# Patient Record
Sex: FEMALE | Race: BLACK OR AFRICAN AMERICAN | Employment: UNEMPLOYED | URBAN - METROPOLITAN AREA
[De-identification: names, ages, dates, MRNs, and addresses within clinical notes are randomized per-mention and may not be internally consistent; named-entity substitution may affect disease eponyms.]

---

## 2017-01-24 ENCOUNTER — HOSPITAL ENCOUNTER (OUTPATIENT)
Dept: ULTRASOUND IMAGING | Age: 47
Discharge: HOME | End: 2017-01-24
Attending: PHYSICIAN ASSISTANT
Payer: MEDICAID

## 2017-01-24 DIAGNOSIS — M79.89 PAIN AND SWELLING OF RIGHT LOWER LEG: ICD-10-CM

## 2017-01-24 DIAGNOSIS — M79.661 PAIN AND SWELLING OF RIGHT LOWER LEG: ICD-10-CM

## 2017-01-24 PROCEDURE — 93971 EXTREMITY STUDY: CPT

## 2017-01-24 NOTE — PROGRESS NOTES
Results discussed with patient. Advised to wear compression stocking on the R leg to help reduce the swelling, limit sodium in her diet, and stay well hydrated with water. Asked her to call me late next week to let me know how symptoms are doing and if not significantly improved will put in a lab order for BNP to be done at the [facility] while she is in that area for her pulmonology appt.

## 2017-02-22 ENCOUNTER — HOSPITAL ENCOUNTER (OUTPATIENT)
Dept: GENERAL RADIOLOGY | Age: 47
Discharge: HOME | End: 2017-02-22
Attending: PHYSICIAN ASSISTANT
Payer: MEDICAID

## 2017-02-22 PROCEDURE — 71020 XR CHEST PA LAT: CPT

## 2017-03-01 ENCOUNTER — APPOINTMENT (OUTPATIENT)
Dept: GENERAL RADIOLOGY | Age: 47
DRG: 133 | End: 2017-03-01
Attending: EMERGENCY MEDICINE
Payer: MEDICAID

## 2017-03-01 ENCOUNTER — HOSPITAL ENCOUNTER (INPATIENT)
Age: 47
LOS: 4 days | Discharge: HOME | DRG: 133 | End: 2017-03-05
Attending: EMERGENCY MEDICINE | Admitting: INTERNAL MEDICINE
Payer: MEDICAID

## 2017-03-01 DIAGNOSIS — J96.21 ACUTE ON CHRONIC RESPIRATORY FAILURE WITH HYPOXIA AND HYPERCAPNIA (HCC): ICD-10-CM

## 2017-03-01 DIAGNOSIS — J96.22 ACUTE ON CHRONIC RESPIRATORY FAILURE WITH HYPOXIA AND HYPERCAPNIA (HCC): ICD-10-CM

## 2017-03-01 DIAGNOSIS — E11.29 CONTROLLED TYPE 2 DIABETES MELLITUS WITH MICROALBUMINURIA, WITHOUT LONG-TERM CURRENT USE OF INSULIN (HCC): Chronic | ICD-10-CM

## 2017-03-01 DIAGNOSIS — J44.9 COPD, SEVERE (HCC): Chronic | ICD-10-CM

## 2017-03-01 DIAGNOSIS — E66.01 MORBID OBESITY WITH BMI OF 50.0-59.9, ADULT (HCC): Chronic | ICD-10-CM

## 2017-03-01 DIAGNOSIS — R09.02 HYPOXIA: ICD-10-CM

## 2017-03-01 DIAGNOSIS — F17.210 NICOTINE DEPENDENCE, CIGARETTES, UNCOMPLICATED: Chronic | ICD-10-CM

## 2017-03-01 DIAGNOSIS — Z99.89 OSA ON CPAP: Chronic | ICD-10-CM

## 2017-03-01 DIAGNOSIS — E66.2 OBESITY HYPOVENTILATION SYNDROME (HCC): ICD-10-CM

## 2017-03-01 DIAGNOSIS — G47.33 OSA ON CPAP: Chronic | ICD-10-CM

## 2017-03-01 DIAGNOSIS — J96.02 ACUTE RESPIRATORY FAILURE WITH HYPERCAPNIA (HCC): Primary | ICD-10-CM

## 2017-03-01 DIAGNOSIS — R80.9 CONTROLLED TYPE 2 DIABETES MELLITUS WITH MICROALBUMINURIA, WITHOUT LONG-TERM CURRENT USE OF INSULIN (HCC): Chronic | ICD-10-CM

## 2017-03-01 PROBLEM — J96.92 RESPIRATORY FAILURE WITH HYPERCAPNIA (HCC): Status: ACTIVE | Noted: 2017-03-01

## 2017-03-01 LAB
ALBUMIN SERPL BCP-MCNC: 2.9 G/DL (ref 3.5–5)
ALBUMIN SERPL BCP-MCNC: 3 G/DL (ref 3.5–5)
ALBUMIN/GLOB SERPL: 0.6 {RATIO} (ref 1.2–3.5)
ALBUMIN/GLOB SERPL: 0.7 {RATIO} (ref 1.2–3.5)
ALP SERPL-CCNC: 72 U/L (ref 50–136)
ALP SERPL-CCNC: 74 U/L (ref 50–136)
ALT SERPL-CCNC: 39 U/L (ref 12–65)
ALT SERPL-CCNC: 41 U/L (ref 12–65)
AMPHET UR QL SCN: NEGATIVE
ANION GAP BLD CALC-SCNC: ABNORMAL MMOL/L (ref 7–16)
ANION GAP BLD CALC-SCNC: ABNORMAL MMOL/L (ref 7–16)
ARTERIAL PATENCY WRIST A: POSITIVE
AST SERPL W P-5'-P-CCNC: 19 U/L (ref 15–37)
AST SERPL W P-5'-P-CCNC: 21 U/L (ref 15–37)
BACTERIA SPEC CULT: NORMAL
BACTERIA URNS QL MICRO: 0 /HPF
BARBITURATES UR QL SCN: NEGATIVE
BASE EXCESS BLDA CALC-SCNC: 15.1 MMOL/L (ref 0–3)
BASE EXCESS BLDA CALC-SCNC: 15.4 MMOL/L (ref 0–3)
BASE EXCESS BLDA CALC-SCNC: 16.7 MMOL/L (ref 0–3)
BASE EXCESS BLDA CALC-SCNC: 18.3 MMOL/L (ref 0–3)
BASOPHILS # BLD AUTO: 0 K/UL (ref 0–0.2)
BASOPHILS # BLD: 0 % (ref 0–2)
BDY SITE: ABNORMAL
BENZODIAZ UR QL: NEGATIVE
BILIRUB SERPL-MCNC: 0.5 MG/DL (ref 0.2–1.1)
BILIRUB SERPL-MCNC: 0.6 MG/DL (ref 0.2–1.1)
BNP SERPL-MCNC: <2 PG/ML
BUN SERPL-MCNC: 10 MG/DL (ref 6–23)
BUN SERPL-MCNC: 10 MG/DL (ref 6–23)
CALCIUM SERPL-MCNC: 8.5 MG/DL (ref 8.3–10.4)
CALCIUM SERPL-MCNC: 9 MG/DL (ref 8.3–10.4)
CANNABINOIDS UR QL SCN: NEGATIVE
CASTS URNS QL MICRO: NORMAL /LPF
CHLORIDE SERPL-SCNC: 97 MMOL/L (ref 98–107)
CHLORIDE SERPL-SCNC: 98 MMOL/L (ref 98–107)
CO2 SERPL-SCNC: 43 MMOL/L (ref 21–32)
CO2 SERPL-SCNC: >45 MMOL/L (ref 21–32)
COCAINE UR QL SCN: NEGATIVE
COHGB MFR BLD: 2.3 % (ref 0.5–1.5)
COHGB MFR BLD: 2.4 % (ref 0.5–1.5)
COHGB MFR BLD: 2.5 % (ref 0.5–1.5)
CREAT SERPL-MCNC: 0.73 MG/DL (ref 0.6–1)
CREAT SERPL-MCNC: 0.91 MG/DL (ref 0.6–1)
CRYSTALS URNS QL MICRO: 0 /LPF
DIFFERENTIAL METHOD BLD: ABNORMAL
DO-HGB BLD-MCNC: 10 % (ref 0–5)
DO-HGB BLD-MCNC: 15 % (ref 0–5)
DO-HGB BLD-MCNC: 8 % (ref 0–5)
EOSINOPHIL # BLD: 0 K/UL (ref 0–0.8)
EOSINOPHIL NFR BLD: 0 % (ref 0.5–7.8)
EPI CELLS #/AREA URNS HPF: NORMAL /HPF
ERYTHROCYTE [DISTWIDTH] IN BLOOD BY AUTOMATED COUNT: 19 % (ref 11.9–14.6)
FIO2 ON VENT: 40 %
FIO2 ON VENT: 45 %
FIO2 ON VENT: 60 %
FLUAV AG NPH QL IA: NEGATIVE
FLUBV AG NPH QL IA: NEGATIVE
GAS FLOW.O2 O2 DELIVERY SYS: 4 L/MIN
GLOBULIN SER CALC-MCNC: 4.3 G/DL
GLOBULIN SER CALC-MCNC: 4.7 G/DL (ref 2.3–3.5)
GLUCOSE SERPL-MCNC: 113 MG/DL (ref 65–100)
GLUCOSE SERPL-MCNC: 115 MG/DL (ref 65–100)
HCO3 BLDA-SCNC: 47 MMOL/L (ref 22–26)
HCO3 BLDA-SCNC: 50 MMOL/L (ref 22–26)
HCO3 BLDA-SCNC: 51 MMOL/L (ref 22–26)
HCO3 BLDA-SCNC: 51 MMOL/L (ref 22–26)
HCT VFR BLD AUTO: 49.7 % (ref 35.8–46.3)
HGB BLD-MCNC: 14.1 G/DL (ref 11.7–15.4)
HGB BLDMV-MCNC: 15 GM/DL (ref 11.7–15)
HGB BLDMV-MCNC: 15.1 GM/DL (ref 11.7–15)
HGB BLDMV-MCNC: 15.7 GM/DL (ref 11.7–15)
IMM GRANULOCYTES # BLD: 0 K/UL (ref 0–0.5)
IMM GRANULOCYTES NFR BLD AUTO: 0.3 % (ref 0–5)
LACTATE BLD-SCNC: 1.1 MMOL/L (ref 0.5–1.9)
LYMPHOCYTES # BLD AUTO: 17 % (ref 13–44)
LYMPHOCYTES # BLD: 1.5 K/UL (ref 0.5–4.6)
MCH RBC QN AUTO: 22.8 PG (ref 26.1–32.9)
MCHC RBC AUTO-ENTMCNC: 28.4 G/DL (ref 31.4–35)
MCV RBC AUTO: 80.3 FL (ref 79.6–97.8)
METHADONE UR QL: NEGATIVE
METHGB MFR BLD: 0 % (ref 0–1.5)
METHGB MFR BLD: 0.1 % (ref 0–1.5)
METHGB MFR BLD: 0.2 % (ref 0–1.5)
MONOCYTES # BLD: 0.7 K/UL (ref 0.1–1.3)
MONOCYTES NFR BLD AUTO: 8 % (ref 4–12)
MUCOUS THREADS URNS QL MICRO: NORMAL /LPF
NEUTS SEG # BLD: 6.6 K/UL (ref 1.7–8.2)
NEUTS SEG NFR BLD AUTO: 75 % (ref 43–78)
OPIATES UR QL: NEGATIVE
OTHER OBSERVATIONS,UCOM: NORMAL
OXYHGB MFR BLDA: 82.7 % (ref 94–97)
OXYHGB MFR BLDA: 87.2 % (ref 94–97)
OXYHGB MFR BLDA: 89.2 % (ref 94–97)
PCO2 BLDA: 101 MMHG (ref 35–45)
PCO2 BLDA: 109 MMHG (ref 35–45)
PCO2 BLDA: 113 MMHG (ref 35–45)
PCO2 BLDA: >130 MMHG (ref 35–45)
PCP UR QL: NEGATIVE
PH BLDA: 7.17 [PH] (ref 7.35–7.45)
PH BLDA: 7.27 [PH] (ref 7.35–7.45)
PH BLDA: 7.29 [PH] (ref 7.35–7.45)
PH BLDA: 7.29 [PH] (ref 7.35–7.45)
PHOSPHATE SERPL-MCNC: 4 MG/DL (ref 2.5–4.5)
PLATELET # BLD AUTO: 247 K/UL (ref 150–450)
PMV BLD AUTO: 11 FL (ref 10.8–14.1)
PO2 BLDA: 52 MMHG (ref 75–100)
PO2 BLDA: 62 MMHG (ref 75–100)
PO2 BLDA: 74 MMHG (ref 75–100)
PO2 BLDA: 89 MMHG (ref 75–100)
POTASSIUM SERPL-SCNC: 4.1 MMOL/L (ref 3.5–5.1)
POTASSIUM SERPL-SCNC: 4.8 MMOL/L (ref 3.5–5.1)
PROT SERPL-MCNC: 7.2 G/DL (ref 6.3–8.2)
PROT SERPL-MCNC: 7.7 G/DL (ref 6.3–8.2)
RBC # BLD AUTO: 6.19 M/UL (ref 4.05–5.25)
RBC #/AREA URNS HPF: NORMAL /HPF
RESP RATE: 24
SAO2 % BLD: 85 % (ref 92–98.5)
SAO2 % BLD: 90 % (ref 92–98.5)
SAO2 % BLD: 92 % (ref 92–98.5)
SERVICE CMNT-IMP: ABNORMAL
SERVICE CMNT-IMP: NORMAL
SODIUM SERPL-SCNC: 137 MMOL/L (ref 136–145)
SODIUM SERPL-SCNC: 141 MMOL/L (ref 136–145)
TROPONIN I BLD-MCNC: 0.02 NG/ML (ref 0–0.08)
TROPONIN I SERPL-MCNC: <0.04 NG/ML (ref 0.02–0.05)
VENTILATION MODE VENT: ABNORMAL
VT SETTING VENT: 400 ML
WBC # BLD AUTO: 8.8 K/UL (ref 4.3–11.1)
WBC URNS QL MICRO: NORMAL /HPF

## 2017-03-01 PROCEDURE — 82803 BLOOD GASES ANY COMBINATION: CPT

## 2017-03-01 PROCEDURE — 77030019605

## 2017-03-01 PROCEDURE — 84484 ASSAY OF TROPONIN QUANT: CPT

## 2017-03-01 PROCEDURE — 81003 URINALYSIS AUTO W/O SCOPE: CPT | Performed by: NURSE PRACTITIONER

## 2017-03-01 PROCEDURE — 80307 DRUG TEST PRSMV CHEM ANLYZR: CPT | Performed by: NURSE PRACTITIONER

## 2017-03-01 PROCEDURE — 94640 AIRWAY INHALATION TREATMENT: CPT

## 2017-03-01 PROCEDURE — 74011250636 HC RX REV CODE- 250/636: Performed by: INTERNAL MEDICINE

## 2017-03-01 PROCEDURE — 99285 EMERGENCY DEPT VISIT HI MDM: CPT | Performed by: NURSE PRACTITIONER

## 2017-03-01 PROCEDURE — 84100 ASSAY OF PHOSPHORUS: CPT | Performed by: INTERNAL MEDICINE

## 2017-03-01 PROCEDURE — 74011000250 HC RX REV CODE- 250: Performed by: NURSE PRACTITIONER

## 2017-03-01 PROCEDURE — 71010 XR CHEST PORT: CPT

## 2017-03-01 PROCEDURE — 65610000001 HC ROOM ICU GENERAL

## 2017-03-01 PROCEDURE — 74011250636 HC RX REV CODE- 250/636: Performed by: NURSE PRACTITIONER

## 2017-03-01 PROCEDURE — 87804 INFLUENZA ASSAY W/OPTIC: CPT | Performed by: INTERNAL MEDICINE

## 2017-03-01 PROCEDURE — 85025 COMPLETE CBC W/AUTO DIFF WBC: CPT | Performed by: EMERGENCY MEDICINE

## 2017-03-01 PROCEDURE — 36600 WITHDRAWAL OF ARTERIAL BLOOD: CPT

## 2017-03-01 PROCEDURE — 81015 MICROSCOPIC EXAM OF URINE: CPT | Performed by: NURSE PRACTITIONER

## 2017-03-01 PROCEDURE — 83605 ASSAY OF LACTIC ACID: CPT

## 2017-03-01 PROCEDURE — 36415 COLL VENOUS BLD VENIPUNCTURE: CPT | Performed by: INTERNAL MEDICINE

## 2017-03-01 PROCEDURE — 80053 COMPREHEN METABOLIC PANEL: CPT | Performed by: EMERGENCY MEDICINE

## 2017-03-01 PROCEDURE — 87040 BLOOD CULTURE FOR BACTERIA: CPT | Performed by: EMERGENCY MEDICINE

## 2017-03-01 PROCEDURE — 83880 ASSAY OF NATRIURETIC PEPTIDE: CPT | Performed by: NURSE PRACTITIONER

## 2017-03-01 PROCEDURE — 87641 MR-STAPH DNA AMP PROBE: CPT | Performed by: INTERNAL MEDICINE

## 2017-03-01 PROCEDURE — 80053 COMPREHEN METABOLIC PANEL: CPT | Performed by: INTERNAL MEDICINE

## 2017-03-01 PROCEDURE — 94660 CPAP INITIATION&MGMT: CPT

## 2017-03-01 RX ORDER — IPRATROPIUM BROMIDE AND ALBUTEROL SULFATE 2.5; .5 MG/3ML; MG/3ML
3 SOLUTION RESPIRATORY (INHALATION)
Status: DISCONTINUED | OUTPATIENT
Start: 2017-03-01 | End: 2017-03-02

## 2017-03-01 RX ORDER — NYSTATIN 100000 U/G
CREAM TOPICAL
Status: DISPENSED | OUTPATIENT
Start: 2017-03-01 | End: 2017-03-02

## 2017-03-01 RX ORDER — ENOXAPARIN SODIUM 100 MG/ML
40 INJECTION SUBCUTANEOUS EVERY 12 HOURS
Status: DISCONTINUED | OUTPATIENT
Start: 2017-03-01 | End: 2017-03-05 | Stop reason: HOSPADM

## 2017-03-01 RX ORDER — SODIUM CHLORIDE 0.9 % (FLUSH) 0.9 %
5-10 SYRINGE (ML) INJECTION AS NEEDED
Status: DISCONTINUED | OUTPATIENT
Start: 2017-03-01 | End: 2017-03-05 | Stop reason: HOSPADM

## 2017-03-01 RX ORDER — LEVOFLOXACIN 5 MG/ML
750 INJECTION, SOLUTION INTRAVENOUS EVERY 24 HOURS
Status: DISCONTINUED | OUTPATIENT
Start: 2017-03-01 | End: 2017-03-05 | Stop reason: HOSPADM

## 2017-03-01 RX ORDER — ACETAMINOPHEN 325 MG/1
650 TABLET ORAL
Status: DISCONTINUED | OUTPATIENT
Start: 2017-03-01 | End: 2017-03-05 | Stop reason: HOSPADM

## 2017-03-01 RX ORDER — MAGNESIUM SULFATE HEPTAHYDRATE 40 MG/ML
2 INJECTION, SOLUTION INTRAVENOUS
Status: COMPLETED | OUTPATIENT
Start: 2017-03-01 | End: 2017-03-01

## 2017-03-01 RX ORDER — SODIUM CHLORIDE 0.9 % (FLUSH) 0.9 %
5-10 SYRINGE (ML) INJECTION EVERY 8 HOURS
Status: DISCONTINUED | OUTPATIENT
Start: 2017-03-01 | End: 2017-03-05 | Stop reason: HOSPADM

## 2017-03-01 RX ORDER — IPRATROPIUM BROMIDE AND ALBUTEROL SULFATE 2.5; .5 MG/3ML; MG/3ML
3 SOLUTION RESPIRATORY (INHALATION)
Status: COMPLETED | OUTPATIENT
Start: 2017-03-01 | End: 2017-03-01

## 2017-03-01 RX ORDER — BUDESONIDE 0.5 MG/2ML
500 INHALANT ORAL
Status: DISCONTINUED | OUTPATIENT
Start: 2017-03-01 | End: 2017-03-05 | Stop reason: HOSPADM

## 2017-03-01 RX ADMIN — IPRATROPIUM BROMIDE AND ALBUTEROL SULFATE 3 ML: 2.5; .5 SOLUTION RESPIRATORY (INHALATION) at 15:15

## 2017-03-01 RX ADMIN — LEVOFLOXACIN 750 MG: 5 INJECTION, SOLUTION INTRAVENOUS at 20:44

## 2017-03-01 RX ADMIN — Medication 10 ML: at 23:30

## 2017-03-01 RX ADMIN — MAGNESIUM SULFATE IN WATER 2 G: 40 INJECTION, SOLUTION INTRAVENOUS at 15:54

## 2017-03-01 RX ADMIN — METHYLPREDNISOLONE SODIUM SUCCINATE 125 MG: 125 INJECTION, POWDER, FOR SOLUTION INTRAMUSCULAR; INTRAVENOUS at 15:54

## 2017-03-01 RX ADMIN — Medication 10 ML: at 20:45

## 2017-03-01 RX ADMIN — METHYLPREDNISOLONE SODIUM SUCCINATE 40 MG: 40 INJECTION, POWDER, FOR SOLUTION INTRAMUSCULAR; INTRAVENOUS at 23:29

## 2017-03-01 RX ADMIN — ENOXAPARIN SODIUM 40 MG: 40 INJECTION SUBCUTANEOUS at 20:45

## 2017-03-01 NOTE — PROGRESS NOTES
03/01/17 1710   Oxygen Therapy   O2 Sat (%) (!) 84 %   FIO2 (%) 45 %  (increased from 40%)   CPAP/BIPAP   Device Mode BIPAP; Humidified;S/T   Mask Type and Size Full face; Medium   IPAP (cm H2O) 22 cm H2O   EPAP (cm H2O) 12 cm H2O  (increased from 10)   Total RR (Spontaneous) 20 breaths per minute  (increased from 18)   ABG's drawn and results given to Dr Shanta Madison.

## 2017-03-01 NOTE — ED NOTES
Patient arrives to the ER complaining of weakness and lethargy. Upon arrival, patient appears very lethargic, falling asleep sitting up during assessment. Patient is coughing up thick white mucous. Lung sounds are very course. Patient states that she recently went to the doctor for a cold, but does not remember when she went to the doctor or what exact medications she has recently taken for the \"cold\".

## 2017-03-01 NOTE — H&P
HOSPITALIST HISTORY AND PHYSICAL  NAME:  Khushi Navarrete   Age:  42 y.o.  :   1970   MRN:   [id number]  PCP: MAIA Serrano  Consulting MD:  Treatment Team: Attending Provider: Katia Triana DO; Primary Nurse: Marie Tamayo, RN; Nurse Practitioner: VALENTINA Callaway    REASON FOR ADMISSION: Hypercapnic respiratory failure    HPI:   38yr old with a history of COPD and noncompliance with cpap and smoking who presented to ER with lethragy and weakness. Found to have Hypercapnic respiratory failure CO2 144. Currently on BIPAP in the ER. Lethargic and drowsy and unable to give history. Hospitalist asked to admit. Complete ROS done and is as stated in HPI or otherwise negative  Past Medical History:   Diagnosis Date    Childhood asthma     COPD exacerbation ([facility] [address] 11/10/2016    hospitalization at [facility] 2016    Pneumonia ages 3 and 11      Past Surgical History:   Procedure Laterality Date    HX PARTIAL HYSTERECTOMY  2012      Prior to Admission Medications   Prescriptions Last Dose Informant Patient Reported? Taking? Blood-Glucose Meter (ONETOUCH ULTRA2) monitoring kit   No No   Sig: Use to check blood sugar once daily as directed. Dx: E11.9   Blood-Glucose Meter (TRUE METRIX GLUCOSE METER) misc   No No   Sig: Check blood sugar twice daily, E11.9   Lancets (ONETOUCH ULTRASOFT LANCETS) misc   No No   Sig: Use to check glucose once daily as directed. Dx: E11.9   OXYGEN-AIR DELIVERY SYSTEMS   Yes No   Sig: by Does Not Apply route. 2 lpm exertion and hs    albuterol (PROVENTIL VENTOLIN) 2.5 mg /3 mL (0.083 %) nebulizer solution   No No   Sig: 3 mL by Nebulization route four (4) times daily. Indications: Chronic Obstructive Pulmonary Disease   albuterol (VENTOLIN HFA) 90 mcg/actuation inhaler   No No   Sig: Take 2 Puffs by inhalation every four (4) hours as needed for Wheezing.    albuterol-ipratropium (DUO-NEB) 2.5 mg-0.5 mg/3 ml nebu   No No   Sig: 3 mL by Nebulization route four (4) times daily. atorvastatin (LIPITOR) 10 mg tablet   No No   Sig: Take 1 Tab by mouth nightly. benzonatate (TESSALON) 200 mg capsule   No No   Sig: Take 1 Cap by mouth three (3) times daily as needed for Cough for up to 10 days. buPROPion (WELLBUTRIN) 75 mg tablet   No No   Sig: Take 2 Tabs by mouth daily. budesonide-formoterol (SYMBICORT) 160-4.5 mcg/actuation HFA inhaler   No No   Sig: Take 2 Puffs by inhalation two (2) times a day. cpap machine kit   Yes No   Sig: by Does Not Apply route. cyclobenzaprine (FLEXERIL) 10 mg tablet   No No   Sig: Take 1 Tab by mouth three (3) times daily as needed for Muscle Spasm(s). doxycycline (MONODOX) 100 mg capsule   No No   Sig: Take 1 Cap by mouth two (2) times a day for 10 days. glucose blood VI test strips (ONETOUCH ULTRA TEST) strip   No No   Sig: Use to check glucose once daily as directed. Dx: E11.9   glucose blood VI test strips (TRUE METRIX GLUCOSE TEST STRIP) strip   No No   Sig: Check blood sugar twice daily, E11.9   guaiFENesin SR (MUCINEX) 600 mg SR tablet   No No   Sig: Take 2 Tabs by mouth two (2) times a day. lisinopril (PRINIVIL, ZESTRIL) 10 mg tablet   No No   Sig: Take 1 Tab by mouth daily. metFORMIN (GLUCOPHAGE) 500 mg tablet   No No   Sig: Take 1 Tab by mouth two (2) times daily (with meals). methylPREDNISolone (MEDROL DOSEPACK) 4 mg tablet   No No   Sig: Take as directed   traMADol (ULTRAM) 50 mg tablet   No No   Sig: Take 1 Tab by mouth every six (6) hours as needed for Pain. Max Daily Amount: 200 mg.       Facility-Administered Medications: None     No Known Allergies   Social History   Substance Use Topics    Smoking status: Former Smoker     Packs/day: 0.25     Years: 24.00     Types: Cigarettes    Smokeless tobacco: Never Used      Comment: Quit 02/21/2017    Alcohol use 9.0 oz/week     18 Cans of beer per week      Comment: weekends      Family History   Problem Relation Age of Onset   Avila Inder COPD Mother     Hypertension Mother  Parkinsonism Father     Asthma Son     COPD Brother       Objective:     Visit Vitals    /59    Pulse 100    Temp 98.2 °F (36.8 °C)    Resp (!) 68    Ht 5' 2\" (1.575 m)    Wt 158.8 kg (350 lb)    LMP 2010    SpO2 92%    BMI 64.02 kg/m2      Temp (24hrs), Av.2 °F (36.8 °C), Min:98.2 °F (36.8 °C), Max:98.2 °F (36.8 °C)    Oxygen Therapy  O2 Sat (%): 92 % (17 1504)  Pulse via Oximetry: 85 beats per minute (17 1504)  O2 Device: Room air (17 1347)  Physical Exam:  General:    Alert, cooperative, no distress, appears stated age. Head:   Normocephalic, without obvious abnormality, atraumatic. Nose:  Nares normal. No drainage or sinus tenderness. Lungs:   Clear to auscultation bilaterally. No Wheezing or Rhonchi. No rales. Heart:   Regular rate and rhythm,  no murmur, rub or gallop. Abdomen:   Soft, non-tender. Not distended. Bowel sounds normal.   Extremities: No cyanosis. No edema. No clubbing  Skin:     Texture, turgor normal. No rashes or lesions. Not Jaundiced  Neurologic: Alert and oriented x 3, no focal deficits   Data Review: personally by me   Recent Results (from the past 24 hour(s))   CBC WITH AUTOMATED DIFF    Collection Time: 17  2:07 PM   Result Value Ref Range    WBC 8.8 4.3 - 11.1 K/uL    RBC 6.19 (H) 4.05 - 5.25 M/uL    HGB 14.1 11.7 - 15.4 g/dL    HCT 49.7 (H) 35.8 - 46.3 %    MCV 80.3 79.6 - 97.8 FL    MCH 22.8 (L) 26.1 - 32.9 PG    MCHC 28.4 (L) 31.4 - 35.0 g/dL    RDW 19.0 (H) 11.9 - 14.6 %    PLATELET 177 610 - 354 K/uL    MPV 11.0 10.8 - 14.1 FL    DF AUTOMATED      NEUTROPHILS 75 43 - 78 %    LYMPHOCYTES 17 13 - 44 %    MONOCYTES 8 4.0 - 12.0 %    EOSINOPHILS 0 (L) 0.5 - 7.8 %    BASOPHILS 0 0.0 - 2.0 %    IMMATURE GRANULOCYTES 0.3 0.0 - 5.0 %    ABS. NEUTROPHILS 6.6 1.7 - 8.2 K/UL    ABS. LYMPHOCYTES 1.5 0.5 - 4.6 K/UL    ABS. MONOCYTES 0.7 0.1 - 1.3 K/UL    ABS. EOSINOPHILS 0.0 0.0 - 0.8 K/UL    ABS.  BASOPHILS 0.0 0.0 - 0.2 K/UL ABS. IMM. GRANS. 0.0 0.0 - 0.5 K/UL   METABOLIC PANEL, COMPREHENSIVE    Collection Time: 03/01/17  2:07 PM   Result Value Ref Range    Sodium 141 136 - 145 mmol/L    Potassium 4.1 3.5 - 5.1 mmol/L    Chloride 98 98 - 107 mmol/L    CO2 >45 (HH) 21 - 32 mmol/L    Anion gap Cannot be calulated 7 - 16 mmol/L    Glucose 115 (H) 65 - 100 mg/dL    BUN 10 6 - 23 MG/DL    Creatinine 0.91 0.6 - 1.0 MG/DL    GFR est AA >60 >60 ml/min/1.73m2    GFR est non-AA >60 >60 ml/min/1.73m2    Calcium 8.5 8.3 - 10.4 MG/DL    Bilirubin, total 0.5 0.2 - 1.1 MG/DL    ALT (SGPT) 41 12 - 65 U/L    AST (SGOT) 21 15 - 37 U/L    Alk.  phosphatase 72 50 - 136 U/L    Protein, total 7.2 6.3 - 8.2 g/dL    Albumin 2.9 (L) 3.5 - 5.0 g/dL    Globulin 4.3 g/dL    A-G Ratio 0.7 (L) 1.2 - 3.5     POC TROPONIN-I    Collection Time: 03/01/17  2:08 PM   Result Value Ref Range    Troponin-I (POC) 0.02 0.0 - 0.08 ng/ml   POC LACTIC ACID    Collection Time: 03/01/17  2:17 PM   Result Value Ref Range    Lactic Acid (POC) 1.1 0.5 - 1.9 mmol/L   DRUG SCREEN, URINE    Collection Time: 03/01/17  3:13 PM   Result Value Ref Range    PCP(PHENCYCLIDINE) NEGATIVE       BENZODIAZEPINE NEGATIVE       COCAINE NEGATIVE       AMPHETAMINE NEGATIVE       METHADONE NEGATIVE       THC (TH-CANNABINOL) NEGATIVE       OPIATES NEGATIVE       BARBITURATES NEGATIVE      URINE MICROSCOPIC    Collection Time: 03/01/17  3:13 PM   Result Value Ref Range    WBC 0-3 0 /hpf    RBC 3-5 0 /hpf    Epithelial cells 0-3 0 /hpf    Bacteria 0 0 /hpf    Casts 10-20 0 /lpf    Crystals 0 0 /LPF    Mucus TRACE 0 /lpf    Other observations RESULTS VERIFIED MANUALLY     BLOOD GAS, ARTERIAL    Collection Time: 03/01/17  3:23 PM   Result Value Ref Range    pH 7.17 (LL) 7.35 - 7.45      PCO2 >130 (HH) 35 - 45 mmHg    PO2 74 (L) 75.0 - 100.0 mmHg    BICARBONATE 51 (H) 22 - 26 mmol/L    BASE EXCESS 15.4 (H) 0 - 3 mmol/L    TOTAL HEMOGLOBIN 15.1 (H) 11.7 - 15.0 GM/DL    O2 SAT 92 92.0 - 98.5 %    ARTERIAL O2 HGB 89.2 (L) 94.0 - 97.0 %    CARBOXYHEMOGLOBIN 2.4 (H) 0.5 - 1.5 %    METHEMOGLOBIN 0.1 0.0 - 1.5 %    DEOXYHEMOGLOBIN 8 (H) 0.0 - 5.0 %    SITE LR     ALLENS TEST POSITIVE      MODE NC     O2 FLOW 4.00 L/min    Respiratory comment: RYAN ascencio at 3 1 2017 3 25 21 PM. Read back. Imaging /Procedures /Studies reviewed personally by me  XR Results (most recent):    Results from Hospital Encounter encounter on 03/01/17   XR CHEST PORT   Narrative AP chest radiograph    History: Shortness of breath,     Comparison: Chest radiograph February 22, 2017    Findings:   Normal cardiomediastinal silhouette. Decreased lung volumes. There  is increased diffuse interstitial prominence which is nonspecific but in the  appropriate clinical setting could represent interstitial edema. There appear  to be increased trace bilateral pleural effusions and mild bibasilar airspace  opacities, which may represent atelectasis and or consolidation. No evidence of  pneumothorax. Visualized soft tissue and osseous structures otherwise  unremarkable. Impression Impression:    1. Decreased lung volumes with increased diffuse interstitial prominence, which  in the appropriate clinical setting could represent interstitial edema. 2.  Increased trace bilateral pleural effusions and mild bibasilar atelectasis  and or consolidation. CT Scan    Results from Hospital Encounter encounter on 11/10/16   CT CHEST W CONT   Narrative CT chest for pulmonary embolus done with  IV contrast November 10, 2016    Reference exam: Chest x-ray same day    Indication: COPD, worsening shortness of breath and chest congestion for 5 days    Technique: Radiation dose reduction techniques were used for this study using  one or more of the following: automated exposure control; adjustment of mA  and/or kV (according to patient size);  iterative reconstruction.  Thin section  axial images were taken through the chest using 100 cc  dynamic contrast  enhancement. Findings: No convincing evidence of pulmonary embolus is seen. Pretracheal lymph  node measures 1.7 x 1.3 cm, the azygous node measures 1.8 x 0.9 cm with  aortopulmonary window node measuring 1.4 x 2.4 cm. Lung fields show some  dependent edema and atelectasis but no focal consolidation. There is a  pericardial lymph node anteriorly on the right inferiorly measuring 1.2 x 0.9  cm. Impression IMPRESSION: No PE seen. Mediastinal adenopathy seen but no clear pneumonia  appreciated. Cannot exclude malignancy such as lymphoma. VAS/US Results (most recent):    Results from Hospital Encounter encounter on 01/24/17   DUPLEX LOWER EXT VENOUS RIGHT   Narrative Right lower extremity venous ultrasound    INDICATION:  Pain and swelling, one-month, moderate    Doppler ultrasound of the right lower extremity was performed. FINDINGS: There is no evidence of Baker's cyst. There is normal flow in the  common femoral, superficial femoral, greater saphenous, femoral and popliteal  veins. Normal compression and augmentation demonstrated. The proximal calf veins  are also patent as is the partially visualized contralateral common femoral  vein. Impression IMPRESSION: No evidence of deep venous thrombosis in the right lower extremity          Assessment and Plan:      Active Hospital Problems    Diagnosis Date Noted    Respiratory failure with hypercapnia ([address] 03/01/2017       PLAN  · Resp failure- continue bipap- repeat abg in 1 hour, consult pulmonology  · Copd exacerbation- continue nebs, steroids, abx  · Tinea in body folds- nystatin powder  · Morbid obesity- supportive care  · CXR concerning for pleural effusions vs consolidations- on abx- bnp pending,will also trend trops- will follow up and give lasix if indicated  · dvt ppx lovenox  · Condition guarded- pt high risk given severe hypercapnic respiratory failure- low threshold for intubation- although I am reluctant to given her copd and morbid obesity  · Further mgt as her workup dictates    Code Status:   full  Anticipated discharge:   2-3 days  Signed By: John Vega MD     March 1, 2017        Addendum- rpt abg showed some improvement in [address] and co- settings titrated will get a repeat in 2 hours  BNP- <2- so likely pneumonia- on abx -  follow up cultures  Further mgt as course dictates

## 2017-03-01 NOTE — ED TRIAGE NOTES
From home, c/o lethargic/confused been in bed x5 days, family called. Seen by pcp last week and rx for prednisone and antibiotic. Wears home o2 at 4l, 94%, PTA received 2 albuterol tx, temp 99 oral, /96, HR 98.  Pt states feeling  Better upon arrival.

## 2017-03-01 NOTE — ED PROVIDER NOTES
HPI Comments: Patient presents with lethargy. Patient having trouble staying awake during assessment. Patient given breathing treatment per ems. Family states patient does not have history of CHF. Patient's family is not good historians regarding patient's health. Patient is a 42 y.o. female presenting with lethargy. The history is provided by the patient. Lethargy   This is a new problem. The current episode started 12 to 24 hours ago. The problem occurs daily. The problem has been gradually worsening. Associated symptoms include shortness of breath. Pertinent negatives include no chest pain, no abdominal pain and no headaches. Past Medical History:   Diagnosis Date    Childhood asthma     COPD exacerbation ([facility] [address] 11/10/2016    hospitalization at [facility] 11/2016    Pneumonia ages 3 and 11       Past Surgical History:   Procedure Laterality Date    HX PARTIAL HYSTERECTOMY  08/2012         Family History:   Problem Relation Age of Onset    COPD Mother     Hypertension Mother     Parkinsonism Father     Asthma Son     COPD Brother        Social History     Social History    Marital status: SINGLE     Spouse name: N/A    Number of children: N/A    Years of education: N/A     Occupational History    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 Disabled--previously worked at [employer]. Has lived in [place] and [place]. No pets. ALLERGIES: Review of patient's allergies indicates no known allergies.     Review of Systems   Unable to perform ROS: Other (ROS provided by patient's family. )   Respiratory: Positive for cough and shortness of breath. Cardiovascular: Negative for chest pain. Gastrointestinal: Negative for abdominal pain. Neurological: Negative for headaches. Vitals:    03/01/17 1347 03/01/17 1357 03/01/17 1405   BP: 124/69  116/59   Pulse: 94 97 85   Resp: 16 28 24   Temp: 98.2 °F (36.8 °C)     SpO2: 96% 94% 93%   Weight: 158.8 kg (350 lb)     Height: 5' 2\" (1.575 m)              Physical Exam   Constitutional: She appears lethargic. She is sleeping. She appears distressed. Cardiovascular: Normal rate, regular rhythm, normal heart sounds and normal pulses. Pulmonary/Chest: No accessory muscle usage. Tachypnea noted. She is in respiratory distress. She has decreased breath sounds in the right lower field and the left lower field. She has wheezes in the right middle field and the left middle field. Abdominal: Normal appearance. Bowel sounds are decreased. There is no tenderness. Musculoskeletal:        Right ankle: She exhibits swelling. Left ankle: She exhibits swelling. Right lower leg: She exhibits edema. Left lower leg: She exhibits edema. Right foot: There is swelling. Left foot: There is swelling. Neurological: She appears lethargic. Nursing note and vitals reviewed. 3:41 PM-patient discussed with Dr. David Pagan  Recent Results (from the past 12 hour(s))   CBC WITH AUTOMATED DIFF    Collection Time: 03/01/17  2:07 PM   Result Value Ref Range    WBC 8.8 4.3 - 11.1 K/uL    RBC 6.19 (H) 4.05 - 5.25 M/uL    HGB 14.1 11.7 - 15.4 g/dL    HCT 49.7 (H) 35.8 - 46.3 %    MCV 80.3 79.6 - 97.8 FL    MCH 22.8 (L) 26.1 - 32.9 PG    MCHC 28.4 (L) 31.4 - 35.0 g/dL    RDW 19.0 (H) 11.9 - 14.6 %    PLATELET 861 269 - 778 K/uL    MPV 11.0 10.8 - 14.1 FL    DF AUTOMATED      NEUTROPHILS 75 43 - 78 %    LYMPHOCYTES 17 13 - 44 %    MONOCYTES 8 4.0 - 12.0 %    EOSINOPHILS 0 (L) 0.5 - 7.8 %    BASOPHILS 0 0.0 - 2.0 %    IMMATURE GRANULOCYTES 0.3 0.0 - 5.0 %    ABS.  NEUTROPHILS 6.6 1.7 - 8.2 K/UL    ABS. LYMPHOCYTES 1.5 0.5 - 4.6 K/UL    ABS. MONOCYTES 0.7 0.1 - 1.3 K/UL    ABS. EOSINOPHILS 0.0 0.0 - 0.8 K/UL    ABS. BASOPHILS 0.0 0.0 - 0.2 K/UL    ABS. IMM. GRANS. 0.0 0.0 - 0.5 K/UL   METABOLIC PANEL, COMPREHENSIVE    Collection Time: 03/01/17  2:07 PM   Result Value Ref Range    Sodium 141 136 - 145 mmol/L    Potassium 4.1 3.5 - 5.1 mmol/L    Chloride 98 98 - 107 mmol/L    CO2 >45 (HH) 21 - 32 mmol/L    Anion gap Cannot be calulated 7 - 16 mmol/L    Glucose 115 (H) 65 - 100 mg/dL    BUN 10 6 - 23 MG/DL    Creatinine 0.91 0.6 - 1.0 MG/DL    GFR est AA >60 >60 ml/min/1.73m2    GFR est non-AA >60 >60 ml/min/1.73m2    Calcium 8.5 8.3 - 10.4 MG/DL    Bilirubin, total 0.5 0.2 - 1.1 MG/DL    ALT (SGPT) 41 12 - 65 U/L    AST (SGOT) 21 15 - 37 U/L    Alk.  phosphatase 72 50 - 136 U/L    Protein, total 7.2 6.3 - 8.2 g/dL    Albumin 2.9 (L) 3.5 - 5.0 g/dL    Globulin 4.3 g/dL    A-G Ratio 0.7 (L) 1.2 - 3.5     BNP    Collection Time: 03/01/17  2:07 PM   Result Value Ref Range    BNP <2 pg/mL   POC TROPONIN-I    Collection Time: 03/01/17  2:08 PM   Result Value Ref Range    Troponin-I (POC) 0.02 0.0 - 0.08 ng/ml   POC LACTIC ACID    Collection Time: 03/01/17  2:17 PM   Result Value Ref Range    Lactic Acid (POC) 1.1 0.5 - 1.9 mmol/L   DRUG SCREEN, URINE    Collection Time: 03/01/17  3:13 PM   Result Value Ref Range    PCP(PHENCYCLIDINE) NEGATIVE       BENZODIAZEPINE NEGATIVE       COCAINE NEGATIVE       AMPHETAMINE NEGATIVE       METHADONE NEGATIVE       THC (TH-CANNABINOL) NEGATIVE       OPIATES NEGATIVE       BARBITURATES NEGATIVE      URINE MICROSCOPIC    Collection Time: 03/01/17  3:13 PM   Result Value Ref Range    WBC 0-3 0 /hpf    RBC 3-5 0 /hpf    Epithelial cells 0-3 0 /hpf    Bacteria 0 0 /hpf    Casts 10-20 0 /lpf    Crystals 0 0 /LPF    Mucus TRACE 0 /lpf    Other observations RESULTS VERIFIED MANUALLY     BLOOD GAS, ARTERIAL    Collection Time: 03/01/17  3:23 PM   Result Value Ref Range pH 7.17 (LL) 7.35 - 7.45      PCO2 >130 (HH) 35 - 45 mmHg    PO2 74 (L) 75.0 - 100.0 mmHg    BICARBONATE 51 (H) 22 - 26 mmol/L    BASE EXCESS 15.4 (H) 0 - 3 mmol/L    TOTAL HEMOGLOBIN 15.1 (H) 11.7 - 15.0 GM/DL    O2 SAT 92 92.0 - 98.5 %    ARTERIAL O2 HGB 89.2 (L) 94.0 - 97.0 %    CARBOXYHEMOGLOBIN 2.4 (H) 0.5 - 1.5 %    METHEMOGLOBIN 0.1 0.0 - 1.5 %    DEOXYHEMOGLOBIN 8 (H) 0.0 - 5.0 %    SITE LR     ALLENS TEST POSITIVE      MODE NC     O2 FLOW 4.00 L/min    Respiratory comment: RYAN ascencio at 3 1 2017 3 25 21 PM. Read back. XR CHEST PORT (Final result) Result time: 03/01/17 14:44:02     Final result by Kalie Khan MD (03/01/17 14:44:02)     Impression:     Impression:    1.  Decreased lung volumes with increased diffuse interstitial prominence, which  in the appropriate clinical setting could represent interstitial edema. 2.  Increased trace bilateral pleural effusions and mild bibasilar atelectasis  and or consolidation.       Narrative:     AP chest radiograph    History: Shortness of breath,     Comparison: Chest radiograph February 22, 2017    Findings:   Normal cardiomediastinal silhouette.  Decreased lung volumes. Janalee Ceiba  is increased diffuse interstitial prominence which is nonspecific but in the  appropriate clinical setting could represent interstitial edema.  There appear  to be increased trace bilateral pleural effusions and mild bibasilar airspace  opacities, which may represent atelectasis and or consolidation.  No evidence of  pneumothorax.  Visualized soft tissue and osseous structures otherwise  unremarkable.                 MDM  Number of Diagnoses or Management Options  Acute respiratory failure with hypercapnia [facility]):   Diagnosis management comments: Patient's CO2 144. 2 patient placed on bipap. BNP within normal limits. I have discussed patient with Dr. Jhony Armendariz regarding admission for COPD exacerbation. She agrees to see patient and admit.  Patient received IV solumedrol, 2 g of IV magnesium and duoneb treatment while in the ED. Negative troponin negative lactic. Patient admitted to ICU for respiratory failure.         Amount and/or Complexity of Data Reviewed  Clinical lab tests: ordered and reviewed  Tests in the radiology section of CPT®: ordered and reviewed  Tests in the medicine section of CPT®: reviewed and ordered  Discuss the patient with other providers: yes    Patient Progress  Patient progress: stable    ED Course       Procedures

## 2017-03-01 NOTE — IP AVS SNAPSHOT
[address] [place] [address] 
[phone] Patient: Ramirez Isidro MRN: [id number] You are allergic to the following No active allergies Recent Documentation Height Weight Breastfeeding? BMI OB Status Smoking Status 1.575 m 151.7 kg No 61.18 kg/m2 Hysterectomy Former Smoker Unresulted Labs Order Current Status CULTURE, BLOOD Preliminary result Emergency Contacts Name Discharge Info Relation Home Work Mobile Yina Isidro  Sister [23] [phone] About your hospitalization You were admitted on:  March 1, 2017 You last received care in the:  [facility] You were discharged on:  March 5, 2017 Unit phone number:  [phone] Why you were hospitalized Your primary diagnosis was:  Acute On Chronic Respiratory Failure (Hcc) Your diagnoses also included:  Copd, Severe (Hcc), Morbid Obesity With Bmi Of 50.0-59.9, Adult (Hcc), Hugo On Cpap, Hypoxia, Diabetes Mellitus Type 2, Controlled (Hcc), Obesity Hypoventilation Syndrome (Hcc), Tobacco Abuse, Cocaine Abuse Providers Seen During Your Hospitalizations Provider Role Specialty Primary office phone Ángel Casillas MD Attending Provider Emergency Medicine [phone] Eagle Norris DO Attending Provider Emergency Medicine [phone] Natasha Burt MD Attending Provider Internal Medicine [phone] Your Primary Care Physician (PCP) Primary Care Physician Office Phone Office Fax Alber Dominique Follow-up Information Follow up With Details Comments Contact Info MAIA Baron In 1 week  [address] [place] [phone] 
[phone] Your Appointments Thursday March 09, 2017  8:20 AM EST Return appointment with Pati Verde NP  
 [facility] ([facility]) [address] [facility]  
[phone] Current Discharge Medication List  
  
START taking these medications Dose & Instructions Dispensing Information Comments Morning Noon Evening Bedtime  
 levoFLOXacin 750 mg tablet Commonly known as:  Rod Back Your next dose is: Today Dose:  750 mg Take 1 Tab by mouth daily. Quantity:  5 Tab Refills:  0  
     
   
   
  
   
  
 nicotine 21 mg/24 hr  
Commonly known as:  Dianne Brewster Your next dose is:  Tomorrow Dose:  1 Patch 1 Patch by TransDERmal route daily for 30 days. Quantity:  28 Patch Refills:  1  
     
  
   
   
   
  
 nystatin powder Commonly known as:  MYCOSTATIN Your next dose is: Today Apply  to affected area two (2) times a day. Quantity:  1 Bottle Refills:  0  
     
   
   
  
   
  
 predniSONE 20 mg tablet Commonly known as:  Bosie Spittle Your next dose is: Today 3 tabs po daily x 4 days then 2 tabs po daily 4 days then 1 tab po daily x 4 days then 1.2 tab po daily x 4 days then stop Quantity:  26 Tab Refills:  0 CONTINUE these medications which have CHANGED Dose & Instructions Dispensing Information Comments Morning Noon Evening Bedtime  
 albuterol-ipratropium 2.5 mg-0.5 mg/3 ml Nebu Commonly known as:  [employer] What changed:   
- when to take this 
- additional instructions Dose:  3 mL  
3 mL by Nebulization route every four (4) hours. For 3 days then 4 times daily Quantity:  120 Nebule Refills:  11 CONTINUE these medications which have NOT CHANGED Dose & Instructions Dispensing Information Comments Morning Noon Evening Bedtime * albuterol 2.5 mg /3 mL (0.083 %) nebulizer solution Commonly known as:  PROVENTIL VENTOLIN  Dose:  2.5 mg  
 3 mL by Nebulization route four (4) times daily. Indications: Chronic Obstructive Pulmonary Disease Quantity:  120 Each Refills:  11  
     
   
   
   
  
 * albuterol 90 mcg/actuation inhaler Commonly known as:  VENTOLIN HFA Dose:  2 Puff Take 2 Puffs by inhalation every four (4) hours as needed for Wheezing. Quantity:  1 Inhaler Refills:  11  
     
   
   
   
  
 atorvastatin 10 mg tablet Commonly known as:  LIPITOR Your next dose is: Today Dose:  10 mg Take 1 Tab by mouth nightly. Quantity:  30 Tab Refills:  5 * Blood-Glucose Meter monitoring kit Commonly known as:  Gt Carry Use to check blood sugar once daily as directed. Dx: E11.9 Quantity:  1 Kit Refills:  0  
     
   
   
   
  
 * Blood-Glucose Meter Misc Commonly known as:  TRUE METRIX GLUCOSE METER Check blood sugar twice daily, E11.9 Quantity:  100 Each Refills:  11  
     
   
   
   
  
 budesonide-formoterol 160-4.5 mcg/actuation HFA inhaler Commonly known as:  SYMBICORT Your next dose is: Today Dose:  2 Puff Take 2 Puffs by inhalation two (2) times a day. Quantity:  1 Inhaler Refills:  11 buPROPion 75 mg tablet Commonly known as:  [facility] Your next dose is:  Tomorrow Dose:  150 mg Take 2 Tabs by mouth daily. Quantity:  30 Tab Refills:  1  
     
  
   
   
   
  
 cpap machine kit Your next dose is: Today  
   
 by Does Not Apply route. Refills:  0  
     
   
   
   
  
  
 cyclobenzaprine 10 mg tablet Commonly known as:  FLEXERIL Dose:  10 mg Take 1 Tab by mouth three (3) times daily as needed for Muscle Spasm(s). Quantity:  30 Tab Refills:  1  
     
   
   
   
  
 * glucose blood VI test strips strip Commonly known as:  ONETOUCH ULTRA TEST Use to check glucose once daily as directed. Dx: E11.9 Quantity:  30 Strip Refills:  11  
     
   
 * glucose blood VI test strips strip Commonly known as:  TRUE METRIX GLUCOSE TEST STRIP Check blood sugar twice daily, E11.9 Quantity:  100 Strip Refills:  11  
     
   
   
   
  
 guaiFENesin  mg SR tablet Commonly known as:  Chidi & Chidi Your next dose is: Today Dose:  1200 mg Take 2 Tabs by mouth two (2) times a day. Quantity:  120 Tab Refills:  5 Lancets Misc Commonly known as:  ONETOUCH ULTRASOFT LANCETS Use to check glucose once daily as directed. Dx: E11.9 Quantity:  30 Each Refills:  11  
     
   
   
   
  
 lisinopril 10 mg tablet Commonly known as:  Rosaline Reasons Your next dose is:  Tomorrow Dose:  10 mg Take 1 Tab by mouth daily. Quantity:  30 Tab Refills:  5  
     
  
   
   
   
  
 metFORMIN 500 mg tablet Commonly known as:  GLUCOPHAGE Your next dose is: Today Dose:  500 mg Take 1 Tab by mouth two (2) times daily (with meals). Quantity:  60 Tab Refills:  5 OXYGEN-AIR DELIVERY SYSTEMS  
   
 by Does Not Apply route. 2 lpm exertion and hs Refills:  0  
     
   
   
   
  
 traMADol 50 mg tablet Commonly known as:  ULTRAM  
   
 Dose:  50 mg Take 1 Tab by mouth every six (6) hours as needed for Pain. Max Daily Amount: 200 mg. Quantity:  30 Tab Refills:  1  
     
   
   
   
  
 * Notice: This list has 6 medication(s) that are the same as other medications prescribed for you. Read the directions carefully, and ask your doctor or other care provider to review them with you. STOP taking these medications   
 benzonatate 200 mg capsule Commonly known as:  TESSALON  
   
  
 doxycycline 100 mg capsule Commonly known as:  MONODOX  
   
  
 methylPREDNISolone 4 mg tablet Commonly known as:  Pako Julien Where to Get Your Medications These medications were sent to [address] [place] [address], [place] - [address] [address] Phone:  [phone]  
  nicotine 21 mg/24 hr Information on where to get these meds will be given to you by the nurse or doctor. ! Ask your nurse or doctor about these medications  
  albuterol-ipratropium 2.5 mg-0.5 mg/3 ml Nebu  
 levoFLOXacin 750 mg tablet  
 nystatin powder  
 predniSONE 20 mg tablet Discharge Instructions Chronic Obstructive Pulmonary Disease (COPD): Care Instructions Your Care Instructions Chronic obstructive pulmonary disease (COPD) is a general term for a group of lung diseases, including emphysema and chronic bronchitis. People with COPD have decreased airflow in and out of the lungs, which makes it hard to breathe. The airways also can get clogged with thick mucus. Cigarette smoking is a major cause of COPD. Although there is no cure for COPD, you can slow its progress. Following your treatment plan and taking care of yourself can help you feel better and live longer. Follow-up care is a key part of your treatment and safety. Be sure to make and go to all appointments, and call your doctor if you are having problems. It's also a good idea to know your test results and keep a list of the medicines you take. How can you care for yourself at home? Staying healthy · Do not smoke. This is the most important step you can take to prevent more damage to your lungs. If you need help quitting, talk to your doctor about stop-smoking programs and medicines. These can increase your chances of quitting for good. · Avoid colds and flu. Get a pneumococcal vaccine shot. If you have had one before, ask your doctor whether you need a second dose. Get the flu vaccine every fall. If you must be around people with colds or the flu, wash your hands often. · Avoid secondhand smoke, air pollution, and high altitudes.  Also avoid cold, dry air and hot, humid air. Stay at home with your windows closed when air pollution is bad. Medicines and oxygen therapy · Take your medicines exactly as prescribed. Call your doctor if you think you are having a problem with your medicine. · You may be taking medicines such as: ¨ Bronchodilators. These help open your airways and make breathing easier. Bronchodilators are either short-acting (work for 6 to 9 hours) or long-acting (work for 24 hours). You inhale most bronchodilators, so they start to act quickly. Always carry your quick-relief inhaler with you in case you need it while you are away from home. ¨ Corticosteroids (prednisone, budesonide). These reduce airway inflammation. They come in pill or inhaled form. You must take these medicines every day for them to work well. · A spacer may help you get more inhaled medicine to your lungs. Ask your doctor or pharmacist if a spacer is right for you. If it is, ask how to use it properly. · Do not take any vitamins, over-the-counter medicine, or herbal products without talking to your doctor first. 
· If your doctor prescribed antibiotics, take them as directed. Do not stop taking them just because you feel better. You need to take the full course of antibiotics. · Oxygen therapy boosts the amount of oxygen in your blood and helps you breathe easier. Use the flow rate your doctor has recommended, and do not change it without talking to your doctor first. 
Activity · Get regular exercise. Walking is an easy way to get exercise. Start out slowly, and walk a little more each day. · Pay attention to your breathing. You are exercising too hard if you cannot talk while you are exercising. · Take short rest breaks when doing household chores and other activities. · Learn breathing methodssuch as breathing through pursed lipsto help you become less short of breath.  
· If your doctor has not set you up with a pulmonary rehabilitation program, talk to him or her about whether rehab is right for you. Rehab includes exercise programs, education about your disease and how to manage it, help with diet and other changes, and emotional support. Diet · Eat regular, healthy meals. Use bronchodilators about 1 hour before you eat to make it easier to eat. Eat several small meals instead of three large ones. Drink beverages at the end of the meal. Avoid foods that are hard to chew. · Eat foods that contain protein so that you do not lose muscle mass. Mental health · Talk to your family, friends, or a therapist about your feelings. It is normal to feel frightened, angry, hopeless, helpless, and even guilty. Talking openly about bad feelings can help you cope. If these feelings last, talk to your doctor. When should you call for help? Call 911 anytime you think you may need emergency care. For example, call if: 
· You have severe trouble breathing. Call your doctor now or seek immediate medical care if: 
· You have new or worse trouble breathing. · You cough up blood. · You have a fever. Watch closely for changes in your health, and be sure to contact your doctor if: 
· You cough more deeply or more often, especially if you notice more mucus or a change in the color of your mucus. · You have new or worse swelling in your legs or belly. · You are not getting better as expected. Where can you learn more? Go to http://carin-kylie.info/. Calderon Sears in the search box to learn more about \"Chronic Obstructive Pulmonary Disease (COPD): Care Instructions. \" Current as of: May 23, 2016 Content Version: 11.1 © 2781-7413 [employer]. Care instructions adapted under license by [employer] (which disclaims liability or warranty for this information).  If you have questions about a medical condition or this instruction, always ask your healthcare professional. [employer] Dicks[employer] disclaims any warranty or liability for your use of this information. Stopping Smoking: Care Instructions Your Care Instructions Cigarette smokers crave the nicotine in cigarettes. Giving it up is much harder than simply changing a habit. Your body has to stop craving the nicotine. It is hard to quit, but you can do it. There are many tools that people use to quit smoking. You may find that combining tools works best for you. There are several steps to quitting. First you get ready to quit. Then you get support to help you. After that, you learn new skills and behaviors to become a nonsmoker. For many people, a necessary step is getting and using medicine. Your doctor will help you set up the plan that best meets your needs. You may want to attend a smoking cessation program to help you quit smoking. When you choose a program, look for one that has proven success. Ask your doctor for ideas. You will greatly increase your chances of success if you take medicine as well as get counseling or join a cessation program. 
Some of the changes you feel when you first quit tobacco are uncomfortable. Your body will miss the nicotine at first, and you may feel short-tempered and grumpy. You may have trouble sleeping or concentrating. Medicine can help you deal with these symptoms. You may struggle with changing your smoking habits and rituals. The last step is the tricky one: Be prepared for the smoking urge to continue for a time. This is a lot to deal with, but keep at it. You will feel better. Follow-up care is a key part of your treatment and safety. Be sure to make and go to all appointments, and call your doctor if you are having problems. Its also a good idea to know your test results and keep a list of the medicines you take. How can you care for yourself at home? · Ask your family, friends, and coworkers for support. You have a better chance of quitting if you have help and support. · Join a support group, such as Nicotine Anonymous, for people who are trying to quit smoking. · Consider signing up for a smoking cessation program, such as the American Lung Association's Freedom from Smoking program. 
· Set a quit date. Pick your date carefully so that it is not right in the middle of a big deadline or stressful time. Once you quit, do not even take a puff. Get rid of all ashtrays and lighters after your last cigarette. Clean your house and your clothes so that they do not smell of smoke. · Learn how to be a nonsmoker. Think about ways you can avoid those things that make you reach for a cigarette. ¨ Avoid situations that put you at greatest risk for smoking. For some people, it is hard to have a drink with friends without smoking. For others, they might skip a coffee break with coworkers who smoke. ¨ Change your daily routine. Take a different route to work or eat a meal in a different place. · Cut down on stress. Calm yourself or release tension by doing an activity you enjoy, such as reading a book, taking a hot bath, or gardening. · Talk to your doctor or pharmacist about nicotine replacement therapy, which replaces the nicotine in your body. You still get nicotine but you do not use tobacco. Nicotine replacement products help you slowly reduce the amount of nicotine you need. These products come in several forms, many of them available over-the-counter: ¨ Nicotine patches ¨ Nicotine gum and lozenges ¨ Nicotine inhaler · Ask your doctor about bupropion (Wellbutrin) or varenicline (Chantix), which are prescription medicines. They do not contain nicotine. They help you by reducing withdrawal symptoms, such as stress and anxiety. · Some people find hypnosis, acupuncture, and massage helpful for ending the smoking habit. · Eat a healthy diet and get regular exercise. Having healthy habits will help your body move past its craving for nicotine. · Be prepared to keep trying. Most people are not successful the first few times they try to quit. Do not get mad at yourself if you smoke again. Make a list of things you learned and think about when you want to try again, such as next week, next month, or next year. Where can you learn more? Go to http://carin-kylie.info/. Enter C448 in the search box to learn more about \"Stopping Smoking: Care Instructions. \" Current as of: May 26, 2016 Content Version: 11.1 © 7764-5107 [employer]. Care instructions adapted under license by [employer] (which disclaims liability or warranty for this information). If you have questions about a medical condition or this instruction, always ask your healthcare professional. [address] any warranty or liability for your use of this information. Discharge Orders None [place] Announcement We are excited to announce that we are making your provider's discharge notes available to you in [facility]. You will see these notes when they are completed and signed by the physician that discharged you from your recent hospital stay. If you have any questions or concerns about any information you see in [facility]ok, please call the Health Information Department where you were seen or reach out to your Primary Care Provider for more information about your plan of care. Introducing [facility]! Johnathon Yi introduces [facility]ok patient portal. Now you can access parts of your medical record, email your doctor's office, and request medication refills online. 1. In your internet browser, go to [email or web address] 2. Click on the First Time User? Click Here link in the Sign In box. You will see the New Member Sign Up page. 3. Enter your [place]ok Access Code exactly as it appears below. You will not need to use this code after youve completed the sign-up process.  If you do not sign up before the expiration date, you must request a new code. · ClearFlow Access Code: [email or web address] Expires: 5/23/2017 10:41 AM 
 
4. Enter the last four digits of your Social Security Number (xxxx) and Date of Birth (mm/dd/yyyy) as indicated and click Submit. You will be taken to the next sign-up page. 5. Create a ClearFlow ID. This will be your ClearFlow login ID and cannot be changed, so think of one that is secure and easy to remember. 6. Create a ClearFlow password. You can change your password at any time. 7. Enter your Password Reset Question and Answer. This can be used at a later time if you forget your password. 8. Enter your e-mail address. You will receive e-mail notification when new information is available in [address] 9. Click Sign Up. You can now view and download portions of your medical record. 10. Click the Download Summary menu link to download a portable copy of your medical information. If you have questions, please visit the Frequently Asked Questions section of the ClearFlow website. Remember, ClearFlow is NOT to be used for urgent needs. For medical emergencies, dial 911. Now available from your iPhone and Android! General Information Please provide this summary of care documentation to your next provider. Patient Signature:  ____________________________________________________________ Date:  ____________________________________________________________  
  
Celine Li Provider Signature:  ____________________________________________________________ Date:  ____________________________________________________________

## 2017-03-01 NOTE — PROGRESS NOTES
03/01/17 1345   Oxygen Therapy   O2 Sat (%) 91 %   Pulse via Oximetry 101 beats per minute   O2 Device BIPAP   FIO2 (%) 40 %   CPAP/BIPAP   CPAP/BIPAP Start/Stop On   Device Mode BIPAP; Humidified;S/T   $$ Bipap Daily Yes   Mask Type and Size Full face; Medium   IPAP (cm H2O) 22 cm H2O   EPAP (cm H2O) 10 cm H2O   Inspiratory Time (sec) 1 seconds   Vt Spont (ml) 372 ml   Ve Observed (l/min) 7 l/min   Total RR (Spontaneous) 18 breaths per minute   Insp Rise Time (sec) 0.1   Leak (Estimated) 47 L/min   Alarm Settings   High Pressure 24   Low Pressure 10   High Rate 40   Low Rate 10   Pt placed on Bi-pap after ABG and nebulizer treatment. Pt was responding to questions prior to placing on Bi-pap. Pt has had a Sleep Study in the past 3/25/16 with results showing lowest Sats of 71% . .1 258 Hypopneas and 71 Obstructive apneas during study. Pt was given  Home cpap of 20 cm with full face mask. Pt is a current smoker. Pt has seen [facility] in the past.  Pt placed on Bi-pap of 22 cm and Epap of 10 RR 18 . BS diminished.  Will repeat ABG in 1 hour per ER MD request.

## 2017-03-02 ENCOUNTER — APPOINTMENT (OUTPATIENT)
Dept: GENERAL RADIOLOGY | Age: 47
DRG: 133 | End: 2017-03-02
Attending: INTERNAL MEDICINE
Payer: MEDICAID

## 2017-03-02 PROBLEM — Z99.89 OSA ON CPAP: Chronic | Status: ACTIVE | Noted: 2017-03-02

## 2017-03-02 PROBLEM — J96.20 ACUTE ON CHRONIC RESPIRATORY FAILURE (HCC): Status: ACTIVE | Noted: 2017-03-01

## 2017-03-02 PROBLEM — E66.01 MORBID OBESITY WITH BMI OF 50.0-59.9, ADULT (HCC): Chronic | Status: ACTIVE | Noted: 2017-03-02

## 2017-03-02 PROBLEM — R09.02 HYPOXIA: Status: ACTIVE | Noted: 2017-03-02

## 2017-03-02 PROBLEM — G47.33 OSA ON CPAP: Chronic | Status: ACTIVE | Noted: 2017-03-02

## 2017-03-02 PROBLEM — J44.9 COPD, SEVERE (HCC): Chronic | Status: ACTIVE | Noted: 2017-03-02

## 2017-03-02 PROBLEM — F17.210 NICOTINE DEPENDENCE, CIGARETTES, UNCOMPLICATED: Chronic | Status: ACTIVE | Noted: 2017-03-02

## 2017-03-02 PROBLEM — E11.9 DIABETES MELLITUS TYPE 2, CONTROLLED (HCC): Chronic | Status: ACTIVE | Noted: 2017-03-02

## 2017-03-02 LAB
ALBUMIN SERPL BCP-MCNC: 2.8 G/DL (ref 3.5–5)
ALBUMIN/GLOB SERPL: 0.6 {RATIO} (ref 1.2–3.5)
ALP SERPL-CCNC: 74 U/L (ref 50–136)
ALT SERPL-CCNC: 35 U/L (ref 12–65)
ANION GAP BLD CALC-SCNC: ABNORMAL MMOL/L (ref 7–16)
ARTERIAL PATENCY WRIST A: ABNORMAL
ARTERIAL PATENCY WRIST A: POSITIVE
AST SERPL W P-5'-P-CCNC: 16 U/L (ref 15–37)
ATRIAL RATE: 83 BPM
ATRIAL RATE: 95 BPM
BASE EXCESS BLDA CALC-SCNC: 15.7 MMOL/L (ref 0–3)
BASE EXCESS BLDA CALC-SCNC: 17.7 MMOL/L (ref 0–3)
BASOPHILS # BLD AUTO: 0 K/UL (ref 0–0.2)
BASOPHILS # BLD: 0 % (ref 0–2)
BDY SITE: ABNORMAL
BDY SITE: ABNORMAL
BILIRUB SERPL-MCNC: 0.5 MG/DL (ref 0.2–1.1)
BNP SERPL-MCNC: 258 PG/ML
BUN SERPL-MCNC: 9 MG/DL (ref 6–23)
CALCIUM SERPL-MCNC: 9 MG/DL (ref 8.3–10.4)
CALCULATED P AXIS, ECG09: 23 DEGREES
CALCULATED P AXIS, ECG09: 53 DEGREES
CALCULATED R AXIS, ECG10: -60 DEGREES
CALCULATED R AXIS, ECG10: -71 DEGREES
CALCULATED T AXIS, ECG11: 67 DEGREES
CALCULATED T AXIS, ECG11: 9 DEGREES
CHLORIDE SERPL-SCNC: 100 MMOL/L (ref 98–107)
CO2 SERPL-SCNC: 45 MMOL/L (ref 21–32)
CREAT SERPL-MCNC: 0.68 MG/DL (ref 0.6–1)
DIAGNOSIS, 93000: NORMAL
DIAGNOSIS, 93000: NORMAL
DIASTOLIC BP, ECG02: NORMAL MMHG
DIASTOLIC BP, ECG02: NORMAL MMHG
DIFFERENTIAL METHOD BLD: ABNORMAL
EOSINOPHIL # BLD: 0 K/UL (ref 0–0.8)
EOSINOPHIL NFR BLD: 0 % (ref 0.5–7.8)
ERYTHROCYTE [DISTWIDTH] IN BLOOD BY AUTOMATED COUNT: 18.6 % (ref 11.9–14.6)
FIO2 ON VENT: 55 %
FIO2 ON VENT: 60 %
GLOBULIN SER CALC-MCNC: 4.5 G/DL (ref 2.3–3.5)
GLUCOSE SERPL-MCNC: 126 MG/DL (ref 65–100)
HCO3 BLDA-SCNC: 46 MMOL/L (ref 22–26)
HCO3 BLDA-SCNC: 47 MMOL/L (ref 22–26)
HCT VFR BLD AUTO: 48.7 % (ref 35.8–46.3)
HGB BLD-MCNC: 13.8 G/DL (ref 11.7–15.4)
IMM GRANULOCYTES # BLD: 0 K/UL (ref 0–0.5)
IMM GRANULOCYTES NFR BLD AUTO: 0.2 % (ref 0–5)
LYMPHOCYTES # BLD AUTO: 6 % (ref 13–44)
LYMPHOCYTES # BLD: 0.5 K/UL (ref 0.5–4.6)
MAGNESIUM SERPL-MCNC: 2.4 MG/DL (ref 1.8–2.4)
MCH RBC QN AUTO: 22.6 PG (ref 26.1–32.9)
MCHC RBC AUTO-ENTMCNC: 28.3 G/DL (ref 31.4–35)
MCV RBC AUTO: 79.7 FL (ref 79.6–97.8)
MONOCYTES # BLD: 0.1 K/UL (ref 0.1–1.3)
MONOCYTES NFR BLD AUTO: 1 % (ref 4–12)
NEUTS SEG # BLD: 7.4 K/UL (ref 1.7–8.2)
NEUTS SEG NFR BLD AUTO: 93 % (ref 43–78)
P-R INTERVAL, ECG05: 160 MS
P-R INTERVAL, ECG05: 163 MS
PCO2 BLDA: 67 MMHG (ref 35–45)
PCO2 BLDA: 98 MMHG (ref 35–45)
PH BLDA: 7.3 [PH] (ref 7.35–7.45)
PH BLDA: 7.45 [PH] (ref 7.35–7.45)
PLATELET # BLD AUTO: 239 K/UL (ref 150–450)
PMV BLD AUTO: 11.2 FL (ref 10.8–14.1)
PO2 BLDA: 63 MMHG (ref 75–100)
PO2 BLDA: 78 MMHG (ref 75–100)
POTASSIUM SERPL-SCNC: 5.1 MMOL/L (ref 3.5–5.1)
PROT SERPL-MCNC: 7.3 G/DL (ref 6.3–8.2)
Q-T INTERVAL, ECG07: 338 MS
Q-T INTERVAL, ECG07: 346 MS
QRS DURATION, ECG06: 76 MS
QRS DURATION, ECG06: 79 MS
QTC CALCULATION (BEZET), ECG08: 406 MS
QTC CALCULATION (BEZET), ECG08: 424 MS
RBC # BLD AUTO: 6.11 M/UL (ref 4.05–5.25)
RESP RATE: 24
SERVICE CMNT-IMP: ABNORMAL
SERVICE CMNT-IMP: ABNORMAL
SODIUM SERPL-SCNC: 141 MMOL/L (ref 136–145)
SYSTOLIC BP, ECG01: NORMAL MMHG
SYSTOLIC BP, ECG01: NORMAL MMHG
TROPONIN I SERPL-MCNC: <0.04 NG/ML (ref 0.02–0.05)
TSH SERPL DL<=0.005 MIU/L-ACNC: 0.51 UIU/ML (ref 0.36–3.74)
VENTILATION MODE VENT: ABNORMAL
VENTILATION MODE VENT: ABNORMAL
VENTRICULAR RATE, ECG03: 83 BPM
VENTRICULAR RATE, ECG03: 95 BPM
WBC # BLD AUTO: 8.1 K/UL (ref 4.3–11.1)

## 2017-03-02 PROCEDURE — 83880 ASSAY OF NATRIURETIC PEPTIDE: CPT | Performed by: INTERNAL MEDICINE

## 2017-03-02 PROCEDURE — 82803 BLOOD GASES ANY COMBINATION: CPT

## 2017-03-02 PROCEDURE — 84443 ASSAY THYROID STIM HORMONE: CPT | Performed by: INTERNAL MEDICINE

## 2017-03-02 PROCEDURE — 84484 ASSAY OF TROPONIN QUANT: CPT | Performed by: INTERNAL MEDICINE

## 2017-03-02 PROCEDURE — 83735 ASSAY OF MAGNESIUM: CPT | Performed by: INTERNAL MEDICINE

## 2017-03-02 PROCEDURE — 94640 AIRWAY INHALATION TREATMENT: CPT

## 2017-03-02 PROCEDURE — 74011000250 HC RX REV CODE- 250: Performed by: INTERNAL MEDICINE

## 2017-03-02 PROCEDURE — 74011250636 HC RX REV CODE- 250/636: Performed by: INTERNAL MEDICINE

## 2017-03-02 PROCEDURE — 93005 ELECTROCARDIOGRAM TRACING: CPT | Performed by: INTERNAL MEDICINE

## 2017-03-02 PROCEDURE — 36600 WITHDRAWAL OF ARTERIAL BLOOD: CPT

## 2017-03-02 PROCEDURE — 36415 COLL VENOUS BLD VENIPUNCTURE: CPT | Performed by: INTERNAL MEDICINE

## 2017-03-02 PROCEDURE — 71010 XR CHEST SNGL V: CPT

## 2017-03-02 PROCEDURE — 74011250637 HC RX REV CODE- 250/637: Performed by: INTERNAL MEDICINE

## 2017-03-02 PROCEDURE — 94660 CPAP INITIATION&MGMT: CPT

## 2017-03-02 PROCEDURE — 80053 COMPREHEN METABOLIC PANEL: CPT | Performed by: INTERNAL MEDICINE

## 2017-03-02 PROCEDURE — 85025 COMPLETE CBC W/AUTO DIFF WBC: CPT | Performed by: INTERNAL MEDICINE

## 2017-03-02 PROCEDURE — 65610000001 HC ROOM ICU GENERAL

## 2017-03-02 PROCEDURE — 99254 IP/OBS CNSLTJ NEW/EST MOD 60: CPT | Performed by: INTERNAL MEDICINE

## 2017-03-02 RX ORDER — ALBUTEROL SULFATE 0.83 MG/ML
2.5 SOLUTION RESPIRATORY (INHALATION)
Status: DISCONTINUED | OUTPATIENT
Start: 2017-03-02 | End: 2017-03-04

## 2017-03-02 RX ORDER — FUROSEMIDE 10 MG/ML
40 INJECTION INTRAMUSCULAR; INTRAVENOUS ONCE
Status: COMPLETED | OUTPATIENT
Start: 2017-03-02 | End: 2017-03-02

## 2017-03-02 RX ORDER — NYSTATIN 100000 [USP'U]/G
POWDER TOPICAL 2 TIMES DAILY
Status: DISCONTINUED | OUTPATIENT
Start: 2017-03-02 | End: 2017-03-05 | Stop reason: HOSPADM

## 2017-03-02 RX ADMIN — IPRATROPIUM BROMIDE AND ALBUTEROL SULFATE 3 ML: .5; 3 SOLUTION RESPIRATORY (INHALATION) at 00:38

## 2017-03-02 RX ADMIN — LEVOFLOXACIN 750 MG: 5 INJECTION, SOLUTION INTRAVENOUS at 21:30

## 2017-03-02 RX ADMIN — Medication 10 ML: at 09:21

## 2017-03-02 RX ADMIN — NYSTATIN: 100000 POWDER TOPICAL at 21:31

## 2017-03-02 RX ADMIN — IPRATROPIUM BROMIDE AND ALBUTEROL SULFATE 3 ML: .5; 3 SOLUTION RESPIRATORY (INHALATION) at 08:28

## 2017-03-02 RX ADMIN — Medication 10 ML: at 21:33

## 2017-03-02 RX ADMIN — ALBUTEROL SULFATE 2.5 MG: 2.5 SOLUTION RESPIRATORY (INHALATION) at 19:45

## 2017-03-02 RX ADMIN — Medication 10 ML: at 14:01

## 2017-03-02 RX ADMIN — NYSTATIN: 100000 POWDER TOPICAL at 16:53

## 2017-03-02 RX ADMIN — IPRATROPIUM BROMIDE AND ALBUTEROL SULFATE 3 ML: .5; 3 SOLUTION RESPIRATORY (INHALATION) at 03:44

## 2017-03-02 RX ADMIN — ENOXAPARIN SODIUM 40 MG: 40 INJECTION SUBCUTANEOUS at 21:30

## 2017-03-02 RX ADMIN — ALBUTEROL SULFATE 2.5 MG: 2.5 SOLUTION RESPIRATORY (INHALATION) at 23:08

## 2017-03-02 RX ADMIN — Medication 10 ML: at 05:10

## 2017-03-02 RX ADMIN — FUROSEMIDE 40 MG: 10 INJECTION, SOLUTION INTRAMUSCULAR; INTRAVENOUS at 14:04

## 2017-03-02 RX ADMIN — ENOXAPARIN SODIUM 40 MG: 40 INJECTION SUBCUTANEOUS at 09:20

## 2017-03-02 RX ADMIN — METHYLPREDNISOLONE SODIUM SUCCINATE 40 MG: 40 INJECTION, POWDER, FOR SOLUTION INTRAMUSCULAR; INTRAVENOUS at 09:21

## 2017-03-02 RX ADMIN — BUDESONIDE 500 MCG: 0.5 INHALANT RESPIRATORY (INHALATION) at 19:45

## 2017-03-02 RX ADMIN — BUDESONIDE 500 MCG: 0.5 INHALANT RESPIRATORY (INHALATION) at 08:29

## 2017-03-02 RX ADMIN — ALBUTEROL SULFATE 2.5 MG: 2.5 SOLUTION RESPIRATORY (INHALATION) at 12:43

## 2017-03-02 RX ADMIN — Medication 10 ML: at 16:50

## 2017-03-02 RX ADMIN — ALBUTEROL SULFATE 2.5 MG: 2.5 SOLUTION RESPIRATORY (INHALATION) at 15:24

## 2017-03-02 RX ADMIN — METHYLPREDNISOLONE SODIUM SUCCINATE 40 MG: 40 INJECTION, POWDER, FOR SOLUTION INTRAMUSCULAR; INTRAVENOUS at 16:50

## 2017-03-02 NOTE — PROGRESS NOTES
Spiritual Care Assessment/Progress Notes    Chucho Marino [id number]  [id number]    1970  42 y.o.  female    Patient Telephone Number: [phone] (home)   Jain Affiliation: No preference   Language: English   Extended Emergency Contact Information  Primary Emergency Contact: Clau Tang Balta. Phone: [phone]  Relation: Sister   Patient Active Problem List    Diagnosis Date Noted    COPD, severe ([facility] 03/02/2017    Diabetes mellitus type 2, controlled ([facility] 03/02/2017    Morbid obesity with BMI of 50.0-59.9, adult ([facility] 03/02/2017    MICHAEL on CPAP 03/02/2017    Nicotine dependence, cigarettes, uncomplicated 66/97/5128    Hypoxia 03/02/2017    Acute on chronic respiratory failure ([facility] 03/01/2017    Tracheobronchitis 11/10/2016    Hypertension 03/07/2016    Chronic back pain 03/07/2016    Nocturnal hypoxemia 03/07/2016    Obesity hypoventilation syndrome ([facility] 12/18/2015        Date: 3/2/2017       Level of Jain/Spiritual Activity:  []         Involved in kibmerley tradition/spiritual practice    []         Not involved in kimberley tradition/spiritual practice  [x]         Spiritually oriented    []         Claims no spiritual orientation    []         seeking spiritual identity  []         Feels alienated from Baptist practice/tradition  []         Feels angry about Baptist practice/tradition  [x]         Spirituality/Baptist tradition is not a resource for coping at this time.   []         Not able to assess due to medical condition    Services Provided Today:  []         crisis intervention    []         reading Scriptures  []         spiritual assessment    []         prayer  []         empathic listening/emotional support  []         rites and rituals (cite in comments)  []         life review     []         Baptist support  []         theological development   []         advocacy  []         ethical dialog     []         blessing  [] bereavement support    []         support to family  []         anticipatory grief support   []         help with AMD  []         spiritual guidance    []         meditation      Spiritual Care Needs  []         Emotional Support  []         Spiritual/Jainism Care  []         Loss/Adjustment  []         Advocacy/Referral                /Ethics  []         No needs expressed at               this time  []         Other: (note in               comments)  [address]  []         Follow up visits with               pt/family  []         Provide materials  []         Schedule sacraments  []         Contact Community               Clergy  []         Follow up as needed  []         Other: (note in               comments)     Comments: Spiritual care assessment completed via chart review. Patient has \"No preference\" in Quaker. Patient is a Full Code. Will follow as needed.     Juliet Belle

## 2017-03-02 NOTE — PROGRESS NOTES
TRANSFER - IN REPORT:    Verbal report received from [facility] on Karime Isidro  being received from ED for routine progression of care      Report consisted of patients Situation, Background, Assessment and   Recommendations(SBAR). Information from the following report(s) SBAR, ED Summary, [facility] and Recent Results was reviewed with the receiving nurse. Opportunity for questions and clarification was provided. Assessment completed upon patients arrival to unit and care assumed.

## 2017-03-02 NOTE — PROGRESS NOTES
Hospitalist Progress Note    3/2/2017  Admit Date: 3/1/2017  1:46 PM   NAME: Gaby Isidro   :  1970   MRN:  [id number]   Attending: Naa Garner MD  PCP:  MAIA Barr      Admitted for: Resp failure, metabolic encephalopathy    SUBJECTIVE:   Patient seen- awake and alert- still on Bipap. But mental status is improved        Hospital Course  Admitted to [facility], on treatment for copd exacerbation. Has improved     Review of Systems negative with exception of pertinent positives noted above  Past medical history unchanged from H&P    PHYSICAL EXAM     Visit Vitals    /54    Pulse 93    Temp 97.5 °F (36.4 °C)    Resp 24    Ht 5' 2\" (1.575 m)    Wt 153.8 kg (339 lb)    LMP 2010    SpO2 93%    Breastfeeding No    BMI 62 kg/m2      Temp (24hrs), Av °F (36.7 °C), Min:97.4 °F (36.3 °C), Max:98.8 °F (37.1 °C)    Oxygen Therapy  O2 Sat (%): 93 % (17 08)  Pulse via Oximetry: 89 beats per minute (17 0831)  O2 Device: BIPAP (17 08)  O2 Temperature: 87.8 °F (31 °C) (17 0345)  FIO2 (%): 60 % (decreased to 55%) (17 0836)    Intake/Output Summary (Last 24 hours) at 17 1109  Last data filed at 17 0515   Gross per 24 hour   Intake              150 ml   Output             1600 ml   Net            -1450 ml        General: No acute distress  mucous membranes pink and moist acyanotic anicteric  Neck:  Supple full range of motion  Lungs:  Air entry equal bilaterally, no crepitations rales rhonchi   Heart:  Regular rate and rhythm,  No murmur, rub, or gallop  Abdomen: Soft, Non distended, Non tender, no rebound guarding Positive bowel sounds  Extremities: No cyanosis, clubbing or edema  Neurologic:  No focal deficits  Musculoskeletal: no   Joint swelling tenderness erythema  Skin:  No erythema, rashes noted          LAB  No results for input(s): GLUCPOC in the last 72 hours.     No lab exists for component: [address] 03/02/17   0050   WBC  8.1   HGB  13.8   HCT  48.7*   PLT  239     Recent Labs      03/02/17   0050  03/01/17   2044   NA  141  137   K  5.1  4.8   CL  100  97*   CO2  45*  43*   GLU  126*  113*   BUN  9  10   CREA  0.68  0.73   MG  2.4   --    CA  9.0  9.0   PHOS   --   4.0   TROIQ  <0.04  <0.04   ALB  2.8*  3.0*   TBILI  0.5  0.6   ALT  35  39   SGOT  16  19       EKG and imaging reviewed personally by me  Xr Chest Sngl V    Result Date: 3/2/2017  Chest portable CLINICAL INDICATION: Respiratory failure COMPARISON: Yesterday TECHNIQUE: single AP portable view chest at 9:14 AM semiupright FINDINGS: The cardiac silhouette is borderline enlarged. Hilar contours are unremarkable. Overall lung inflation has slightly improved since yesterday's exam. There is no pneumothorax or dense consolidation. There is suggestion of persisting although decreased bilateral perihilar mild vascular congestion and trace pleural effusions. IMPRESSION: Improving aeration. Decreased, mild vascular congestion and trace pleural effusions. Xr Chest Port    Result Date: 3/1/2017  AP chest radiograph History: Shortness of breath, Comparison: Chest radiograph February 22, 2017 Findings:   Normal cardiomediastinal silhouette. Decreased lung volumes. There is increased diffuse interstitial prominence which is nonspecific but in the appropriate clinical setting could represent interstitial edema. There appear to be increased trace bilateral pleural effusions and mild bibasilar airspace opacities, which may represent atelectasis and or consolidation. No evidence of pneumothorax. Visualized soft tissue and osseous structures otherwise unremarkable. Impression:  1. Decreased lung volumes with increased diffuse interstitial prominence, which in the appropriate clinical setting could represent interstitial edema. 2.  Increased trace bilateral pleural effusions and mild bibasilar atelectasis and or consolidation.      Results for orders placed or performed during the hospital encounter of 03/01/17   EKG, 12 LEAD, INITIAL   Result Value Ref Range    Systolic BP  mmHg    Diastolic BP  mmHg    Ventricular Rate 83 BPM    Atrial Rate 83 BPM    P-R Interval 163 ms    QRS Duration 79 ms    Q-T Interval 346 ms    QTC Calculation (Bezet) 406 ms    Calculated P Axis 23 degrees    Calculated R Axis -71 degrees    Calculated T Axis 67 degrees    Diagnosis       Normal sinus rhythm with occasional Premature atrial complexes  Left axis deviation !!! Poor data quality, interpretation may be adversely   affected  Nonspecific ST abnormality  Abnormal ECG  Confirmed by CHRISTOPHER YENUG (), CHARLIE DSOUZA ([id number]) on 3/2/2017 8:12:05 AM       XR Results (most recent):    Results from Hospital Encounter encounter on 03/01/17   XR CHEST SNGL V   Narrative Chest portable    CLINICAL INDICATION: Respiratory failure    COMPARISON: Yesterday    TECHNIQUE: single AP portable view chest at 9:14 AM semiupright     FINDINGS: The cardiac silhouette is borderline enlarged. Hilar contours are  unremarkable. Overall lung inflation has slightly improved since yesterday's  exam. There is no pneumothorax or dense consolidation. There is suggestion of  persisting although decreased bilateral perihilar mild vascular congestion and  trace pleural effusions. Impression IMPRESSION: Improving aeration. Decreased, mild vascular congestion and trace  pleural effusions. Active problems  Active Hospital Problems    Diagnosis Date Noted    COPD, severe ([address] 03/02/2017     PFTs 3/2016    FVC 2.22 L and 62 % of predicted. FEV1 1.23 L and 42 % of predicted. Ratio 55%. There was no clinically relevant response to bronchodilator  LUNG VOLUMES:  TLC 4.61 L and 89 % of predicted. RV 2.39 L and 139 % of predicted. DIFFUSION:    The diffusing capacity was Corrected for hemoglobin of 14.7 g/dL  and was 15.9 mL/mmHg/minute and 58 % of predicted.         Morbid obesity with BMI of 50.0-59.9, adult ([facility] 03/02/2017    MICHAEL on CPAP 03/02/2017    Hypoxia 03/02/2017    Nicotine dependence, cigarettes, uncomplicated 29/01/2939    Diabetes mellitus type 2, controlled ([facility] 03/02/2017    Acute on chronic respiratory failure ([facility] 03/01/2017    Obesity hypoventilation syndrome ([facility] 12/18/2015       ASSESSMENT  AND PLAN      · Resp failure- continue bipap- repeat abg this afternoon- pulmonology input appreciated  · Copd exacerbation- continue nebs, steroids, abx  · Tinea in body folds- nystatin powder  · Morbid obesity- supportive care  · CXR concerning for pleural effusions vs consolidations- on abx- - bnp negative likely pneumonia  · dvt ppx lovenox  · Condition guarded- pt high risk given severe hypercapnic respiratory failure- low threshold for intubation- although I am reluctant to given her copd and morbid obesity  · Further mgt as her workup dictates       DVT Prophylaxis:   Patient remains high risk for decompensation, given resp failure- low threshold for intubation    Signed By: Traci Walsh MD     March 2, 2017

## 2017-03-02 NOTE — PROGRESS NOTES
Patient received from ED and place on cardiac monitor. Sinus rhythm heart rate 93, no ectopy noted. Patient place on BIPAP per respiratory therapy. Oxygen saturation 87-88 %. Head of bed elevated 30 degrees. Patient lethargic will open eyes when name called. Follow commands. Denies pain. Buitrago catheter in place with kim urine. Dual skin assessment completed with Lorena Hodges RN. Muna areas underneath both breast and abdomen folds. Underneath left breast appears to be a small open shear tear and to left lower quadrant. Sacral /coccyx area red but blanchable,small shear tear also. Skin protector applied to areas and Allevyn to sacral/coccyx. Bed in low/lock position. Bed alarm on.

## 2017-03-02 NOTE — PROGRESS NOTES
Late entry due to hands on patient care. Patient more awake and alert. Follow commands. Patient remain on BIPAP. Call light within reach. Bed in low/lock position.

## 2017-03-02 NOTE — CONSULTS
CONSULT NOTE    Jonatan Isidro    3/2/2017    Date of Admission:  3/1/2017    The patient's chart is reviewed and the patient is discussed with the staff. Subjective: The patient is a 42 y.o.  female seen and evaluated at the request of Dr. Sherin Cote. She has a history of morbid obesity, OHS, MICHAEL, noncompliance who presented to the ER with hypercaneic respiratory failure. She was placed on BIPAP and moved to the ICU for further management. The pt is known to our service with severe COPD (FEV1 42% predicted) and at her December appt was smoking 1 PPD. She is currently on BIPAP and remains somnolent but her gases have improved. It is unclear is she was using home CPAP/BIPAP      Review of Systems    Review of systems not obtained due to patient factors. Patient Active Problem List   Diagnosis Code    COPD, severe ([facility] J44.9    Obesity hypoventilation syndrome ([facility] E66.2    Diabetes mellitus type 2, controlled ([address] E11.9    Morbid obesity with BMI of 50.0-59.9, adult ([facility] E66.01, Z68.43    Hypertension I10    Chronic back pain M54.9, G89.29    Nocturnal hypoxemia G47.34    MICHAEL on CPAP G47.33    Nicotine dependence, cigarettes, uncomplicated Z05.223    Hypoxia R09.02    Tracheobronchitis J40    Acute on chronic respiratory failure (HCC) J96.20         Prior to Admission Medications   Prescriptions Last Dose Informant Patient Reported? Taking? Blood-Glucose Meter (ONETOUCH ULTRA2) monitoring kit   No No   Sig: Use to check blood sugar once daily as directed. Dx: E11.9   Blood-Glucose Meter (TRUE METRIX GLUCOSE METER) misc   No No   Sig: Check blood sugar twice daily, E11.9   Lancets (ONETOUCH ULTRASOFT LANCETS) misc   No No   Sig: Use to check glucose once daily as directed. Dx: E11.9   OXYGEN-AIR DELIVERY SYSTEMS   Yes No   Sig: by Does Not Apply route.  2 lpm exertion and hs    albuterol (PROVENTIL VENTOLIN) 2.5 mg /3 mL (0.083 %) nebulizer solution   No No   Sig: 3 mL by Nebulization route four (4) times daily. Indications: Chronic Obstructive Pulmonary Disease   albuterol (VENTOLIN HFA) 90 mcg/actuation inhaler   No No   Sig: Take 2 Puffs by inhalation every four (4) hours as needed for Wheezing. albuterol-ipratropium (DUO-NEB) 2.5 mg-0.5 mg/3 ml nebu   No No   Sig: 3 mL by Nebulization route four (4) times daily. atorvastatin (LIPITOR) 10 mg tablet   No No   Sig: Take 1 Tab by mouth nightly. benzonatate (TESSALON) 200 mg capsule   No No   Sig: Take 1 Cap by mouth three (3) times daily as needed for Cough for up to 10 days. buPROPion (WELLBUTRIN) 75 mg tablet   No No   Sig: Take 2 Tabs by mouth daily. budesonide-formoterol (SYMBICORT) 160-4.5 mcg/actuation HFA inhaler   No No   Sig: Take 2 Puffs by inhalation two (2) times a day. cpap machine kit   Yes No   Sig: by Does Not Apply route. cyclobenzaprine (FLEXERIL) 10 mg tablet   No No   Sig: Take 1 Tab by mouth three (3) times daily as needed for Muscle Spasm(s). doxycycline (MONODOX) 100 mg capsule   No No   Sig: Take 1 Cap by mouth two (2) times a day for 10 days. glucose blood VI test strips (ONETOUCH ULTRA TEST) strip   No No   Sig: Use to check glucose once daily as directed. Dx: E11.9   glucose blood VI test strips (TRUE METRIX GLUCOSE TEST STRIP) strip   No No   Sig: Check blood sugar twice daily, E11.9   guaiFENesin SR (MUCINEX) 600 mg SR tablet   No No   Sig: Take 2 Tabs by mouth two (2) times a day. lisinopril (PRINIVIL, ZESTRIL) 10 mg tablet   No No   Sig: Take 1 Tab by mouth daily. metFORMIN (GLUCOPHAGE) 500 mg tablet   No No   Sig: Take 1 Tab by mouth two (2) times daily (with meals). methylPREDNISolone (MEDROL DOSEPACK) 4 mg tablet   No No   Sig: Take as directed   traMADol (ULTRAM) 50 mg tablet   No No   Sig: Take 1 Tab by mouth every six (6) hours as needed for Pain. Max Daily Amount: 200 mg.       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 Disabled--previously worked at [employer]. Has lived in [place] and [place]. No pets.      Family History   Problem Relation Age of Onset   [place] COPD Mother     Hypertension Mother     Parkinsonism Father     Asthma Son     COPD Brother      No Known Allergies    Current Facility-Administered Medications   Medication Dose Route Frequency    sodium chloride (NS) flush 5-10 mL  5-10 mL IntraVENous Q8H    sodium chloride (NS) flush 5-10 mL  5-10 mL IntraVENous PRN    sodium chloride (NS) flush 5-10 mL  5-10 mL IntraVENous Q8H    sodium chloride (NS) flush 5-10 mL  5-10 mL IntraVENous PRN    levoFLOXacin (LEVAQUIN) 750 mg in D5W IVPB  750 mg IntraVENous Q24H    methylPREDNISolone (PF) (SOLU-MEDROL) injection 40 mg  40 mg IntraVENous Q8H    acetaminophen (TYLENOL) tablet 650 mg  650 mg Oral Q4H PRN    enoxaparin (LOVENOX) injection 40 mg  40 mg SubCUTAneous Q12H    albuterol-ipratropium (DUO-NEB) 2.5 MG-0.5 MG/3 ML  3 mL Nebulization Q4H RT    budesonide (PULMICORT) 500 mcg/2 ml nebulizer suspension  500 mcg Nebulization BID RT         Objective:     Vitals:    03/02/17 9457 03/02/17 8160 03/02/17 0824 03/02/17 0836   BP: 109/50 103/54     Pulse: 85 93     Resp: 16 24     Temp:       SpO2: 92% 95% 93% 93%   Weight:       Height:           PHYSICAL EXAM     Constitutional:  the patient is morbidly obese and in no acute distress  EENMT:  Sclera clear, pupils equal, oral mucosa moist  Respiratory: decreased BS  Cardiovascular:  RRR without M,G,R  Gastrointestinal: soft and non-tender; with positive bowel sounds. Musculoskeletal: warm without cyanosis. There is trace lower leg edema. Skin:  no jaundice or rashes  Neurologic: no gross neuro deficits     Psychiatric:  somnolent    Chest X-ray:            Recent Labs      03/02/17   0050  03/01/17   1407   WBC  8.1  8.8   HGB  13.8  14.1   HCT  48.7*  49.7*   PLT  239  247     Recent Labs      03/02/17   0050  03/01/17   2044  03/01/17   1407   NA  141  137  141   K  5.1  4.8  4.1   CL  100  97*  98   GLU  126*  113*  115*   CO2  45*  43*  >45*   BUN  9  10  10   CREA  0.68  0.73  0.91   MG  2.4   --    --    PHOS   --   4.0   --    CA  9.0  9.0  8.5   TROIQ  <0.04  <0.04   --    ALB  2.8*  3.0*  2.9*   TBILI  0.5  0.6  0.5   ALT  35  39  41   SGOT  16  19  21     Recent Labs      03/02/17   0455  03/01/17   2117  03/01/17   1928   PH  7.30*  7.29*  7.27*   PCO2  98*  101*  113*   PO2  78  89  62*   HCO3  47*  47*  50*     No results for input(s): LCAD, LAC in the last 72 hours. Assessment:  (Medical Decision Making)     Hospital Problems  Date Reviewed: 2/22/2017          Codes Class Noted POA    COPD, severe ([facility] (Chronic) ICD-10-CM: J44.9  ICD-9-CM: 808  3/2/2017 Yes    Overview Signed 11/10/2016  4:21 PM by Tino Paz NP     PFTs 3/2016    FVC 2.22 L and 62 % of predicted. FEV1 1.23 L and 42 % of predicted. Ratio 55%. There was no clinically relevant response to bronchodilator  LUNG VOLUMES:  TLC 4.61 L and 89 % of predicted. RV 2.39 L and 139 % of predicted.       DIFFUSION:    The diffusing capacity was Corrected for hemoglobin of 14.7 g/dL  and was 15.9 mL/mmHg/minute and 58 % of predicted. Needs aggressive bronchodilator support. Keep PO2 in low 60s to lower pCO2. Diabetes mellitus type 2, controlled ([facility] (Chronic) ICD-10-CM: E11.9  ICD-9-CM: 250.00  3/2/2017 Yes        Morbid obesity with BMI of 50.0-59.9, adult (HCC) (Chronic) ICD-10-CM: E66.01, Z68.43  ICD-9-CM: 278.01, V85.43  3/2/2017 Yes    Major problem. MICHAEL on CPAP (Chronic) ICD-10-CM: G47.33  ICD-9-CM: 327.23  3/2/2017 Yes    Suspect non-compliant    Nicotine dependence, cigarettes, uncomplicated (Chronic) OHK-93-UI: C09.549  ICD-9-CM: 305.1  3/2/2017 Yes        Hypoxia ICD-10-CM: R09.02  ICD-9-CM: 799.02  3/2/2017 Yes    Wean oxygen to keep sats in low 90s. * (Principal)Acute on chronic respiratory failure ([facility] ICD-10-CM: J96.20  ICD-9-CM: 518.84  3/1/2017 Unknown    Severe but improved. Obesity hypoventilation syndrome ([facility] ICD-10-CM: X48.6  ICD-9-CM: 278.03  12/18/2015 Yes    Likely. Recheck TSH since hypothyroidism can promote decompensation. Plan:  (Medical Decision Making)     Continue BIPAP support. Avoid narcotics. Check TSH. Intubate prn. Seems to be getting better so would not intubate at present. Check BNP and diurese if elevated. Duoneb >> albuterol. Serial gases. Needs dietary consult when mentation better. --    More than 50% of the time documented was spent in face-to-face contact with the patient and in the care of the patient on the floor/unit where the patient is located. Thank you very much for this referral.  We appreciate the opportunity to participate in this patient's care. Will follow along with above stated plan.     Aida Monson MD

## 2017-03-02 NOTE — ROUTINE PROCESS
Report called to Aubrey Phillips RN in ICU. #20 jelco via the Left AC intact without swelling or redness.   Transported to ICU via stretcher attached to the monitor

## 2017-03-02 NOTE — PROGRESS NOTES
Patient awake and alert. Head of bed elevated. ABG results called to . Call light within reach. Bed in low/lock position. Bed alarm on.

## 2017-03-03 LAB
ALBUMIN SERPL BCP-MCNC: 3 G/DL (ref 3.5–5)
ALBUMIN/GLOB SERPL: 0.6 {RATIO} (ref 1.2–3.5)
ALP SERPL-CCNC: 70 U/L (ref 50–136)
ALT SERPL-CCNC: 33 U/L (ref 12–65)
ANION GAP BLD CALC-SCNC: 2 MMOL/L (ref 7–16)
ARTERIAL PATENCY WRIST A: POSITIVE
AST SERPL W P-5'-P-CCNC: 15 U/L (ref 15–37)
BASE EXCESS BLDA CALC-SCNC: 14.2 MMOL/L (ref 0–3)
BDY SITE: ABNORMAL
BILIRUB SERPL-MCNC: 0.6 MG/DL (ref 0.2–1.1)
BUN SERPL-MCNC: 16 MG/DL (ref 6–23)
CALCIUM SERPL-MCNC: 9.7 MG/DL (ref 8.3–10.4)
CHLORIDE SERPL-SCNC: 99 MMOL/L (ref 98–107)
CO2 SERPL-SCNC: 39 MMOL/L (ref 21–32)
CREAT SERPL-MCNC: 0.84 MG/DL (ref 0.6–1)
GLOBULIN SER CALC-MCNC: 4.7 G/DL (ref 2.3–3.5)
GLUCOSE SERPL-MCNC: 111 MG/DL (ref 65–100)
HCO3 BLDA-SCNC: 42 MMOL/L (ref 22–26)
MAGNESIUM SERPL-MCNC: 2.4 MG/DL (ref 1.8–2.4)
PCO2 BLDA: 68 MMHG (ref 35–45)
PEEP RESPIRATORY: 8 CM[H2O]
PH BLDA: 7.41 [PH] (ref 7.35–7.45)
PO2 BLDA: 78 MMHG (ref 75–100)
POTASSIUM SERPL-SCNC: 4.6 MMOL/L (ref 3.5–5.1)
PROT SERPL-MCNC: 7.7 G/DL (ref 6.3–8.2)
SERVICE CMNT-IMP: ABNORMAL
SODIUM SERPL-SCNC: 140 MMOL/L (ref 136–145)
VENTILATION MODE VENT: ABNORMAL

## 2017-03-03 PROCEDURE — 99233 SBSQ HOSP IP/OBS HIGH 50: CPT | Performed by: INTERNAL MEDICINE

## 2017-03-03 PROCEDURE — 36415 COLL VENOUS BLD VENIPUNCTURE: CPT | Performed by: INTERNAL MEDICINE

## 2017-03-03 PROCEDURE — 83735 ASSAY OF MAGNESIUM: CPT | Performed by: INTERNAL MEDICINE

## 2017-03-03 PROCEDURE — 74011000250 HC RX REV CODE- 250: Performed by: INTERNAL MEDICINE

## 2017-03-03 PROCEDURE — 74011250636 HC RX REV CODE- 250/636: Performed by: INTERNAL MEDICINE

## 2017-03-03 PROCEDURE — 80053 COMPREHEN METABOLIC PANEL: CPT | Performed by: INTERNAL MEDICINE

## 2017-03-03 PROCEDURE — 74011250637 HC RX REV CODE- 250/637: Performed by: INTERNAL MEDICINE

## 2017-03-03 PROCEDURE — 65610000001 HC ROOM ICU GENERAL

## 2017-03-03 PROCEDURE — 82803 BLOOD GASES ANY COMBINATION: CPT

## 2017-03-03 PROCEDURE — 97161 PT EVAL LOW COMPLEX 20 MIN: CPT

## 2017-03-03 PROCEDURE — 36600 WITHDRAWAL OF ARTERIAL BLOOD: CPT

## 2017-03-03 PROCEDURE — 97165 OT EVAL LOW COMPLEX 30 MIN: CPT

## 2017-03-03 PROCEDURE — 94640 AIRWAY INHALATION TREATMENT: CPT

## 2017-03-03 PROCEDURE — 77030021668 HC NEB PREFIL KT VYRM -A

## 2017-03-03 RX ORDER — IBUPROFEN 200 MG
1 TABLET ORAL DAILY
Status: DISCONTINUED | OUTPATIENT
Start: 2017-03-03 | End: 2017-03-05 | Stop reason: HOSPADM

## 2017-03-03 RX ADMIN — Medication 10 ML: at 05:50

## 2017-03-03 RX ADMIN — ENOXAPARIN SODIUM 40 MG: 40 INJECTION SUBCUTANEOUS at 08:47

## 2017-03-03 RX ADMIN — NYSTATIN: 100000 POWDER TOPICAL at 22:00

## 2017-03-03 RX ADMIN — ALBUTEROL SULFATE 2.5 MG: 2.5 SOLUTION RESPIRATORY (INHALATION) at 16:23

## 2017-03-03 RX ADMIN — Medication 10 ML: at 22:00

## 2017-03-03 RX ADMIN — METHYLPREDNISOLONE SODIUM SUCCINATE 40 MG: 40 INJECTION, POWDER, FOR SOLUTION INTRAMUSCULAR; INTRAVENOUS at 23:24

## 2017-03-03 RX ADMIN — BUDESONIDE 500 MCG: 0.5 INHALANT RESPIRATORY (INHALATION) at 19:36

## 2017-03-03 RX ADMIN — METHYLPREDNISOLONE SODIUM SUCCINATE 40 MG: 40 INJECTION, POWDER, FOR SOLUTION INTRAMUSCULAR; INTRAVENOUS at 17:09

## 2017-03-03 RX ADMIN — ENOXAPARIN SODIUM 40 MG: 40 INJECTION SUBCUTANEOUS at 20:32

## 2017-03-03 RX ADMIN — ALBUTEROL SULFATE 2.5 MG: 2.5 SOLUTION RESPIRATORY (INHALATION) at 13:05

## 2017-03-03 RX ADMIN — ALBUTEROL SULFATE 2.5 MG: 2.5 SOLUTION RESPIRATORY (INHALATION) at 08:39

## 2017-03-03 RX ADMIN — ALBUTEROL SULFATE 2.5 MG: 2.5 SOLUTION RESPIRATORY (INHALATION) at 19:36

## 2017-03-03 RX ADMIN — BUDESONIDE 500 MCG: 0.5 INHALANT RESPIRATORY (INHALATION) at 08:39

## 2017-03-03 RX ADMIN — LEVOFLOXACIN 750 MG: 5 INJECTION, SOLUTION INTRAVENOUS at 20:33

## 2017-03-03 RX ADMIN — METHYLPREDNISOLONE SODIUM SUCCINATE 40 MG: 40 INJECTION, POWDER, FOR SOLUTION INTRAMUSCULAR; INTRAVENOUS at 00:39

## 2017-03-03 RX ADMIN — NYSTATIN: 100000 POWDER TOPICAL at 08:48

## 2017-03-03 RX ADMIN — METHYLPREDNISOLONE SODIUM SUCCINATE 40 MG: 40 INJECTION, POWDER, FOR SOLUTION INTRAMUSCULAR; INTRAVENOUS at 08:47

## 2017-03-03 NOTE — PROGRESS NOTES
Bedside report from Susie Rebollar RN. Pt saturated in ice water that she had spilled on herself. I will clean up or get PCT, Letitia, to do so since day shift nurse has to go.

## 2017-03-03 NOTE — PROGRESS NOTES
Pt has been compliant with wearing her home BiPap 18/8, rate = 20/min, with 50% Fi02. ABG drawn and results reviewed pH= 7.41, pCO2=67, and p02=78, & HC03=42. Previous ABG's with C02's of 113, 101, and 98. Pt says she doesn't want to wear her BiPap mask anymore tonight and \"is done wearing that\". Teaching about home hs compliance and smoking cessation need reinforcement. Pt placed back on high flow 02 at 6L/min. She remains in the recliner as she wishes.

## 2017-03-03 NOTE — PROGRESS NOTES
Hospitalist Progress Note    3/3/2017  Admit Date: 3/1/2017  1:46 PM   NAME: Burgess Shima Isidro   :  1970   MRN:  [id number]   Attending: Narendra Hawkins MD  PCP:  MAIA Limon      Admitted for: Resp failure, metabolic encephalopathy    SUBJECTIVE:   Patient seen- awake and alert- Off Bipap. Mental status is improved        Hospital Course  Admitted to [facility], on treatment for copd exacerbation. Has improved     Review of Systems negative with exception of pertinent positives noted above  Past medical history unchanged from H&P    PHYSICAL EXAM     Visit Vitals    /71    Pulse 100    Temp 98.6 °F (37 °C)    Resp (!) 42    Ht 5' 2\" (1.575 m)    Wt 146.8 kg (323 lb 11.2 oz)    LMP 2010    SpO2 93%    Breastfeeding No    BMI 59.21 kg/m2      Temp (24hrs), Av.2 °F (36.8 °C), Min:97.2 °F (36.2 °C), Max:98.7 °F (37.1 °C)    Oxygen Therapy  O2 Sat (%): 93 % (17 0839)  Pulse via Oximetry: 100 beats per minute (17 0839)  O2 Device: Hi flow nasal cannula (17 0335)  O2 Flow Rate (L/min): 6 l/min (17 0335)  O2 Temperature: 87.8 °F (31 °C) (17 0345)  FIO2 (%): 50 % (17 0313)    Intake/Output Summary (Last 24 hours) at 17 0940  Last data filed at 17 0530   Gross per 24 hour   Intake                0 ml   Output             4700 ml   Net            -4700 ml        General: No acute distress  mucous membranes pink and moist acyanotic anicteric  Neck:  Supple full range of motion  Lungs:  Air entry equal bilaterally, no crepitations rales rhonchi   Heart:  Regular rate and rhythm,  No murmur, rub, or gallop  Abdomen: Soft, Non distended, Non tender, no rebound guarding Positive bowel sounds  Extremities: No cyanosis, clubbing or edema  Neurologic:  No focal deficits  Musculoskeletal: no   Joint swelling tenderness erythema  Skin:  No erythema, rashes noted          LAB  No results for input(s): GLUCPOC in the last 72 hours.     No lab exists for component: [place]   Recent Labs      03/02/17   0050   WBC  8.1   HGB  13.8   HCT  48.7*   PLT  239     Recent Labs      03/03/17   0328  03/02/17   0050   03/01/17   2044   NA  140  141   --   137   K  4.6  5.1   --   4.8   CL  99  100   --   97*   CO2  39*  45*   --   43*   GLU  111*  126*   --   113*   BUN  16  9   --   10   CREA  0.84  0.68   --   0.73   MG  2.4  2.4   < >   --    CA  9.7  9.0   --   9.0   PHOS   --    --    --   4.0   TROIQ   --   <0.04   --   <0.04   ALB  3.0*  2.8*   --   3.0*   TBILI  0.6  0.5   --   0.6   ALT  33  35   --   39   SGOT  15  16   --   19    < > = values in this interval not displayed. EKG and imaging reviewed personally by me  No results found. Results for orders placed or performed during the hospital encounter of 03/01/17   EKG, 12 LEAD, INITIAL   Result Value Ref Range    Systolic BP  mmHg    Diastolic BP  mmHg    Ventricular Rate 83 BPM    Atrial Rate 83 BPM    P-R Interval 163 ms    QRS Duration 79 ms    Q-T Interval 346 ms    QTC Calculation (Bezet) 406 ms    Calculated P Axis 23 degrees    Calculated R Axis -71 degrees    Calculated T Axis 67 degrees    Diagnosis       Normal sinus rhythm with occasional Premature atrial complexes  Left axis deviation !!! Poor data quality, interpretation may be adversely   affected  Nonspecific ST abnormality  Abnormal ECG  Confirmed by CHRISTOPHER YEUNG (), CHARLIE DSOUZA ([id number]) on 3/2/2017 8:12:05 AM       XR Results (most recent):    Results from Hospital Encounter encounter on 03/01/17   XR CHEST SNGL V   Narrative Chest portable    CLINICAL INDICATION: Respiratory failure    COMPARISON: Yesterday    TECHNIQUE: single AP portable view chest at 9:14 AM semiupright     FINDINGS: The cardiac silhouette is borderline enlarged. Hilar contours are  unremarkable. Overall lung inflation has slightly improved since yesterday's  exam. There is no pneumothorax or dense consolidation.  There is suggestion of  persisting although decreased bilateral perihilar mild vascular congestion and  trace pleural effusions. Impression IMPRESSION: Improving aeration. Decreased, mild vascular congestion and trace  pleural effusions. Active problems  Active Hospital Problems    Diagnosis Date Noted    COPD, severe ([facility] 03/02/2017     PFTs 3/2016    FVC 2.22 L and 62 % of predicted. FEV1 1.23 L and 42 % of predicted. Ratio 55%. There was no clinically relevant response to bronchodilator  LUNG VOLUMES:  TLC 4.61 L and 89 % of predicted. RV 2.39 L and 139 % of predicted. DIFFUSION:    The diffusing capacity was Corrected for hemoglobin of 14.7 g/dL  and was 15.9 mL/mmHg/minute and 58 % of predicted.  Morbid obesity with BMI of 50.0-59.9, adult ([facility] 03/02/2017    MICHAEL on CPAP 03/02/2017    Hypoxia 03/02/2017    Nicotine dependence, cigarettes, uncomplicated 68/91/5038    Diabetes mellitus type 2, controlled ([facility] 03/02/2017    Acute on chronic respiratory failure ([facility] 03/01/2017    Obesity hypoventilation syndrome ([facility] 12/18/2015       ASSESSMENT  AND PLAN      · Resp failure- prn bipap- pulmonology input appreciated  · Copd exacerbation- continue nebs, steroids, abx  · Tinea in body folds- nystatin powder  · Morbid obesity- supportive care  · ?  PNa- continue abx  · dvt ppx lovenox  · Condition guarded- pt high risk given severe hypercapnic respiratory failure-   · Further mgt as her workup dictates       DVT Prophylaxis:   Patient remains high risk for decompensation, given resp failure- low threshold for intubation    Signed By: John Vega MD     March 3, 2017

## 2017-03-03 NOTE — PROGRESS NOTES
ABG drawn and resulted to Rashaad Jain RN. Pt. Placed on 6l HF cannula at present time.   ABG results:  PH  7.41  PCO2  68  Po2  78  HC03  42  BE  14.2

## 2017-03-03 NOTE — PROGRESS NOTES
Pulmonary Daily Progress NOTE    Red Isidro    3/3/2017    Date of Admission:  3/1/2017   The patient is a 42 y.o.  female seen and evaluated at the request of Dr. Manuel Swain. She has a history of morbid obesity, OHS, MICHAEL, noncompliance who presented to the ER with hypercaneic respiratory failure. She was placed on BIPAP and moved to the ICU for further management. The pt is known to our service with severe COPD (FEV1 42% predicted) and at her December appt was smoking 1 PPD. She is currently on BIPAP and remains somnolent but her gases have improved. It is unclear is she was using home CPAP/BIPAP      The patient's chart is reviewed and the patient is discussed with the staff. Subjective:     Patient off BIPAP since AM now and feels she can breath today. On NC at 6 LPM and normally only on 2-4 LPM. She does have her home machine with her today. No coughing.  Does report smokes 2 PPD and does use cocaine (snorting) about once per month    Review of Systems:  -Fever  -Headaches  -Chest pain  +Dyspnea, -wheezing-, cough  -Abdominal pain, -constipation  -Leg swelling  All other organ systems grossly normal.      Current Facility-Administered Medications   Medication Dose Route Frequency    albuterol (PROVENTIL VENTOLIN) nebulizer solution 2.5 mg  2.5 mg Inhalation Q4H RT    nystatin (MYCOSTATIN) 100,000 unit/gram powder   Topical BID    sodium chloride (NS) flush 5-10 mL  5-10 mL IntraVENous Q8H    sodium chloride (NS) flush 5-10 mL  5-10 mL IntraVENous PRN    sodium chloride (NS) flush 5-10 mL  5-10 mL IntraVENous Q8H    sodium chloride (NS) flush 5-10 mL  5-10 mL IntraVENous PRN    levoFLOXacin (LEVAQUIN) 750 mg in D5W IVPB  750 mg IntraVENous Q24H    methylPREDNISolone (PF) (SOLU-MEDROL) injection 40 mg  40 mg IntraVENous Q8H    acetaminophen (TYLENOL) tablet 650 mg  650 mg Oral Q4H PRN    enoxaparin (LOVENOX) injection 40 mg  40 mg SubCUTAneous Q12H    budesonide (PULMICORT) 500 mcg/2 ml nebulizer suspension  500 mcg Nebulization BID RT         Objective:     Vitals:    03/03/17 0539 03/03/17 0639 03/03/17 0651 03/03/17 0658   BP: 106/65 109/76 98/62 98/62   Pulse: 89  90 (!) 108   Resp: (!) 40   28   Temp:    98.6 °F (37 °C)   SpO2: 93% 95% 95% 94%   Weight:       Height:         Blood pressure 98/62, pulse (!) 108, temperature 98.6 °F (37 °C), resp. rate 28, height 5' 2\" (1.575 m), weight 323 lb 11.2 oz (146.8 kg), last menstrual period 05/26/2010, SpO2 94 %, not currently breastfeeding. PHYSICAL EXAM     Constitutional:  the patient is morbidly obese and in no acute distress  EENMT:  Sclera clear, pupils equal, oral mucosa moist, narrow airway   Respiratory: decreased BS b/l. NO wheezing on 6 LPM  Cardiovascular:  RRR without M,G,R  Gastrointestinal: soft and non-tender; with positive bowel sounds. Musculoskeletal: warm without cyanosis. There is trace lower leg edema. Skin:  no jaundice or rashes  Neurologic: no gross neuro deficits     Psychiatric:  Awake and alert    Chest X-ray:  Yesterday noted Less congestion with noted cardiomegaly improved compared to day prior                  Recent Labs      03/02/17   0050  03/01/17   1407   WBC  8.1  8.8   HGB  13.8  14.1   HCT  48.7*  49.7*   PLT  239  247     Recent Labs      03/03/17   0328  03/02/17   0050  03/01/17   2044   NA  140  141  137   K  4.6  5.1  4.8   CL  99  100  97*   GLU  111*  126*  113*   CO2  39*  45*  43*   BUN  16  9  10   CREA  0.84  0.68  0.73   MG  2.4  2.4   --    PHOS   --    --   4.0   CA  9.7  9.0  9.0   TROIQ   --   <0.04  <0.04   ALB  3.0*  2.8*  3.0*   TBILI  0.6  0.5  0.6   ALT  33  35  39   SGOT  15  16  19     Recent Labs      03/02/17   1440  03/02/17   0455  03/01/17   2117   PH  7.45  7.30*  7.29*   PCO2  67*  98*  101*   PO2  63*  78  89   HCO3  46*  47*  47*     No results for input(s): LCAD, LAC in the last 72 hours.     Assessment:  (Medical Decision Making) Hospital Problems  Date Reviewed: 2/22/2017          Codes Class Noted POA    COPD, severe ([facility] (Chronic) ICD-10-CM: J44.9  ICD-9-CM: 847  3/2/2017 Yes    Overview Signed 11/10/2016  4:21 PM by Gayla Duran NP     PFTs 3/2016    FVC 2.22 L and 62 % of predicted. FEV1 1.23 L and 42 % of predicted. Ratio 55%. There was no clinically relevant response to bronchodilator  LUNG VOLUMES:  TLC 4.61 L and 89 % of predicted. RV 2.39 L and 139 % of predicted. DIFFUSION:    The diffusing capacity was Corrected for hemoglobin of 14.7 g/dL  and was 15.9 mL/mmHg/minute and 58 % of predicted. See below    Diabetes mellitus type 2, controlled ([facility] (Chronic) ICD-10-CM: E11.9  ICD-9-CM: 250.00  3/2/2017 Yes        Morbid obesity with BMI of 50.0-59.9, adult (HCC) (Chronic) ICD-10-CM: E66.01, Z68.43  ICD-9-CM: 278.01, V85.43  3/2/2017 Yes    Major problem. MICHAEL on CPAP (Chronic) ICD-10-CM: G47.33  ICD-9-CM: 327.23  3/2/2017 Yes    Suspect non-compliant    Nicotine dependence, cigarettes, uncomplicated (Chronic) [address]: Q49.905  ICD-9-CM: 305.1  3/2/2017 Yes        Hypoxia ICD-10-CM: R09.02  ICD-9-CM: 799.02  3/2/2017 Yes    Wean oxygen to keep sats in low 90s. * (Principal)Acute on chronic respiratory failure ([facility] ICD-10-CM: J96.20  ICD-9-CM: 518.84  3/1/2017 Unknown    Severe but improved. Obesity hypoventilation syndrome ([facility] ICD-10-CM: W49.7  ICD-9-CM: 278.03  12/18/2015 Yes    Likely. Recheck TSH since hypothyroidism can promote decompensation. Plan:  (Medical Decision Making)     --on NC now, will continue to taper to 4 LPM  --has her home CPAP here and told respiratory will see how she does with it tonight.  Told to make sure we are using her home oxgyen at 4 LPM with it.  --remove gonzalez  --PT/OT  --OOB to chair today  --continue solumedrol 40 q8 and taper tomorrow  --TSH normal range  --continue Abx for 7 days  --f/u ABG and x-ray in AM  --incentive spirometry  --add nicotine patch.  --continue remaining treatment. Spoke with nursing and respiratory and aware of plan    More than 50% of the time documented was spent in face-to-face contact with the patient and in the care of the patient on the floor/unit where the patient is located.          Caleb Pandya MD

## 2017-03-03 NOTE — PROGRESS NOTES
Problem: Mobility Impaired (Adult and Pediatric)  Goal: *Acute Goals and Plan of Care (Insert Text)  STG:  (1.)Ms. Isidro will move from supine to sit and sit to supine with CONTACT GUARD ASSIST within 3 day(s). (2.)Ms. Isidro will transfer from bed to chair and chair to bed with CONTACT GUARD ASSIST using the least restrictive device within 3 day(s). (3.)Ms. Isidro will ambulate with CONTACT GUARD ASSIST for 100 feet with the least restrictive device within 3 day(s). LTG:  (1.)Ms. Isidro will move from supine to sit and sit to supine in bed with SUPERVISION within 7 day(s). (2.)Ms. Isidro will transfer from bed to chair and chair to bed with SUPERVISION using the least restrictive device within 7 day(s). (3.)Ms. Isidro will ambulate with SUPERVISION for 300 feet with the least restrictive device within 7 day(s). (4)Ms. Isidro will go up and down a flight of steps with rail CGA in 7 days. ________________________________________________________________________________________________      PHYSICAL THERAPY: INITIAL ASSESSMENT, AM 3/3/2017  INPATIENT: Hospital Day: 3  Payor: Luke Rm / Plan: [place] Luke Rm / Product Type: Managed Care Medicaid /      NAME/AGE/GENDER: Miranda Isidro is a 42 y.o. female            PRIMARY DIAGNOSIS: Respiratory failure with hypercapnia ([address] Acute on chronic respiratory failure ([address] Acute on chronic respiratory failure (HCC)        ICD-10: Treatment Diagnosis:       · Generalized Muscle Weakness (M62.81)  · Difficulty in walking, Not elsewhere classified (R26.2)   Precaution/Allergies:  Review of patient's allergies indicates no known allergies. ASSESSMENT:      Ms. Sienna Pruitt presents with general decrease in functional mobility and gait. She is on 5 liters O2 and was able to walk in the room a short distance with walker and get in chair. Will follow to increase functional mobility.       This section established at most recent assessment PROBLEM LIST (Impairments causing functional limitations):  1. Decreased Strength  2. Decreased ADL/Functional Activities  3. Decreased Transfer Abilities  4. Decreased Ambulation Ability/Technique  5. Decreased Balance  6. Decreased Activity Tolerance  7. Increased Fatigue  8. Increased Shortness of Breath  9. Decreased [place] with Home Exercise Program    INTERVENTIONS PLANNED: (Benefits and precautions of physical therapy have been discussed with the patient.)  1. Balance Exercise  2. Bed Mobility  3. Family Education  4. Gait Training  5. Range of Motion (ROM)  6. Therapeutic Activites  7. Therapeutic Exercise/Strengthening  8. Transfer Training  9. Group Therapy      TREATMENT PLAN: Frequency/Duration: daily for duration of hospital stay  Rehabilitation Potential For Stated Goals: GOOD      RECOMMENDED REHABILITATION/EQUIPMENT: (at time of discharge pending progress): Continue Skilled Therapy and Home Health: Physical Therapy. HISTORY:   History of Present Injury/Illness (Reason for Referral):  38yr old with a history of COPD and noncompliance with cpap and smoking who presented to ER with lethragy and weakness. Found to have Hypercapnic respiratory failure CO2 144. Currently on BIPAP in the ER. Lethargic and drowsy and unable to give history. Hospitalist asked to admit. Past Medical History/Comorbidities:   Ms. Fariha John  has a past medical history of Childhood asthma; COPD exacerbation ([facility] (11/10/2016); and Pneumonia (ages 3 and 11). She also has no past medical history of COPD; DEMENTIA; Gastrointestinal disorder; Heart failure ([address] Infectious disease; Neurological disorder; Other ill-defined conditions(799.89); or Psychiatric disorder. Ms. Isidro  has a past surgical history that includes partial hysterectomy (08/2012).   Social History/Living Environment:   Home Environment: Apartment  # Steps to Enter: 14 (second floor apartment)  Rails to Enter: Yes  [employer] : Right  One/Two Story Residence: One story  Living Alone: No  Support Systems: Family member(s)  Patient Expects to be Discharged to[de-identified] Private residence  Current DME Used/Available at Home: CPAP, Oxygen, portable, Nebulizer, Glucometer  Prior Level of Function/Work/Activity:  Independent, on oxygen   Number of Personal Factors/Comorbidities that affect the Plan of Care: 1-2: MODERATE COMPLEXITY   EXAMINATION:   Most Recent Physical Functioning:   Gross Assessment:  AROM: Generally decreased, functional (LE's)  Strength: Generally decreased, functional (LE's)               Posture:  Posture (WDL): Exceptions to WDL  Posture Assessment: Rounded shoulders  Balance:  Sitting: Intact  Standing: With support;Pull to stand Bed Mobility:  Supine to Sit: Minimum assistance; Additional time  Wheelchair Mobility:     Transfers:  Sit to Stand: Additional time;Minimum assistance  Stand to Sit: Minimum assistance; Additional time  Bed to Chair: Minimum assistance; Additional time  Gait:     Speed/Miriam: Delayed  Step Length: Left shortened;Right shortened  Gait Abnormalities: Decreased step clearance  Distance (ft): 20 Feet (ft)  Assistive Device: Walker, rolling  Ambulation - Level of Assistance: Minimal assistance  Interventions: Safety awareness training;Verbal cues; Tactile cues;Manual cues       Body Structures Involved:  1. Bones  2. Joints  3. Muscles  4. Ligaments Body Functions Affected:  1. Movement Related Activities and Participation Affected:  1. Mobility   Number of elements that affect the Plan of Care: 1-2: LOW COMPLEXITY   CLINICAL PRESENTATION:   Presentation: Stable and uncomplicated: LOW COMPLEXITY   CLINICAL DECISION MAKIN [facility] [address] [id number] AM-PAC 6 Clicks   Basic Mobility Inpatient Short Form  How much difficulty does the patient currently have. .. Unable A Lot A Little None   1. Turning over in bed (including adjusting bedclothes, sheets and blankets)? [ ] 1   [ ] 2   [X] 3   [ ] 4   2.   Sitting down on and standing up from a chair with arms ( e.g., wheelchair, bedside commode, etc.)   [ ] 1   [ ] 2   [X] 3   [ ] 4   3. Moving from lying on back to sitting on the side of the bed? [ ] 1   [ ] 2   [X] 3   [ ] 4   How much help from another person does the patient currently need. .. Total A Lot A Little None   4. Moving to and from a bed to a chair (including a wheelchair)? [ ] 1   [ ] 2   [X] 3   [ ] 4   5. Need to walk in hospital room? [ ] 1   [ ] 2   [X] 3   [ ] 4   6. Climbing 3-5 steps with a railing? [ ] 1   [X] 2   [ ] 3   [ ] 4   © 2007, Trustees of [address] [place] [address] [address] [address], under license to [employer]. All rights reserved    Score:  Initial: 17 Most Recent: X (Date: -- )     Interpretation of Tool:  Represents activities that are increasingly more difficult (i.e. Bed mobility, Transfers, Gait). Score 24 23 22-20 19-15 14-10 9-7 6       Modifier CH CI CJ CK CL CM CN         · Mobility - Walking and Moving Around:               - CURRENT STATUS:    CK - 40%-59% impaired, limited or restricted               - GOAL STATUS:           CJ - 20%-39% impaired, limited or restricted               - D/C STATUS:                       ---------------To be determined---------------  Payor: José Somers / Plan: [place] RUSSE[employer] MEDICAID / Product Type: Managed Care Medicaid /       Medical Necessity:     · Patient is expected to demonstrate progress in strength, range of motion and balance to decrease assistance required with theraputic exercises and functional mobility. Reason for Services/Other Comments:  · Patient continues to require present interventions due to patient's inability to perform theraputic exercises and functional mobility independently.    Use of outcome tool(s) and clinical judgement create a POC that gives a: Questionable prediction of patient's progress: MODERATE COMPLEXITY                 TREATMENT:   (In addition to Assessment/Re-Assessment sessions the following treatments were rendered)   Pre-treatment Symptoms/Complaints:  None, general weakness and fatigue  Pain: Initial:      Post Session:  0      Assessment/Reassessment only, no treatment provided today     Braces/Orthotics/Lines/Etc:   · IV  · gonzalez catheter  · telemetry  · O2 Device: Hi flow nasal cannula  Treatment/Session Assessment:    · Response to Treatment:  Pt. Did well. · Interdisciplinary Collaboration:  · Occupational Therapist  · Registered Nurse  · After treatment position/precautions:  · Up in chair  · Bed/Chair-wheels locked  · Bed in low position  · Call light within reach  · RN notified  · Compliance with Program/Exercises: Will assess as treatment progresses. No questions. · Recommendations/Intent for next treatment session: \"Next visit will focus on advancements to more challenging activities and reduction in assistance provided\".   Total Treatment Duration:  PT Patient Time In/Time Out  Time In: 1055  [facility]

## 2017-03-03 NOTE — PROGRESS NOTES
Problem: Self Care Deficits Care Plan (Adult)  Goal: *Acute Goals and Plan of Care (Insert Text)  1. Patient will perform grooming with supervision. 2. Patient will perform Upper body dressing with supervision  3. Patient will perform lower body dressing with CGA using adaptive equipment PRN. 4. Patient will perform upper and lower body bathing with supervision using adaptive equipment PRN. 5. Patient will perform toilet transfers with CGA/SBA. 6. Patient will perform shower transfer with CGA/SBA. 7. Patient will participate in 30 + minutes of ADL/ therapeutic exercise/therapeutic activity with min rest breaks to increase activity tolerance for self care. 8. Patient will perform ADL functional mobility in room with CGA/SBA    Goals to be achieved in 7 days. OCCUPATIONAL THERAPY: Initial Assessment, AM 3/3/2017  INPATIENT: Hospital Day: 3  Payor: Sheila Hanson / Plan: [place] / Product Type: [employer] Medicaid /      NAME/AGE/GENDER: Keenan Isidro is a 42 y.o. female            PRIMARY DIAGNOSIS:  Respiratory failure with hypercapnia ([address] Acute on chronic respiratory failure ([address] Acute on chronic respiratory failure (HCC)        ICD-10: Treatment Diagnosis:        · Generalized Muscle Weakness (M62.81)  · Other lack of cordination (R27.8)   Precautions/Allergies:         Review of patient's allergies indicates no known allergies. ASSESSMENT:      Ms. Abena Britton presents supine in bed, min assist with transfers, supervision with UE ADLS and min/mod assist with LE ADLS. She lives with her son in a 2nd floor apartment and was Independent with all ADLS prior. She does not work and was on home o2. She would benefit from skilled OT will follow. This section established at most recent assessment   PROBLEM LIST (Impairments causing functional limitations):  1. Decreased Strength  2. Decreased ADL/Functional Activities  3. Decreased Transfer Abilities  4.  Decreased Ambulation Ability/Technique  5. Decreased Balance  6. Decreased Activity Tolerance    INTERVENTIONS PLANNED: (Benefits and precautions of occupational therapy have been discussed with the patient.)  1. Activities of daily living training  2. Adaptive equipment training  3. Balance training  4. Clothing management  5. Neuromuscular re-eduation  6. Theraputic activity  7. Theraputic exercise  8. Home safety and DME recommendations      TREATMENT PLAN: Frequency/Duration: Follow patient 3 times to address above goals. Rehabilitation Potential For Stated Goals: GOOD      RECOMMENDED REHABILITATION/EQUIPMENT: (at time of discharge pending progress): Continue Skilled Therapy and Home Health: Physical Therapy. OCCUPATIONAL PROFILE AND HISTORY:   History of Present Injury/Illness (Reason for Referral):  Decreased self care and functional mobility  Past Medical History/Comorbidities:   Ms. Christi Rizo  has a past medical history of Childhood asthma; COPD exacerbation ([facility] (11/10/2016); and Pneumonia (ages 3 and 11). She also has no past medical history of COPD; DEMENTIA; Gastrointestinal disorder; Heart failure ([facility] Infectious disease; Neurological disorder; Other ill-defined conditions(799.89); or Psychiatric disorder. Ms. Isidro  has a past surgical history that includes partial hysterectomy (08/2012).   Social History/Living Environment:   Home Environment: Apartment  # Steps to Enter: 14 (second floor apartment)  Rails to Enter: Yes  [employer] : Right  One/Two Story Residence: One story  Living Alone: No  Support Systems: Family member(s)  Patient Expects to be Discharged to[de-identified] Private residence  Current DME Used/Available at Home: CPAP, Oxygen, portable, Nebulizer, Glucometer  Tub or Shower Type: Tub/Shower combination  Prior Level of Function/Work/Activity:  Mod I with ADLs no DME      Number of Personal Factors/Comorbidities that affect the Plan of Care: Brief history (0):  LOW COMPLEXITY   ASSESSMENT OF OCCUPATIONAL PERFORMANCE[de-identified]   Activities of Daily Living:           Basic ADLs (From Assessment) Complex ADLs (From Assessment)   Basic ADL  Feeding: Setup  Oral Facial Hygiene/Grooming: Supervision  Bathing: Minimum assistance, Moderate assistance  Upper Body Dressing: Supervision  Lower Body Dressing: Minimum assistance, Moderate assistance  Toileting: Total assistance (catheter)     Grooming/Bathing/Dressing Activities of Daily Living                       Functional Transfers  Toilet Transfer : Minimum assistance     Bed/Mat Mobility  Supine to Sit: Minimum assistance; Additional time  Sit to Stand: Additional time;Minimum assistance  Bed to Chair: Minimum assistance; Additional time          Most Recent Physical Functioning:   Gross Assessment:                  Posture:  Posture (WDL): Exceptions to WDL  Posture Assessment: Rounded shoulders  Balance:  Sitting: Intact  Standing: With support;Pull to stand Bed Mobility:  Supine to Sit: Minimum assistance; Additional time  Wheelchair Mobility:     Transfers:  Sit to Stand: Additional time;Minimum assistance  Stand to Sit: Minimum assistance; Additional time  Bed to Chair: Minimum assistance; Additional time                    Patient Vitals for the past 6 hrs:       BP SpO2 Pulse   03/03/17 0639 109/76 95 % -   03/03/17 0651 98/62 95 % 90   03/03/17 0658 98/62 94 % (!) 108   03/03/17 0739 115/59 95 % 95   03/03/17 0839 113/71 93 % 100   03/03/17 0939 130/63 (!) 88 % (!) 102   03/03/17 1039 103/55 93 % (!) 104   03/03/17 1139 105/63 96 % (!) 103        Mental Status  Neurologic State: Alert, Appropriate for age  Orientation Level: Appropriate for age  Cognition: Appropriate decision making, Appropriate for age attention/concentration, Appropriate safety awareness, Follows commands                               Physical Skills Involved:  1. Balance  2. Mobility  3. Strength  4.  Endurance Cognitive Skills Affected (resulting in the inability to perform in a timely and safe manner): 1. none Psychosocial Skills Affected:  1. Environmental Adaptations   Number of elements that affect the Plan of Care: 3-5:  MODERATE COMPLEXITY   CLINICAL DECISION MAKIN[address] [place] [address] AM-PAC 6 Clicks   Basic Mobility Inpatient Short Form  How much help from another person does the patient currently need. .. Total A Lot A Little None   1. Putting on and taking off regular lower body clothing?   [ ] 1   [X] 2   [ ] 3   [ ] 4   2. Bathing (including washing, rinsing, drying)? [ ] 1   [X] 2   [ ] 3   [ ] 4   3. Toileting, which includes using toilet, bedpan or urinal?   [ ] 1   [ ] 2   [X] 3   [ ] 4   4. Putting on and taking off regular upper body clothing?   [ ] 1   [ ] 2   [X] 3   [ ] 4   5. Taking care of personal grooming such as brushing teeth? [ ] 1   [ ] 2   [X] 3   [ ] 4   6. Eating meals? [ ] 1   [ ] 2   [ ] 3   [X] 4   © , Trustees of [address] [place] [address], under license to [employer]. All rights reserved    Score:  Initial: 17 Most Recent: X (Date: -- )     Interpretation of Tool:  Represents activities that are increasingly more difficult (i.e. Bed mobility, Transfers, Gait). Score 24 23 22-20 19-15 14-10 9-7 6       Modifier CH CI CJ CK CL CM CN         · Self Care:               - CURRENT STATUS:    CK - 40%-59% impaired, limited or restricted               - GOAL STATUS:           CJ - 20%-39% impaired, limited or restricted               - D/C STATUS:                       ---------------To be determined---------------  Payor: Clarisse Dia / Plan: [place] [employer] MEDICAID / Product Type: Managed Care Medicaid /       Medical Necessity:     · Patient is expected to demonstrate progress in balance, coordination and functional technique to decrease assistance required with self care and functional mobility and improve safety during self care and functional mobility.   Reason for Services/Other Comments:  · Patient continues to require skilled intervention due to decreased self care and functional mobility. Use of outcome tool(s) and clinical judgement create a POC that gives a: LOW COMPLEXITY             TREATMENT:   (In addition to Assessment/Re-Assessment sessions the following treatments were rendered)      Pre-treatment Symptoms/Complaints:  none  Pain: Initial:   Pain Intensity 1: 0  Post Session:  0/10      Assessment/Reassessment only, no treatment provided today     Braces/Orthotics/Lines/Etc:   · gonzalez catheter  · ICU monitors  · O2 Device: Hi flow nasal cannula  Treatment/Session Assessment:    · Response to Treatment:  Tolerated well, motivated to move  · Interdisciplinary Collaboration:  · Physical Therapist  · Occupational Therapist  · Registered Nurse  · After treatment position/precautions:  · Up in chair  · Bed/Chair-wheels locked  · Bed in low position  · Call light within reach  · RN notified  · Compliance with Program/Exercises: Will assess as treatment progresses. · Recommendations/Intent for next treatment session: \"Next visit will focus on advancements to more challenging activities and reduction in assistance provided\".   Total Treatment Duration:  OT Patient Time In/Time Out  Time In: 1105  Time Out: 1801 [facility], OT

## 2017-03-04 ENCOUNTER — APPOINTMENT (OUTPATIENT)
Dept: GENERAL RADIOLOGY | Age: 47
DRG: 133 | End: 2017-03-04
Attending: INTERNAL MEDICINE
Payer: MEDICAID

## 2017-03-04 LAB
ARTERIAL PATENCY WRIST A: POSITIVE
BASE EXCESS BLDA CALC-SCNC: 9.8 MMOL/L (ref 0–3)
BDY SITE: ABNORMAL
FIO2 ON VENT: 40 %
GAS FLOW.O2 O2 DELIVERY SYS: 5 L/MIN
HCO3 BLDA-SCNC: 37 MMOL/L (ref 22–26)
PCO2 BLDA: 58 MMHG (ref 35–45)
PH BLDA: 7.42 [PH] (ref 7.35–7.45)
PO2 BLDA: 67 MMHG (ref 75–100)
SERVICE CMNT-IMP: ABNORMAL
VENTILATION MODE VENT: ABNORMAL

## 2017-03-04 PROCEDURE — 74011250637 HC RX REV CODE- 250/637: Performed by: INTERNAL MEDICINE

## 2017-03-04 PROCEDURE — 36600 WITHDRAWAL OF ARTERIAL BLOOD: CPT

## 2017-03-04 PROCEDURE — 77030011256 HC DRSG MEPILEX <16IN NO BORD MOLN -A

## 2017-03-04 PROCEDURE — 94760 N-INVAS EAR/PLS OXIMETRY 1: CPT

## 2017-03-04 PROCEDURE — 97530 THERAPEUTIC ACTIVITIES: CPT

## 2017-03-04 PROCEDURE — 77010033678 HC OXYGEN DAILY

## 2017-03-04 PROCEDURE — 97535 SELF CARE MNGMENT TRAINING: CPT

## 2017-03-04 PROCEDURE — 71020 XR CHEST PA LAT: CPT

## 2017-03-04 PROCEDURE — 74011000250 HC RX REV CODE- 250: Performed by: INTERNAL MEDICINE

## 2017-03-04 PROCEDURE — 94640 AIRWAY INHALATION TREATMENT: CPT

## 2017-03-04 PROCEDURE — 82803 BLOOD GASES ANY COMBINATION: CPT

## 2017-03-04 PROCEDURE — 74011250636 HC RX REV CODE- 250/636: Performed by: INTERNAL MEDICINE

## 2017-03-04 PROCEDURE — 99232 SBSQ HOSP IP/OBS MODERATE 35: CPT | Performed by: INTERNAL MEDICINE

## 2017-03-04 PROCEDURE — 65270000029 HC RM PRIVATE

## 2017-03-04 RX ORDER — ALBUTEROL SULFATE 0.83 MG/ML
2.5 SOLUTION RESPIRATORY (INHALATION)
Status: DISCONTINUED | OUTPATIENT
Start: 2017-03-04 | End: 2017-03-05 | Stop reason: HOSPADM

## 2017-03-04 RX ADMIN — METHYLPREDNISOLONE SODIUM SUCCINATE 40 MG: 40 INJECTION, POWDER, FOR SOLUTION INTRAMUSCULAR; INTRAVENOUS at 09:12

## 2017-03-04 RX ADMIN — Medication 10 ML: at 06:00

## 2017-03-04 RX ADMIN — ALBUTEROL SULFATE 2.5 MG: 2.5 SOLUTION RESPIRATORY (INHALATION) at 01:33

## 2017-03-04 RX ADMIN — NYSTATIN: 100000 POWDER TOPICAL at 18:23

## 2017-03-04 RX ADMIN — METHYLPREDNISOLONE SODIUM SUCCINATE 40 MG: 40 INJECTION, POWDER, FOR SOLUTION INTRAMUSCULAR; INTRAVENOUS at 20:25

## 2017-03-04 RX ADMIN — ENOXAPARIN SODIUM 40 MG: 40 INJECTION SUBCUTANEOUS at 20:25

## 2017-03-04 RX ADMIN — BUDESONIDE 500 MCG: 0.5 INHALANT RESPIRATORY (INHALATION) at 07:35

## 2017-03-04 RX ADMIN — NYSTATIN: 100000 POWDER TOPICAL at 09:12

## 2017-03-04 RX ADMIN — BUDESONIDE 500 MCG: 0.5 INHALANT RESPIRATORY (INHALATION) at 20:11

## 2017-03-04 RX ADMIN — ENOXAPARIN SODIUM 40 MG: 40 INJECTION SUBCUTANEOUS at 09:12

## 2017-03-04 RX ADMIN — ALBUTEROL SULFATE 2.5 MG: 2.5 SOLUTION RESPIRATORY (INHALATION) at 15:17

## 2017-03-04 RX ADMIN — ALBUTEROL SULFATE 2.5 MG: 2.5 SOLUTION RESPIRATORY (INHALATION) at 07:35

## 2017-03-04 RX ADMIN — Medication 10 ML: at 20:26

## 2017-03-04 RX ADMIN — ALBUTEROL SULFATE 2.5 MG: 2.5 SOLUTION RESPIRATORY (INHALATION) at 20:11

## 2017-03-04 RX ADMIN — LEVOFLOXACIN 750 MG: 5 INJECTION, SOLUTION INTRAVENOUS at 20:25

## 2017-03-04 NOTE — PROGRESS NOTES
Problem: Mobility Impaired (Adult and Pediatric)  Goal: *Acute Goals and Plan of Care (Insert Text)  STG:  (1.)Ms. Isidro will move from supine to sit and sit to supine with CONTACT GUARD ASSIST within 3 day(s). (2.)Ms. Isidro will transfer from bed to chair and chair to bed with CONTACT GUARD ASSIST using the least restrictive device within 3 day(s). (3.)Ms. Isidro will ambulate with CONTACT GUARD ASSIST for 100 feet with the least restrictive device within 3 day(s). LTG:  (1.)Ms. Isidro will move from supine to sit and sit to supine in bed with SUPERVISION within 7 day(s). (2.)Ms. Isidro will transfer from bed to chair and chair to bed with SUPERVISION using the least restrictive device within 7 day(s). (3.)Ms. Isidro will ambulate with SUPERVISION for 300 feet with the least restrictive device within 7 day(s). (4)Ms. Isidro will go up and down a flight of steps with rail CGA in 7 days. ________________________________________________________________________________________________      PHYSICAL THERAPY: Daily Note and AM 3/4/2017  INPATIENT: Hospital Day: 4  Payor: Archie Artis / Plan: [place] Archie Lavender / Product Type: Managed Care Medicaid /      NAME/AGE/GENDER: Clark Isidro is a 42 y.o. female            PRIMARY DIAGNOSIS: Respiratory failure with hypercapnia ([address] Acute on chronic respiratory failure ([address] Acute on chronic respiratory failure (HCC)        ICD-10: Treatment Diagnosis:       · Generalized Muscle Weakness (M62.81)  · Difficulty in walking, Not elsewhere classified (R26.2)   Precaution/Allergies:  Review of patient's allergies indicates no known allergies. ASSESSMENT:     Ms. Pau Lewis presents with general decrease in functional mobility and gait. Her O2 was decreased to 2L by RT and she was able to ambulate without the use of an assistive device. Patient did need a rest break with walking but she demonstrated good balance.   Patient does have a flight of stairs to get to her apartment on the 2nd floor and she has had trouble with this prior. Agreed to work on stair negotiation tomorrow. This section established at most recent assessment   PROBLEM LIST (Impairments causing functional limitations):  1. Decreased Strength  2. Decreased ADL/Functional Activities  3. Decreased Transfer Abilities  4. Decreased Ambulation Ability/Technique  5. Decreased Balance  6. Decreased Activity Tolerance  7. Increased Fatigue  8. Increased Shortness of Breath  9. Decreased [place] with Home Exercise Program    INTERVENTIONS PLANNED: (Benefits and precautions of physical therapy have been discussed with the patient.)  1. Balance Exercise  2. Bed Mobility  3. Family Education  4. Gait Training  5. Range of Motion (ROM)  6. Therapeutic Activites  7. Therapeutic Exercise/Strengthening  8. Transfer Training  9. Group Therapy      TREATMENT PLAN: Frequency/Duration: daily for duration of hospital stay  Rehabilitation Potential For Stated Goals: GOOD      RECOMMENDED REHABILITATION/EQUIPMENT: (at time of discharge pending progress): Continue Skilled Therapy and Home Health: Physical Therapy. HISTORY:   History of Present Injury/Illness (Reason for Referral):  38yr old with a history of COPD and noncompliance with cpap and smoking who presented to ER with lethragy and weakness. Found to have Hypercapnic respiratory failure CO2 144. Currently on BIPAP in the ER. Lethargic and drowsy and unable to give history. Hospitalist asked to admit. Past Medical History/Comorbidities:   Ms. Daya García  has a past medical history of Childhood asthma; COPD exacerbation ([address] (11/10/2016); and Pneumonia (ages 3 and 11). She also has no past medical history of COPD; DEMENTIA; Gastrointestinal disorder; Heart failure ([address] Infectious disease; Neurological disorder; Other ill-defined conditions(799.89); or Psychiatric disorder. Ms. Isidro  has a past surgical history that includes partial hysterectomy (2012). Social History/Living Environment:   Home Environment: Apartment  # Steps to Enter: 14 (second floor apartment)  Rails to Enter: Yes  [employer] : Right  One/Two Story Residence: One story  Living Alone: No  Support Systems: Family member(s)  Patient Expects to be Discharged to[de-identified] Private residence  Current DME Used/Available at Home: CPAP, Oxygen, portable, Nebulizer, Glucometer  Tub or Shower Type: Tub/Shower combination  Prior Level of Function/Work/Activity:  Independent, on oxygen   Number of Personal Factors/Comorbidities that affect the Plan of Care: 1-2: MODERATE COMPLEXITY   EXAMINATION:   Most Recent Physical Functioning:   Gross Assessment:  AROM: Generally decreased, functional (B UEs and LEs)  Strength: Generally decreased, functional (B UEs and LEs)               Posture:     Balance:  Sitting: Intact  Standing: Intact Bed Mobility:  Supine to Sit: Stand-by asssistance  Wheelchair Mobility:     Transfers:  Sit to Stand: Stand-by asssistance  Stand to Sit: Stand-by asssistance  Bed to Chair: Stand-by asssistance  Gait:     Speed/Miriam: Pace decreased (<100 feet/min)  Step Length: Left shortened;Right shortened  Distance (ft): 75 Feet (ft) (x 1; 100' x 1)  Assistive Device:  (none)  Ambulation - Level of Assistance: Stand-by asssistance  Interventions: Safety awareness training;Verbal cues       Body Structures Involved:  1. Bones  2. Joints  3. Muscles  4. Ligaments Body Functions Affected:  1. Movement Related Activities and Participation Affected:  1. Mobility   Number of elements that affect the Plan of Care: 1-2: LOW COMPLEXITY   CLINICAL PRESENTATION:   Presentation: Stable and uncomplicated: LOW COMPLEXITY   CLINICAL DECISION MAKIN [facility] [address] [id number] AM-PAC 6 Clicks   Basic Mobility Inpatient Short Form  How much difficulty does the patient currently have. .. Unable A Lot A Little None   1.   Turning over in bed (including adjusting bedclothes, sheets and blankets)? [ ] 1   [ ] 2   [X] 3   [ ] 4   2. Sitting down on and standing up from a chair with arms ( e.g., wheelchair, bedside commode, etc.)   [ ] 1   [ ] 2   [X] 3   [ ] 4   3. Moving from lying on back to sitting on the side of the bed? [ ] 1   [ ] 2   [X] 3   [ ] 4   How much help from another person does the patient currently need. .. Total A Lot A Little None   4. Moving to and from a bed to a chair (including a wheelchair)? [ ] 1   [ ] 2   [X] 3   [ ] 4   5. Need to walk in hospital room? [ ] 1   [ ] 2   [X] 3   [ ] 4   6. Climbing 3-5 steps with a railing? [ ] 1   [X] 2   [ ] 3   [ ] 4   © 2007, Trustees of [address] [place] [id number], under license to [employer]. All rights reserved    Score:  Initial: 17 Most Recent: X (Date: -- )     Interpretation of Tool:  Represents activities that are increasingly more difficult (i.e. Bed mobility, Transfers, Gait). Score 24 23 22-20 19-15 14-10 9-7 6       Modifier CH CI CJ CK CL CM CN         · Mobility - Walking and Moving Around:               - CURRENT STATUS:    CK - 40%-59% impaired, limited or restricted               - GOAL STATUS:           CJ - 20%-39% impaired, limited or restricted               - D/C STATUS:                       ---------------To be determined---------------  Payor: Bartolo Brown / Plan: [place] MEDICAID / Product Type: Managed Care Medicaid /       Medical Necessity:     · Patient is expected to demonstrate progress in strength, range of motion and balance to decrease assistance required with theraputic exercises and functional mobility. Reason for Services/Other Comments:  · Patient continues to require present interventions due to patient's inability to perform theraputic exercises and functional mobility independently.    Use of outcome tool(s) and clinical judgement create a POC that gives a: Questionable prediction of patient's progress: MODERATE COMPLEXITY                 TREATMENT:   (In addition to Assessment/Re-Assessment sessions the following treatments were rendered)   Pre-treatment Symptoms/Complaints:  Patient with no complaints. Pain: Initial:   Pain Intensity 1: 0  Post Session:  0      Therapeutic Activity: (    30 minutes): Therapeutic activities including Bed transfers, Chair transfers, Toilet transfers and Ambulation on level ground to improve mobility, strength, balance and coordination. Required minimal Safety awareness training;Verbal cues to promote static and dynamic balance in standing. Braces/Orthotics/Lines/Etc:   · telemetry, O2 via nasal canula (2L)  Treatment/Session Assessment:    · Response to Treatment:  Patient tolerated today's therapy well. Good progress with mobility. · Interdisciplinary Collaboration:  · Physical Therapist and Registered Nurse  · After treatment position/precautions:  · Up in chair, Bed/Chair-wheels locked, Call light within reach and RN notified  · Compliance with Program/Exercises: Will assess as treatment progresses. No questions. · Recommendations/Intent for next treatment session: \"Next visit will focus on advancements to more challenging activities and reduction in assistance provided\".   Total Treatment Duration:  PT Patient Time In/Time Out  Time In: 1030  Time Out: 593 Johnathon Dex Novak PT

## 2017-03-04 NOTE — PROGRESS NOTES
Hospitalist Progress Note    3/4/2017  Admit Date: 3/1/2017  1:46 PM   NAME: Burgess Shima Isidro   :  1970   MRN:  [id number]   Attending: Narendra Hawkins MD  PCP:  MAIA Limon      Admitted for: Resp failure, metabolic encephalopathy    SUBJECTIVE:   Patient seen- awake and alert- Off Bipap. Mental status is improved    2017- seen - ddi well overnight with 3 [facility]  Admitted to [facility] and started on treatment for copd exacerbation. Has improved. Wore her on BIPAp last night and did well.  Pulmonology wants to tweak her O2      Review of Systems negative with exception of pertinent positives noted above  Past medical history unchanged from H&P    PHYSICAL EXAM     Visit Vitals    /77    Pulse 90    Temp 98.6 °F (37 °C)    Resp 13    Ht 5' 2\" (1.575 m)    Wt 151.7 kg (334 lb 8 oz)    LMP 2010    SpO2 99%    Breastfeeding No    BMI 61.18 kg/m2      Temp (24hrs), Av.5 °F (36.9 °C), Min:98.1 °F (36.7 °C), Max:99 °F (37.2 °C)    Oxygen Therapy  O2 Sat (%): 99 % (17 0735)  Pulse via Oximetry: 93 beats per minute (17 0735)  O2 Device: Hi flow nasal cannula (17 0735)  O2 Flow Rate (L/min): 5 l/min (pt states she wears 2 L at home- weaned to 4) (17 0735)  O2 Temperature: 87.8 °F (31 °C) (17 0345)  FIO2 (%): 40 % (17 193)    Intake/Output Summary (Last 24 hours) at 17 09  Last data filed at 17   Gross per 24 hour   Intake              150 ml   Output             1800 ml   Net            -1650 ml        General: No acute distress  mucous membranes pink and moist acyanotic anicteric  Neck:  Supple full range of motion  Lungs:  Air entry equal bilaterally, no crepitations rales rhonchi   Heart:  Regular rate and rhythm,  No murmur, rub, or gallop  Abdomen: Soft, Non distended, Non tender, no rebound guarding Positive bowel sounds  Extremities: No cyanosis, clubbing or edema  Neurologic:  No focal deficits  Musculoskeletal: no   Joint swelling tenderness erythema  Skin:  No erythema, rashes noted          LAB  No results for input(s): GLUCPOC in the last 72 hours. No lab exists for component: [place]   Recent Labs      03/02/17   0050   WBC  8.1   HGB  13.8   HCT  48.7*   PLT  239     Recent Labs      03/03/17   0328  03/02/17   0050   03/01/17   2044   NA  140  141   --   137   K  4.6  5.1   --   4.8   CL  99  100   --   97*   CO2  39*  45*   --   43*   GLU  111*  126*   --   113*   BUN  16  9   --   10   CREA  0.84  0.68   --   0.73   MG  2.4  2.4   < >   --    CA  9.7  9.0   --   9.0   PHOS   --    --    --   4.0   TROIQ   --   <0.04   --   <0.04   ALB  3.0*  2.8*   --   3.0*   TBILI  0.6  0.5   --   0.6   ALT  33  35   --   39   SGOT  15  16   --   19    < > = values in this interval not displayed. EKG and imaging reviewed personally by me  Xr Chest Pa Lat    Result Date: 3/4/2017  CHEST X-RAY, 2 views 3/4/2017 History: Follow-up infiltrates. Technique: PA and lateral views of the chest. Comparison: 3/2/2017 Findings: The cardiac silhouette is nonenlarged. Lungs are expanded without pneumothorax. Slightly stable basilar atelectatic appearing changes are seen. In addition, small basilar effusions are seen best appreciated on the lateral view which do appear new given that there does appear to be some new blunting of the costophrenic angles. IMPRESSION: 1.  Stable basilar atelectatic appearing changes although new trace basilar effusions are seen. Recommend correlation for symptoms of evolving fluid overload.      Results for orders placed or performed during the hospital encounter of 03/01/17   EKG, 12 LEAD, INITIAL   Result Value Ref Range    Systolic BP  mmHg    Diastolic BP  mmHg    Ventricular Rate 83 BPM    Atrial Rate 83 BPM    P-R Interval 163 ms    QRS Duration 79 ms    Q-T Interval 346 ms    QTC Calculation (Bezet) 406 ms    Calculated P Axis 23 degrees    Calculated R Axis -71 degrees Calculated T Axis 67 degrees    Diagnosis       Normal sinus rhythm with occasional Premature atrial complexes  Left axis deviation !!! Poor data quality, interpretation may be adversely   affected  Nonspecific ST abnormality  Abnormal ECG  Confirmed by CHRISTOPHER YEUNG (), CHARLIE DSOUZA ([id number]) on 3/2/2017 8:12:05 AM       XR Results (most recent):    Results from [place] encounter on 03/01/17   XR CHEST PA LAT   Narrative CHEST X-RAY, 2 views 3/4/2017    History: Follow-up infiltrates. Technique: PA and lateral views of the chest.     Comparison: 3/2/2017    Findings: The cardiac silhouette is nonenlarged. Lungs are expanded without pneumothorax. Slightly stable basilar atelectatic appearing changes are seen. In addition,  small basilar effusions are seen best appreciated on the lateral view which do  appear new given that there does appear to be some new blunting of the  costophrenic angles. Impression IMPRESSION:   1.  Stable basilar atelectatic appearing changes although new trace basilar  effusions are seen. Recommend correlation for symptoms of evolving fluid  overload. Active problems  Active Hospital Problems    Diagnosis Date Noted    COPD, severe ([address] 03/02/2017     PFTs 3/2016    FVC 2.22 L and 62 % of predicted. FEV1 1.23 L and 42 % of predicted. Ratio 55%. There was no clinically relevant response to bronchodilator  LUNG VOLUMES:  TLC 4.61 L and 89 % of predicted. RV 2.39 L and 139 % of predicted. DIFFUSION:    The diffusing capacity was Corrected for hemoglobin of 14.7 g/dL  and was 15.9 mL/mmHg/minute and 58 % of predicted.         Morbid obesity with BMI of 50.0-59.9, adult ([address] 03/02/2017    MICHAEL on CPAP 03/02/2017    Hypoxia 03/02/2017    Nicotine dependence, cigarettes, uncomplicated 95/05/3729    Diabetes mellitus type 2, controlled ([address] 03/02/2017    Acute on chronic respiratory failure ([address] 03/01/2017    Obesity hypoventilation syndrome ([address] 12/18/2015       ASSESSMENT  AND PLAN      · Resp failure- bipap at night and O2 in the day- pulmonology input appreciated  · Copd exacerbation- continue nebs, steroids, abx  · Tinea in body folds- nystatin powder  · Morbid obesity- supportive care  · ? PNa- continue abx  · dvt ppx lovenox  · Condition guarded- pt high risk given severe hypercapnic respiratory failure-   · Further mgt as her workup dictates     Moderate risk. Will transfer to the floor today.      Signed By: Zeeshan Yeboah MD     March 4, 2017

## 2017-03-04 NOTE — PROGRESS NOTES
Shift assessment complete. Patient in bed resting quietly. Patient is alert and oriented x4. Patient on 2Lof 02 per NC. Chest sounds diminished, no distress noted. Ambulates to bathroom without difficulty. BM today. Denies any pain or discomfort at this time.

## 2017-03-04 NOTE — PROGRESS NOTES
Problem: Self Care Deficits Care Plan (Adult)  Goal: *Acute Goals and Plan of Care (Insert Text)  1. Patient will perform grooming with supervision. 2. Patient will perform Upper body dressing with supervision  3. Patient will perform lower body dressing with CGA using adaptive equipment PRN. 4. Patient will perform upper and lower body bathing with supervision using adaptive equipment PRN. 5. Patient will perform toilet transfers with CGA/SBA. 6. Patient will perform shower transfer with CGA/SBA. 7. Patient will participate in 30 + minutes of ADL/ therapeutic exercise/therapeutic activity with min rest breaks to increase activity tolerance for self care. 8. Patient will perform ADL functional mobility in room with CGA/SBA    Goals to be achieved in 7 days. OCCUPATIONAL THERAPY: Daily Note, Treatment Day: 1st and AM 3/4/2017  INPATIENT: Hospital Day: 4  Payor: Elena Vera / Plan: VIDYA Vera / Product Type: [facility] Care Medicaid /      NAME/AGE/GENDER: Kaylan Isidro is a 42 y.o. female            PRIMARY DIAGNOSIS:  Respiratory failure with hypercapnia ([address] Acute on chronic respiratory failure ([address] Acute on chronic respiratory failure (HCC)        ICD-10: Treatment Diagnosis:        · Generalized Muscle Weakness (M62.81)  · Other lack of cordination (R27.8)   Precautions/Allergies:         Review of patient's allergies indicates no known allergies. ASSESSMENT:      Ms. Ricardo Pate presents in supine upon arrival. Pt completed functional mobility with supervision. Pt completed toileting and grooming with the assistance listed below. She would benefit from skilled OT will follow. This section established at most recent assessment   PROBLEM LIST (Impairments causing functional limitations):  1. Decreased Strength  2. Decreased ADL/Functional Activities  3. Decreased Transfer Abilities  4. Decreased Ambulation Ability/Technique  5. Decreased Balance  6.  Decreased Activity Tolerance INTERVENTIONS PLANNED: (Benefits and precautions of occupational therapy have been discussed with the patient.)  1. Activities of daily living training  2. Adaptive equipment training  3. Balance training  4. Clothing management  5. Neuromuscular re-eduation  6. Theraputic activity  7. Theraputic exercise  8. Home safety and DME recommendations      TREATMENT PLAN: Frequency/Duration: Follow patient 3 times to address above goals. Rehabilitation Potential For Stated Goals: GOOD      RECOMMENDED REHABILITATION/EQUIPMENT: (at time of discharge pending progress): Continue Skilled Therapy and Home Health: Physical Therapy. OCCUPATIONAL PROFILE AND HISTORY:   History of Present Injury/Illness (Reason for Referral):  Decreased self care and functional mobility  Past Medical History/Comorbidities:   Ms. Christi Rizo  has a past medical history of Childhood asthma; COPD exacerbation ([facility] (11/10/2016); and Pneumonia (ages 3 and 11). She also has no past medical history of COPD; DEMENTIA; Gastrointestinal disorder; Heart failure ([facility] Infectious disease; Neurological disorder; Other ill-defined conditions(799.89); or Psychiatric disorder. Ms. Isidro  has a past surgical history that includes partial hysterectomy (08/2012).   Social History/Living Environment:   Home Environment: Apartment  # Steps to Enter: 14 (second floor apartment)  Rails to Enter: Yes  [employer] : Right  One/Two Story Residence: One story  Living Alone: No  Support Systems: Family member(s)  Patient Expects to be Discharged to[de-identified] Private residence  Current DME Used/Available at Home: CPAP, Oxygen, portable, Nebulizer, Glucometer  Tub or Shower Type: Tub/Shower combination  Prior Level of Function/Work/Activity:  Mod I with ADLs no DME      Number of Personal Factors/Comorbidities that affect the Plan of Care: Brief history (0):  LOW COMPLEXITY   ASSESSMENT OF OCCUPATIONAL PERFORMANCE[de-identified]   Activities of Daily Living:           Basic ADLs (From Assessment) Complex ADLs (From Assessment)   Basic ADL  Feeding: Setup  Oral Facial Hygiene/Grooming: Supervision  Bathing: Minimum assistance, Moderate assistance  Upper Body Dressing: Supervision  Lower Body Dressing: Minimum assistance, Moderate assistance  Toileting: Total assistance (catheter)     Grooming/Bathing/Dressing Activities of Daily Living   Grooming  Washing Hands: Supervision/set-up Cognitive Retraining  Safety/Judgement: Fall prevention           Toileting  Toileting Assistance: Modified independent  Bladder Hygiene: Independent  Adaptive Equipment: Elevated seat     Functional Transfers  Toilet Transfer : Supervision     Bed/Mat Mobility  Supine to Sit: Supervision  Sit to Supine: Supervision  Sit to Stand: Supervision  Bed to Chair: Stand-by asssistance          Most Recent Physical Functioning:   Gross Assessment:                  Posture:  Posture (WDL): Exceptions to WDL  Posture Assessment: Rounded shoulders  Balance:  Sitting: Intact  Standing: Intact Bed Mobility:  Supine to Sit: Supervision  Sit to Supine: Supervision  Wheelchair Mobility:     Transfers:  Sit to Stand: Supervision  Stand to Sit: Stand-by asssistance  Bed to Chair: Stand-by asssistance                    Patient Vitals for the past 6 hrs:   BP SpO2 O2 Flow Rate (L/min) Pulse   03/04/17 0923 112/56 95 % - 95   03/04/17 1033 - 97 % 2 l/min -   03/04/17 1152 (!) 144/100 96 % - 93        Mental Status  Neurologic State: Alert  Orientation Level: Oriented X4  Cognition: Follows commands  Perception: Appears intact  Perseveration: No perseveration noted  Safety/Judgement: Fall prevention                               Physical Skills Involved:  1. Balance  2. Mobility  3. Strength  4. Endurance Cognitive Skills Affected (resulting in the inability to perform in a timely and safe manner): 1. none Psychosocial Skills Affected:  1.  Environmental Adaptations   Number of elements that affect the Plan of Care: 3-5:  MODERATE COMPLEXITY   CLINICAL DECISION MAKIN[address] [place] [address] AM-PAC 6 Clicks   Basic Mobility Inpatient Short Form  How much help from another person does the patient currently need. .. Total A Lot A Little None   1. Putting on and taking off regular lower body clothing?   [ ] 1   [X] 2   [ ] 3   [ ] 4   2. Bathing (including washing, rinsing, drying)? [ ] 1   [X] 2   [ ] 3   [ ] 4   3. Toileting, which includes using toilet, bedpan or urinal?   [ ] 1   [ ] 2   [X] 3   [ ] 4   4. Putting on and taking off regular upper body clothing?   [ ] 1   [ ] 2   [X] 3   [ ] 4   5. Taking care of personal grooming such as brushing teeth? [ ] 1   [ ] 2   [X] 3   [ ] 4   6. Eating meals? [ ] 1   [ ] 2   [ ] 3   [X] 4   © , Trustees of [address] [place] [address], under license to [employer]. All rights reserved    Score:  Initial: 17 Most Recent: X (Date: -- )     Interpretation of Tool:  Represents activities that are increasingly more difficult (i.e. Bed mobility, Transfers, Gait). Score 24 23 22-20 19-15 14-10 9-7 6       Modifier CH CI CJ CK CL CM CN         · Self Care:               - CURRENT STATUS:    CK - 40%-59% impaired, limited or restricted               - GOAL STATUS:           CJ - 20%-39% impaired, limited or restricted               - D/C STATUS:                       ---------------To be determined---------------  Payor: Batool Teresa / Plan: [place] MEDICA[place] / Product Type: Managed Care Medicaid /       Medical Necessity:     · Patient is expected to demonstrate progress in balance, coordination and functional technique to decrease assistance required with self care and functional mobility and improve safety during self care and functional mobility. Reason for Services/Other Comments:  · Patient continues to require skilled intervention due to decreased self care and functional mobility.    Use of outcome tool(s) and clinical judgement create a POC that gives a: LOW COMPLEXITY TREATMENT:   (In addition to Assessment/Re-Assessment sessions the following treatments were rendered)      Pre-treatment Symptoms/Complaints:  none  Pain: Initial:   Pain Intensity 1: 0  Post Session:  0/10      Self Care: (15): Procedure(s) (per grid) utilized to improve and/or restore self-care/home management as related to toileting. Required min verbal cueing to facilitate activities of daily living skills. Braces/Orthotics/Lines/Etc:   · IV  Treatment/Session Assessment:    · Response to Treatment:  Tolerated well, motivated to move  · Interdisciplinary Collaboration:  · Physical Therapist, Certified Occupational Therapy Assistant and Registered Nurse  · After treatment position/precautions:  · Up in chair, Bed/Chair-wheels locked, Bed in low position, Call light within reach and RN notified  · Compliance with Program/Exercises: Will assess as treatment progresses. · Recommendations/Intent for next treatment session: \"Next visit will focus on advancements to more challenging activities and reduction in assistance provided\".   Total Treatment Duration:  OT Patient Time In/Time Out  Time In: 1335  Time Out: [address] [place] Saunders

## 2017-03-04 NOTE — PROGRESS NOTES
Pulmonary Daily Progress NOTE    Burgess Shima Isidro    3/4/2017    Date of Admission:  3/1/2017   The patient is a 42 y.o.  female seen and evaluated at the request of Dr. Shanta Madison. She has a history of morbid obesity, OHS, MICHAEL, noncompliance who presented to the ER with hypercaneic respiratory failure. She was placed on BIPAP and moved to the ICU for further management. The pt is known to our service with severe COPD (FEV1 42% predicted) and at her December appt was smoking 1 PPD. She is currently on BIPAP and remains somnolent but her gases have improved. It is unclear is she was using home CPAP/BIPAP      The patient's chart is reviewed and the patient is discussed with the staff. Subjective: On NC at 4 LPM.    No coughing.    Feels better      Review of Systems:  -Fever  -Headaches  -Chest pain  +Dyspnea, -wheezing-, cough  -Abdominal pain, -constipation  -Leg swelling  All other organ systems grossly normal.      Current Facility-Administered Medications   Medication Dose Route Frequency    albuterol (PROVENTIL VENTOLIN) nebulizer solution 2.5 mg  2.5 mg Inhalation Q6H RT    nicotine (NICODERM CQ) 21 mg/24 hr patch 1 Patch  1 Patch TransDERmal DAILY    nystatin (MYCOSTATIN) 100,000 unit/gram powder   Topical BID    sodium chloride (NS) flush 5-10 mL  5-10 mL IntraVENous Q8H    sodium chloride (NS) flush 5-10 mL  5-10 mL IntraVENous PRN    sodium chloride (NS) flush 5-10 mL  5-10 mL IntraVENous Q8H    sodium chloride (NS) flush 5-10 mL  5-10 mL IntraVENous PRN    levoFLOXacin (LEVAQUIN) 750 mg in D5W IVPB  750 mg IntraVENous Q24H    methylPREDNISolone (PF) (SOLU-MEDROL) injection 40 mg  40 mg IntraVENous Q8H    acetaminophen (TYLENOL) tablet 650 mg  650 mg Oral Q4H PRN    enoxaparin (LOVENOX) injection 40 mg  40 mg SubCUTAneous Q12H    budesonide (PULMICORT) 500 mcg/2 ml nebulizer suspension  500 mcg Nebulization BID RT         Objective:     Vitals: 03/04/17 0133 03/04/17 0139 03/04/17 0328 03/04/17 0735   BP:  126/77     Pulse:  90     Resp:  13     Temp:   98.6 °F (37 °C)    SpO2: 93% 97%  99%   Weight:       Height:         Blood pressure 126/77, pulse 90, temperature 98.6 °F (37 °C), resp. rate 13, height 5' 2\" (1.575 m), weight 334 lb 8 oz (151.7 kg), last menstrual period 05/26/2010, SpO2 99 %, not currently breastfeeding. PHYSICAL EXAM     Constitutional:  the patient is morbidly obese and in no acute distress  EENMT:  Sclera clear, pupils equal, oral mucosa moist, narrow airway   Respiratory: decreased BS b/l. NO wheezing   Cardiovascular:  RRR without M,G,R  Gastrointestinal: soft and non-tender; with positive bowel sounds. Musculoskeletal: warm without cyanosis. There is trace lower leg edema. Skin:  no jaundice or rashes  Neurologic: no gross neuro deficits     Psychiatric:  Awake and alert    Chest X-ray:                   Recent Labs      03/02/17   0050  03/01/17   1407   WBC  8.1  8.8   HGB  13.8  14.1   HCT  48.7*  49.7*   PLT  239  247     Recent Labs      03/03/17   0328  03/02/17   0050  03/01/17   2044   NA  140  141  137   K  4.6  5.1  4.8   CL  99  100  97*   GLU  111*  126*  113*   CO2  39*  45*  43*   BUN  16  9  10   CREA  0.84  0.68  0.73   MG  2.4  2.4   --    PHOS   --    --   4.0   CA  9.7  9.0  9.0   TROIQ   --   <0.04  <0.04   ALB  3.0*  2.8*  3.0*   TBILI  0.6  0.5  0.6   ALT  33  35  39   SGOT  15  16  19     Recent Labs      03/04/17   0615  03/03/17 0325  03/02/17   1440   PH  7.42  7.41  7.45   PCO2  58*  68*  67*   PO2  67*  78  63*   HCO3  37*  42*  46*     No results for input(s): LCAD, LAC in the last 72 hours.     Assessment:  (Medical Decision Making)     Hospital Problems  Date Reviewed: 2/22/2017          Codes Class Noted POA    COPD, severe ([facility] (Chronic) ICD-10-CM: J44.9  ICD-9-CM: 643  3/2/2017 Yes    Overview Signed 11/10/2016  4:21 PM by Stacia Rubio NP     PFTs 3/2016    FVC 2.22 L and 62 % of predicted. FEV1 1.23 L and 42 % of predicted. Ratio 55%. There was no clinically relevant response to bronchodilator  LUNG VOLUMES:  TLC 4.61 L and 89 % of predicted. RV 2.39 L and 139 % of predicted. DIFFUSION:    The diffusing capacity was Corrected for hemoglobin of 14.7 g/dL  and was 15.9 mL/mmHg/minute and 58 % of predicted. See below    Diabetes mellitus type 2, controlled ([facility] (Chronic) ICD-10-CM: E11.9  ICD-9-CM: 250.00  3/2/2017 Yes        Morbid obesity with BMI of 50.0-59.9, adult (HCC) (Chronic) ICD-10-CM: E66.01, Z68.43  ICD-9-CM: 278.01, V85.43  3/2/2017 Yes    Major problem. MICHAEL on CPAP (Chronic) ICD-10-CM: G47.33  ICD-9-CM: 327.23  3/2/2017 Yes    Suspect non-compliant    Nicotine dependence, cigarettes, uncomplicated (Chronic) V-36-RR: X10.347  ICD-9-CM: 305.1  3/2/2017 Yes        Hypoxia ICD-10-CM: R09.02  ICD-9-CM: 799.02  3/2/2017 Yes    Wean oxygen to keep sats in low 90s. * (Principal)Acute on chronic respiratory failure ([facility] ICD-10-CM: J96.20  ICD-9-CM: 518.84  3/1/2017 Unknown    Severe but improved. Obesity hypoventilation syndrome ([facility] ICD-10-CM: R57.8  ICD-9-CM: 278.03  12/18/2015 Yes    Likely. Recheck TSH since hypothyroidism can promote decompensation. Plan:  (Medical Decision Making)       --has her home CPAP   --PT/OT  --OOB   --continue Abx for 7 days  --incentive spirometry  --wean steroids  --can go to floor    Spoke with nursing and respiratory and aware of plan    More than 50% of the time documented was spent in face-to-face contact with the patient and in the care of the patient on the floor/unit where the patient is located.          Tamiko Bergeron MD

## 2017-03-04 NOTE — PROGRESS NOTES
TRANSFER - OUT REPORT:    Verbal report given to DANIEL Cee on Zita Bliss  being transferred to [facility] for routine progression of care       Report consisted of patients Situation, Background, Assessment and   Recommendations(SBAR). Information from the following report(s) SBAR, ED Summary, Intake/Output, MAR, Accordion and Recent Results was reviewed with the receiving nurse. Lines:   Peripheral IV 03/01/17 Left Antecubital (Active)   Site Assessment Clean, dry, & intact 3/3/2017  7:33 PM   Phlebitis Assessment 0 3/3/2017  7:33 PM   Infiltration Assessment 0 3/3/2017  7:33 PM   Dressing Status Clean, dry, & intact 3/3/2017  7:33 PM   Dressing Type Transparent 3/3/2017  7:33 PM   Hub Color/Line Status Flushed 3/3/2017  7:33 PM   Alcohol Cap Used Yes 3/3/2017  7:33 PM        Opportunity for questions and clarification was provided.       Patient transported with:   O2 @ 2 liters

## 2017-03-04 NOTE — PROGRESS NOTES
TRANSFER - IN REPORT:    Verbal report received from Payal(name) on Anjana Isidro  being received from [facility]) for routine progression of care      Report consisted of patients Situation, Background, Assessment and   Recommendations(SBAR). Information from the following report(s) Kardex, Procedure Summary, Intake/Output and MAR was reviewed with the receiving nurse. Opportunity for questions and clarification was provided. Assessment completed upon patients arrival to unit and care assumed.

## 2017-03-05 VITALS
SYSTOLIC BLOOD PRESSURE: 161 MMHG | DIASTOLIC BLOOD PRESSURE: 88 MMHG | BODY MASS INDEX: 53.92 KG/M2 | WEIGHT: 293 LBS | RESPIRATION RATE: 18 BRPM | TEMPERATURE: 98.6 F | HEART RATE: 89 BPM | HEIGHT: 62 IN | OXYGEN SATURATION: 96 %

## 2017-03-05 PROBLEM — Z72.0 TOBACCO ABUSE: Chronic | Status: ACTIVE | Noted: 2017-03-05

## 2017-03-05 PROBLEM — F14.10 COCAINE ABUSE (HCC): Chronic | Status: ACTIVE | Noted: 2017-03-05

## 2017-03-05 PROCEDURE — 94640 AIRWAY INHALATION TREATMENT: CPT

## 2017-03-05 PROCEDURE — 94760 N-INVAS EAR/PLS OXIMETRY 1: CPT

## 2017-03-05 PROCEDURE — 74011000250 HC RX REV CODE- 250: Performed by: INTERNAL MEDICINE

## 2017-03-05 PROCEDURE — 77010033678 HC OXYGEN DAILY

## 2017-03-05 PROCEDURE — 74011250636 HC RX REV CODE- 250/636: Performed by: INTERNAL MEDICINE

## 2017-03-05 PROCEDURE — 97530 THERAPEUTIC ACTIVITIES: CPT

## 2017-03-05 PROCEDURE — 74011250637 HC RX REV CODE- 250/637: Performed by: INTERNAL MEDICINE

## 2017-03-05 RX ORDER — NYSTATIN 100000 [USP'U]/G
POWDER TOPICAL 2 TIMES DAILY
Qty: 1 BOTTLE | Refills: 0 | Status: SHIPPED | OUTPATIENT
Start: 2017-03-05 | End: 2018-01-04

## 2017-03-05 RX ORDER — LISINOPRIL 5 MG/1
10 TABLET ORAL DAILY
Status: DISCONTINUED | OUTPATIENT
Start: 2017-03-05 | End: 2017-03-05 | Stop reason: HOSPADM

## 2017-03-05 RX ORDER — PREDNISONE 20 MG/1
TABLET ORAL
Qty: 26 TAB | Refills: 0 | Status: SHIPPED | OUTPATIENT
Start: 2017-03-05 | End: 2017-03-23 | Stop reason: ALTCHOICE

## 2017-03-05 RX ORDER — IBUPROFEN 200 MG
1 TABLET ORAL DAILY
Qty: 28 PATCH | Refills: 1 | Status: SHIPPED | OUTPATIENT
Start: 2017-03-05 | End: 2017-04-04

## 2017-03-05 RX ORDER — LEVOFLOXACIN 750 MG/1
750 TABLET ORAL DAILY
Qty: 5 TAB | Refills: 0 | Status: SHIPPED | OUTPATIENT
Start: 2017-03-05 | End: 2017-03-14

## 2017-03-05 RX ORDER — IPRATROPIUM BROMIDE AND ALBUTEROL SULFATE 2.5; .5 MG/3ML; MG/3ML
3 SOLUTION RESPIRATORY (INHALATION) EVERY 4 HOURS
Qty: 120 NEBULE | Refills: 11 | Status: SHIPPED | OUTPATIENT
Start: 2017-03-05 | End: 2017-12-21 | Stop reason: SDUPTHER

## 2017-03-05 RX ADMIN — METHYLPREDNISOLONE SODIUM SUCCINATE 40 MG: 40 INJECTION, POWDER, FOR SOLUTION INTRAMUSCULAR; INTRAVENOUS at 09:03

## 2017-03-05 RX ADMIN — ALBUTEROL SULFATE 2.5 MG: 2.5 SOLUTION RESPIRATORY (INHALATION) at 02:06

## 2017-03-05 RX ADMIN — Medication 10 ML: at 05:48

## 2017-03-05 RX ADMIN — NYSTATIN: 100000 POWDER TOPICAL at 09:01

## 2017-03-05 RX ADMIN — BUDESONIDE 500 MCG: 0.5 INHALANT RESPIRATORY (INHALATION) at 07:41

## 2017-03-05 RX ADMIN — ALBUTEROL SULFATE 2.5 MG: 2.5 SOLUTION RESPIRATORY (INHALATION) at 07:41

## 2017-03-05 RX ADMIN — ENOXAPARIN SODIUM 40 MG: 40 INJECTION SUBCUTANEOUS at 09:02

## 2017-03-05 RX ADMIN — LISINOPRIL 10 MG: 5 TABLET ORAL at 14:40

## 2017-03-05 NOTE — PROGRESS NOTES
Problem: Mobility Impaired (Adult and Pediatric)  Goal: *Acute Goals and Plan of Care (Insert Text)  STG:  (1.)Ms. Isidro will move from supine to sit and sit to supine with CONTACT GUARD ASSIST within 3 day(s). Met 3/5/17  (2.)Ms. Isidro will transfer from bed to chair and chair to bed with CONTACT GUARD ASSIST using the least restrictive device within 3 day(s). (3.)Ms. Isidro will ambulate with CONTACT GUARD ASSIST for 100 feet with the least restrictive device within 3 day(s). Met 3/5/17     LTG:  (1.)Ms. Isidro will move from supine to sit and sit to supine in bed with SUPERVISION within 7 day(s). Met 3/5/17  (2.)Ms. Isidro will transfer from bed to chair and chair to bed with SUPERVISION using the least restrictive device within 7 day(s). (3.)Ms. Isidro will ambulate with SUPERVISION for 300 feet with the least restrictive device within 7 day(s). (4)Ms. Isidro will go up and down a flight of steps with rail CGA in 7 days. Met 3/5/17  ________________________________________________________________________________________________      PHYSICAL THERAPY: Daily Note and AM 3/5/2017  INPATIENT: Hospital Day: 5  Payor: John Sunshine / Plan: VIDYA Sunshine / Product Type: Managed Care Medicaid /      NAME/AGE/GENDER: Christine Isidro is a 42 y.o. female            PRIMARY DIAGNOSIS: Respiratory failure with hypercapnia ([address] Acute on chronic respiratory failure ([address] Acute on chronic respiratory failure (HCC)        ICD-10: Treatment Diagnosis:       · Generalized Muscle Weakness (M62.81)  · Difficulty in walking, Not elsewhere classified (R26.2)   Precaution/Allergies:  Review of patient's allergies indicates no known allergies. ASSESSMENT:     Ms. Oziel Barney presents with general decrease in functional mobility and gait. Patient reported feeling better and hoping to go home today. She as able to ambulate without an assistive device and supervision only.   She took standing rest breaks prior to and after ambulating up/down steps but no sitting breaks. Patient able to negotiate steps safely and felt it went better than she anticipated. Will continue to work with patient while in the hospital to maximize strength and functional mobility. This section established at most recent assessment   PROBLEM LIST (Impairments causing functional limitations):  1. Decreased Strength  2. Decreased ADL/Functional Activities  3. Decreased Transfer Abilities  4. Decreased Ambulation Ability/Technique  5. Decreased Balance  6. Decreased Activity Tolerance  7. Increased Fatigue  8. Increased Shortness of Breath  9. Decreased Mathews with Home Exercise Program    INTERVENTIONS PLANNED: (Benefits and precautions of physical therapy have been discussed with the patient.)  1. Balance Exercise  2. Bed Mobility  3. Family Education  4. Gait Training  5. Range of Motion (ROM)  6. Therapeutic Activites  7. Therapeutic Exercise/Strengthening  8. Transfer Training  9. Group Therapy      TREATMENT PLAN: Frequency/Duration: daily for duration of hospital stay  Rehabilitation Potential For Stated Goals: GOOD      RECOMMENDED REHABILITATION/EQUIPMENT: (at time of discharge pending progress): Continue Skilled Therapy and Home Health: Physical Therapy. HISTORY:   History of Present Injury/Illness (Reason for Referral):  38yr old with a history of COPD and noncompliance with cpap and smoking who presented to ER with lethragy and weakness. Found to have Hypercapnic respiratory failure CO2 144. Currently on BIPAP in the ER. Lethargic and drowsy and unable to give history. Hospitalist asked to admit. Past Medical History/Comorbidities:   Ms. Bradly Rizzo  has a past medical history of Childhood asthma; COPD exacerbation ([facility] (11/10/2016); and Pneumonia (ages 3 and 11). She also has no past medical history of COPD; DEMENTIA; Gastrointestinal disorder; Heart failure ([address]  Infectious disease; Neurological disorder; Other ill-defined conditions(799.89); or Psychiatric disorder. Ms. Isidro  has a past surgical history that includes partial hysterectomy (08/2012). Social History/Living Environment:   Home Environment: Apartment  # Steps to Enter: 15 (second floor apartment)  Rails to Enter: Yes  [employer] : Right  One/Two Story Residence: One story  Living Alone: No  Support Systems: Family member(s)  Patient Expects to be Discharged to[de-identified] Private residence  Current DME Used/Available at Home: CPAP, Oxygen, portable, Nebulizer, Glucometer  Tub or Shower Type: Tub/Shower combination  Prior Level of Function/Work/Activity:  Independent, on oxygen   Number of Personal Factors/Comorbidities that affect the Plan of Care: 1-2: MODERATE COMPLEXITY   EXAMINATION:   Most Recent Physical Functioning:   Gross Assessment:  AROM: Generally decreased, functional  Strength: Generally decreased, functional  Coordination: Within functional limits               Posture:     Balance:  Sitting: Intact  Standing: Intact Bed Mobility:  Supine to Sit: Independent  Sit to Supine: Independent  Wheelchair Mobility:     Transfers:  Sit to Stand: Supervision  Stand to Sit: Supervision  Gait:     Speed/Miriam: Pace decreased (<100 feet/min)  Step Length: Left shortened;Right shortened  Gait Abnormalities: Trunk sway increased  Distance (ft): 125 Feet (ft)  Assistive Device:  (none)  Ambulation - Level of Assistance: Supervision  Number of Stairs Trained: 12  Stairs - Level of Assistance: Supervision  Rail Use: Right   Interventions: Safety awareness training;Verbal cues; Visual/Demos       Body Structures Involved:  1. Bones  2. Joints  3. Muscles  4. Ligaments Body Functions Affected:  1. Movement Related Activities and Participation Affected:  1.  Mobility   Number of elements that affect the Plan of Care: 1-2: LOW COMPLEXITY   CLINICAL PRESENTATION:   Presentation: Stable and uncomplicated: LOW COMPLEXITY   CLINICAL DECISION MAKING:   [employer] AM-PAC 6 Clicks   Basic Mobility Inpatient Short Form  How much difficulty does the patient currently have. .. Unable A Lot A Little None   1. Turning over in bed (including adjusting bedclothes, sheets and blankets)? [ ] 1   [ ] 2   [X] 3   [ ] 4   2. Sitting down on and standing up from a chair with arms ( e.g., wheelchair, bedside commode, etc.)   [ ] 1   [ ] 2   [X] 3   [ ] 4   3. Moving from lying on back to sitting on the side of the bed? [ ] 1   [ ] 2   [X] 3   [ ] 4   How much help from another person does the patient currently need. .. Total A Lot A Little None   4. Moving to and from a bed to a chair (including a wheelchair)? [ ] 1   [ ] 2   [X] 3   [ ] 4   5. Need to walk in hospital room? [ ] 1   [ ] 2   [X] 3   [ ] 4   6. Climbing 3-5 steps with a railing? [ ] 1   [X] 2   [ ] 3   [ ] 4   © 2007, Trustees of [employer], under license to [employer]. All rights reserved    Score:  Initial: 17 Most Recent: X (Date: -- )     Interpretation of Tool:  Represents activities that are increasingly more difficult (i.e. Bed mobility, Transfers, Gait). Score 24 23 22-20 19-15 14-10 9-7 6       Modifier CH CI CJ CK CL CM CN         · Mobility - Walking and Moving Around:               - CURRENT STATUS:    CK - 40%-59% impaired, limited or restricted               - GOAL STATUS:           CJ - 20%-39% impaired, limited or restricted               - D/C STATUS:                       ---------------To be determined---------------  Payor: Iveth Mealing / Plan: VIDYA RUSSELL MEDICAID / Product Type: Managed Care Medicaid /       Medical Necessity:     · Patient is expected to demonstrate progress in strength, range of motion and balance to decrease assistance required with theraputic exercises and functional mobility.   Reason for Services/Other Comments:  · Patient continues to require present interventions due to patient's inability to perform theraputic exercises and functional mobility independently. Use of outcome tool(s) and clinical judgement create a POC that gives a: Questionable prediction of patient's progress: MODERATE COMPLEXITY                 TREATMENT:   (In addition to Assessment/Re-Assessment sessions the following treatments were rendered)   Pre-treatment Symptoms/Complaints:  Patient reports feeling better today and hoping to go home. Pain: Initial:   Pain Intensity 1: 0  Post Session:  0      Therapeutic Activity: (    20 minutes): Therapeutic activities including Bed transfers, Chair transfers, Toilet transfers and Ambulation on level ground and unlevel ground (stairs) to improve mobility, strength, balance and coordination. Required minimal Safety awareness training;Verbal cues; Visual/Demos to promote static and dynamic balance in standing. Braces/Orthotics/Lines/Etc:   · O2 via nasal canula (2L)  Treatment/Session Assessment:    · Response to Treatment:  Patient tolerated today's therapy well. Good negotiation of stairs. · Interdisciplinary Collaboration:  · Physical Therapist and Registered Nurse  · After treatment position/precautions:  · Supine in bed, Bed/Chair-wheels locked, Bed in low position and Call light within reach  · Compliance with Program/Exercises: Will assess as treatment progresses. No questions. · Recommendations/Intent for next treatment session: \"Next visit will focus on advancements to more challenging activities and reduction in assistance provided\".   Total Treatment Duration:  PT Patient Time In/Time Out  Time In: 0830  Time Out: Shukri Novak PT

## 2017-03-05 NOTE — DISCHARGE INSTRUCTIONS
Chronic Obstructive Pulmonary Disease (COPD): Care Instructions  Your Care Instructions    Chronic obstructive pulmonary disease (COPD) is a general term for a group of lung diseases, including emphysema and chronic bronchitis. People with COPD have decreased airflow in and out of the lungs, which makes it hard to breathe. The airways also can get clogged with thick mucus. Cigarette smoking is a major cause of COPD. Although there is no cure for COPD, you can slow its progress. Following your treatment plan and taking care of yourself can help you feel better and live longer. Follow-up care is a key part of your treatment and safety. Be sure to make and go to all appointments, and call your doctor if you are having problems. It's also a good idea to know your test results and keep a list of the medicines you take. How can you care for yourself at home? Staying healthy  · Do not smoke. This is the most important step you can take to prevent more damage to your lungs. If you need help quitting, talk to your doctor about stop-smoking programs and medicines. These can increase your chances of quitting for good. · Avoid colds and flu. Get a pneumococcal vaccine shot. If you have had one before, ask your doctor whether you need a second dose. Get the flu vaccine every fall. If you must be around people with colds or the flu, wash your hands often. · Avoid secondhand smoke, air pollution, and high altitudes. Also avoid cold, dry air and hot, humid air. Stay at home with your windows closed when air pollution is bad. Medicines and oxygen therapy  · Take your medicines exactly as prescribed. Call your doctor if you think you are having a problem with your medicine. · You may be taking medicines such as:  ¨ Bronchodilators. These help open your airways and make breathing easier. Bronchodilators are either short-acting (work for 6 to 9 hours) or long-acting (work for 24 hours).  You inhale most bronchodilators, so they start to act quickly. Always carry your quick-relief inhaler with you in case you need it while you are away from home. ¨ Corticosteroids (prednisone, budesonide). These reduce airway inflammation. They come in pill or inhaled form. You must take these medicines every day for them to work well. · A spacer may help you get more inhaled medicine to your lungs. Ask your doctor or pharmacist if a spacer is right for you. If it is, ask how to use it properly. · Do not take any vitamins, over-the-counter medicine, or herbal products without talking to your doctor first.  · If your doctor prescribed antibiotics, take them as directed. Do not stop taking them just because you feel better. You need to take the full course of antibiotics. · Oxygen therapy boosts the amount of oxygen in your blood and helps you breathe easier. Use the flow rate your doctor has recommended, and do not change it without talking to your doctor first.  Activity  · Get regular exercise. Walking is an easy way to get exercise. Start out slowly, and walk a little more each day. · Pay attention to your breathing. You are exercising too hard if you cannot talk while you are exercising. · Take short rest breaks when doing household chores and other activities. · Learn breathing methods--such as breathing through pursed lips--to help you become less short of breath. · If your doctor has not set you up with a pulmonary rehabilitation program, talk to him or her about whether rehab is right for you. Rehab includes exercise programs, education about your disease and how to manage it, help with diet and other changes, and emotional support. Diet  · Eat regular, healthy meals. Use bronchodilators about 1 hour before you eat to make it easier to eat. Eat several small meals instead of three large ones. Drink beverages at the end of the meal. Avoid foods that are hard to chew.   · Eat foods that contain protein so that you do not lose muscle mass.  Mental health  · Talk to your family, friends, or a therapist about your feelings. It is normal to feel frightened, angry, hopeless, helpless, and even guilty. Talking openly about bad feelings can help you cope. If these feelings last, talk to your doctor. When should you call for help? Call 911 anytime you think you may need emergency care. For example, call if:  · You have severe trouble breathing. Call your doctor now or seek immediate medical care if:  · You have new or worse trouble breathing. · You cough up blood. · You have a fever. Watch closely for changes in your health, and be sure to contact your doctor if:  · You cough more deeply or more often, especially if you notice more mucus or a change in the color of your mucus. · You have new or worse swelling in your legs or belly. · You are not getting better as expected. Where can you learn more? Go to http://carin-kylie.info/. Camacho Lou in the search box to learn more about \"Chronic Obstructive Pulmonary Disease (COPD): Care Instructions. \"  Current as of: May 23, 2016  Content Version: 11.1  © 1386-1904 [employer]. Care instructions adapted under license by [employer] (which disclaims liability or warranty for this information). If you have questions about a medical condition or this instruction, always ask your healthcare professional. [address] any warranty or liability for your use of this information. Stopping Smoking: Care Instructions  Your Care Instructions  Cigarette smokers crave the nicotine in cigarettes. Giving it up is much harder than simply changing a habit. Your body has to stop craving the nicotine. It is hard to quit, but you can do it. There are many tools that people use to quit smoking. You may find that combining tools works best for you. There are several steps to quitting. First you get ready to quit. Then you get support to help you. After that, you learn new skills and behaviors to become a nonsmoker. For many people, a necessary step is getting and using medicine. Your doctor will help you set up the plan that best meets your needs. You may want to attend a smoking cessation program to help you quit smoking. When you choose a program, look for one that has proven success. Ask your doctor for ideas. You will greatly increase your chances of success if you take medicine as well as get counseling or join a cessation program.  Some of the changes you feel when you first quit tobacco are uncomfortable. Your body will miss the nicotine at first, and you may feel short-tempered and grumpy. You may have trouble sleeping or concentrating. Medicine can help you deal with these symptoms. You may struggle with changing your smoking habits and rituals. The last step is the tricky one: Be prepared for the smoking urge to continue for a time. This is a lot to deal with, but keep at it. You will feel better. Follow-up care is a key part of your treatment and safety. Be sure to make and go to all appointments, and call your doctor if you are having problems. Its also a good idea to know your test results and keep a list of the medicines you take. How can you care for yourself at home? · Ask your family, friends, and coworkers for support. You have a better chance of quitting if you have help and support. · Join a support group, such as Nicotine Anonymous, for people who are trying to quit smoking. · Consider signing up for a smoking cessation program, such as the American Lung Association's Freedom from Smoking program.  · Set a quit date. Pick your date carefully so that it is not right in the middle of a big deadline or stressful time. Once you quit, do not even take a puff. Get rid of all ashtrays and lighters after your last cigarette. Clean your house and your clothes so that they do not smell of smoke. · Learn how to be a nonsmoker.  Think about ways you can avoid those things that make you reach for a cigarette. ¨ Avoid situations that put you at greatest risk for smoking. For some people, it is hard to have a drink with friends without smoking. For others, they might skip a coffee break with coworkers who smoke. ¨ Change your daily routine. Take a different route to work or eat a meal in a different place. · Cut down on stress. Calm yourself or release tension by doing an activity you enjoy, such as reading a book, taking a hot bath, or gardening. · Talk to your doctor or pharmacist about nicotine replacement therapy, which replaces the nicotine in your body. You still get nicotine but you do not use tobacco. Nicotine replacement products help you slowly reduce the amount of nicotine you need. These products come in several forms, many of them available over-the-counter:  ¨ Nicotine patches  ¨ Nicotine gum and lozenges  ¨ Nicotine inhaler  · Ask your doctor about bupropion (Wellbutrin) or varenicline (Chantix), which are prescription medicines. They do not contain nicotine. They help you by reducing withdrawal symptoms, such as stress and anxiety. · Some people find hypnosis, acupuncture, and massage helpful for ending the smoking habit. · Eat a healthy diet and get regular exercise. Having healthy habits will help your body move past its craving for nicotine. · Be prepared to keep trying. Most people are not successful the first few times they try to quit. Do not get mad at yourself if you smoke again. Make a list of things you learned and think about when you want to try again, such as next week, next month, or next year. Where can you learn more? Go to http://carin-kylie.info/. Enter Z605 in the search box to learn more about \"Stopping Smoking: Care Instructions. \"  Current as of: May 26, 2016  Content Version: 11.1  © 3158-1364 [employer].  Care instructions adapted under license by [employer] (which disclaims liability or warranty for this information). If you have questions about a medical condition or this instruction, always ask your healthcare professional. [address] any warranty or liability for your use of this information.

## 2017-03-05 NOTE — PROGRESS NOTES
Shift assessment complete. Resting quietly in bed. Diminished breath sounds. Ambulating to the bathroom voiding yellow clear urine. No distress noted. Denies any pian or discomfort at this time. Bed is low and locked. Call light within reach.

## 2017-03-05 NOTE — DISCHARGE SUMMARY
Patient ID:  Carlos Martinez  [id number]  42 y.o.  1970  Admit date: 3/1/2017  1:46 PM  Discharge date and time: 3/5/2017  Attending: Frederick Severe, MD  PCP:  MAIA Pastrana  Treatment Team: Attending Provider: Frederick Severe, MD; Consulting Provider: Anjali Mancilla MD; Consulting Provider: Corie Flaherty MD; Consulting Provider: Katia Jeffers MD; Utilization Review: Ameya Huerta RN; Utilization Review: Keenan Zamudio RN    Principal Diagnosis Acute on chronic respiratory failure [facility])   Principal Problem:    Acute on chronic respiratory failure ([facility] (3/1/2017)    Active Problems:    COPD, severe ([facility] (3/2/2017)      Overview: PFTs 3/2016            FVC 2.22 L and 62 % of predicted. FEV1 1.23 L and 42 % of predicted. Ratio 55%. There was no clinically relevant response to bronchodilator      LUNG VOLUMES:      TLC 4.61 L and 89 % of predicted. RV 2.39 L and 139 % of predicted. DIFFUSION:            The diffusing capacity was Corrected for hemoglobin of 14.7 g/dL      and was 15.9 mL/mmHg/minute and 58 % of predicted. Obesity hypoventilation syndrome ([facility] (12/18/2015)      Diabetes mellitus type 2, controlled ([facility] (3/2/2017)      Morbid obesity with BMI of 50.0-59.9, adult ([facility] (3/2/2017)      MICHAEL on CPAP (3/2/2017)      Hypoxia (3/2/2017)      Tobacco abuse (3/5/2017)      Cocaine abuse (3/5/2017)             Hospital Course:  Please refer to the admission H&P for details of presentation. In summary, the patient is a 38yr old with a history of COPD and noncompliance with cpap and smoking who presented to ER with lethragy and weakness. Found to have Hypercapnic respiratory failure CO2 144. Currently on BIPAP in the ER. Lethargic and drowsy and unable to give history. Hospitalist asked to admit. The patient was started on Bipap and admitted to the [address] [place] [address]. Her hypercapnic respiratory failure resolved. Her mentation improved. Copd exacerbation improved. She was seen by pulmonology. Further history revealed that the pt was noncompliant with BIPAP and medications and was also still smoking. We explained the need for medication and bipap compliance and smoking cessation she has voiced understanding. Says that she will quit moking and be compliant with o2 and bipap. Significant Diagnostic Studies:     CHEST X-RAY, 2 views 3/4/2017     History: Follow-up infiltrates.     Technique: PA and lateral views of the chest.      Comparison: 3/2/2017     Findings: The cardiac silhouette is nonenlarged. Lungs are expanded without pneumothorax. Slightly stable basilar atelectatic appearing changes are seen. In addition,  small basilar effusions are seen best appreciated on the lateral view which do  appear new given that there does appear to be some new blunting of the  costophrenic angles.     IMPRESSION  IMPRESSION:   1. Stable basilar atelectatic appearing changes although new trace basilar  effusions are seen. Recommend correlation for symptoms of evolving fluid  overload. Labs: Results:       Chemistry Recent Labs      03/03/17   0328   GLU  111*   NA  140   K  4.6   CL  99   CO2  39*   BUN  16   CREA  0.84   CA  9.7   AGAP  2*   AP  70   TP  7.7   ALB  3.0*   GLOB  4.7*   AGRAT  0.6*      CBC w/Diff No results for input(s): WBC, RBC, HGB, HCT, PLT, GRANS, LYMPH, EOS, HGBEXT, HCTEXT, PLTEXT in the last 72 hours. Cardiac Enzymes No results for input(s): CPK, CKND1, KASIA in the last 72 hours. No lab exists for component: CKRMB, TROIP   Coagulation No results for input(s): PTP, INR, APTT in the last 72 hours.     No lab exists for component: INREXT    Lipid Panel Lab Results   Component Value Date/Time    Cholesterol, total 162 11/22/2016 09:12 AM    HDL Cholesterol 40 11/22/2016 09:12 AM    LDL, calculated 103 11/22/2016 09:12 AM    VLDL, calculated 19 11/22/2016 09:12 AM    Triglyceride 96 11/22/2016 09:12 AM      BNP No results for input(s): BNPP in the last 72 hours. Liver Enzymes Recent Labs      03/03/17   0328   TP  7.7   ALB  3.0*   AP  70   SGOT  15      Thyroid Studies Lab Results   Component Value Date/Time    TSH 0.509 03/02/2017 12:50 AM          Results for orders placed or performed during the hospital encounter of 03/01/17   EKG, 12 LEAD, INITIAL   Result Value Ref Range    Systolic BP  mmHg    Diastolic BP  mmHg    Ventricular Rate 83 BPM    Atrial Rate 83 BPM    P-R Interval 163 ms    QRS Duration 79 ms    Q-T Interval 346 ms    QTC Calculation (Bezet) 406 ms    Calculated P Axis 23 degrees    Calculated R Axis -71 degrees    Calculated T Axis 67 degrees    Diagnosis       Normal sinus rhythm with occasional Premature atrial complexes  Left axis deviation !!! Poor data quality, interpretation may be adversely   affected  Nonspecific ST abnormality  Abnormal ECG  Confirmed by CHRISTOPHER YEUNG (), CHARLIE DSOUZA ([id number]) on 3/2/2017 8:12:05 AM       CT Results (most recent):    Results from Hospital Encounter encounter on 11/10/16   CT CHEST W CONT   Narrative CT chest for pulmonary embolus done with  IV contrast November 10, 2016    Reference exam: Chest x-ray same day    Indication: COPD, worsening shortness of breath and chest congestion for 5 days    Technique: Radiation dose reduction techniques were used for this study using  one or more of the following: automated exposure control; adjustment of mA  and/or kV (according to patient size);  iterative reconstruction. Thin section  axial images were taken through the chest using 100 cc  dynamic contrast  enhancement. Findings: No convincing evidence of pulmonary embolus is seen. Pretracheal lymph  node measures 1.7 x 1.3 cm, the azygous node measures 1.8 x 0.9 cm with  aortopulmonary window node measuring 1.4 x 2.4 cm. Lung fields show some  dependent edema and atelectasis but no focal consolidation.  There is a  pericardial lymph node anteriorly on the right inferiorly measuring 1.2 x 0.9  cm. Impression IMPRESSION: No PE seen. Mediastinal adenopathy seen but no clear pneumonia  appreciated. Cannot exclude malignancy such as lymphoma. VAS/US Results (most recent):    Results from Hospital Encounter encounter on 01/24/17   DUPLEX LOWER EXT VENOUS RIGHT   Narrative Right lower extremity venous ultrasound    INDICATION:  Pain and swelling, one-month, moderate    Doppler ultrasound of the right lower extremity was performed. FINDINGS: There is no evidence of Baker's cyst. There is normal flow in the  common femoral, superficial femoral, greater saphenous, femoral and popliteal  veins. Normal compression and augmentation demonstrated. The proximal calf veins  are also patent as is the partially visualized contralateral common femoral  vein. Impression IMPRESSION: No evidence of deep venous thrombosis in the right lower extremity        XR Results (most recent):    Results from Hospital Encounter encounter on 03/01/17   XR CHEST PA LAT   Narrative CHEST X-RAY, 2 views 3/4/2017    History: Follow-up infiltrates. Technique: PA and lateral views of the chest.     Comparison: 3/2/2017    Findings: The cardiac silhouette is nonenlarged. Lungs are expanded without pneumothorax. Slightly stable basilar atelectatic appearing changes are seen. In addition,  small basilar effusions are seen best appreciated on the lateral view which do  appear new given that there does appear to be some new blunting of the  costophrenic angles. Impression IMPRESSION:   1.  Stable basilar atelectatic appearing changes although new trace basilar  effusions are seen. Recommend correlation for symptoms of evolving fluid  overload.               Discharge Exam:  Visit Vitals    /88    Pulse 89    Temp 98.6 °F (37 °C)    Resp 18    Ht 5' 2\" (1.575 m)    Wt 151.7 kg (334 lb 8 oz)    LMP 05/26/2010    SpO2 96%    Breastfeeding No    BMI 61.18 kg/m2     General appearance: alert, cooperative, no distress, appears stated age  Lungs: clear to auscultation bilaterally  Heart: regular rate and rhythm, S1, S2 normal, no murmur, click, rub or gallop  Abdomen: soft, non-tender. Bowel sounds normal. No masses,  no organomegaly  Extremities: no cyanosis or edema  Neurologic: Grossly normal    Disposition: home  Discharge Condition: stable  Patient Instructions:   Current Discharge Medication List      START taking these medications    Details   nicotine (NICODERM CQ) 21 mg/24 hr 1 Patch by TransDERmal route daily for 30 days. Qty: 28 Patch, Refills: 1      nystatin (MYCOSTATIN) powder Apply  to affected area two (2) times a day. Qty: 1 Bottle, Refills: 0      levoFLOXacin (LEVAQUIN) 750 mg tablet Take 1 Tab by mouth daily. Qty: 5 Tab, Refills: 0      predniSONE (DELTASONE) 20 mg tablet 3 tabs po daily x 4 days then 2 tabs po daily 4 days then 1 tab po daily x 4 days then 1.2 tab po daily x 4 days then stop  Qty: 26 Tab, Refills: 0         CONTINUE these medications which have CHANGED    Details   albuterol-ipratropium (DUO-NEB) 2.5 mg-0.5 mg/3 ml nebu 3 mL by Nebulization route every four (4) hours. For 3 days then 4 times daily  Qty: 120 Nebule, Refills: 11    Associated Diagnoses: COPD, severe ([facility]         CONTINUE these medications which have NOT CHANGED    Details   guaiFENesin SR (MUCINEX) 600 mg SR tablet Take 2 Tabs by mouth two (2) times a day. Qty: 120 Tab, Refills: 5    Associated Diagnoses: Acute exacerbation of chronic obstructive pulmonary disease (COPD) ([facility])      lisinopril (PRINIVIL, ZESTRIL) 10 mg tablet Take 1 Tab by mouth daily. Qty: 30 Tab, Refills: 5    Associated Diagnoses: Essential hypertension, benign      metFORMIN (GLUCOPHAGE) 500 mg tablet Take 1 Tab by mouth two (2) times daily (with meals).   Qty: 60 Tab, Refills: 5    Associated Diagnoses: Controlled type 2 diabetes mellitus with microalbuminuria, without long-term current use of insulin ([facility] atorvastatin (LIPITOR) 10 mg tablet Take 1 Tab by mouth nightly. Qty: 30 Tab, Refills: 5    Associated Diagnoses: Hyperlipidemia LDL goal <100      budesonide-formoterol (SYMBICORT) 160-4.5 mcg/actuation HFA inhaler Take 2 Puffs by inhalation two (2) times a day. Qty: 1 Inhaler, Refills: 11    Associated Diagnoses: Mucopurulent chronic bronchitis (HCC)      albuterol (PROVENTIL VENTOLIN) 2.5 mg /3 mL (0.083 %) nebulizer solution 3 mL by Nebulization route four (4) times daily. Indications: Chronic Obstructive Pulmonary Disease  Qty: 120 Each, Refills: 11      albuterol (VENTOLIN HFA) 90 mcg/actuation inhaler Take 2 Puffs by inhalation every four (4) hours as needed for Wheezing. Qty: 1 Inhaler, Refills: 11    Associated Diagnoses: Chronic obstructive pulmonary disease, unspecified COPD type (HCC)      buPROPion (WELLBUTRIN) 75 mg tablet Take 2 Tabs by mouth daily. Qty: 30 Tab, Refills: 1      cpap machine kit by Does Not Apply route. OXYGEN-AIR DELIVERY SYSTEMS by Does Not Apply route. 2 lpm exertion and hs       Blood-Glucose Meter (TRUE METRIX GLUCOSE METER) misc Check blood sugar twice daily, E11.9  Qty: 100 Each, Refills: 11      !! glucose blood VI test strips (TRUE METRIX GLUCOSE TEST STRIP) strip Check blood sugar twice daily, E11.9  Qty: 100 Strip, Refills: 11      Blood-Glucose Meter (ONETOUCH ULTRA2) monitoring kit Use to check blood sugar once daily as directed. Dx: E11.9  Qty: 1 Kit, Refills: 0    Associated Diagnoses: Diabetes mellitus type 2, controlled ([address]      ! ! glucose blood VI test strips (ONETOUCH ULTRA TEST) strip Use to check glucose once daily as directed. Dx: E11.9  Qty: 30 Strip, Refills: 11    Associated Diagnoses: Diabetes mellitus type 2, controlled ([address]      Lancets (ONETOUCH ULTRASOFT LANCETS) misc Use to check glucose once daily as directed.   Dx: E11.9  Qty: 30 Each, Refills: 11    Associated Diagnoses: Diabetes mellitus type 2, controlled ([address]      cyclobenzaprine (FLEXERIL) 10 mg tablet Take 1 Tab by mouth three (3) times daily as needed for Muscle Spasm(s). Qty: 30 Tab, Refills: 1      traMADol (ULTRAM) 50 mg tablet Take 1 Tab by mouth every six (6) hours as needed for Pain. Max Daily Amount: 200 mg. Qty: 30 Tab, Refills: 1       !! - Potential duplicate medications found. Please discuss with provider.       STOP taking these medications       doxycycline (MONODOX) 100 mg capsule Comments:   Reason for Stopping:         methylPREDNISolone (MEDROL DOSEPACK) 4 mg tablet Comments:   Reason for Stopping:         benzonatate (TESSALON) 200 mg capsule Comments:   Reason for Stopping:               Activity: as tolerated  Diet: cardiac diabetic  Wound Care: none    Follow-up  ·   Pulmonology  1 week  · PCP in 1 week  Time spent to discharge patient greater than 30 minutes  Signed:  John Vega MD  3/5/2017  12:42 PM

## 2017-03-05 NOTE — PROGRESS NOTES
Patient is alert and oriented x4. Able to verbalize needs. Resting quietly with no distress noted. Diminished breath sounds. No c/o pain or s/s of distress. Voiding clear yellow urine. Bed low and locked. Call light within reach. Instructed to call for assistance. Patient verbalizes understanding. Will monitor.

## 2017-03-05 NOTE — PROGRESS NOTES
I have reviewed discharge instructions with the patient. The patient verbalized understanding. Prescriptions given to patient. Patient will follow up with her PCP and already has an appointment with [facility].

## 2017-03-07 LAB
BACTERIA SPEC CULT: NORMAL
SERVICE CMNT-IMP: NORMAL

## 2017-05-18 PROBLEM — J96.20 ACUTE ON CHRONIC RESPIRATORY FAILURE (HCC): Status: RESOLVED | Noted: 2017-03-01 | Resolved: 2017-05-18

## 2017-05-18 PROBLEM — R09.02 HYPOXIA: Status: RESOLVED | Noted: 2017-03-02 | Resolved: 2017-05-18

## 2018-10-18 ENCOUNTER — HOSPITAL ENCOUNTER (OUTPATIENT)
Dept: CT IMAGING | Age: 48
Discharge: HOME | End: 2018-10-18
Attending: INTERNAL MEDICINE
Payer: MEDICARE

## 2018-10-18 DIAGNOSIS — R59.0 MEDIASTINAL ADENOPATHY: ICD-10-CM

## 2018-10-18 PROCEDURE — 71250 CT THORAX DX C-: CPT

## 2018-10-23 NOTE — PROGRESS NOTES
Please notify patient that previously enlarged lymph nodes in the chest are now no longer enlarged. She does have some evidence of emphysema.   Angel Ricks MD

## 2018-10-23 NOTE — PROGRESS NOTES
Spoke with the patient in regards to their CT scan results, explained to the patient per Dr. Sabine Crenshaw that the enlarged lymph notes are no longer enlarged, but she does have some evidence of emphysema. Patient understood and did not have any further questions or concerns at this time. // Angel TREJO

## 2018-10-24 ENCOUNTER — HOSPITAL ENCOUNTER (OUTPATIENT)
Dept: CARDIAC REHAB | Age: 48
Discharge: HOME | End: 2018-10-24

## 2018-10-24 NOTE — CARDIO/PULMONARY
2018      Dear Avery Bhardwaj    Thank you for referring your patient, Radhika Delgado ( 1970), to the Pulmonary Rehabilitation Program at [facility]. Ms. Breanna Juarez is a good candidate for the program and should see improvements with regular participation. We will be working to increase your patient's endurance and self care over the next 12 weeks. We will contact you with any issues or concerns that may arise, or you can follow your patient's progress through [address] [place] [address] at any time. We will send you a final summary report when the program is completed. Again, thank you for your referral. If we can be of further assistance, please feel free to contact the Cardiopulmonary Rehab staff at [phone].     Sincerely,      Genie Pelletier, RRT, RCP  Pulmonary Rehab Specialist   [facility]    CC: File

## 2018-11-06 ENCOUNTER — HOSPITAL ENCOUNTER (OUTPATIENT)
Dept: CARDIAC REHAB | Age: 48
End: 2018-11-06

## 2018-11-08 ENCOUNTER — APPOINTMENT (OUTPATIENT)
Dept: CARDIAC REHAB | Age: 48
End: 2018-11-08

## 2018-11-13 ENCOUNTER — APPOINTMENT (OUTPATIENT)
Dept: CARDIAC REHAB | Age: 48
End: 2018-11-13

## 2018-11-15 ENCOUNTER — APPOINTMENT (OUTPATIENT)
Dept: CARDIAC REHAB | Age: 48
End: 2018-11-15

## 2018-11-20 ENCOUNTER — APPOINTMENT (OUTPATIENT)
Dept: CARDIAC REHAB | Age: 48
End: 2018-11-20

## 2018-11-27 ENCOUNTER — APPOINTMENT (OUTPATIENT)
Dept: CARDIAC REHAB | Age: 48
End: 2018-11-27

## 2018-11-29 ENCOUNTER — APPOINTMENT (OUTPATIENT)
Dept: CARDIAC REHAB | Age: 48
End: 2018-11-29

## 2018-12-04 ENCOUNTER — APPOINTMENT (OUTPATIENT)
Dept: CARDIAC REHAB | Age: 48
End: 2018-12-04

## 2018-12-04 ENCOUNTER — HOSPITAL ENCOUNTER (OUTPATIENT)
Dept: MAMMOGRAPHY | Age: 48
Discharge: HOME | End: 2018-12-04
Attending: PHYSICIAN ASSISTANT
Payer: MEDICARE

## 2018-12-04 DIAGNOSIS — Z12.31 ENCOUNTER FOR SCREENING MAMMOGRAM FOR BREAST CANCER: ICD-10-CM

## 2018-12-04 PROCEDURE — 77067 SCR MAMMO BI INCL CAD: CPT

## 2018-12-06 ENCOUNTER — APPOINTMENT (OUTPATIENT)
Dept: CARDIAC REHAB | Age: 48
End: 2018-12-06

## 2018-12-11 ENCOUNTER — APPOINTMENT (OUTPATIENT)
Dept: CARDIAC REHAB | Age: 48
End: 2018-12-11

## 2018-12-13 ENCOUNTER — APPOINTMENT (OUTPATIENT)
Dept: CARDIAC REHAB | Age: 48
End: 2018-12-13

## 2018-12-18 ENCOUNTER — APPOINTMENT (OUTPATIENT)
Dept: CARDIAC REHAB | Age: 48
End: 2018-12-18

## 2018-12-20 ENCOUNTER — APPOINTMENT (OUTPATIENT)
Dept: CARDIAC REHAB | Age: 48
End: 2018-12-20

## 2018-12-27 ENCOUNTER — APPOINTMENT (OUTPATIENT)
Dept: CARDIAC REHAB | Age: 48
End: 2018-12-27

## 2019-01-03 ENCOUNTER — APPOINTMENT (OUTPATIENT)
Dept: CARDIAC REHAB | Age: 49
End: 2019-01-03

## 2019-01-03 ENCOUNTER — HOSPITAL ENCOUNTER (OUTPATIENT)
Dept: MAMMOGRAPHY | Age: 49
Discharge: HOME | End: 2019-01-03
Attending: PHYSICIAN ASSISTANT
Payer: MEDICARE

## 2019-01-03 DIAGNOSIS — R92.8 ABNORMAL SCREENING MAMMOGRAM: ICD-10-CM

## 2019-01-03 PROCEDURE — 77065 DX MAMMO INCL CAD UNI: CPT

## 2019-01-03 PROCEDURE — 76642 ULTRASOUND BREAST LIMITED: CPT

## 2019-01-07 ENCOUNTER — HOSPITAL ENCOUNTER (OUTPATIENT)
Dept: MAMMOGRAPHY | Age: 49
Discharge: HOME | End: 2019-01-07
Attending: PHYSICIAN ASSISTANT
Payer: MEDICARE

## 2019-01-07 VITALS — DIASTOLIC BLOOD PRESSURE: 70 MMHG | HEART RATE: 86 BPM | SYSTOLIC BLOOD PRESSURE: 120 MMHG

## 2019-01-07 DIAGNOSIS — R92.8 ABNORMAL MAMMOGRAM: ICD-10-CM

## 2019-01-07 DIAGNOSIS — R92.8 ABNORMAL MAMMOGRAM OF LEFT BREAST: ICD-10-CM

## 2019-01-07 PROCEDURE — 74011250636 HC RX REV CODE- 250/636: Performed by: PHYSICIAN ASSISTANT

## 2019-01-07 PROCEDURE — 19083 BX BREAST 1ST LESION US IMAG: CPT

## 2019-01-07 PROCEDURE — 77065 DX MAMMO INCL CAD UNI: CPT

## 2019-01-07 PROCEDURE — 88305 TISSUE EXAM BY PATHOLOGIST: CPT

## 2019-01-07 RX ORDER — LIDOCAINE HYDROCHLORIDE 10 MG/ML
3 INJECTION INFILTRATION; PERINEURAL
Status: COMPLETED | OUTPATIENT
Start: 2019-01-07 | End: 2019-01-07

## 2019-01-07 RX ADMIN — LIDOCAINE HYDROCHLORIDE 3 ML: 10 INJECTION, SOLUTION INFILTRATION; PERINEURAL at 08:56

## 2019-01-08 ENCOUNTER — APPOINTMENT (OUTPATIENT)
Dept: CARDIAC REHAB | Age: 49
End: 2019-01-08

## 2019-01-10 ENCOUNTER — APPOINTMENT (OUTPATIENT)
Dept: CARDIAC REHAB | Age: 49
End: 2019-01-10

## 2019-01-15 ENCOUNTER — APPOINTMENT (OUTPATIENT)
Dept: CARDIAC REHAB | Age: 49
End: 2019-01-15

## 2019-01-17 ENCOUNTER — APPOINTMENT (OUTPATIENT)
Dept: CARDIAC REHAB | Age: 49
End: 2019-01-17

## 2019-01-17 NOTE — PROGRESS NOTES
Patient notified that Crystal Victor was aware of biopsy and lumpectomy needed because of nodule inside duct with dilated duct seen on compression views which remains unclear if Malignant. Patient wants to Thank Crystal Victor for her concern and she will see her at follow up in April.

## 2019-01-17 NOTE — PROGRESS NOTES
Biopsy determined intraductal papilloma. Surgical consult occurred today and she is scheduled for lumpectomy in February.

## 2019-01-17 NOTE — PROGRESS NOTES
Compression views show a nodule inside a duct with a dilated duct that remains unclear if malignant. Biopsy is needed and was done already (1/7/19). She is scheduled for lumpectomy on 2/13/19. Please let patient know that I am aware - sorry I have not been in the office to see these results sooner. We will keep up with what is happening through the system and will plan to see her back in the office for follow-up in April as scheduled.

## 2019-01-22 ENCOUNTER — APPOINTMENT (OUTPATIENT)
Dept: CARDIAC REHAB | Age: 49
End: 2019-01-22

## 2019-01-24 ENCOUNTER — APPOINTMENT (OUTPATIENT)
Dept: CARDIAC REHAB | Age: 49
End: 2019-01-24

## 2019-02-11 ENCOUNTER — HOSPITAL ENCOUNTER (OUTPATIENT)
Dept: SURGERY | Age: 49
Discharge: HOME | End: 2019-02-11
Attending: SURGERY
Payer: MEDICARE

## 2019-02-11 ENCOUNTER — ANESTHESIA EVENT (OUTPATIENT)
Dept: SURGERY | Age: 49
End: 2019-02-11
Payer: MEDICARE

## 2019-02-11 VITALS
OXYGEN SATURATION: 96 % | WEIGHT: 293 LBS | DIASTOLIC BLOOD PRESSURE: 76 MMHG | HEART RATE: 96 BPM | HEIGHT: 66 IN | SYSTOLIC BLOOD PRESSURE: 138 MMHG | TEMPERATURE: 97.3 F | BODY MASS INDEX: 47.09 KG/M2 | RESPIRATION RATE: 16 BRPM

## 2019-02-11 LAB — GLUCOSE BLD STRIP.AUTO-MCNC: 98 MG/DL (ref 65–100)

## 2019-02-11 PROCEDURE — 82962 GLUCOSE BLOOD TEST: CPT

## 2019-02-11 NOTE — PERIOP NOTES
Call placed to Dr Gonsalez's office and message left with surgery scheduler, Uri Golden,  regarding anesthesia recommendation for patient to postpone surgery.

## 2019-02-11 NOTE — PERIOP NOTES
Patient verified name and  Order for consent not found in EHR and unable to match consent with case posting; patient verified. Type 1b surgery, walk in assessment complete. Labs per surgeon: none;  
Labs per anesthesia protocol: glucose; results 98 and within anesthesia guidelines. EKG: completed 18; ECHO 18; CXR 10/18/18; cardiology progress note~Dr Sanchez Lipoma 18; [facility] note 18~all placed on chart for anesthesia reference. Hibiclens and instructions given per hospital policy. Patient provided with and instructed on educational handouts including Guide to Surgery, Pain Management, Hand Hygiene, Blood Transfusion Education, and [facility] Brochure. Patient answered medical/surgical history questions at their best of ability. All prior to admission medications documented in [facility]. Original medication prescription bottle YES visualized during patient appointment. Patient instructed to hold all vitamins 7 days prior to surgery and NSAIDS 5 days prior to surgery, patient verbalized understanding. Prescription medications to hold: none Patient instructed to continue previous medications as prescribed prior to surgery and to take the following medications the day of surgery according to anesthesia guidelines with a small sip of water: use/bring Albuterol inhaler; use Albuterol nebulizer. Beth Cheek Patient teach back successful and patient demonstrates knowledge of instructions.

## 2019-02-11 NOTE — PERIOP NOTES
Dr Kole Brown by to see patient and to listen to her lungs; He recommends that she postpone her surgery and see her pulmonologist for possible CXR. Pt voices understanding.

## 2019-02-11 NOTE — PERIOP NOTES
Pt coughing and spitting out \"thick white\" drainage; right lung sounds coarse; pt wearing oxygen 2 liters/nc~she states 24/7. Diagnosed with flu~type B 1/30/19 and treated with Tamiflu and Prednisone. Call placed to Dr. Jose Gomez and states he will come by to see patient.

## 2019-02-11 NOTE — ANESTHESIA PREPROCEDURE EVALUATION
Anesthetic History   No history of anesthetic complications            Review of Systems / Medical History  Patient summary reviewed and pertinent labs reviewed    Pulmonary    COPD (Chronic respiratory failure. On 2L [place] O2 continuous.): severe    Sleep apnea: CPAP  Smoker    Pertinent negatives: Asthma: Childhood asthma. Neuro/Psych   Within defined limits           Cardiovascular    Hypertension              Exercise tolerance: <4 METS  Comments: Denies recent CP, SOB or Palpitations   GI/Hepatic/Renal  Within defined limits              Endo/Other    Diabetes: type 2    Morbid obesity (Supramorbid)     Other Findings            Physical Exam    Airway  Mallampati: II  TM Distance: 4 - 6 cm  Neck ROM: normal range of motion   Mouth opening: Normal     Cardiovascular  Regular rate and rhythm,  S1 and S2 normal,  no murmur, click, rub, or gallop  Rhythm: regular  Rate: normal         Dental  No notable dental hx       Pulmonary  Breath sounds clear to auscultation    Decreased breath sounds           Abdominal  GI exam deferred       Other Findings            Anesthetic Plan    ASA: 3  Anesthesia type: general          Induction: Intravenous  Anesthetic plan and risks discussed with: Patient      Patient cancelled 2 months ago due to influenza. She appears at baseline today. Will plan for GETA. D/w patient possibility of post-op ventilation due to patient's end-stage lung disease.

## 2019-02-13 ENCOUNTER — APPOINTMENT (OUTPATIENT)
Dept: MAMMOGRAPHY | Age: 49
End: 2019-02-13
Attending: SURGERY
Payer: MEDICARE

## 2019-03-20 ENCOUNTER — HOSPITAL ENCOUNTER (OUTPATIENT)
Dept: SURGERY | Age: 49
Discharge: HOME | End: 2019-03-20
Attending: SURGERY
Payer: MEDICARE

## 2019-03-20 VITALS
OXYGEN SATURATION: 94 % | HEIGHT: 66 IN | TEMPERATURE: 98.9 F | RESPIRATION RATE: 16 BRPM | DIASTOLIC BLOOD PRESSURE: 57 MMHG | WEIGHT: 293 LBS | HEART RATE: 110 BPM | BODY MASS INDEX: 47.09 KG/M2 | SYSTOLIC BLOOD PRESSURE: 99 MMHG

## 2019-03-20 LAB — GLUCOSE BLD STRIP.AUTO-MCNC: 128 MG/DL (ref 65–100)

## 2019-03-20 PROCEDURE — 82962 GLUCOSE BLOOD TEST: CPT

## 2019-03-20 NOTE — PERIOP NOTES
Patient verified name and  Order for consent not found in EHR. Type 1B surgery, PAT walk in assessment complete. Labs per surgeon: no orders. Labs per anesthesia protocol: POC glucose; results 128. EKG: completed 18; ECHO 18; cardiology progress note~Dr Sanchez Lipoma 18; [facility] clearance note dated 3/4/19~all  found in EMR if needed for anesthesia reference. Pt had a PAT walk in assessment on 19 and was seen by Dr. Bebeto Brown who wanted her to have pulmonary clearance prior to surgery due to pt continuing to have a cough. Pt was cleared by pulmonary on 3/4/19~\"There is no contraindication for planned lumpectomy or breast surgery from the pulmonary standpoint in this patient as long as she is in her baseline state and not exacerbated. She is therefore from the pulmonary standpoint cleared for the surgery. \" Dr. Rodolfo Whalen did an in person visit with pt today due to pt still having a productive cough and pt coughing during visit. Dr. Rodolfo Whalen states that pt will be ok for surgery and encouraged pt to stop smoking prior to surgery. Antibacterial soap and instructions given per hospital policy. Patient provided with and instructed on educational handouts including Guide to Surgery, Pain Management, Hand Hygiene, Blood Transfusion Education, and [facility] Brochure. Patient answered medical/surgical history questions at their best of ability. All prior to admission medications documented in [facility]. Original medication prescription bottle was visualized during patient appointment. Patient instructed to hold all vitamins 7 days prior to surgery and NSAIDS 5 days prior to surgery, patient verbalized understanding. Prescription medications to hold: none.  
 
Patient instructed to continue previous medications as prescribed prior to surgery and to take the following medications the day of surgery according to anesthesia guidelines with a small sip of water: albuterol inhaler, duo-neb treatment and mucinex. Patient teach back successful and patient demonstrates knowledge of instructions.

## 2019-03-27 ENCOUNTER — ANESTHESIA (OUTPATIENT)
Dept: SURGERY | Age: 49
End: 2019-03-27
Payer: MEDICARE

## 2019-03-27 ENCOUNTER — HOSPITAL ENCOUNTER (OUTPATIENT)
Age: 49
Setting detail: OUTPATIENT SURGERY
Discharge: HOME | End: 2019-03-27
Attending: SURGERY | Admitting: SURGERY
Payer: MEDICARE

## 2019-03-27 ENCOUNTER — APPOINTMENT (OUTPATIENT)
Dept: MAMMOGRAPHY | Age: 49
End: 2019-03-27
Attending: SURGERY
Payer: MEDICARE

## 2019-03-27 VITALS
SYSTOLIC BLOOD PRESSURE: 123 MMHG | DIASTOLIC BLOOD PRESSURE: 77 MMHG | TEMPERATURE: 97.2 F | HEART RATE: 91 BPM | RESPIRATION RATE: 20 BRPM | OXYGEN SATURATION: 93 %

## 2019-03-27 DIAGNOSIS — D24.2 INTRADUCTAL PAPILLOMA OF BREAST, LEFT: Primary | ICD-10-CM

## 2019-03-27 DIAGNOSIS — D24.2 INTRADUCTAL PAPILLOMA OF LEFT BREAST: ICD-10-CM

## 2019-03-27 LAB
BASE EXCESS BLD CALC-SCNC: 4 MMOL/L
CA-I BLD-MCNC: 1.38 MMOL/L (ref 1.12–1.32)
GLUCOSE BLD STRIP.AUTO-MCNC: 130 MG/DL (ref 65–100)
GLUCOSE BLD STRIP.AUTO-MCNC: 95 MG/DL (ref 65–100)
HCO3 BLD-SCNC: 35.1 MMOL/L (ref 22–26)
PCO2 BLD: 129.7 MMHG (ref 35–45)
PH BLD: 7.04 [PH] (ref 7.35–7.45)
PO2 BLD: 101 MMHG (ref 75–100)
POTASSIUM BLD-SCNC: 3.9 MMOL/L (ref 3.5–5.1)
SAO2 % BLD: 93 % (ref 95–98)
SODIUM BLD-SCNC: 142 MMOL/L (ref 136–145)

## 2019-03-27 PROCEDURE — 19285 PERQ DEV BREAST 1ST US IMAG: CPT

## 2019-03-27 PROCEDURE — 76010000161 HC OR TIME 1 TO 1.5 HR INTENSV-TIER 1: Performed by: SURGERY

## 2019-03-27 PROCEDURE — 77030002996 HC SUT SLK J&J -A: Performed by: SURGERY

## 2019-03-27 PROCEDURE — 82803 BLOOD GASES ANY COMBINATION: CPT

## 2019-03-27 PROCEDURE — 74011000250 HC RX REV CODE- 250: Performed by: SURGERY

## 2019-03-27 PROCEDURE — 77030008477 HC STYL SATN SLP COVD -A: Performed by: NURSE ANESTHETIST, CERTIFIED REGISTERED

## 2019-03-27 PROCEDURE — 76210000003 HC OR PH I REC 3.5 TO 4 HR: Performed by: SURGERY

## 2019-03-27 PROCEDURE — 77030032490 HC SLV COMPR SCD KNE COVD -B: Performed by: SURGERY

## 2019-03-27 PROCEDURE — 74011000250 HC RX REV CODE- 250

## 2019-03-27 PROCEDURE — 74011250636 HC RX REV CODE- 250/636

## 2019-03-27 PROCEDURE — 94640 AIRWAY INHALATION TREATMENT: CPT

## 2019-03-27 PROCEDURE — 74011250636 HC RX REV CODE- 250/636: Performed by: SURGERY

## 2019-03-27 PROCEDURE — 76060000034 HC ANESTHESIA 1.5 TO 2 HR: Performed by: SURGERY

## 2019-03-27 PROCEDURE — 77030008703 HC TU ET UNCUF COVD -A: Performed by: NURSE ANESTHETIST, CERTIFIED REGISTERED

## 2019-03-27 PROCEDURE — 77030039425 HC BLD LARYNG TRULITE DISP TELE -A: Performed by: NURSE ANESTHETIST, CERTIFIED REGISTERED

## 2019-03-27 PROCEDURE — 77065 DX MAMMO INCL CAD UNI: CPT

## 2019-03-27 PROCEDURE — 77030031139 HC SUT VCRL2 J&J -A: Performed by: SURGERY

## 2019-03-27 PROCEDURE — 77030018836 HC SOL IRR NACL ICUM -A: Performed by: SURGERY

## 2019-03-27 PROCEDURE — 74011250636 HC RX REV CODE- 250/636: Performed by: ANESTHESIOLOGY

## 2019-03-27 PROCEDURE — 76210000020 HC REC RM PH II FIRST 0.5 HR: Performed by: SURGERY

## 2019-03-27 PROCEDURE — 82947 ASSAY GLUCOSE BLOOD QUANT: CPT

## 2019-03-27 PROCEDURE — 74011000250 HC RX REV CODE- 250: Performed by: ANESTHESIOLOGY

## 2019-03-27 PROCEDURE — 82962 GLUCOSE BLOOD TEST: CPT

## 2019-03-27 PROCEDURE — 88307 TISSUE EXAM BY PATHOLOGIST: CPT

## 2019-03-27 PROCEDURE — 77030021668 HC NEB PREFIL KT VYRM -A

## 2019-03-27 PROCEDURE — 77030010514 HC APPL CLP LIG COVD -B: Performed by: SURGERY

## 2019-03-27 PROCEDURE — 94660 CPAP INITIATION&MGMT: CPT

## 2019-03-27 RX ORDER — SODIUM CHLORIDE 0.9 % (FLUSH) 0.9 %
5-40 SYRINGE (ML) INJECTION AS NEEDED
Status: DISCONTINUED | OUTPATIENT
Start: 2019-03-27 | End: 2019-03-27 | Stop reason: HOSPADM

## 2019-03-27 RX ORDER — LIDOCAINE HYDROCHLORIDE 10 MG/ML
0.1 INJECTION INFILTRATION; PERINEURAL AS NEEDED
Status: DISCONTINUED | OUTPATIENT
Start: 2019-03-27 | End: 2019-03-27 | Stop reason: HOSPADM

## 2019-03-27 RX ORDER — OXYCODONE HYDROCHLORIDE 5 MG/1
10 TABLET ORAL
Status: DISCONTINUED | OUTPATIENT
Start: 2019-03-27 | End: 2019-03-27 | Stop reason: HOSPADM

## 2019-03-27 RX ORDER — ACETAMINOPHEN 500 MG
1000 TABLET ORAL ONCE
Status: DISCONTINUED | OUTPATIENT
Start: 2019-03-27 | End: 2019-03-27 | Stop reason: HOSPADM

## 2019-03-27 RX ORDER — SODIUM CHLORIDE 0.9 % (FLUSH) 0.9 %
5-40 SYRINGE (ML) INJECTION EVERY 8 HOURS
Status: DISCONTINUED | OUTPATIENT
Start: 2019-03-27 | End: 2019-03-27 | Stop reason: HOSPADM

## 2019-03-27 RX ORDER — LIDOCAINE HYDROCHLORIDE 10 MG/ML
6 INJECTION INFILTRATION; PERINEURAL
Status: COMPLETED | OUTPATIENT
Start: 2019-03-27 | End: 2019-03-27

## 2019-03-27 RX ORDER — MIDAZOLAM HYDROCHLORIDE 1 MG/ML
2 INJECTION, SOLUTION INTRAMUSCULAR; INTRAVENOUS
Status: DISCONTINUED | OUTPATIENT
Start: 2019-03-27 | End: 2019-03-27 | Stop reason: HOSPADM

## 2019-03-27 RX ORDER — NALOXONE HYDROCHLORIDE 0.4 MG/ML
0.2 INJECTION, SOLUTION INTRAMUSCULAR; INTRAVENOUS; SUBCUTANEOUS AS NEEDED
Status: DISCONTINUED | OUTPATIENT
Start: 2019-03-27 | End: 2019-03-27 | Stop reason: HOSPADM

## 2019-03-27 RX ORDER — ESMOLOL HYDROCHLORIDE 10 MG/ML
INJECTION INTRAVENOUS AS NEEDED
Status: DISCONTINUED | OUTPATIENT
Start: 2019-03-27 | End: 2019-03-27 | Stop reason: HOSPADM

## 2019-03-27 RX ORDER — OXYCODONE AND ACETAMINOPHEN 5; 325 MG/1; MG/1
1 TABLET ORAL
Qty: 30 TAB | Refills: 0 | Status: SHIPPED | OUTPATIENT
Start: 2019-03-27 | End: 2019-04-03

## 2019-03-27 RX ORDER — ONDANSETRON 2 MG/ML
INJECTION INTRAMUSCULAR; INTRAVENOUS AS NEEDED
Status: DISCONTINUED | OUTPATIENT
Start: 2019-03-27 | End: 2019-03-27 | Stop reason: HOSPADM

## 2019-03-27 RX ORDER — SODIUM CHLORIDE, SODIUM LACTATE, POTASSIUM CHLORIDE, CALCIUM CHLORIDE 600; 310; 30; 20 MG/100ML; MG/100ML; MG/100ML; MG/100ML
100 INJECTION, SOLUTION INTRAVENOUS CONTINUOUS
Status: DISCONTINUED | OUTPATIENT
Start: 2019-03-27 | End: 2019-03-27 | Stop reason: HOSPADM

## 2019-03-27 RX ORDER — HYDROMORPHONE HYDROCHLORIDE 2 MG/ML
0.5 INJECTION, SOLUTION INTRAMUSCULAR; INTRAVENOUS; SUBCUTANEOUS
Status: DISCONTINUED | OUTPATIENT
Start: 2019-03-27 | End: 2019-03-27 | Stop reason: HOSPADM

## 2019-03-27 RX ORDER — FLUMAZENIL 0.1 MG/ML
0.2 INJECTION INTRAVENOUS
Status: DISCONTINUED | OUTPATIENT
Start: 2019-03-27 | End: 2019-03-27 | Stop reason: HOSPADM

## 2019-03-27 RX ORDER — EPHEDRINE SULFATE 50 MG/ML
INJECTION, SOLUTION INTRAVENOUS AS NEEDED
Status: DISCONTINUED | OUTPATIENT
Start: 2019-03-27 | End: 2019-03-27 | Stop reason: HOSPADM

## 2019-03-27 RX ORDER — BUPIVACAINE HYDROCHLORIDE AND EPINEPHRINE 2.5; 5 MG/ML; UG/ML
INJECTION, SOLUTION EPIDURAL; INFILTRATION; INTRACAUDAL; PERINEURAL AS NEEDED
Status: DISCONTINUED | OUTPATIENT
Start: 2019-03-27 | End: 2019-03-27 | Stop reason: HOSPADM

## 2019-03-27 RX ORDER — FENTANYL CITRATE 50 UG/ML
INJECTION, SOLUTION INTRAMUSCULAR; INTRAVENOUS AS NEEDED
Status: DISCONTINUED | OUTPATIENT
Start: 2019-03-27 | End: 2019-03-27 | Stop reason: HOSPADM

## 2019-03-27 RX ORDER — SUCCINYLCHOLINE CHLORIDE 20 MG/ML
INJECTION INTRAMUSCULAR; INTRAVENOUS AS NEEDED
Status: DISCONTINUED | OUTPATIENT
Start: 2019-03-27 | End: 2019-03-27 | Stop reason: HOSPADM

## 2019-03-27 RX ORDER — PROPOFOL 10 MG/ML
INJECTION, EMULSION INTRAVENOUS AS NEEDED
Status: DISCONTINUED | OUTPATIENT
Start: 2019-03-27 | End: 2019-03-27 | Stop reason: HOSPADM

## 2019-03-27 RX ORDER — ROCURONIUM BROMIDE 10 MG/ML
INJECTION, SOLUTION INTRAVENOUS AS NEEDED
Status: DISCONTINUED | OUTPATIENT
Start: 2019-03-27 | End: 2019-03-27 | Stop reason: HOSPADM

## 2019-03-27 RX ORDER — CEFAZOLIN SODIUM/WATER 2 G/20 ML
2 SYRINGE (ML) INTRAVENOUS ONCE
Status: DISCONTINUED | OUTPATIENT
Start: 2019-03-27 | End: 2019-03-27

## 2019-03-27 RX ORDER — MIDAZOLAM HYDROCHLORIDE 1 MG/ML
2 INJECTION, SOLUTION INTRAMUSCULAR; INTRAVENOUS ONCE
Status: DISCONTINUED | OUTPATIENT
Start: 2019-03-27 | End: 2019-03-27 | Stop reason: HOSPADM

## 2019-03-27 RX ORDER — LIDOCAINE HYDROCHLORIDE 20 MG/ML
INJECTION, SOLUTION EPIDURAL; INFILTRATION; INTRACAUDAL; PERINEURAL AS NEEDED
Status: DISCONTINUED | OUTPATIENT
Start: 2019-03-27 | End: 2019-03-27 | Stop reason: HOSPADM

## 2019-03-27 RX ORDER — LABETALOL HYDROCHLORIDE 5 MG/ML
INJECTION, SOLUTION INTRAVENOUS AS NEEDED
Status: DISCONTINUED | OUTPATIENT
Start: 2019-03-27 | End: 2019-03-27 | Stop reason: HOSPADM

## 2019-03-27 RX ORDER — OXYCODONE HYDROCHLORIDE 5 MG/1
5 TABLET ORAL
Status: DISCONTINUED | OUTPATIENT
Start: 2019-03-27 | End: 2019-03-27 | Stop reason: HOSPADM

## 2019-03-27 RX ORDER — IPRATROPIUM BROMIDE AND ALBUTEROL SULFATE 2.5; .5 MG/3ML; MG/3ML
3 SOLUTION RESPIRATORY (INHALATION)
Status: COMPLETED | OUTPATIENT
Start: 2019-03-27 | End: 2019-03-27

## 2019-03-27 RX ORDER — DIPHENHYDRAMINE HYDROCHLORIDE 50 MG/ML
12.5 INJECTION, SOLUTION INTRAMUSCULAR; INTRAVENOUS
Status: DISCONTINUED | OUTPATIENT
Start: 2019-03-27 | End: 2019-03-27 | Stop reason: HOSPADM

## 2019-03-27 RX ORDER — FENTANYL CITRATE 50 UG/ML
100 INJECTION, SOLUTION INTRAMUSCULAR; INTRAVENOUS ONCE
Status: DISCONTINUED | OUTPATIENT
Start: 2019-03-27 | End: 2019-03-27 | Stop reason: HOSPADM

## 2019-03-27 RX ADMIN — FENTANYL CITRATE 50 MCG: 50 INJECTION, SOLUTION INTRAMUSCULAR; INTRAVENOUS at 11:02

## 2019-03-27 RX ADMIN — ESMOLOL HYDROCHLORIDE 30 MG: 10 INJECTION INTRAVENOUS at 10:48

## 2019-03-27 RX ADMIN — SUCCINYLCHOLINE CHLORIDE 180 MG: 20 INJECTION INTRAMUSCULAR; INTRAVENOUS at 10:45

## 2019-03-27 RX ADMIN — Medication 3 G: at 10:40

## 2019-03-27 RX ADMIN — ROCURONIUM BROMIDE 5 MG: 10 INJECTION, SOLUTION INTRAVENOUS at 10:45

## 2019-03-27 RX ADMIN — LABETALOL HYDROCHLORIDE 10 MG: 5 INJECTION, SOLUTION INTRAVENOUS at 11:58

## 2019-03-27 RX ADMIN — SODIUM CHLORIDE, SODIUM LACTATE, POTASSIUM CHLORIDE, AND CALCIUM CHLORIDE: 600; 310; 30; 20 INJECTION, SOLUTION INTRAVENOUS at 10:38

## 2019-03-27 RX ADMIN — SODIUM CHLORIDE, SODIUM LACTATE, POTASSIUM CHLORIDE, AND CALCIUM CHLORIDE: 600; 310; 30; 20 INJECTION, SOLUTION INTRAVENOUS at 11:28

## 2019-03-27 RX ADMIN — LIDOCAINE HYDROCHLORIDE 100 MG: 20 INJECTION, SOLUTION EPIDURAL; INFILTRATION; INTRACAUDAL; PERINEURAL at 10:44

## 2019-03-27 RX ADMIN — LIDOCAINE HYDROCHLORIDE 0.1 ML: 10 INJECTION, SOLUTION INFILTRATION; PERINEURAL at 10:28

## 2019-03-27 RX ADMIN — FENTANYL CITRATE 50 MCG: 50 INJECTION, SOLUTION INTRAMUSCULAR; INTRAVENOUS at 10:42

## 2019-03-27 RX ADMIN — ONDANSETRON 4 MG: 2 INJECTION INTRAMUSCULAR; INTRAVENOUS at 11:04

## 2019-03-27 RX ADMIN — IPRATROPIUM BROMIDE AND ALBUTEROL SULFATE 3 ML: .5; 3 SOLUTION RESPIRATORY (INHALATION) at 15:14

## 2019-03-27 RX ADMIN — EPHEDRINE SULFATE 10 MG: 50 INJECTION, SOLUTION INTRAVENOUS at 11:18

## 2019-03-27 RX ADMIN — SODIUM CHLORIDE, SODIUM LACTATE, POTASSIUM CHLORIDE, AND CALCIUM CHLORIDE 100 ML/HR: 600; 310; 30; 20 INJECTION, SOLUTION INTRAVENOUS at 10:29

## 2019-03-27 RX ADMIN — PROPOFOL 200 MG: 10 INJECTION, EMULSION INTRAVENOUS at 10:45

## 2019-03-27 RX ADMIN — PROPOFOL 50 MG: 10 INJECTION, EMULSION INTRAVENOUS at 10:55

## 2019-03-27 RX ADMIN — LIDOCAINE HYDROCHLORIDE 6 ML: 10 INJECTION, SOLUTION INFILTRATION; PERINEURAL at 09:45

## 2019-03-27 NOTE — OP NOTES
[place]  OPERATIVE REPORT    Name:  Anita Montes  MR#:  [id number]  :  1970  ACCOUNT #:  [[id number]]  DATE OF SERVICE:  2019    PREOPERATIVE DIAGNOSIS:  Left breast intraductal papilloma. POSTOPERATIVE DIAGNOSIS:  Left breast intraductal papilloma. PROCEDURE PERFORMED:  Left breast needle localization, partial mastectomy. SURGEON:  Hank Kirby DO.    ASSISTANT:  None. ANESTHESIA:  General endotracheal.    COMPLICATIONS:  None. CONDITION:  Stable. COUNTS:  Sponge count, needle count and instrument count all correct x3. SPECIMENS REMOVED:  Left breast lumpectomy. IMPLANTS:  None. ESTIMATED BLOOD LOSS:  Minimal.    DESCRIPTION OF PROCEDURE:  This is a 53-year-old female with biopsy-proven left breast intraductal papilloma. She is prepared for left breast needle localization and partial mastectomy. Consent was obtained by describing the procedure to the patient including potential complications to include infection and bleeding. Consent was obtained and placed in the final chart. She was administered Ancef 3 g IV preoperatively, taken to the operative suite in a supine position. General anesthesia was initiated without complications. She was prepped and draped in usual sterile fashion. A needle guidewire had been placed by Radiology. Radiographs were reviewed. We then prepared the incision. A 0.25% Marcaine with epinephrine was used to anesthetize the skin and subcutaneous tissue. A #15 scalpel blade was used to make a skin incision over the wire. We then dissected around the wire circumferentially stabilizing the wire using Allis clamps. We continued our dissection to beyond the tip of the wire and removed the specimen marking with a long lateral, short superior and double deep suture. This was sent to mammography for radiographic imaging, which revealed the biopsy clip within the specimen. At this point, we concluded the case.   We copiously irrigated with saline until clear. We ensured hemostasis using spot cauterization. We then reapproximated the subcutaneous tissue with 2-0 Vicryl in an interrupted fashion and then 3-0 Vicryl in a simple running fashion. Mastisol and Steri-Strips were placed at top of the incisions. Sterile dressing was applied. The patient was extubated and transferred to recovery stable. FINDINGS:  A 82-year-old female with left breast intraductal papilloma. Wire localization, partial mastectomy was performed with biopsy clip within the specimen. She tolerated the procedure well.         [address], DO      [id number]  D:  03/27/2019 11:41  T:  03/27/2019 13:18  JOB #:  [id number]

## 2019-03-27 NOTE — BRIEF OP NOTE
BRIEF OPERATIVE NOTE Date of Procedure: 3/27/2019 Preoperative Diagnosis: Papilloma of left breast [D24.2] Postoperative Diagnosis: Papilloma of left breast [D24.2] Procedure(s): LEFT BREAST NEEDLE LOCALIZED PARTIAL MASTECTOMY FOR MARGINS Surgeon(s) and Role: * Phylicia Gonsalez,  - Primary Surgical Assistant: None Surgical Staff: 
Circ-1: Anali Bermudez RN 
Circ-Relief: Herber Huntley RN Scrub Tech-1: Grace Good Scrub Tech-2: Cecelia Abad Scrub Tech-3: Satinder Llanos Event Time In Time Out Incision Start 1100 Incision Close 1132 Anesthesia: General  
Estimated Blood Loss: Minimal 
Specimens:  
ID Type Source Tests Collected by Time Destination 1 : left breast lumpectomy Needle Loc Breast Mass, Left  Radha Dirk, DO 3/27/2019 1110 Pathology Findings: Left breast intraductal papilloma. Specimen with biopsy clip included. Complications: None Implants: * No implants in log * Phylicia Carbone [address], [address]

## 2019-03-27 NOTE — DISCHARGE INSTRUCTIONS
Post op instructions:  1. May shower only, no tub bathing, no hot tub, no swimming. 2. Keep incision clean with regular soap and water. 3. No lifting over 10 lbs. 4. Regular diet as tolerated. 5. May remove topical dressing on Day #2. Leave steri strips until seen in Dr. Gonsalez's office at follow up appointment. 6. No driving on pain medicines.   7. Resume home medicines as usual.     Kamila Gonsalez, DO

## 2019-03-27 NOTE — PROGRESS NOTES
Took patient off BIPAP to NC , she couldn't keep her saturation past 85 ON 6 LPM.  Consequently, she is back on BIPAP on documented settings.

## 2019-03-28 NOTE — ANESTHESIA POSTPROCEDURE EVALUATION
Procedure(s):  LEFT BREAST NEEDLE LOCALIZED LUMPECTOMY . general    Anesthesia Post Evaluation      Multimodal analgesia: multimodal analgesia not used between 6 hours prior to anesthesia start to PACU discharge  Patient location during evaluation: PACU  Patient participation: complete - patient participated  Level of consciousness: awake and alert  Pain management: adequate  Airway patency: patent  Anesthetic complications: no  Cardiovascular status: acceptable and hemodynamically stable  Respiratory status: acceptable  Hydration status: acceptable  Comments: Patient required post-op ventilation due to profound hypercarbia. Patient self-extubated in PACU and transitioned to BiPAP and eventually nasal canula at home O2 dose. Awake and alert - ok for d/c.         Vitals Value Taken Time   /77 3/27/2019  3:11 PM   Temp 36.2 °C (97.2 °F) 3/27/2019 12:11 PM   Pulse 91 3/27/2019  3:11 PM   Resp 20 3/27/2019  3:11 PM   SpO2 93 % 3/27/2019  3:26 PM

## 2019-08-27 ENCOUNTER — HOSPITAL ENCOUNTER (INPATIENT)
Age: 49
LOS: 8 days | Discharge: HOME | DRG: 190 | End: 2019-09-04
Attending: INTERNAL MEDICINE | Admitting: INTERNAL MEDICINE
Payer: MEDICARE

## 2019-08-27 ENCOUNTER — APPOINTMENT (OUTPATIENT)
Dept: GENERAL RADIOLOGY | Age: 49
DRG: 190 | End: 2019-08-27
Attending: INTERNAL MEDICINE
Payer: MEDICARE

## 2019-08-27 DIAGNOSIS — J44.1 COPD WITH ACUTE EXACERBATION (HCC): ICD-10-CM

## 2019-08-27 DIAGNOSIS — G47.33 OSA ON CPAP: Chronic | ICD-10-CM

## 2019-08-27 DIAGNOSIS — E66.2 OBESITY HYPOVENTILATION SYNDROME (HCC): ICD-10-CM

## 2019-08-27 DIAGNOSIS — J96.11 CHRONIC RESPIRATORY FAILURE WITH HYPOXIA (HCC): Chronic | ICD-10-CM

## 2019-08-27 DIAGNOSIS — J44.9 COPD, SEVERE (HCC): Chronic | ICD-10-CM

## 2019-08-27 DIAGNOSIS — I48.91 ATRIAL FIBRILLATION, NEW ONSET (HCC): ICD-10-CM

## 2019-08-27 DIAGNOSIS — E66.01 MORBID OBESITY WITH BMI OF 50.0-59.9, ADULT (HCC): Chronic | ICD-10-CM

## 2019-08-27 DIAGNOSIS — Z99.89 OSA ON CPAP: Chronic | ICD-10-CM

## 2019-08-27 DIAGNOSIS — Z72.0 TOBACCO ABUSE: Chronic | ICD-10-CM

## 2019-08-27 LAB
ANION GAP SERPL CALC-SCNC: 6 MMOL/L (ref 7–16)
BUN SERPL-MCNC: 10 MG/DL (ref 6–23)
CALCIUM SERPL-MCNC: 9.5 MG/DL (ref 8.3–10.4)
CHLORIDE SERPL-SCNC: 97 MMOL/L (ref 98–107)
CO2 SERPL-SCNC: 35 MMOL/L (ref 21–32)
CREAT SERPL-MCNC: 0.74 MG/DL (ref 0.6–1)
ERYTHROCYTE [DISTWIDTH] IN BLOOD BY AUTOMATED COUNT: 15.5 % (ref 11.9–14.6)
FLUAV AG NPH QL IA: NEGATIVE
FLUBV AG NPH QL IA: NEGATIVE
GLUCOSE BLD STRIP.AUTO-MCNC: 93 MG/DL (ref 65–100)
GLUCOSE BLD STRIP.AUTO-MCNC: 94 MG/DL (ref 65–100)
GLUCOSE SERPL-MCNC: 97 MG/DL (ref 65–100)
HCT VFR BLD AUTO: 44.1 % (ref 35.8–46.3)
HGB BLD-MCNC: 12.6 G/DL (ref 11.7–15.4)
MAGNESIUM SERPL-MCNC: 1.8 MG/DL (ref 1.8–2.4)
MCH RBC QN AUTO: 22.2 PG (ref 26.1–32.9)
MCHC RBC AUTO-ENTMCNC: 28.6 G/DL (ref 31.4–35)
MCV RBC AUTO: 77.8 FL (ref 79.6–97.8)
NRBC # BLD: 0.02 K/UL (ref 0–0.2)
PHOSPHATE SERPL-MCNC: 3.5 MG/DL (ref 2.5–4.5)
PLATELET # BLD AUTO: 288 K/UL (ref 150–450)
PMV BLD AUTO: 11 FL (ref 9.4–12.3)
POTASSIUM SERPL-SCNC: 4 MMOL/L (ref 3.5–5.1)
RBC # BLD AUTO: 5.67 M/UL (ref 4.05–5.2)
SODIUM SERPL-SCNC: 138 MMOL/L (ref 136–145)
SPECIMEN SOURCE: NORMAL
WBC # BLD AUTO: 9.4 K/UL (ref 4.3–11.1)

## 2019-08-27 PROCEDURE — 74011000250 HC RX REV CODE- 250: Performed by: INTERNAL MEDICINE

## 2019-08-27 PROCEDURE — 77010033678 HC OXYGEN DAILY

## 2019-08-27 PROCEDURE — 74011250636 HC RX REV CODE- 250/636: Performed by: INTERNAL MEDICINE

## 2019-08-27 PROCEDURE — 83735 ASSAY OF MAGNESIUM: CPT

## 2019-08-27 PROCEDURE — 74011250637 HC RX REV CODE- 250/637: Performed by: INTERNAL MEDICINE

## 2019-08-27 PROCEDURE — 85027 COMPLETE CBC AUTOMATED: CPT

## 2019-08-27 PROCEDURE — 94760 N-INVAS EAR/PLS OXIMETRY 1: CPT

## 2019-08-27 PROCEDURE — 94640 AIRWAY INHALATION TREATMENT: CPT

## 2019-08-27 PROCEDURE — 80048 BASIC METABOLIC PNL TOTAL CA: CPT

## 2019-08-27 PROCEDURE — 82962 GLUCOSE BLOOD TEST: CPT

## 2019-08-27 PROCEDURE — 36415 COLL VENOUS BLD VENIPUNCTURE: CPT

## 2019-08-27 PROCEDURE — 65270000029 HC RM PRIVATE

## 2019-08-27 PROCEDURE — 71046 X-RAY EXAM CHEST 2 VIEWS: CPT

## 2019-08-27 PROCEDURE — 84100 ASSAY OF PHOSPHORUS: CPT

## 2019-08-27 PROCEDURE — 87070 CULTURE OTHR SPECIMN AEROBIC: CPT

## 2019-08-27 PROCEDURE — 87804 INFLUENZA ASSAY W/OPTIC: CPT

## 2019-08-27 PROCEDURE — 65660000000 HC RM CCU STEPDOWN

## 2019-08-27 RX ORDER — NALOXONE HYDROCHLORIDE 0.4 MG/ML
0.4 INJECTION, SOLUTION INTRAMUSCULAR; INTRAVENOUS; SUBCUTANEOUS AS NEEDED
Status: DISCONTINUED | OUTPATIENT
Start: 2019-08-27 | End: 2019-09-04 | Stop reason: HOSPADM

## 2019-08-27 RX ORDER — NICOTINE 7MG/24HR
1 PATCH, TRANSDERMAL 24 HOURS TRANSDERMAL EVERY 24 HOURS
Status: DISCONTINUED | OUTPATIENT
Start: 2019-08-27 | End: 2019-09-04 | Stop reason: HOSPADM

## 2019-08-27 RX ORDER — IPRATROPIUM BROMIDE AND ALBUTEROL SULFATE 2.5; .5 MG/3ML; MG/3ML
3 SOLUTION RESPIRATORY (INHALATION)
Status: DISCONTINUED | OUTPATIENT
Start: 2019-08-27 | End: 2019-08-27

## 2019-08-27 RX ORDER — BISACODYL 5 MG
5 TABLET, DELAYED RELEASE (ENTERIC COATED) ORAL DAILY PRN
Status: DISCONTINUED | OUTPATIENT
Start: 2019-08-27 | End: 2019-09-04 | Stop reason: HOSPADM

## 2019-08-27 RX ORDER — IPRATROPIUM BROMIDE AND ALBUTEROL SULFATE 2.5; .5 MG/3ML; MG/3ML
3 SOLUTION RESPIRATORY (INHALATION)
Status: DISCONTINUED | OUTPATIENT
Start: 2019-08-27 | End: 2019-08-28

## 2019-08-27 RX ORDER — ALBUTEROL SULFATE 0.83 MG/ML
2.5 SOLUTION RESPIRATORY (INHALATION)
Status: DISCONTINUED | OUTPATIENT
Start: 2019-08-27 | End: 2019-08-28

## 2019-08-27 RX ORDER — SODIUM CHLORIDE 0.9 % (FLUSH) 0.9 %
5-40 SYRINGE (ML) INJECTION AS NEEDED
Status: DISCONTINUED | OUTPATIENT
Start: 2019-08-27 | End: 2019-09-04 | Stop reason: HOSPADM

## 2019-08-27 RX ORDER — HYDROCODONE BITARTRATE AND ACETAMINOPHEN 5; 325 MG/1; MG/1
1 TABLET ORAL
Status: DISCONTINUED | OUTPATIENT
Start: 2019-08-27 | End: 2019-09-04 | Stop reason: HOSPADM

## 2019-08-27 RX ORDER — DILTIAZEM HYDROCHLORIDE 180 MG/1
180 CAPSULE, COATED, EXTENDED RELEASE ORAL
Status: DISCONTINUED | OUTPATIENT
Start: 2019-08-27 | End: 2019-08-30

## 2019-08-27 RX ORDER — ONDANSETRON 8 MG/1
4 TABLET, ORALLY DISINTEGRATING ORAL
Status: DISCONTINUED | OUTPATIENT
Start: 2019-08-27 | End: 2019-09-04 | Stop reason: HOSPADM

## 2019-08-27 RX ORDER — ENOXAPARIN SODIUM 100 MG/ML
40 INJECTION SUBCUTANEOUS EVERY 12 HOURS
Status: DISCONTINUED | OUTPATIENT
Start: 2019-08-27 | End: 2019-08-30 | Stop reason: ALTCHOICE

## 2019-08-27 RX ORDER — LISINOPRIL 5 MG/1
5 TABLET ORAL DAILY
Status: DISCONTINUED | OUTPATIENT
Start: 2019-08-28 | End: 2019-09-04 | Stop reason: HOSPADM

## 2019-08-27 RX ORDER — SODIUM CHLORIDE 0.9 % (FLUSH) 0.9 %
5-40 SYRINGE (ML) INJECTION EVERY 8 HOURS
Status: DISCONTINUED | OUTPATIENT
Start: 2019-08-27 | End: 2019-09-04 | Stop reason: HOSPADM

## 2019-08-27 RX ORDER — ALBUTEROL SULFATE 0.83 MG/ML
2.5 SOLUTION RESPIRATORY (INHALATION)
Status: DISCONTINUED | OUTPATIENT
Start: 2019-08-27 | End: 2019-08-27 | Stop reason: SDUPTHER

## 2019-08-27 RX ORDER — INSULIN LISPRO 100 [IU]/ML
INJECTION, SOLUTION INTRAVENOUS; SUBCUTANEOUS
Status: DISCONTINUED | OUTPATIENT
Start: 2019-08-27 | End: 2019-09-04 | Stop reason: HOSPADM

## 2019-08-27 RX ORDER — ATORVASTATIN CALCIUM 10 MG/1
10 TABLET, FILM COATED ORAL
Status: DISCONTINUED | OUTPATIENT
Start: 2019-08-27 | End: 2019-09-04 | Stop reason: HOSPADM

## 2019-08-27 RX ORDER — ACETAMINOPHEN 325 MG/1
650 TABLET ORAL
Status: DISCONTINUED | OUTPATIENT
Start: 2019-08-27 | End: 2019-09-04 | Stop reason: HOSPADM

## 2019-08-27 RX ORDER — GUAIFENESIN 600 MG/1
1200 TABLET, EXTENDED RELEASE ORAL 2 TIMES DAILY
Status: DISCONTINUED | OUTPATIENT
Start: 2019-08-27 | End: 2019-09-04 | Stop reason: HOSPADM

## 2019-08-27 RX ORDER — BUDESONIDE 0.5 MG/2ML
500 INHALANT ORAL
Status: DISCONTINUED | OUTPATIENT
Start: 2019-08-27 | End: 2019-08-28

## 2019-08-27 RX ORDER — METFORMIN HYDROCHLORIDE 500 MG/1
500 TABLET ORAL 2 TIMES DAILY WITH MEALS
Status: DISCONTINUED | OUTPATIENT
Start: 2019-08-27 | End: 2019-09-04 | Stop reason: HOSPADM

## 2019-08-27 RX ORDER — PANTOPRAZOLE SODIUM 40 MG/1
40 TABLET, DELAYED RELEASE ORAL
Status: DISCONTINUED | OUTPATIENT
Start: 2019-08-27 | End: 2019-09-04 | Stop reason: HOSPADM

## 2019-08-27 RX ORDER — DIPHENHYDRAMINE HYDROCHLORIDE 50 MG/ML
12.5 INJECTION, SOLUTION INTRAMUSCULAR; INTRAVENOUS
Status: DISCONTINUED | OUTPATIENT
Start: 2019-08-27 | End: 2019-09-04 | Stop reason: HOSPADM

## 2019-08-27 RX ADMIN — METHYLPREDNISOLONE SODIUM SUCCINATE 125 MG: 125 INJECTION, POWDER, FOR SOLUTION INTRAMUSCULAR; INTRAVENOUS at 17:24

## 2019-08-27 RX ADMIN — AZITHROMYCIN MONOHYDRATE 500 MG: 500 INJECTION, POWDER, LYOPHILIZED, FOR SOLUTION INTRAVENOUS at 17:25

## 2019-08-27 RX ADMIN — ALBUTEROL SULFATE 2.5 MG: 2.5 SOLUTION RESPIRATORY (INHALATION) at 14:36

## 2019-08-27 RX ADMIN — ENOXAPARIN SODIUM 40 MG: 40 INJECTION SUBCUTANEOUS at 17:26

## 2019-08-27 RX ADMIN — DILTIAZEM HYDROCHLORIDE 180 MG: 180 CAPSULE, COATED, EXTENDED RELEASE ORAL at 22:11

## 2019-08-27 RX ADMIN — BUDESONIDE 500 MCG: 0.5 INHALANT RESPIRATORY (INHALATION) at 20:57

## 2019-08-27 RX ADMIN — METFORMIN HYDROCHLORIDE 500 MG: 500 TABLET, FILM COATED ORAL at 17:24

## 2019-08-27 RX ADMIN — GUAIFENESIN 1200 MG: 600 TABLET, EXTENDED RELEASE ORAL at 17:26

## 2019-08-27 RX ADMIN — ALBUTEROL SULFATE 2.5 MG: 2.5 SOLUTION RESPIRATORY (INHALATION) at 20:57

## 2019-08-27 RX ADMIN — PANTOPRAZOLE SODIUM 40 MG: 40 TABLET, DELAYED RELEASE ORAL at 17:24

## 2019-08-27 RX ADMIN — Medication 10 ML: at 22:12

## 2019-08-27 RX ADMIN — Medication 10 ML: at 14:58

## 2019-08-27 RX ADMIN — ATORVASTATIN CALCIUM 10 MG: 10 TABLET, FILM COATED ORAL at 22:11

## 2019-08-27 NOTE — PROGRESS NOTES
Pt resting in bed with family at the bedside. Pt alert and oriented to person place and time. Pt and family made aware of room surroundings and call light. No distress noted at this time. Respirations even and unlabored on 3 L NC. Rhonci noted upon assessment. Pt states that 3 L is her baseline at home. Skin assessment complete with Donavon Jones RN. When asked if pt has any wounds/redness she stated no. Pt refused for me to look at her sacral area. Bilateral feet dry and scaly. Pt denies pain at this time. Pt instructed to call for assistance if needed. Call light in place. Door open. Will continue to monitor.

## 2019-08-27 NOTE — PROGRESS NOTES
Pt resting in bed comfortably. Pt alert and oriented times 3. No distress noted at this time. Respirations even and unlabored on 3 L NC. Pt has productive cough. Coughing up thick clear sputum. Pt made aware of sputum culture. Sputum cup at the bedside. Pt denies pain at this time. Pt instructed to call for assistance if needed. Call light in place. Door open. Will continue to monitor.

## 2019-08-27 NOTE — PROGRESS NOTES
BSR received  Patient is resting in bed  Respirations are even on 3L NC there are no signs of distress at this time  Will continue to monitor

## 2019-08-27 NOTE — H&P
HOSPITALIST H&P/CONSULT  NAME:  Rhonda Isidro   Age:  54 y.o.  :   1970   MRN:   [id number]  PCP: Jessica Merchant  Consulting MD:  Treatment Team: Attending Provider: Ruslan Still DO; Primary Nurse: Devi Sutton, RN; Primary Nurse: Nathalie Esposito, DANIEL  HPI:   53 yo F with PMH of severe COPD (GOLD stage 4) on home O2/CPAP, OHS, Morbid obesity, HTN, HLD, DM2 was sent to [facility] as direct admit from pcp office. Pt went there for worsening cough, SOB and fatigue for 4-5 days. Today she said her breathing got worse at the pcp office. Denies any CP, Fever, dizziness, passing out, diarrhea, muscle pain but did have productive cough with whitish sputum, also reports runny nose. Her home inhalers did not help. At the office, her O2 sat was 71 and HR was 114 so she was sent to hospital.    She says she has not smoked a cigarette in over a week. Denies sick contacts. 10 point ROS done and is negative except as noted in HPI. Past Medical History:   Diagnosis Date    Arrhythmia     hx of tachycardia~under control w medication; sees cardiologist- Dr. Rocio Bains Childhood asthma     Chronic respiratory failure with hypoxia ([address]     COPD (chronic obstructive pulmonary disease) ([address]     currently on O2/NC at 2 liters~24/7    Diabetes ([address]     type 2 ~takes oral med, average blood sugar- 99, Denies hypoglycemia     Hyperlipidemia     Daily meds     Hypertension     Managed with meds     Influenza B 2019    Microalbuminuria     Microcytosis     Morbid obesity ([address]     Pneumonia ages 1 and 9    Sleep apnea     CPAP every night    Tachycardia       Past Surgical History:   Procedure Laterality Date    HX BREAST BIOPSY Left 3/27/2019    LEFT BREAST NEEDLE LOCALIZED LUMPECTOMY  performed by Leydi Deleon DO at [address] [facility] HX PARTIAL HYSTERECTOMY  2012      Prior to Admission Medications   Prescriptions Last Dose Informant Patient Reported? Taking?    Blood-Glucose Meter [facility] ULTRA2) monitoring kit   No No   Sig: Use to check blood sugar once daily as directed. Dx: E11.9   Blood-Glucose Meter (TRUE METRIX GLUCOSE METER) misc   No No   Sig: Check blood sugar twice daily, E11.9   DISABLED PLACARD (DISABLED PLACARD) DMV   No No   Sig: Dx: Severe COPD J44.9   Insulin Needles, Disposable, (NOVOFINE 32) 32 gauge x 1/4\" ndle   No No   Sig: Use to inject Victoza once daily   Lancets (ONETOUCH ULTRASOFT LANCETS) misc   No No   Sig: Use to check glucose once daily as directed. Dx: E11.9   OXYGEN-AIR DELIVERY SYSTEMS   Yes No   Sig: by Nasal route. 2 lpm exertion and hs   albuterol (VENTOLIN HFA) 90 mcg/actuation inhaler   No No   Sig: Take 2 Puffs by inhalation every four (4) hours as needed for Wheezing. albuterol-ipratropium (DUO-NEB) 2.5 mg-0.5 mg/3 ml nebu   No No   Sig: 3 mL by Nebulization route every six (6) hours as needed (shortness of breath or wheezing). atorvastatin (LIPITOR) 10 mg tablet   No No   Sig: Take 1 Tab by mouth nightly. Indications: high cholesterol   cpap machine kit   Yes No   Sig: by Does Not Apply route. dilTIAZem CD (CARDIZEM CD) 180 mg ER capsule   No No   Sig: Take 1 Cap by mouth nightly. fluticasone-vilanterol (BREO ELLIPTA) 200-25 mcg/dose inhaler   No No   Sig: Take 1 Puff by inhalation daily. RINSE MOUTH WELL AFTER USE   Patient taking differently: Take 1 Puff by inhalation nightly. RINSE MOUTH WELL AFTER USE;   glucose blood VI test strips (ONETOUCH ULTRA TEST) strip   No No   Sig: Use to check glucose once daily as directed. Dx: E11.9   glucose blood VI test strips (TRUE METRIX GLUCOSE TEST STRIP) strip   No No   Sig: Use to check blood sugar once daily as directed. Dx: E11.29   guaiFENesin SR (MUCINEX) 600 mg SR tablet   No No   Sig: Take 2 Tabs by mouth two (2) times a day. Patient taking differently: Take 1,200 mg by mouth two (2) times a day. Take / use AM day of surgery  per anesthesia protocols.    lisinopril (PRINIVIL, ZESTRIL) 5 mg tablet   No No   Sig: Take 1 Tab by mouth daily. metFORMIN (GLUCOPHAGE) 500 mg tablet   No No   Sig: Take 1 Tab by mouth two (2) times daily (with meals). Indications: type 2 diabetes mellitus      Facility-Administered Medications: None     Home meds reconciled. No Known Allergies   Social History     Tobacco Use    Smoking status: Current Some Day Smoker     Packs/day: 1.00     Years: 32.00     Pack years: 32.00     Types: Cigarettes    Smokeless tobacco: Never Used    Tobacco comment: Reduced to 1/2 pack daily   Substance Use Topics    Alcohol use: Yes     Alcohol/week: 1.0 standard drinks     Types: 1 Shots of liquor per week     Frequency: Never     Comment: rarely      Family History   Problem Relation Age of Onset    COPD Mother     Hypertension Mother     Parkinsonism Father     Asthma Son     Schizophrenia Brother     Hypertension Brother     Emphysema Paternal Grandfather     Coronary Artery Disease Neg Hx       Immunization History   Administered Date(s) Administered    Influenza High Dose Vaccine PF 10/25/2018    Influenza Vaccine 10/01/2017    Influenza Vaccine (Quad) PF 2015, 11/15/2016    Pneumococcal Polysaccharide (PPSV-23) 2017     Objective:     Visit Vitals  BP (!) 142/95   Pulse (!) 109   Temp 98.2 °F (36.8 °C)   LMP 2010   SpO2 91%      Temp (24hrs), Av.8 °F (37.1 °C), Min:98.2 °F (36.8 °C), Max:99.4 °F (37.4 °C)    Oxygen Therapy  O2 Sat (%): 91 % (19 1322)  Physical Exam:  General:    Alert, cooperative, mild resp distress. Morbidly obese  Head:   NCAT. No obvious deformity  Nose:  Nares normal. No drainage  Lungs:   B/l expiratory wheezing  Heart:   RRR. No m/r/g. Abdomen:   S/nt/nd. Bowel sounds normal.   Extremities: No cyanosis. Skin:     No rashes or lesions. Not Jaundiced  Neurologic: Moves all extremities.   no gross focal deficits      Data Review:   Recent Results (from the past 24 hour(s))   AMB POC HEMOGLOBIN A1C Collection Time: 08/27/19 11:18 AM   Result Value Ref Range    Hemoglobin A1c (POC) 6.0 %     Imaging /Procedures /Studies:  I personally reviewed all labs, imaging, and other studies this admission:    CXR Results  (Last 48 hours)    None        CT Results  (Last 48 hours)    None          Assessment and Plan: Active Hospital Problems    Diagnosis Date Noted    COPD with acute exacerbation ([facility] 08/27/2019    Tobacco abuse 03/05/2017    MICHAEL on CPAP 03/02/2017    Morbid obesity with BMI of 50.0-59.9, adult ([facility] 03/02/2017    Diabetes mellitus type 2, controlled ([facility] 03/02/2017    Hypertension 03/07/2016    Chronic back pain 03/07/2016    Obesity hypoventilation syndrome ([facility] 12/18/2015       PLAN    · COPD: Hypoxia improved with O2, on 3lnc, need to r/o Flu, get CXR r/o PNA. Order duo-nebs, pulmicort, azithromycin and IV steroids. Pulm consult in am.  · MICHAEL/OHS: resume home cpap qhs. · DM2: Resume metformin, add ISS as sugars wiill be high with steroids. · HTN: Resume home meds. · Tobacco Abuse: Nicotine patch ordered  · Protonix for ppx. FEN:  Diabetic diet  DVT ppx:  lovenox  Code status:  Full code  Estimated LOS:  > 2 midnights, admit to inpt.   Risk assessment:  High Risk  Plan of care discussed with: patient     Signed By: Ty Calhoun MD     August 27, 2019

## 2019-08-28 PROBLEM — J96.11 CHRONIC RESPIRATORY FAILURE WITH HYPOXIA (HCC): Chronic | Status: ACTIVE | Noted: 2019-08-28

## 2019-08-28 LAB
GLUCOSE BLD STRIP.AUTO-MCNC: 122 MG/DL (ref 65–100)
GLUCOSE BLD STRIP.AUTO-MCNC: 131 MG/DL (ref 65–100)
GLUCOSE BLD STRIP.AUTO-MCNC: 135 MG/DL (ref 65–100)
GLUCOSE BLD STRIP.AUTO-MCNC: 139 MG/DL (ref 65–100)

## 2019-08-28 PROCEDURE — 65270000029 HC RM PRIVATE

## 2019-08-28 PROCEDURE — 94660 CPAP INITIATION&MGMT: CPT

## 2019-08-28 PROCEDURE — 74011000250 HC RX REV CODE- 250: Performed by: INTERNAL MEDICINE

## 2019-08-28 PROCEDURE — 94760 N-INVAS EAR/PLS OXIMETRY 1: CPT

## 2019-08-28 PROCEDURE — 94640 AIRWAY INHALATION TREATMENT: CPT

## 2019-08-28 PROCEDURE — 77010033678 HC OXYGEN DAILY

## 2019-08-28 PROCEDURE — 65660000000 HC RM CCU STEPDOWN

## 2019-08-28 PROCEDURE — 74011250636 HC RX REV CODE- 250/636: Performed by: INTERNAL MEDICINE

## 2019-08-28 PROCEDURE — 82962 GLUCOSE BLOOD TEST: CPT

## 2019-08-28 PROCEDURE — 77030018846 HC SOL IRR STRL H20 ICUM -A

## 2019-08-28 PROCEDURE — 74011250637 HC RX REV CODE- 250/637: Performed by: INTERNAL MEDICINE

## 2019-08-28 PROCEDURE — 74011000250 HC RX REV CODE- 250: Performed by: NURSE PRACTITIONER

## 2019-08-28 PROCEDURE — 99223 1ST HOSP IP/OBS HIGH 75: CPT | Performed by: INTERNAL MEDICINE

## 2019-08-28 RX ORDER — BUDESONIDE 0.5 MG/2ML
500 INHALANT ORAL
Status: DISCONTINUED | OUTPATIENT
Start: 2019-08-28 | End: 2019-08-30

## 2019-08-28 RX ORDER — ALBUTEROL SULFATE 0.83 MG/ML
2.5 SOLUTION RESPIRATORY (INHALATION)
Status: DISCONTINUED | OUTPATIENT
Start: 2019-08-28 | End: 2019-08-30

## 2019-08-28 RX ORDER — ALBUTEROL SULFATE 0.83 MG/ML
2.5 SOLUTION RESPIRATORY (INHALATION)
Status: DISCONTINUED | OUTPATIENT
Start: 2019-08-28 | End: 2019-09-04 | Stop reason: HOSPADM

## 2019-08-28 RX ORDER — CODEINE PHOSPHATE AND GUAIFENESIN 10; 100 MG/5ML; MG/5ML
5 SOLUTION ORAL
Status: DISCONTINUED | OUTPATIENT
Start: 2019-08-28 | End: 2019-09-04 | Stop reason: HOSPADM

## 2019-08-28 RX ADMIN — METFORMIN HYDROCHLORIDE 500 MG: 500 TABLET, FILM COATED ORAL at 08:43

## 2019-08-28 RX ADMIN — ALBUTEROL SULFATE 2.5 MG: 2.5 SOLUTION RESPIRATORY (INHALATION) at 23:31

## 2019-08-28 RX ADMIN — GUAIFENESIN AND CODEINE PHOSPHATE 5 ML: 10; 100 LIQUID ORAL at 15:27

## 2019-08-28 RX ADMIN — ALBUTEROL SULFATE 2.5 MG: 2.5 SOLUTION RESPIRATORY (INHALATION) at 19:37

## 2019-08-28 RX ADMIN — ENOXAPARIN SODIUM 40 MG: 40 INJECTION SUBCUTANEOUS at 21:24

## 2019-08-28 RX ADMIN — ALBUTEROL SULFATE 2.5 MG: 2.5 SOLUTION RESPIRATORY (INHALATION) at 07:28

## 2019-08-28 RX ADMIN — BUDESONIDE 500 MCG: 0.5 INHALANT RESPIRATORY (INHALATION) at 19:37

## 2019-08-28 RX ADMIN — GUAIFENESIN AND CODEINE PHOSPHATE 5 ML: 10; 100 LIQUID ORAL at 21:24

## 2019-08-28 RX ADMIN — AZITHROMYCIN MONOHYDRATE 500 MG: 500 INJECTION, POWDER, LYOPHILIZED, FOR SOLUTION INTRAVENOUS at 16:34

## 2019-08-28 RX ADMIN — Medication 10 ML: at 21:25

## 2019-08-28 RX ADMIN — METHYLPREDNISOLONE SODIUM SUCCINATE 40 MG: 40 INJECTION, POWDER, FOR SOLUTION INTRAMUSCULAR; INTRAVENOUS at 21:24

## 2019-08-28 RX ADMIN — METHYLPREDNISOLONE SODIUM SUCCINATE 40 MG: 40 INJECTION, POWDER, FOR SOLUTION INTRAMUSCULAR; INTRAVENOUS at 15:27

## 2019-08-28 RX ADMIN — DILTIAZEM HYDROCHLORIDE 180 MG: 180 CAPSULE, COATED, EXTENDED RELEASE ORAL at 21:24

## 2019-08-28 RX ADMIN — METFORMIN HYDROCHLORIDE 500 MG: 500 TABLET, FILM COATED ORAL at 16:34

## 2019-08-28 RX ADMIN — METHYLPREDNISOLONE SODIUM SUCCINATE 40 MG: 40 INJECTION, POWDER, FOR SOLUTION INTRAMUSCULAR; INTRAVENOUS at 06:01

## 2019-08-28 RX ADMIN — ENOXAPARIN SODIUM 40 MG: 40 INJECTION SUBCUTANEOUS at 08:43

## 2019-08-28 RX ADMIN — PANTOPRAZOLE SODIUM 40 MG: 40 TABLET, DELAYED RELEASE ORAL at 06:02

## 2019-08-28 RX ADMIN — ATORVASTATIN CALCIUM 10 MG: 10 TABLET, FILM COATED ORAL at 21:24

## 2019-08-28 RX ADMIN — BUDESONIDE 500 MCG: 0.5 INHALANT RESPIRATORY (INHALATION) at 07:28

## 2019-08-28 RX ADMIN — GUAIFENESIN 1200 MG: 600 TABLET, EXTENDED RELEASE ORAL at 16:34

## 2019-08-28 RX ADMIN — LISINOPRIL 5 MG: 5 TABLET ORAL at 08:43

## 2019-08-28 RX ADMIN — ALBUTEROL SULFATE 2.5 MG: 2.5 SOLUTION RESPIRATORY (INHALATION) at 11:22

## 2019-08-28 RX ADMIN — Medication 10 ML: at 06:01

## 2019-08-28 RX ADMIN — Medication 10 ML: at 15:27

## 2019-08-28 RX ADMIN — ALBUTEROL SULFATE 2.5 MG: 2.5 SOLUTION RESPIRATORY (INHALATION) at 15:18

## 2019-08-28 RX ADMIN — GUAIFENESIN 1200 MG: 600 TABLET, EXTENDED RELEASE ORAL at 08:43

## 2019-08-28 RX ADMIN — ALBUTEROL SULFATE 2.5 MG: 2.5 SOLUTION RESPIRATORY (INHALATION) at 10:11

## 2019-08-28 NOTE — PROGRESS NOTES
Beside shift report received from [facility]  Patient lying in bed  Respirations present  No signs of distress  No needs expressed at this time  Safety measures in place

## 2019-08-28 NOTE — CONSULTS
CONSULT NOTE    Molly Liliana Isidro    8/28/2019    Date of Admission:  8/27/2019    The patient's chart is reviewed and the patient is discussed with the staff. Subjective:     Patient is a 54 y.o.  female seen and evaluated at the request of Dr. Luis King for COPD exacerbation with shortness of breath, productive cough and wheezing. Was last seen in our office 3/4/19 for poorly controlled severe COPD (FEV1 1.23, 42% in 2016) and in need of breast surgery (left lumpectomy with benign intraductal papilloma). States had not been back to her normal from breathing standpoint since influenza in January/Feb 2019. Has been compliant with home meds, nebs and O2 at 3L. She has not been using BIPAP as prescribed due to mask breakdown and possible machine malfunction--has not called for assistance and doesn't remember DME name of company for BIPAP. Has unfortunately continued to smoke about 1/3 PPD--states she quit 2 weeks ago when symptoms started. Denies fever, chills, GI symptoms and only rarely has LE edema. Chronic Medical:  O2 dependent requiring NC 3L ([facility]),  Gold stage IV COPD, morbid obesity, tobacco abuse, HTN, DM, MICHAEL with home BIPAP (22/12 with 3L O2 bleed in), HLD, chronic back pain.     Review of Systems  A comprehensive review of systems was negative except for: Constitutional: positive for fatigue and malaise  Respiratory: positive for cough, sputum, wheezing, dyspnea on exertion or MICHAEL has home BIPAP, COPD Gold stage 4, chronic respiratory failure on NC 3L  Cardiovascular: positive for fatigue, HTN, mild LE edema at times, dyspnea on exertion  Musculoskeletal: positive for back pain  Behvioral/Psych: positive for obesity, sleep disturbance and tobacco use  Endocrine: positive for DM    Patient Active Problem List   Diagnosis Code    COPD, severe ([address] J44.9    Obesity hypoventilation syndrome ([address] E66.2    Diabetes mellitus type 2, controlled ([facility] E11.9    Morbid obesity with BMI of 50.0-59.9, adult ([facility] E66.01, Z68.43    Hypertension I10    Chronic back pain M54.9, G89.29    Nocturnal hypoxemia G47.34    MICHAEL on CPAP G47.33, Z99.89    Tobacco abuse Z72.0    Cocaine abuse (HCC) F14.10    COPD with acute exacerbation ([facility]) J44.1    Chronic respiratory failure with hypoxia ([facility] J96.11       Home DME [employer]--3L continuous. Prior to Admission Medications   Prescriptions Last Dose Informant Patient Reported? Taking? Blood-Glucose Meter (ONETOUCH ULTRA2) monitoring kit   No No   Sig: Use to check blood sugar once daily as directed. Dx: E11.9   Blood-Glucose Meter (TRUE METRIX GLUCOSE METER) misc   No No   Sig: Check blood sugar twice daily, E11.9   DISABLED PLACARD (DISABLED PLACARD) DMV   No No   Sig: Dx: Severe COPD J44.9   Insulin Needles, Disposable, (NOVOFINE 32) 32 gauge x 1/4\" ndle   No No   Sig: Use to inject Victoza once daily   Lancets (ONETOUCH ULTRASOFT LANCETS) misc   No No   Sig: Use to check glucose once daily as directed. Dx: E11.9   OXYGEN-AIR DELIVERY SYSTEMS   Yes No   Sig: by Nasal route. 2 lpm exertion and hs   albuterol (VENTOLIN HFA) 90 mcg/actuation inhaler   No No   Sig: Take 2 Puffs by inhalation every four (4) hours as needed for Wheezing. albuterol-ipratropium (DUO-NEB) 2.5 mg-0.5 mg/3 ml nebu   No No   Sig: 3 mL by Nebulization route every six (6) hours as needed (shortness of breath or wheezing). atorvastatin (LIPITOR) 10 mg tablet   No No   Sig: Take 1 Tab by mouth nightly. Indications: high cholesterol   cpap machine kit   Yes No   Sig: by Does Not Apply route. dilTIAZem CD (CARDIZEM CD) 180 mg ER capsule   No No   Sig: Take 1 Cap by mouth nightly. fluticasone-vilanterol (BREO ELLIPTA) 200-25 mcg/dose inhaler   No No   Sig: Take 1 Puff by inhalation daily. RINSE MOUTH WELL AFTER USE   Patient taking differently: Take 1 Puff by inhalation nightly.  RINSE MOUTH WELL AFTER USE;   glucose blood VI test strips (ONETOUCH ULTRA TEST) strip   No No   Sig: Use to check glucose once daily as directed. Dx: E11.9   glucose blood VI test strips (TRUE METRIX GLUCOSE TEST STRIP) strip   No No   Sig: Use to check blood sugar once daily as directed. Dx: E11.29   guaiFENesin SR (MUCINEX) 600 mg SR tablet   No No   Sig: Take 2 Tabs by mouth two (2) times a day. Patient taking differently: Take 1,200 mg by mouth two (2) times a day. Take / use AM day of surgery  per anesthesia protocols. lisinopril (PRINIVIL, ZESTRIL) 5 mg tablet   No No   Sig: Take 1 Tab by mouth daily. metFORMIN (GLUCOPHAGE) 500 mg tablet   No No   Sig: Take 1 Tab by mouth two (2) times daily (with meals).  Indications: type 2 diabetes mellitus      Facility-Administered Medications: None       Past Medical History:   Diagnosis Date    Arrhythmia     hx of tachycardia~under control w medication; sees cardiologist- Dr. Getachew Fregoso Childhood asthma     Chronic respiratory failure with hypoxia ([address]     COPD (chronic obstructive pulmonary disease) ([address]     currently on O2/NC at 2 liters~24/7    Diabetes ([address]     type 2 ~takes oral med, average blood sugar- 99, Denies hypoglycemia     Hyperlipidemia     Daily meds     Hypertension     Managed with meds     Influenza B 01/30/2019    Microalbuminuria     Microcytosis     Morbid obesity ([address]     Pneumonia ages 3 and 9    Sleep apnea     CPAP every night    Tachycardia      Past Surgical History:   Procedure Laterality Date    HX BREAST BIOPSY Left 3/27/2019    LEFT BREAST NEEDLE LOCALIZED LUMPECTOMY  [facility] HY[facility]  08/2012     Social History     Socioeconomic History    Marital status: SINGLE     Spouse name: Not on file    Number of children: Not on file    Years of education: Not on file    Highest education level: Not on file   Occupational History    Occupation: disabled from [employer] Financial resource strain: Not on file    Food insecurity:     Worry: Not on file     Inability: Not on file    Transportation needs:     Medical: Not on file     Non-medical: Not on file   Tobacco Use    Smoking status: Current Some Day Smoker     Packs/day: 1.00     Years: 32.00     Pack years: 32.00     Types: Cigarettes    Smokeless tobacco: Never Used    Tobacco comment: Reduced to 1/2 pack daily   Substance and Sexual Activity    Alcohol use: Yes     Alcohol/week: 1.0 standard drinks     Types: 1 Shots of liquor per week     Frequency: Never     Comment: rarely    Drug use: Not Currently     Types: Marijuana, Cocaine     Comment: no drugs in over 2 years    Sexual activity: Never     Partners: Male     Birth control/protection: None   Lifestyle    Physical activity:     Days per week: Not on file     Minutes per session: Not on file    Stress: Not on file   Relationships    Social connections:     Talks on phone: Not on file     Gets together: Not on file     Attends Jew service: Not on file     Active member of club or organization: Not on file     Attends meetings of clubs or organizations: Not on file     Relationship status: Not on file    Intimate partner violence:     Fear of current or ex partner: Not on file     Emotionally abused: Not on file     Physically abused: Not on file     Forced sexual activity: Not on file   Other Topics Concern     Service Not Asked    Blood Transfusions Not Asked    Caffeine Concern Not Asked    Occupational Exposure Not Asked   Percilla Sprang Hazards Not Asked    Sleep Concern Not Asked    Stress Concern Not Asked    Weight Concern Not Asked    Special Diet Not Asked    Back Care Not Asked    Exercise Not Asked    Bike Helmet Not Asked   [address] Not Asked    Self-Exams Not Asked   Social History Narrative    Single and lives alone. Disabled--previously worked at [employer]. Has lived in [place] and [place]. No pets.      Family History   Problem Relation Age of Onset    COPD Mother     Hypertension Mother     Parkinsonism Father     Asthma Son     Schizophrenia Brother     Hypertension Brother     Emphysema Paternal Grandfather     Coronary Artery Disease Neg Hx      No Known Allergies    Current Facility-Administered Medications   Medication Dose Route Frequency    sodium chloride (NS) flush 5-40 mL  5-40 mL IntraVENous Q8H    sodium chloride (NS) flush 5-40 mL  5-40 mL IntraVENous PRN    acetaminophen (TYLENOL) tablet 650 mg  650 mg Oral Q4H PRN    HYDROcodone-acetaminophen (NORCO) 5-325 mg per tablet 1 Tab  1 Tab Oral Q4H PRN    naloxone (NARCAN) injection 0.4 mg  0.4 mg IntraVENous PRN    diphenhydrAMINE (BENADRYL) injection 12.5 mg  12.5 mg IntraVENous Q4H PRN    ondansetron (ZOFRAN ODT) tablet 4 mg  4 mg Oral Q8H PRN    bisacodyl (DULCOLAX) tablet 5 mg  5 mg Oral DAILY PRN    nicotine (NICODERM CQ) 7 mg/24 hr patch 1 Patch  1 Patch TransDERmal Q24H    enoxaparin (LOVENOX) injection 40 mg  40 mg SubCUTAneous Q12H    atorvastatin (LIPITOR) tablet 10 mg  10 mg Oral QHS    dilTIAZem CD (CARDIZEM CD) capsule 180 mg  180 mg Oral QHS    guaiFENesin ER (MUCINEX) tablet 1,200 mg  1,200 mg Oral BID    lisinopril (PRINIVIL, ZESTRIL) tablet 5 mg  5 mg Oral DAILY    metFORMIN (GLUCOPHAGE) tablet 500 mg  500 mg Oral BID WITH MEALS    insulin lispro (HUMALOG) injection   SubCUTAneous AC&HS    budesonide (PULMICORT) 500 mcg/2 ml nebulizer suspension  500 mcg Nebulization BID RT    And    albuterol (PROVENTIL VENTOLIN) nebulizer solution 2.5 mg  2.5 mg Nebulization Q6HWA RT    albuterol-ipratropium (DUO-NEB) 2.5 MG-0.5 MG/3 ML  3 mL Nebulization Q4H PRN    methylPREDNISolone (PF) (SOLU-MEDROL) injection 40 mg  40 mg IntraVENous Q8H    pantoprazole (PROTONIX) tablet 40 mg  40 mg Oral ACB    azithromycin (ZITHROMAX) 500 mg in 0.9% sodium chloride (MBP/ADV) 250 mL  500 mg IntraVENous Q24H         Objective:     Vitals:    08/27/19 2331 08/28/19 0047 08/28/19 0357 08/28/19 0728   BP: 133/82  138/84    Pulse: (!) 111  100    Temp: 98.6 °F (37 °C)  98.2 °F (36.8 °C)    SpO2: 94% 96% 96% 90%       PHYSICAL EXAM     Constitutional:  the patient is morbidly obese and in no acute distress, NC 3L sat 90%  EENMT:  Sclera clear, pupils equal, oral mucosa moist  Respiratory: few scattered crackles, anterior and posterior wheezes, productive cough with \"milky\" colored secretions  Cardiovascular:  RRR without M,G,R  Gastrointestinal: soft, obese and non-tender; with positive bowel sounds. Musculoskeletal: warm without cyanosis. There is trace lower extremity edema. Skin:  no jaundice or rashes, no wounds   Neurologic: no gross neuro deficits     Psychiatric:  alert and oriented x 3    CXR:    8/27/19:  Mild lingular atelectasis/infiltrate. Recent Labs     08/27/19  1800   WBC 9.4   HGB 12.6   HCT 44.1        Recent Labs     08/27/19  1800      K 4.0   CL 97*   GLU 97   CO2 35*   BUN 10   CREA 0.74   MG 1.8   PHOS 3.5   CA 9.5     No results for input(s): PH, PCO2, PO2, HCO3, PHI, PCO2I, PO2I, HCO3I in the last 72 hours. No results for input(s): LCAD, LAC in the last 72 hours.     Assessment:  (Medical Decision Making)     Hospital Problems  Date Reviewed: 8/28/2019          Codes Class Noted POA    * (Principal) COPD with acute exacerbation ([facility] ICD-10-CM: J44.1  ICD-9-CM: 491.21  8/27/2019 Yes    Continue current--    Chronic respiratory failure with hypoxia (HCC) (Chronic) ICD-10-CM: J96.11  ICD-9-CM: 518.83, 799.02  8/28/2019 Yes    Overview Signed 8/28/2019  8:51 AM by Fernando Barros NP     NC 3L continuous         On home O2 flow    Tobacco abuse (Chronic) ICD-10-CM: Z72.0  ICD-9-CM: 305.1  3/5/2017 Yes    Cessation stressed    Diabetes mellitus type 2, controlled ([address] (Chronic) ICD-10-CM: E11.9  ICD-9-CM: 250.00  3/2/2017 Yes    Chronic--per primary    Morbid obesity with BMI of 50.0-59.9, adult (HCC) (Chronic) ICD-10-CM: E66.01, P52.42  ICD-9-CM: 278.01, V85.43  3/2/2017 Yes    Chronic--weight loss stressed    MICHAEL on CPAP (Chronic) ICD-10-CM: G47.33, Z99.89  ICD-9-CM: 327.23, V46.8  3/2/2017 Yes    Has home BIPAP--son to bring her machine here for evaluation    Hypertension (Chronic) ICD-10-CM: I10  ICD-9-CM: 401.9  3/7/2016 Yes    chronic    Chronic back pain (Chronic) ICD-10-CM: M54.9, G89.29  ICD-9-CM: 724.5, 338.29  3/7/2016 Yes    chronic    Obesity hypoventilation syndrome ([facility] ICD-10-CM: G42.5  ICD-9-CM: 278.03  12/18/2015 Yes    chronic          Plan:  (Medical Decision Making)     --Albuterol, Pulmicort, Mucinex--change Albuterol to every 4hrs and PRN  --Zithromax day 2  --Respiratory culture:  pending  --Solu Medrol 40mg l9t--gzaoopyv  --Nicotine patch--smoking cessation encouraged  --Son to bring her machine here for evaluation. Sounds like she needs a new mask. Will need to follow up in our sleep lab for management. More than 50% of the time documented was spent in face-to-face contact with the patient and in the care of the patient on the floor/unit where the patient is located. Thank you very much for this referral.  We appreciate the opportunity to participate in this patient's care. Will follow along with above stated plan. Digna Chris NP     Lungs:  Distant with frequent cough but not much wheeze  Heart:  RRR with no Murmur/Rubs/Gallops    Additional Comments:    Patient with moderate-severe obstruction. Will check A1AT level given the severity at relatively young age. May be related to obesity and collapsible airways. Cont above therapies. F/u sputum culture. I have spoken with and examined the patient. I agree with the above assessment and plan as documented.     Timbo Negron MD

## 2019-08-28 NOTE — PROGRESS NOTES
Back on floor via stretcher  Patient is A/Ox4 resting in bed  Respirations are even on 3L NC with no signs of distress

## 2019-08-28 NOTE — PROGRESS NOTES
Patient admitted overnight. She is independent at baseline. She has known COPD with home O2 at baseline. She is otherwise independent in all ADLs. Chart screened by  for discharge planning. No needs identified at this time. Please consult  if any new issues arise. Care Management Interventions  PCP Verified by CM:  Yes  Transition of Care Consult (CM Consult): Discharge Planning  Discharge Durable Medical Equipment: No  Physical Therapy Consult: No  Occupational Therapy Consult: No  Speech Therapy Consult: No  Current Support Network: Own Home  Confirm Follow Up Transport: Family  Plan discussed with Pt/Family/Caregiver: Yes  Freedom of Choice Offered: Yes  Discharge Location  Discharge Placement: Home

## 2019-08-28 NOTE — PROGRESS NOTES
Spoke with MULUGETA, patient home CPAP machine will have a safety check completed this afternoon before use this evening. Confirmation # is [id number].

## 2019-08-28 NOTE — PROGRESS NOTES
Problem: Chronic Obstructive Pulmonary Disease (COPD)  Goal: *Optimize nutritional status  Outcome: Progressing Towards Goal     Problem: Falls - Risk of  Goal: *Absence of Falls  Description  Document Chela Freedman Fall Risk and appropriate interventions in the flowsheet.   Outcome: Progressing Towards Goal  Note:   Fall Risk Interventions:            Medication Interventions: Evaluate medications/consider consulting pharmacy, Teach patient to arise slowly

## 2019-08-28 NOTE — PROGRESS NOTES
Spiritual Care Visit, initial visit. Visited with patient at bedside. Prayed for patient's healing and health. Visit by Tino Way, Staff .  Joe, Ragini.B., B.A.

## 2019-08-28 NOTE — PROGRESS NOTES
Hospitalist Progress Note    2019  Admit Date: 2019  1:19 PM   NAME: Irish Isidro   :  1970   MRN:  [id number]   Attending: Jhonatan Stiles DO  PCP:  Ambrocio Olguin PA-C    SUBJECTIVE:   HPI: 53 yo F with PMH of severe COPD (GOLD stage 4) on home O2/CPAP, OHS, Morbid obesity, HTN, HLD, DM2 was sent to [facility] as direct admit from pcp office for COPD exacerbation. Pulm consulted. : Feels better with her breathing, Less cough and sputum, sugars at goal.     Nursing notes and chart reviewed. Review of Systems negative with exception of pertinent positives noted above. PHYSICAL EXAM     Visit Vitals  /84 (BP 1 Location: Right arm, BP Patient Position: At rest)   Pulse 100   Temp 98.2 °F (36.8 °C)   LMP 2010   SpO2 92%      Temp (24hrs), Av.7 °F (37.1 °C), Min:98.2 °F (36.8 °C), Max:99.4 °F (37.4 °C)    Oxygen Therapy  O2 Sat (%): 92 % (19 1011)  Pulse via Oximetry: 105 beats per minute (19 1011)  O2 Device: Nasal cannula (19 1011)  PEEP/CPAP (cm H2O): 10 cm H20 (19 0047)  O2 Flow Rate (L/min): 3 l/min (19 1011)  FIO2 (%): 32 % (19 1011)    Intake/Output Summary (Last 24 hours) at 2019 1015  Last data filed at 2019 0544  Gross per 24 hour   Intake 840 ml   Output 1750 ml   Net -910 ml         General: No acute distress. Alert.  Obese. Head:  AT/NC  Lungs:  B/l expiratory wheezing. Heart:  RRR, no murmur, rub, or gallop  Abdomen: Soft, non-distended, non-tender, +bs  Extremities: No cyanosis or clubbing. Neurologic:  No focal deficits. Moves all extremities. Skin:  No Obvious Rash  Psych:  Normal affect. LABS AND STUDIES:  Personally reviewed all labs, meds, and studies for past 24hrs.       ASSESSMENT      Active Hospital Problems    Diagnosis Date Noted    Chronic respiratory failure with hypoxia ([facility] 2019     NC 3L continuous      COPD with acute exacerbation ([facility] 2019    Tobacco abuse 2017  MICHAEL on CPAP 03/02/2017    Morbid obesity with BMI of 50.0-59.9, adult ([facility] 03/02/2017    Diabetes mellitus type 2, controlled ([facility] 03/02/2017    Hypertension 03/07/2016    Chronic back pain 03/07/2016    Obesity hypoventilation syndrome ([facility] 12/18/2015           PLAN:  · COPD: Hypoxia improved with O2, on 3lnc, -ve Flu, -ve CXR for PNA. c/w duo-nebs, pulmicort, azithromycin and IV steroids. Pulm has seen pt. · MICHAEL/OHS: resume home cpap qhs. · DM2: Resume metformin, add ISS as sugars wiill be high with steroids. · HTN: Resume home meds. · Tobacco Abuse: Nicotine patch ordered  · Protonix for ppx.     FEN:  Diabetic diet  DVT ppx:  lovenox    Dispo: pending improvement   Discussed plan with pt who is in agreement. All questions answered.     Signed By: Sandoval Samuels MD     August 28, 2019

## 2019-08-29 LAB
ANION GAP SERPL CALC-SCNC: 3 MMOL/L (ref 7–16)
BUN SERPL-MCNC: 10 MG/DL (ref 6–23)
CALCIUM SERPL-MCNC: 9.8 MG/DL (ref 8.3–10.4)
CHLORIDE SERPL-SCNC: 96 MMOL/L (ref 98–107)
CO2 SERPL-SCNC: 37 MMOL/L (ref 21–32)
CREAT SERPL-MCNC: 0.59 MG/DL (ref 0.6–1)
GLUCOSE BLD STRIP.AUTO-MCNC: 126 MG/DL (ref 65–100)
GLUCOSE BLD STRIP.AUTO-MCNC: 127 MG/DL (ref 65–100)
GLUCOSE BLD STRIP.AUTO-MCNC: 135 MG/DL (ref 65–100)
GLUCOSE BLD STRIP.AUTO-MCNC: 152 MG/DL (ref 65–100)
GLUCOSE SERPL-MCNC: 130 MG/DL (ref 65–100)
POTASSIUM SERPL-SCNC: 3.8 MMOL/L (ref 3.5–5.1)
SODIUM SERPL-SCNC: 136 MMOL/L (ref 136–145)

## 2019-08-29 PROCEDURE — 99232 SBSQ HOSP IP/OBS MODERATE 35: CPT | Performed by: INTERNAL MEDICINE

## 2019-08-29 PROCEDURE — 74011636637 HC RX REV CODE- 636/637: Performed by: INTERNAL MEDICINE

## 2019-08-29 PROCEDURE — 94640 AIRWAY INHALATION TREATMENT: CPT

## 2019-08-29 PROCEDURE — 74011250636 HC RX REV CODE- 250/636: Performed by: INTERNAL MEDICINE

## 2019-08-29 PROCEDURE — 74011000250 HC RX REV CODE- 250: Performed by: NURSE PRACTITIONER

## 2019-08-29 PROCEDURE — 77010033678 HC OXYGEN DAILY

## 2019-08-29 PROCEDURE — 36415 COLL VENOUS BLD VENIPUNCTURE: CPT

## 2019-08-29 PROCEDURE — 82962 GLUCOSE BLOOD TEST: CPT

## 2019-08-29 PROCEDURE — 94760 N-INVAS EAR/PLS OXIMETRY 1: CPT

## 2019-08-29 PROCEDURE — 65660000000 HC RM CCU STEPDOWN

## 2019-08-29 PROCEDURE — 74011250637 HC RX REV CODE- 250/637: Performed by: INTERNAL MEDICINE

## 2019-08-29 PROCEDURE — 80048 BASIC METABOLIC PNL TOTAL CA: CPT

## 2019-08-29 RX ORDER — AZITHROMYCIN 250 MG/1
500 TABLET, FILM COATED ORAL EVERY 24 HOURS
Status: COMPLETED | OUTPATIENT
Start: 2019-08-29 | End: 2019-08-31

## 2019-08-29 RX ADMIN — METFORMIN HYDROCHLORIDE 500 MG: 500 TABLET, FILM COATED ORAL at 09:20

## 2019-08-29 RX ADMIN — Medication 10 ML: at 05:46

## 2019-08-29 RX ADMIN — INSULIN LISPRO 2 UNITS: 100 INJECTION, SOLUTION INTRAVENOUS; SUBCUTANEOUS at 21:28

## 2019-08-29 RX ADMIN — GUAIFENESIN AND CODEINE PHOSPHATE 5 ML: 10; 100 LIQUID ORAL at 16:29

## 2019-08-29 RX ADMIN — GUAIFENESIN 1200 MG: 600 TABLET, EXTENDED RELEASE ORAL at 16:28

## 2019-08-29 RX ADMIN — ALBUTEROL SULFATE 2.5 MG: 2.5 SOLUTION RESPIRATORY (INHALATION) at 07:25

## 2019-08-29 RX ADMIN — DILTIAZEM HYDROCHLORIDE 180 MG: 180 CAPSULE, COATED, EXTENDED RELEASE ORAL at 21:28

## 2019-08-29 RX ADMIN — LISINOPRIL 5 MG: 5 TABLET ORAL at 09:20

## 2019-08-29 RX ADMIN — PANTOPRAZOLE SODIUM 40 MG: 40 TABLET, DELAYED RELEASE ORAL at 05:46

## 2019-08-29 RX ADMIN — ATORVASTATIN CALCIUM 10 MG: 10 TABLET, FILM COATED ORAL at 21:28

## 2019-08-29 RX ADMIN — BUDESONIDE 500 MCG: 0.5 INHALANT RESPIRATORY (INHALATION) at 07:25

## 2019-08-29 RX ADMIN — ENOXAPARIN SODIUM 40 MG: 40 INJECTION SUBCUTANEOUS at 09:20

## 2019-08-29 RX ADMIN — GUAIFENESIN AND CODEINE PHOSPHATE 5 ML: 10; 100 LIQUID ORAL at 09:20

## 2019-08-29 RX ADMIN — METFORMIN HYDROCHLORIDE 500 MG: 500 TABLET, FILM COATED ORAL at 16:28

## 2019-08-29 RX ADMIN — METHYLPREDNISOLONE SODIUM SUCCINATE 40 MG: 40 INJECTION, POWDER, FOR SOLUTION INTRAMUSCULAR; INTRAVENOUS at 16:28

## 2019-08-29 RX ADMIN — GUAIFENESIN AND CODEINE PHOSPHATE 5 ML: 10; 100 LIQUID ORAL at 21:27

## 2019-08-29 RX ADMIN — ALBUTEROL SULFATE 2.5 MG: 2.5 SOLUTION RESPIRATORY (INHALATION) at 15:21

## 2019-08-29 RX ADMIN — BUDESONIDE 500 MCG: 0.5 INHALANT RESPIRATORY (INHALATION) at 19:50

## 2019-08-29 RX ADMIN — ALBUTEROL SULFATE 2.5 MG: 2.5 SOLUTION RESPIRATORY (INHALATION) at 19:49

## 2019-08-29 RX ADMIN — ENOXAPARIN SODIUM 40 MG: 40 INJECTION SUBCUTANEOUS at 21:28

## 2019-08-29 RX ADMIN — AZITHROMYCIN 500 MG: 250 TABLET, FILM COATED ORAL at 16:28

## 2019-08-29 RX ADMIN — GUAIFENESIN 1200 MG: 600 TABLET, EXTENDED RELEASE ORAL at 09:19

## 2019-08-29 RX ADMIN — METHYLPREDNISOLONE SODIUM SUCCINATE 40 MG: 40 INJECTION, POWDER, FOR SOLUTION INTRAMUSCULAR; INTRAVENOUS at 05:45

## 2019-08-29 RX ADMIN — Medication 10 ML: at 16:29

## 2019-08-29 RX ADMIN — ALBUTEROL SULFATE 2.5 MG: 2.5 SOLUTION RESPIRATORY (INHALATION) at 11:39

## 2019-08-29 RX ADMIN — ALBUTEROL SULFATE 2.5 MG: 2.5 SOLUTION RESPIRATORY (INHALATION) at 23:20

## 2019-08-29 RX ADMIN — Medication 10 ML: at 21:30

## 2019-08-29 RX ADMIN — ALBUTEROL SULFATE 2.5 MG: 2.5 SOLUTION RESPIRATORY (INHALATION) at 05:17

## 2019-08-29 RX ADMIN — METHYLPREDNISOLONE SODIUM SUCCINATE 40 MG: 40 INJECTION, POWDER, FOR SOLUTION INTRAMUSCULAR; INTRAVENOUS at 21:28

## 2019-08-29 NOTE — PROGRESS NOTES
Hospitalist Progress Note    2019  Admit Date: 2019  1:19 PM   NAME: Clay Isidro   :  1970   MRN:  [id number]   Attending: Wendy Esqueda DO  PCP:  Saravanan Shaw PA-C    SUBJECTIVE:   HPI: 55 yo F with PMH of severe COPD (GOLD stage 4) on home O2/CPAP, OHS, Morbid obesity, HTN, HLD, DM2 was sent to [facility] as direct admit from pcp office for COPD exacerbation. Pulm consulted. : Feels better, improved breathing, Less cough and sputum, sugars at goal. Used home CPAP overnight. Nursing notes and chart reviewed. Review of Systems negative with exception of pertinent positives noted above. PHYSICAL EXAM     Visit Vitals  /90   Pulse 91   Temp 97.9 °F (36.6 °C)   Resp 18   LMP 2010   SpO2 90%      Temp (24hrs), Av.1 °F (36.7 °C), Min:97.3 °F (36.3 °C), Max:98.7 °F (37.1 °C)    Oxygen Therapy  O2 Sat (%): 90 % (19 0736)  Pulse via Oximetry: 90 beats per minute (19)  O2 Device: Nasal cannula (19)  PEEP/CPAP (cm H2O): 3 cm H20 (19)  O2 Flow Rate (L/min): 4 l/min (19)  FIO2 (%): 36 % (19)    Intake/Output Summary (Last 24 hours) at 2019 0911  Last data filed at 2019 0336  Gross per 24 hour   Intake 960 ml   Output 3000 ml   Net -2040 ml         General: No acute distress. Alert.  Obese. Head:  AT/NC  Lungs:  B/l expiratory wheezing. Heart:  RRR, no murmur, rub, or gallop  Abdomen: Soft, non-distended, non-tender, +bs  Extremities: No cyanosis or clubbing. Neurologic:  No focal deficits. Moves all extremities. Skin:  No Obvious Rash  Psych:  Normal affect. LABS AND STUDIES:  Personally reviewed all labs, meds, and studies for past 24hrs.       ASSESSMENT      Active Hospital Problems    Diagnosis Date Noted    Chronic respiratory failure with hypoxia ([facility] 2019     NC 3L continuous      COPD with acute exacerbation ([facility] 2019    Tobacco abuse 2017    MICHAEL on CPAP 03/02/2017    Morbid obesity with BMI of 50.0-59.9, adult ([facility] 03/02/2017    Diabetes mellitus type 2, controlled ([facility] 03/02/2017    Hypertension 03/07/2016    Chronic back pain 03/07/2016    Obesity hypoventilation syndrome ([facility] 12/18/2015           PLAN:  · COPD: Hypoxia improved with O2, on 3lnc, -ve Flu, -ve CXR for PNA. c/w duo-nebs, pulmicort, azithromycin and IV steroids. Pulm managing. · MICHAEL/OHS: resumed home cpap qhs. · DM2: Resumed metformin, added ISS. · HTN: Resumed home meds. · Tobacco Abuse: Nicotine patch ordered  · Protonix for ppx.     FEN:  Diabetic diet  DVT ppx:  lovenox    Dispo: pending improvement, to home when ok with Pulm  Discussed plan with pt who is in agreement. All questions answered.     Signed By: Royce Tracy MD     August 29, 2019

## 2019-08-29 NOTE — PROGRESS NOTES
Beside shift report received from [facility]  Patient lying in bed  Respirations present  No signs of distress  No needs expressed at this time  Safety measures in place

## 2019-08-29 NOTE — PROGRESS NOTES
SBAR report received from [facility]. Pt is stable, alert and oriented. Resting quietly in bed, respirations unlabored, has productive, hacking cough. Safety measures in place, bed low and locked, call light in reach. Encouraged to call with any needs.

## 2019-08-29 NOTE — PROGRESS NOTES
Problem: Chronic Obstructive Pulmonary Disease (COPD)  Goal: *Absence of hypoxia  Outcome: Progressing Towards Goal  Goal: *Optimize nutritional status  Outcome: Progressing Towards Goal     Problem: Falls - Risk of  Goal: *Absence of Falls  Description  Document Vivienne Pisano Fall Risk and appropriate interventions in the flowsheet.   Outcome: Progressing Towards Goal  Note:   Fall Risk Interventions:            Medication Interventions: Evaluate medications/consider consulting pharmacy, Teach patient to arise slowly

## 2019-08-29 NOTE — PROGRESS NOTES
Tate Isidro  Admission Date: 8/27/2019             Daily Progress Note: 8/29/2019    The patient's chart is reviewed and the patient is discussed with the staff. 54 y.o. AAF evaluated at the request of Dr. Cary Villanueva for COPD exacerbation with shortness of breath, productive cough and wheezing. Was last seen in our office 3/4/19 for poorly controlled severe COPD (FEV1 1.23, 42% in 2016) and in need of breast surgery (left lumpectomy with benign intraductal papilloma). States had not been back to her normal from breathing standpoint since influenza in January/Feb 2019. Has been compliant with home meds, nebs and O2 at 3L. Has not been using BIPAP due to mask breakdown and possible machine malfunction--has not called for assistance and doesn't remember DME name of company. Has unfortunately continued to smoke about 1/3 PPD--states she quit 2 weeks ago when symptoms started.        Chronic Medical:  O2 dependent requiring NC 3L ([facility]),  Gold stage IV COPD, morbid obesity, tobacco abuse, HTN, DM, MICHAEL with home BIPAP (22/12 with 3L O2 bleed in), HLD, chronic back pain. Subjective:     Sitting up in bed, states is feeling some better today. Has productive cough at times. Family brought her machine and respiratory replaced her mask.       Current Facility-Administered Medications   Medication Dose Route Frequency    albuterol (PROVENTIL VENTOLIN) nebulizer solution 2.5 mg  2.5 mg Nebulization Q4H PRN    albuterol (PROVENTIL VENTOLIN) nebulizer solution 2.5 mg  2.5 mg Nebulization Q4H RT    And    budesonide (PULMICORT) 500 mcg/2 ml nebulizer suspension  500 mcg Nebulization BID RT    guaiFENesin-codeine (ROBITUSSIN AC) 100-10 mg/5 mL solution 5 mL  5 mL Oral Q4H PRN    sodium chloride (NS) flush 5-40 mL  5-40 mL IntraVENous Q8H    sodium chloride (NS) flush 5-40 mL  5-40 mL IntraVENous PRN    acetaminophen (TYLENOL) tablet 650 mg  650 mg Oral Q4H PRN    HYDROcodone-acetaminophen (NORCO) 5-325 mg per tablet 1 Tab  1 Tab Oral Q4H PRN    naloxone (NARCAN) injection 0.4 mg  0.4 mg IntraVENous PRN    diphenhydrAMINE (BENADRYL) injection 12.5 mg  12.5 mg IntraVENous Q4H PRN    ondansetron (ZOFRAN ODT) tablet 4 mg  4 mg Oral Q8H PRN    bisacodyl (DULCOLAX) tablet 5 mg  5 mg Oral DAILY PRN    nicotine (NICODERM CQ) 7 mg/24 hr patch 1 Patch  1 Patch TransDERmal Q24H    enoxaparin (LOVENOX) injection 40 mg  40 mg SubCUTAneous Q12H    atorvastatin (LIPITOR) tablet 10 mg  10 mg Oral QHS    dilTIAZem CD (CARDIZEM CD) capsule 180 mg  180 mg Oral QHS    guaiFENesin ER (MUCINEX) tablet 1,200 mg  1,200 mg Oral BID    lisinopril (PRINIVIL, ZESTRIL) tablet 5 mg  5 mg Oral DAILY    metFORMIN (GLUCOPHAGE) tablet 500 mg  500 mg Oral BID WITH MEALS    insulin lispro (HUMALOG) injection   SubCUTAneous AC&HS    methylPREDNISolone (PF) (SOLU-MEDROL) injection 40 mg  40 mg IntraVENous Q8H    pantoprazole (PROTONIX) tablet 40 mg  40 mg Oral ACB    azithromycin (ZITHROMAX) 500 mg in 0.9% sodium chloride (MBP/ADV) 250 mL  500 mg IntraVENous Q24H       Review of Systems  Constitutional: negative for fever, chills, sweats  Cardiovascular: negative for chest pain, palpitations, syncope, edema  Gastrointestinal:  negative for dysphagia, reflux, vomiting, diarrhea, abdominal pain, or melena  Neurologic:  negative for focal weakness, numbness, headache    Objective:     Vitals:    08/28/19 1937 08/28/19 2320 08/28/19 2341 08/29/19 0331   BP:  143/78  153/75   Pulse:  100  94   Resp:  20  20   Temp:  98.5 °F (36.9 °C)  98.2 °F (36.8 °C)   SpO2: 94% 91% 93% 94%         Intake/Output Summary (Last 24 hours) at 8/29/2019 6138  Last data filed at 8/29/2019 7884  Gross per 24 hour   Intake 960 ml   Output 3000 ml   Net -2040 ml       Physical Exam:   Constitutional:  the patient is morbidly obese and in no acute distress, NC 3L sat 94%  EENMT:  Sclera clear, pupils equal, oral mucosa moist  Respiratory: few scattered crackles, anterior and posterior wheezes, productive cough with \"milky\" colored secretions  Cardiovascular:  RRR without M,G,R  Gastrointestinal: soft, obese and non-tender; with positive bowel sounds. Musculoskeletal: warm without cyanosis. There is no lower extremity edema. Skin:  no jaundice or rashes, no wounds   Neurologic: no gross neuro deficits     Psychiatric:  alert and oriented x 3    CHEST XRAY:   8/27/19:  Mild lingular atelectasis/infiltrate. LAB  Recent Labs     08/29/19  0525 08/28/19  2037 08/28/19  1643 08/28/19  1121 08/28/19  0519   GLUCPOC 126* 131* 135* 139* 122*      Recent Labs     08/27/19  1800   WBC 9.4   HGB 12.6   HCT 44.1        Recent Labs     08/27/19  1800      K 4.0   CL 97*   CO2 35*   GLU 97   BUN 10   CREA 0.74   MG 1.8   CA 9.5   PHOS 3.5     No results for input(s): PH, PCO2, PO2, HCO3, PHI, PCO2I, PO2I, HCO3I in the last 72 hours. No results for input(s): LCAD, LAC in the last 72 hours.       Assessment:  (Medical Decision Making)     Patient Active Problem List   Diagnosis Code    COPD, severe ([facility] J44.9    Obesity hypoventilation syndrome ([facility] E66.2    Diabetes mellitus type 2, controlled ([facility] E11.9    Morbid obesity with BMI of 50.0-59.9, adult ([facility] E66.01, Z68.43    Hypertension I10    Chronic back pain M54.9, G89.29    Nocturnal hypoxemia G47.34    MICHAEL on CPAP G47.33, Z99.89    Tobacco abuse Z72.0    Cocaine abuse (HCC) F14.10    COPD with acute exacerbation (HCC) J44.1    Chronic respiratory failure with hypoxia (HCC) J96.11         Plan:  (Medical Decision Making)     Hospital Problems  Date Reviewed: 8/29/2019          Codes Class Noted POA    * (Principal) COPD with acute exacerbation ([facility] ICD-10-CM: J44.1  ICD-9-CM: 491.21  8/27/2019 Yes    Continue current    Chronic respiratory failure with hypoxia (HCC) (Chronic) ICD-10-CM: J96.11  ICD-9-CM: 518.83, 799.02  8/28/2019 Yes    Overview Signed 8/28/2019  8:51 AM by Elke Case NP     NC 3L continuous         chronic--on home flow    Tobacco abuse (Chronic) ICD-10-CM: Z72.0  ICD-9-CM: 305.1  3/5/2017 Yes    Cessation stressed    Diabetes mellitus type 2, controlled (HCC) (Chronic) ICD-10-CM: E11.9  ICD-9-CM: 250.00  3/2/2017 Yes    Chronic--ranges 122-139    Morbid obesity with BMI of 50.0-59.9, adult (HCC) (Chronic) ICD-10-CM: E66.01, Z68.43  ICD-9-CM: 278.01, V85.43  3/2/2017 Yes    Chronic -- weight loss strongly encouraged    MICHAEL on CPAP (Chronic) ICD-10-CM: G47.33, Z99.89  ICD-9-CM: 327.23, V46.8  3/2/2017 Yes    chronic    Hypertension (Chronic) ICD-10-CM: I10  ICD-9-CM: 401.9  3/7/2016 Yes    chronic    Chronic back pain (Chronic) ICD-10-CM: M54.9, G89.29  ICD-9-CM: 724.5, 338.29  3/7/2016 Yes    chronic    Obesity hypoventilation syndrome ([facility] ICD-10-CM: W47.9  ICD-9-CM: 278.03  12/18/2015 Yes    chronic          --Albuterol, Pulmicort, Mucinex  --Zithromax day 3  --Respiratory culture:  pending  --Solu Medrol 40mg q8h--continue--hope to wean tomorrow  --Nicotine patch--smoking cessation encouraged  --Her home machine brought here, new full face mask obtained. Will need to follow up in our sleep lab for management. --Checking A1AT    More than 50% of the time documented was spent in face-to-face contact with the patient and in the care of the patient on the floor/unit where the patient is located. Jero Trejo NP    Lungs: less  wheezing  Heart:  RRR with no Murmur/Rubs/Gallops    Additional Comments:  She was fitted with medium FFM and will use her home CPAP her. Cont. Nebs and steroids    I have spoken with and examined the patient. I agree with the above assessment and plan as documented.     Shivam Cohen MD

## 2019-08-29 NOTE — INTERDISCIPLINARY ROUNDS
Interdisciplinary team rounds were held 8/29/2019 with the following team members:Care Management, Physical Therapy, Physician and Clinical Coordinator and the patient. Plan of care discussed. See clinical pathway and/or care plan for interventions and desired outcomes.

## 2019-08-30 PROBLEM — I48.91 ATRIAL FIBRILLATION, NEW ONSET (HCC): Status: ACTIVE | Noted: 2019-08-30

## 2019-08-30 LAB
APTT PPP: 33 SEC (ref 24.7–39.8)
APTT PPP: 53.9 SEC (ref 24.7–39.8)
ATRIAL RATE: 120 BPM
CALCULATED R AXIS, ECG10: -24 DEGREES
CALCULATED T AXIS, ECG11: 46 DEGREES
DIAGNOSIS, 93000: NORMAL
GLUCOSE BLD STRIP.AUTO-MCNC: 120 MG/DL (ref 65–100)
GLUCOSE BLD STRIP.AUTO-MCNC: 128 MG/DL (ref 65–100)
GLUCOSE BLD STRIP.AUTO-MCNC: 135 MG/DL (ref 65–100)
GLUCOSE BLD STRIP.AUTO-MCNC: 136 MG/DL (ref 65–100)
Q-T INTERVAL, ECG07: 270 MS
QRS DURATION, ECG06: 70 MS
QTC CALCULATION (BEZET), ECG08: 401 MS
TROPONIN I SERPL-MCNC: <0.02 NG/ML (ref 0.02–0.05)
VENTRICULAR RATE, ECG03: 133 BPM

## 2019-08-30 PROCEDURE — 94640 AIRWAY INHALATION TREATMENT: CPT

## 2019-08-30 PROCEDURE — 82962 GLUCOSE BLOOD TEST: CPT

## 2019-08-30 PROCEDURE — 74011000250 HC RX REV CODE- 250: Performed by: INTERNAL MEDICINE

## 2019-08-30 PROCEDURE — 99232 SBSQ HOSP IP/OBS MODERATE 35: CPT | Performed by: INTERNAL MEDICINE

## 2019-08-30 PROCEDURE — C8929 TTE W OR WO FOL WCON,DOPPLER: HCPCS

## 2019-08-30 PROCEDURE — 74011250636 HC RX REV CODE- 250/636: Performed by: INTERNAL MEDICINE

## 2019-08-30 PROCEDURE — 74011250637 HC RX REV CODE- 250/637: Performed by: INTERNAL MEDICINE

## 2019-08-30 PROCEDURE — 85730 THROMBOPLASTIN TIME PARTIAL: CPT

## 2019-08-30 PROCEDURE — 74011000250 HC RX REV CODE- 250: Performed by: NURSE PRACTITIONER

## 2019-08-30 PROCEDURE — 36415 COLL VENOUS BLD VENIPUNCTURE: CPT

## 2019-08-30 PROCEDURE — 77010033678 HC OXYGEN DAILY

## 2019-08-30 PROCEDURE — 93005 ELECTROCARDIOGRAM TRACING: CPT | Performed by: INTERNAL MEDICINE

## 2019-08-30 PROCEDURE — 65660000000 HC RM CCU STEPDOWN

## 2019-08-30 PROCEDURE — 84484 ASSAY OF TROPONIN QUANT: CPT

## 2019-08-30 PROCEDURE — 94760 N-INVAS EAR/PLS OXIMETRY 1: CPT

## 2019-08-30 PROCEDURE — 74011250636 HC RX REV CODE- 250/636: Performed by: HOSPITALIST

## 2019-08-30 RX ORDER — HEPARIN SODIUM 5000 [USP'U]/ML
35 INJECTION, SOLUTION INTRAVENOUS; SUBCUTANEOUS ONCE
Status: COMPLETED | OUTPATIENT
Start: 2019-08-30 | End: 2019-08-30

## 2019-08-30 RX ORDER — HEPARIN SODIUM 5000 [USP'U]/100ML
12-25 INJECTION, SOLUTION INTRAVENOUS
Status: DISCONTINUED | OUTPATIENT
Start: 2019-08-30 | End: 2019-08-31

## 2019-08-30 RX ORDER — HEPARIN SODIUM 5000 [USP'U]/ML
60 INJECTION, SOLUTION INTRAVENOUS; SUBCUTANEOUS ONCE
Status: COMPLETED | OUTPATIENT
Start: 2019-08-30 | End: 2019-08-30

## 2019-08-30 RX ORDER — ALBUTEROL SULFATE 0.83 MG/ML
2.5 SOLUTION RESPIRATORY (INHALATION)
Status: DISCONTINUED | OUTPATIENT
Start: 2019-08-30 | End: 2019-09-04 | Stop reason: HOSPADM

## 2019-08-30 RX ORDER — DILTIAZEM HYDROCHLORIDE 30 MG/1
60 TABLET, FILM COATED ORAL
Status: COMPLETED | OUTPATIENT
Start: 2019-08-30 | End: 2019-08-30

## 2019-08-30 RX ORDER — ASPIRIN 325 MG
325 TABLET ORAL ONCE
Status: COMPLETED | OUTPATIENT
Start: 2019-08-30 | End: 2019-08-30

## 2019-08-30 RX ORDER — DILTIAZEM HYDROCHLORIDE 240 MG/1
240 CAPSULE, COATED, EXTENDED RELEASE ORAL
Status: DISCONTINUED | OUTPATIENT
Start: 2019-08-30 | End: 2019-09-04 | Stop reason: HOSPADM

## 2019-08-30 RX ORDER — BUDESONIDE 0.5 MG/2ML
500 INHALANT ORAL
Status: DISCONTINUED | OUTPATIENT
Start: 2019-08-30 | End: 2019-09-04 | Stop reason: HOSPADM

## 2019-08-30 RX ORDER — METOPROLOL TARTRATE 5 MG/5ML
5 INJECTION INTRAVENOUS
Status: DISCONTINUED | OUTPATIENT
Start: 2019-08-30 | End: 2019-09-04 | Stop reason: HOSPADM

## 2019-08-30 RX ADMIN — HEPARIN SODIUM 12 UNITS/KG/HR: 5000 INJECTION, SOLUTION INTRAVENOUS at 07:21

## 2019-08-30 RX ADMIN — GUAIFENESIN 1200 MG: 600 TABLET, EXTENDED RELEASE ORAL at 10:12

## 2019-08-30 RX ADMIN — METFORMIN HYDROCHLORIDE 500 MG: 500 TABLET, FILM COATED ORAL at 10:12

## 2019-08-30 RX ADMIN — METHYLPREDNISOLONE SODIUM SUCCINATE 40 MG: 40 INJECTION, POWDER, FOR SOLUTION INTRAMUSCULAR; INTRAVENOUS at 13:21

## 2019-08-30 RX ADMIN — ALBUTEROL SULFATE 2.5 MG: 2.5 SOLUTION RESPIRATORY (INHALATION) at 13:53

## 2019-08-30 RX ADMIN — BUDESONIDE 500 MCG: 0.5 INHALANT RESPIRATORY (INHALATION) at 19:51

## 2019-08-30 RX ADMIN — PERFLUTREN 1 ML: 6.52 INJECTION, SUSPENSION INTRAVENOUS at 11:13

## 2019-08-30 RX ADMIN — AZITHROMYCIN 500 MG: 250 TABLET, FILM COATED ORAL at 17:07

## 2019-08-30 RX ADMIN — Medication 10 ML: at 22:26

## 2019-08-30 RX ADMIN — ALBUTEROL SULFATE 2.5 MG: 2.5 SOLUTION RESPIRATORY (INHALATION) at 03:56

## 2019-08-30 RX ADMIN — BISACODYL 5 MG: 5 TABLET, COATED ORAL at 12:29

## 2019-08-30 RX ADMIN — ALBUTEROL SULFATE 2.5 MG: 2.5 SOLUTION RESPIRATORY (INHALATION) at 19:51

## 2019-08-30 RX ADMIN — HEPARIN SODIUM 5550 UNITS: 5000 INJECTION INTRAVENOUS; SUBCUTANEOUS at 05:52

## 2019-08-30 RX ADMIN — GUAIFENESIN 1200 MG: 600 TABLET, EXTENDED RELEASE ORAL at 17:07

## 2019-08-30 RX ADMIN — ATORVASTATIN CALCIUM 10 MG: 10 TABLET, FILM COATED ORAL at 22:25

## 2019-08-30 RX ADMIN — HEPARIN SODIUM 5000 UNITS: 5000 INJECTION INTRAVENOUS; SUBCUTANEOUS at 22:19

## 2019-08-30 RX ADMIN — DILTIAZEM HYDROCHLORIDE 240 MG: 240 CAPSULE, COATED, EXTENDED RELEASE ORAL at 22:25

## 2019-08-30 RX ADMIN — METOPROLOL TARTRATE 5 MG: 5 INJECTION INTRAVENOUS at 05:47

## 2019-08-30 RX ADMIN — DILTIAZEM HYDROCHLORIDE 60 MG: 30 TABLET, FILM COATED ORAL at 12:29

## 2019-08-30 RX ADMIN — GUAIFENESIN AND CODEINE PHOSPHATE 5 ML: 10; 100 LIQUID ORAL at 06:08

## 2019-08-30 RX ADMIN — METFORMIN HYDROCHLORIDE 500 MG: 500 TABLET, FILM COATED ORAL at 17:06

## 2019-08-30 RX ADMIN — DILTIAZEM HYDROCHLORIDE 60 MG: 30 TABLET, FILM COATED ORAL at 17:06

## 2019-08-30 RX ADMIN — Medication 10 ML: at 05:54

## 2019-08-30 RX ADMIN — BUDESONIDE 500 MCG: 0.5 INHALANT RESPIRATORY (INHALATION) at 07:56

## 2019-08-30 RX ADMIN — PANTOPRAZOLE SODIUM 40 MG: 40 TABLET, DELAYED RELEASE ORAL at 06:08

## 2019-08-30 RX ADMIN — LISINOPRIL 5 MG: 5 TABLET ORAL at 10:12

## 2019-08-30 RX ADMIN — METHYLPREDNISOLONE SODIUM SUCCINATE 40 MG: 40 INJECTION, POWDER, FOR SOLUTION INTRAMUSCULAR; INTRAVENOUS at 05:48

## 2019-08-30 RX ADMIN — ASPIRIN 325 MG ORAL TABLET 325 MG: 325 PILL ORAL at 04:56

## 2019-08-30 RX ADMIN — DILTIAZEM HYDROCHLORIDE 60 MG: 30 TABLET, FILM COATED ORAL at 05:53

## 2019-08-30 RX ADMIN — Medication 10 ML: at 12:31

## 2019-08-30 RX ADMIN — METHYLPREDNISOLONE SODIUM SUCCINATE 40 MG: 40 INJECTION, POWDER, FOR SOLUTION INTRAMUSCULAR; INTRAVENOUS at 22:21

## 2019-08-30 RX ADMIN — ALBUTEROL SULFATE 2.5 MG: 2.5 SOLUTION RESPIRATORY (INHALATION) at 07:55

## 2019-08-30 RX ADMIN — HEPARIN SODIUM 5000 UNITS: 5000 INJECTION INTRAVENOUS; SUBCUTANEOUS at 14:43

## 2019-08-30 NOTE — PROGRESS NOTES
Sammi Isidro  Admission Date: 8/27/2019             Daily Progress Note: 8/30/2019    The patient's chart is reviewed and the patient is discussed with the staff. 54 y.o. AAF evaluated at the request of Dr. Frederick Layne for COPD exacerbation with shortness of breath, productive cough and wheezing. Was last seen in our office 3/4/19 for poorly controlled severe COPD (FEV1 1.23, 42% in 2016) and in need of breast surgery (left lumpectomy with benign intraductal papilloma). States had not been back to her normal from breathing standpoint since influenza in January/Feb 2019. Has been compliant with home meds, nebs and O2 at 3L. Has not been using BIPAP due to mask breakdown and possible machine malfunction--has not called for assistance and doesn't remember DME name of company. Has unfortunately continued to smoke about 1/3 PPD--states she quit 2 weeks ago when symptoms started.        Chronic Medical:  O2 dependent requiring NC 3L ([facility]),  Gold stage IV COPD, morbid obesity, tobacco abuse, HTN, DM, MICHAEL with home BIPAP (22/12 with 3L O2 bleed in), HLD, chronic back pain. Subjective:     Sitting up in bed, states is breathing is better but \"my heart is acting up\". Last night noted with tachycardia, EKG showed AFib with RVR and placed on telemetry. Hospitalist placed on Heparin drip for new onset AFib and cardiology consulted. Feeling some better today and denies chest pain or palpitiations. Has productive cough at times. Used home machine with full face mask--tolerated fairly well.     Current Facility-Administered Medications   Medication Dose Route Frequency    metoprolol (LOPRESSOR) injection 5 mg  5 mg IntraVENous Q6H PRN    dilTIAZem (CARDIZEM) IR tablet 60 mg  60 mg Oral TIDAC    dilTIAZem CD (CARDIZEM CD) capsule 240 mg  240 mg Oral QHS    heparin 25,000 units in dextrose 500 mL infusion  12-25 Units/kg/hr (Adjusted) IntraVENous TITRATE    azithromycin (ZITHROMAX) tablet 500 mg  500 mg Oral Q24H    albuterol (PROVENTIL VENTOLIN) nebulizer solution 2.5 mg  2.5 mg Nebulization Q4H PRN    albuterol (PROVENTIL VENTOLIN) nebulizer solution 2.5 mg  2.5 mg Nebulization Q4H RT    And    budesonide (PULMICORT) 500 mcg/2 ml nebulizer suspension  500 mcg Nebulization BID RT    guaiFENesin-codeine (ROBITUSSIN AC) 100-10 mg/5 mL solution 5 mL  5 mL Oral Q4H PRN    sodium chloride (NS) flush 5-40 mL  5-40 mL IntraVENous Q8H    sodium chloride (NS) flush 5-40 mL  5-40 mL IntraVENous PRN    acetaminophen (TYLENOL) tablet 650 mg  650 mg Oral Q4H PRN    HYDROcodone-acetaminophen (NORCO) 5-325 mg per tablet 1 Tab  1 Tab Oral Q4H PRN    naloxone (NARCAN) injection 0.4 mg  0.4 mg IntraVENous PRN    diphenhydrAMINE (BENADRYL) injection 12.5 mg  12.5 mg IntraVENous Q4H PRN    ondansetron (ZOFRAN ODT) tablet 4 mg  4 mg Oral Q8H PRN    bisacodyl (DULCOLAX) tablet 5 mg  5 mg Oral DAILY PRN    nicotine (NICODERM CQ) 7 mg/24 hr patch 1 Patch  1 Patch TransDERmal Q24H    atorvastatin (LIPITOR) tablet 10 mg  10 mg Oral QHS    guaiFENesin ER (MUCINEX) tablet 1,200 mg  1,200 mg Oral BID    lisinopril (PRINIVIL, ZESTRIL) tablet 5 mg  5 mg Oral DAILY    metFORMIN (GLUCOPHAGE) tablet 500 mg  500 mg Oral BID WITH MEALS    insulin lispro (HUMALOG) injection   SubCUTAneous AC&HS    methylPREDNISolone (PF) (SOLU-MEDROL) injection 40 mg  40 mg IntraVENous Q8H    pantoprazole (PROTONIX) tablet 40 mg  40 mg Oral ACB       Review of Systems  Constitutional: negative for fever, chills, sweats  Cardiovascular: negative for chest pain, palpitations, syncope, edema  Gastrointestinal:  negative for dysphagia, reflux, vomiting, diarrhea, abdominal pain, or melena  Neurologic:  negative for focal weakness, numbness, headache    Objective:     Vitals:    08/30/19 0436 08/30/19 0508 08/30/19 0533 08/30/19 0727   BP:    117/81   Pulse: (!) 116 (!) 133  89   Resp:    (!) 93   Temp:    98.1 °F (36.7 °C)   SpO2:       Weight:   314 lb 11.2 oz (142.7 kg)          Intake/Output Summary (Last 24 hours) at 8/30/2019 0745  Last data filed at 8/30/2019 0533  Gross per 24 hour   Intake 1320 ml   Output 3000 ml   Net -1680 ml       Physical Exam:   Constitutional:  the patient is morbidly obese and in no acute distress, NC 3L sat 93%  EENMT:  Sclera clear, pupils equal, oral mucosa moist  Respiratory: rare wheezes, productive cough with \"milky\" colored secretions but less now  Cardiovascular:  irregular, AFib without M,G,R  Gastrointestinal: soft, obese and non-tender; with positive bowel sounds. Musculoskeletal: warm without cyanosis. There is no lower extremity edema. Skin:  no jaundice or rashes, no wounds   Neurologic: no gross neuro deficits     Psychiatric:  alert and oriented x 3    CHEST XRAY:   8/27/19:  Mild lingular atelectasis/infiltrate. LAB  Recent Labs     08/30/19  0524 08/29/19  2023 08/29/19  1638 08/29/19  1111 08/29/19  0525   GLUCPOC 135* 152* 135* 127* 126*      Recent Labs     08/27/19  1800   WBC 9.4   HGB 12.6   HCT 44.1        Recent Labs     08/30/19  0509 08/29/19  0636 08/27/19  1800   NA  --  136 138   K  --  3.8 4.0   CL  --  96* 97*   CO2  --  37* 35*   GLU  --  130* 97   BUN  --  10 10   CREA  --  0.59* 0.74   MG  --   --  1.8   CA  --  9.8 9.5   PHOS  --   --  3.5   TROIQ <0.02*  --   --      No results for input(s): PH, PCO2, PO2, HCO3, PHI, PCO2I, PO2I, HCO3I in the last 72 hours. No results for input(s): LCAD, LAC in the last 72 hours.       Assessment:  (Medical Decision Making)     Patient Active Problem List   Diagnosis Code    COPD, severe ([facility] J44.9    Obesity hypoventilation syndrome ([facility] E66.2    Diabetes mellitus type 2, controlled ([facility] E11.9    Morbid obesity with BMI of 50.0-59.9, adult ([facility] E66.01, Z68.43    Hypertension I10    Chronic back pain M54.9, G89.29    Nocturnal hypoxemia G47.34    MICHAEL on CPAP G47.33, Z99.89    Tobacco abuse Z72.0  Cocaine abuse (HCC) F14.10    COPD with acute exacerbation ([facility] J44.1    Chronic respiratory failure with hypoxia (HCC) J96.11    Atrial fibrillation, new onset ([facility] I48.91         Plan:  (Medical Decision Making)     Hospital Problems  Date Reviewed: 8/29/2019          Codes Class Noted POA    * (Principal) COPD with acute exacerbation ([facility] ICD-10-CM: J44.1  ICD-9-CM: 491.21  8/27/2019 Yes    Less wheezing--wean steroids    Atrial fibrillation, new onset ([facility] ICD-10-CM: I48.91  ICD-9-CM: 427.31  8/30/2019 No    Cardiology consutled    Chronic respiratory failure with hypoxia (HCC) (Chronic) ICD-10-CM: J96.11  ICD-9-CM: 518.83, 799.02  8/28/2019 Yes    Overview Signed 8/28/2019  8:51 AM by Carlyn Bland NP     NC 3L continuous         On home O2 flow    Tobacco abuse (Chronic) ICD-10-CM: Z72.0  ICD-9-CM: 305.1  3/5/2017 Yes    Cessation stressed    Diabetes mellitus type 2, controlled ([facility] (Chronic) ICD-10-CM: E11.9  ICD-9-CM: 250.00  3/2/2017 Yes    Chronic--ranges 126-152    Morbid obesity with BMI of 50.0-59.9, adult (HCC) (Chronic) ICD-10-CM: E66.01, Z68.43  ICD-9-CM: 278.01, V85.43  3/2/2017 Yes    Weight loss encouraged    MICHAEL on CPAP (Chronic) ICD-10-CM: G47.33, Z99.89  ICD-9-CM: 327.23, V46.8  3/2/2017 Yes    Tolerating new mask    Hypertension (Chronic) ICD-10-CM: I10  ICD-9-CM: 401.9  3/7/2016 Yes    Chronic--controlled    Chronic back pain (Chronic) ICD-10-CM: M54.9, G89.29  ICD-9-CM: 724.5, 338.29  3/7/2016 Yes    chronic    Obesity hypoventilation syndrome ([facility] ICD-10-CM: L01.1  ICD-9-CM: 278.03  12/18/2015 Yes    chronic            --New onset AFib, on Heparin drip, remote telemetry, cardiology consulted and ECHO pending  --Albuterol, Pulmicort, Mucinex  --Zithromax day 3  --Respiratory culture:  pending  --Solu Medrol 40mg q8h-- wean today, rare wheezing  --Nicotine patch--smoking cessation encouraged  --Using her home machine, tolerating full face mask.  Will need to follow up in our sleep lab for management. --Checking A1AT    More than 50% of the time documented was spent in face-to-face contact with the patient and in the care of the patient on the floor/unit where the patient is located. Jero Trejo NP    Lungs: less wheezing  Heart:  RRR with no Murmur/Rubs/Gallops    Additional Comments:   Wean steroids and decrease BD. Card is following for atrial fib    I have spoken with and examined the patient. I agree with the above assessment and plan as documented.     Shivam Cohen MD

## 2019-08-30 NOTE — PROGRESS NOTES
Pt resting in bed comfortably at this time. Alert and oriented to person place and time. No distress noted at this time. Respirations even and unlabored on 3 L NC. CPAP at the bedside. Heparin infusing in L forearm IV 12 units/kg/hour. IV clean dry and intact with no signs of infection noted at this time. Pt has productive cough periodically. Pt denies pain at this time. Pt instructed to call for assistance if needed. Call light in place. Door open. Will continue to monitor.

## 2019-08-30 NOTE — PROGRESS NOTES
SBAR report received from [facility]. Pt is stable, alert and oriented x4. Denies pain and does not appear to be in distress. Sitting up in bed. Denies any needs at this time. Safety measures in place, bed low and locked, call light in reach. Encouraged to call with any needs.

## 2019-08-30 NOTE — CONSULTS
[facility] Cardiology Consultation        Date of  Admission: 8/27/2019  1:19 PM     Primary Care Physician: Dr. Kwasi Gordon  Primary Cardiologist: Dr. Christine Loza  Referring Physician: Dr. Ricardo Woodward Physician: Dr. Mel Rodriguez    CC/Reason for consult: AF w/ RVR    HPI:  Denise Isidro is a 54 y.o. morbidly obese AAF with h/o COPD, chronic respiratory failure on O2 3L at home, MICHAEL on CPAP, hypoventilation syndrome, DM II, tachycardia, dyslipidemia and tobacco abuse who has had increasing SOB, cough with milky white sputum and was seen at PCP office and found to have hypoxia. She was sent to [facility] for direct admission. She was admitted with COPD exacerbation and is prescribed steroids, antibiotics and nebulizer treatments. Last night she was noted to have tachycardia and placed on remote telemetry and ECG showed AF with RVR. She was given IV BB, IV heparin and [facility] Cardiology was consulted. She has seen Dr. Christine Loza in 4/2018 for tachycardia which was sinus tachycardia and started on Cardizem  mg daily. She had an echo which showed preserved EF and mild LVH. Cardizem CD was increased to 180 mg daily. She denies CP, palpitations, LE swelling, orthopnea or syncope. She also denies any h/o bleeding- no hematuria, BRBPR or melena.       Past Medical History:   Diagnosis Date    Arrhythmia     hx of tachycardia~under control w medication; sees cardiologist- Dr. Melissa Contreras Childhood asthma     Chronic respiratory failure with hypoxia ([address]     COPD (chronic obstructive pulmonary disease) ([address]     currently on O2/NC at 2 liters~24/7    Diabetes ([address]     type 2 ~takes oral med, average blood sugar- 99, Denies hypoglycemia     Hyperlipidemia     Daily meds     Hypertension     Managed with meds     Influenza B 01/30/2019    Microalbuminuria     Microcytosis     Morbid obesity ([address]     Pneumonia ages 3 and 9    Sleep apnea     CPAP every night    Tachycardia       Past Surgical History: Procedure Laterality Date    HX BREAST BIOPSY Left 3/27/2019    LEFT BREAST NEEDLE LOCALIZED LUMPECTOMY  [facility] [address] [facility]ERECTOM[facility]  08/2012       No Known Allergies   Social History     Socioeconomic History    Marital status: SINGLE     Spouse name: Not on file    Number of children: Not on file    Years of education: Not on file    Highest education level: Not on file   Occupational History    Occupation: disabled from good will   Social Needs    Financial resource strain: Not on file    Food insecurity:     Worry: Not on file     Inability: Not on file   [employer] needs:     Medical: Not on file     Non-medical: Not on file   Tobacco Use    Smoking status: Current Some Day Smoker     Packs/day: 1.00     Years: 32.00     Pack years: 32.00     Types: Cigarettes    Smokeless tobacco: Never Used    Tobacco comment: Reduced to 1/2 pack daily   Substance and Sexual Activity    Alcohol use:  Yes     Alcohol/week: 1.0 standard drinks     Types: 1 Shots of liquor per week     Frequency: Never     Comment: rarely    Drug use: Not Currently     Types: Marijuana, Cocaine     Comment: no drugs in over 2 years    Sexual activity: Never     Partners: Male     Birth control/protection: None   Lifestyle    Physical activity:     Days per week: Not on file     Minutes per session: Not on file    Stress: Not on file   Relationships    Social connections:     Talks on phone: Not on file     Gets together: Not on file     Attends Latter day service: Not on file     Active member of club or organization: Not on file     Attends meetings of clubs or organizations: Not on file     Relationship status: Not on file    Intimate partner violence:     Fear of current or ex partner: Not on file     Emotionally abused: Not on file     Physically abused: Not on file     Forced sexual activity: Not on file   Other Topics Concern   [address] Service Not Asked    Blood Transfusions Not Asked    Caffeine Concern Not Asked    Occupational Exposure Not Asked    Hobby Hazards Not Asked    Sleep Concern Not Asked    Stress Concern Not Asked    Weight Concern Not Asked    Special Diet Not Asked    Back Care Not Asked    Exercise Not Asked    Bike Helmet Not Asked   [address] Not Asked    Self-Exams Not Asked   Social History Narrative    Single and lives alone. Disabled--previously worked at [employer]. Has lived in [place] and [place]. No pets.      Family History   Problem Relation Age of Onset    COPD Mother     Hypertension Mother     Parkinsonism Father     Asthma Son     Schizophrenia Brother     Hypertension Brother     Emphysema Paternal Grandfather     Coronary Artery Disease Neg Hx         Current Facility-Administered Medications   Medication Dose Route Frequency    metoprolol (LOPRESSOR) injection 5 mg  5 mg IntraVENous Q6H PRN    dilTIAZem (CARDIZEM) IR tablet 60 mg  60 mg Oral TIDAC    dilTIAZem CD (CARDIZEM CD) capsule 240 mg  240 mg Oral QHS    heparin 25,000 units in dextrose 500 mL infusion  12-25 Units/kg/hr (Adjusted) IntraVENous TITRATE    azithromycin (ZITHROMAX) tablet 500 mg  500 mg Oral Q24H    albuterol (PROVENTIL VENTOLIN) nebulizer solution 2.5 mg  2.5 mg Nebulization Q4H PRN    albuterol (PROVENTIL VENTOLIN) nebulizer solution 2.5 mg  2.5 mg Nebulization Q4H RT    And    budesonide (PULMICORT) 500 mcg/2 ml nebulizer suspension  500 mcg Nebulization BID RT    guaiFENesin-codeine (ROBITUSSIN AC) 100-10 mg/5 mL solution 5 mL  5 mL Oral Q4H PRN    sodium chloride (NS) flush 5-40 mL  5-40 mL IntraVENous Q8H    sodium chloride (NS) flush 5-40 mL  5-40 mL IntraVENous PRN    acetaminophen (TYLENOL) tablet 650 mg  650 mg Oral Q4H PRN    HYDROcodone-acetaminophen (NORCO) 5-325 mg per tablet 1 Tab  1 Tab Oral Q4H PRN    naloxone (NARCAN) injection 0.4 mg  0.4 mg IntraVENous PRN    diphenhydrAMINE (BENADRYL) injection 12.5 mg  12.5 mg IntraVENous Q4H PRN    ondansetron (ZOFRAN ODT) tablet 4 mg  4 mg Oral Q8H PRN    bisacodyl (DULCOLAX) tablet 5 mg  5 mg Oral DAILY PRN    nicotine (NICODERM CQ) 7 mg/24 hr patch 1 Patch  1 Patch TransDERmal Q24H    atorvastatin (LIPITOR) tablet 10 mg  10 mg Oral QHS    guaiFENesin ER (MUCINEX) tablet 1,200 mg  1,200 mg Oral BID    lisinopril (PRINIVIL, ZESTRIL) tablet 5 mg  5 mg Oral DAILY    metFORMIN (GLUCOPHAGE) tablet 500 mg  500 mg Oral BID WITH MEALS    insulin lispro (HUMALOG) injection   SubCUTAneous AC&HS    methylPREDNISolone (PF) (SOLU-MEDROL) injection 40 mg  40 mg IntraVENous Q8H    pantoprazole (PROTONIX) tablet 40 mg  40 mg Oral ACB       Review of symptoms:  General: no recent weight loss/gain, weakness, +fatigue, fever or chills   Skin: no rashes, lumps, or other skin changes   HEENT: no headache, dizziness, lightheadedness, vision changes, hearing changes, tinnitus, vertigo, sinus pressure/pain, bleeding gums, sore throat, or hoarseness   Neck: no swollen glands, goiter, pain or stiffness   Respiratory: +cough with milky white sputum, no hemoptysis, +dyspnea, +wheezing   Cardiovascular: no chest pain or discomfort, palpitations, +dyspnea, orthopnea, paroxysmal nocturnal dyspnea, peripheral edema   Gastrointestinal: no trouble swallowing, heartburn, change of appetite, nausea, change in bowel habits, pain with defecation, rectal bleeding or black/tarry stools, hemorrhoids, constipation, diarrhea, abdominal pain, jaundice, liver or gallbladder problems   Urinary: no frequency, urgency , hematuria, burning/pain with urination, recent flank pain, polyuria, nocturia, or difficulty urinating   Genital: no vaginal or pelvic infections   Peripheral Vascular: no claudication, leg cramps, prior DVTs, swelling of calves, legs, or feet, color change, or swelling with redness or tenderness   Musculoskeletal: no muscle or joint pain/stiffness, joint swelling, erythema of joints, or back pain Psychiatric: no depression, mental disorders, or excessive stress   Neurological: no history of CVA, dizziness, no sensory or motor loss, seizures, syncope, tremors, numbness, tingling, no changes in mood, attention, or speech, no changes in orientation, memory, insight, or judgment. no headache, vertigo. Hematologic: no anemia, easy bruising or bleeding   Endocrine: +diabetes, thyroid problems, heat or cold intolerance, excessive sweating, polyuria, polydipsia        Subjective:   Physical Exam    Visit Vitals  /81   Pulse (!) 114   Temp 98.1 °F (36.7 °C)   Resp 20   Wt 142.7 kg (314 lb 11.2 oz)   LMP 05/26/2010   SpO2 95%   BMI 50.79 kg/m²     General Appearance:  Well developed, obese, alert and oriented x 3, and individual in no acute distress. Ears/Nose/Mouth/Throat:   Hearing grossly normal.         Neck: Supple. Chest:   Lungs clear to auscultation bilaterally. Cardiovascular:  Irregular rate and rhythm, S1, S2   Abdomen:   Soft, obese, non-tender, bowel sounds are active. Extremities: No edema bilaterally. Skin: Warm and dry.        Labs:   Recent Results (from the past 24 hour(s))   GLUCOSE, POC    Collection Time: 08/29/19 11:11 AM   Result Value Ref Range    Glucose (POC) 127 (H) 65 - 100 mg/dL   GLUCOSE, POC    Collection Time: 08/29/19  4:38 PM   Result Value Ref Range    Glucose (POC) 135 (H) 65 - 100 mg/dL   GLUCOSE, POC    Collection Time: 08/29/19  8:23 PM   Result Value Ref Range    Glucose (POC) 152 (H) 65 - 100 mg/dL   EKG, 12 LEAD, INITIAL    Collection Time: 08/30/19  5:03 AM   Result Value Ref Range    Ventricular Rate 133 BPM    Atrial Rate 120 BPM    QRS Duration 70 ms    Q-T Interval 270 ms    QTC Calculation (Bezet) 401 ms    Calculated R Axis -24 degrees    Calculated T Axis 46 degrees    Diagnosis       Atrial fibrillation with rapid ventricular response  Inferior infarct , age undetermined  Abnormal ECG  When compared with ECG of 02-MAR-2017 06:00,  Atrial fibrillation has replaced Sinus rhythm  Vent. rate has increased BY  50 BPM  Inferior infarct is now Present  Non-specific change in ST segment in Inferior leads  ST elevation now present in Lateral leads  T wave inversion now evident in Lateral leads     TROPONIN I    Collection Time: 08/30/19  5:09 AM   Result Value Ref Range    Troponin-I, Qt. <0.02 (L) 0.02 - 0.05 NG/ML   GLUCOSE, POC    Collection Time: 08/30/19  5:24 AM   Result Value Ref Range    Glucose (POC) 135 (H) 65 - 100 mg/dL       Pt has been seen and examined by Dr. Wisam Mohan. He agrees with the following assessment and plan. Assessment/Plan:       Diagnosis    Atrial fibrillation, new onset- on IV heparin per primary team--would change to [address] [place] [address] Eliquis 5 mg q 12 hours, rate improved with IV BB and PO Cardizem-- increase home dose Cardizem CD to 240 mg daily, monitor    Chronic respiratory failure with hypoxia (HCC)- on O2, steroids, nebs per primary team/Pulm    COPD with acute exacerbation (HCC)- on O2, antibiotics, steroids, nebs per primary team/Pulm    Tobacco abuse- cessation encouraged    H/o Cocaine abuse ([address]    Diabetes mellitus type 2, controlled ([address]- meds, SSI per primary team    Morbid obesity with BMI of 50.0-59.9, adult ([address]- weight loss encouraged    MICHAEL on CPAP    Hypertension    Chronic back pain    Obesity hypoventilation syndrome ([address]       Thank you for consulting [facility] Cardiology and allowing us to participate in the care of this patient. We will continue to follow along with you.     Kellee Patel PA-C

## 2019-08-30 NOTE — PROGRESS NOTES
Hospitalist Progress Note    2019  Admit Date: 2019  1:19 PM   NAME: Mark Isidro   :  1970   MRN:  [id number]   Attending: Hilario Beckman DO  PCP:  Chad Loco PA-C    SUBJECTIVE:   HPI: 55 yo F with PMH of severe COPD (GOLD stage 4) on home O2/CPAP, OHS, Morbid obesity, HTN, HLD, DM2 was sent to [facility] as direct admit from pcp office for COPD exacerbation. Pulm consulted. 0n  am, went into A fib RVR and started on hep gtt, cards consulted. : Feels better with improved breathing, but concerned for A Fib, seen by cards, sugars at goal. Used home CPAP overnight. Remains on Hep gtt. Nursing notes and chart reviewed. Review of Systems negative with exception of pertinent positives noted above. PHYSICAL EXAM     Visit Vitals  /81   Pulse (!) 114   Temp 98.1 °F (36.7 °C)   Resp 20   Wt 142.7 kg (314 lb 11.2 oz)   LMP 2010   SpO2 95%   BMI 50.79 kg/m²      Temp (24hrs), Av.1 °F (36.7 °C), Min:97.4 °F (36.3 °C), Max:98.5 °F (36.9 °C)    Oxygen Therapy  O2 Sat (%): 95 % (19 0756)  Pulse via Oximetry: 79 beats per minute (19 0756)  O2 Device: Nasal cannula (19 0756)  PEEP/CPAP (cm H2O): 3 cm H20 (19 1937)  O2 Flow Rate (L/min): 3 l/min (19 0756)  FIO2 (%): 32 % (19 1521)    Intake/Output Summary (Last 24 hours) at 2019 1108  Last data filed at 2019 0857  Gross per 24 hour   Intake 1320 ml   Output 2900 ml   Net -1580 ml         General: No acute distress. Alert.  Obese. Head:  AT/NC  Lungs:  B/l expiratory wheezing. Heart:  IRIR, no murmur, rub, or gallop  Abdomen: Soft, non-distended, non-tender, +bs  Extremities: No cyanosis or clubbing. Neurologic:  No focal deficits. Moves all extremities. Skin:  No Obvious Rash  Psych:  Normal affect. LABS AND STUDIES:  Personally reviewed all labs, meds, and studies for past 24hrs.       ASSESSMENT      Active Hospital Problems    Diagnosis Date Noted    Atrial fibrillation, new onset ([facility] 08/30/2019    Chronic respiratory failure with hypoxia ([facility] 08/28/2019     NC 3L continuous      COPD with acute exacerbation ([facility] 08/27/2019    Tobacco abuse 03/05/2017    MICHAEL on CPAP 03/02/2017    Morbid obesity with BMI of 50.0-59.9, adult ([facility] 03/02/2017    Diabetes mellitus type 2, controlled ([facility] 03/02/2017    Hypertension 03/07/2016    Chronic back pain 03/07/2016    Obesity hypoventilation syndrome ([facility] 12/18/2015           PLAN:  · COPD exacerbation: Hypoxia improved with O2, on 3lnc, -ve Flu, -ve CXR for PNA. c/w duo-nebs, pulmicort, azithromycin and IV steroids. Pulm managing. · Afib with RVR: New onset, [id number] score is 3, started on Hep gtt. Seen by cards, plan to change to Eliquis, Cardiazem increased to 240, further management as per cards. · MICHAEL/OHS: resumed home cpap qhs. · DM2: Resumed metformin, added ISS. · HTN: Resumed home meds. · Tobacco Abuse: Nicotine patch ordered  · Protonix for ppx.  [address] pt  FEN:  Diabetic/cardiac diet  DVT ppx:  lovenox    Dispo: pending improvement, to home when ok with Pulm/cards  Discussed plan with pt who is in agreement. All questions answered.     Signed By: Estrada Samuels MD     August 30, 2019

## 2019-08-30 NOTE — PROGRESS NOTES
Pt resting in bed comfortably. No distress noted at this time. Respirations even and unlabored on 3 L NC. Heparin infusing in left forearm. Pt denies pain at this time. Pt instructed to call for assistance if needed. Call light in place. Door open. Will continue to monitor.

## 2019-08-30 NOTE — PROGRESS NOTES
Pt is stable, alert, resting in bed at this time. Pt is A fib 105 per tele room. Respirations even and unlabored and does not appear to be in distress. Denies any needs at this time. SBAR report given to oncoming nurse.

## 2019-08-30 NOTE — PROGRESS NOTES
Pt resting in bed comfortably at this time. Pt alert and oriented to person place and time. No distress noted. Respirations even and unlabored on 3 L NC. Heparin drip infusing 16 units/kg/hour. Pt instructed to call for assistance if needed. Call light in place. Door open. Will continue to monitor.

## 2019-08-30 NOTE — PROGRESS NOTES
PROGRESS NOTE    I was paged about the patient having a rapid HR. She was placed on telemetry and was noticed to be atrial fibrillation with RVR. A 12-lead EKG confirmed atrial fibrillation with RVR. The patient denies CP/palpitations. She states that she has seen a Cardiology NP (not sure what practice) in the past for fast HR and that's why she is on Cardizem CD. She has never heard the term afib or atrial fibrillation before.     At this point I will:  Give Lopressor IV  Start Cardizem IR 60mg QID for today  Increase Cardizem CD 180mg to 240mg  Start Heparin gtt  Check ECHO  Consult Cardiology for assistance in case she needs cardioversion    Will follow    Brenda Dover, DO

## 2019-08-30 NOTE — PROGRESS NOTES
08/30/19 0028   Oxygen Therapy   O2 Sat (%) 93 %   Pulse via Oximetry 106 beats per minute   O2 Device CPAP mask   O2 Flow Rate (L/min) 3 l/min  (bled in)   Respiratory   Respiratory (WDL) X   Respiratory Pattern Regular   Chest/Tracheal Assessment Chest expansion, symmetrical   Breath Sounds Bilateral Coarse   CPAP/BIPAP   CPAP/BIPAP Start/Stop On   Device Mode CPAP, auto-titrating   Mask Type and Size Full face   Skin Condition Intact   Pt's Home Machine Yes (type/vendor)   Patient placed on home cpap with 3L bled in. Pt will call if needed.

## 2019-08-30 NOTE — PROGRESS NOTES
Problem: Chronic Obstructive Pulmonary Disease (COPD)  Goal: *Oxygen saturation during activity within specified parameters  Outcome: Progressing Towards Goal  Goal: *Able to remain out of bed as prescribed  Outcome: Progressing Towards Goal  Goal: *Absence of hypoxia  Outcome: Progressing Towards Goal  Goal: *Optimize nutritional status  Outcome: Progressing Towards Goal     Problem: Falls - Risk of  Goal: *Absence of Falls  Description  Document Tyler Fells Fall Risk and appropriate interventions in the flowsheet.   Outcome: Progressing Towards Goal  Note:   Fall Risk Interventions:            Medication Interventions: Patient to call before getting OOB

## 2019-08-30 NOTE — PROGRESS NOTES
PTT 33.0. Pharmacy notified of lab value. Bolus ordered and given. Heparin increased 4 units/kg/hour to 16 units/kg/hour. Will continue to monitor.

## 2019-08-31 LAB
ANION GAP SERPL CALC-SCNC: 3 MMOL/L (ref 7–16)
APTT PPP: 102 SEC (ref 24.7–39.8)
APTT PPP: 73.7 SEC (ref 24.7–39.8)
BACTERIA SPEC CULT: NORMAL
BUN SERPL-MCNC: 17 MG/DL (ref 6–23)
CALCIUM SERPL-MCNC: 9.3 MG/DL (ref 8.3–10.4)
CHLORIDE SERPL-SCNC: 97 MMOL/L (ref 98–107)
CO2 SERPL-SCNC: 35 MMOL/L (ref 21–32)
CREAT SERPL-MCNC: 0.69 MG/DL (ref 0.6–1)
GLUCOSE BLD STRIP.AUTO-MCNC: 154 MG/DL (ref 65–100)
GLUCOSE BLD STRIP.AUTO-MCNC: 158 MG/DL (ref 65–100)
GLUCOSE BLD STRIP.AUTO-MCNC: 170 MG/DL (ref 65–100)
GLUCOSE BLD STRIP.AUTO-MCNC: 202 MG/DL (ref 65–100)
GLUCOSE SERPL-MCNC: 165 MG/DL (ref 65–100)
GRAM STN SPEC: NORMAL
MAGNESIUM SERPL-MCNC: 2.4 MG/DL (ref 1.8–2.4)
POTASSIUM SERPL-SCNC: 4.1 MMOL/L (ref 3.5–5.1)
SERVICE CMNT-IMP: NORMAL
SODIUM SERPL-SCNC: 135 MMOL/L (ref 136–145)

## 2019-08-31 PROCEDURE — 83735 ASSAY OF MAGNESIUM: CPT

## 2019-08-31 PROCEDURE — 82962 GLUCOSE BLOOD TEST: CPT

## 2019-08-31 PROCEDURE — 74011250637 HC RX REV CODE- 250/637: Performed by: INTERNAL MEDICINE

## 2019-08-31 PROCEDURE — 65660000000 HC RM CCU STEPDOWN

## 2019-08-31 PROCEDURE — 74011636637 HC RX REV CODE- 636/637: Performed by: INTERNAL MEDICINE

## 2019-08-31 PROCEDURE — 85730 THROMBOPLASTIN TIME PARTIAL: CPT

## 2019-08-31 PROCEDURE — 80048 BASIC METABOLIC PNL TOTAL CA: CPT

## 2019-08-31 PROCEDURE — 94640 AIRWAY INHALATION TREATMENT: CPT

## 2019-08-31 PROCEDURE — 74011250636 HC RX REV CODE- 250/636: Performed by: INTERNAL MEDICINE

## 2019-08-31 PROCEDURE — 99232 SBSQ HOSP IP/OBS MODERATE 35: CPT | Performed by: INTERNAL MEDICINE

## 2019-08-31 PROCEDURE — 36415 COLL VENOUS BLD VENIPUNCTURE: CPT

## 2019-08-31 PROCEDURE — 74011000250 HC RX REV CODE- 250: Performed by: INTERNAL MEDICINE

## 2019-08-31 PROCEDURE — 77010033678 HC OXYGEN DAILY

## 2019-08-31 PROCEDURE — 94760 N-INVAS EAR/PLS OXIMETRY 1: CPT

## 2019-08-31 RX ORDER — HEPARIN SODIUM 5000 [USP'U]/ML
35 INJECTION, SOLUTION INTRAVENOUS; SUBCUTANEOUS ONCE
Status: COMPLETED | OUTPATIENT
Start: 2019-08-31 | End: 2019-08-31

## 2019-08-31 RX ADMIN — Medication 10 ML: at 21:02

## 2019-08-31 RX ADMIN — GUAIFENESIN 1200 MG: 600 TABLET, EXTENDED RELEASE ORAL at 17:56

## 2019-08-31 RX ADMIN — ALBUTEROL SULFATE 2.5 MG: 2.5 SOLUTION RESPIRATORY (INHALATION) at 01:10

## 2019-08-31 RX ADMIN — LISINOPRIL 5 MG: 5 TABLET ORAL at 09:41

## 2019-08-31 RX ADMIN — ALBUTEROL SULFATE 2.5 MG: 2.5 SOLUTION RESPIRATORY (INHALATION) at 08:39

## 2019-08-31 RX ADMIN — APIXABAN 5 MG: 2.5 TABLET, FILM COATED ORAL at 17:56

## 2019-08-31 RX ADMIN — AZITHROMYCIN 500 MG: 250 TABLET, FILM COATED ORAL at 17:57

## 2019-08-31 RX ADMIN — Medication 10 ML: at 05:04

## 2019-08-31 RX ADMIN — DILTIAZEM HYDROCHLORIDE 240 MG: 240 CAPSULE, COATED, EXTENDED RELEASE ORAL at 21:00

## 2019-08-31 RX ADMIN — ALBUTEROL SULFATE 2.5 MG: 2.5 SOLUTION RESPIRATORY (INHALATION) at 14:06

## 2019-08-31 RX ADMIN — ATORVASTATIN CALCIUM 10 MG: 10 TABLET, FILM COATED ORAL at 21:00

## 2019-08-31 RX ADMIN — METFORMIN HYDROCHLORIDE 500 MG: 500 TABLET, FILM COATED ORAL at 17:56

## 2019-08-31 RX ADMIN — HEPARIN SODIUM 5000 UNITS: 5000 INJECTION INTRAVENOUS; SUBCUTANEOUS at 13:21

## 2019-08-31 RX ADMIN — ALBUTEROL SULFATE 2.5 MG: 2.5 SOLUTION RESPIRATORY (INHALATION) at 19:45

## 2019-08-31 RX ADMIN — METFORMIN HYDROCHLORIDE 500 MG: 500 TABLET, FILM COATED ORAL at 09:41

## 2019-08-31 RX ADMIN — BISACODYL 5 MG: 5 TABLET, COATED ORAL at 21:07

## 2019-08-31 RX ADMIN — INSULIN LISPRO 4 UNITS: 100 INJECTION, SOLUTION INTRAVENOUS; SUBCUTANEOUS at 12:32

## 2019-08-31 RX ADMIN — BUDESONIDE 500 MCG: 0.5 INHALANT RESPIRATORY (INHALATION) at 19:45

## 2019-08-31 RX ADMIN — BUDESONIDE 500 MCG: 0.5 INHALANT RESPIRATORY (INHALATION) at 08:39

## 2019-08-31 RX ADMIN — INSULIN LISPRO 2 UNITS: 100 INJECTION, SOLUTION INTRAVENOUS; SUBCUTANEOUS at 06:38

## 2019-08-31 RX ADMIN — METHYLPREDNISOLONE SODIUM SUCCINATE 40 MG: 40 INJECTION, POWDER, FOR SOLUTION INTRAMUSCULAR; INTRAVENOUS at 05:04

## 2019-08-31 RX ADMIN — METHYLPREDNISOLONE SODIUM SUCCINATE 40 MG: 40 INJECTION, POWDER, FOR SOLUTION INTRAMUSCULAR; INTRAVENOUS at 21:00

## 2019-08-31 RX ADMIN — GUAIFENESIN 1200 MG: 600 TABLET, EXTENDED RELEASE ORAL at 09:41

## 2019-08-31 RX ADMIN — METHYLPREDNISOLONE SODIUM SUCCINATE 40 MG: 40 INJECTION, POWDER, FOR SOLUTION INTRAMUSCULAR; INTRAVENOUS at 13:22

## 2019-08-31 RX ADMIN — Medication 10 ML: at 13:22

## 2019-08-31 RX ADMIN — HEPARIN SODIUM 18 UNITS/KG/HR: 5000 INJECTION, SOLUTION INTRAVENOUS at 01:32

## 2019-08-31 RX ADMIN — INSULIN LISPRO 2 UNITS: 100 INJECTION, SOLUTION INTRAVENOUS; SUBCUTANEOUS at 17:57

## 2019-08-31 RX ADMIN — PANTOPRAZOLE SODIUM 40 MG: 40 TABLET, DELAYED RELEASE ORAL at 05:04

## 2019-08-31 RX ADMIN — INSULIN LISPRO 2 UNITS: 100 INJECTION, SOLUTION INTRAVENOUS; SUBCUTANEOUS at 21:00

## 2019-08-31 NOTE — PROGRESS NOTES
Sammi Isidro  Admission Date: 8/27/2019             Daily Progress Note: 8/31/2019    The patient's chart is reviewed and the patient is discussed with the staff. 54 y.o. AAF evaluated at the request of Dr. Frederick Layne for COPD exacerbation with shortness of breath, productive cough and wheezing. Was last seen in our office 3/4/19 for poorly controlled severe COPD (FEV1 1.23, 42% in 2016) and in need of breast surgery (left lumpectomy with benign intraductal papilloma). States had not been back to her normal from breathing standpoint since influenza in January/Feb 2019. Has been compliant with home meds, nebs and O2 at 3L. Has not been using BIPAP due to mask breakdown and possible machine malfunction--has not called for assistance and doesn't remember DME name of company. Has unfortunately continued to smoke about 1/3 PPD--states she quit 2 weeks ago when symptoms started.        Chronic Medical:  O2 dependent requiring NC 3L ([facility]),  Gold stage IV COPD, morbid obesity, tobacco abuse, HTN, DM, MICHAEL with home BIPAP (22/12 with 3L O2 bleed in), HLD, chronic back pain. Subjective:     Sitting up in bed, states is breathing is better   Used home CPAP machine with full face mask--tolerated fairly well.     Current Facility-Administered Medications   Medication Dose Route Frequency    metoprolol (LOPRESSOR) injection 5 mg  5 mg IntraVENous Q6H PRN    dilTIAZem CD (CARDIZEM CD) capsule 240 mg  240 mg Oral QHS    heparin 25,000 units in dextrose 500 mL infusion  12-25 Units/kg/hr (Adjusted) IntraVENous TITRATE    albuterol (PROVENTIL VENTOLIN) nebulizer solution 2.5 mg  2.5 mg Nebulization Q6H RT    And    budesonide (PULMICORT) 500 mcg/2 ml nebulizer suspension  500 mcg Nebulization BID RT    azithromycin (ZITHROMAX) tablet 500 mg  500 mg Oral Q24H    albuterol (PROVENTIL VENTOLIN) nebulizer solution 2.5 mg  2.5 mg Nebulization Q4H PRN    guaiFENesin-codeine (ROBITUSSIN AC) 100-10 mg/5 mL solution 5 mL  5 mL Oral Q4H PRN    sodium chloride (NS) flush 5-40 mL  5-40 mL IntraVENous Q8H    sodium chloride (NS) flush 5-40 mL  5-40 mL IntraVENous PRN    acetaminophen (TYLENOL) tablet 650 mg  650 mg Oral Q4H PRN    HYDROcodone-acetaminophen (NORCO) 5-325 mg per tablet 1 Tab  1 Tab Oral Q4H PRN    naloxone (NARCAN) injection 0.4 mg  0.4 mg IntraVENous PRN    diphenhydrAMINE (BENADRYL) injection 12.5 mg  12.5 mg IntraVENous Q4H PRN    ondansetron (ZOFRAN ODT) tablet 4 mg  4 mg Oral Q8H PRN    bisacodyl (DULCOLAX) tablet 5 mg  5 mg Oral DAILY PRN    nicotine (NICODERM CQ) 7 mg/24 hr patch 1 Patch  1 Patch TransDERmal Q24H    atorvastatin (LIPITOR) tablet 10 mg  10 mg Oral QHS    guaiFENesin ER (MUCINEX) tablet 1,200 mg  1,200 mg Oral BID    lisinopril (PRINIVIL, ZESTRIL) tablet 5 mg  5 mg Oral DAILY    metFORMIN (GLUCOPHAGE) tablet 500 mg  500 mg Oral BID WITH MEALS    insulin lispro (HUMALOG) injection   SubCUTAneous AC&HS    methylPREDNISolone (PF) (SOLU-MEDROL) injection 40 mg  40 mg IntraVENous Q8H    pantoprazole (PROTONIX) tablet 40 mg  40 mg Oral ACB       Review of Systems  Constitutional: negative for fever, chills, sweats  Cardiovascular: negative for chest pain, palpitations, syncope, edema  Gastrointestinal:  negative for dysphagia, reflux, vomiting, diarrhea, abdominal pain, or melena  Neurologic:  negative for focal weakness, numbness, headache    Objective:     Vitals:    08/31/19 0110 08/31/19 0448 08/31/19 0735 08/31/19 0842   BP:  129/76 128/79    Pulse:  77 80    Resp:  22 22    Temp:  97.8 °F (36.6 °C) 98.1 °F (36.7 °C)    SpO2: 94% 94% 95% 95%   Weight:             Intake/Output Summary (Last 24 hours) at 8/31/2019 1052  Last data filed at 8/31/2019 2177  Gross per 24 hour   Intake 1516.25 ml   Output 2875 ml   Net -1358.75 ml       Physical Exam:   Constitutional:  the patient is morbidly obese and in no acute distress, NC 3L sat 93%  EENMT: Sclera clear, pupils equal, oral mucosa moist  Respiratory: rare wheezes, productive cough with \"milky\" colored secretions but less now  Cardiovascular:  irregular, AFib without M,G,R  Gastrointestinal: soft, obese and non-tender; with positive bowel sounds. Musculoskeletal: warm without cyanosis. There is no lower extremity edema. Skin:  no jaundice or rashes, no wounds   Neurologic: no gross neuro deficits     Psychiatric:  alert and oriented x 3    CHEST XRAY:   8/27/19:  Mild lingular atelectasis/infiltrate. LAB  Recent Labs     08/31/19  0537 08/30/19  2030 08/30/19  1600 08/30/19  1206 08/30/19  0524   GLUCPOC 154* 136* 120* 128* 135*      No results for input(s): WBC, HGB, HCT, PLT, INR, HGBEXT, HCTEXT, PLTEXT, HGBEXT, HCTEXT, PLTEXT in the last 72 hours. No lab exists for component: Xu Hernandez  Recent Labs     08/31/19  0342 08/30/19  0509 08/29/19  0636   *  --  136   K 4.1  --  3.8   CL 97*  --  96*   CO2 35*  --  37*   *  --  130*   BUN 17  --  10   CREA 0.69  --  0.59*   MG 2.4  --   --    CA 9.3  --  9.8   TROIQ  --  <0.02*  --      No results for input(s): PH, PCO2, PO2, HCO3, PHI, PCO2I, PO2I, HCO3I in the last 72 hours. No results for input(s): LCAD, LAC in the last 72 hours.       Assessment:  (Medical Decision Making)     Patient Active Problem List   Diagnosis Code    COPD, severe ([facility] J44.9    Obesity hypoventilation syndrome ([facility] E66.2    Diabetes mellitus type 2, controlled ([facility] E11.9    Morbid obesity with BMI of 50.0-59.9, adult ([facility] E66.01, Z68.43    Hypertension I10    Chronic back pain M54.9, G89.29    Nocturnal hypoxemia G47.34    MICHAEL on CPAP G47.33, Z99.89    Tobacco abuse Z72.0    Cocaine abuse ([facility] F14.10    COPD with acute exacerbation (HCC) J44.1    Chronic respiratory failure with hypoxia ([facility]) J96.11    Atrial fibrillation, new onset ([facility] I48.91         Plan:  (Medical Decision Making)     Hospital Problems  Date Reviewed: 8/29/2019 Codes Class Noted POA    * (Principal) COPD with acute exacerbation ([facility] ICD-10-CM: J44.1  ICD-9-CM: 491.21  8/27/2019 Yes    Less wheezing--wean steroids    Atrial fibrillation, new onset ([facility] ICD-10-CM: I48.91  ICD-9-CM: 427.31  8/30/2019 No    Cardiology consutled    Chronic respiratory failure with hypoxia (HCC) (Chronic) ICD-10-CM: J96.11  ICD-9-CM: 518.83, 799.02  8/28/2019 Yes    Overview Signed 8/28/2019  8:51 AM by Ej Whalen NP     NC 3L continuous         On home O2 flow    Tobacco abuse (Chronic) ICD-10-CM: Z72.0  ICD-9-CM: 305.1  3/5/2017 Yes    Cessation stressed    Diabetes mellitus type 2, controlled ([facility] (Chronic) ICD-10-CM: E11.9  ICD-9-CM: 250.00  3/2/2017 Yes    Chronic--ranges 126-152    Morbid obesity with BMI of 50.0-59.9, adult (HCC) (Chronic) ICD-10-CM: E66.01, Z68.43  ICD-9-CM: 278.01, V85.43  3/2/2017 Yes    Weight loss encouraged    MICHAEL on CPAP (Chronic) ICD-10-CM: G47.33, Z99.89  ICD-9-CM: 327.23, V46.8  3/2/2017 Yes    Tolerating new mask    Hypertension (Chronic) ICD-10-CM: I10  ICD-9-CM: 401.9  3/7/2016 Yes    Chronic--controlled    Chronic back pain (Chronic) ICD-10-CM: M54.9, G89.29  ICD-9-CM: 724.5, 338.29  3/7/2016 Yes    chronic    Obesity hypoventilation syndrome ([facility] ICD-10-CM: X39.6  ICD-9-CM: 278.03  12/18/2015 Yes    chronic            --New onset AFib, on Heparin drip, remote telemetry, cardiology consulted   --Albuterol, Pulmicort, Mucinex  --Zithromax day 4  --Respiratory culture:  pending  --Solu Medrol 40mg q12h  --Nicotine patch--smoking cessation encouraged  --Using her home machine, tolerating full face mask. Will need to follow up in our sleep lab for management. More than 50% of the time documented was spent in face-to-face contact with the patient and in the care of the patient on the floor/unit where the patient is located.     Vincenzo Medina MD

## 2019-08-31 NOTE — PROGRESS NOTES
Bedside report [facility] Assessment done as noted  Respiration even and unlabored 20/min; denies pain or nausea at present. Remains on remote tele with NSR 83 confirmed per monitor tech. Encouraged to call with needs.

## 2019-08-31 NOTE — PROGRESS NOTES
Bedside shift report given to Geo Grier RN. Respirations regular rate & rhythm on 3L oxygen via nasal cannula. No s/sx of distress.

## 2019-08-31 NOTE — PROGRESS NOTES
IV heparin rate has been adjusted based on the most recent PTT results. Lab Results       Component                Value               Date/Time                  aPTT                     73.7 (H)            08/31/2019 11:30 AM    Heparin drip adjusted per protocol. Heparin bolus given. Rate increased by 2u/kg/hr. New rate 20u/kg/hr  37.1cc/hr. Next PTT due in 6 hours at [address].

## 2019-08-31 NOTE — PROGRESS NOTES
Bedside shift received from [facility]. Respirations regular rate & rhythm, on 3L oxygen via nasal cannula. No s/sx of distress. Resting quietly in bed. Bed in low & locked position. Call light and personal belongings within reach.

## 2019-08-31 NOTE — PROGRESS NOTES
Patient sitting up in bed, watching tv. Placed back on 3L oxygen via nasal cannula; respirations regular rate & rhythm. dyspneic when walking to bathroom, recovers quickly. Heparin drip at therapeutic range. No s/sx of distress. Bed in low & locked position. Call light and personal belongings within reach.

## 2019-09-01 LAB
ANION GAP SERPL CALC-SCNC: 4 MMOL/L (ref 7–16)
BUN SERPL-MCNC: 13 MG/DL (ref 6–23)
CALCIUM SERPL-MCNC: 9.4 MG/DL (ref 8.3–10.4)
CHLORIDE SERPL-SCNC: 97 MMOL/L (ref 98–107)
CO2 SERPL-SCNC: 35 MMOL/L (ref 21–32)
CREAT SERPL-MCNC: 0.69 MG/DL (ref 0.6–1)
ERYTHROCYTE [DISTWIDTH] IN BLOOD BY AUTOMATED COUNT: 15.8 % (ref 11.9–14.6)
GLUCOSE BLD STRIP.AUTO-MCNC: 103 MG/DL (ref 65–100)
GLUCOSE BLD STRIP.AUTO-MCNC: 129 MG/DL (ref 65–100)
GLUCOSE BLD STRIP.AUTO-MCNC: 148 MG/DL (ref 65–100)
GLUCOSE BLD STRIP.AUTO-MCNC: 182 MG/DL (ref 65–100)
GLUCOSE SERPL-MCNC: 105 MG/DL (ref 65–100)
HCT VFR BLD AUTO: 42.3 % (ref 35.8–46.3)
HGB BLD-MCNC: 12.2 G/DL (ref 11.7–15.4)
MCH RBC QN AUTO: 22 PG (ref 26.1–32.9)
MCHC RBC AUTO-ENTMCNC: 28.8 G/DL (ref 31.4–35)
MCV RBC AUTO: 76.4 FL (ref 79.6–97.8)
NRBC # BLD: 0.02 K/UL (ref 0–0.2)
PLATELET # BLD AUTO: 327 K/UL (ref 150–450)
PMV BLD AUTO: 10.9 FL (ref 9.4–12.3)
POTASSIUM SERPL-SCNC: 4.1 MMOL/L (ref 3.5–5.1)
RBC # BLD AUTO: 5.54 M/UL (ref 4.05–5.2)
SODIUM SERPL-SCNC: 136 MMOL/L (ref 136–145)
WBC # BLD AUTO: 15.1 K/UL (ref 4.3–11.1)

## 2019-09-01 PROCEDURE — 94640 AIRWAY INHALATION TREATMENT: CPT

## 2019-09-01 PROCEDURE — 74011250637 HC RX REV CODE- 250/637: Performed by: INTERNAL MEDICINE

## 2019-09-01 PROCEDURE — 74011000250 HC RX REV CODE- 250: Performed by: INTERNAL MEDICINE

## 2019-09-01 PROCEDURE — 85027 COMPLETE CBC AUTOMATED: CPT

## 2019-09-01 PROCEDURE — 65660000000 HC RM CCU STEPDOWN

## 2019-09-01 PROCEDURE — 74011250636 HC RX REV CODE- 250/636: Performed by: INTERNAL MEDICINE

## 2019-09-01 PROCEDURE — 80048 BASIC METABOLIC PNL TOTAL CA: CPT

## 2019-09-01 PROCEDURE — 99232 SBSQ HOSP IP/OBS MODERATE 35: CPT | Performed by: INTERNAL MEDICINE

## 2019-09-01 PROCEDURE — 94760 N-INVAS EAR/PLS OXIMETRY 1: CPT

## 2019-09-01 PROCEDURE — 36415 COLL VENOUS BLD VENIPUNCTURE: CPT

## 2019-09-01 PROCEDURE — 74011636637 HC RX REV CODE- 636/637: Performed by: INTERNAL MEDICINE

## 2019-09-01 PROCEDURE — 77010033678 HC OXYGEN DAILY

## 2019-09-01 PROCEDURE — 82962 GLUCOSE BLOOD TEST: CPT

## 2019-09-01 RX ADMIN — METHYLPREDNISOLONE SODIUM SUCCINATE 40 MG: 40 INJECTION, POWDER, FOR SOLUTION INTRAMUSCULAR; INTRAVENOUS at 22:34

## 2019-09-01 RX ADMIN — APIXABAN 5 MG: 2.5 TABLET, FILM COATED ORAL at 17:33

## 2019-09-01 RX ADMIN — INSULIN LISPRO 2 UNITS: 100 INJECTION, SOLUTION INTRAVENOUS; SUBCUTANEOUS at 12:44

## 2019-09-01 RX ADMIN — APIXABAN 5 MG: 2.5 TABLET, FILM COATED ORAL at 06:14

## 2019-09-01 RX ADMIN — BUDESONIDE 500 MCG: 0.5 INHALANT RESPIRATORY (INHALATION) at 07:34

## 2019-09-01 RX ADMIN — DILTIAZEM HYDROCHLORIDE 240 MG: 240 CAPSULE, COATED, EXTENDED RELEASE ORAL at 22:32

## 2019-09-01 RX ADMIN — ATORVASTATIN CALCIUM 10 MG: 10 TABLET, FILM COATED ORAL at 22:32

## 2019-09-01 RX ADMIN — Medication 10 ML: at 06:14

## 2019-09-01 RX ADMIN — ALBUTEROL SULFATE 2.5 MG: 2.5 SOLUTION RESPIRATORY (INHALATION) at 14:13

## 2019-09-01 RX ADMIN — Medication 10 ML: at 22:34

## 2019-09-01 RX ADMIN — GUAIFENESIN AND CODEINE PHOSPHATE 5 ML: 10; 100 LIQUID ORAL at 17:32

## 2019-09-01 RX ADMIN — METFORMIN HYDROCHLORIDE 500 MG: 500 TABLET, FILM COATED ORAL at 17:33

## 2019-09-01 RX ADMIN — METFORMIN HYDROCHLORIDE 500 MG: 500 TABLET, FILM COATED ORAL at 09:39

## 2019-09-01 RX ADMIN — PANTOPRAZOLE SODIUM 40 MG: 40 TABLET, DELAYED RELEASE ORAL at 06:14

## 2019-09-01 RX ADMIN — ALBUTEROL SULFATE 2.5 MG: 2.5 SOLUTION RESPIRATORY (INHALATION) at 07:34

## 2019-09-01 RX ADMIN — GUAIFENESIN AND CODEINE PHOSPHATE 5 ML: 10; 100 LIQUID ORAL at 09:39

## 2019-09-01 RX ADMIN — ALBUTEROL SULFATE 2.5 MG: 2.5 SOLUTION RESPIRATORY (INHALATION) at 19:54

## 2019-09-01 RX ADMIN — LISINOPRIL 5 MG: 5 TABLET ORAL at 09:39

## 2019-09-01 RX ADMIN — BUDESONIDE 500 MCG: 0.5 INHALANT RESPIRATORY (INHALATION) at 19:54

## 2019-09-01 RX ADMIN — GUAIFENESIN 1200 MG: 600 TABLET, EXTENDED RELEASE ORAL at 17:33

## 2019-09-01 RX ADMIN — GUAIFENESIN AND CODEINE PHOSPHATE 5 ML: 10; 100 LIQUID ORAL at 22:42

## 2019-09-01 RX ADMIN — GUAIFENESIN 1200 MG: 600 TABLET, EXTENDED RELEASE ORAL at 09:39

## 2019-09-01 RX ADMIN — Medication 5 ML: at 15:26

## 2019-09-01 RX ADMIN — METHYLPREDNISOLONE SODIUM SUCCINATE 40 MG: 40 INJECTION, POWDER, FOR SOLUTION INTRAMUSCULAR; INTRAVENOUS at 06:14

## 2019-09-01 NOTE — PROGRESS NOTES
Patient resting quietly with eyes closed/sleeping upon entering room. Patient reports sleeping well throughout evening. Respirations regular rate & rhythm on CPAP mask. Patient educated on expected results & risks of anticoagulation therapy. Patient verbalized understanding of ways to avoid excessive bleeding as well as when to call the physician/go to ED. Patient verbalized attempt to return to sleep. No s/sx of distress. Bed in low & locked position. Call light and personal belongings within reach.

## 2019-09-01 NOTE — PROGRESS NOTES
Bedside shift received from [facility]. Respirations regular rate & rhythm, on 3L oxygen via nasal cannula. No s/sx of distress. Resting quietly in bed. Bed in low & locked position. Call light and personal belongings within reach.

## 2019-09-01 NOTE — PROGRESS NOTES
Bedside report received from night nurse Vinod Bhat Assessment done as noted  Respiration even and unlabored 20/min; denies pain or nausea at present. Remains on remote tele with NSR 78 confirmed per monitor tech. Door open. Encouraged to call with needs.

## 2019-09-01 NOTE — PROGRESS NOTES
Lilian Isidro  Admission Date: 8/27/2019             Daily Progress Note: 9/1/2019    The patient's chart is reviewed and the patient is discussed with the staff. 54 y.o. AAF evaluated at the request of Dr. Charissa Thomason for COPD exacerbation with shortness of breath, productive cough and wheezing. Was last seen in our office 3/4/19 for poorly controlled severe COPD (FEV1 1.23, 42% in 2016) and in need of breast surgery (left lumpectomy with benign intraductal papilloma). States had not been back to her normal from breathing standpoint since influenza in January/Feb 2019. Has been compliant with home meds, nebs and O2 at 3L. Has not been using BIPAP due to mask breakdown and possible machine malfunction--has not called for assistance and doesn't remember DME name of company. Has unfortunately continued to smoke about 1/3 PPD--states she quit 2 weeks ago when symptoms started.        Chronic Medical:  O2 dependent requiring NC 3L ([facility]),  Gold stage IV COPD, morbid obesity, tobacco abuse, HTN, DM, MICHAEL with home BIPAP (22/12 with 3L O2 bleed in), HLD, chronic back pain. Subjective:     Sitting up in chair, states is breathing is better   Used home CPAP machine with full face mask--tolerated fairly well.   Off heparin and on Eliquis    Current Facility-Administered Medications   Medication Dose Route Frequency    apixaban (ELIQUIS) tablet 5 mg  5 mg Oral Q12H    metoprolol (LOPRESSOR) injection 5 mg  5 mg IntraVENous Q6H PRN    dilTIAZem CD (CARDIZEM CD) capsule 240 mg  240 mg Oral QHS    albuterol (PROVENTIL VENTOLIN) nebulizer solution 2.5 mg  2.5 mg Nebulization Q6H RT    And    budesonide (PULMICORT) 500 mcg/2 ml nebulizer suspension  500 mcg Nebulization BID RT    albuterol (PROVENTIL VENTOLIN) nebulizer solution 2.5 mg  2.5 mg Nebulization Q4H PRN    guaiFENesin-codeine (ROBITUSSIN AC) 100-10 mg/5 mL solution 5 mL  5 mL Oral Q4H PRN    sodium chloride (NS) flush 5-40 mL  5-40 mL IntraVENous Q8H    sodium chloride (NS) flush 5-40 mL  5-40 mL IntraVENous PRN    acetaminophen (TYLENOL) tablet 650 mg  650 mg Oral Q4H PRN    HYDROcodone-acetaminophen (NORCO) 5-325 mg per tablet 1 Tab  1 Tab Oral Q4H PRN    naloxone (NARCAN) injection 0.4 mg  0.4 mg IntraVENous PRN    diphenhydrAMINE (BENADRYL) injection 12.5 mg  12.5 mg IntraVENous Q4H PRN    ondansetron (ZOFRAN ODT) tablet 4 mg  4 mg Oral Q8H PRN    bisacodyl (DULCOLAX) tablet 5 mg  5 mg Oral DAILY PRN    nicotine (NICODERM CQ) 7 mg/24 hr patch 1 Patch  1 Patch TransDERmal Q24H    atorvastatin (LIPITOR) tablet 10 mg  10 mg Oral QHS    guaiFENesin ER (MUCINEX) tablet 1,200 mg  1,200 mg Oral BID    lisinopril (PRINIVIL, ZESTRIL) tablet 5 mg  5 mg Oral DAILY    metFORMIN (GLUCOPHAGE) tablet 500 mg  500 mg Oral BID WITH MEALS    insulin lispro (HUMALOG) injection   SubCUTAneous AC&HS    methylPREDNISolone (PF) (SOLU-MEDROL) injection 40 mg  40 mg IntraVENous Q8H    pantoprazole (PROTONIX) tablet 40 mg  40 mg Oral ACB       Review of Systems  Constitutional: negative for fever, chills, sweats  Cardiovascular: negative for chest pain, palpitations, syncope, edema  Gastrointestinal:  negative for dysphagia, reflux, vomiting, diarrhea, abdominal pain, or melena  Neurologic:  negative for focal weakness, numbness, headache    Objective:     Vitals:    08/31/19 2301 09/01/19 0255 09/01/19 0736 09/01/19 0754   BP: 131/71 154/69  (!) 147/94   Pulse: 87 77  78   Resp: 19 18 18   Temp: 97.9 °F (36.6 °C) 98.3 °F (36.8 °C)  98.2 °F (36.8 °C)   SpO2: 93% 95% 97% 96%   Weight:             Intake/Output Summary (Last 24 hours) at 9/1/2019 1051  Last data filed at 9/1/2019 0933  Gross per 24 hour   Intake 838 ml   Output 2100 ml   Net -1262 ml       Physical Exam:   Constitutional:  the patient is morbidly obese and in no acute distress, NC 3L sat 93%  EENMT:  Sclera clear, pupils equal, oral mucosa moist  Respiratory: no wheezes,   Cardiovascular:  irregular, AFib without M,G,R  Gastrointestinal: soft, obese and non-tender; with positive bowel sounds. Musculoskeletal: warm without cyanosis. There is no lower extremity edema. Skin:  no jaundice or rashes, no wounds   Neurologic: no gross neuro deficits     Psychiatric:  alert and oriented x 3    CHEST XRAY:   8/27/19:  Mild lingular atelectasis/infiltrate. LAB  Recent Labs     09/01/19  0543 08/31/19  2003 08/31/19  1544 08/31/19  1111 08/31/19  0537   GLUCPOC 103* 170* 158* 202* 154*      Recent Labs     09/01/19  0641   WBC 15.1*   HGB 12.2   HCT 42.3        Recent Labs     09/01/19  0641 08/31/19  0342 08/30/19  0509    135*  --    K 4.1 4.1  --    CL 97* 97*  --    CO2 35* 35*  --    * 165*  --    BUN 13 17  --    CREA 0.69 0.69  --    MG  --  2.4  --    CA 9.4 9.3  --    TROIQ  --   --  <0.02*     No results for input(s): PH, PCO2, PO2, HCO3, PHI, PCO2I, PO2I, HCO3I in the last 72 hours. No results for input(s): LCAD, LAC in the last 72 hours.       Assessment:  (Medical Decision Making)     Patient Active Problem List   Diagnosis Code    COPD, severe ([facility] J44.9    Obesity hypoventilation syndrome ([facility] E66.2    Diabetes mellitus type 2, controlled ([address] E11.9    Morbid obesity with BMI of 50.0-59.9, adult ([address] E66.01, Z68.43    Hypertension I10    Chronic back pain M54.9, G89.29    Nocturnal hypoxemia G47.34    MICHAEL on CPAP G47.33, Z99.89    Tobacco abuse Z72.0    Cocaine abuse (HCC) F14.10    COPD with acute exacerbation (HCC) J44.1    Chronic respiratory failure with hypoxia (HCC) J96.11    Atrial fibrillation, new onset ([address] I48.91         Plan:  (Medical Decision Making)     Hospital Problems  Date Reviewed: 8/29/2019          Codes Class Noted POA    * (Principal) COPD with acute exacerbation ([facility] ICD-10-CM: J44.1  ICD-9-CM: 491.21  8/27/2019 Yes    No wheezing--wean steroids    Atrial fibrillation, new onset ([facility] ICD-10-CM: I48.91  ICD-9-CM: 427.31  8/30/2019 No    Cardiology consutled    Chronic respiratory failure with hypoxia [facility]) (Chronic) ICD-10-CM: J96.11  ICD-9-CM: 518.83, 799.02  8/28/2019 Yes    Overview Signed 8/28/2019  8:51 AM by Basia Oakes NP     NC 3L continuous         On home O2 flow    Tobacco abuse (Chronic) ICD-10-CM: Z72.0  ICD-9-CM: 305.1  3/5/2017 Yes    Cessation stressed    Diabetes mellitus type 2, controlled ([facility] (Chronic) ICD-10-CM: E11.9  ICD-9-CM: 250.00  3/2/2017 Yes    Chronic--ranges 103-170      Morbid obesity with BMI of 50.0-59.9, adult (HCC) (Chronic) ICD-10-CM: E66.01, Z68.43  ICD-9-CM: 278.01, V85.43  3/2/2017 Yes    Weight loss encouraged    MICHAEL on CPAP (Chronic) ICD-10-CM: G47.33, Z99.89  ICD-9-CM: 327.23, V46.8  3/2/2017 Yes    Tolerating new mask    Hypertension (Chronic) ICD-10-CM: I10  ICD-9-CM: 401.9  3/7/2016 Yes    Chronic--controlled    Chronic back pain (Chronic) ICD-10-CM: M54.9, G89.29  ICD-9-CM: 724.5, 338.29  3/7/2016 Yes    chronic    Obesity hypoventilation syndrome ([facility] ICD-10-CM: Q39.1  ICD-9-CM: 278.03  12/18/2015 Yes    chronic            --New onset AFib, on Heparin drip, remote telemetry, cardiology consulted   --Albuterol, Pulmicort, Mucinex  --Zithromax day 5/5  --Respiratory culture:  pending  --Solu Medrol 40mg q8 hr, wean  --Nicotine patch--smoking cessation encouraged  --Using her home machine, tolerating full face mask. Will need to follow up in our sleep lab for management. More than 50% of the time documented was spent in face-to-face contact with the patient and in the care of the patient on the floor/unit where the patient is located.     Campbell Molina MD

## 2019-09-01 NOTE — PROGRESS NOTES
Hospitalist Progress Note    2019  Admit Date: 2019  1:19 PM   NAME: Andie Isidro   :  1970   MRN:  [id number]   Attending: Johanny Castro DO  PCP:  Cade Denise PA-C    SUBJECTIVE:   HPI: 55 yo F with PMH of severe COPD (GOLD stage 4) on home O2/CPAP, OHS, Morbid obesity, HTN, HLD, DM2 was sent to [facility] as direct admit from pcp office for COPD exacerbation. Pulm consulted. 0n  am, went into A fib RVR and started on hep gtt, cards consulted. : patient was seen at bedside. Feels better. Lungs are more clear. Dose of steroids reduced today. Nursing notes and chart reviewed. Review of Systems negative with exception of pertinent positives noted above. PHYSICAL EXAM     Visit Vitals  /73 (BP 1 Location: Right arm, BP Patient Position: At rest)   Pulse 88   Temp 98.4 °F (36.9 °C)   Resp 18   Wt 142.7 kg (314 lb 11.2 oz)   LMP 2010   SpO2 95%   BMI 50.79 kg/m²      Temp (24hrs), Av.2 °F (36.8 °C), Min:97.9 °F (36.6 °C), Max:98.4 °F (36.9 °C)    Oxygen Therapy  O2 Sat (%): 95 % (19 1530)  Pulse via Oximetry: 89 beats per minute (19 1415)  O2 Device: Nasal cannula (19 141)  PEEP/CPAP (cm H2O): 3 cm H20 (19 195)  O2 Flow Rate (L/min): 3 l/min (19 1415)  FIO2 (%): 32 % (19 1521)    Intake/Output Summary (Last 24 hours) at 2019 1717  Last data filed at 2019 1704  Gross per 24 hour   Intake 840 ml   Output 3000 ml   Net -2160 ml         General: No acute distress. Alert.  Obese. Head:  AT/NC  Lungs:  B/l expiratory wheezing. Heart:  IRIR, no murmur, rub, or gallop  Abdomen: Soft, non-distended, non-tender, +bs  Extremities: No cyanosis or clubbing. Neurologic:  No focal deficits. Moves all extremities. Skin:  No Obvious Rash  Psych:  Normal affect. LABS AND STUDIES:  Personally reviewed all labs, meds, and studies for past 24hrs.       ASSESSMENT      Active Hospital Problems    Diagnosis Date Noted    Atrial fibrillation, new onset ([facility] 08/30/2019    Chronic respiratory failure with hypoxia ([facility] 08/28/2019     NC 3L continuous      COPD with acute exacerbation ([facility] 08/27/2019    Tobacco abuse 03/05/2017    MICAHEL on CPAP 03/02/2017    Morbid obesity with BMI of 50.0-59.9, adult ([facility] 03/02/2017    Diabetes mellitus type 2, controlled ([facility] 03/02/2017    Hypertension 03/07/2016    Chronic back pain 03/07/2016    Obesity hypoventilation syndrome ([facility] 12/18/2015           PLAN:  · COPD exacerbation: antibiotics, IV steroids, nebs . Tapering down steroids. · Afib with RVR: started eliquis, on oral cardizem   · MICHAEL/OHS:  home cpap qhs. · DM2: metformin, humalog ISS. Improving   · HTN: Resumed home meds. · Tobacco Abuse: Nicotine patch   · Protonix for ppx.     FEN:  Diabetic/cardiac diet  DVT ppx:  lovenox    Dispo: pending improvement    Signed By: Miguel Angel Logan MD     September 1, 2019

## 2019-09-01 NOTE — PROGRESS NOTES
Patient reports having bowel movement and feeling much better. Patient returned to bed, no dyspnea at this time. Respirations regular rate & rhythm on CPAP. No s/sx of distress. Bed in low & locked position. Call light and personal belongings within reach.

## 2019-09-01 NOTE — PROGRESS NOTES
Bedside shift report given to [facility], RN. Respirations regular rate & rhythm on CPAP. No s/sx of distress.

## 2019-09-01 NOTE — PROGRESS NOTES
Hospitalist Progress Note    2019  Admit Date: 2019  1:19 PM   NAME: Raciel Isidro   :  1970   MRN:  [id number]   Attending: Darrion Melton DO  PCP:  Esperanza Rider PA-C    SUBJECTIVE:   HPI: 55 yo F with PMH of severe COPD (GOLD stage 4) on home O2/CPAP, OHS, Morbid obesity, HTN, HLD, DM2 was sent to [facility] as direct admit from pcp office for COPD exacerbation. Pulm consulted. 0n  am, went into A fib RVR and started on hep gtt, cards consulted. : Seen at bedside. SOB is improving. Switched to Eliquis today. Stopped heparin drip. Nursing notes and chart reviewed. Review of Systems negative with exception of pertinent positives noted above. PHYSICAL EXAM     Visit Vitals  /75 (BP 1 Location: Right arm, BP Patient Position: At rest)   Pulse 91   Temp 98.2 °F (36.8 °C)   Resp 18   Wt 142.7 kg (314 lb 11.2 oz)   LMP 2010   SpO2 96%   BMI 50.79 kg/m²      Temp (24hrs), Av.2 °F (36.8 °C), Min:97.8 °F (36.6 °C), Max:98.5 °F (36.9 °C)    Oxygen Therapy  O2 Sat (%): 96 % (19)  Pulse via Oximetry: 80 beats per minute (19)  O2 Device: Nasal cannula (19)  PEEP/CPAP (cm H2O): 3 cm H20 (19)  O2 Flow Rate (L/min): 3 l/min (19)  FIO2 (%): 32 % (19 1521)    Intake/Output Summary (Last 24 hours) at 2019  Last data filed at 2019 1341  Gross per 24 hour   Intake 1279.25 ml   Output 1500 ml   Net -220.75 ml         General: No acute distress. Alert.  Obese. Head:  AT/NC  Lungs:  B/l expiratory wheezing. Heart:  IRIR, no murmur, rub, or gallop  Abdomen: Soft, non-distended, non-tender, +bs  Extremities: No cyanosis or clubbing. Neurologic:  No focal deficits. Moves all extremities. Skin:  No Obvious Rash  Psych:  Normal affect. LABS AND STUDIES:  Personally reviewed all labs, meds, and studies for past 24hrs.       ASSESSMENT      Active Hospital Problems    Diagnosis Date Noted    Atrial fibrillation, new onset ([facility] 08/30/2019    Chronic respiratory failure with hypoxia ([facility] 08/28/2019     NC 3L continuous      COPD with acute exacerbation ([facility] 08/27/2019    Tobacco abuse 03/05/2017    MICHAEL on CPAP 03/02/2017    Morbid obesity with BMI of 50.0-59.9, adult ([facility] 03/02/2017    Diabetes mellitus type 2, controlled ([facility] 03/02/2017    Hypertension 03/07/2016    Chronic back pain 03/07/2016    Obesity hypoventilation syndrome ([facility] 12/18/2015           PLAN:  · COPD exacerbation: antibiotics, IV steroids, nebs   · Afib with RVR: started eliquis, on oral cardizem   · MICHAEL/OHS:  home cpap qhs. · DM2: metformin, humalog ISS. · HTN: Resumed home meds. · Tobacco Abuse: Nicotine patch   · Protonix for ppx.     FEN:  Diabetic/cardiac diet  DVT ppx:  lovenox    Dispo: pending improvement    Signed By: Jody Cuenca MD     August 31, 2019

## 2019-09-02 LAB
ANION GAP SERPL CALC-SCNC: 2 MMOL/L (ref 7–16)
ARTERIAL PATENCY WRIST A: YES
BASE EXCESS BLD CALC-SCNC: 6 MMOL/L
BDY SITE: ABNORMAL
BODY TEMPERATURE: 98.6
BUN SERPL-MCNC: 14 MG/DL (ref 6–23)
CALCIUM SERPL-MCNC: 9 MG/DL (ref 8.3–10.4)
CHLORIDE SERPL-SCNC: 98 MMOL/L (ref 98–107)
CO2 BLD-SCNC: 35 MMOL/L
CO2 SERPL-SCNC: 36 MMOL/L (ref 21–32)
COLLECT TIME,HTIME: 1140
CREAT SERPL-MCNC: 0.7 MG/DL (ref 0.6–1)
ERYTHROCYTE [DISTWIDTH] IN BLOOD BY AUTOMATED COUNT: 15.9 % (ref 11.9–14.6)
FLOW RATE ISTAT,IFRATE: 3 L/MIN
GAS FLOW.O2 O2 DELIVERY SYS: ABNORMAL L/MIN
GLUCOSE BLD STRIP.AUTO-MCNC: 117 MG/DL (ref 65–100)
GLUCOSE BLD STRIP.AUTO-MCNC: 118 MG/DL (ref 65–100)
GLUCOSE BLD STRIP.AUTO-MCNC: 123 MG/DL (ref 65–100)
GLUCOSE BLD STRIP.AUTO-MCNC: 129 MG/DL (ref 65–100)
GLUCOSE SERPL-MCNC: 153 MG/DL (ref 65–100)
HCO3 BLD-SCNC: 33.4 MMOL/L (ref 22–26)
HCT VFR BLD AUTO: 41.9 % (ref 35.8–46.3)
HGB BLD-MCNC: 12.4 G/DL (ref 11.7–15.4)
MCH RBC QN AUTO: 22.5 PG (ref 26.1–32.9)
MCHC RBC AUTO-ENTMCNC: 29.6 G/DL (ref 31.4–35)
MCV RBC AUTO: 76.2 FL (ref 79.6–97.8)
NRBC # BLD: 0.02 K/UL (ref 0–0.2)
PCO2 BLD: 57.6 MMHG (ref 35–45)
PH BLD: 7.37 [PH] (ref 7.35–7.45)
PLATELET # BLD AUTO: 296 K/UL (ref 150–450)
PMV BLD AUTO: 10.8 FL (ref 9.4–12.3)
PO2 BLD: 63 MMHG (ref 75–100)
POTASSIUM SERPL-SCNC: 4.3 MMOL/L (ref 3.5–5.1)
RBC # BLD AUTO: 5.5 M/UL (ref 4.05–5.2)
SAO2 % BLD: 90 % (ref 95–98)
SERVICE CMNT-IMP: ABNORMAL
SERVICE CMNT-IMP: ABNORMAL
SODIUM SERPL-SCNC: 136 MMOL/L (ref 136–145)
SPECIMEN TYPE: ABNORMAL
WBC # BLD AUTO: 15.8 K/UL (ref 4.3–11.1)

## 2019-09-02 PROCEDURE — 74011250637 HC RX REV CODE- 250/637: Performed by: INTERNAL MEDICINE

## 2019-09-02 PROCEDURE — 36415 COLL VENOUS BLD VENIPUNCTURE: CPT

## 2019-09-02 PROCEDURE — 82803 BLOOD GASES ANY COMBINATION: CPT

## 2019-09-02 PROCEDURE — 65270000029 HC RM PRIVATE

## 2019-09-02 PROCEDURE — 74011250636 HC RX REV CODE- 250/636: Performed by: INTERNAL MEDICINE

## 2019-09-02 PROCEDURE — 99232 SBSQ HOSP IP/OBS MODERATE 35: CPT | Performed by: INTERNAL MEDICINE

## 2019-09-02 PROCEDURE — 94640 AIRWAY INHALATION TREATMENT: CPT

## 2019-09-02 PROCEDURE — 85027 COMPLETE CBC AUTOMATED: CPT

## 2019-09-02 PROCEDURE — 74011000250 HC RX REV CODE- 250: Performed by: INTERNAL MEDICINE

## 2019-09-02 PROCEDURE — 77010033678 HC OXYGEN DAILY

## 2019-09-02 PROCEDURE — 36600 WITHDRAWAL OF ARTERIAL BLOOD: CPT

## 2019-09-02 PROCEDURE — 94760 N-INVAS EAR/PLS OXIMETRY 1: CPT

## 2019-09-02 PROCEDURE — 65660000000 HC RM CCU STEPDOWN

## 2019-09-02 PROCEDURE — 82962 GLUCOSE BLOOD TEST: CPT

## 2019-09-02 PROCEDURE — 80048 BASIC METABOLIC PNL TOTAL CA: CPT

## 2019-09-02 RX ADMIN — APIXABAN 5 MG: 2.5 TABLET, FILM COATED ORAL at 18:14

## 2019-09-02 RX ADMIN — GUAIFENESIN 1200 MG: 600 TABLET, EXTENDED RELEASE ORAL at 09:46

## 2019-09-02 RX ADMIN — METFORMIN HYDROCHLORIDE 500 MG: 500 TABLET, FILM COATED ORAL at 18:14

## 2019-09-02 RX ADMIN — BUDESONIDE 500 MCG: 0.5 INHALANT RESPIRATORY (INHALATION) at 20:12

## 2019-09-02 RX ADMIN — ALBUTEROL SULFATE 2.5 MG: 2.5 SOLUTION RESPIRATORY (INHALATION) at 01:10

## 2019-09-02 RX ADMIN — ATORVASTATIN CALCIUM 10 MG: 10 TABLET, FILM COATED ORAL at 22:25

## 2019-09-02 RX ADMIN — DILTIAZEM HYDROCHLORIDE 240 MG: 240 CAPSULE, COATED, EXTENDED RELEASE ORAL at 22:24

## 2019-09-02 RX ADMIN — PANTOPRAZOLE SODIUM 40 MG: 40 TABLET, DELAYED RELEASE ORAL at 05:53

## 2019-09-02 RX ADMIN — GUAIFENESIN 1200 MG: 600 TABLET, EXTENDED RELEASE ORAL at 18:14

## 2019-09-02 RX ADMIN — BUDESONIDE 500 MCG: 0.5 INHALANT RESPIRATORY (INHALATION) at 08:02

## 2019-09-02 RX ADMIN — LISINOPRIL 5 MG: 5 TABLET ORAL at 09:45

## 2019-09-02 RX ADMIN — METHYLPREDNISOLONE SODIUM SUCCINATE 40 MG: 40 INJECTION, POWDER, FOR SOLUTION INTRAMUSCULAR; INTRAVENOUS at 09:46

## 2019-09-02 RX ADMIN — Medication 5 ML: at 13:25

## 2019-09-02 RX ADMIN — METFORMIN HYDROCHLORIDE 500 MG: 500 TABLET, FILM COATED ORAL at 09:45

## 2019-09-02 RX ADMIN — METHYLPREDNISOLONE SODIUM SUCCINATE 20 MG: 40 INJECTION, POWDER, FOR SOLUTION INTRAMUSCULAR; INTRAVENOUS at 22:22

## 2019-09-02 RX ADMIN — Medication 10 ML: at 05:53

## 2019-09-02 RX ADMIN — ALBUTEROL SULFATE 2.5 MG: 2.5 SOLUTION RESPIRATORY (INHALATION) at 20:12

## 2019-09-02 RX ADMIN — Medication 10 ML: at 22:26

## 2019-09-02 RX ADMIN — APIXABAN 5 MG: 2.5 TABLET, FILM COATED ORAL at 05:53

## 2019-09-02 RX ADMIN — ALBUTEROL SULFATE 2.5 MG: 2.5 SOLUTION RESPIRATORY (INHALATION) at 14:01

## 2019-09-02 RX ADMIN — ALBUTEROL SULFATE 2.5 MG: 2.5 SOLUTION RESPIRATORY (INHALATION) at 08:03

## 2019-09-02 NOTE — PROGRESS NOTES
Hospitalist Progress Note    2019  Admit Date: 2019  1:19 PM   NAME: Raiza Isidro   :  1970   MRN:  [id number]   Attending: Madonna Berrios DO  PCP:  Grant Robbins PA-C    SUBJECTIVE:   HPI: 53 yo F with PMH of severe COPD (GOLD stage 4) on home O2/CPAP, OHS, Morbid obesity, HTN, HLD, DM2 was sent to [facility] as direct admit from pcp office on   for COPD exacerbation. During her admission she developed  A fib with RVR and required IV Cardizem and a heparin gtt. She was seen by Cardiology who recommended a heart rate control approach. She was started on oral anticoagulation and oral ER cardizem. Her COPD exacerbation has been managed with zithromax and IV solumedrol. Her condition is improving. Pulmonary to decide if she needs BIPAP at home instead of her CPAP. She is an active smoker. Advised to quit. Nursing notes and chart reviewed. Review of Systems negative with exception of pertinent positives noted above. PHYSICAL EXAM     Visit Vitals  /73   Pulse 85   Temp 98 °F (36.7 °C)   Resp 18   Wt 142.7 kg (314 lb 11.2 oz)   LMP 2010   SpO2 92%   BMI 50.79 kg/m²      Temp (24hrs), Av °F (36.7 °C), Min:97.4 °F (36.3 °C), Max:98.3 °F (36.8 °C)    Oxygen Therapy  O2 Sat (%): 92 % (19 1538)  Pulse via Oximetry: 69 beats per minute (19 1402)  O2 Device: Nasal cannula;Humidifier (19 1402)  PEEP/CPAP (cm H2O): 3 cm H20 (19 1402)  O2 Flow Rate (L/min): 3 l/min (19 0558)  FIO2 (%): 32 % (19 1402)    Intake/Output Summary (Last 24 hours) at 2019 1836  Last data filed at 2019 1828  Gross per 24 hour   Intake 1320 ml   Output 3350 ml   Net -2030 ml         General: No acute distress. Alert.  Obese. Head:  AT/NC  Lungs:  B/l expiratory wheezing. Heart:  IRIR, no murmur, rub, or gallop  Abdomen: Soft, non-distended, non-tender, +bs  Extremities: No cyanosis or clubbing. Neurologic:  No focal deficits. Moves all extremities.   Skin:  No Obvious Rash  Psych:  Normal affect. LABS AND STUDIES:  Personally reviewed all labs, meds, and studies for past 24hrs. ASSESSMENT      Active Hospital Problems    Diagnosis Date Noted    Atrial fibrillation, new onset ([facility] 08/30/2019    Chronic respiratory failure with hypoxia ([facility] 08/28/2019     NC 3L continuous      COPD with acute exacerbation ([facility] 08/27/2019    Tobacco abuse 03/05/2017    MICHAEL on CPAP 03/02/2017    Morbid obesity with BMI of 50.0-59.9, adult ([facility] 03/02/2017    Diabetes mellitus type 2, controlled ([facility] 03/02/2017    Hypertension 03/07/2016    Chronic back pain 03/07/2016    Obesity hypoventilation syndrome ([facility] 12/18/2015           PLAN:  · COPD exacerbation: completed azithromycin. On  IV steroids, nebs . Tapering down steroids. · Afib with RVR: continue eliquis, on oral cardizem   · MICHAEL/OHS:  home cpap qhs/ pulmonary to determine if she will be better served with BIPAP/trylogy   · DM2: metformin, humalog ISS. · HTN: stable on present regimen   · Tobacco Abuse: Nicotine patch   · Protonix for ppx.     FEN:  Diabetic/cardiac diet  DVT ppx:  lovenox    Dispo: pending improvement    Signed By: Viola Gutierrez MD     September 2, 2019

## 2019-09-02 NOTE — PROGRESS NOTES
Pt asked to set up home CPAP for ready to use. RT did as requested. Pt states she will place self on.

## 2019-09-02 NOTE — PROGRESS NOTES
Patient sitting up in bed, watching tv. Patient switched from CPAP to 3L oxygen via nasal cannula. Patient visited the restroom and back to bed; dyspnic on exertion, recovered quickly. No s/sx of distress. Bed in low & locked position. Call light and personal belongings within reach. Gripper socks on.

## 2019-09-02 NOTE — PROGRESS NOTES
Bedside shift received from [facility]. Respirations regular rate & rhythm, on 3L oxygen via nasal cannula. No s/sx of distress. Resting quietly in bed. Bed in low & locked position. Call light and personal belongings within reach.

## 2019-09-02 NOTE — PROGRESS NOTES
Remote tele monitor dc'd and returned to monitor bin per order. Resting quietly at present. NAD noted. To report off to on coming nurse.

## 2019-09-02 NOTE — PROGRESS NOTES
Bedside shift report given to LAPARRISH [facility], RN. Respirations regular rate & rhythm on CPAP. No s/sx of distress.

## 2019-09-02 NOTE — PROGRESS NOTES
Sammi Isidro  Admission Date: 8/27/2019             Daily Progress Note: 9/2/2019    The patient's chart is reviewed and the patient is discussed with the staff. 54 y.o. AAF evaluated at the request of Dr. Frederick Layne for COPD exacerbation with shortness of breath, productive cough and wheezing. Was last seen in our office 3/4/19 for poorly controlled severe COPD (FEV1 1.23, 42% in 2016) and in need of breast surgery (left lumpectomy with benign intraductal papilloma). States had not been back to her normal from breathing standpoint since influenza in January/Feb 2019. Has been compliant with home meds, nebs and O2 at 3L. Has not been using BIPAP due to mask breakdown and possible machine malfunction--has not called for assistance and doesn't remember DME name of company. Has unfortunately continued to smoke about 1/3 PPD--states she quit 2 weeks ago when symptoms started.        Chronic Medical:  O2 dependent requiring NC 3L ([facility]),  Gold stage IV COPD, morbid obesity, tobacco abuse, HTN, DM, MICHAEL with home BIPAP (22/12 with 3L O2 bleed in), HLD, chronic back pain. Subjective:     Comfortable on 3L. Using CPAP with sleep. Last ABG in March with pCO2 of 130. Was non compliant with CPAP at that time.      Current Facility-Administered Medications   Medication Dose Route Frequency    methylPREDNISolone (PF) (SOLU-MEDROL) injection 40 mg  40 mg IntraVENous Q12H    apixaban (ELIQUIS) tablet 5 mg  5 mg Oral Q12H    metoprolol (LOPRESSOR) injection 5 mg  5 mg IntraVENous Q6H PRN    dilTIAZem CD (CARDIZEM CD) capsule 240 mg  240 mg Oral QHS    albuterol (PROVENTIL VENTOLIN) nebulizer solution 2.5 mg  2.5 mg Nebulization Q6H RT    And    budesonide (PULMICORT) 500 mcg/2 ml nebulizer suspension  500 mcg Nebulization BID RT    albuterol (PROVENTIL VENTOLIN) nebulizer solution 2.5 mg  2.5 mg Nebulization Q4H PRN    guaiFENesin-codeine (ROBITUSSIN AC) 100-10 mg/5 mL solution 5 mL  5 mL Oral Q4H PRN    sodium chloride (NS) flush 5-40 mL  5-40 mL IntraVENous Q8H    sodium chloride (NS) flush 5-40 mL  5-40 mL IntraVENous PRN    acetaminophen (TYLENOL) tablet 650 mg  650 mg Oral Q4H PRN    HYDROcodone-acetaminophen (NORCO) 5-325 mg per tablet 1 Tab  1 Tab Oral Q4H PRN    naloxone (NARCAN) injection 0.4 mg  0.4 mg IntraVENous PRN    diphenhydrAMINE (BENADRYL) injection 12.5 mg  12.5 mg IntraVENous Q4H PRN    ondansetron (ZOFRAN ODT) tablet 4 mg  4 mg Oral Q8H PRN    bisacodyl (DULCOLAX) tablet 5 mg  5 mg Oral DAILY PRN    nicotine (NICODERM CQ) 7 mg/24 hr patch 1 Patch  1 Patch TransDERmal Q24H    atorvastatin (LIPITOR) tablet 10 mg  10 mg Oral QHS    guaiFENesin ER (MUCINEX) tablet 1,200 mg  1,200 mg Oral BID    lisinopril (PRINIVIL, ZESTRIL) tablet 5 mg  5 mg Oral DAILY    metFORMIN (GLUCOPHAGE) tablet 500 mg  500 mg Oral BID WITH MEALS    insulin lispro (HUMALOG) injection   SubCUTAneous AC&HS    pantoprazole (PROTONIX) tablet 40 mg  40 mg Oral ACB       Review of Systems  Constitutional: negative for fever, chills, sweats  Cardiovascular: negative for chest pain, palpitations, syncope, edema  Gastrointestinal:  negative for dysphagia, reflux, vomiting, diarrhea, abdominal pain, or melena  Neurologic:  negative for focal weakness, numbness, headache    Objective:     Vitals:    09/02/19 0110 09/02/19 0346 09/02/19 0802 09/02/19 0804   BP:  127/73 130/77    Pulse:  62 60    Resp:  19 18    Temp:  97.5 °F (36.4 °C) 97.4 °F (36.3 °C)    SpO2: 90% 92% 93% 93%   Weight:             Intake/Output Summary (Last 24 hours) at 9/2/2019 1005  Last data filed at 9/2/2019 6656  Gross per 24 hour   Intake 960 ml   Output 2900 ml   Net -1940 ml       Physical Exam:   Constitutional:  the patient is morbidly obese and in no acute distress, NC 3L sat 93%  EENMT:  Sclera clear, pupils equal, oral mucosa moist  Respiratory: no wheezes; BS decreased  Cardiovascular:  irregular, AFib without M,G,R  Gastrointestinal: soft, obese and non-tender; with positive bowel sounds. Musculoskeletal: warm without cyanosis. There is no lower extremity edema. Skin:  no jaundice or rashes, no wounds   Neurologic: no gross neuro deficits     Psychiatric:  alert and oriented x 3    CHEST XRAY:     8/27/19:  Mild lingular atelectasis/infiltrate. LAB  Recent Labs     09/02/19  0549 09/01/19  2026 09/01/19  1627 09/01/19  1132 09/01/19  0543   GLUCPOC 129* 129* 148* 182* 103*      Recent Labs     09/02/19  0814 09/01/19  0641   WBC 15.8* 15.1*   HGB 12.4 12.2   HCT 41.9 42.3    327     Recent Labs     09/02/19  0814 09/01/19  0641 08/31/19  0342    136 135*   K 4.3 4.1 4.1   CL 98 97* 97*   CO2 36* 35* 35*   * 105* 165*   BUN 14 13 17   CREA 0.70 0.69 0.69   MG  --   --  2.4   CA 9.0 9.4 9.3     No results for input(s): PH, PCO2, PO2, HCO3, PHI, PCO2I, PO2I, HCO3I in the last 72 hours. No results for input(s): LCAD, LAC in the last 72 hours. Assessment:  (Medical Decision Making)     Hospital Problems  Date Reviewed: 8/29/2019          Codes Class Noted POA    Atrial fibrillation, new onset [facility]) ICD-10-CM: I48.91  ICD-9-CM: 427.31  8/30/2019 No    On Eliquis and cardizem.     Chronic respiratory failure with hypoxia (HCC) (Chronic) ICD-10-CM: J96.11  ICD-9-CM: 518.83, 799.02  8/28/2019 Yes    Overview Signed 8/28/2019  8:51 AM by Elihue Sicard, NP     NC 3L continuous             * (Principal) COPD with acute exacerbation [facility]) ICD-10-CM: J44.1  ICD-9-CM: 491.21  8/27/2019 Yes    No wheezing--wean steroids    Tobacco abuse (Chronic) ICD-10-CM: Z72.0  ICD-9-CM: 305.1  3/5/2017 Yes    Cessation needed    Diabetes mellitus type 2, controlled (HCC) (Chronic) ICD-10-CM: E11.9  ICD-9-CM: 250.00  3/2/2017 Yes        Morbid obesity with BMI of 50.0-59.9, adult (HCC) (Chronic) ICD-10-CM: E66.01, Z68.43  ICD-9-CM: 278.01, V85.43  3/2/2017 Yes    Needs massive weight loss    MICHAEL on CPAP (Chronic) ICD-10-CM: O98.91, Z99.89  ICD-9-CM: 327.23, V46.8  3/2/2017 Yes    Using. Might be better treated with BIPAP or Trilogy. Hypertension (Chronic) ICD-10-CM: I10  ICD-9-CM: 401.9  3/7/2016 Yes        Chronic back pain (Chronic) ICD-10-CM: M54.9, G89.29  ICD-9-CM: 724.5, 338.29  3/7/2016 Yes        Obesity hypoventilation syndrome (HCC) ICD-10-CM: X28.9  ICD-9-CM: 278.03  12/18/2015 Yes    Severely hypoventilates in the past.                 Plan:  (Medical Decision Making)       Check ABG. Wean steroids. Likely will need BIPAP or trilogy. More than 50% of the time documented was spent in face-to-face contact with the patient and in the care of the patient on the floor/unit where the patient is located.     Dino Buchanan MD

## 2019-09-02 NOTE — PROGRESS NOTES
[facility]. Respirations regular rate & rhythm, on 3L oxygen via nasal cannula. No s/sx of distress. Resting in quietly in bed. Bed in low & locked position. Call light and personal belongings within reach. Gripper socks on.

## 2019-09-02 NOTE — PROGRESS NOTES
Bedside report received from night nurse Lauren Cordoba. Assessment done as noted  Respiration even and unlabored 20/min; denies pain or nausea at present. Remains on remote tele with NSR 80 confirmed per monitor tech. NPC at interval. Encouraged to call with needs.

## 2019-09-03 LAB
ALBUMIN SERPL-MCNC: 3.1 G/DL (ref 3.5–5)
ALBUMIN/GLOB SERPL: 0.8 {RATIO} (ref 1.2–3.5)
ALP SERPL-CCNC: 81 U/L (ref 50–136)
ALT SERPL-CCNC: 18 U/L (ref 12–65)
ANION GAP SERPL CALC-SCNC: 6 MMOL/L (ref 7–16)
AST SERPL-CCNC: 7 U/L (ref 15–37)
BILIRUB SERPL-MCNC: 0.3 MG/DL (ref 0.2–1.1)
BUN SERPL-MCNC: 15 MG/DL (ref 6–23)
CALCIUM SERPL-MCNC: 9.1 MG/DL (ref 8.3–10.4)
CHLORIDE SERPL-SCNC: 97 MMOL/L (ref 98–107)
CO2 SERPL-SCNC: 33 MMOL/L (ref 21–32)
CREAT SERPL-MCNC: 0.8 MG/DL (ref 0.6–1)
ERYTHROCYTE [DISTWIDTH] IN BLOOD BY AUTOMATED COUNT: 15.8 % (ref 11.9–14.6)
GLOBULIN SER CALC-MCNC: 4 G/DL (ref 2.3–3.5)
GLUCOSE BLD STRIP.AUTO-MCNC: 100 MG/DL (ref 65–100)
GLUCOSE BLD STRIP.AUTO-MCNC: 106 MG/DL (ref 65–100)
GLUCOSE BLD STRIP.AUTO-MCNC: 157 MG/DL (ref 65–100)
GLUCOSE BLD STRIP.AUTO-MCNC: 169 MG/DL (ref 65–100)
GLUCOSE SERPL-MCNC: 169 MG/DL (ref 65–100)
HCT VFR BLD AUTO: 41.3 % (ref 35.8–46.3)
HGB BLD-MCNC: 12.2 G/DL (ref 11.7–15.4)
MCH RBC QN AUTO: 22.2 PG (ref 26.1–32.9)
MCHC RBC AUTO-ENTMCNC: 29.5 G/DL (ref 31.4–35)
MCV RBC AUTO: 75.1 FL (ref 79.6–97.8)
NRBC # BLD: 0.02 K/UL (ref 0–0.2)
PLATELET # BLD AUTO: 314 K/UL (ref 150–450)
PMV BLD AUTO: 10.5 FL (ref 9.4–12.3)
POTASSIUM SERPL-SCNC: 4.4 MMOL/L (ref 3.5–5.1)
PROT SERPL-MCNC: 7.1 G/DL (ref 6.3–8.2)
RBC # BLD AUTO: 5.5 M/UL (ref 4.05–5.2)
SODIUM SERPL-SCNC: 136 MMOL/L (ref 136–145)
WBC # BLD AUTO: 18.5 K/UL (ref 4.3–11.1)

## 2019-09-03 PROCEDURE — 80053 COMPREHEN METABOLIC PANEL: CPT

## 2019-09-03 PROCEDURE — 74011250636 HC RX REV CODE- 250/636: Performed by: INTERNAL MEDICINE

## 2019-09-03 PROCEDURE — 74011636637 HC RX REV CODE- 636/637: Performed by: INTERNAL MEDICINE

## 2019-09-03 PROCEDURE — 36415 COLL VENOUS BLD VENIPUNCTURE: CPT

## 2019-09-03 PROCEDURE — 94760 N-INVAS EAR/PLS OXIMETRY 1: CPT

## 2019-09-03 PROCEDURE — 99232 SBSQ HOSP IP/OBS MODERATE 35: CPT | Performed by: INTERNAL MEDICINE

## 2019-09-03 PROCEDURE — 74011250637 HC RX REV CODE- 250/637: Performed by: INTERNAL MEDICINE

## 2019-09-03 PROCEDURE — 77010033678 HC OXYGEN DAILY

## 2019-09-03 PROCEDURE — 65270000029 HC RM PRIVATE

## 2019-09-03 PROCEDURE — 74011000250 HC RX REV CODE- 250: Performed by: INTERNAL MEDICINE

## 2019-09-03 PROCEDURE — 77030039270 HC TU BLD FLTR CARD -A

## 2019-09-03 PROCEDURE — 82962 GLUCOSE BLOOD TEST: CPT

## 2019-09-03 PROCEDURE — 94640 AIRWAY INHALATION TREATMENT: CPT

## 2019-09-03 PROCEDURE — 85027 COMPLETE CBC AUTOMATED: CPT

## 2019-09-03 PROCEDURE — 82103 ALPHA-1-ANTITRYPSIN TOTAL: CPT

## 2019-09-03 RX ORDER — PREDNISONE 10 MG/1
40 TABLET ORAL
Status: DISCONTINUED | OUTPATIENT
Start: 2019-09-04 | End: 2019-09-04 | Stop reason: HOSPADM

## 2019-09-03 RX ORDER — INSULIN GLARGINE 100 [IU]/ML
10 INJECTION, SOLUTION SUBCUTANEOUS DAILY
Status: DISCONTINUED | OUTPATIENT
Start: 2019-09-03 | End: 2019-09-04 | Stop reason: HOSPADM

## 2019-09-03 RX ADMIN — APIXABAN 5 MG: 2.5 TABLET, FILM COATED ORAL at 17:01

## 2019-09-03 RX ADMIN — LISINOPRIL 5 MG: 5 TABLET ORAL at 08:46

## 2019-09-03 RX ADMIN — PANTOPRAZOLE SODIUM 40 MG: 40 TABLET, DELAYED RELEASE ORAL at 07:00

## 2019-09-03 RX ADMIN — ALBUTEROL SULFATE 2.5 MG: 2.5 SOLUTION RESPIRATORY (INHALATION) at 01:14

## 2019-09-03 RX ADMIN — INSULIN GLARGINE 10 UNITS: 100 INJECTION, SOLUTION SUBCUTANEOUS at 12:35

## 2019-09-03 RX ADMIN — Medication 5 ML: at 21:29

## 2019-09-03 RX ADMIN — ALBUTEROL SULFATE 2.5 MG: 2.5 SOLUTION RESPIRATORY (INHALATION) at 07:33

## 2019-09-03 RX ADMIN — METFORMIN HYDROCHLORIDE 500 MG: 500 TABLET, FILM COATED ORAL at 08:46

## 2019-09-03 RX ADMIN — ALBUTEROL SULFATE 2.5 MG: 2.5 SOLUTION RESPIRATORY (INHALATION) at 20:06

## 2019-09-03 RX ADMIN — GUAIFENESIN 1200 MG: 600 TABLET, EXTENDED RELEASE ORAL at 17:00

## 2019-09-03 RX ADMIN — INSULIN LISPRO 2 UNITS: 100 INJECTION, SOLUTION INTRAVENOUS; SUBCUTANEOUS at 06:31

## 2019-09-03 RX ADMIN — METHYLPREDNISOLONE SODIUM SUCCINATE 20 MG: 40 INJECTION, POWDER, FOR SOLUTION INTRAMUSCULAR; INTRAVENOUS at 08:46

## 2019-09-03 RX ADMIN — BUDESONIDE 500 MCG: 0.5 INHALANT RESPIRATORY (INHALATION) at 07:32

## 2019-09-03 RX ADMIN — Medication 5 ML: at 12:39

## 2019-09-03 RX ADMIN — GUAIFENESIN 1200 MG: 600 TABLET, EXTENDED RELEASE ORAL at 08:46

## 2019-09-03 RX ADMIN — ATORVASTATIN CALCIUM 10 MG: 10 TABLET, FILM COATED ORAL at 21:26

## 2019-09-03 RX ADMIN — DILTIAZEM HYDROCHLORIDE 240 MG: 240 CAPSULE, COATED, EXTENDED RELEASE ORAL at 21:25

## 2019-09-03 RX ADMIN — ALBUTEROL SULFATE 2.5 MG: 2.5 SOLUTION RESPIRATORY (INHALATION) at 13:33

## 2019-09-03 RX ADMIN — Medication 10 ML: at 06:29

## 2019-09-03 RX ADMIN — APIXABAN 5 MG: 2.5 TABLET, FILM COATED ORAL at 06:29

## 2019-09-03 RX ADMIN — INSULIN LISPRO 2 UNITS: 100 INJECTION, SOLUTION INTRAVENOUS; SUBCUTANEOUS at 21:26

## 2019-09-03 RX ADMIN — BUDESONIDE 500 MCG: 0.5 INHALANT RESPIRATORY (INHALATION) at 20:06

## 2019-09-03 NOTE — PROGRESS NOTES
Ariel Isidro  Admission Date: 8/27/2019             Daily Progress Note: 9/3/2019   49 y.o. AAF evaluated at the request of Dr. Nereida Seymour for COPD exacerbation with shortness of breath, productive cough and wheezing. Was last seen in our office 3/4/19 for poorly controlled severe COPD (FEV1 1.23, 42% in 2016) and in need of breast surgery (left lumpectomy with benign intraductal papilloma). She is O2 dependent requiring NC 3L ([facility]) with Gold stage IV COPD, morbid obesity, tobacco abuse, HTN, DM, MICHAEL with home BIPAP (22/12 with 3L O2 bleed in), HLD, and chronic back pain. States had not been back to her normal from breathing standpoint since influenza in January/Feb 2019. Has been compliant with home meds, nebs and O2 at 3L.  Has not been using BIPAP due to mask breakdown and possible machine malfunction--has not called for assistance and doesn't remember DME name of company. Isamar Mcdonnell unfortunately continued to smoke about 1/3 PPD. Asked to see her for AECOPD. Will check A1AT level given the severity at relatively young age. May be related to obesity and collapsible airways    Subjective:     Alpha 1 ordered last week, no results in connect care. Reordered for lab to draw. Has CPAP at home, encouraged to wear it.     Review of Systems  Respiratory: positive for dyspnea on exertion    Current Facility-Administered Medications   Medication Dose Route Frequency    methylPREDNISolone (PF) (SOLU-MEDROL) injection 20 mg  20 mg IntraVENous Q12H    apixaban (ELIQUIS) tablet 5 mg  5 mg Oral Q12H    metoprolol (LOPRESSOR) injection 5 mg  5 mg IntraVENous Q6H PRN    dilTIAZem CD (CARDIZEM CD) capsule 240 mg  240 mg Oral QHS    albuterol (PROVENTIL VENTOLIN) nebulizer solution 2.5 mg  2.5 mg Nebulization Q6H RT    And    budesonide (PULMICORT) 500 mcg/2 ml nebulizer suspension  500 mcg Nebulization BID RT    albuterol (PROVENTIL VENTOLIN) nebulizer solution 2.5 mg  2.5 mg Nebulization Q4H PRN  guaiFENesin-codeine (ROBITUSSIN AC) 100-10 mg/5 mL solution 5 mL  5 mL Oral Q4H PRN    sodium chloride (NS) flush 5-40 mL  5-40 mL IntraVENous Q8H    sodium chloride (NS) flush 5-40 mL  5-40 mL IntraVENous PRN    acetaminophen (TYLENOL) tablet 650 mg  650 mg Oral Q4H PRN    HYDROcodone-acetaminophen (NORCO) 5-325 mg per tablet 1 Tab  1 Tab Oral Q4H PRN    naloxone (NARCAN) injection 0.4 mg  0.4 mg IntraVENous PRN    diphenhydrAMINE (BENADRYL) injection 12.5 mg  12.5 mg IntraVENous Q4H PRN    ondansetron (ZOFRAN ODT) tablet 4 mg  4 mg Oral Q8H PRN    bisacodyl (DULCOLAX) tablet 5 mg  5 mg Oral DAILY PRN    nicotine (NICODERM CQ) 7 mg/24 hr patch 1 Patch  1 Patch TransDERmal Q24H    atorvastatin (LIPITOR) tablet 10 mg  10 mg Oral QHS    guaiFENesin ER (MUCINEX) tablet 1,200 mg  1,200 mg Oral BID    lisinopril (PRINIVIL, ZESTRIL) tablet 5 mg  5 mg Oral DAILY    metFORMIN (GLUCOPHAGE) tablet 500 mg  500 mg Oral BID WITH MEALS    insulin lispro (HUMALOG) injection   SubCUTAneous AC&HS    pantoprazole (PROTONIX) tablet 40 mg  40 mg Oral ACB         Objective:     Vitals:    09/03/19 0119 09/03/19 0309 09/03/19 0733 09/03/19 0738   BP:  119/72  119/78   Pulse:  88  73   Resp:  18  20   Temp:  98.2 °F (36.8 °C)  97.5 °F (36.4 °C)   SpO2: 95% 95% 97% 95%   Weight:         Intake and Output:   09/01 1901 - 09/03 0700  In: 1200 [P.O.:1200]  Out: 4350 [Urine:4350]  No intake/output data recorded. Intake/Output Summary (Last 24 hours) at 9/3/2019 0857  Last data filed at 9/3/2019 0520  Gross per 24 hour   Intake 720 ml   Output 2350 ml   Net -1630 ml       Physical Exam:          Constitutional: the patient is obese  HEENT: Sclera clear, pupils equal, oral mucosa moist  Lungs: CTA on 3 lpm  Cardiovascular: RRR without M,G,R  Abd/GI: soft and non-tender; with positive bowel sounds. Ext: warm without cyanosis. There is no lower leg edema.   Musculoskeletal: moves all four extremities with equal strength  Skin: no jaundice or rashes, no wounds   Neuro: no gross neuro deficits       Lines/Drains: IV  Nutrition:ADA    CHEST XRAY:         LAB  Recent Labs     09/02/19  0814 09/01/19  0641   WBC 15.8* 15.1*   HGB 12.4 12.2   HCT 41.9 42.3    327     Recent Labs     09/02/19  0814 09/01/19  0641    136   K 4.3 4.1   CL 98 97*   CO2 36* 35*   * 105*   BUN 14 13   CREA 0.70 0.69     ABG:    Lab Results   Component Value Date/Time    PHI 7.371 09/02/2019 11:45 AM    PCO2I 57.6 (H) 09/02/2019 11:45 AM    PO2I 63 (L) 09/02/2019 11:45 AM    HCO3I 33.4 (H) 09/02/2019 11:45 AM           Assessment:     Patient Active Problem List   Diagnosis Code    COPD, severe ([facility] J44.9    Obesity hypoventilation syndrome ([facility]) E66.2    Diabetes mellitus type 2, controlled ([facility] E11.9    Morbid obesity with BMI of 50.0-59.9, adult ([facility]) E66.01, Z68.43    Hypertension I10    Chronic back pain M54.9, G89.29    Nocturnal hypoxemia G47.34    MICHAEL on CPAP G47.33, Z99.89    Tobacco abuse Z72.0    Cocaine abuse ([facility]) F14.10    COPD with acute exacerbation ([facility]) J44.1    Chronic respiratory failure with hypoxia ([facility]) J96.11    Atrial fibrillation, new onset ([facility] I48.91          Principal Problem:    COPD with acute exacerbation (HCC) (8/27/2019)      Obesity hypoventilation syndrome (HCC) (12/18/2015    Morbid obesity with BMI of 50.0-59.9, adult ([facility]) (3/2/2017)        Chronic back pain (3/7/2016)      MICHAEL on CPAP (3/2/2017)      Tobacco abuse (3/5/2017)  Ongoing      Chronic respiratory failure with hypoxia ([facility]) (8/28/2019)      Overview: NC 3L continuous      Atrial fibrillation, new onset ([facility] (8/30/2019)        PLAN:  -- on home O2 flow, wearing CPAP Q HS here, ?  Needs BIPAP   -- needs office follow up with split night and encouraged to wear her home machine  -- Alpha 1 reordered for lab to draw not respiratory  -- ongoing tobacco abuse, cessation strongly encouraged  -- pulmicort, albuterol, convert to prednisone  -- mobilize    Miley Iman NP-C    More than 50% of time documented was spent in face-to-face contact with the patient and in the care of the patient on the floor/unit where the patient is located. Lungs:  Decreased BS  Heart:  Distant RRR with no Murmur/Rubs/Gallops    Additional Comments:  ABG yesterday with evidence of ongoing CO2 retention. She has severe COPD. Would benefit from trilogy to help keep PCO2 down and prevent PHTN. Contacted Leonardo Devries about getting her the device. [id number] [address] for discharge later once arrangements for Trilogy finalized. I have spoken with and examined the patient. I agree with the above assessment and plan as documented.     Avis Driver MD

## 2019-09-03 NOTE — PROGRESS NOTES
Chart review for home NIV order    Patient has ABG with PaCO2 of 57.6. Patient with Chronic Respiratory Failure due to COPD requires nocturnal and daytime targeted volume ventilation. Patient requires NIV 8-24 hours/day via face mask for respiratory failure and rapidly changing lung volumes as well as MPV preventing daytime exacerbations and illness. Alternative methods of ventilatory support have been tried such as a bi-level device. Patient required targeted volumes, back up rate and battery with alarms. This patient is at risk for exacerbations leading quickly to possible decline and harm without this therapy. In patients with severe chronic hypercapnia and a history of hospitalization for acute respiratory failure, long-term non-invasive ventilation may decrease mortality and prevent re-hospitalization. Recommended settings:    Target Vt : 500 ml  Rate : 8-15  Max Insp.  Pressure : [id number]  EPAP/PEEP: 5-12 cmH2O  Pressure Support Min : 4  Pressure Support Max : 22  O2 bleed in 3 LPM (current home dose)      PLEASE EXPEDITE [address] DISCHARGE IS NOT PROLONGED    Taylor Aguirre MD

## 2019-09-03 NOTE — PROGRESS NOTES
Face sheet, ABGs, progress notes, and order for NIV sent to [facility] for prior authorization processing. [address]                                                                                                 PATIENT INFORMATION   Patient Name: Roselyn [facility] Acct: [de-identified] Patient MRN: [id number]   Address: [address] Patient CSN: [id number]      Yarsanism: NO PREFERENCE   Sex: Female Marital Status: SINGLE   : 1970 Age:   54 yrs   Home Phone: [phone] Mobile Phone:   [phone]   Race: BLACK OR  Employer:   Not Employed   Language: ENGLISH Admitted/Arrived From:      ADMISSION INFORMATION   Admit Date: 2019 Admit Time:  1:19 PM   Patient Class: Inpatient Service: Medicine   Admit Source: Clinic or physician offi* Admit Type: URGENT   Admitting Provider: Rekha Irizarry Attending Provider: Julio Sharma   Unit: Sfd 8 Med Surg Room/Bed: 816/    Admission Diagnosis: acute exacerbation of COPD; ;COPD with acute exacerb*     Discharge Date:   Discharge Time:     GUARANTOR INFORMATION   Name:  Aubrey Membreno Address: [address]  Rel:  Self   Phone: [phone]   [facility] [address] : 1970   EMERGENCY CONTACTS   Name: Figueroa Maria Eugenia Home:  Mobile: [phone] Rel: Sister   COVERAGE INFORMATION   Primary Insurance:   [place] MEDICARE Subscriber: Aubrey Membreno   [place] Name[place] Pt Rel to Subscriber: Self [01]   Claim Address: [address] Sex: Female      Policy #:  [id number]    Group #:   Group Name:   Part A And B    Auth #: JULIO CESAR Ins Phone:         Secondary Insurance: MEDICAID OF [facility]* Subscriber: Scar Isidro   Plan Name: [id number] [address]un [address]es Pt Rel to Subscriber: Self   Claim Address: [address] Sex: Female     Policy #: [id number]    Group #: [id number] Group Name: MEDICAID OF [place]   Auth #: JULIO CESAR Ins Phone:     Accident Date:    Accident Type:     PROVIDER INFORMATION   PCP:         Rosa Reynoso PA-C PCP Phone:  [phone]   Referring Prov:   No ref.  provider found Referring Phone:  N/A   Advanced Directive:  Not Received Research:     Lab Client:   Enrollment Status:

## 2019-09-03 NOTE — INTERDISCIPLINARY ROUNDS
Interdisciplinary team rounds were held 9/3/2019 with the following team members:Care Management, Physical Therapy, Physician and Clinical Coordinator and the patient. Plan of care discussed. See clinical pathway and/or care plan for interventions and desired outcomes.

## 2019-09-03 NOTE — PROGRESS NOTES
Home non-invasive ventilator has been approved for set-up. Please call [facility] at [phone] with [facility] to coordinate discharge plans. Patient can be set up at hospital or home. Also please fax discharge summary to [phone] when complete.

## 2019-09-03 NOTE — PROGRESS NOTES
End of shift report given to oncoming nurs , pt  Is stable resting in bed, no c/o pain , call light within reach, bed locked and low position, pt respirations even and unlabored, no distress noted.

## 2019-09-03 NOTE — PROGRESS NOTES
Hospitalist Progress Note    9/3/2019  Admit Date: 2019  1:19 PM   NAME: Ashley Isidro   :  1970   MRN:  [id number]   Attending: Cuate Block DO  PCP:  Terrance Lyon PA-C    SUBJECTIVE:   HPI: 55 yo F with PMH of severe COPD (GOLD stage 4) on home O2/CPAP, OHS, Morbid obesity, HTN, HLD, DM2 was sent to [facility] as direct admit from pcp office on   for COPD exacerbation. During her admission she developed  A fib with RVR and required IV Cardizem and a heparin gtt. She was seen by Cardiology who recommended a heart rate control approach. She was started on oral anticoagulation and oral ER cardizem. Her COPD exacerbation has been managed with zithromax and IV solumedrol. Her condition is improving. Pulmonary to decide if she needs BIPAP at home instead of her CPAP. She is an active smoker. Advised to quit. Interval History (9/3): patient examined at bedside. No acute overnight events. Feeling better overall. No changes in shortness of breath at baseline. No fevers/chills, chest pain, abdominal pain, nausea/vomiting, or diarrhea. Nursing notes and chart reviewed. Review of Systems negative with exception of pertinent positives noted above. PHYSICAL EXAM     Visit Vitals  /74   Pulse 82   Temp 98.5 °F (36.9 °C)   Resp 18   Wt 142.7 kg (314 lb 11.2 oz)   LMP 2010   SpO2 96%   BMI 50.79 kg/m²      Temp (24hrs), Av.2 °F (36.8 °C), Min:97.5 °F (36.4 °C), Max:98.5 °F (36.9 °C)    Oxygen Therapy  O2 Sat (%): 96 % (19 1519)  Pulse via Oximetry: 77 beats per minute (19 1334)  O2 Device: Nasal cannula (19 0733)  PEEP/CPAP (cm H2O): 3 cm H20 (19 1334)  O2 Flow Rate (L/min): 3 l/min (19 0733)  FIO2 (%): 32 % (19 1402)    Intake/Output Summary (Last 24 hours) at 9/3/2019 1526  Last data filed at 9/3/2019 1334  Gross per 24 hour   Intake 720 ml   Output 2500 ml   Net -1780 ml         General: No acute distress. Alert.  Obese. Head:  AT/NC  Lungs:  B/l expiratory wheezing. Heart:  IRIR, no murmur, rub, or gallop  Abdomen: Soft, non-distended, non-tender, +bs  Extremities: No cyanosis or clubbing. Neurologic:  No focal deficits. Moves all extremities. Skin:  No Obvious Rash  Psych:  Normal affect. LABS AND STUDIES:  Personally reviewed all labs, meds, and studies for past 24hrs. ASSESSMENT      Active Hospital Problems    Diagnosis Date Noted    Atrial fibrillation, new onset ([facility]) 08/30/2019    Chronic respiratory failure with hypoxia ([facility]) 08/28/2019     NC 3L continuous      COPD with acute exacerbation ([facility]) 08/27/2019    Tobacco abuse 03/05/2017    MICHAEL on CPAP 03/02/2017    Morbid obesity with BMI of 50.0-59.9, adult ([facility]) 03/02/2017    Diabetes mellitus type 2, controlled ([facility]) 03/02/2017    Hypertension 03/07/2016    Chronic back pain 03/07/2016    Obesity hypoventilation syndrome ([facility]) 12/18/2015       PLAN:    # Acute COPD exacerbation  - completed empiric azithromycin  - weaning corticosteroids  - pulmonology following, appreciate recs  - patient being set up with Trilogy  - continue with jet nebs    # Afib, not in RVR  - continue Eliquis  - continue with oral diltiazem    # History of MICHAEL/OHS, on CPAP  - evaluation for Trilogy, as above    # DM type II  - hold home metformin  - Lantus and Humalog SSI and serial CBGs    # HTN  - continue current regimen    # Tobacco abuse  - continue nicotine patch    F/E/N: no fluids, replete electrolytes as needed, diabetic diet    Ppx: Eliquis for VTE    Code Status: FULL CODE    Disposition: pending clinical improvement as above with anticipated discharge in 1-2 days, awaiting set up with home Trilogy. Discussed with patient at bedside. All questions answered.      Signed By: Tonye Crigler, DO     September 3, 2019

## 2019-09-04 VITALS
SYSTOLIC BLOOD PRESSURE: 106 MMHG | OXYGEN SATURATION: 93 % | BODY MASS INDEX: 50.79 KG/M2 | TEMPERATURE: 98.2 F | WEIGHT: 293 LBS | RESPIRATION RATE: 98 BRPM | HEART RATE: 82 BPM | DIASTOLIC BLOOD PRESSURE: 74 MMHG

## 2019-09-04 LAB
A1AT SERPL-MCNC: 115 MG/DL (ref 90–200)
ANION GAP SERPL CALC-SCNC: 5 MMOL/L (ref 7–16)
BASOPHILS # BLD: 0 K/UL (ref 0–0.2)
BASOPHILS NFR BLD: 0 % (ref 0–2)
BUN SERPL-MCNC: 17 MG/DL (ref 6–23)
CALCIUM SERPL-MCNC: 9.2 MG/DL (ref 8.3–10.4)
CHLORIDE SERPL-SCNC: 98 MMOL/L (ref 98–107)
CO2 SERPL-SCNC: 33 MMOL/L (ref 21–32)
CREAT SERPL-MCNC: 0.61 MG/DL (ref 0.6–1)
DIFFERENTIAL METHOD BLD: ABNORMAL
EOSINOPHIL # BLD: 0.1 K/UL (ref 0–0.8)
EOSINOPHIL NFR BLD: 0 % (ref 0.5–7.8)
ERYTHROCYTE [DISTWIDTH] IN BLOOD BY AUTOMATED COUNT: 16.5 % (ref 11.9–14.6)
GLUCOSE BLD STRIP.AUTO-MCNC: 75 MG/DL (ref 65–100)
GLUCOSE BLD STRIP.AUTO-MCNC: 83 MG/DL (ref 65–100)
GLUCOSE SERPL-MCNC: 80 MG/DL (ref 65–100)
HCT VFR BLD AUTO: 43 % (ref 35.8–46.3)
HGB BLD-MCNC: 12.8 G/DL (ref 11.7–15.4)
IMM GRANULOCYTES # BLD AUTO: 0.1 K/UL (ref 0–0.5)
IMM GRANULOCYTES NFR BLD AUTO: 1 % (ref 0–5)
LYMPHOCYTES # BLD: 3.3 K/UL (ref 0.5–4.6)
LYMPHOCYTES NFR BLD: 18 % (ref 13–44)
MCH RBC QN AUTO: 22.4 PG (ref 26.1–32.9)
MCHC RBC AUTO-ENTMCNC: 29.8 G/DL (ref 31.4–35)
MCV RBC AUTO: 75.2 FL (ref 79.6–97.8)
MONOCYTES # BLD: 1.2 K/UL (ref 0.1–1.3)
MONOCYTES NFR BLD: 7 % (ref 4–12)
NEUTS SEG # BLD: 13.7 K/UL (ref 1.7–8.2)
NEUTS SEG NFR BLD: 74 % (ref 43–78)
NRBC # BLD: 0 K/UL (ref 0–0.2)
PLATELET # BLD AUTO: 305 K/UL (ref 150–450)
PMV BLD AUTO: 10.8 FL (ref 9.4–12.3)
POTASSIUM SERPL-SCNC: 4.2 MMOL/L (ref 3.5–5.1)
RBC # BLD AUTO: 5.72 M/UL (ref 4.05–5.2)
SODIUM SERPL-SCNC: 136 MMOL/L (ref 136–145)
WBC # BLD AUTO: 18.4 K/UL (ref 4.3–11.1)

## 2019-09-04 PROCEDURE — 94761 N-INVAS EAR/PLS OXIMETRY MLT: CPT

## 2019-09-04 PROCEDURE — 94640 AIRWAY INHALATION TREATMENT: CPT

## 2019-09-04 PROCEDURE — 77030020120 HC VLV RESP PEP HI -B

## 2019-09-04 PROCEDURE — 94760 N-INVAS EAR/PLS OXIMETRY 1: CPT

## 2019-09-04 PROCEDURE — 80048 BASIC METABOLIC PNL TOTAL CA: CPT

## 2019-09-04 PROCEDURE — 77030012341 HC CHMB SPCR OPTC MDI VYRM -A

## 2019-09-04 PROCEDURE — 74011250637 HC RX REV CODE- 250/637: Performed by: INTERNAL MEDICINE

## 2019-09-04 PROCEDURE — 74011636637 HC RX REV CODE- 636/637: Performed by: NURSE PRACTITIONER

## 2019-09-04 PROCEDURE — 82962 GLUCOSE BLOOD TEST: CPT

## 2019-09-04 PROCEDURE — 77010033678 HC OXYGEN DAILY

## 2019-09-04 PROCEDURE — 85025 COMPLETE CBC W/AUTO DIFF WBC: CPT

## 2019-09-04 PROCEDURE — 74011636637 HC RX REV CODE- 636/637: Performed by: INTERNAL MEDICINE

## 2019-09-04 PROCEDURE — 77030027138 HC INCENT SPIROMETER -A

## 2019-09-04 PROCEDURE — 36415 COLL VENOUS BLD VENIPUNCTURE: CPT

## 2019-09-04 PROCEDURE — 74011000250 HC RX REV CODE- 250: Performed by: INTERNAL MEDICINE

## 2019-09-04 PROCEDURE — 99232 SBSQ HOSP IP/OBS MODERATE 35: CPT | Performed by: INTERNAL MEDICINE

## 2019-09-04 RX ORDER — NICOTINE 7MG/24HR
1 PATCH, TRANSDERMAL 24 HOURS TRANSDERMAL EVERY 24 HOURS
Qty: 30 PATCH | Refills: 0 | Status: SHIPPED | OUTPATIENT
Start: 2019-09-04 | End: 2019-10-04

## 2019-09-04 RX ORDER — DILTIAZEM HYDROCHLORIDE 240 MG/1
240 CAPSULE, COATED, EXTENDED RELEASE ORAL
Qty: 30 CAP | Refills: 0 | Status: SHIPPED | OUTPATIENT
Start: 2019-09-04 | End: 2019-09-06 | Stop reason: SDUPTHER

## 2019-09-04 RX ORDER — METHYLPREDNISOLONE 4 MG/1
TABLET ORAL
Qty: 1 DOSE PACK | Refills: 0 | Status: SHIPPED | OUTPATIENT
Start: 2019-09-04 | End: 2019-09-17

## 2019-09-04 RX ORDER — OMEPRAZOLE 20 MG/1
20 CAPSULE, DELAYED RELEASE ORAL DAILY
Qty: 30 CAP | Refills: 0 | Status: SHIPPED | OUTPATIENT
Start: 2019-09-04 | End: 2019-10-11

## 2019-09-04 RX ORDER — ALBUTEROL SULFATE 0.83 MG/ML
2.5 SOLUTION RESPIRATORY (INHALATION)
Qty: 30 NEBULE | Refills: 11 | Status: SHIPPED | OUTPATIENT
Start: 2019-09-04 | End: 2019-09-17

## 2019-09-04 RX ADMIN — PANTOPRAZOLE SODIUM 40 MG: 40 TABLET, DELAYED RELEASE ORAL at 06:11

## 2019-09-04 RX ADMIN — INSULIN GLARGINE 10 UNITS: 100 INJECTION, SOLUTION SUBCUTANEOUS at 09:15

## 2019-09-04 RX ADMIN — ALBUTEROL SULFATE 2.5 MG: 2.5 SOLUTION RESPIRATORY (INHALATION) at 13:53

## 2019-09-04 RX ADMIN — Medication 5 ML: at 06:17

## 2019-09-04 RX ADMIN — ALBUTEROL SULFATE 2.5 MG: 2.5 SOLUTION RESPIRATORY (INHALATION) at 08:11

## 2019-09-04 RX ADMIN — GUAIFENESIN 1200 MG: 600 TABLET, EXTENDED RELEASE ORAL at 09:15

## 2019-09-04 RX ADMIN — PREDNISONE 40 MG: 10 TABLET ORAL at 09:15

## 2019-09-04 RX ADMIN — LISINOPRIL 5 MG: 5 TABLET ORAL at 09:15

## 2019-09-04 RX ADMIN — BUDESONIDE 500 MCG: 0.5 INHALANT RESPIRATORY (INHALATION) at 08:11

## 2019-09-04 RX ADMIN — APIXABAN 5 MG: 2.5 TABLET, FILM COATED ORAL at 06:11

## 2019-09-04 NOTE — PROGRESS NOTES
End of shift report given to APRIL RN , pt is stable resting in bed, no c/o pain , call light within reach, bed locked and low position, pt respirations even and unlabored, no distress noted.

## 2019-09-04 NOTE — PROGRESS NOTES
Discharge instructions, follow up information, medication list, and prescriptions provided and explained to the pt. IV removed from right arm, no remote telemetry on. Opportunity for questions provided. Patient states son is coming to transport patient home and is bringing home portable oxygen tank for transport. Instructed to call once ready to leave.

## 2019-09-04 NOTE — PROGRESS NOTES
09/03/19 9927   Oxygen Therapy   O2 Sat (%) 98 %   Pulse via Oximetry 78 beats per minute   O2 Device CPAP mask  (home cpap / home settings)   O2 Flow Rate (L/min) 3 l/min   Respiratory   Respiratory (WDL) WDL   Respiratory Pattern Regular   Chest/Tracheal Assessment Chest expansion, symmetrical   Breath Sounds Bilateral Diminished

## 2019-09-04 NOTE — DISCHARGE INSTRUCTIONS
DISCHARGE SUMMARY from Nurse    PATIENT INSTRUCTIONS:    After general anesthesia or intravenous sedation, for 24 hours or while taking prescription Narcotics:  · Limit your activities  · Do not drive and operate hazardous machinery  · Do not make important personal or business decisions  · Do  not drink alcoholic beverages  · If you have not urinated within 8 hours after discharge, please contact your surgeon on call. What to do at Home:  Recommended activity: Activity as tolerated. If you experience any of the following symptoms temp > 101.5, worsening cough or wheezing, shortness of breath or fatigue not relieved with rest, please follow up with MD.    *  Please give a list of your current medications to your Primary Care Provider. *  Please update this list whenever your medications are discontinued, doses are      changed, or new medications (including over-the-counter products) are added. *  Please carry medication information at all times in case of emergency situations. These are general instructions for a healthy lifestyle:    No smoking/ No tobacco products/ Avoid exposure to second hand smoke  Surgeon General's Warning:  Quitting smoking now greatly reduces serious risk to your health. Obesity, smoking, and sedentary lifestyle greatly increases your risk for illness    A healthy diet, regular physical exercise & weight monitoring are important for maintaining a healthy lifestyle    You may be retaining fluid if you have a history of heart failure or if you experience any of the following symptoms:  Weight gain of 3 pounds or more overnight or 5 pounds in a week, increased swelling in our hands or feet or shortness of breath while lying flat in bed. Please call your doctor as soon as you notice any of these symptoms; do not wait until your next office visit. The discharge information has been reviewed with the patient. The patient verbalized understanding.   Discharge medications reviewed with the patient and appropriate educational materials and side effects teaching were provided. Patient Education        Chronic Obstructive Pulmonary Disease (COPD) Flare-Ups: Care Instructions  Your Care Instructions    Chronic obstructive pulmonary disease (COPD) is a lung disease that makes it hard to breathe. It is caused by damage to the lungs over many years, usually from smoking. COPD is often a mix of two diseases:  · Chronic bronchitis: The airways that carry air to the lungs (bronchial tubes) get inflamed and make a lot of mucus. This can narrow or block the airways. · Emphysema: In a healthy person, the tiny air sacs in the lungs are like balloons. As you breathe in and out, they get bigger and smaller to move air through your lungs. But with emphysema, these air sacs are damaged and lose their stretch. Less air gets in and out of the lungs. Many people with COPD have attacks called flare-ups or exacerbations. This is when your usual symptoms quickly get worse and stay worse. The doctor has checked you carefully. But problems can develop later. If you notice any problems or new symptoms, get medical treatment right away. Follow-up care is a key part of your treatment and safety. Be sure to make and go to all appointments, and call your doctor if you are having problems. It's also a good idea to know your test results and keep a list of the medicines you take. How can you care for yourself at home? · Be safe with medicines. Take your medicines exactly as prescribed. Call your doctor if you think you are having a problem with your medicine. You may be taking medicines such as:  ? Bronchodilators. These help open your airways and make breathing easier. ? Corticosteroids. These reduce airway inflammation. They may be given as pills, in a vein, or in an inhaled form. You may go home with pills in addition to an inhaler that you already use.   · A spacer may help you get more inhaled medicine to your lungs. Ask your doctor or pharmacist if a spacer is right for you. If it is, ask how to use it properly. · If your doctor prescribed antibiotics, take them as directed. Do not stop taking them just because you feel better. You need to take the full course of antibiotics. · If your doctor prescribed oxygen, use the flow rate your doctor has recommended. Do not change it without talking to your doctor first.  · Do not smoke. Smoking makes COPD worse. If you need help quitting, talk to your doctor about stop-smoking programs and medicines. These can increase your chances of quitting for good. When should you call for help? Call 911 anytime you think you may need emergency care. For example, call if:    · You have severe trouble breathing.    Call your doctor now or seek immediate medical care if:    · You have new or worse trouble breathing.     · Your coughing or wheezing gets worse.     · You cough up dark brown or bloody mucus (sputum).     · You have a new or higher fever.    Watch closely for changes in your health, and be sure to contact your doctor if:    · You notice more mucus or a change in the color of your mucus.     · You need to use your antibiotic or steroid pills.     · You do not get better as expected. Where can you learn more? Go to http://carin-kylie.info/. Enter Z613 in the search box to learn more about \"Chronic Obstructive Pulmonary Disease (COPD) Flare-Ups: Care Instructions. \"  Current as of: September 5, 2018  Content Version: 12.1  © 3444-8571 Healthwise, Incorporated. Care instructions adapted under license by [employer] (which disclaims liability or warranty for this information). If you have questions about a medical condition or this instruction, always ask your healthcare professional. [place] [address] any warranty or liability for your use of this information.          Patient Education        Atrial Fibrillation: Care Instructions  Your Care Instructions    Atrial fibrillation is an irregular and often fast heartbeat. Treating this condition is important for several reasons. It can cause blood clots, which can travel from your heart to your brain and cause a stroke. If you have a fast heartbeat, you may feel lightheaded, dizzy, and weak. An irregular heartbeat can also increase your risk for heart failure. Atrial fibrillation is often the result of another heart condition, such as high blood pressure or coronary artery disease. Making changes to improve your heart condition will help you stay healthy and active. Follow-up care is a key part of your treatment and safety. Be sure to make and go to all appointments, and call your doctor if you are having problems. It's also a good idea to know your test results and keep a list of the medicines you take. How can you care for yourself at home? Medicines    · Take your medicines exactly as prescribed. Call your doctor if you think you are having a problem with your medicine. You will get more details on the specific medicines your doctor prescribes.     · If your doctor has given you a blood thinner to prevent a stroke, be sure you get instructions about how to take your medicine safely. Blood thinners can cause serious bleeding problems.     · Do not take any vitamins, over-the-counter drugs, or herbal products without talking to your doctor first.    Lifestyle changes    · Do not smoke. Smoking can increase your chance of a stroke and heart attack. If you need help quitting, talk to your doctor about stop-smoking programs and medicines. These can increase your chances of quitting for good.     · Eat a heart-healthy diet.     · Stay at a healthy weight. Lose weight if you need to.     · Limit alcohol to 2 drinks a day for men and 1 drink a day for women. Too much alcohol can cause health problems.     · Avoid colds and flu. Get a pneumococcal vaccine shot.  If you have had one before, ask your doctor whether you need another dose. Get a flu shot every year. If you must be around people with colds or flu, wash your hands often. Activity    · If your doctor recommends it, get more exercise. Walking is a good choice. Bit by bit, increase the amount you walk every day. Try for at least 30 minutes on most days of the week. You also may want to swim, bike, or do other activities. Your doctor may suggest that you join a cardiac rehabilitation program so that you can have help increasing your physical activity safely.     · Start light exercise if your doctor says it is okay. Even a small amount will help you get stronger, have more energy, and manage stress. Walking is an easy way to get exercise. Start out by walking a little more than you did in the hospital. Gradually increase the amount you walk.     · When you exercise, watch for signs that your heart is working too hard. You are pushing too hard if you cannot talk while you are exercising. If you become short of breath or dizzy or have chest pain, sit down and rest immediately.     · Check your pulse regularly. Place two fingers on the artery at the palm side of your wrist, in line with your thumb. If your heartbeat seems uneven or fast, talk to your doctor. When should you call for help? Call 911 anytime you think you may need emergency care. For example, call if:    · You have symptoms of a heart attack. These may include:  ? Chest pain or pressure, or a strange feeling in the chest.  ? Sweating. ? Shortness of breath. ? Nausea or vomiting. ? Pain, pressure, or a strange feeling in the back, neck, jaw, or upper belly or in one or both shoulders or arms. ? Lightheadedness or sudden weakness. ? A fast or irregular heartbeat. After you call 911, the  may tell you to chew 1 adult-strength or 2 to 4 low-dose aspirin. Wait for an ambulance. Do not try to drive yourself.     · You have symptoms of a stroke. These may include:  ? Sudden numbness, tingling, weakness, or loss of movement in your face, arm, or leg, especially on only one side of your body. ? Sudden vision changes. ? Sudden trouble speaking. ? Sudden confusion or trouble understanding simple statements. ? Sudden problems with walking or balance. ? A sudden, severe headache that is different from past headaches.     · You passed out (lost consciousness).    Call your doctor now or seek immediate medical care if:    · You have new or increased shortness of breath.     · You feel dizzy or lightheaded, or you feel like you may faint.     · Your heart rate becomes irregular.     · You can feel your heart flutter in your chest or skip heartbeats. Tell your doctor if these symptoms are new or worse.    Watch closely for changes in your health, and be sure to contact your doctor if you have any problems. Where can you learn more? Go to http://carin-kylie.info/. Enter U020 in the search box to learn more about \"Atrial Fibrillation: Care Instructions. \"  Current as of: July 22, 2018  Content Version: 12.1  © 4984-2679 [employer]. Care instructions adapted under license by [employer] (which disclaims liability or warranty for this information). If you have questions about a medical condition or this instruction, always ask your healthcare professional. [address] any warranty or liability for your use of this information. _Patient Education     Patient Education   Apixaban (By mouth)   Apixaban (a-PIX-a-ban)  Treats and prevents blood clots. This medicine is a blood thinner. Brand Name(s): Eliquis   There may be other brand names for this medicine. When This Medicine Should Not Be Used: This medicine is not right for everyone. Do not use it if you had an allergic reaction to apixaban or you have active bleeding.   How to Use This Medicine:   Tablet  · Your doctor will tell you how much medicine to use. Do not use more than directed. · If you are not able to swallow the tablets whole, they may be crushed and mixed in water, 5% dextrose in water (D5W), apple juice, or applesauce. The crushed tablets may be mixed with 60 mL of water or D5W dose and given through a nasogastric tube (NGT). · This medicine should come with a Medication Guide. Ask your pharmacist for a copy if you do not have one. · Missed dose: Take a dose as soon as you remember. If it is almost time for your next dose, wait until then and take a regular dose. Do not take extra medicine to make up for a missed dose. · Store the medicine in a closed container at room temperature, away from heat, moisture, and direct light. Drugs and Foods to Avoid:   Ask your doctor or pharmacist before using any other medicine, including over-the-counter medicines, vitamins, and herbal products. · Some medicines can affect how apixaban works. Tell your doctor if you are using any of the following:   ¨ Carbamazepine, clarithromycin, itraconazole, ketoconazole, phenytoin, rifampin, ritonavir, Bernardino's wort  ¨ Blood thinner (including clopidogrel, heparin, prasugrel, warfarin)  ¨ Medicine to treat depression  ¨ NSAID pain or arthritis medicine (including aspirin, celecoxib, diclofenac, ibuprofen, naproxen)  Warnings While Using This Medicine:   · Tell your doctor if you are pregnant or breastfeeding, or if you have kidney disease, liver disease, bleeding problems, or an artificial heart valve. · Do not stop using this medicine suddenly without asking your doctor. You might have a higher risk of stroke for a short time after you stop using this medicine. · This medicine increases your risk for bleeding that can become serious if not controlled. You may also bruise easily, and it may take longer than usual for bleeding to stop.   · This medicine may increase your risk for blood clots in your spine or back if you undergo an epidural or spinal puncture. This could lead to paralysis. Tell your doctor if you ever had spine problems or back surgery. · Tell any doctor or dentist who treats you that you are using this medicine. With your doctor's supervision, you may need to stop using this medicine several days before you have surgery or medical tests. · Your doctor will do lab tests at regular visits to check on the effects of this medicine. Keep all appointments. · Keep all medicine out of the reach of children. Never share your medicine with anyone. Possible Side Effects While Using This Medicine:   Call your doctor right away if you notice any of these side effects:  · Allergic reaction: Itching or hives, swelling in your face or hands, swelling or tingling in your mouth or throat, chest tightness, trouble breathing  · Change in how much or how often you urinate, red or pink urine  · Chest pain, trouble breathing  · Coughing up blood, vomiting blood or material that looks like coffee grounds  · Numbness, tingling, or muscle weakness in your legs or feet  · Red or black, tarry stools  · Unusual bleeding, bruising, or weakness  If you notice other side effects that you think are caused by this medicine, tell your doctor. Call your doctor for medical advice about side effects. You may report side effects to FDA at 8-247-FDA-9733  © 2017 [facility] Information is for End User's use only and may not be sold, redistributed or otherwise used for commercial purposes. The above information is an  only. It is not intended as medical advice for individual conditions or treatments. Talk to your doctor, nurse or pharmacist before following any medical regimen to see if it is safe and effective for you. Learning About Benefits From Quitting Smoking  How does quitting smoking make you healthier? If you're thinking about quitting smoking, you may have a few reasons to be smoke-free. Your health may be one of them.   · When you quit smoking, you lower your risks for cancer, lung disease, heart attack, stroke, blood vessel disease, and blindness from macular degeneration. · When you're smoke-free, you get sick less often, and you heal faster. You are less likely to get colds, flu, bronchitis, and pneumonia. · As a nonsmoker, you may find that your mood is better and you are less stressed. When and how will you feel healthier? Quitting has real health benefits that start from day 1 of being smoke-free. And the longer you stay smoke-free, the healthier you get and the better you feel. The first hours  · After just 20 minutes, your blood pressure and heart rate go down. That means there's less stress on your heart and blood vessels. · Within 12 hours, the level of carbon monoxide in your blood drops back to normal. That makes room for more oxygen. With more oxygen in your body, you may notice that you have more energy than when you smoked. After 2 weeks  · Your lungs start to work better. · Your risk of heart attack starts to drop. After 1 month  · When your lungs are clear, you cough less and breathe deeper, so it's easier to be active. · Your sense of taste and smell return. That means you can enjoy food more than you have since you started smoking. Over the years  · After 1 year, your risk of heart disease is half what it would be if you kept smoking. · After 5 years, your risk of stroke starts to shrink. Within a few years after that, it's about the same as if you'd never smoked. · After 10 years, your risk of dying from lung cancer is cut by about half. And your risk for many other types of cancer is lower too. How would quitting help others in your life? When you quit smoking, you improve the health of everyone who now breathes in your smoke. · Their heart, lung, and cancer risks drop, much like yours. · They are sick less.  For babies and small children, living smoke-free means they're less likely to have ear infections, pneumonia, and bronchitis. · If you're a woman who is or will be pregnant someday, quitting smoking means a healthier . · Children who are close to you are less likely to become adult smokers. Where can you learn more? Go to http://carin-kylie.info/. Enter [id number] [id number] [id number] in the search box to learn more about \"Learning About Benefits From Quitting Smoking. \"  Current as of: 2018  Content Version: 12.1  © 7123-2532 Healthwise, Incorporated. Care instructions adapted under license by [employer] (which disclaims liability or warranty for this information). If you have questions about a medical condition or this instruction, always ask your healthcare professional. [address] any warranty or liability for your use of this information.        __________________________________________________________________________________________________________________________________

## 2019-09-04 NOTE — PROGRESS NOTES
Oxygen Qualifier       Room air: SpO2 with O2 and liter flow   Resting SpO2  90%  94% on 3L   Ambulating SpO2  88% 93% on 3 L       Completed by:    Dewey Grimm, RT

## 2019-09-04 NOTE — PROGRESS NOTES
Pt in bed resting rr are even and unlabored lung sounds are diminished no distress noted O2 in place. abd is soft bowel sounds are active. Skin intact she is able to ambulate without assistance. Safety measures in place will continue to monitor.

## 2019-09-04 NOTE — PROGRESS NOTES
Pt bedside report received from [facility], pt resting in bed  watching Tv, assessment completed ,  pt AxOx4 bed on low and locked position with floor free of objects,  call light within reach, pt on oxygen  @3L NC, respirations even and non labored, pt verbalized understanding to call for needs,  no c/o pain, no distress noted.

## 2019-09-04 NOTE — PROGRESS NOTES
Patient is discharging home this morning. She has home O2 through [facility]. She states that her son will bring an extra tank to get her home (she has a partial one here already). He will transport her home as well. Non invasive ventilator has been arranged through [address] [place] [address] and will be delivered to the patient's home today. Patient is not home bound and denies any home health needs. Case Management will remain available to assist as needed. Care Management Interventions  PCP Verified by CM:  Yes  Transition of Care Consult (CM Consult): Discharge Planning  Discharge Durable Medical Equipment: Yes(Non-invasive vent through [facility]. Already had home O2 [facility].)  Physical Therapy Consult: No  Occupational Therapy Consult: No  Speech Therapy Consult: No  Current Support Network: Own Home  Confirm Follow Up Transport: Family  Plan discussed with Pt/Family/Caregiver: Yes  Freedom of Choice Offered: Yes  Discharge Location  Discharge Placement: Home

## 2019-09-04 NOTE — PROGRESS NOTES
Pt discharged home via private car she is alert and oriented. She verbalized understanding of all discharge instructions and follow up appointments. She is leaving with all her belongings. She is stable.

## 2019-09-04 NOTE — DISCHARGE SUMMARY
Hospitalist Discharge Summary     Patient ID:  Kassidy Arvizu  [id number]  54 y.o.  1970  Admit date: 8/27/2019  1:19 PM  Discharge date and time: 9/4/2019  Attending: Cuate Block DO  PCP:  Terrance Lyon PA-C  Treatment Team: Attending Provider: Cuate Block DO; Consulting Provider: Kathrin Noonan MD; Utilization Review: Ekta Acevedo RN; Care Manager: Ny Wong RN; Primary Nurse: Kwabena Werner April J    Principal Diagnosis COPD with acute exacerbation [facility])   Principal Problem:    COPD with acute exacerbation ([facility] (8/27/2019)    Active Problems:    Obesity hypoventilation syndrome ([facility] [address] (12/18/2015)      Diabetes mellitus type 2, controlled ([facility] [address] (3/2/2017)      Morbid obesity with BMI of 50.0-59.9, adult ([facility] [address] (3/2/2017)      Hypertension (3/7/2016)      Chronic back pain (3/7/2016)      MICHAEL on CPAP (3/2/2017)      Tobacco abuse (3/5/2017)      Chronic respiratory failure with hypoxia ([facility] [address] (8/28/2019)      Overview: NC 3L continuous      Atrial fibrillation, new onset ([facility] [address] (8/30/2019)       53 yo F with PMH of severe COPD (GOLD stage 4) on home O2/CPAP, OHS, Morbid obesity, HTN, HLD, DM2 was sent to [facility] as direct admit from pcp office. Pt went there for worsening cough, SOB and fatigue for 4-5 days. Today she said her breathing got worse at the pcp office. Denies any CP, Fever, dizziness, passing out, diarrhea, muscle pain but did have productive cough with whitish sputum, also reports runny nose. Her home inhalers did not help. At the office, her O2 sat was 71 and HR was 114 so she was sent to hospital.     She says she has not smoked a cigarette in over a week. Denies sick contacts.      Interval History (9/4): patient examined at bedside. No acute overnight events. Feels much better overall. No fevers/chills, chest pain, changes in baseline shortness of breath, abdominal pain, nausea/vomiting, or diarrhea.     Hospital Course:  Please refer to the admission H&P for details of presentation. In summary, the patient is admitted for acute on chronic hypoxic and hypercapnic respiratory failure in the setting of COPD exacerbation. Patient treated with corticosteroids, jet nebs and empiric antibiotics. Pulmonary consulted and have approved patient for home use of Trilogy. Patient with significant clinical improvement overall. Her home diltiazem was uptitrated and she was prescribed Eliquis for VTE prophylaxis in the setting of Afib, evaluated by cardiology. She is currently hemodynamically stable for hospital discharge, oxygen qualifier completed and patient satting in the low 90s on her baseline 3L of supplemental oxygen. Details of hospitalization discussed with patient who understands and agrees with plan of care and hospital discharge home. Return precautions provided. All questions answered. Significant Diagnostic Studies:     Two view chest     History: Cough, shortness of breath.      Comparison: 03/04/2017     Findings: The heart and mediastinal silhouette are normal in size and  configuration. There is mild lingular atelectasis/infiltrate. The pulmonary  vascularity is within normal limits. The visualized osseous structures are  unremarkable.     IMPRESSION  Impression: Mild lingular atelectasis/infiltrate.     Labs: Results:       Chemistry Recent Labs     09/04/19  0742 09/03/19  0908 09/02/19  0814   GLU 80 169* 153*    136 136   K 4.2 4.4 4.3   CL 98 97* 98   CO2 33* 33* 36*   BUN 17 15 14   CREA 0.61 0.80 0.70   CA 9.2 9.1 9.0   AGAP 5* 6* 2*   AP  --  81  --    TP  --  7.1  --    ALB  --  3.1*  --    GLOB  --  4.0*  --    AGRAT  --  0.8*  --       CBC w/Diff Recent Labs     09/04/19  0742 09/03/19  0908 09/02/19  0814   WBC 18.4* 18.5* 15.8*   RBC 5.72* 5.50* 5.50*   HGB 12.8 12.2 12.4   HCT 43.0 41.3 41.9    314 296   GRANS 74  --   --    LYMPH 18  --   --    EOS 0*  --   --       Cardiac Enzymes No results for input(s): CPK, CKND1, KASIA in the last 72 hours.    No lab exists for component: CKRMB, TROIP   Coagulation No results for input(s): PTP, INR, APTT in the last 72 hours. No lab exists for component: INREXT    Lipid Panel Lab Results   Component Value Date/Time    Cholesterol, total 144 08/20/2019 10:08 AM    HDL Cholesterol 26 (L) 08/20/2019 10:08 AM    LDL, calculated 99 08/20/2019 10:08 AM    VLDL, calculated 19 08/20/2019 10:08 AM    Triglyceride 95 08/20/2019 10:08 AM      BNP No results for input(s): BNPP in the last 72 hours. Liver Enzymes Recent Labs     09/03/19  0908   TP 7.1   ALB 3.1*   AP 81   SGOT 7*      Thyroid Studies Lab Results   Component Value Date/Time    TSH 0.509 03/02/2017 12:50 AM            Discharge Exam:  Visit Vitals  /74   Pulse 82   Temp 98.2 °F (36.8 °C)   Resp (!) 98   Wt 142.7 kg (314 lb 11.2 oz)   LMP 05/26/2010   SpO2 93%   BMI 50.79 kg/m²     General:          No acute distress. Alert.  Obese. Head:               AT/NC  Lungs:             B/l expiratory wheezing. Heart:              IRIR, no murmur, rub, or gallop  Abdomen:        Soft, non-distended, non-tender, +bs  Extremities:     No cyanosis or clubbing. Neurologic:      No focal deficits. Moves all extremities. Skin:                No Obvious Rash  Psych:             Normal affect. Disposition: home  Discharge Condition: stable  Patient Instructions:   Current Discharge Medication List      START taking these medications    Details   apixaban (ELIQUIS) 5 mg tablet Take 1 Tab by mouth every twelve (12) hours for 30 days. Qty: 60 Tab, Refills: 0      albuterol (PROVENTIL VENTOLIN) 2.5 mg /3 mL (0.083 %) nebu 3 mL by Nebulization route every four (4) hours as needed for Cough. Qty: 30 Nebule, Refills: 11      nicotine (NICODERM CQ) 7 mg/24 hr 1 Patch by TransDERmal route every twenty-four (24) hours for 30 days.   Qty: 30 Patch, Refills: 0      methylPREDNISolone (MEDROL DOSEPACK) 4 mg tablet Take as directed  Qty: 1 Dose Pack, Refills: 0 omeprazole (PRILOSEC) 20 mg capsule Take 1 Cap by mouth daily. Qty: 30 Cap, Refills: 0         CONTINUE these medications which have CHANGED    Details   dilTIAZem CD (CARDIZEM CD) 240 mg ER capsule Take 1 Cap by mouth nightly for 30 days. Qty: 30 Cap, Refills: 0         CONTINUE these medications which have NOT CHANGED    Details   albuterol (VENTOLIN HFA) 90 mcg/actuation inhaler Take 2 Puffs by inhalation every four (4) hours as needed for Wheezing. Qty: 1 Inhaler, Refills: 11    Associated Diagnoses: COPD, severe (HCC)      atorvastatin (LIPITOR) 10 mg tablet Take 1 Tab by mouth nightly. Indications: high cholesterol  Qty: 30 Tab, Refills: 5    Associated Diagnoses: Hyperlipidemia LDL goal <100      lisinopril (PRINIVIL, ZESTRIL) 5 mg tablet Take 1 Tab by mouth daily. Qty: 30 Tab, Refills: 5      albuterol-ipratropium (DUO-NEB) 2.5 mg-0.5 mg/3 ml nebu 3 mL by Nebulization route every six (6) hours as needed (shortness of breath or wheezing). Qty: 120 Nebule, Refills: 5    Associated Diagnoses: COPD, severe ([address]      metFORMIN (GLUCOPHAGE) 500 mg tablet Take 1 Tab by mouth two (2) times daily (with meals). Indications: type 2 diabetes mellitus  Qty: 60 Tab, Refills: 5    Associated Diagnoses: Controlled type 2 diabetes mellitus with microalbuminuria, without long-term current use of insulin ([address]      ! ! glucose blood VI test strips (TRUE METRIX GLUCOSE TEST STRIP) strip Use to check blood sugar once daily as directed. Dx: E11.29  Qty: 100 Strip, Refills: 11    Associated Diagnoses: Controlled type 2 diabetes mellitus with microalbuminuria, without long-term current use of insulin (HCC)      Insulin Needles, Disposable, (NOVOFINE 32) 32 gauge x 1/4\" ndle Use to inject Victoza once daily  Qty: 100 Pen Needle, Refills: 3    Associated Diagnoses: Controlled type 2 diabetes mellitus with microalbuminuria, without long-term current use of insulin (HCC)      DISABLED PLACARD (DISABLED PLACARD) DMV Dx:  Severe COPD J44.9  Qty: 1 Each, Refills: 0    Associated Diagnoses: COPD, severe ([address]      fluticasone-vilanterol (BREO ELLIPTA) 200-25 mcg/dose inhaler Take 1 Puff by inhalation daily. RINSE MOUTH WELL AFTER USE  Qty: 1 Inhaler, Refills: 11    Associated Diagnoses: COPD, severe (HCC)      guaiFENesin SR (MUCINEX) 600 mg SR tablet Take 2 Tabs by mouth two (2) times a day. Qty: 120 Tab, Refills: 5    Associated Diagnoses: Acute exacerbation of chronic obstructive pulmonary disease (COPD) (HCC)      cpap machine kit by Does Not Apply route. OXYGEN-AIR DELIVERY SYSTEMS by Nasal route. 2 lpm exertion and hs      Blood-Glucose Meter (TRUE METRIX GLUCOSE METER) misc Check blood sugar twice daily, E11.9  Qty: 100 Each, Refills: 11      Blood-Glucose Meter (ONETOUCH ULTRA2) monitoring kit Use to check blood sugar once daily as directed. Dx: E11.9  Qty: 1 Kit, Refills: 0    Associated Diagnoses: Diabetes mellitus type 2, controlled ([address]      ! ! glucose blood VI test strips (ONETOUCH ULTRA TEST) strip Use to check glucose once daily as directed. Dx: E11.9  Qty: 30 Strip, Refills: 11    Associated Diagnoses: Diabetes mellitus type 2, controlled ([address]      Lancets (ONETOUCH ULTRASOFT LANCETS) misc Use to check glucose once daily as directed. Dx: E11.9  Qty: 30 Each, Refills: 11    Associated Diagnoses: Diabetes mellitus type 2, controlled ([address]       ! ! - Potential duplicate medications found. Please discuss with provider. Activity: Activity as tolerated  Diet: Resume previous diet  Wound Care: None needed    Follow-up  ·   With PCP within 1 week. With pulmonology within 2-3 weeks or as previously scheduled. Time spent to discharge patient 35 minutes  Signed:   Libertad Carlos DO  9/4/2019  2:43 PM

## 2019-09-04 NOTE — PROGRESS NOTES
Sarah Isidro  Admission Date: 8/27/2019             Daily Progress Note: 9/4/2019   49 y.o. AAF evaluated at the request of Dr. Edith Lui for COPD exacerbation with shortness of breath, productive cough and wheezing. Was last seen in our office 3/4/19 for poorly controlled severe COPD (FEV1 1.23, 42% in 2016) and in need of breast surgery (left lumpectomy with benign intraductal papilloma). She is O2 dependent requiring NC 3L ([facility]) with Gold stage IV COPD, morbid obesity, tobacco abuse, HTN, DM, MICHAEL with home BIPAP (22/12 with 3L O2 bleed in), HLD, and chronic back pain. States had not been back to her normal from breathing standpoint since influenza in January/Feb 2019. Has been compliant with home meds, nebs and O2 at 3L.  Has not been using BIPAP due to mask breakdown and possible machine malfunction--has not called for assistance and doesn't remember DME name of company. Juliet Funez unfortunately continued to smoke about 1/3 PPD. Asked to see her for AECOPD. Will check A1AT level given the severity at relatively young age.  May be related to obesity and collapsible airways    Subjective:     NIV being arranged for home vs CPAP  Plans to go home today  Discussed smoking cessation    Review of Systems  Respiratory: positive for dyspnea on exertion    Current Facility-Administered Medications   Medication Dose Route Frequency    insulin glargine (LANTUS) injection 10 Units  10 Units SubCUTAneous DAILY    predniSONE (DELTASONE) tablet 40 mg  40 mg Oral DAILY WITH BREAKFAST    apixaban (ELIQUIS) tablet 5 mg  5 mg Oral Q12H    metoprolol (LOPRESSOR) injection 5 mg  5 mg IntraVENous Q6H PRN    dilTIAZem CD (CARDIZEM CD) capsule 240 mg  240 mg Oral QHS    albuterol (PROVENTIL VENTOLIN) nebulizer solution 2.5 mg  2.5 mg Nebulization Q6H RT    And    budesonide (PULMICORT) 500 mcg/2 ml nebulizer suspension  500 mcg Nebulization BID RT    albuterol (PROVENTIL VENTOLIN) nebulizer solution 2.5 mg  2.5 mg Nebulization Q4H PRN    guaiFENesin-codeine (ROBITUSSIN AC) 100-10 mg/5 mL solution 5 mL  5 mL Oral Q4H PRN    sodium chloride (NS) flush 5-40 mL  5-40 mL IntraVENous Q8H    sodium chloride (NS) flush 5-40 mL  5-40 mL IntraVENous PRN    acetaminophen (TYLENOL) tablet 650 mg  650 mg Oral Q4H PRN    HYDROcodone-acetaminophen (NORCO) 5-325 mg per tablet 1 Tab  1 Tab Oral Q4H PRN    naloxone (NARCAN) injection 0.4 mg  0.4 mg IntraVENous PRN    diphenhydrAMINE (BENADRYL) injection 12.5 mg  12.5 mg IntraVENous Q4H PRN    ondansetron (ZOFRAN ODT) tablet 4 mg  4 mg Oral Q8H PRN    bisacodyl (DULCOLAX) tablet 5 mg  5 mg Oral DAILY PRN    nicotine (NICODERM CQ) 7 mg/24 hr patch 1 Patch  1 Patch TransDERmal Q24H    atorvastatin (LIPITOR) tablet 10 mg  10 mg Oral QHS    guaiFENesin ER (MUCINEX) tablet 1,200 mg  1,200 mg Oral BID    lisinopril (PRINIVIL, ZESTRIL) tablet 5 mg  5 mg Oral DAILY    [Held by provider] metFORMIN (GLUCOPHAGE) tablet 500 mg  500 mg Oral BID WITH MEALS    insulin lispro (HUMALOG) injection   SubCUTAneous AC&HS    pantoprazole (PROTONIX) tablet 40 mg  40 mg Oral ACB         Objective:     Vitals:    09/03/19 2257 09/04/19 0324 09/04/19 0726 09/04/19 0811   BP:  121/69 107/72    Pulse:  78 79    Resp:  18 19    Temp:  97.6 °F (36.4 °C) 98 °F (36.7 °C)    SpO2: 98% 95% 96% 99%   Weight:         Intake and Output:   09/02 1901 - 09/04 0700  In: 720 [P.O.:720]  Out: 3000 [Urine:3000]  No intake/output data recorded. Intake/Output Summary (Last 24 hours) at 9/4/2019 0827  Last data filed at 9/3/2019 1812  Gross per 24 hour   Intake 720 ml   Output 2000 ml   Net -1280 ml       Physical Exam:          Constitutional: the patient is obese  HEENT: Sclera clear, pupils equal, oral mucosa moist  Lungs: CTA on 3 lpm  Cardiovascular: RRR without M,G,R  Abd/GI: soft and non-tender; with positive bowel sounds. Ext: warm without cyanosis. There is no lower leg edema.   Musculoskeletal: moves all four extremities with equal strength  Skin: no jaundice or rashes, no wounds   Neuro: no gross neuro deficits       Lines/Drains: IV  Nutrition:ADA    CHEST XRAY:         LAB  Recent Labs     09/04/19  0742 09/03/19  0908 09/02/19  0814   WBC 18.4* 18.5* 15.8*   HGB 12.8 12.2 12.4   HCT 43.0 41.3 41.9    314 296     Recent Labs     09/04/19  0742 09/03/19  0908 09/02/19  0814    136 136   K 4.2 4.4 4.3   CL 98 97* 98   CO2 33* 33* 36*   GLU 80 169* 153*   BUN 17 15 14   CREA 0.61 0.80 0.70     ABG:    No results found for: PH, PHI, PCO2, PCO2I, PO2, PO2I, HCO3, HCO3I, FIO2, FIO2I        Component Value Flag Ref Range Units Status   Alpha-1 Antitrypsin 115   90 - 200 mg/dL Final   Comment:         Assessment:     Patient Active Problem List   Diagnosis Code    COPD, severe ([facility] [address] J44.9    Obesity hypoventilation syndrome ([facility] [address] E66.2    Diabetes mellitus type 2, controlled ([facility] [address] E11.9    Morbid obesity with BMI of 50.0-59.9, adult ([facility]) E66.01, Z68.43    Hypertension I10    Chronic back pain M54.9, G89.29    Nocturnal hypoxemia G47.34    MICHAEL on CPAP G47.33, Z99.89    Tobacco abuse Z72.0    Cocaine abuse ([facility]) F14.10    COPD with acute exacerbation ([facility]) J44.1    Chronic respiratory failure with hypoxia ([facility]) J96.11    Atrial fibrillation, new onset ([facility]) I48.91          Principal Problem:    COPD with acute exacerbation ([facility]) (8/27/2019)      Obesity hypoventilation syndrome (HCC) (12/18/2015    Morbid obesity with BMI of 50.0-59.9, adult ([facility]) (3/2/2017)        Chronic back pain (3/7/2016)      MICHAEL on CPAP (3/2/2017)      Tobacco abuse (3/5/2017)  Ongoing      Chronic respiratory failure with hypoxia ([facility]) (8/28/2019)      Overview: NC 3L continuous      Atrial fibrillation, new onset ([address] (8/30/2019)        PLAN:  -- on home O2 flow, wearing CPAP Q HS here,  Trilogy being arranged  -- needs office follow up   -- Alpha 1 screening WNL, no phenotype available  -- ongoing tobacco abuse, cessation strongly encouraged  -- pulmicort, albuterol, converted to prednisone  -- mobilize  -- okay for discharge, office follow up in  3-4 weeks     ROSALIE Shukla    More than 50% of time documented was spent in face-to-face contact with the patient and in the care of the patient on the floor/unit where the patient is located. Lungs:  Decreased BS with no significant wheezing  Heart:  RRR with no Murmur/Rubs/Gallops    Additional Comments:  Trilogy approved. Ready for discharge. Pt reviewed diet information from yesterday and noted immediate changes that could be made to her diet at home. Follow up in office lat this month. I have spoken with and examined the patient. I agree with the above assessment and plan as documented.     Moira Dowell MD

## 2019-09-05 ENCOUNTER — PATIENT OUTREACH (OUTPATIENT)
Dept: CASE MANAGEMENT | Age: 49
End: 2019-09-05

## 2019-09-05 NOTE — PROGRESS NOTES
This note will not be viewable in [address] Transition of Care Discharge Follow-up Questionnaire   What was patients diagnosis for hospital stay? COPD with acute exacerbation 9/5/19 6:20 PM   Does the patient understand his/her diagnosis and/or treatment and what happened during the hospitalization? Yes    Patient understands diagnosis and treatment    Did the patient receive discharge instructions? Yes     CM Assessed Risk for Readmission:     Patient stated Risk for Readmission:      Low to medium risk for readmission. No stated risk of readmission at this time. Review any discharge instructions (see discharge instructions/AVS in [facility]). Ask patient if they understand these. Do they have any questions? Yes, reviewed discharge instructions. No questions at this time. Were home services ordered (nursing, PT, OT, ST, etc.)? No   If so, has the first visit occurred? If not, why? (Assist with coordination of services if necessary.)   N/A   Was any DME ordered? Yes Non-Invasive Ventilator - [employer]       If so, has it been received? If not, why?  (Assist patient in obtaining DME orders &/or equipment if necessary.) Yes   Complete a review of all medications (new, continued and discontinued meds per the D/C instructions and medication tab in [address] [place] [address]). Yes. Started Eliquis 5 mg, Nicoderm , Medrol dose pack, Omerazole 20mg   Were all new prescriptions filled? If not, why?  (Assist patient in obtaining medications if necessary  escalate for CCM &/or SW if ongoing issues are verbalized by patient or anticipated)   All but one, they had changed her Albuterol nebulizer dose to 2.5mg/3ml. She was taking 0.5 mg/3ml. She told the pharmacy she did not need the nebulizer rx. While I was reviewing her meds with her, she realized that she did need the rx and will get it filled tomorrow.      Does the patient understand the purpose and dosing instructions for all medications? (If patient has questions, provide explanation and education.)   Yes    Does the patient have any problems in performing ADLs? (If patient is unable to perform ADLs  what is the limiting factor(s)? Do they have a support system that can assist? If no support system is present, discuss possible assistance that they may be able to obtain. Escalate for CCM/SW if ongoing issues are verbalized by pt or anticipated)   No problems at this time per patient. She does have family support with ADLs. Does the patient have all follow-up appointments scheduled? 7 day f/up with PCP?   (f/up with PCP may be w/in 14 days if patient has a f/up with their specialist w/in 7 days)    7-14 day f/up with specialist?   (or per discharge instructions)    If f/up has not been made  what actions has the care coordinator made to accomplish this? Has transportation been arranged? Yes         Dr Stacy Rodríguez 9/6/19 @ 1 PM      Jenny Gardner NP [facility] Pulmonary 9/17/19 10:30 AM            Yes, no concerns. Any other questions or concerns expressed by the patient? None at this time. Contact information for Care Coordinator was given, instructed to call with new questions or concerns. Schedule next appointment with MOHIT SOW Coordinator or refer to RN Case Manager/ per the workflow guidelines. When is care coordinators next follow-up call scheduled? If referred for CCM  what RN care manager was the referral assigned? Care Coordinator will follow up per workflow guide lines.         7 to 14 days   VICENTE Call Completed By:   Gwendolyn Mcgregor LPN Care Coordinator

## 2019-09-17 PROBLEM — J44.1 COPD WITH ACUTE EXACERBATION (HCC): Status: RESOLVED | Noted: 2019-08-27 | Resolved: 2019-09-17

## 2019-09-17 PROBLEM — Z99.89 OSA ON CPAP: Chronic | Status: RESOLVED | Noted: 2017-03-02 | Resolved: 2019-09-17

## 2019-09-17 PROBLEM — G47.33 OSA ON CPAP: Chronic | Status: RESOLVED | Noted: 2017-03-02 | Resolved: 2019-09-17

## 2019-09-17 PROBLEM — F17.211 CIGARETTE NICOTINE DEPENDENCE IN REMISSION: Status: ACTIVE | Noted: 2017-03-05

## 2019-09-17 PROBLEM — F17.211 CIGARETTE NICOTINE DEPENDENCE IN REMISSION: Chronic | Status: ACTIVE | Noted: 2017-03-05

## 2019-09-19 ENCOUNTER — PATIENT OUTREACH (OUTPATIENT)
Dept: CASE MANAGEMENT | Age: 49
End: 2019-09-19

## 2019-10-24 ENCOUNTER — PATIENT OUTREACH (OUTPATIENT)
Dept: CASE MANAGEMENT | Age: 49
End: 2019-10-24

## 2019-10-24 NOTE — PROGRESS NOTES
This note will not be viewable in [address] Transitions of Care  Follow up Outreach Note   Outreach type  Phone call: Spoke with patient     Date/Time of Outreach:  10/24/19  11:35 AM     Has patient attended PCP or specialist follow-up appointments since last contact? What was outcome of appointment? When is next follow-up scheduled? Patient completed appointments with follow ups scheduled appropriately. Review medications. Any medication changes since last outreach? Does patient have any questions or issues related to their medications? Yes    Yes, Patient states that she was taken off Omeprazole at her last OV. No, none at time of this VICENTE call, per pt. No questions from patient at time of [facility] phone call     Home health active? If yes  any issue? Progress? Patient states she is doing much better and she denies any issues at present time. Referrals needed?  (CM, SW, HH, etc )   None   Other issues/Miscellaneous? (Transportation, access to meals, ability to perform ADLs, adequate caregiver support, etc.)        No further questions or concerns at this time. Care Coordinator contact information provided to patient should a need arise. Next Outreach Scheduled? Graduation from program?   None    Yes, patient graduated.      Next Steps/Goals (if applicable):   N/A     Outreach completed by:   Cooper Medina LPN  Care Coordinator

## 2019-11-11 PROBLEM — F14.10 COCAINE ABUSE (HCC): Chronic | Status: RESOLVED | Noted: 2017-03-05 | Resolved: 2019-11-11

## 2019-12-21 ENCOUNTER — HOSPITAL ENCOUNTER (OUTPATIENT)
Dept: MAMMOGRAPHY | Age: 49
Discharge: HOME | End: 2019-12-21
Attending: PHYSICIAN ASSISTANT
Payer: MEDICARE

## 2019-12-21 DIAGNOSIS — Z12.31 ENCOUNTER FOR SCREENING MAMMOGRAM FOR BREAST CANCER: ICD-10-CM

## 2019-12-21 PROCEDURE — 77067 SCR MAMMO BI INCL CAD: CPT

## 2019-12-23 NOTE — PROGRESS NOTES
Normal mammogram with expected changes noted post lumpectomy - nothing new or concerning was seen. Remind to do monthly self breast exams. Plan to repeat imaging in 1 year.

## 2020-01-07 PROBLEM — E11.21 TYPE 2 DIABETES WITH NEPHROPATHY (HCC): Status: ACTIVE | Noted: 2020-01-07

## 2020-02-21 ENCOUNTER — PATIENT OUTREACH (OUTPATIENT)
Dept: CASE MANAGEMENT | Age: 50
End: 2020-02-21

## 2020-02-21 NOTE — PROGRESS NOTES
RNCM call to pt, spoke w/ son, identified self and role. Pt has just arrived home and needs a nebulizer treatment. Son requests a call back on Monday. Agreeable to [facility] outreach 2/24/20, 1027   Referral Source & Reason High risk for admission, ED visit   Primary concerns per patient and/or pts family/caregiver I need a new portable O2 device   Recent Hospitalization(s)/ED Visits None   Current with Home Health?  - Agency? No   Social Needs:  - Able to afford medications? - Financial assessment?  - Access to care? Transportation? Meds are affordable, meds reviewed    Drives self or takes Medicaid van   Nutritional Assessment  - Appetite? - Obesity?  - Failure to Thrive? - Bowels ? Pt is obese, wishes to lose weight. Cognitive Assessment  - History of dementia  - Health literacy    Good health literacy     Mobility/Activity Assessment  - Bed/chair bound? - Make use of assistive devices? - Does patient still drive? No assistive devices   Plan/Interventions/Education  - Follow up Appointments  - Referrals 4/2 PCP, Labs  4/7 PCP, Jerald CARVALHO     Plan to discuss smoking cessation, diet at next encounter. Discussed getting a new portable O2 device. [facility] will not pay for it. Requests this RN call Pulmonary to ensure they received order form to complete from [email or web address]. Will f/u next week. Pronounced [facility]      This note will not be viewable in [facility].

## 2020-03-06 ENCOUNTER — PATIENT OUTREACH (OUTPATIENT)
Dept: CASE MANAGEMENT | Age: 50
End: 2020-03-06

## 2020-03-06 NOTE — PROGRESS NOTES
Ambulatory Care Management  Follow up Outreach Note   Outreach type: Phone call: Yes     Date/Time of Outreach: 3/6/20, 9622     Reason for follow-up:   Continued outreach   Disease specific complaints/issues: None     Patient progress towards goals set from last contact:   Stable   Has patient attended any PCP or specialist follow-up appointments since last contact? What was outcome of appointment? When is next follow-up scheduled? 4/2 PCP, Labs  4/7 PCP, Jerald CARVALHO   Review medications. Any medication changes since last outreach? Does patient have any questions or issues related to their medications? No med changes, states compliance   Home health active? If yes  any issue? Progress? No   Referrals needed?  (SW, Diabetes education, HH, etc. ) No   Other issues/Miscellaneous? (Transportation, access to meals, ability to perform ADLs, adequate caregiver support, etc.) Continues to work on smoking cessation, support, encouragement offered. Working to set up [employer] page to raise money for portable O2 concentrator. Next Outreach Scheduled: Next 1-2 weeks     Next Steps/Goals:   F/U   Ambulatory Care Manager/ LPN Care Coordinator: Robert Castro RN     Appreciative of outreach. This note will not be viewable in [address]

## 2020-03-18 ENCOUNTER — PATIENT OUTREACH (OUTPATIENT)
Dept: CASE MANAGEMENT | Age: 50
End: 2020-03-18

## 2020-03-18 NOTE — PROGRESS NOTES
Ambulatory Care Management  Follow up Outreach Note   Outreach type: Phone call: Yes     Date/Time of Outreach: 3/18/20, 1102     Reason for follow-up:   Continued outreach   Disease specific complaints/issues: None     Patient progress towards goals set from last contact:   Stable   Has patient attended any PCP or specialist follow-up appointments since last contact? What was outcome of appointment? When is next follow-up scheduled? 4/2 Labs, PCP  4/7 PCP, Jerald CARVALHO   Review medications. Any medication changes since last outreach? Does patient have any questions or issues related to their medications? Reports compliance, no questions, issues   Home health active? If yes  any issue? Progress? No   Referrals needed?  (SW, Diabetes education, HH, etc. ) No   Other issues/Miscellaneous? (Transportation, access to meals, ability to perform ADLs, adequate caregiver support, etc.)   Discussed hand hygiene, social distancing, infection control, smoking cessation. Encouragement, support given. Asking about upcoming MD appmt in April, suggest sending [address] message re rescheduling or video chat w/ MD. Gt Ta. Next Outreach Scheduled: 1-2 weeks     Next Steps/Goals:   F/U   Ambulatory Care Manager/ LPN Care Coordinator: Marleni Escamilla RN       This note will not be viewable in [address]

## 2020-04-03 ENCOUNTER — PATIENT OUTREACH (OUTPATIENT)
Dept: CASE MANAGEMENT | Age: 50
End: 2020-04-03

## 2020-04-15 ENCOUNTER — PATIENT OUTREACH (OUTPATIENT)
Dept: CASE MANAGEMENT | Age: 50
End: 2020-04-15

## 2020-04-15 NOTE — PROGRESS NOTES
Ambulatory Care Management  Follow up Outreach Note   Outreach type: Phone call: Yes     Date/Time of Outreach: 4/15/20, 5645     Reason for follow-up:   Continued outreach   Disease specific complaints/issues: Feeling well     Patient progress towards goals set from last contact:   Stable   Has patient attended any PCP or specialist follow-up appointments since last contact? What was outcome of appointment? When is next follow-up scheduled? None   Review medications. Any medication changes since last outreach? Does patient have any questions or issues related to their medications? Reports med compliance, no issues, questions   Home health active? If yes  any issue? Progress? No   Referrals needed?  (SW, Diabetes education, HH, etc. ) No   Other issues/Miscellaneous? (Transportation, access to meals, ability to perform ADLs, adequate caregiver support, etc.)   Reviewed social distancing, hand hygiene, limit visitors, notify MD asap w/ any symptoms.          Next Outreach Scheduled: 2 weeks     Next Steps/Goals:   Prob d/c   Ambulatory Care Manager/ LPN Care Coordinator: Coco Ferrera RN

## 2020-04-30 ENCOUNTER — PATIENT OUTREACH (OUTPATIENT)
Dept: CASE MANAGEMENT | Age: 50
End: 2020-04-30

## 2020-04-30 NOTE — PROGRESS NOTES
Ambulatory Care Management  Follow up Outreach Note   Outreach type: Phone call: Yes     Date/Time of Outreach: 4/30/20, 6119     Reason for follow-up:   Continued outreach/ D/C   Disease specific complaints/issues: None     Patient progress towards goals set from last contact:   Pt continues smoking but has cut down - encouraged cessation   Has patient attended any PCP or specialist follow-up appointments since last contact? What was outcome of appointment? When is next follow-up scheduled? None  Encouraged to call Pulmonary to schedule appmt for July or August   Review medications. Any medication changes since last outreach? Does patient have any questions or issues related to their medications? Reports compliance, no issues, questions   Home health active? If yes  any issue? Progress? No   Referrals needed?  (SW, Diabetes education, HH, etc. ) No   Other issues/Miscellaneous?  (Transportation, access to meals, ability to perform ADLs, adequate caregiver support, etc.)   Reviewed COVID precautions, alert MD if any symptoms         Next Outreach Scheduled: None     Next Steps/Goals:   None   Ambulatory Care Manager/ LPN Care Coordinator: Sahra Doe RN

## 2021-08-24 PROBLEM — U07.1 COVID-19: Status: ACTIVE | Noted: 2021-01-14

## 2022-01-24 ENCOUNTER — HOSPITAL ENCOUNTER (OUTPATIENT)
Dept: GENERAL RADIOLOGY | Age: 52
Discharge: HOME | End: 2022-01-24
Payer: MEDICARE

## 2022-01-24 DIAGNOSIS — J44.1 ACUTE EXACERBATION OF CHRONIC OBSTRUCTIVE PULMONARY DISEASE (COPD) (HCC): ICD-10-CM

## 2022-01-24 PROCEDURE — 71046 X-RAY EXAM CHEST 2 VIEWS: CPT

## 2022-01-24 NOTE — PROGRESS NOTES
No pneumonia seen in x-ray which is great news! !  I want her to focus on the Mucinex 12 Hour & nebulizer treatments 4 times/day for at least 2 weeks and see how she feels? If not feeling significantly better by then please call and let me know.

## 2022-01-26 NOTE — PROGRESS NOTES
Pt notified that her xray showed no pneumonia and to continue to focus on the Mucinex 12 Hour & nebulizer treatments 4 times/day for at least 2 weeks and see how she feels. If not feeling significantly better by then please call and let me know. Pt verbalized understanding and states that she feels okay, not anybody, but not any worse.

## 2022-03-18 PROBLEM — U07.1 COVID-19: Status: ACTIVE | Noted: 2021-01-14

## 2022-03-18 PROBLEM — E11.21 TYPE 2 DIABETES WITH NEPHROPATHY (HCC): Status: ACTIVE | Noted: 2020-01-07

## 2022-03-18 PROBLEM — F17.211 CIGARETTE NICOTINE DEPENDENCE IN REMISSION: Status: ACTIVE | Noted: 2017-03-05

## 2022-03-19 PROBLEM — E66.01 MORBID OBESITY WITH BMI OF 50.0-59.9, ADULT (HCC): Status: ACTIVE | Noted: 2017-03-02

## 2022-03-19 PROBLEM — J44.9 COPD, SEVERE (HCC): Status: ACTIVE | Noted: 2017-03-02

## 2022-03-19 PROBLEM — I48.91 ATRIAL FIBRILLATION, NEW ONSET (HCC): Status: ACTIVE | Noted: 2019-08-30

## 2022-03-19 PROBLEM — E11.9 DIABETES MELLITUS TYPE 2, CONTROLLED (HCC): Status: ACTIVE | Noted: 2017-03-02

## 2022-03-20 PROBLEM — J96.11 CHRONIC RESPIRATORY FAILURE WITH HYPOXIA (HCC): Status: ACTIVE | Noted: 2019-08-28

## 2022-03-20 PROBLEM — G47.33 OSA (OBSTRUCTIVE SLEEP APNEA): Status: ACTIVE | Noted: 2017-03-02

## 2022-05-22 NOTE — PROGRESS NOTES
[address] [place] [address], [address] [facility]  [phone]        Name:  Son Obrien  YOB: 1970  MRN:  [id number]    Office visit  5/23/2022      Chief Complaint   Patient presents with    COPD           HISTORY OF PRESENT ILLNESS:  The patient is a 50year old female who is seen for follow up of severe COPD, OHS, chronic respiratory failure, and morbid obesity. She is on O2 at 3 lpm continuously. States her trilogy device was picked up 2-3 months ago due to noncompliance. States she is having more headaches and doesn't sleep well since she has been without it. Denies any wheezing. There is intermittent cough. Has decreased her smoking to 1/2 ppd. No exacerbations since last visit. ASSESSMENT/PLAN:  (Medical Decision Making)  Below is the assessment and plan developed based on review of pertinent history, physical exam, labs, studies, and medications. Encounter Diagnoses   Name Primary?  COPD, severe ([facility]  --continue Breo once daily. --continue Duoneb qid. Refill sent to pharmacy. --may use albuterol inhaler q 4 hours prn--refill sent to pharmacy. Yes    Chronic respiratory failure with hypoxia (HCC)  --continue O2 at 3 lpm.       THANH (obstructive sleep apnea)  --states BiPAP was picked up due to noncompliance. --contacted [facility] but had to leave a message--patient is willing to try this again.  Cigarette nicotine dependence without complication  --total smoking cessation is again advised. Follow up with Dr. Juliann Nava or me in 6 months. Collaborating physician is Dr. Tiffanie Esteves.     MM         Storm Castro, NP, APRN - CNP  Electronically signed          ~~~~~~~~~~~~~~~~~~~~~~~~~~~~~~~~~~~~~~~~  REFERENCE INFORMATION      Past Medical History:   Diagnosis Date    Childhood asthma     Chronic respiratory failure with hypoxia (HCC)     Continuous at 3L    COPD (chronic obstructive pulmonary disease) ([facility]     Stage 4 by GOLD Criteria    COVID-19 01/07/2021    Hyperlipidemia     Daily meds     Hypertension     Managed with meds     Intraductal papilloma of breast 01/16/2019    Left    Microalbuminuria     Microcytosis     Morbid obesity ([address]     Nonproliferative retinopathy due to secondary diabetes ([address] 03/07/2020    Mild, Bilateral    THANH on CPAP 3/2/2017    Paroxysmal atrial fibrillation ([address]     Pneumonia ages 3 and 11    Type 2 diabetes mellitus ([address]        Past Surgical History:   Procedure Laterality Date    BREAST BIOPSY Left 3/27/2019    LEFT BREAST NEEDLE LOCALIZED LUMPECTOMY  performed by Zan Smith,  at [address] PARTIAL HYSTERECTOMY  08/2012    US BREAST NEEDLE BIOPSY LEFT Left 1/7/2019    US BREAST NEEDLE BIOPSY LEFT 1/7/2019 [facility]    US GUIDED NEEDLE LOC OF LEFT BREAST Left 3/27/2019    US GUIDED NEEDLE LOC OF LEFT BREAST 3/27/2019 [facility]       Family History   Problem Relation Age of Onset    Coronary Art Dis Neg Hx     COPD Mother     Parkinsonism Father     Asthma Son     Schizophrenia Brother     Hypertension Brother     Emphysema Paternal Grandfather     Hypertension Mother        Social History     Socioeconomic History    Marital status: Single     Spouse name: Not on file    Number of children: Not on file    Years of education: Not on file    Highest education level: Not on file   Occupational History    Not on file   Tobacco Use    Smoking status: Current Every Day Smoker     Packs/day: 1.00     Years: 32.00     Pack years: 32.00     Types: Cigarettes    Smokeless tobacco: Never Used    Tobacco comment: Quit smoking: Currently 10 Cigarettes/day   Vaping Use    Vaping Use: Never used   Substance and Sexual Activity    Alcohol use: Yes    Drug use: Not Currently     Types: Cocaine, Marijuana [place])    Sexual activity: Not on file   Other Topics Concern    Not on file   Social History Narrative    Single and lives alone. Disabled--previously worked at [employer]. Has lived in [place] and [place] [id number]. No pets. Social Determinants of Health     Financial Resource Strain:     Difficulty of Paying Living Expenses: Not on file   Food Insecurity:     Worried About Running Out of Food in the Last Year: Not on file    Hugh of Food in the Last Year: Not on file   Transportation Needs:     Lack of Transportation (Medical): Not on file    Lack of Transportation (Non-Medical):  Not on file   Physical Activity:     Days of Exercise per Week: Not on file    Minutes of Exercise per Session: Not on file   Stress:     Feeling of Stress : Not on file   Social Connections:     Frequency of Communication with Friends and Family: Not on file    Frequency of Social Gatherings with Friends and Family: Not on file    Attends Jewish Services: Not on file    Active Member of [address] [place] [address] or Organizations: Not on file    Attends Club or Organization Meetings: Not on file    Marital Status: Not on file   Intimate Partner Violence:     Fear of Current or Ex-Partner: Not on file    Emotionally Abused: Not on file    Physically Abused: Not on file    Sexually Abused: Not on file   Housing Stability:     Unable to Pay for Housing in the Last Year: Not on file    Number of [place] in the Last Year: Not on file    Unstable Housing in the Last Year: Not on file       Patient Active Problem List   Diagnosis    COVID-19    Type 2 diabetes with nephropathy ([facility]    Cigarette nicotine dependence in remission    Nocturnal hypoxemia    Hypertension    COPD, severe ([facility]    Diabetes mellitus type 2, controlled ([address]    Atrial fibrillation, new onset ([facility]    Chronic back pain    Morbid obesity with BMI of 50.0-59.9, adult ([facility]    Chronic respiratory failure with hypoxia ([facility]    THANH (obstructive sleep apnea)    Obesity hypoventilation syndrome (HCC)           No Known Allergies    Current Outpatient Medications   Medication Sig    albuterol sulfate  (90 Base) MCG/ACT inhaler Inhale 2 puffs into the lungs every 4 hours as needed for Wheezing    ipratropium-albuterol (DUONEB) 0.5-2.5 (3) MG/3ML SOLN nebulizer solution Inhale 3 mLs into the lungs 4 times daily File to Medicare part B--J44.9    apixaban (ELIQUIS) 5 MG TABS tablet TAKE ONE TABLET BY MOUTH EVERY 12 HOURS **MUST SCHEDULE APPOINTMENT FOR MORE REFILLS**    atorvastatin (LIPITOR) 10 MG tablet Take 10 mg by mouth    dilTIAZem (TIAZAC) 240 MG extended release capsule TAKE ONE CAPSULE BY MOUTH EVERY DAY (MUST SCHEDULE APPOINTMENT FOR FURTHER REFILLS)    Fluticasone furoate-vilanterol (BREO ELLIPTA) 200-25 MCG/INH AEPB inhaler INHALE ONE PUFF ONCE DAILY *RINSE MOUTH WELL AFTER USE*    furosemide (LASIX) 20 MG tablet TAKE ONE TABLET BY MOUTH EVERY DAY ( TAKE ONE TABLET BY MOUTH AS NEEDED )    guaiFENesin 1200 MG TB12 Take 1,200 mg by mouth 2 times daily    lisinopril (PRINIVIL;ZESTRIL) 5 MG tablet Take 5 mg by mouth daily    metFORMIN (GLUCOPHAGE) 500 MG tablet Take 500 mg by mouth 2 times daily (with meals)     No current facility-administered medications for this visit. Review of Systems   Constitutional: Negative for chills, diaphoresis, fatigue and fever. Cardiovascular: Negative for chest pain, palpitations and leg swelling. Gastrointestinal: Negative for abdominal pain, constipation, diarrhea, nausea and vomiting. Neurological: Negative for dizziness, tremors, seizures, syncope and weakness. PHYSICAL EXAM:  Vitals:    05/23/22 0944   BP: 135/83   Pulse: 108   Resp: 17   Temp: 98.1 °F (36.7 °C)   TempSrc: Temporal   SpO2: 96%  Comment: 3 LPM   Weight: (!) 325 lb (147.4 kg)   Height: 5' 5\" (1.651 m)             GENERAL APPEARANCE:  The patient is morbidly obese and in no respiratory distress. HEENT:  PERRL. Conjunctivae unremarkable. Nasal mucosa is without epistaxis, exudate, or polyps. Gums and dentition are unremarkable.   There is no oropharyngeal narrowing. TMs are clear. NECK/LYMPHATIC:  Symmetrical with no elevation of jugular venous pulsation. Trachea midline. No thyroid enlargement. No cervical adenopathy. LUNGS:  Normal respiratory effort with symmetrical lung expansion. Breath sounds minimally decreased bilaterally, but clear. HEART:  There is a regular rate and rhythm. No murmur, rub, or gallop. There is no edema in the lower extremities. ABDOMEN:  Soft and non-tender. No hepatosplenomegaly. Bowel sounds are normal.    NEURO:  The patient is alert and oriented to person, place, and time. Memory appears intact and mood is normal.  No gross sensorimotor deficits are present. DIAGNOSTIC TESTS:  None today. Camilla Dozier NP, APRN - CNP  Electronically signed    Dictated using voice recognition software.   Proof read but unrecognized errors may exist.

## 2022-05-23 ENCOUNTER — OFFICE VISIT (OUTPATIENT)
Dept: PULMONOLOGY | Age: 52
End: 2022-05-23
Payer: MEDICARE

## 2022-05-23 VITALS
DIASTOLIC BLOOD PRESSURE: 83 MMHG | HEIGHT: 65 IN | BODY MASS INDEX: 48.82 KG/M2 | HEART RATE: 108 BPM | WEIGHT: 293 LBS | RESPIRATION RATE: 17 BRPM | SYSTOLIC BLOOD PRESSURE: 135 MMHG | OXYGEN SATURATION: 96 % | TEMPERATURE: 98.1 F

## 2022-05-23 DIAGNOSIS — J96.11 CHRONIC RESPIRATORY FAILURE WITH HYPOXIA (HCC): ICD-10-CM

## 2022-05-23 DIAGNOSIS — F17.210 CIGARETTE NICOTINE DEPENDENCE WITHOUT COMPLICATION: ICD-10-CM

## 2022-05-23 DIAGNOSIS — G47.33 OSA (OBSTRUCTIVE SLEEP APNEA): ICD-10-CM

## 2022-05-23 DIAGNOSIS — J44.9 COPD, SEVERE (HCC): Primary | ICD-10-CM

## 2022-05-23 PROCEDURE — 3023F SPIROM DOC REV: CPT | Performed by: NURSE PRACTITIONER

## 2022-05-23 PROCEDURE — 99214 OFFICE O/P EST MOD 30 MIN: CPT | Performed by: NURSE PRACTITIONER

## 2022-05-23 PROCEDURE — 3017F COLORECTAL CA SCREEN DOC REV: CPT | Performed by: NURSE PRACTITIONER

## 2022-05-23 PROCEDURE — 4004F PT TOBACCO SCREEN RCVD TLK: CPT | Performed by: NURSE PRACTITIONER

## 2022-05-23 PROCEDURE — G8417 CALC BMI ABV UP PARAM F/U: HCPCS | Performed by: NURSE PRACTITIONER

## 2022-05-23 PROCEDURE — G8427 DOCREV CUR MEDS BY ELIG CLIN: HCPCS | Performed by: NURSE PRACTITIONER

## 2022-05-23 RX ORDER — ALBUTEROL SULFATE 90 UG/1
2 AEROSOL, METERED RESPIRATORY (INHALATION) EVERY 4 HOURS PRN
Qty: 1 EACH | Refills: 11 | Status: SHIPPED | OUTPATIENT
Start: 2022-05-23

## 2022-05-23 RX ORDER — IPRATROPIUM BROMIDE AND ALBUTEROL SULFATE 2.5; .5 MG/3ML; MG/3ML
1 SOLUTION RESPIRATORY (INHALATION) 4 TIMES DAILY
Qty: 120 EACH | Refills: 11 | Status: SHIPPED | OUTPATIENT
Start: 2022-05-23 | End: 2022-05-23 | Stop reason: SDUPTHER

## 2022-05-23 RX ORDER — IPRATROPIUM BROMIDE AND ALBUTEROL SULFATE 2.5; .5 MG/3ML; MG/3ML
1 SOLUTION RESPIRATORY (INHALATION) 4 TIMES DAILY
Qty: 120 EACH | Refills: 11 | Status: SHIPPED | OUTPATIENT
Start: 2022-05-23

## 2022-05-23 ASSESSMENT — ENCOUNTER SYMPTOMS
VOMITING: 0
DIARRHEA: 0
NAUSEA: 0
ABDOMINAL PAIN: 0
CONSTIPATION: 0

## 2022-08-08 ENCOUNTER — TELEPHONE (OUTPATIENT)
Dept: INTERNAL MEDICINE CLINIC | Facility: CLINIC | Age: 52
End: 2022-08-08

## 2022-08-08 NOTE — TELEPHONE ENCOUNTER
It looks like she may just need to know that we still have a phlebotomist here for now so we can schedule her fasting blood work here if desired and can then provide her with a list of alternative draw sites for next time since we may not have anyone here by then.

## 2022-08-08 NOTE — TELEPHONE ENCOUNTER
----- Message from [facility] sent at 8/8/2022 11:44 AM EDT -----  Subject: Message to Provider    QUESTIONS  Information for Provider? pt is asking for nurse to give her a call back   regarding labs  ---------------------------------------------------------------------------  --------------  [employer] [employer]  [phone]; OK to leave message on voicemail  ---------------------------------------------------------------------------  --------------  SCRIPT ANSWERS  Relationship to Patient?  Self

## 2022-09-02 LAB
ALBUMIN SERPL-MCNC: 3.5 G/DL (ref 3.5–5)
ALBUMIN/GLOB SERPL: 0.8 {RATIO} (ref 1.2–3.5)
ALP SERPL-CCNC: 125 U/L (ref 50–136)
ALT SERPL-CCNC: 14 U/L (ref 12–65)
ANION GAP SERPL CALC-SCNC: 1 MMOL/L (ref 4–13)
AST SERPL-CCNC: 5 U/L (ref 15–37)
BASOPHILS # BLD: 0 K/UL (ref 0–0.2)
BASOPHILS NFR BLD: 0 % (ref 0–2)
BILIRUB SERPL-MCNC: 0.4 MG/DL (ref 0.2–1.1)
BUN SERPL-MCNC: 11 MG/DL (ref 6–23)
CALCIUM SERPL-MCNC: 9.4 MG/DL (ref 8.3–10.4)
CHLORIDE SERPL-SCNC: 101 MMOL/L (ref 101–110)
CHOLEST SERPL-MCNC: 134 MG/DL
CO2 SERPL-SCNC: 36 MMOL/L (ref 21–32)
CREAT SERPL-MCNC: 0.6 MG/DL (ref 0.6–1)
DIFFERENTIAL METHOD BLD: ABNORMAL
EOSINOPHIL # BLD: 0.2 K/UL (ref 0–0.8)
EOSINOPHIL NFR BLD: 2 % (ref 0.5–7.8)
ERYTHROCYTE [DISTWIDTH] IN BLOOD BY AUTOMATED COUNT: 20 % (ref 11.9–14.6)
GLOBULIN SER CALC-MCNC: 4.3 G/DL (ref 2.3–3.5)
GLUCOSE SERPL-MCNC: 79 MG/DL (ref 65–100)
HCT VFR BLD AUTO: 39.6 % (ref 35.8–46.3)
HDLC SERPL-MCNC: 28 MG/DL (ref 40–60)
HDLC SERPL: 4.8 {RATIO}
HGB BLD-MCNC: 11 G/DL (ref 11.7–15.4)
IMM GRANULOCYTES # BLD AUTO: 0 K/UL (ref 0–0.5)
IMM GRANULOCYTES NFR BLD AUTO: 0 % (ref 0–5)
LDLC SERPL CALC-MCNC: 91.2 MG/DL
LYMPHOCYTES # BLD: 2.2 K/UL (ref 0.5–4.6)
LYMPHOCYTES NFR BLD: 23 % (ref 13–44)
MCH RBC QN AUTO: 20.7 PG (ref 26.1–32.9)
MCHC RBC AUTO-ENTMCNC: 27.8 G/DL (ref 31.4–35)
MCV RBC AUTO: 74.6 FL (ref 79.6–97.8)
MONOCYTES # BLD: 0.4 K/UL (ref 0.1–1.3)
MONOCYTES NFR BLD: 4 % (ref 4–12)
NEUTS SEG # BLD: 6.6 K/UL (ref 1.7–8.2)
NEUTS SEG NFR BLD: 70 % (ref 43–78)
NRBC # BLD: 0 K/UL (ref 0–0.2)
PLATELET # BLD AUTO: 316 K/UL (ref 150–450)
PMV BLD AUTO: 11.3 FL (ref 9.4–12.3)
POTASSIUM SERPL-SCNC: 4.5 MMOL/L (ref 3.5–5.1)
PROT SERPL-MCNC: 7.8 G/DL (ref 6.3–8.2)
RBC # BLD AUTO: 5.31 M/UL (ref 4.05–5.2)
SODIUM SERPL-SCNC: 138 MMOL/L (ref 136–145)
TRIGL SERPL-MCNC: 74 MG/DL (ref 35–150)
VLDLC SERPL CALC-MCNC: 14.8 MG/DL (ref 6–23)
WBC # BLD AUTO: 9.4 K/UL (ref 4.3–11.1)

## 2022-09-03 LAB
EST. AVERAGE GLUCOSE BLD GHB EST-MCNC: 128 MG/DL
HBA1C MFR BLD: 6.1 % (ref 4.8–5.6)

## 2022-09-08 ENCOUNTER — OFFICE VISIT (OUTPATIENT)
Dept: INTERNAL MEDICINE CLINIC | Facility: CLINIC | Age: 52
End: 2022-09-08
Payer: MEDICARE

## 2022-09-08 VITALS
BODY MASS INDEX: 48.82 KG/M2 | DIASTOLIC BLOOD PRESSURE: 80 MMHG | TEMPERATURE: 98.1 F | OXYGEN SATURATION: 94 % | HEART RATE: 88 BPM | SYSTOLIC BLOOD PRESSURE: 120 MMHG | WEIGHT: 293 LBS | HEIGHT: 65 IN

## 2022-09-08 DIAGNOSIS — E11.29 TYPE 2 DIABETES MELLITUS WITH OTHER DIABETIC KIDNEY COMPLICATION (HCC): Primary | ICD-10-CM

## 2022-09-08 DIAGNOSIS — I10 ESSENTIAL (PRIMARY) HYPERTENSION: ICD-10-CM

## 2022-09-08 DIAGNOSIS — R71.0 DECREASED HEMOGLOBIN: ICD-10-CM

## 2022-09-08 DIAGNOSIS — E78.5 HYPERLIPIDEMIA LDL GOAL <100: ICD-10-CM

## 2022-09-08 DIAGNOSIS — R07.9 CENTRAL CHEST PAIN: ICD-10-CM

## 2022-09-08 DIAGNOSIS — R71.8 MICROCYTOSIS: ICD-10-CM

## 2022-09-08 PROCEDURE — G8417 CALC BMI ABV UP PARAM F/U: HCPCS | Performed by: PHYSICIAN ASSISTANT

## 2022-09-08 PROCEDURE — 3017F COLORECTAL CA SCREEN DOC REV: CPT | Performed by: PHYSICIAN ASSISTANT

## 2022-09-08 PROCEDURE — 4004F PT TOBACCO SCREEN RCVD TLK: CPT | Performed by: PHYSICIAN ASSISTANT

## 2022-09-08 PROCEDURE — 99214 OFFICE O/P EST MOD 30 MIN: CPT | Performed by: PHYSICIAN ASSISTANT

## 2022-09-08 PROCEDURE — 2022F DILAT RTA XM EVC RTNOPTHY: CPT | Performed by: PHYSICIAN ASSISTANT

## 2022-09-08 PROCEDURE — 3044F HG A1C LEVEL LT 7.0%: CPT | Performed by: PHYSICIAN ASSISTANT

## 2022-09-08 PROCEDURE — G8427 DOCREV CUR MEDS BY ELIG CLIN: HCPCS | Performed by: PHYSICIAN ASSISTANT

## 2022-09-08 RX ORDER — LANCETS 33 GAUGE
EACH MISCELLANEOUS
COMMUNITY
Start: 2021-08-24

## 2022-09-08 RX ORDER — LISINOPRIL 5 MG/1
5 TABLET ORAL DAILY
Qty: 30 TABLET | Refills: 5 | Status: ON HOLD | OUTPATIENT
Start: 2022-09-08 | End: 2022-10-31 | Stop reason: HOSPADM

## 2022-09-08 RX ORDER — DILTIAZEM HYDROCHLORIDE 240 MG/1
CAPSULE, COATED, EXTENDED RELEASE ORAL
Status: ON HOLD | COMMUNITY
Start: 2022-09-04 | End: 2022-10-31 | Stop reason: HOSPADM

## 2022-09-08 RX ORDER — ATORVASTATIN CALCIUM 10 MG/1
10 TABLET, FILM COATED ORAL NIGHTLY
Qty: 30 TABLET | Refills: 5 | Status: SHIPPED | OUTPATIENT
Start: 2022-09-08

## 2022-09-08 ASSESSMENT — PATIENT HEALTH QUESTIONNAIRE - PHQ9
SUM OF ALL RESPONSES TO PHQ QUESTIONS 1-9: 15
3. TROUBLE FALLING OR STAYING ASLEEP: 3
7. TROUBLE CONCENTRATING ON THINGS, SUCH AS READING THE NEWSPAPER OR WATCHING TELEVISION: 3
SUM OF ALL RESPONSES TO PHQ QUESTIONS 1-9: 15
SUM OF ALL RESPONSES TO PHQ QUESTIONS 1-9: 15
9. THOUGHTS THAT YOU WOULD BE BETTER OFF DEAD, OR OF HURTING YOURSELF: 0
2. FEELING DOWN, DEPRESSED OR HOPELESS: 3
1. LITTLE INTEREST OR PLEASURE IN DOING THINGS: 1
4. FEELING TIRED OR HAVING LITTLE ENERGY: 3
6. FEELING BAD ABOUT YOURSELF - OR THAT YOU ARE A FAILURE OR HAVE LET YOURSELF OR YOUR FAMILY DOWN: 1
10. IF YOU CHECKED OFF ANY PROBLEMS, HOW DIFFICULT HAVE THESE PROBLEMS MADE IT FOR YOU TO DO YOUR WORK, TAKE CARE OF THINGS AT HOME, OR GET ALONG WITH OTHER PEOPLE: 3
5. POOR APPETITE OR OVEREATING: 0
SUM OF ALL RESPONSES TO PHQ9 QUESTIONS 1 & 2: 4
8. MOVING OR SPEAKING SO SLOWLY THAT OTHER PEOPLE COULD HAVE NOTICED. OR THE OPPOSITE, BEING SO FIGETY OR RESTLESS THAT YOU HAVE BEEN MOVING AROUND A LOT MORE THAN USUAL: 1
SUM OF ALL RESPONSES TO PHQ QUESTIONS 1-9: 15

## 2022-09-08 ASSESSMENT — ENCOUNTER SYMPTOMS
SHORTNESS OF BREATH: 1
COUGH: 1

## 2022-09-08 NOTE — PATIENT INSTRUCTIONS
Praised her dietary changes which have improved numerous aspects of her health including weight loss, reducing pulse, improving glucose control, etc  Advised to pay attention to whether her chest pain is consistently after coughing spells and if so consider ice therapy to the affected area +/- OTC Ibuprofen to help reduce inflammation for presumed costochondritis  Monitor blood pressure 3 times/week and keep record to bring to next appointment  Continue chronic medications as prescribed  Monitor blood sugar daily and keep record to bring to next appointment  Recommend waiting until October to receive annual flu vaccine to ensure optimal protection during the entire flu season - she needs high dose given COPD & diabetes history  Counseled with demonstration how to make sure she is getting the most out of her albuterol inhaler when used since observing her use in the office did not include any inhalation on her part but rather just 2 puffs into her mouth which is not likely very effective  Asked patient to call me when she needs a new prescription of test strips  Reminded that diabetic eye exam and foot exam are overdue so appointments need to be scheduled ASAP - consider doing these on the same day when she has access to a car  Advised that she will need to get fasting labs drawn at any of the [address] [place] [address] lab locations (outlined on handout provided to patient) approx 1 week prior to scheduled follow-up appointment

## 2022-09-08 NOTE — PROGRESS NOTES
Angeles Obrien (:  1970) is a 50 y.o. female,Established patient, here for evaluation of the following chief complaint(s):  Follow-up (Hypertension, Hyperlipidemia, Diabetes)         ASSESSMENT/PLAN:  1. Type 2 diabetes mellitus with other diabetic kidney complication (HCC)  -     metFORMIN (GLUCOPHAGE) 500 MG tablet; Take 1 tablet by mouth 2 times daily (with meals), Disp-60 tablet, R-5Normal  -     Microalbumin / Creatinine Urine Ratio; Future  -     Comprehensive Metabolic Panel; Future  -     Hemoglobin A1C; Future  2. Essential (primary) hypertension  -     lisinopril (PRINIVIL;ZESTRIL) 5 MG tablet; Take 1 tablet by mouth daily, Disp-30 tablet, R-5Normal  -     CBC with Auto Differential; Future  -     Comprehensive Metabolic Panel; Future  3. Microcytosis  -     CBC with Auto Differential; Future  4. Hyperlipidemia LDL goal <100  -     atorvastatin (LIPITOR) 10 MG tablet; Take 1 tablet by mouth at bedtime, Disp-30 tablet, R-5Normal  -     Comprehensive Metabolic Panel; Future  -     Lipid Panel; Future  5. Central chest pain  6.  Decreased hemoglobin  -     CBC with Auto Differential; Future      Patient Instructions   Praised her dietary changes which have improved numerous aspects of her health including weight loss, reducing pulse, improving glucose control, etc  Advised to pay attention to whether her chest pain is consistently after coughing spells and if so consider ice therapy to the affected area +/- OTC Ibuprofen to help reduce inflammation for presumed costochondritis  Monitor blood pressure 3 times/week and keep record to bring to next appointment  Continue chronic medications as prescribed  Monitor blood sugar daily and keep record to bring to next appointment  Recommend waiting until October to receive annual flu vaccine to ensure optimal protection during the entire flu season - she needs high dose given COPD & diabetes history  Counseled with demonstration how to make sure she is getting the most out of her albuterol inhaler when used since observing her use in the office did not include any inhalation on her part but rather just 2 puffs into her mouth which is not likely very effective  Asked patient to call me when she needs a new prescription of test strips  Reminded that diabetic eye exam and foot exam are overdue so appointments need to be scheduled ASAP - consider doing these on the same day when she has access to a car  Advised that she will need to get fasting labs drawn at any of the [address] [place] [address] lab locations (outlined on handout provided to patient) approx 1 week prior to scheduled follow-up appointment         Return in about 20 weeks (around 1/26/2023) for MWE + diabetes f/u. Subjective   SUBJECTIVE/OBJECTIVE:  The patient is a 50 y.o. female who is seen for follow up of diabetes. Current monitoring regimen: BGs consistently in an acceptable range. Glucose monitoring frequency: 3 times daily  Home blood sugar records: fasting range: , postprandial range: 140-160  Any episodes of hypoglycemia? no  Known diabetic complications: retinopathy and microalbuminuria  Current diabetic medications include: oral agent (monotherapy): metformin (generic) 500 mg bid. Current Eye Exam (within one year): No  Current Urine Microalbumin: Yes, abnormal - Dec 2021  Is She on ACE inhibitor or angiotensin II receptor blocker? Yes - lisinopril (generic)  Current Foot Exam (within one year): Unknown      Weight trend: decreasing a bit  Prior visit with dietician: yes - 2016  Current diet: meals per day on average: 2;  restricting intake of the following: white bread, soda & sweet tea, and fried foods  Current exercise: no regular exercise        Last HbA1C:    Lab Results   Component Value Date    LABA1C 6.1 (H) 09/01/2022     Lab Results   Component Value Date     09/01/2022       The patient is a 50 y.o. female who is seen for evaluation of hyperlipidemia.   She was tested because of hyperlipidemia w/ LDL goal < 100 + diabetes. The current state of this condition is no significant medication side effects noted and well controlled on Lipitor 10 mg q hs - taking as prescribed. Last Lipid Panel:   Lab Results   Component Value Date    CHOL 134 09/01/2022    CHOL 137 03/28/2022    CHOL 125 11/22/2021     Lab Results   Component Value Date    TRIG 74 09/01/2022    TRIG 81 03/28/2022    TRIG 74 11/22/2021     Lab Results   Component Value Date    HDL 28 (L) 09/01/2022    HDL 29 (L) 03/28/2022    HDL 27 (L) 11/22/2021     Lab Results   Component Value Date    LDLCALC 91.2 09/01/2022    LDLCALC 92 03/28/2022    LDLCALC 83 11/22/2021     Lab Results   Component Value Date    LABVLDL 14.8 09/01/2022    LABVLDL 15 06/11/2020    LABVLDL 14 12/16/2019    VLDL 16 03/28/2022    VLDL 15 11/22/2021    VLDL 13 07/13/2021     Lab Results   Component Value Date    CHOLHDLRATIO 4.8 09/01/2022       The patient is a 50 y.o. female who is seen for follow-up of hypertension. She is not exercising and is adherent to low salt diet. Daily caffiene intake: a known amount (3 serving/day). Current medication regimen consists of: Lisinopril 5 mg daily + Diltiazem  mg daily. Blood pressure is not measured at home. BP Readings from Last 3 Encounters:   09/08/22 120/80   05/23/22 135/83   04/19/22 110/70       Patient is here for follow-up of microcytosis. The current state of this condition is no significant medication side effects noted and control uncertain given stable MCV values but decline noted in hemoglobin this time (but labs are being processed by hospital now rather than [facility]  - not currently on medications for this problem.       Lab Results   Component Value Date    WBC 9.4 09/01/2022    WBC 9.7 03/28/2022    WBC 10.8 11/22/2021     Lab Results   Component Value Date    HGB 11.0 (L) 09/01/2022    HGB 12.1 03/28/2022    HGB 12.1 11/22/2021     Lab Results   Component Value Date    HCT 39.6 09/01/2022    HCT 42.9 03/28/2022    HCT 41.9 11/22/2021     Lab Results   Component Value Date    MCV 74.6 (L) 09/01/2022    MCV 73 (L) 03/28/2022    MCV 71 (L) 11/22/2021     Lab Results   Component Value Date     09/01/2022     03/28/2022     11/22/2021       Lab Results   Component Value Date    IRON 50 12/16/2019     Lab Results   Component Value Date    TIBC 320 12/16/2019     Lab Results   Component Value Date    LABIRON 16 12/16/2019     Lab Results   Component Value Date    FERRITIN 159 (H) 12/16/2019     No results found for: [id number]  No results found for: FOLATE      Review of Systems   Constitutional:  Negative for fatigue and unexpected weight change. Eyes:  Negative for visual disturbance. Respiratory:  Positive for cough (intense coughing spells with phelgm produced) and shortness of breath (chronic). Cardiovascular:  Positive for chest pain (sharpness usually in center of chest after coughing spell) and leg swelling (isolated above L knee). Negative for palpitations. Endocrine: Positive for polydipsia (chronic - unchanged). Genitourinary:  Positive for frequency (chronic - unchanged). Musculoskeletal:  Positive for myalgias (intermittent in various parts of her body). Negative for arthralgias. Neurological:  Positive for dizziness (after coughing spells). Negative for numbness and headaches. /80 (Site: Left Upper Arm, Position: Sitting, Cuff Size: Large Adult)   Pulse 88   Temp 98.1 °F (36.7 °C) (Temporal)   Ht 5' 5\" (1.651 m)   Wt (!) 327 lb (148.3 kg)   SpO2 94%   BMI 54.42 kg/m²       Objective   Physical Exam  Constitutional:       Appearance: Normal appearance. She is obese. HENT:      Head: Normocephalic and atraumatic. Eyes:      Conjunctiva/sclera: Conjunctivae normal.      Pupils: Pupils are equal, round, and reactive to light. Neck:      Vascular: No carotid bruit.    Cardiovascular:      Rate and Rhythm: Normal

## 2022-10-26 ENCOUNTER — HOSPITAL ENCOUNTER (INPATIENT)
Age: 52
LOS: 5 days | Discharge: HOME | DRG: 853 | End: 2022-11-01
Attending: EMERGENCY MEDICINE | Admitting: HOSPITALIST
Payer: MEDICARE

## 2022-10-26 ENCOUNTER — APPOINTMENT (OUTPATIENT)
Dept: CT IMAGING | Age: 52
DRG: 853 | End: 2022-10-26
Payer: MEDICARE

## 2022-10-26 ENCOUNTER — APPOINTMENT (OUTPATIENT)
Dept: GENERAL RADIOLOGY | Age: 52
DRG: 853 | End: 2022-10-26
Payer: MEDICARE

## 2022-10-26 ENCOUNTER — APPOINTMENT (OUTPATIENT)
Dept: ULTRASOUND IMAGING | Age: 52
DRG: 853 | End: 2022-10-26
Payer: MEDICARE

## 2022-10-26 DIAGNOSIS — K85.10 ACUTE GALLSTONE PANCREATITIS: ICD-10-CM

## 2022-10-26 DIAGNOSIS — K85.10 GALLSTONE PANCREATITIS: Primary | ICD-10-CM

## 2022-10-26 DIAGNOSIS — A41.9 SEPTICEMIA (HCC): ICD-10-CM

## 2022-10-26 PROBLEM — F17.211 CIGARETTE NICOTINE DEPENDENCE IN REMISSION: Status: ACTIVE | Noted: 2017-03-05

## 2022-10-26 PROBLEM — E66.01 MORBID OBESITY WITH BMI OF 50.0-59.9, ADULT (HCC): Status: ACTIVE | Noted: 2017-03-02

## 2022-10-26 PROBLEM — E11.9 DIABETES MELLITUS TYPE 2, CONTROLLED (HCC): Status: ACTIVE | Noted: 2017-03-02

## 2022-10-26 PROBLEM — J44.9 COPD, SEVERE (HCC): Status: ACTIVE | Noted: 2017-03-02

## 2022-10-26 PROBLEM — G47.33 OSA (OBSTRUCTIVE SLEEP APNEA): Status: ACTIVE | Noted: 2017-03-02

## 2022-10-26 LAB
ALBUMIN SERPL-MCNC: 3.3 G/DL (ref 3.5–5)
ALBUMIN/GLOB SERPL: 0.7 (ref 0.4–1.6)
ALP SERPL-CCNC: 232 U/L (ref 50–136)
ALT SERPL-CCNC: 225 U/L (ref 12–65)
ANION GAP SERPL CALC-SCNC: 4 MMOL/L (ref 2–11)
AST SERPL-CCNC: 202 U/L (ref 15–37)
BASOPHILS # BLD: 0.1 K/UL (ref 0–0.2)
BASOPHILS NFR BLD: 0 % (ref 0–2)
BILIRUB SERPL-MCNC: 1.4 MG/DL (ref 0.2–1.1)
BUN SERPL-MCNC: 15 MG/DL (ref 6–23)
CALCIUM SERPL-MCNC: 9.9 MG/DL (ref 8.3–10.4)
CHLORIDE SERPL-SCNC: 105 MMOL/L (ref 101–110)
CO2 SERPL-SCNC: 27 MMOL/L (ref 21–32)
CREAT SERPL-MCNC: 1.6 MG/DL (ref 0.6–1)
DIFFERENTIAL METHOD BLD: ABNORMAL
EOSINOPHIL # BLD: 0 K/UL (ref 0–0.8)
EOSINOPHIL NFR BLD: 0 % (ref 0.5–7.8)
ERYTHROCYTE [DISTWIDTH] IN BLOOD BY AUTOMATED COUNT: 19.5 % (ref 11.9–14.6)
GLOBULIN SER CALC-MCNC: 4.7 G/DL (ref 2.8–4.5)
GLUCOSE SERPL-MCNC: 133 MG/DL (ref 65–100)
HCT VFR BLD AUTO: 45.8 % (ref 35.8–46.3)
HGB BLD-MCNC: 12.6 G/DL (ref 11.7–15.4)
IMM GRANULOCYTES # BLD AUTO: 0.2 K/UL (ref 0–0.5)
IMM GRANULOCYTES NFR BLD AUTO: 1 % (ref 0–5)
LACTATE SERPL-SCNC: 2.6 MMOL/L (ref 0.4–2)
LIPASE SERPL-CCNC: ABNORMAL U/L (ref 73–393)
LYMPHOCYTES # BLD: 1.6 K/UL (ref 0.5–4.6)
LYMPHOCYTES NFR BLD: 9 % (ref 13–44)
MCH RBC QN AUTO: 20.6 PG (ref 26.1–32.9)
MCHC RBC AUTO-ENTMCNC: 27.5 G/DL (ref 31.4–35)
MCV RBC AUTO: 74.7 FL (ref 82–102)
MONOCYTES # BLD: 1.3 K/UL (ref 0.1–1.3)
MONOCYTES NFR BLD: 7 % (ref 4–12)
NEUTS SEG # BLD: 15.8 K/UL (ref 1.7–8.2)
NEUTS SEG NFR BLD: 83 % (ref 43–78)
NRBC # BLD: 0.07 K/UL (ref 0–0.2)
PLATELET # BLD AUTO: 401 K/UL (ref 150–450)
PMV BLD AUTO: 10.4 FL (ref 9.4–12.3)
POTASSIUM SERPL-SCNC: 4.5 MMOL/L (ref 3.5–5.1)
PROCALCITONIN SERPL-MCNC: 3.51 NG/ML (ref 0–0.49)
PROT SERPL-MCNC: 8 G/DL (ref 6.3–8.2)
RBC # BLD AUTO: 6.13 M/UL (ref 4.05–5.2)
SODIUM SERPL-SCNC: 136 MMOL/L (ref 133–143)
WBC # BLD AUTO: 19 K/UL (ref 4.3–11.1)

## 2022-10-26 PROCEDURE — 96374 THER/PROPH/DIAG INJ IV PUSH: CPT

## 2022-10-26 PROCEDURE — 99285 EMERGENCY DEPT VISIT HI MDM: CPT

## 2022-10-26 PROCEDURE — 84145 PROCALCITONIN (PCT): CPT

## 2022-10-26 PROCEDURE — 83605 ASSAY OF LACTIC ACID: CPT

## 2022-10-26 PROCEDURE — 6360000002 HC RX W HCPCS: Performed by: EMERGENCY MEDICINE

## 2022-10-26 PROCEDURE — 87040 BLOOD CULTURE FOR BACTERIA: CPT

## 2022-10-26 PROCEDURE — 71045 X-RAY EXAM CHEST 1 VIEW: CPT

## 2022-10-26 PROCEDURE — 80053 COMPREHEN METABOLIC PANEL: CPT

## 2022-10-26 PROCEDURE — 85025 COMPLETE CBC W/AUTO DIFF WBC: CPT

## 2022-10-26 PROCEDURE — 76705 ECHO EXAM OF ABDOMEN: CPT

## 2022-10-26 PROCEDURE — 74177 CT ABD & PELVIS W/CONTRAST: CPT

## 2022-10-26 PROCEDURE — 2580000003 HC RX 258: Performed by: EMERGENCY MEDICINE

## 2022-10-26 PROCEDURE — 6360000004 HC RX CONTRAST MEDICATION: Performed by: EMERGENCY MEDICINE

## 2022-10-26 PROCEDURE — 83690 ASSAY OF LIPASE: CPT

## 2022-10-26 RX ORDER — 0.9 % SODIUM CHLORIDE 0.9 %
1000 INTRAVENOUS SOLUTION INTRAVENOUS
Status: COMPLETED | OUTPATIENT
Start: 2022-10-26 | End: 2022-10-27

## 2022-10-26 RX ORDER — 0.9 % SODIUM CHLORIDE 0.9 %
1000 INTRAVENOUS SOLUTION INTRAVENOUS
Status: COMPLETED | OUTPATIENT
Start: 2022-10-26 | End: 2022-10-26

## 2022-10-26 RX ADMIN — Medication 2500 MG: at 23:09

## 2022-10-26 RX ADMIN — SODIUM CHLORIDE 1000 ML: 9 INJECTION, SOLUTION INTRAVENOUS at 19:37

## 2022-10-26 RX ADMIN — SODIUM CHLORIDE 1000 ML: 900 INJECTION, SOLUTION INTRAVENOUS at 23:30

## 2022-10-26 RX ADMIN — SODIUM CHLORIDE 1000 ML: 9 INJECTION, SOLUTION INTRAVENOUS at 23:07

## 2022-10-26 RX ADMIN — IOPAMIDOL 100 ML: 755 INJECTION, SOLUTION INTRAVENOUS at 21:39

## 2022-10-26 ASSESSMENT — ENCOUNTER SYMPTOMS
ABDOMINAL PAIN: 1
COLOR CHANGE: 0
VOICE CHANGE: 0
EYE REDNESS: 0
CONSTIPATION: 0
CHOKING: 0
DIARRHEA: 1
NAUSEA: 1
TROUBLE SWALLOWING: 0
BACK PAIN: 0
CHEST TIGHTNESS: 0
PHOTOPHOBIA: 0

## 2022-10-26 ASSESSMENT — PAIN - FUNCTIONAL ASSESSMENT: PAIN_FUNCTIONAL_ASSESSMENT: NONE - DENIES PAIN

## 2022-10-26 NOTE — ED NOTES
Pt received  Zosyn  3.375mg, Zofran 4mg IV, and NS bolus 200ml enroute by EMS     Dolores Pool RN  10/26/22 1952

## 2022-10-26 NOTE — ED PROVIDER NOTES
Emergency Department Provider Note                   PCP:                Renaldo Smallwood PA-C               Age: 50 y.o. Sex: female       ICD-10-CM    1. Gallstone pancreatitis  K85.10       2. Septicemia ([facility] [address]  A41.9           DISPOSITION Admitted 10/27/2022 12:00:50 AM        MDM  Number of Diagnoses or Management Options  Gallstone pancreatitis  Septicemia ([facility] [address]  Diagnosis management comments: Some minimal tenderness on exam  Blood pressure is improved here. Plan for screening labs and urinalysis. 9:27 PM  Laboratory data significant for lactic acid 2.6, elevated Pro-Dragan.  Lipase greater than 30,000 and mildly elevated bilirubin and LFTs. Plan for CT scan of abdomen pelvis. She got Zosyn in route via EMS. IV fluids have been initiated. We will add vancomycin    11:30 PM  CT scan does show gallbladder wall thickening as well as stones concern for possible acute cholecystitis. However she has no focal tenderness on exam.  Given acuity of symptoms reported fever and sepsis markers my concern for possibly developing cholangitis. Will discuss case with GI as well as hospitalist.    11:39 PM  Discussed case with GI who will come evaluate patient. Agree with IV fluids and antibiotics. Repeat lactic acid pending.        Amount and/or Complexity of Data Reviewed  Clinical lab tests: ordered and reviewed  Tests in the radiology section of CPT®: ordered and reviewed  Review and summarize past medical records: yes  Discuss the patient with other providers: yes  Independent visualization of images, tracings, or specimens: yes    Risk of Complications, Morbidity, and/or Mortality  Presenting problems: moderate  Diagnostic procedures: moderate  Management options: high    Patient Progress  Patient progress: stable             Orders Placed This Encounter   Procedures    Blood Culture 1    Blood Culture 2    XR CHEST PORTABLE    CT ABDOMEN PELVIS W IV CONTRAST Additional Contrast? None    US ABDOMEN LIMITED Specify organ? GALLBLADDER    Lactate, Sepsis    Lipase    CMP    Procalcitonin    CBC with Auto Differential    Lactic Acid    Comprehensive Metabolic Panel w/ Reflex to MG    Lactic Acid    CBC with Auto Differential    Protime-INR    APTT    Lipase    Urinalysis w rflx microscopic    Lipase    Urinalysis, Micro    CBC with Auto Differential    Comprehensive Metabolic Panel    Transferrin Saturation    Ferritin    Transferrin    ADULT DIET;  Regular; Low Fat/Low Chol/High Fiber/2 gm Na    Vital signs per unit routine    Telemetry monitoring - 48 hour duration    Up with assistance    HYPOGLYCEMIA TREATMENT: blood glucose LESS THAN 70 mg/dL and patient ALERT and TOLERATING PO    HYPOGLYCEMIA TREATMENT: blood glucose LESS THAN 70 mg/dL and patient NOT ALERT or NPO    Verify informed consent    Weigh patient    Verify informed consent    Vital signs per unit routine    Notify provider for    Vital signs per unit routine    Notify anesthesia provider    Phase I - bedrest    Phase I - warming device    Phase I - cardiac monitor    Phase I & II - check level of sensory block    Phase I & II - neurological checks    Nursing communication - Discharge from PACU    Encourage deep breathing and coughing    Continuous Pulse Oximetry    Notify anesthesia provider (specify)    HYPOGLYCEMIA TREATMENT: blood glucose LESS THAN 70 mg/dL and patient ALERT and TOLERATING PO    HYPOGLYCEMIA TREATMENT: blood glucose LESS THAN 70 mg/dL and patient NOT ALERT or NPO    Strict intake and output    Misc nursing order (specify)    Full code    Consult to General Surgery    Initiate Oxygen Therapy Protocol    Home BIPAP or CPAP    Initiate Oxygen Therapy Protocol    Pulse Oximetry Spot Check    Initiate PACU Oxygen Therapy Protocol    Nasal Cannula Oxygen    POCT Glucose    POCT Glucose    POCT Glucose    POCT Glucose    POCT Glucose    POCT Glucose    POCT Glucose    POCT Glucose    Blood glucose - POCT    POCT Glucose    POCT Glucose POCT Glucose    POCT Glucose    POCT Glucose    POCT Glucose    ADMIT TO INPATIENT        Medications   sodium chloride flush 0.9 % injection 5-40 mL (10 mLs IntraVENous Given 10/28/22 0817)   sodium chloride flush 0.9 % injection 5-40 mL (10 mLs IntraVENous Given 10/27/22 0816)   ondansetron (ZOFRAN-ODT) disintegrating tablet 4 mg (has no administration in time range)     Or   ondansetron (ZOFRAN) injection 4 mg (has no administration in time range)   polyethylene glycol (GLYCOLAX) packet 17 g (has no administration in time range)   acetaminophen (TYLENOL) tablet 650 mg (has no administration in time range)     Or   acetaminophen (TYLENOL) suppository 650 mg (has no administration in time range)   nicotine (NICODERM CQ) 21 MG/24HR 1 patch (1 patch TransDERmal Patch Applied 10/28/22 0830)   insulin lispro (HUMALOG) injection vial 0-8 Units (0 Units SubCUTAneous Held 10/28/22 1200)   insulin lispro (HUMALOG) injection vial 0-4 Units (0 Units SubCUTAneous Held 10/27/22 2121)   glucose chewable tablet 16 g (has no administration in time range)   dextrose bolus 10% 125 mL ( IntraVENous See Alternative 10/27/22 1237)     Or   dextrose bolus 10% 250 mL (0 mLs IntraVENous Stopped 10/27/22 1237)   glucagon (rDNA) injection 1 mg (has no administration in time range)   dextrose 10 % infusion (has no administration in time range)   oxyCODONE (ROXICODONE) immediate release tablet 5 mg (has no administration in time range)   morphine injection 4 mg (has no administration in time range)   piperacillin-tazobactam (ZOSYN) 3,375 mg in sodium chloride 0.9 % 50 mL IVPB (mini-bag) (0 mg IntraVENous Stopped 10/28/22 0817)   heparin (porcine) injection 5,000 Units ( SubCUTAneous Unheld by provider 10/28/22 0856)   ipratropium-albuterol (DUONEB) nebulizer solution 1 ampule (1 ampule Inhalation Given 10/28/22 9408)   mometasone-formoterol (DULERA) 100-5 MCG/ACT inhaler 2 puff (2 puffs Inhalation Given 10/28/22 6232)   guaiFENesin [facility]) extended release tablet 1,200 mg (0 mg Oral Held 10/28/22 0816)   albuterol sulfate HFA (PROVENTIL;VENTOLIN;PROAIR) 108 (90 Base) MCG/ACT inhaler 2 puff (has no administration in time range)   lidocaine 1 % injection 1 mL (has no administration in time range)   sodium chloride flush 0.9 % injection 5-40 mL (has no administration in time range)   sodium chloride flush 0.9 % injection 5-40 mL (has no administration in time range)   0.9 % sodium chloride infusion (has no administration in time range)   HYDROmorphone HCl PF (DILAUDID) injection 0.25 mg (has no administration in time range)   oxyCODONE (ROXICODONE) immediate release tablet 5 mg (has no administration in time range)   ondansetron (ZOFRAN) injection 4 mg (has no administration in time range)   haloperidol lactate (HALDOL) injection 1 mg (has no administration in time range)   ipratropium-albuterol (DUONEB) nebulizer solution 3 mL (3 mLs Inhalation Given 10/28/22 1344)   lactated ringers infusion ( IntraVENous New Bag 10/28/22 0812)   0.9 % sodium chloride bolus (0 mLs IntraVENous Stopped 10/26/22 2047)   iopamidol (ISOVUE-370) 76 % injection 100 mL (100 mLs IntraVENous Given 10/26/22 2139)   vancomycin (VANCOCIN) 2500 mg in sodium chloride 0.9 % 500 mL IVPB (0 mg IntraVENous Stopped 10/27/22 0213)   0.9 % sodium chloride bolus (1,000 mLs IntraVENous New Bag 10/26/22 2307)   0.9 % sodium chloride bolus (0 mLs IntraVENous Stopped 10/27/22 0250)   indomethacin (INDOCIN) 50 MG suppository 100 mg (100 mg Rectal Given 10/27/22 1828)       Current Discharge Medication List           Hannah Mejia is a 50 y.o. female who presents to the Emergency Department with chief complaint of    Chief Complaint   Patient presents with    Nausea    Emesis     Pt with N/V/D since 4am. Alert and oriented x's 4. Sepsis protocol started by EMS enroute      Patient presents the ER with complaints of abdominal pain vomiting and diarrhea.   Patient states symptoms started this morning. States she believes she \"ate some bad food last evening. States since then this morning she had crampy abdominal pain with vomiting. Denies any hematemesis or melena. Denies any fevers or chills. EMS was called when she was initially found to be hypotensive. Antibiotics were started in route as well as IV fluids and antiemetics. Patient describes a diffuse abdominal pain. The history is provided by the patient. Abdominal Pain  Pain location:  Generalized  Pain quality: cramping    Pain quality: not aching    Pain radiates to:  Does not radiate  Pain severity:  Moderate  Onset quality:  Gradual  Duration:  2 days  Timing:  Intermittent  Context: not diet changes and not eating    Relieved by:  Nothing  Associated symptoms: diarrhea and nausea    Associated symptoms: no anorexia, no chest pain, no constipation and no fatigue        Review of Systems   Constitutional:  Negative for diaphoresis and fatigue. HENT:  Negative for congestion, dental problem, trouble swallowing and voice change. Eyes:  Negative for photophobia and redness. Respiratory:  Negative for choking and chest tightness. Cardiovascular:  Negative for chest pain and leg swelling. Gastrointestinal:  Positive for abdominal pain, diarrhea and nausea. Negative for anorexia and constipation. Endocrine: Negative for polydipsia, polyphagia and polyuria. Genitourinary:  Negative for decreased urine volume and urgency. Musculoskeletal:  Negative for back pain and gait problem. Skin:  Negative for color change and pallor. Allergic/Immunologic: Negative for food allergies and immunocompromised state. Neurological:  Negative for speech difficulty and weakness. Hematological:  Negative for adenopathy. Does not bruise/bleed easily. Psychiatric/Behavioral:  Negative for behavioral problems and confusion. All other systems reviewed and are negative.     Past Medical History:   Diagnosis Date    Childhood asthma Chronic respiratory failure with hypoxia (HCC)     Continuous at 3L    COPD (chronic obstructive pulmonary disease) ([facility] [address]     Stage 4 by GOLD Criteria    COVID-19 01/07/2021    Hyperlipidemia     Daily meds     Hypertension     Managed with meds     Intraductal papilloma of breast 01/16/2019    Left    Microalbuminuria     Microcytosis     Morbid obesity (HCC)     Nonproliferative retinopathy due to secondary diabetes ([facility] [address] 03/07/2020    Mild, Bilateral    THANH on CPAP 3/2/2017    Paroxysmal atrial fibrillation (HCC)     Pneumonia ages 3 and 9    Type 2 diabetes mellitus ([facility] [address]         Past Surgical History:   Procedure Laterality Date    BREAST BIOPSY Left 3/27/2019    LEFT BREAST NEEDLE LOCALIZED LUMPECTOMY  performed by Miracle Borden DO at [address] [place] [address] (CERVIX NOT REMOVED)  08/2012    US BREAST NEEDLE BIOPSY LEFT Left 1/7/2019    US BREAST NEEDLE BIOPSY LEFT 1/7/2019 [facility]    US GUIDED NEEDLE LOC OF LEFT BREAST Left 3/27/2019    US GUIDED NEEDLE LOC OF LEFT BREAST 3/27/2019 [facility]        Family History   Problem Relation Age of Onset    Coronary Art Dis Neg Hx     COPD Mother     Parkinsonism Father     Asthma Son     Schizophrenia Brother     Hypertension Brother     Emphysema Paternal Grandfather     Hypertension Mother         Social History     Socioeconomic History    Marital status: Single     Spouse name: None    Number of children: None    Years of education: None    Highest education level: None   Tobacco Use    Smoking status: Every Day     Packs/day: 1.00     Years: 32.00     Pack years: 32.00     Types: Cigarettes    Smokeless tobacco: Never    Tobacco comments:     Quit smoking: Currently 10 Cigarettes/day   Vaping Use    Vaping Use: Never used   Substance and Sexual Activity    Alcohol use: Yes    Drug use: Not Currently     Types: Cocaine, Marijuana (Weed)   Social History Narrative    Single and lives alone.   Disabled--previously worked at [employer]. Has lived in [place] and [place]. No pets. Patient has no known allergies. Current Discharge Medication List        CONTINUE these medications which have NOT CHANGED    Details   dilTIAZem (CARDIZEM CD) 240 MG extended release capsule       blood glucose test strips (ASCENSIA AUTODISC VI;ONE TOUCH ULTRA TEST VI) strip Use to check blood sugar once daily as directed. Dx: E11.29      Lancets 33G MISC Use to check glucose once daily.   Dx: E11.29      atorvastatin (LIPITOR) 10 MG tablet Take 1 tablet by mouth at bedtime  Qty: 30 tablet, Refills: 5    Associated Diagnoses: Hyperlipidemia LDL goal <100      lisinopril (PRINIVIL;ZESTRIL) 5 MG tablet Take 1 tablet by mouth daily  Qty: 30 tablet, Refills: 5    Associated Diagnoses: Essential (primary) hypertension      metFORMIN (GLUCOPHAGE) 500 MG tablet Take 1 tablet by mouth 2 times daily (with meals)  Qty: 60 tablet, Refills: 5    Associated Diagnoses: Type 2 diabetes mellitus with other diabetic kidney complication (HCC)      albuterol sulfate  (90 Base) MCG/ACT inhaler Inhale 2 puffs into the lungs every 4 hours as needed for Wheezing  Qty: 1 each, Refills: 11      ipratropium-albuterol (DUONEB) 0.5-2.5 (3) MG/3ML SOLN nebulizer solution Inhale 3 mLs into the lungs 4 times daily File to Medicare part B--J44.9  Qty: 120 each, Refills: 11      apixaban (ELIQUIS) 5 MG TABS tablet TAKE ONE TABLET BY MOUTH EVERY 12 HOURS **MUST SCHEDULE APPOINTMENT FOR MORE REFILLS**      dilTIAZem (TIAZAC) 240 MG extended release capsule TAKE ONE CAPSULE BY MOUTH EVERY DAY (MUST SCHEDULE APPOINTMENT FOR FURTHER REFILLS)      Fluticasone furoate-vilanterol (BREO ELLIPTA) 200-25 MCG/INH AEPB inhaler INHALE ONE PUFF ONCE DAILY *RINSE MOUTH WELL AFTER USE*      furosemide (LASIX) 20 MG tablet TAKE ONE TABLET BY MOUTH EVERY DAY ( TAKE ONE TABLET BY MOUTH AS NEEDED )      guaiFENesin 1200 MG TB12 Take 1,200 mg by mouth 2 times daily              Vitals signs and nursing note reviewed. Patient Vitals for the past 4 hrs:   Pulse Resp SpO2   10/28/22 1345 (!) 103 18 96 %          Physical Exam  Vitals and nursing note reviewed. Constitutional:       General: She is not in acute distress. Appearance: Normal appearance. She is not ill-appearing. HENT:      Head: Normocephalic and atraumatic. Right Ear: External ear normal.      Left Ear: External ear normal.      Nose: Nose normal. No congestion or rhinorrhea. Mouth/Throat:      Mouth: Mucous membranes are moist.      Pharynx: No oropharyngeal exudate. Eyes:      General:         Right eye: No discharge. Left eye: No discharge. Extraocular Movements: Extraocular movements intact. Pupils: Pupils are equal, round, and reactive to light. Cardiovascular:      Rate and Rhythm: Normal rate and regular rhythm. Pulses: Normal pulses. Heart sounds: Normal heart sounds. Pulmonary:      Effort: Pulmonary effort is normal. No respiratory distress. Breath sounds: Normal breath sounds. No stridor. No wheezing. Abdominal:      General: Abdomen is flat. There is no distension. Palpations: Abdomen is soft. Tenderness: There is no abdominal tenderness. Musculoskeletal:         General: No swelling, tenderness, deformity or signs of injury. Normal range of motion. Cervical back: Normal range of motion and neck supple. Skin:     General: Skin is warm. Coloration: Skin is not jaundiced or pale. Findings: No bruising or erythema. Neurological:      General: No focal deficit present. Mental Status: She is alert and oriented to person, place, and time. Cranial Nerves: No cranial nerve deficit. Sensory: No sensory deficit. Motor: No weakness.    Psychiatric:         Mood and Affect: Mood normal.         Behavior: Behavior normal.        Procedures    Results for orders placed or performed during the hospital encounter of 10/26/22   Blood Culture 1 Specimen: Blood   Result Value Ref Range    Special Requests LEFT  Antecubital        Culture NO GROWTH 2 DAYS     Blood Culture 2    Specimen: Blood   Result Value Ref Range    Special Requests RIGHT  HAND        Culture NO GROWTH 2 DAYS     XR CHEST PORTABLE    Narrative    EXAMINATION: XR CHEST PORTABLE 10/26/2022 8:05 PM    ACCESSION NUMBER: [id number]    COMPARISON: None available    INDICATION: dyspnea    TECHNIQUE: A single view of the chest was obtained. FINDINGS:   Lung hypoinflation. Streaky bibasilar opacities. No pneumothorax or sizable pleural effusion. Stable cardiomediastinal silhouette. Impression    -Lung hypoinflation with streaky bibasilar opacities, favored to reflect  atelectasis, though infection/aspiration are also considerations in the  appropriate clinical context. CT ABDOMEN PELVIS W IV CONTRAST Additional Contrast? None    Narrative    EXAM: CT ABDOMEN PELVIS W IV CONTRAST    HISTORY: Upper abdominal pain. Diarrhea. TECHNIQUE: Axial images of the abdomen and pelvis were performed following the  administration of intravenous contrast. Images were obtained in the axial plane  and coronal reformatted images were created and reviewed. Dose reduction technique used: Automated exposure control/Adjustment of the mA  and/or kV according to patient size/Use of iterative reconstruction technique. 100 mL of Isovue-370 were utilized. COMPARISON: CT chest dated 10/18/2018 and 11/10/2016. FINDINGS:   A moderate amount of motion artifact limits this examination to some degree. The visualized lung bases and mediastinum are unremarkable. The liver, spleen, pancreas, adrenal glands, and kidneys are within normal  limits. The left kidney contains a tiny low-density lesion in the midpole which  is too small to characterize. The bladder is decompressed and not assessed. The gallbladder shows wall thickening and mucosal enhancement.  A few radiopaque  stones are appreciated. Multiple prominent lymph nodes are appreciated in the yousuf hepatis and near the  head of the pancreas. Distal esophagus and stomach are unremarkable. Small and large bowel show no  evidence of obstruction. There is a normal appendix in the right lower quadrant. No evidence of free fluid or free air. No pathologic adenopathy. No abdominal  aortic aneurysm. Osseous structures show no evidence of acute fracture or suspicious lesion. Impression    The gallbladder shows wall thickening and mucosal enhancement. A few radiopaque  stones are appreciated. These findings are suggestive of acute cholecystitis. Multiple prominent lymph nodes are appreciated in the yousuf hepatis and near the  head of the pancreas. There significance is unclear. US ABDOMEN LIMITED Specify organ? GALLBLADDER    Narrative    EXAM: US ABDOMEN LIMITED    HISTORY: Upper abdominal pain, elevated LFTs and elevated lipase. TECHNIQUE: Grayscale and limited color Doppler ultrasound of the right upper  quadrant. COMPARISON: CT abdomen and pelvis dated 10/26/2022    FINDINGS:   Aorta/IVC: Not well visualized. /Visualized portions are within normal limits. Pancreas: Mostly obscured by overlying bowel gas. Liver: There is diffuse increased echogenicity within the liver parenchyma,  suggestive of hepatic steatosis. No focal lesions are demonstrated on the  provided images. Portal vein: The portal vein shows hepatopedal flow. Gallbladder: The gallbladder is contracted and not assessed. Sonographic Moe  sign was negative. CBD: Normal in caliber and measures 2.3 mm. Right kidney: 12.1 cm in length and without evidence of obstruction. Impression    The gallbladder is contracted and not assessed. Sonographic Moe sign was  negative. Hepatic steatosis.    Lactate, Sepsis   Result Value Ref Range    Lactic Acid, Sepsis 2.6 (H) 0.4 - 2.0 MMOL/L   Lipase   Result Value Ref Range    Lipase >30,000 (H) 73 - 393 U/L   CMP   Result Value Ref Range    Sodium 136 133 - 143 mmol/L    Potassium 4.5 3.5 - 5.1 mmol/L    Chloride 105 101 - 110 mmol/L    CO2 27 21 - 32 mmol/L    Anion Gap 4 2 - 11 mmol/L    Glucose 133 (H) 65 - 100 mg/dL    BUN 15 6 - 23 MG/DL    Creatinine 1.60 (H) 0.6 - 1.0 MG/DL    Est, Glom Filt Rate 39 (L) >60 ml/min/1.73m2    Calcium 9.9 8.3 - 10.4 MG/DL    Total Bilirubin 1.4 (H) 0.2 - 1.1 MG/DL     (H) 12 - 65 U/L     (H) 15 - 37 U/L    Alk Phosphatase 232 (H) 50 - 136 U/L    Total Protein 8.0 6.3 - 8.2 g/dL    Albumin 3.3 (L) 3.5 - 5.0 g/dL    Globulin 4.7 (H) 2.8 - 4.5 g/dL    Albumin/Globulin Ratio 0.7 0.4 - 1.6     Procalcitonin   Result Value Ref Range    Procalcitonin 3.51 (H) 0.00 - 0.49 ng/mL   CBC with Auto Differential   Result Value Ref Range    WBC 19.0 (H) 4.3 - 11.1 K/uL    RBC 6.13 (H) 4.05 - 5.2 M/uL    Hemoglobin 12.6 11.7 - 15.4 g/dL    Hematocrit 45.8 35.8 - 46.3 %    MCV 74.7 (L) 82 - 102 FL    MCH 20.6 (L) 26.1 - 32.9 PG    MCHC 27.5 (L) 31.4 - 35.0 g/dL    RDW 19.5 (H) 11.9 - 14.6 %    Platelets 750 772 - 098 K/uL    MPV 10.4 9.4 - 12.3 FL    nRBC 0.07 0.0 - 0.2 K/uL    Differential Type AUTOMATED      Seg Neutrophils 83 (H) 43 - 78 %    Lymphocytes 9 (L) 13 - 44 %    Monocytes 7 4.0 - 12.0 %    Eosinophils % 0 (L) 0.5 - 7.8 %    Basophils 0 0.0 - 2.0 %    Immature Granulocytes 1 0.0 - 5.0 %    Segs Absolute 15.8 (H) 1.7 - 8.2 K/UL    Absolute Lymph # 1.6 0.5 - 4.6 K/UL    Absolute Mono # 1.3 0.1 - 1.3 K/UL    Absolute Eos # 0.0 0.0 - 0.8 K/UL    Basophils Absolute 0.1 0.0 - 0.2 K/UL    Absolute Immature Granulocyte 0.2 0.0 - 0.5 K/UL   Lactic Acid   Result Value Ref Range    Lactic Acid, Plasma 2.4 (H) 0.4 - 2.0 MMOL/L   Comprehensive Metabolic Panel w/ Reflex to MG   Result Value Ref Range    Sodium 139 133 - 143 mmol/L    Potassium 5.0 3.5 - 5.1 mmol/L    Chloride 106 101 - 110 mmol/L    CO2 27 21 - 32 mmol/L    Anion Gap 6 2 - 11 mmol/L    Glucose 85 65 - 100 mg/dL    BUN 23 6 - 23 MG/DL    Creatinine 2.00 (H) 0.6 - 1.0 MG/DL    Est, Glom Filt Rate 30 (L) >60 ml/min/1.73m2    Calcium 8.8 8.3 - 10.4 MG/DL    Total Bilirubin 0.5 0.2 - 1.1 MG/DL     (H) 12 - 65 U/L    AST QUANTITY NOT SUFFICIENT.  SUGGEST RECOLLECTION 15 - 37 U/L    Alk Phosphatase 210 (H) 50 - 136 U/L    Total Protein 7.7 6.3 - 8.2 g/dL    Albumin 3.2 (L) 3.5 - 5.0 g/dL    Globulin 4.5 2.8 - 4.5 g/dL    Albumin/Globulin Ratio 0.7 0.4 - 1.6     Lactic Acid   Result Value Ref Range    Lactic Acid, Plasma 1.8 0.4 - 2.0 MMOL/L   CBC with Auto Differential   Result Value Ref Range    WBC 20.3 (H) 4.3 - 11.1 K/uL    RBC 5.46 (H) 4.05 - 5.2 M/uL    Hemoglobin 10.9 (L) 11.7 - 15.4 g/dL    Hematocrit 39.9 35.8 - 46.3 %    MCV 73.1 (L) 82 - 102 FL    MCH 20.0 (L) 26.1 - 32.9 PG    MCHC 27.3 (L) 31.4 - 35.0 g/dL    RDW 19.0 (H) 11.9 - 14.6 %    Platelets 587 607 - 192 K/uL    MPV 10.0 9.4 - 12.3 FL    nRBC 0.00 0.0 - 0.2 K/uL    Differential Type AUTOMATED      Seg Neutrophils 86 (H) 43 - 78 %    Lymphocytes 8 (L) 13 - 44 %    Monocytes 5 4.0 - 12.0 %    Eosinophils % 0 (L) 0.5 - 7.8 %    Basophils 0 0.0 - 2.0 %    Immature Granulocytes 0 0.0 - 5.0 %    Segs Absolute 17.5 (H) 1.7 - 8.2 K/UL    Absolute Lymph # 1.6 0.5 - 4.6 K/UL    Absolute Mono # 1.0 0.1 - 1.3 K/UL    Absolute Eos # 0.0 0.0 - 0.8 K/UL    Basophils Absolute 0.1 0.0 - 0.2 K/UL    Absolute Immature Granulocyte 0.1 0.0 - 0.5 K/UL   Protime-INR   Result Value Ref Range    Protime 14.8 (H) 12.6 - 14.3 sec    INR 1.1     APTT   Result Value Ref Range    PTT 33.6 24.5 - 34.2 SEC   Urinalysis w rflx microscopic   Result Value Ref Range    Color, UA DARK YELLOW      Appearance TURBID      Specific Gravity, UA >1.035 (H) 1.001 - 1.023    pH, Urine 5.0 5.0 - 9.0      Protein,  (A) NEG mg/dL    Glucose, UA Negative mg/dL    Ketones, Urine Negative NEG mg/dL    Bilirubin Urine Negative NEG      Blood, Urine SMALL (A) NEG      Urobilinogen, Urine 1.0 0.2 - 1.0 EU/dL    Nitrite, Urine Negative NEG      Leukocyte Esterase, Urine Negative NEG     Lipase   Result Value Ref Range    Lipase 2,507 (H) 73 - 393 U/L   Urinalysis, Micro   Result Value Ref Range    WBC, UA 5-10 0 /hpf    RBC, UA 3-5 0 /hpf    Epithelial Cells UA 5-10 0 /hpf    BACTERIA, URINE 4+ (H) 0 /hpf    Casts 0 0 /lpf    Crystals 0 0 /LPF    Amorphous Crystal 2+ (H) 0    Mucus, UA 0 0 /lpf    OTHER OBSERVATIONS RESULTS VERIFIED MANUALLY     Lipase   Result Value Ref Range    Lipase 145 73 - 393 U/L   CBC with Auto Differential   Result Value Ref Range    WBC 10.2 4.3 - 11.1 K/uL    RBC 4.59 4.05 - 5.2 M/uL    Hemoglobin 9.1 (L) 11.7 - 15.4 g/dL    Hematocrit 33.4 (L) 35.8 - 46.3 %    MCV 72.8 (L) 82 - 102 FL    MCH 19.8 (L) 26.1 - 32.9 PG    MCHC 27.2 (L) 31.4 - 35.0 g/dL    RDW 18.2 (H) 11.9 - 14.6 %    Platelets 475 219 - 089 K/uL    MPV 10.1 9.4 - 12.3 FL    nRBC 0.00 0.0 - 0.2 K/uL    Differential Type AUTOMATED      Seg Neutrophils 74 43 - 78 %    Lymphocytes 19 13 - 44 %    Monocytes 5 4.0 - 12.0 %    Eosinophils % 1 0.5 - 7.8 %    Basophils 0 0.0 - 2.0 %    Immature Granulocytes 1 0.0 - 5.0 %    Segs Absolute 7.5 1.7 - 8.2 K/UL    Absolute Lymph # 2.0 0.5 - 4.6 K/UL    Absolute Mono # 0.5 0.1 - 1.3 K/UL    Absolute Eos # 0.1 0.0 - 0.8 K/UL    Basophils Absolute 0.0 0.0 - 0.2 K/UL    Absolute Immature Granulocyte 0.1 0.0 - 0.5 K/UL   Comprehensive Metabolic Panel   Result Value Ref Range    Sodium 142 133 - 143 mmol/L    Potassium 3.4 (L) 3.5 - 5.1 mmol/L    Chloride 108 101 - 110 mmol/L    CO2 30 21 - 32 mmol/L    Anion Gap 4 2 - 11 mmol/L    Glucose 88 65 - 100 mg/dL    BUN 13 6 - 23 MG/DL    Creatinine 0.90 0.6 - 1.0 MG/DL    Est, Glom Filt Rate >60 >60 ml/min/1.73m2    Calcium 9.2 8.3 - 10.4 MG/DL    Total Bilirubin 0.3 0.2 - 1.1 MG/DL     (H) 12 - 65 U/L    AST 52 (H) 15 - 37 U/L    Alk Phosphatase 160 (H) 50 - 136 U/L    Total Protein 7.0 6.3 - 8.2 g/dL    Albumin 3.0 (L) 3.5 - 5.0 g/dL    Globulin 4.0 2.8 - 4.5 g/dL    Albumin/Globulin Ratio 0.8 0.4 - 1.6     Transferrin Saturation   Result Value Ref Range    Iron 66 35 - 150 ug/dL    TIBC 368 250 - 450 ug/dL    TRANSFERRIN SATURATION 18 (L) >20 %   Ferritin   Result Value Ref Range    Ferritin 52 8 - 388 NG/ML   Transferrin   Result Value Ref Range    Transferrin 224 202 - 364 mg/dL   Consult to General Surgery    Narrative    Azeb Downing MD     10/28/2022  7:40 AM  H&P/Consult Note/Progress Note/Office Note:   Darya Morales  MRN: [id number]  VNV:[birth year]  Age:52 y.o.    HPI: Darya Morales is a 50 y.o. female with h/o COPD who   presented to the  ER with a 1 day history of severe constant   epigastric pain associate with nausea and vomiting. She had no associated radiation to her back  Nothing in particular made her pain better or worse. She is s/p hysterectomy for benign fibroids through a low midline   incision. She reported her uterus weighed 5 pounds. Her ovaries   remain. Hypotension was noted. She was seen in the ER  WBC 20.3  Lactic acid was 2.6. Lipase >30k. T Bili 1.4, AST//225    CT scan of her abdomen and pelvis is as shown below. She was treated with IV fluids and Zosyn and transported to [address] [place] [address]    She denies ETOH for several years. General surgery was consulted    10/26/22 CT abd/pelvis  Hx: Upper abdominal pain. Diarrhea. FINDINGS: A moderate amount of motion artifact limits this   examination to some degree. The visualized lung bases and   mediastinum are unremarkable. The liver, spleen, pancreas, adrenal glands, and kidneys are   within normal limits. The left kidney contains a tiny low-density lesion in the midpole   which is too small to characterize. The bladder is decompressed   and not assessed. The gallbladder shows wall thickening and mucosal enhancement. A   few radiopaque stones are appreciated.        Multiple prominent lymph nodes are appreciated in the yousuf   hepatis and near the head of the pancreas. Distal esophagus and stomach are unremarkable. Small and large   bowel show no evidence of obstruction. There is a normal appendix in the right lower quadrant. No   evidence of free fluid or free air. No pathologic adenopathy. No   abdominal aortic aneurysm. Osseous structures show no evidence of acute fracture or   suspicious lesion. Impression:  The gallbladder shows wall thickening and mucosal enhancement. A few radiopaque stones are appreciated. These findings are suggestive of acute cholecystitis. Multiple prominent lymph nodes are appreciated in the yousuf   hepatis and near the head of the pancreas. There significance is unclear.                  Past Medical History:   Diagnosis Date    Childhood asthma     Chronic respiratory failure with hypoxia (HCC)     Continuous at 3L    COPD (chronic obstructive pulmonary disease) ([address]     Stage 4 by GOLD Criteria    COVID-19 01/07/2021    Hyperlipidemia     Daily meds     Hypertension     Managed with meds     Intraductal papilloma of breast 01/16/2019    Left    Microalbuminuria     Microcytosis     Morbid obesity ([address]     Nonproliferative retinopathy due to secondary diabetes ([address]   03/07/2020    Mild, Bilateral    THANH on CPAP 3/2/2017    Paroxysmal atrial fibrillation ([address]     Pneumonia ages 3 and 9    Type 2 diabetes mellitus ([address]      Past Surgical History:   Procedure Laterality Date    BREAST BIOPSY Left 3/27/2019    LEFT BREAST NEEDLE LOCALIZED LUMPECTOMY  performed by Ashutosh Vences DO at [address] [place] [address] (CERVIX NOT REMOVED)  08/2012    US BREAST NEEDLE BIOPSY LEFT Left 1/7/2019    US BREAST NEEDLE BIOPSY LEFT 1/7/2019 [facility]    US GUIDED NEEDLE LOC OF LEFT BREAST Left 3/27/2019    US GUIDED NEEDLE LOC OF LEFT BREAST 3/27/2019 [facility]     Current Facility-Administered Medications   Medication Dose Route Frequency    sodium chloride flush 0.9 % injection 5-40 mL  5-40 mL   IntraVENous 2 times per day    sodium chloride flush 0.9 % injection 5-40 mL  5-40 mL   IntraVENous PRN    0.9 % sodium chloride infusion   IntraVENous PRN    ondansetron (ZOFRAN-ODT) disintegrating tablet 4 mg  4 mg Oral   Q8H PRN    Or    ondansetron (ZOFRAN) injection 4 mg  4 mg IntraVENous Q6H PRN    polyethylene glycol (GLYCOLAX) packet 17 g  17 g Oral Daily PRN    acetaminophen (TYLENOL) tablet 650 mg  650 mg Oral Q6H PRN    Or    acetaminophen (TYLENOL) suppository 650 mg  650 mg Rectal Q6H   PRN    0.9 % sodium chloride infusion   IntraVENous Continuous    nicotine (NICODERM CQ) 21 MG/24HR 1 patch  1 patch TransDERmal   Daily    insulin lispro (HUMALOG) injection vial 0-8 Units  0-8 Units   SubCUTAneous TID WC    insulin lispro (HUMALOG) injection vial 0-4 Units  0-4 Units   SubCUTAneous Nightly    glucose chewable tablet 16 g  4 tablet Oral PRN    dextrose bolus 10% 125 mL  125 mL IntraVENous PRN    Or    dextrose bolus 10% 250 mL  250 mL IntraVENous PRN    glucagon (rDNA) injection 1 mg  1 mg SubCUTAneous PRN    dextrose 10 % infusion   IntraVENous Continuous PRN    oxyCODONE (ROXICODONE) immediate release tablet 5 mg  5 mg Oral   Q4H PRN    morphine injection 4 mg  4 mg IntraVENous Q1H PRN    piperacillin-tazobactam (ZOSYN) 3,375 mg in sodium chloride 0.9   % 50 mL IVPB (mini-bag)  3,375 mg IntraVENous q8h    [Held by provider] heparin (porcine) injection 5,000 Units    5,000 Units SubCUTAneous 3 times per day    ipratropium-albuterol (DUONEB) nebulizer solution 1 ampule  1   ampule Inhalation Q6H PRN    mometasone-formoterol (DULERA) 100-5 MCG/ACT inhaler 2 puff  2   puff Inhalation BID    guaiFENesin (MUCINEX) extended release tablet 1,200 mg  1,200 mg   Oral BID    ipratropium-albuterol (DUONEB) nebulizer solution 3 mL  1 vial   Inhalation 4x Daily    albuterol sulfate HFA (PROVENTIL;VENTOLIN;PROAIR) 108 (90 Base) MCG/ACT inhaler 2 puff  2 puff Inhalation Q4H PRN     ALLERGIES:  Patient has no known allergies. Social History     Socioeconomic History    Marital status: Single     Spouse name: None    Number of children: None    Years of education: None    Highest education level: None   Tobacco Use    Smoking status: Every Day     Packs/day: 1.00     Years: 32.00     Pack years: 32.00     Types: Cigarettes    Smokeless tobacco: Never    Tobacco comments:     Quit smoking: Currently 10 Cigarettes/day   Vaping Use    Vaping Use: Never used   Substance and Sexual Activity    Alcohol use: Yes    Drug use: Not Currently     Types: Cocaine, Marijuana Garon Kellogg)   Social History Narrative    Single and lives alone. Disabled--previously worked at   [employer]. Has lived in [facility]. No pets. Social History     Tobacco Use   Smoking Status Every Day    Packs/day: 1.00    Years: 32.00    Pack years: 32.00    Types: Cigarettes   Smokeless Tobacco Never   Tobacco Comments    Quit smoking: Currently 10 Cigarettes/day     Family History   Problem Relation Age of Onset    Coronary Art Dis Neg Hx     COPD Mother     Parkinsonism Father     Asthma Son     Schizophrenia Brother     Hypertension Brother     Emphysema Paternal Grandfather     Hypertension Mother      ROS: The patient has no difficulty with chest pain or shortness   of breath. No fever or chills. Comprehensive review of systems   was otherwise unremarkable except as noted above. Physical Exam:   BP (!) 103/58   Pulse (!) 108   Temp 98.6 °F (37 °C) (Oral)     Resp 18   Ht 5' 5\" (1.651 m)   Wt (!) 327 lb (148.3 kg)   SpO2   90%   BMI 54.42 kg/m²   Vitals:    10/27/22 0729 10/27/22 0954 10/27/22 1105 10/27/22 1318   BP: 94/80  (!) 103/58    Pulse: 84 94 99 (!) 108   Resp: 18 16 20 18   Temp: 98.6 °F (37 °C)  98.6 °F (37 °C)    TempSrc: Oral  Oral    SpO2: 92% 96% 96% 90%   Weight:       Height:         No intake/output data recorded.   No intake/output data recorded. Constitutional: Alert, oriented, cooperative patient in no acute   distress; appears stated age    Eyes:Sclera are clear. EOMs intact  ENMT: no external lesions gross hearing normal; no obvious neck   masses, no ear or lip lesions, nares normal  CV: RRR. Normal perfusion  Resp: No JVD. Breathing is  non-labored; no audible wheezing. GI: obese; BMI>54; tender in epigastrum with     Musculoskeletal: unremarkable with normal function. No embolic   signs or cyanosis.    Neuro:  Oriented; moves all 4; no focal deficits  Psychiatric: normal affect and mood, no memory impairment    Recent vitals (if inpt):  Patient Vitals for the past 24 hrs:   BP Temp Temp src Pulse Resp SpO2 Height Weight   10/27/22 1318 -- -- -- (!) 108 18 90 % -- --   10/27/22 1105 (!) 103/58 98.6 °F (37 °C) Oral 99 20 96 % -- --   10/27/22 0954 -- -- -- 94 16 96 % -- --   10/27/22 0729 94/80 98.6 °F (37 °C) Oral 84 18 92 % -- --   10/27/22 0514 -- -- -- -- 22 93 % -- --   10/27/22 0230 -- -- -- -- -- 93 % -- --   10/27/22 0211 -- -- -- -- -- -- 5' 5\" (1.651 m) (!) 327 lb (148.3   kg)   10/27/22 0206 (!) 94/49 99 °F (37.2 °C) Oral 94 22 91 % -- --   10/27/22 0123 -- -- -- -- -- 92 % -- --   10/27/22 0115 (!) 99/55 -- -- 95 26 90 % -- --   10/27/22 0100 (!) 100/56 -- -- 87 -- (!) 89 % -- --   10/27/22 0016 -- -- -- -- -- 93 % -- --   10/27/22 0015 (!) 108/55 -- -- 93 -- -- -- --   10/26/22 2337 -- -- -- -- -- 93 % -- --   10/26/22 2330 (!) 104/49 -- -- 91 -- -- -- --   10/26/22 2315 (!) 96/53 -- -- 95 -- -- -- --   10/26/22 2233 -- -- -- -- -- -- -- (!) 345 lb 0.3 oz (156.5 kg)   10/26/22 1946 (!) 124/104 -- -- 92 28 92 % -- --   10/26/22 1929 (!) 66/44 98.1 °F (36.7 °C) Axillary 95 29 94 % --   --       Amount and/or Complexity of Data Reviewed and Analyzed:  I reviewed and analyzed all of the unique labs and radiologic   studies that are shown below as well as any that are in the HPI,   and any that are in the expanded problem list below  *Each unique test, order, or document contributes to the   combination of 2 or combination of 3 in Category 1 below. For this visit I also reviewed old records and prior notes. Recent Labs     10/27/22  0600   WBC 20.3*   HGB 10.9*         K 5.0      CO2 27   BUN 23   INR 1.1   APTT 33.6   *     Review of most recent CBC  Lab Results   Component Value Date    WBC 20.3 (H) 10/27/2022    HGB 10.9 (L) 10/27/2022    HCT 39.9 10/27/2022    MCV 73.1 (L) 10/27/2022     10/27/2022       Review of most recent BMP  Lab Results   Component Value Date/Time     10/27/2022 06:00 AM    K 5.0 10/27/2022 06:00 AM     10/27/2022 06:00 AM    CO2 27 10/27/2022 06:00 AM    BUN 23 10/27/2022 06:00 AM    CREATININE 2.00 10/27/2022 06:00 AM    GLUCOSE 85 10/27/2022 06:00 AM    CALCIUM 8.8 10/27/2022 06:00 AM        Review of most recent LFTs (and lipase if done)  Lab Results   Component Value Date     (H) 10/27/2022    AST QUANTITY NOT SUFFICIENT. SUGGEST RECOLLECTION 10/27/2022    ALKPHOS 210 (H) 10/27/2022    BILITOT 0.5 10/27/2022     Lab Results   Component Value Date    LIPASE 2,507 (H) 10/27/2022       Lab Results   Component Value Date/Time    INR 1.1 10/27/2022 06:00 AM    APTT 33.6 10/27/2022 06:00 AM    APTT 73.7 08/31/2019 11:30 AM       Review of most recent HgbA1c  Hemoglobin A1C   Date Value Ref Range Status   09/01/2022 6.1 (H) 4.8 - 5.6 % Final       Nutritional assessment screen for wound healing issues:  Lab Results   Component Value Date    LABALBU 3.2 (L) 10/27/2022       @lastcovr@    Xray Result (most recent):  XR CHEST PORTABLE 10/26/2022    Narrative  EXAMINATION: XR CHEST PORTABLE 10/26/2022 8:05 PM    ACCESSION NUMBER: [id number]    COMPARISON: None available    INDICATION: dyspnea    TECHNIQUE: A single view of the chest was obtained. FINDINGS:  Lung hypoinflation. Streaky bibasilar opacities.     No pneumothorax or sizable pleural effusion. Stable cardiomediastinal silhouette. Impression  -Lung hypoinflation with streaky bibasilar opacities, favored to   reflect  atelectasis, though infection/aspiration are also considerations   in the  appropriate clinical context. CT Result (most recent):  CT ABDOMEN PELVIS W IV CONTRAST 10/26/2022    Narrative  EXAM: CT ABDOMEN PELVIS W IV CONTRAST    HISTORY: Upper abdominal pain. Diarrhea. TECHNIQUE: Axial images of the abdomen and pelvis were performed   following the  administration of intravenous contrast. Images were obtained in   the axial plane  and coronal reformatted images were created and reviewed. Dose reduction technique used: Automated exposure   control/Adjustment of the mA  and/or kV according to patient size/Use of iterative   reconstruction technique. 100 mL of Isovue-370 were utilized. COMPARISON: CT chest dated 10/18/2018 and 11/10/2016. FINDINGS:  A moderate amount of motion artifact limits this examination to   some degree. The visualized lung bases and mediastinum are unremarkable. The liver, spleen, pancreas, adrenal glands, and kidneys are   within normal  limits. The left kidney contains a tiny low-density lesion in the   midpole which  is too small to characterize. The bladder is decompressed and not   assessed. The gallbladder shows wall thickening and mucosal enhancement. A   few radiopaque  stones are appreciated. Multiple prominent lymph nodes are appreciated in the yousuf   hepatis and near the  head of the pancreas. Distal esophagus and stomach are unremarkable. Small and large   bowel show no  evidence of obstruction. There is a normal appendix in the right   lower quadrant. No evidence of free fluid or free air. No pathologic adenopathy. No abdominal  aortic aneurysm. Osseous structures show no evidence of acute fracture or   suspicious lesion. Impression  The gallbladder shows wall thickening and mucosal enhancement.  A few radiopaque  stones are appreciated. These findings are suggestive of acute   cholecystitis. Multiple prominent lymph nodes are appreciated in the yousuf   hepatis and near the  head of the pancreas. There significance is unclear. US Result (most recent):  US ABDOMEN LIMITED 10/26/2022    Narrative  EXAM: US ABDOMEN LIMITED    HISTORY: Upper abdominal pain, elevated LFTs and elevated lipase. TECHNIQUE: Grayscale and limited color Doppler ultrasound of the   right upper  quadrant. COMPARISON: CT abdomen and pelvis dated 10/26/2022    FINDINGS:  Aorta/IVC: Not well visualized. /Visualized portions are within   normal limits. Pancreas: Mostly obscured by overlying bowel gas. Liver: There is diffuse increased echogenicity within the liver   parenchyma,  suggestive of hepatic steatosis. No focal lesions are   demonstrated on the  provided images. Portal vein: The portal vein shows hepatopedal flow. Gallbladder: The gallbladder is contracted and not assessed. Sonographic Moe  sign was negative. CBD: Normal in caliber and measures 2.3 mm. Right kidney: 12.1 cm in length and without evidence of   obstruction. Impression  The gallbladder is contracted and not assessed. Sonographic   Moe sign was  negative. Hepatic steatosis. Admission date (for inpatients): 10/26/2022   * No surgery date entered *  Procedure(s):  ERCP ENDOSCOPIC RETROGRADE CHOLANGIOPANCREATOGRAPHY         ASSESSMENT/PLAN:  [unfilled]  Principal Problem:    Acute gallstone pancreatitis  Active Problems:    BMI 50.0-59.9, adult (HCC)    Cigarette nicotine dependence in remission    Hypertension    COPD, severe ([address]    Diabetes mellitus type 2, controlled ([address]    Morbid obesity with BMI of 50.0-59.9, adult (HCC)    THANH (obstructive sleep apnea)  Resolved Problems:    * No resolved hospital problems.  *     Patient Active Problem List    Diagnosis Date Noted    BMI 50.0-59.9, adult ([address] 10/27/2022 Priority: Medium    Acute gallstone pancreatitis 10/26/2022     Priority: Medium    COVID-19 01/14/2021    Type 2 diabetes with nephropathy ([facility] 01/07/2020    Atrial fibrillation, new onset ([facility] 08/30/2019    Chronic respiratory failure with hypoxia ([facility] 08/28/2019     NC 3L continuous        Cigarette nicotine dependence in remission 03/05/2017    COPD, severe ([facility] 03/02/2017     PFTs 3/2016  FVC 2.22 L and 62 % of predicted. FEV1 1.23 L and 42 % of predicted. Ratio 55%. There was no clinically relevant response to bronchodilator  LUNG VOLUMES:  TLC 4.61 L and 89 % of predicted. RV 2.39 L and 139 % of predicted. DIFFUSION:  The diffusing capacity was Corrected for hemoglobin of 14.7 g/dL  and was 15.9 mL/mmHg/minute and 58 % of predicted. Diabetes mellitus type 2, controlled ([facility] 03/02/2017    Morbid obesity with BMI of 50.0-59.9, adult ([facility] 03/02/2017    THANH (obstructive sleep apnea) 03/02/2017    Nocturnal hypoxemia 03/07/2016    Hypertension 03/07/2016    Chronic back pain 03/07/2016    Obesity hypoventilation syndrome ([facility] 12/18/2015          Number and Complexity of Problems addressed and   Risks of complications and/or morbidity of management      Epigastric pain, leukocytosis, gallstone pancreatitis  Admit inpatient  N.p.o.  IV fluids  IV antibiotics  ERCP planned with GI    Eventual interval cholecystectomy once pancreatitis has resolved  We discussed operative risks including bleeding, infection, bile   leak, injury to bowel or bile duct, the need for open surgery,   blood clots, risk of anesthesia, etc.. All her questions were answered. BMI>54  Encourage weight loss    Tobacco use  Encourage smoking cessation            Level of MDM (2/3 elements below)  Number and Complexity of Problems Addressed Amount and/or   Complexity of Data to be Reviewed and Analyzed  *Each unique test, order, or document contributes to the   combination of 2 or combination of 3 in Category 1 below. Risk of   Complications and/or Morbidity or Mortality of pt Management     58010  35850 SF Minimal  ?1self-limited or minor problem Minimal or none Minimal risk of   morbidity from additional diagnostic testing or Rx   38352  63326 Low Low  ? 2or more self-limited or minor problems;    or  ? 1stable chronic illness;    or  ?1acute, uncomplicated illness or injury   Limited  (Must meet the requirements of at least 1 of the 2 categories)  Category 1: Tests and documents   ? Any combination of 2 from the following:  ?Review of prior external note(s) from each unique source*;  ?review of the result(s) of each unique test*;   ?ordering of each unique test*    or   Category 2: Assessment requiring an independent historian(s)  (For the categories of independent interpretation of tests and   discussion of management or test interpretation, see moderate or   high) Low risk of morbidity from additional diagnostic testing or   treatment     62533  07730 Mod Moderate  ? 1or more chronic illnesses with exacerbation, progression, or   side effects of treatment;    or  ?2or more stable chronic illnesses;    or  ?1undiagnosed new problem with uncertain prognosis;    or  ?1acute illness with systemic symptoms;    or  ?1acute complicated injury   Moderate  (Must meet the requirements of at least 1 out of 3 categories)  Category 1: Tests, documents, or independent historian(s)  ? Any combination of 3 from the following:   ?Review of prior external note(s) from each unique source*;  ?Review of the result(s) of each unique test*;  ?Ordering of each unique test*;  ?Assessment requiring an independent historian(s)    or  Category 2: Independent interpretation of tests   ? Independent interpretation of a test performed by another   physician/other qualified health care professional (not   separately reported);     or  Category 3: Discussion of management or test interpretation  ? Discussion of management or test interpretation with external physician/other qualified health care professional/appropriate   source (not separately reported)   Moderate risk of morbidity from additional diagnostic testing or   treatment  Examples only:  ?Prescription drug management   ? Decision regarding minor surgery with identified patient or   procedure risk factors  ? Decision regarding elective major surgery without identified   patient or procedure risk factors   ? Diagnosis or treatment significantly limited by social   determinants of health       38396  60738 High High  ? 1or more chronic illnesses with severe exacerbation,   progression, or side effects of treatment;    or  ?1 acute or chronic illness or injury that poses a threat to life   or bodily function   Extensive  (Must meet the requirements of at least 2 out of 3 categories)  Category 1: Tests, documents, or independent historian(s)  ? Any combination of 3 from the following:   ?Review of prior external note(s) from each unique source*;  ?Review of the result(s) of each unique test*;   ?Ordering of each unique test*;   ?Assessment requiring an independent historian(s)    or   Category 2: Independent interpretation of tests   ? Independent interpretation of a test performed by another   physician/other qualified health care professional (not   separately reported);     or  Category 3: Discussion of management or test interpretation  ? Discussion of management or test interpretation with external   physician/other qualified health care professional/appropriate   source (not separately reported)   High risk of morbidity from additional diagnostic testing or   treatment  Examples only:  ?Drug therapy requiring intensive monitoring for toxicity  ? Decision regarding elective major surgery with identified   patient or procedure risk factors  ? Decision regarding emergency major surgery  ? Decision regarding hospitalization  ? Decision not to resuscitate or to de-escalate care because of   poor prognosis             I have personally performed a face-to-face diagnostic evaluation   and management  service on this patient. I have   independently seen the patient. I have independently obtained the above history from the   patient/family. I have independently examined the patient with above findings. I have independently reviewed data/labs for this patient and   developed the above plan of care (MDM). Signed: Maxi Andrade MD  10/27/2022    2:36 PM         POCT Glucose   Result Value Ref Range    POC Glucose 94 65 - 100 mg/dL    Performed by: [place]    POCT Glucose   Result Value Ref Range    POC Glucose 99 65 - 100 mg/dL    Performed by: Pipo    POCT Glucose   Result Value Ref Range    POC Glucose 88 65 - 100 mg/dL    Performed by: PruittLindaV    POCT Glucose   Result Value Ref Range    POC Glucose 68 65 - 100 mg/dL    Performed by: PruittLindaV    POCT Glucose   Result Value Ref Range    POC Glucose 107 (H) 65 - 100 mg/dL    Performed by: PruittLindaV    POCT Glucose   Result Value Ref Range    POC Glucose 69 65 - 100 mg/dL    Performed by: PruittLindaV    POCT Glucose   Result Value Ref Range    POC Glucose 82 65 - 100 mg/dL    Performed by: SineyRNMarta    POCT Glucose   Result Value Ref Range    POC Glucose 102 (H) 65 - 100 mg/dL    Performed by: [facility]    POCT Glucose   Result Value Ref Range    POC Glucose 85 65 - 100 mg/dL    Performed by: Flores[facility]osa[facility]NA    POCT Glucose   Result Value Ref Range    POC Glucose 83 65 - 100 mg/dL    Performed by: Brown[facility]elle[facility]S    POCT Glucose   Result Value Ref Range    POC Glucose 75 65 - 100 mg/dL    Performed by: [id number]  organ? GALLBLADDER   Final Result   The gallbladder is contracted and not assessed. Sonographic Moe sign was   negative. Hepatic steatosis.       CT ABDOMEN PELVIS W IV CONTRAST Additional Contrast? None   Final Result   The gallbladder shows wall thickening and mucosal enhancement. A few radiopaque   stones are appreciated. These findings are suggestive of acute cholecystitis. Multiple prominent lymph nodes are appreciated in the yousuf hepatis and near the   head of the pancreas. There significance is unclear. XR CHEST PORTABLE   Final Result   -Lung hypoinflation with streaky bibasilar opacities, favored to reflect   atelectasis, though infection/aspiration are also considerations in the   appropriate clinical context.                             ===================================================================  CARDIOPULMONARY ASSESSMENT (Lactate >4 or Septic Shock)    Vitals:    10/28/22 1345   BP:    Pulse: (!) 103   Resp: 18   Temp:    SpO2: 96%     Sepsis Reassessment note:     Alisha Obrien meets criteria for Sepsis -    Patient is receiving IV Fluids and Antibiotics per sepsis protocol. I did the Sepsis Reassessment at 12:01AM .    Abdirashid Weiss. Jennifer Carrizales MD         Voice dictation software was used during the making of this note. This software is not perfect and grammatical and other typographical errors may be present. This note has not been completely proofread for errors.      Mau Woodall MD  10/26/22 Quadra Quadra [id number], MD  10/26/22 Quadra Quadra [id number], MD  10/26/22 Quadra Quadra [id number], MD  10/26/22 Quadra Quadra [id number], MD  10/28/22 9722

## 2022-10-27 ENCOUNTER — ANESTHESIA EVENT (OUTPATIENT)
Dept: ENDOSCOPY | Age: 52
End: 2022-10-27

## 2022-10-27 ENCOUNTER — APPOINTMENT (OUTPATIENT)
Dept: GENERAL RADIOLOGY | Age: 52
DRG: 853 | End: 2022-10-27
Payer: MEDICARE

## 2022-10-27 ENCOUNTER — ANESTHESIA (OUTPATIENT)
Dept: ENDOSCOPY | Age: 52
End: 2022-10-27

## 2022-10-27 LAB
ALBUMIN SERPL-MCNC: 3.2 G/DL (ref 3.5–5)
ALBUMIN/GLOB SERPL: 0.7 (ref 0.4–1.6)
ALP SERPL-CCNC: 210 U/L (ref 50–136)
ALT SERPL-CCNC: 205 U/L (ref 12–65)
AMORPH CRY URNS QL MICRO: ABNORMAL
ANION GAP SERPL CALC-SCNC: 6 MMOL/L (ref 2–11)
APPEARANCE UR: ABNORMAL
APTT PPP: 33.6 SEC (ref 24.5–34.2)
AST SERPL-CCNC: ABNORMAL U/L (ref 15–37)
BACTERIA URNS QL MICRO: ABNORMAL /HPF
BASOPHILS # BLD: 0.1 K/UL (ref 0–0.2)
BASOPHILS NFR BLD: 0 % (ref 0–2)
BILIRUB SERPL-MCNC: 0.5 MG/DL (ref 0.2–1.1)
BILIRUB UR QL: NEGATIVE
BUN SERPL-MCNC: 23 MG/DL (ref 6–23)
CALCIUM SERPL-MCNC: 8.8 MG/DL (ref 8.3–10.4)
CASTS URNS QL MICRO: 0 /LPF
CHLORIDE SERPL-SCNC: 106 MMOL/L (ref 101–110)
CO2 SERPL-SCNC: 27 MMOL/L (ref 21–32)
COLOR UR: ABNORMAL
CREAT SERPL-MCNC: 2 MG/DL (ref 0.6–1)
CRYSTALS URNS QL MICRO: 0 /LPF
DIFFERENTIAL METHOD BLD: ABNORMAL
EOSINOPHIL # BLD: 0 K/UL (ref 0–0.8)
EOSINOPHIL NFR BLD: 0 % (ref 0.5–7.8)
EPI CELLS #/AREA URNS HPF: ABNORMAL /HPF
ERYTHROCYTE [DISTWIDTH] IN BLOOD BY AUTOMATED COUNT: 19 % (ref 11.9–14.6)
GLOBULIN SER CALC-MCNC: 4.5 G/DL (ref 2.8–4.5)
GLUCOSE BLD STRIP.AUTO-MCNC: 102 MG/DL (ref 65–100)
GLUCOSE BLD STRIP.AUTO-MCNC: 107 MG/DL (ref 65–100)
GLUCOSE BLD STRIP.AUTO-MCNC: 68 MG/DL (ref 65–100)
GLUCOSE BLD STRIP.AUTO-MCNC: 69 MG/DL (ref 65–100)
GLUCOSE BLD STRIP.AUTO-MCNC: 82 MG/DL (ref 65–100)
GLUCOSE BLD STRIP.AUTO-MCNC: 88 MG/DL (ref 65–100)
GLUCOSE BLD STRIP.AUTO-MCNC: 94 MG/DL (ref 65–100)
GLUCOSE BLD STRIP.AUTO-MCNC: 99 MG/DL (ref 65–100)
GLUCOSE SERPL-MCNC: 85 MG/DL (ref 65–100)
GLUCOSE UR STRIP.AUTO-MCNC: NEGATIVE MG/DL
HCT VFR BLD AUTO: 39.9 % (ref 35.8–46.3)
HGB BLD-MCNC: 10.9 G/DL (ref 11.7–15.4)
HGB UR QL STRIP: ABNORMAL
IMM GRANULOCYTES # BLD AUTO: 0.1 K/UL (ref 0–0.5)
IMM GRANULOCYTES NFR BLD AUTO: 0 % (ref 0–5)
INR PPP: 1.1
KETONES UR QL STRIP.AUTO: NEGATIVE MG/DL
LACTATE SERPL-SCNC: 1.8 MMOL/L (ref 0.4–2)
LACTATE SERPL-SCNC: 2.4 MMOL/L (ref 0.4–2)
LEUKOCYTE ESTERASE UR QL STRIP.AUTO: NEGATIVE
LIPASE SERPL-CCNC: 2507 U/L (ref 73–393)
LYMPHOCYTES # BLD: 1.6 K/UL (ref 0.5–4.6)
LYMPHOCYTES NFR BLD: 8 % (ref 13–44)
MCH RBC QN AUTO: 20 PG (ref 26.1–32.9)
MCHC RBC AUTO-ENTMCNC: 27.3 G/DL (ref 31.4–35)
MCV RBC AUTO: 73.1 FL (ref 82–102)
MONOCYTES # BLD: 1 K/UL (ref 0.1–1.3)
MONOCYTES NFR BLD: 5 % (ref 4–12)
MUCOUS THREADS URNS QL MICRO: 0 /LPF
NEUTS SEG # BLD: 17.5 K/UL (ref 1.7–8.2)
NEUTS SEG NFR BLD: 86 % (ref 43–78)
NITRITE UR QL STRIP.AUTO: NEGATIVE
NRBC # BLD: 0 K/UL (ref 0–0.2)
OTHER OBSERVATIONS: ABNORMAL
PH UR STRIP: 5 (ref 5–9)
PLATELET # BLD AUTO: 274 K/UL (ref 150–450)
PMV BLD AUTO: 10 FL (ref 9.4–12.3)
POTASSIUM SERPL-SCNC: 5 MMOL/L (ref 3.5–5.1)
PROT SERPL-MCNC: 7.7 G/DL (ref 6.3–8.2)
PROT UR STRIP-MCNC: 300 MG/DL
PROTHROMBIN TIME: 14.8 SEC (ref 12.6–14.3)
RBC # BLD AUTO: 5.46 M/UL (ref 4.05–5.2)
RBC #/AREA URNS HPF: ABNORMAL /HPF
SERVICE CMNT-IMP: ABNORMAL
SERVICE CMNT-IMP: ABNORMAL
SERVICE CMNT-IMP: NORMAL
SODIUM SERPL-SCNC: 139 MMOL/L (ref 133–143)
SP GR UR REFRACTOMETRY: >1.035 (ref 1–1.02)
UROBILINOGEN UR QL STRIP.AUTO: 1 EU/DL (ref 0.2–1)
WBC # BLD AUTO: 20.3 K/UL (ref 4.3–11.1)
WBC URNS QL MICRO: ABNORMAL /HPF

## 2022-10-27 PROCEDURE — 94761 N-INVAS EAR/PLS OXIMETRY MLT: CPT

## 2022-10-27 PROCEDURE — 6360000002 HC RX W HCPCS: Performed by: HOSPITALIST

## 2022-10-27 PROCEDURE — 85730 THROMBOPLASTIN TIME PARTIAL: CPT

## 2022-10-27 PROCEDURE — 6370000000 HC RX 637 (ALT 250 FOR IP): Performed by: HOSPITALIST

## 2022-10-27 PROCEDURE — 99223 1ST HOSP IP/OBS HIGH 75: CPT | Performed by: SURGERY

## 2022-10-27 PROCEDURE — 85610 PROTHROMBIN TIME: CPT

## 2022-10-27 PROCEDURE — 82962 GLUCOSE BLOOD TEST: CPT

## 2022-10-27 PROCEDURE — 1100000000 HC RM PRIVATE

## 2022-10-27 PROCEDURE — 5A09357 ASSISTANCE WITH RESPIRATORY VENTILATION, LESS THAN 24 CONSECUTIVE HOURS, CONTINUOUS POSITIVE AIRWAY PRESSURE: ICD-10-PCS | Performed by: INTERNAL MEDICINE

## 2022-10-27 PROCEDURE — 85025 COMPLETE CBC W/AUTO DIFF WBC: CPT

## 2022-10-27 PROCEDURE — 94640 AIRWAY INHALATION TREATMENT: CPT

## 2022-10-27 PROCEDURE — 83605 ASSAY OF LACTIC ACID: CPT

## 2022-10-27 PROCEDURE — 94660 CPAP INITIATION&MGMT: CPT

## 2022-10-27 PROCEDURE — 2580000003 HC RX 258: Performed by: HOSPITALIST

## 2022-10-27 PROCEDURE — 81015 MICROSCOPIC EXAM OF URINE: CPT

## 2022-10-27 PROCEDURE — 2580000003 HC RX 258: Performed by: ANESTHESIOLOGY

## 2022-10-27 PROCEDURE — 83690 ASSAY OF LIPASE: CPT

## 2022-10-27 PROCEDURE — 80053 COMPREHEN METABOLIC PANEL: CPT

## 2022-10-27 PROCEDURE — 36415 COLL VENOUS BLD VENIPUNCTURE: CPT

## 2022-10-27 PROCEDURE — 81001 URINALYSIS AUTO W/SCOPE: CPT

## 2022-10-27 PROCEDURE — 6370000000 HC RX 637 (ALT 250 FOR IP): Performed by: INTERNAL MEDICINE

## 2022-10-27 PROCEDURE — 94760 N-INVAS EAR/PLS OXIMETRY 1: CPT

## 2022-10-27 RX ORDER — SODIUM CHLORIDE 0.9 % (FLUSH) 0.9 %
5-40 SYRINGE (ML) INJECTION EVERY 12 HOURS SCHEDULED
Status: DISCONTINUED | OUTPATIENT
Start: 2022-10-27 | End: 2022-11-01 | Stop reason: HOSPADM

## 2022-10-27 RX ORDER — ONDANSETRON 4 MG/1
4 TABLET, ORALLY DISINTEGRATING ORAL EVERY 8 HOURS PRN
Status: DISCONTINUED | OUTPATIENT
Start: 2022-10-27 | End: 2022-11-01 | Stop reason: HOSPADM

## 2022-10-27 RX ORDER — SODIUM CHLORIDE, SODIUM LACTATE, POTASSIUM CHLORIDE, CALCIUM CHLORIDE 600; 310; 30; 20 MG/100ML; MG/100ML; MG/100ML; MG/100ML
INJECTION, SOLUTION INTRAVENOUS CONTINUOUS
Status: DISCONTINUED | OUTPATIENT
Start: 2022-10-27 | End: 2022-10-28

## 2022-10-27 RX ORDER — SODIUM CHLORIDE 9 MG/ML
INJECTION, SOLUTION INTRAVENOUS CONTINUOUS
Status: DISCONTINUED | OUTPATIENT
Start: 2022-10-27 | End: 2022-10-28

## 2022-10-27 RX ORDER — LIDOCAINE HYDROCHLORIDE 10 MG/ML
1 INJECTION, SOLUTION INFILTRATION; PERINEURAL
Status: ACTIVE | OUTPATIENT
Start: 2022-10-27 | End: 2022-10-28

## 2022-10-27 RX ORDER — INSULIN LISPRO 100 [IU]/ML
0-8 INJECTION, SOLUTION INTRAVENOUS; SUBCUTANEOUS
Status: DISCONTINUED | OUTPATIENT
Start: 2022-10-27 | End: 2022-11-01 | Stop reason: HOSPADM

## 2022-10-27 RX ORDER — ENOXAPARIN SODIUM 100 MG/ML
40 INJECTION SUBCUTANEOUS 2 TIMES DAILY
Status: DISCONTINUED | OUTPATIENT
Start: 2022-10-27 | End: 2022-10-27 | Stop reason: SDUPTHER

## 2022-10-27 RX ORDER — HALOPERIDOL 5 MG/ML
1 INJECTION INTRAMUSCULAR
Status: ACTIVE | OUTPATIENT
Start: 2022-10-27 | End: 2022-10-28

## 2022-10-27 RX ORDER — NICOTINE 21 MG/24HR
1 PATCH, TRANSDERMAL 24 HOURS TRANSDERMAL DAILY
Status: DISCONTINUED | OUTPATIENT
Start: 2022-10-27 | End: 2022-11-01 | Stop reason: HOSPADM

## 2022-10-27 RX ORDER — OXYCODONE HYDROCHLORIDE 5 MG/1
5 TABLET ORAL
Status: ACTIVE | OUTPATIENT
Start: 2022-10-27 | End: 2022-10-28

## 2022-10-27 RX ORDER — IPRATROPIUM BROMIDE AND ALBUTEROL SULFATE 2.5; .5 MG/3ML; MG/3ML
1 SOLUTION RESPIRATORY (INHALATION) 4 TIMES DAILY
Status: DISCONTINUED | OUTPATIENT
Start: 2022-10-28 | End: 2022-10-28

## 2022-10-27 RX ORDER — OXYCODONE HYDROCHLORIDE 5 MG/1
5 TABLET ORAL EVERY 4 HOURS PRN
Status: DISCONTINUED | OUTPATIENT
Start: 2022-10-27 | End: 2022-11-01 | Stop reason: HOSPADM

## 2022-10-27 RX ORDER — SODIUM CHLORIDE 9 MG/ML
INJECTION, SOLUTION INTRAVENOUS PRN
Status: DISCONTINUED | OUTPATIENT
Start: 2022-10-27 | End: 2022-10-28

## 2022-10-27 RX ORDER — ONDANSETRON 2 MG/ML
4 INJECTION INTRAMUSCULAR; INTRAVENOUS EVERY 6 HOURS PRN
Status: DISCONTINUED | OUTPATIENT
Start: 2022-10-27 | End: 2022-11-01 | Stop reason: HOSPADM

## 2022-10-27 RX ORDER — MORPHINE SULFATE 4 MG/ML
4 INJECTION INTRAVENOUS
Status: DISCONTINUED | OUTPATIENT
Start: 2022-10-27 | End: 2022-11-01 | Stop reason: HOSPADM

## 2022-10-27 RX ORDER — DEXTROSE MONOHYDRATE 100 MG/ML
INJECTION, SOLUTION INTRAVENOUS CONTINUOUS PRN
Status: DISCONTINUED | OUTPATIENT
Start: 2022-10-27 | End: 2022-11-01 | Stop reason: HOSPADM

## 2022-10-27 RX ORDER — SODIUM CHLORIDE 0.9 % (FLUSH) 0.9 %
5-40 SYRINGE (ML) INJECTION PRN
Status: DISCONTINUED | OUTPATIENT
Start: 2022-10-27 | End: 2022-10-30 | Stop reason: HOSPADM

## 2022-10-27 RX ORDER — HEPARIN SODIUM 5000 [USP'U]/ML
5000 INJECTION, SOLUTION INTRAVENOUS; SUBCUTANEOUS EVERY 8 HOURS SCHEDULED
Status: DISCONTINUED | OUTPATIENT
Start: 2022-10-28 | End: 2022-10-28

## 2022-10-27 RX ORDER — IPRATROPIUM BROMIDE AND ALBUTEROL SULFATE 2.5; .5 MG/3ML; MG/3ML
1 SOLUTION RESPIRATORY (INHALATION) 4 TIMES DAILY
Status: DISCONTINUED | OUTPATIENT
Start: 2022-10-27 | End: 2022-10-27

## 2022-10-27 RX ORDER — SODIUM CHLORIDE 0.9 % (FLUSH) 0.9 %
5-40 SYRINGE (ML) INJECTION EVERY 12 HOURS SCHEDULED
Status: DISCONTINUED | OUTPATIENT
Start: 2022-10-27 | End: 2022-10-28

## 2022-10-27 RX ORDER — GUAIFENESIN 600 MG/1
1200 TABLET, EXTENDED RELEASE ORAL 2 TIMES DAILY
Status: DISCONTINUED | OUTPATIENT
Start: 2022-10-27 | End: 2022-11-01 | Stop reason: HOSPADM

## 2022-10-27 RX ORDER — DEXTROSE MONOHYDRATE 100 MG/ML
INJECTION, SOLUTION INTRAVENOUS CONTINUOUS PRN
Status: DISCONTINUED | OUTPATIENT
Start: 2022-10-27 | End: 2022-10-27 | Stop reason: SDUPTHER

## 2022-10-27 RX ORDER — SODIUM CHLORIDE 0.9 % (FLUSH) 0.9 %
5-40 SYRINGE (ML) INJECTION PRN
Status: DISCONTINUED | OUTPATIENT
Start: 2022-10-27 | End: 2022-11-01 | Stop reason: HOSPADM

## 2022-10-27 RX ORDER — HYDROMORPHONE HYDROCHLORIDE 2 MG/ML
0.25 INJECTION, SOLUTION INTRAMUSCULAR; INTRAVENOUS; SUBCUTANEOUS EVERY 5 MIN PRN
Status: DISCONTINUED | OUTPATIENT
Start: 2022-10-27 | End: 2022-10-30 | Stop reason: HOSPADM

## 2022-10-27 RX ORDER — POLYETHYLENE GLYCOL 3350 17 G/17G
17 POWDER, FOR SOLUTION ORAL DAILY PRN
Status: DISCONTINUED | OUTPATIENT
Start: 2022-10-27 | End: 2022-11-01 | Stop reason: HOSPADM

## 2022-10-27 RX ORDER — DILTIAZEM HYDROCHLORIDE 240 MG/1
240 CAPSULE, COATED, EXTENDED RELEASE ORAL DAILY
Status: DISCONTINUED | OUTPATIENT
Start: 2022-10-27 | End: 2022-10-27

## 2022-10-27 RX ORDER — ONDANSETRON 2 MG/ML
4 INJECTION INTRAMUSCULAR; INTRAVENOUS
Status: ACTIVE | OUTPATIENT
Start: 2022-10-27 | End: 2022-10-28

## 2022-10-27 RX ORDER — ACETAMINOPHEN 650 MG/1
650 SUPPOSITORY RECTAL EVERY 6 HOURS PRN
Status: DISCONTINUED | OUTPATIENT
Start: 2022-10-27 | End: 2022-11-01 | Stop reason: HOSPADM

## 2022-10-27 RX ORDER — ALBUTEROL SULFATE 90 UG/1
2 AEROSOL, METERED RESPIRATORY (INHALATION) EVERY 4 HOURS PRN
Status: DISCONTINUED | OUTPATIENT
Start: 2022-10-27 | End: 2022-11-01 | Stop reason: HOSPADM

## 2022-10-27 RX ORDER — IPRATROPIUM BROMIDE AND ALBUTEROL SULFATE 2.5; .5 MG/3ML; MG/3ML
1 SOLUTION RESPIRATORY (INHALATION) EVERY 6 HOURS PRN
Status: DISCONTINUED | OUTPATIENT
Start: 2022-10-27 | End: 2022-11-01 | Stop reason: HOSPADM

## 2022-10-27 RX ORDER — ACETAMINOPHEN 325 MG/1
650 TABLET ORAL EVERY 6 HOURS PRN
Status: DISCONTINUED | OUTPATIENT
Start: 2022-10-27 | End: 2022-11-01 | Stop reason: HOSPADM

## 2022-10-27 RX ORDER — INSULIN LISPRO 100 [IU]/ML
0-4 INJECTION, SOLUTION INTRAVENOUS; SUBCUTANEOUS NIGHTLY
Status: DISCONTINUED | OUTPATIENT
Start: 2022-10-27 | End: 2022-11-01 | Stop reason: HOSPADM

## 2022-10-27 RX ORDER — SODIUM CHLORIDE 9 MG/ML
INJECTION, SOLUTION INTRAVENOUS PRN
Status: DISCONTINUED | OUTPATIENT
Start: 2022-10-27 | End: 2022-10-30 | Stop reason: HOSPADM

## 2022-10-27 RX ADMIN — SODIUM CHLORIDE: 9 INJECTION, SOLUTION INTRAVENOUS at 19:51

## 2022-10-27 RX ADMIN — SODIUM CHLORIDE, PRESERVATIVE FREE 10 ML: 5 INJECTION INTRAVENOUS at 21:25

## 2022-10-27 RX ADMIN — IPRATROPIUM BROMIDE AND ALBUTEROL SULFATE 3 ML: .5; 3 SOLUTION RESPIRATORY (INHALATION) at 13:17

## 2022-10-27 RX ADMIN — PIPERACILLIN SODIUM,TAZOBACTAM SODIUM 3375 MG: 3; .375 INJECTION, POWDER, FOR SOLUTION INTRAVENOUS at 03:45

## 2022-10-27 RX ADMIN — INDOMETHACIN 100 MG: 50 SUPPOSITORY RECTAL at 18:28

## 2022-10-27 RX ADMIN — PIPERACILLIN SODIUM,TAZOBACTAM SODIUM 3375 MG: 3; .375 INJECTION, POWDER, FOR SOLUTION INTRAVENOUS at 19:47

## 2022-10-27 RX ADMIN — GUAIFENESIN 1200 MG: 600 TABLET, EXTENDED RELEASE ORAL at 19:46

## 2022-10-27 RX ADMIN — SODIUM CHLORIDE, PRESERVATIVE FREE 10 ML: 5 INJECTION INTRAVENOUS at 21:24

## 2022-10-27 RX ADMIN — SODIUM CHLORIDE: 9 INJECTION, SOLUTION INTRAVENOUS at 00:17

## 2022-10-27 RX ADMIN — IPRATROPIUM BROMIDE AND ALBUTEROL SULFATE 1 AMPULE: .5; 3 SOLUTION RESPIRATORY (INHALATION) at 05:14

## 2022-10-27 RX ADMIN — IPRATROPIUM BROMIDE AND ALBUTEROL SULFATE 1 AMPULE: .5; 3 SOLUTION RESPIRATORY (INHALATION) at 02:30

## 2022-10-27 RX ADMIN — DEXTROSE MONOHYDRATE 250 ML: 100 INJECTION, SOLUTION INTRAVENOUS at 12:21

## 2022-10-27 RX ADMIN — SODIUM CHLORIDE, PRESERVATIVE FREE 10 ML: 5 INJECTION INTRAVENOUS at 08:16

## 2022-10-27 RX ADMIN — IPRATROPIUM BROMIDE AND ALBUTEROL SULFATE 3 ML: .5; 3 SOLUTION RESPIRATORY (INHALATION) at 16:54

## 2022-10-27 RX ADMIN — SODIUM CHLORIDE, PRESERVATIVE FREE 10 ML: 5 INJECTION INTRAVENOUS at 19:47

## 2022-10-27 RX ADMIN — MOMETASONE FUROATE AND FORMOTEROL FUMARATE DIHYDRATE 2 PUFF: 100; 5 AEROSOL RESPIRATORY (INHALATION) at 09:53

## 2022-10-27 RX ADMIN — IPRATROPIUM BROMIDE AND ALBUTEROL SULFATE 3 ML: .5; 3 SOLUTION RESPIRATORY (INHALATION) at 09:53

## 2022-10-27 RX ADMIN — MOMETASONE FUROATE AND FORMOTEROL FUMARATE DIHYDRATE 2 PUFF: 100; 5 AEROSOL RESPIRATORY (INHALATION) at 21:07

## 2022-10-27 RX ADMIN — GUAIFENESIN 1200 MG: 600 TABLET, EXTENDED RELEASE ORAL at 08:15

## 2022-10-27 RX ADMIN — PIPERACILLIN SODIUM,TAZOBACTAM SODIUM 3375 MG: 3; .375 INJECTION, POWDER, FOR SOLUTION INTRAVENOUS at 12:25

## 2022-10-27 ASSESSMENT — PAIN SCALES - GENERAL: PAINLEVEL_OUTOF10: 3

## 2022-10-27 ASSESSMENT — PAIN DESCRIPTION - LOCATION: LOCATION: GENERALIZED

## 2022-10-27 ASSESSMENT — PAIN - FUNCTIONAL ASSESSMENT: PAIN_FUNCTIONAL_ASSESSMENT: NONE - DENIES PAIN

## 2022-10-27 ASSESSMENT — COPD QUESTIONNAIRES: CAT_SEVERITY: SEVERE

## 2022-10-27 ASSESSMENT — PAIN DESCRIPTION - DESCRIPTORS: DESCRIPTORS: SORE

## 2022-10-27 NOTE — PROGRESS NOTES
TRANSFER - IN REPORT:    Verbal report received from [facility] on Frankey Keller Sherred  being received from  for ordered procedure      Report consisted of patient's Situation, Background, Assessment and   Recommendations(SBAR). Information from the following report(s) Nurse Handoff Report was reviewed with the receiving nurse. Opportunity for questions and clarification was provided. Assessment completed upon patient's arrival to unit and care assumed.

## 2022-10-27 NOTE — PROGRESS NOTES
Hypoglycemia:  Patient with blood sugar of 68 at 1054, patient asymptomatic except that reports feeling \"sleepy\". Treated with D10 iv resulting with a blood sugar of 107.

## 2022-10-27 NOTE — PROGRESS NOTES
TRANSFER - IN REPORT:    [facility]  [facility] from [facility]. ERCP canceled by Dr Moisés Schwartz. Report consisted of patient's Situation, Background, Assessment and   Recommendations(SBAR). Information from the following report(s) Nurse Handoff Report was reviewed with the receiving nurse. Opportunity for questions and clarification was provided. Assessment completed upon patient's arrival to unit and care assumed.

## 2022-10-27 NOTE — PROGRESS NOTES
PT asked if she was ok with receiving blood in an emergency. Pt stated \"I don't want to die, so yes\".

## 2022-10-27 NOTE — PROGRESS NOTES
TRANSFER - IN REPORT:    Verbal report received from [facility] on [facility]  being received from ED for routine progression of patient care      Report consisted of patient's Situation, Background, Assessment and   Recommendations(SBAR). Information from the following report(s) Nurse Handoff Report was reviewed with the receiving nurse. Opportunity for questions and clarification was provided. Assessment completed upon patient's arrival to unit and care assumed.

## 2022-10-27 NOTE — CONSULTS
H&P/Consult Note/Progress Note/Office Note:   Bharat Elizabeth  MRN: [id number]  KII:[birth year]  Age:52 y.o.    HPI: Bharat Elizabeth is a 50 y.o. female with h/o COPD who presented to the  ER with a 1 day history of severe constant epigastric pain associate with nausea and vomiting. She had no associated radiation to her back  Nothing in particular made her pain better or worse. She is s/p hysterectomy for benign fibroids through a low midline incision. She reported her uterus weighed 5 pounds. Her ovaries remain. Hypotension was noted. She was seen in the ER  WBC 20.3  Lactic acid was 2.6. Lipase >30k. T Bili 1.4, AST//225    CT scan of her abdomen and pelvis is as shown below. She was treated with IV fluids and Zosyn and transported to [facility]    She denies ETOH for several years. General surgery was consulted    10/26/22 CT abd/pelvis  Hx: Upper abdominal pain. Diarrhea. FINDINGS: A moderate amount of motion artifact limits this examination to some degree. The visualized lung bases and mediastinum are unremarkable. The liver, spleen, pancreas, adrenal glands, and kidneys are within normal limits. The left kidney contains a tiny low-density lesion in the midpole which is too small to characterize. The bladder is decompressed and not assessed. The gallbladder shows wall thickening and mucosal enhancement. A few radiopaque stones are appreciated. Multiple prominent lymph nodes are appreciated in the yousuf hepatis and near the head of the pancreas. Distal esophagus and stomach are unremarkable. Small and large bowel show no evidence of obstruction. There is a normal appendix in the right lower quadrant. No evidence of free fluid or free air. No pathologic adenopathy. No abdominal aortic aneurysm. Osseous structures show no evidence of acute fracture or suspicious lesion.        Impression:  The gallbladder shows wall thickening and mucosal enhancement. A few radiopaque stones are appreciated. These findings are suggestive of acute cholecystitis. Multiple prominent lymph nodes are appreciated in the yousuf hepatis and near the head of the pancreas. There significance is unclear.                  Past Medical History:   Diagnosis Date    Childhood asthma     Chronic respiratory failure with hypoxia (HCC)     Continuous at 3L    COPD (chronic obstructive pulmonary disease) (HCC)     Stage 4 by GOLD Criteria    COVID-19 01/07/2021    Hyperlipidemia     Daily meds     Hypertension     Managed with meds     Intraductal papilloma of breast 01/16/2019    Left    Microalbuminuria     Microcytosis     Morbid obesity ([address]     Nonproliferative retinopathy due to secondary diabetes ([facility] 03/07/2020    Mild, Bilateral    THANH on CPAP 3/2/2017    Paroxysmal atrial fibrillation (HCC)     Pneumonia ages 3 and 9    Type 2 diabetes mellitus ([facility]      Past Surgical History:   Procedure Laterality Date    BREAST BIOPSY Left 3/27/2019    LEFT BREAST NEEDLE LOCALIZED LUMPECTOMY  performed by Dany Steward DO at [address] [place] [address] (CERVIX NOT REMOVED)  08/2012    US BREAST NEEDLE BIOPSY LEFT Left 1/7/2019    US BREAST NEEDLE BIOPSY LEFT 1/7/2019 [facility]    US GUIDED NEEDLE LOC OF LEFT BREAST Left 3/27/2019    US GUIDED NEEDLE LOC OF LEFT BREAST 3/27/2019 [facility]     Current Facility-Administered Medications   Medication Dose Route Frequency    sodium chloride flush 0.9 % injection 5-40 mL  5-40 mL IntraVENous 2 times per day    sodium chloride flush 0.9 % injection 5-40 mL  5-40 mL IntraVENous PRN    0.9 % sodium chloride infusion   IntraVENous PRN    ondansetron (ZOFRAN-ODT) disintegrating tablet 4 mg  4 mg Oral Q8H PRN    Or    ondansetron (ZOFRAN) injection 4 mg  4 mg IntraVENous Q6H PRN    polyethylene glycol (GLYCOLAX) packet 17 g  17 g Oral Daily PRN    acetaminophen (TYLENOL) tablet 650 mg  650 mg Oral Q6H PRN Or    acetaminophen (TYLENOL) suppository 650 mg  650 mg Rectal Q6H PRN    0.9 % sodium chloride infusion   IntraVENous Continuous    nicotine (NICODERM CQ) 21 MG/24HR 1 patch  1 patch TransDERmal Daily    insulin lispro (HUMALOG) injection vial 0-8 Units  0-8 Units SubCUTAneous TID WC    insulin lispro (HUMALOG) injection vial 0-4 Units  0-4 Units SubCUTAneous Nightly    glucose chewable tablet 16 g  4 tablet Oral PRN    dextrose bolus 10% 125 mL  125 mL IntraVENous PRN    Or    dextrose bolus 10% 250 mL  250 mL IntraVENous PRN    glucagon (rDNA) injection 1 mg  1 mg SubCUTAneous PRN    dextrose 10 % infusion   IntraVENous Continuous PRN    oxyCODONE (ROXICODONE) immediate release tablet 5 mg  5 mg Oral Q4H PRN    morphine injection 4 mg  4 mg IntraVENous Q1H PRN    piperacillin-tazobactam (ZOSYN) 3,375 mg in sodium chloride 0.9 % 50 mL IVPB (mini-bag)  3,375 mg IntraVENous q8h    [Held by provider] heparin (porcine) injection 5,000 Units  5,000 Units SubCUTAneous 3 times per day    ipratropium-albuterol (DUONEB) nebulizer solution 1 ampule  1 ampule Inhalation Q6H PRN    mometasone-formoterol (DULERA) 100-5 MCG/ACT inhaler 2 puff  2 puff Inhalation BID    guaiFENesin (MUCINEX) extended release tablet 1,200 mg  1,200 mg Oral BID    ipratropium-albuterol (DUONEB) nebulizer solution 3 mL  1 vial Inhalation 4x Daily    albuterol sulfate HFA (PROVENTIL;VENTOLIN;PROAIR) 108 (90 Base) MCG/ACT inhaler 2 puff  2 puff Inhalation Q4H PRN     ALLERGIES:  Patient has no known allergies.     Social History     Socioeconomic History    Marital status: Single     Spouse name: None    Number of children: None    Years of education: None    Highest education level: None   Tobacco Use    Smoking status: Every Day     Packs/day: 1.00     Years: 32.00     Pack years: 32.00     Types: Cigarettes    Smokeless tobacco: Never    Tobacco comments:     Quit smoking: Currently 10 Cigarettes/day   Vaping Use    Vaping Use: Never used Substance and Sexual Activity    Alcohol use: Yes    Drug use: Not Currently     Types: Cocaine, Marijuana Erasmofernandez Barry   Social History Narrative    Single and lives alone. Disabled--previously worked at [employer]. Has lived in [place] and [place]. No pets. Social History     Tobacco Use   Smoking Status Every Day    Packs/day: 1.00    Years: 32.00    Pack years: 32.00    Types: Cigarettes   Smokeless Tobacco Never   Tobacco Comments    Quit smoking: Currently 10 Cigarettes/day     Family History   Problem Relation Age of Onset    Coronary Art Dis Neg Hx     COPD Mother     Parkinsonism Father     Asthma Son     Schizophrenia Brother     Hypertension Brother     Emphysema Paternal Grandfather     Hypertension Mother      ROS: The patient has no difficulty with chest pain or shortness of breath. No fever or chills. Comprehensive review of systems was otherwise unremarkable except as noted above. Physical Exam:   BP (!) 103/58   Pulse (!) 108   Temp 98.6 °F (37 °C) (Oral)   Resp 18   Ht 5' 5\" (1.651 m)   Wt (!) 327 lb (148.3 kg)   SpO2 90%   BMI 54.42 kg/m²   Vitals:    10/27/22 0729 10/27/22 0954 10/27/22 1105 10/27/22 1318   BP: 94/80  (!) 103/58    Pulse: 84 94 99 (!) 108   Resp: 18 16 20 18   Temp: 98.6 °F (37 °C)  98.6 °F (37 °C)    TempSrc: Oral  Oral    SpO2: 92% 96% 96% 90%   Weight:       Height:         No intake/output data recorded. No intake/output data recorded. Constitutional: Alert, oriented, cooperative patient in no acute distress; appears stated age    Eyes:Sclera are clear. EOMs intact  ENMT: no external lesions gross hearing normal; no obvious neck masses, no ear or lip lesions, nares normal  CV: RRR. Normal perfusion  Resp: No JVD. Breathing is  non-labored; no audible wheezing. GI: obese; BMI>54; tender in epigastrum with     Musculoskeletal: unremarkable with normal function. No embolic signs or cyanosis.    Neuro:  Oriented; moves all 4; no focal deficits  Psychiatric: normal affect and mood, no memory impairment    Recent vitals (if inpt):  Patient Vitals for the past 24 hrs:   BP Temp Temp src Pulse Resp SpO2 Height Weight   10/27/22 1318 -- -- -- (!) 108 18 90 % -- --   10/27/22 1105 (!) 103/58 98.6 °F (37 °C) Oral 99 20 96 % -- --   10/27/22 0954 -- -- -- 94 16 96 % -- --   10/27/22 0729 94/80 98.6 °F (37 °C) Oral 84 18 92 % -- --   10/27/22 0514 -- -- -- -- 22 93 % -- --   10/27/22 0230 -- -- -- -- -- 93 % -- --   10/27/22 0211 -- -- -- -- -- -- 5' 5\" (1.651 m) (!) 327 lb (148.3 kg)   10/27/22 0206 (!) 94/49 99 °F (37.2 °C) Oral 94 22 91 % -- --   10/27/22 0123 -- -- -- -- -- 92 % -- --   10/27/22 0115 (!) 99/55 -- -- 95 26 90 % -- --   10/27/22 0100 (!) 100/56 -- -- 87 -- (!) 89 % -- --   10/27/22 0016 -- -- -- -- -- 93 % -- --   10/27/22 0015 (!) 108/55 -- -- 93 -- -- -- --   10/26/22 2337 -- -- -- -- -- 93 % -- --   10/26/22 2330 (!) 104/49 -- -- 91 -- -- -- --   10/26/22 2315 (!) 96/53 -- -- 95 -- -- -- --   10/26/22 2233 -- -- -- -- -- -- -- (!) 345 lb 0.3 oz (156.5 kg)   10/26/22 1946 (!) 124/104 -- -- 92 28 92 % -- --   10/26/22 1929 (!) 66/44 98.1 °F (36.7 °C) Axillary 95 29 94 % -- --       Amount and/or Complexity of Data Reviewed and Analyzed:  I reviewed and analyzed all of the unique labs and radiologic studies that are shown below as well as any that are in the HPI, and any that are in the expanded problem list below  *Each unique test, order, or document contributes to the combination of 2 or combination of 3 in Category 1 below. For this visit I also reviewed old records and prior notes.       Recent Labs     10/27/22  0600   WBC 20.3*   HGB 10.9*         K 5.0      CO2 27   BUN 23   INR 1.1   APTT 33.6   *     Review of most recent CBC  Lab Results   Component Value Date    WBC 20.3 (H) 10/27/2022    HGB 10.9 (L) 10/27/2022    HCT 39.9 10/27/2022    MCV 73.1 (L) 10/27/2022     10/27/2022       Review of most recent BMP  Lab Results Component Value Date/Time     10/27/2022 06:00 AM    K 5.0 10/27/2022 06:00 AM     10/27/2022 06:00 AM    CO2 27 10/27/2022 06:00 AM    BUN 23 10/27/2022 06:00 AM    CREATININE 2.00 10/27/2022 06:00 AM    GLUCOSE 85 10/27/2022 06:00 AM    CALCIUM 8.8 10/27/2022 06:00 AM        Review of most recent LFTs (and lipase if done)  Lab Results   Component Value Date     (H) 10/27/2022    AST QUANTITY NOT SUFFICIENT. SUGGEST RECOLLECTION 10/27/2022    ALKPHOS 210 (H) 10/27/2022    BILITOT 0.5 10/27/2022     Lab Results   Component Value Date    LIPASE 2,507 (H) 10/27/2022       Lab Results   Component Value Date/Time    INR 1.1 10/27/2022 06:00 AM    APTT 33.6 10/27/2022 06:00 AM    APTT 73.7 08/31/2019 11:30 AM       Review of most recent HgbA1c  Hemoglobin A1C   Date Value Ref Range Status   09/01/2022 6.1 (H) 4.8 - 5.6 % Final       Nutritional assessment screen for wound healing issues:  Lab Results   Component Value Date    LABALBU 3.2 (L) 10/27/2022       @lastcovr@    Xray Result (most recent):  XR CHEST PORTABLE 10/26/2022    Narrative  EXAMINATION: XR CHEST PORTABLE 10/26/2022 8:05 PM    ACCESSION NUMBER: [id number]    COMPARISON: None available    INDICATION: dyspnea    TECHNIQUE: A single view of the chest was obtained. FINDINGS:  Lung hypoinflation. Streaky bibasilar opacities. No pneumothorax or sizable pleural effusion. Stable cardiomediastinal silhouette. Impression  -Lung hypoinflation with streaky bibasilar opacities, favored to reflect  atelectasis, though infection/aspiration are also considerations in the  appropriate clinical context. CT Result (most recent):  CT ABDOMEN PELVIS W IV CONTRAST 10/26/2022    Narrative  EXAM: CT ABDOMEN PELVIS W IV CONTRAST    HISTORY: Upper abdominal pain. Diarrhea.     TECHNIQUE: Axial images of the abdomen and pelvis were performed following the  administration of intravenous contrast. Images were obtained in the axial plane  and coronal reformatted images were created and reviewed. Dose reduction technique used: Automated exposure control/Adjustment of the mA  and/or kV according to patient size/Use of iterative reconstruction technique. 100 mL of Isovue-370 were utilized. COMPARISON: CT chest dated 10/18/2018 and 11/10/2016. FINDINGS:  A moderate amount of motion artifact limits this examination to some degree. The visualized lung bases and mediastinum are unremarkable. The liver, spleen, pancreas, adrenal glands, and kidneys are within normal  limits. The left kidney contains a tiny low-density lesion in the midpole which  is too small to characterize. The bladder is decompressed and not assessed. The gallbladder shows wall thickening and mucosal enhancement. A few radiopaque  stones are appreciated. Multiple prominent lymph nodes are appreciated in the yousuf hepatis and near the  head of the pancreas. Distal esophagus and stomach are unremarkable. Small and large bowel show no  evidence of obstruction. There is a normal appendix in the right lower quadrant. No evidence of free fluid or free air. No pathologic adenopathy. No abdominal  aortic aneurysm. Osseous structures show no evidence of acute fracture or suspicious lesion. Impression  The gallbladder shows wall thickening and mucosal enhancement. A few radiopaque  stones are appreciated. These findings are suggestive of acute cholecystitis. Multiple prominent lymph nodes are appreciated in the yousuf hepatis and near the  head of the pancreas. There significance is unclear. US Result (most recent):  US ABDOMEN LIMITED 10/26/2022    Narrative  EXAM: US ABDOMEN LIMITED    HISTORY: Upper abdominal pain, elevated LFTs and elevated lipase. TECHNIQUE: Grayscale and limited color Doppler ultrasound of the right upper  quadrant. COMPARISON: CT abdomen and pelvis dated 10/26/2022    FINDINGS:  Aorta/IVC: Not well visualized. /Visualized portions are within normal limits. Pancreas: Mostly obscured by overlying bowel gas. Liver: There is diffuse increased echogenicity within the liver parenchyma,  suggestive of hepatic steatosis. No focal lesions are demonstrated on the  provided images. Portal vein: The portal vein shows hepatopedal flow. Gallbladder: The gallbladder is contracted and not assessed. Sonographic Moe  sign was negative. CBD: Normal in caliber and measures 2.3 mm. Right kidney: 12.1 cm in length and without evidence of obstruction. Impression  The gallbladder is contracted and not assessed. Sonographic Moe sign was  negative. Hepatic steatosis. Admission date (for inpatients): 10/26/2022   * No surgery date entered *  Procedure(s):  ERCP ENDOSCOPIC RETROGRADE CHOLANGIOPANCREATOGRAPHY         ASSESSMENT/PLAN:  [unfilled]  Principal Problem:    Acute gallstone pancreatitis  Active Problems:    BMI 50.0-59.9, adult (HCC)    Cigarette nicotine dependence in remission    Hypertension    COPD, severe ([address]    Diabetes mellitus type 2, controlled ([facility]    Morbid obesity with BMI of 50.0-59.9, adult (HCC)    THANH (obstructive sleep apnea)  Resolved Problems:    * No resolved hospital problems. *     Patient Active Problem List    Diagnosis Date Noted    BMI 50.0-59.9, adult ([facility] 10/27/2022     Priority: Medium    Acute gallstone pancreatitis 10/26/2022     Priority: Medium    COVID-19 01/14/2021    Type 2 diabetes with nephropathy ([facility] 01/07/2020    Atrial fibrillation, new onset ([facility] 08/30/2019    Chronic respiratory failure with hypoxia ([facility] 08/28/2019     NC 3L continuous        Cigarette nicotine dependence in remission 03/05/2017    COPD, severe ([address] 03/02/2017     PFTs 3/2016  FVC 2.22 L and 62 % of predicted. FEV1 1.23 L and 42 % of predicted. Ratio 55%. There was no clinically relevant response to bronchodilator  LUNG VOLUMES:  TLC 4.61 L and 89 % of predicted.   RV 2.39 L and 139 % of predicted. DIFFUSION:  The diffusing capacity was Corrected for hemoglobin of 14.7 g/dL  and was 15.9 mL/mmHg/minute and 58 % of predicted. Diabetes mellitus type 2, controlled ([facility] 03/02/2017    Morbid obesity with BMI of 50.0-59.9, adult ([facility] 03/02/2017    THANH (obstructive sleep apnea) 03/02/2017    Nocturnal hypoxemia 03/07/2016    Hypertension 03/07/2016    Chronic back pain 03/07/2016    Obesity hypoventilation syndrome ([facility] 12/18/2015          Number and Complexity of Problems addressed and   Risks of complications and/or morbidity of management      Epigastric pain, leukocytosis, gallstone pancreatitis  Admit inpatient  N.p.o.  IV fluids  IV antibiotics  ERCP planned with GI    Eventual interval cholecystectomy once pancreatitis has resolved  We discussed operative risks including bleeding, infection, bile leak, injury to bowel or bile duct, the need for open surgery, blood clots, risk of anesthesia, etc.. All her questions were answered. BMI>54  Encourage weight loss    Tobacco use  Encourage smoking cessation            Level of MDM (2/3 elements below)  Number and Complexity of Problems Addressed Amount and/or Complexity of Data to be Reviewed and Analyzed  *Each unique test, order, or document contributes to the combination of 2 or combination of 3 in Category 1 below. Risk of Complications and/or Morbidity or Mortality of pt Management     36032  94254 SF Minimal  ?1self-limited or minor problem Minimal or none Minimal risk of morbidity from additional diagnostic testing or Rx   65782  18943 Low Low  ? 2or more self-limited or minor problems;    or  ? 1stable chronic illness;    or  ?1acute, uncomplicated illness or injury   Limited  (Must meet the requirements of at least 1 of the 2 categories)  Category 1: Tests and documents   ? Any combination of 2 from the following:  ?Review of prior external note(s) from each unique source*;  ?review of the result(s) of each unique test*; ?ordering of each unique test*    or   Category 2: Assessment requiring an independent historian(s)  (For the categories of independent interpretation of tests and discussion of management or test interpretation, see moderate or high) Low risk of morbidity from additional diagnostic testing or treatment     33787  38842 Mod Moderate  ? 1or more chronic illnesses with exacerbation, progression, or side effects of treatment;    or  ?2or more stable chronic illnesses;    or  ?1undiagnosed new problem with uncertain prognosis;    or  ?1acute illness with systemic symptoms;    or  ?1acute complicated injury   Moderate  (Must meet the requirements of at least 1 out of 3 categories)  Category 1: Tests, documents, or independent historian(s)  ? Any combination of 3 from the following:   ?Review of prior external note(s) from each unique source*;  ?Review of the result(s) of each unique test*;  ?Ordering of each unique test*;  ?Assessment requiring an independent historian(s)    or  Category 2: Independent interpretation of tests   ? Independent interpretation of a test performed by another physician/other qualified health care professional (not separately reported);     or  Category 3: Discussion of management or test interpretation  ? Discussion of management or test interpretation with external physician/other qualified health care professional/appropriate source (not separately reported)   Moderate risk of morbidity from additional diagnostic testing or treatment  Examples only:  ?Prescription drug management   ? Decision regarding minor surgery with identified patient or procedure risk factors  ? Decision regarding elective major surgery without identified patient or procedure risk factors   ? Diagnosis or treatment significantly limited by social determinants of health       [id number] High High  ? 1or more chronic illnesses with severe exacerbation, progression, or side effects of treatment;    or  ?1 acute or chronic illness or injury that poses a threat to life or bodily function   Extensive  (Must meet the requirements of at least 2 out of 3 categories)  Category 1: Tests, documents, or independent historian(s)  ? Any combination of 3 from the following:   ?Review of prior external note(s) from each unique source*;  ?Review of the result(s) of each unique test*;   ?Ordering of each unique test*;   ?Assessment requiring an independent historian(s)    or   Category 2: Independent interpretation of tests   ? Independent interpretation of a test performed by another physician/other qualified health care professional (not separately reported);     or  Category 3: Discussion of management or test interpretation  ? Discussion of management or test interpretation with external physician/other qualified health care professional/appropriate source (not separately reported)   High risk of morbidity from additional diagnostic testing or treatment  Examples only:  ?Drug therapy requiring intensive monitoring for toxicity  ? Decision regarding elective major surgery with identified patient or procedure risk factors  ? Decision regarding emergency major surgery  ? Decision regarding hospitalization  ? Decision not to resuscitate or to de-escalate care because of poor prognosis             I have personally performed a face-to-face diagnostic evaluation and management  service on this patient. I have independently seen the patient. I have independently obtained the above history from the patient/family. I have independently examined the patient with above findings. I have independently reviewed data/labs for this patient and developed the above plan of care (MDM).       Signed: Kerry Solis MD  10/27/2022    2:36 PM

## 2022-10-27 NOTE — PROGRESS NOTES
END OF SHIFT NOTE:    INTAKE/OUTPUT  No intake/output data recorded. Voiding: Yes  Catheter: No  Drain:   External Urinary Catheter (Active)   Site Assessment Clean,dry & intact 10/27/22 0206   Placement Replaced 10/27/22 0206   Catheter Care Catheter/Wick replaced;Suction Canister/Tubing changed 10/27/22 0206   Perineal Care Yes 10/27/22 0206   Suction 40 mmgHg continuous 10/27/22 0206               Flatus: Patient does have flatus present. Stool:  0 occurrences. Characteristics:           Stool Assessment  Last BM (including prior to admit): 10/26/22 (per patient)    Emesis:  occurrences. Characteristics:        VITAL SIGNS  Patient Vitals for the past 12 hrs:   Temp Pulse Resp BP SpO2   10/27/22 1916 99.5 °F (37.5 °C) (!) 107 20 120/79 92 %   10/27/22 1655 -- (!) 107 20 -- 91 %   10/27/22 1611 99.4 °F (37.4 °C) (!) 109 20 98/68 97 %   10/27/22 1450 98.4 °F (36.9 °C) (!) 109 18 130/75 90 %   10/27/22 1318 -- (!) 108 18 -- 90 %   10/27/22 1105 98.6 °F (37 °C) 99 20 (!) 103/58 96 %   10/27/22 0954 -- 94 16 -- 96 %   10/27/22 0729 98.6 °F (37 °C) 84 18 94/80 92 %       Pain Assessment  Pain Level: 3 (10/27/22 1828)  Pain Location: Generalized  Patient's Stated Pain Goal: 0 - No pain    Ambulating  Yes, short distances to chair and bed. Shift report given to oncoming nurse at the bedside. Monika Ziegler

## 2022-10-27 NOTE — PROGRESS NOTES
TRANSFER - OUT REPORT:    Verbal report given to CORONA Valdez on Stewart Obrien  being transferred to GI LAB for ordered procedure   ERCP    Report consisted of patient's Situation, Background, Assessment and   Recommendations(SBAR). Information from the following report(s) Nurse Handoff Report was reviewed with the receiving nurse. Lines:   Peripheral IV 10/26/22 Right;Dorsal Hand (Active)   Site Assessment Clean, dry & intact 10/27/22 1455   Line Status Flushed 10/27/22 [address] Connections checked and tightened 10/27/22 1002   Phlebitis Assessment No symptoms 10/27/22 1455   Infiltration Assessment 0 10/27/22 1455   Alcohol Cap Used Yes 10/27/22 1002   Dressing Status Clean, dry & intact 10/27/22 1002   Dressing Type Transparent 10/27/22 1455        Opportunity for questions and clarification was provided.       Patient transported with:  O2 @ 4 HFlpm and Registered Nurse

## 2022-10-27 NOTE — PROGRESS NOTES
Patient seen and examined. ERCP on hold due to patient taking Eliquis. Will continue to monitor. Gen Smia plan on cholecystectomy once pancreatitis has resolved. Patient states her abdominal pain has improved.

## 2022-10-27 NOTE — PROGRESS NOTES
[facility] Progress Note         Admit Date:  10/26/2022    Today's Date:  10/27/2022    CC:  Biliary pancreatitis  Subjective:     Patient reports improvement in abdominal pain, nausea, vomiting overnight.      Medications:   Current Facility-Administered Medications   Medication Dose Route Frequency    sodium chloride flush 0.9 % injection 5-40 mL  5-40 mL IntraVENous 2 times per day    sodium chloride flush 0.9 % injection 5-40 mL  5-40 mL IntraVENous PRN    0.9 % sodium chloride infusion   IntraVENous PRN    ondansetron (ZOFRAN-ODT) disintegrating tablet 4 mg  4 mg Oral Q8H PRN    Or    ondansetron (ZOFRAN) injection 4 mg  4 mg IntraVENous Q6H PRN    polyethylene glycol (GLYCOLAX) packet 17 g  17 g Oral Daily PRN    acetaminophen (TYLENOL) tablet 650 mg  650 mg Oral Q6H PRN    Or    acetaminophen (TYLENOL) suppository 650 mg  650 mg Rectal Q6H PRN    0.9 % sodium chloride infusion   IntraVENous Continuous    nicotine (NICODERM CQ) 21 MG/24HR 1 patch  1 patch TransDERmal Daily    insulin lispro (HUMALOG) injection vial 0-8 Units  0-8 Units SubCUTAneous TID WC    insulin lispro (HUMALOG) injection vial 0-4 Units  0-4 Units SubCUTAneous Nightly    glucose chewable tablet 16 g  4 tablet Oral PRN    dextrose bolus 10% 125 mL  125 mL IntraVENous PRN    Or    dextrose bolus 10% 250 mL  250 mL IntraVENous PRN    glucagon (rDNA) injection 1 mg  1 mg SubCUTAneous PRN    dextrose 10 % infusion   IntraVENous Continuous PRN    oxyCODONE (ROXICODONE) immediate release tablet 5 mg  5 mg Oral Q4H PRN    morphine injection 4 mg  4 mg IntraVENous Q1H PRN    piperacillin-tazobactam (ZOSYN) 3,375 mg in sodium chloride 0.9 % 50 mL IVPB (mini-bag)  3,375 mg IntraVENous q8h    [START ON 10/28/2022] heparin (porcine) injection 5,000 Units  5,000 Units SubCUTAneous 3 times per day    ipratropium-albuterol (DUONEB) nebulizer solution 1 ampule  1 ampule Inhalation Q6H PRN    mometasone-formoterol (DULERA) 100-5 MCG/ACT inhaler 2 puff  2 puff Inhalation BID    guaiFENesin (MUCINEX) extended release tablet 1,200 mg  1,200 mg Oral BID    ipratropium-albuterol (DUONEB) nebulizer solution 3 mL  1 vial Inhalation 4x Daily    albuterol sulfate HFA (PROVENTIL;VENTOLIN;PROAIR) 108 (90 Base) MCG/ACT inhaler 2 puff  2 puff Inhalation Q4H PRN       Review of Systems:  ROS was obtained, with pertinent positives as listed above. No chest pain or SOB. Diet:  NPO    Objective:   Vitals:  BP 94/80   Pulse 84   Temp 98.6 °F (37 °C)   Resp 18   Ht 5' 5\" (1.651 m)   Wt (!) 327 lb (148.3 kg)   SpO2 92%   BMI 54.42 kg/m²   Intake/Output:  No intake/output data recorded. No intake/output data recorded. Exam:  General appearance: alert, cooperative, no distress  ENT: Wearing oxygen  Lungs: clear to auscultation bilaterally anteriorly  Heart: regular rate and rhythm  Abdomen: soft, non-tender. Bowel sounds normal. No masses, no organomegaly  Extremities: extremities normal, atraumatic, no cyanosis or edema  Neuro:  alert and oriented    Data Review (Labs):    Recent Labs     10/26/22  1934 10/26/22  1936 10/27/22  0600   WBC 19.0*  --  20.3*   HGB 12.6  --  10.9*   HCT 45.8  --  39.9     --  274   MCV 74.7*  --  73.1*   NA  --  136 139   K  --  4.5 5.0   CL  --  105 106   CO2  --  27 27   BUN  --  15 23   AST  --  202* PENDING   ALT  --  225* 205*   INR  --   --  1.1   APTT  --   --  33.6     CT A/P 10/26/2022  FINDINGS:    A moderate amount of motion artifact limits this examination to some degree. The visualized lung bases and mediastinum are unremarkable. The liver, spleen, pancreas, adrenal glands, and kidneys are within normal   limits. The left kidney contains a tiny low-density lesion in the midpole which   is too small to characterize. The bladder is decompressed and not assessed. The gallbladder shows wall thickening and mucosal enhancement. A few radiopaque   stones are appreciated.        Multiple prominent lymph nodes are appreciated in the yousuf hepatis and near the   head of the pancreas. Distal esophagus and stomach are unremarkable. Small and large bowel show no   evidence of obstruction. There is a normal appendix in the right lower quadrant. No evidence of free fluid or free air. No pathologic adenopathy. No abdominal   aortic aneurysm. Osseous structures show no evidence of acute fracture or suspicious lesion. Impression   The gallbladder shows wall thickening and mucosal enhancement. A few radiopaque   stones are appreciated. These findings are suggestive of acute cholecystitis. Multiple prominent lymph nodes are appreciated in the yousuf hepatis and near the   head of the pancreas. There significance is unclear. US 10/26/2022:      FINDINGS:    Aorta/IVC: Not well visualized. /Visualized portions are within normal limits. Pancreas: Mostly obscured by overlying bowel gas. Liver: There is diffuse increased echogenicity within the liver parenchyma,   suggestive of hepatic steatosis. No focal lesions are demonstrated on the   provided images. Portal vein: The portal vein shows hepatopedal flow. Gallbladder: The gallbladder is contracted and not assessed. Sonographic Moe   sign was negative. CBD: Normal in caliber and measures 2.3 mm. Right kidney: 12.1 cm in length and without evidence of obstruction. Impression   The gallbladder is contracted and not assessed. Sonographic Moe sign was   negative. Hepatic steatosis. Assessment:     Principal Problem:    Acute gallstone pancreatitis  Active Problems:    Cigarette nicotine dependence in remission    Hypertension    COPD, severe ([address]    Diabetes mellitus type 2, controlled ([address]    Morbid obesity with BMI of 50.0-59.9, adult (HCC)    THANH (obstructive sleep apnea)  Resolved Problems:    * No resolved hospital problems.  *      50 y.o. female with PMH of COPD on home oxygen (3 L), hypertension hyperlipidemia, diabetes mellitus, Paroxysmal Afib who is seen in consultation for evaluation of biliary pancreatitis with Sepsis. US: CBD 2.3mm, Gallbladder is contracted and not assessed. CT shows wall thickening of the GB wall and mucosal enhancement, few radiopaque stones appreciated, findings suggestive of acute cholecystitis. Multiple prominent lymph notes near the head of the pancreas. Pancreas described as normal.   lactic acid 2.6, elevated Pro-Dragan.    10/26/2022: TB 1.4, , , , Lipase >30,000. WBC 20.3.   10/27/2022: TB 0.5, AST Pending, , . Plan:     Plan for ERCP today to evaluate the CBD for obstruction  On Vanc and Zosyn  Follow labs  NPO for now  Surgery has been consulted  Hold Heparin for now    Risk of procedure discussed with patient to include risk of infection, bleeding, perforation, sedation reaction, exacerbation of underlying medical conditions, and death. Patient voiced understanding and wishes to plan for procedure when appropriate. FUNMI Snyder    Patient is seen and examined in collaboration with Dr. Steven Leone. Assessment and plan as per Dr. Steven Leone.

## 2022-10-27 NOTE — PROGRESS NOTES
Pt been c/o SOB. Resp came and gave pt treatment. Pt been coughing profusely through the night. This is her normal. Pt is now resting with bed at a 90 degree angle. Hourly rounds performed this shift. All needs met at this time. Shift report will be given to oncoming nurse.

## 2022-10-27 NOTE — PROGRESS NOTES
Pt has tele order but MD state will discontinue order. Pt does not have a tele monitor on at this time.

## 2022-10-27 NOTE — H&P
Hospitalist History and Physical   Admit Date:  10/26/2022  7:27 PM   Name:  Renetta Obrien   Age:  50 y.o. Sex:  female  :  1970   MRN:  [id number]   Room:  ER29/29    Presenting Complaint: Nausea and Emesis (Pt with N/V/D since 4am. Alert and oriented x's 4. Sepsis protocol started by EMS enroute)     Reason(s) for Admission: Acute gallstone pancreatitis [K85.10]     History of Present Illness:     Gretta Collins is a 50 y.o. female, local history of hypertension hyperlipidemia, diabetes mellitus who presents to the Emergency Department with chief complaint of abdominal pain, vomiting, and diarrhea. Patient states symptoms started this morning. States she believes she \"ate some bad food last evening. \"  States since then this morning she had crampy abdominal pain with vomiting. Denies any hematemesis or melena. Denies any fevers or chills. EMS was called when she was initially found to be hypotensive. Antibiotics were started in route as well as IV fluids and antiemetics. Patient describes a diffuse abdominal pain. Laboratory data significant for lactic acid 2.6, elevated Pro-Dragan.  Lipase greater than 30,000 and mildly elevated bilirubin and LFTs. She got Zosyn in route via EMS. IV fluids have been initiated. Vancomycin added in ER. CT scan does show gallbladder wall thickening as well as stones concern for possible acute cholecystitis. However she has no focal tenderness on exam.  Given acuity of symptoms reported fever and sepsis markers ER MD became concerned for possibly developing cholangitis. GI as well as hospitalist consulted for admission. Patient denies current alcohol use. Review of Systems:  10 systems reviewed and negative except as noted in HPI. Assessment & Plan:     Principal Problem:    Acute gallstone pancreatitis -30-year-old morbidly obese diabetic admitted for acute gallstone pancreatitis with lipase greater than 30,000.   Plan:   Admit med bed, INPATIENT STATUS due to medical complexity, need for close cardiopulmonary monitoring given initial unstable vitals signs (hypotension) & need for IV medication  Patient is already been evaluated by gastroenterology in the emergency room. We will follow-up on the recommendations. Possible ERCP in a.m. We will hold her Eliquis and keep her n.p.o. Pain and nausea control  Continue IV Zosyn  Because she will be n.p.o., will hold her diabetic medications and use sliding scale insulin. Hold her blood pressure medications for now given relative hypotension. Restart as appropriate        Active Problems:    Cigarette nicotine dependence in remission  Plan:     Hypertension  Plan:     COPD, severe ([address]  Plan:     Diabetes mellitus type 2, controlled ([address]  Plan: Morbid obesity with BMI of 50.0-59.9, adult ([address]  Plan:     THANH (obstructive sleep apnea)  Plan:       Anticipated discharge needs:         Diet: Diet NPO Exceptions are: Ice Chips, Sips of Water with Meds  VTE ppx: SCDs, Eliquis  Code status: Full Code    Hospital Problems:  Principal Problem:    Acute gallstone pancreatitis  Active Problems:    Cigarette nicotine dependence in remission    Hypertension    COPD, severe (HCC)    Diabetes mellitus type 2, controlled ([address]    Morbid obesity with BMI of 50.0-59.9, adult (HCC)    THANH (obstructive sleep apnea)  Resolved Problems:    * No resolved hospital problems.  *       Past History:     Past Medical History:   Diagnosis Date    Childhood asthma     Chronic respiratory failure with hypoxia (HCC)     Continuous at 3L    COPD (chronic obstructive pulmonary disease) ([address]     Stage 4 by GOLD Criteria    COVID-19 01/07/2021    Hyperlipidemia     Daily meds     Hypertension     Managed with meds     Intraductal papilloma of breast 01/16/2019    Left    Microalbuminuria     Microcytosis     Morbid obesity ([address]     Nonproliferative retinopathy due to secondary diabetes ([address] 03/07/2020    Mild, Bilateral    TAHNH on CPAP 3/2/2017    Paroxysmal atrial fibrillation (HCC)     Pneumonia ages 3 and 9    Type 2 diabetes mellitus [address]        Past Surgical History:   Procedure Laterality Date    BREAST BIOPSY Left 3/27/2019    LEFT BREAST NEEDLE LOCALIZED LUMPECTOMY  performed by Dany Steward DO at [facility] (CERVIX NOT REMOVED)  08/2012     BREAST NEEDLE BIOPSY LEFT Left 1/7/2019    US BREAST NEEDLE BIOPSY LEFT 1/7/2019 [facility]    US GUIDED NEEDLE LOC OF LEFT BREAST Left 3/27/2019    US GUIDED NEEDLE LOC OF LEFT BREAST 3/27/2019 [facility]        Social History     Tobacco Use    Smoking status: Every Day     Packs/day: 1.00     Years: 32.00     Pack years: 32.00     Types: Cigarettes    Smokeless tobacco: Never    Tobacco comments:     Quit smoking: Currently 10 Cigarettes/day   Substance Use Topics    Alcohol use: Yes      Social History     Substance and Sexual Activity   Drug Use Not Currently    Types: Cocaine, Marijuana ([place] [facility])       Family History   Problem Relation Age of Onset    Coronary Art Dis Neg Hx     COPD Mother     Parkinsonism Father     Asthma Son     Schizophrenia Brother     Hypertension Brother     Emphysema Paternal Grandfather     Hypertension Mother         Immunization History   Administered Date(s) Administered    Influenza Virus Vaccine 10/01/2017, 10/01/2019    Influenza, FLUAD, (age 72 y+), Adjuvanted, 0.5mL 11/16/2020, 12/13/2021    Influenza, FLUARIX, FLULAVAL, FLUZONE (age 10 mo+) AND AFLURIA, (age 1 y+), PF, 0.5mL 12/18/2015, 11/15/2016    Influenza, High Dose (Fluzone 65 yrs and older) 10/25/2018    Influenza, Triv, inactivated, subunit, adjuvanted, IM (Fluad 65 yrs and older) 09/23/2019    Pneumococcal Polysaccharide ([address]) 08/07/2017     No Known Allergies  Prior to Admit Medications:  Current Outpatient Medications   Medication Instructions    albuterol sulfate  (90 Base) MCG/ACT inhaler 2 puffs, Inhalation, EVERY 4 HOURS PRN    apixaban (ELIQUIS) 5 MG TABS tablet TAKE ONE TABLET BY MOUTH EVERY 12 HOURS **MUST SCHEDULE APPOINTMENT FOR MORE REFILLS**    atorvastatin (LIPITOR) 10 mg, Oral, Nightly    blood glucose test strips (ASCENSIA AUTODISC VI;ONE TOUCH ULTRA TEST VI) strip Use to check blood sugar once daily as directed. Dx: E11.29    dilTIAZem (CARDIZEM CD) 240 MG extended release capsule No dose, route, or frequency recorded. dilTIAZem (TIAZAC) 240 MG extended release capsule TAKE ONE CAPSULE BY MOUTH EVERY DAY (MUST SCHEDULE APPOINTMENT FOR FURTHER REFILLS)    Fluticasone furoate-vilanterol (BREO ELLIPTA) 200-25 MCG/INH AEPB inhaler INHALE ONE PUFF ONCE DAILY *RINSE MOUTH WELL AFTER USE*    furosemide (LASIX) 20 MG tablet TAKE ONE TABLET BY MOUTH EVERY DAY ( TAKE ONE TABLET BY MOUTH AS NEEDED )    guaiFENesin 1,200 mg, Oral, 2 TIMES DAILY    ipratropium-albuterol (DUONEB) 0.5-2.5 (3) MG/3ML SOLN nebulizer solution 3 mLs, Inhalation, 4 TIMES DAILY, File to Medicare part B--[id number]    Lancets 33G MISC Use to check glucose once daily. Dx: E11.29    lisinopril (PRINIVIL;ZESTRIL) 5 mg, Oral, DAILY    metFORMIN (GLUCOPHAGE) 500 mg, Oral, 2 TIMES DAILY WITH MEALS         Objective:   Patient Vitals for the past 24 hrs:   Temp Pulse Resp BP SpO2   10/27/22 0123 -- -- -- -- 92 %   10/27/22 0115 -- 95 26 (!) 99/55 90 %   10/27/22 0100 -- 87 -- (!) 100/56 (!) 89 %   10/27/22 0016 -- -- -- -- 93 %   10/27/22 0015 -- 93 -- (!) 108/55 --   10/26/22 2337 -- -- -- -- 93 %   10/26/22 2330 -- 91 -- (!) 104/49 --   10/26/22 2315 -- 95 -- (!) 96/53 --   10/26/22 1946 -- 92 28 (!) 124/104 92 %   10/26/22 1929 98.1 °F (36.7 °C) 95 29 (!) 66/44 94 %       Oxygen Therapy  SpO2: 92 %  Pulse Oximeter Device Mode: Continuous  O2 Device: Nasal cannula  O2 Flow Rate (L/min): 3 L/min    Estimated body mass index is 57.41 kg/m² as calculated from the following:    Height as of 9/8/22: 5' 5\" (1.651 m).     Weight as of this encounter: 345 lb 0.3 oz (156.5 kg).  No intake or output data in the 24 hours ending 10/27/22 0144      Physical Exam:    Blood pressure (!) 99/55, pulse 95, temperature 98.1 °F (36.7 °C), temperature source Axillary, resp. rate 26, weight (!) 345 lb 0.3 oz (156.5 kg), SpO2 92 %. General:    Well nourished. [place] obese, no acute distress, alert and oriented x3. Head:  Normocephalic, atraumatic  Eyes:  Sclerae appear normal.  Pupils equally round. ENT:  Nares appear normal, no drainage. Moist oral mucosa  Neck:  No restricted ROM. Trachea midline   CV:   RRR. No m/r/g. No jugular venous distension. Lungs:   CTAB. No wheezing, rhonchi, or rales. Symmetric expansion. Abdomen: Bowel sounds present. Soft, + gastric tender, nondistended. Extremities: No cyanosis or clubbing. No edema  Skin:     No rashes and normal coloration. Warm and dry. Neuro:  CN II-XII grossly intact. Sensation intact. A&Ox3  Psych:  Normal mood and affect.       I have personally reviewed labs and tests showing:  Recent Labs:  Recent Results (from the past 24 hour(s))   CBC with Auto Differential    Collection Time: 10/26/22  7:34 PM   Result Value Ref Range    WBC 19.0 (H) 4.3 - 11.1 K/uL    RBC 6.13 (H) 4.05 - 5.2 M/uL    Hemoglobin 12.6 11.7 - 15.4 g/dL    Hematocrit 45.8 35.8 - 46.3 %    MCV 74.7 (L) 82 - 102 FL    MCH 20.6 (L) 26.1 - 32.9 PG    MCHC 27.5 (L) 31.4 - 35.0 g/dL    RDW 19.5 (H) 11.9 - 14.6 %    Platelets 257 512 - 082 K/uL    MPV 10.4 9.4 - 12.3 FL    nRBC 0.07 0.0 - 0.2 K/uL    Differential Type AUTOMATED      Seg Neutrophils 83 (H) 43 - 78 %    Lymphocytes 9 (L) 13 - 44 %    Monocytes 7 4.0 - 12.0 %    Eosinophils % 0 (L) 0.5 - 7.8 %    Basophils 0 0.0 - 2.0 %    Immature Granulocytes 1 0.0 - 5.0 %    Segs Absolute 15.8 (H) 1.7 - 8.2 K/UL    Absolute Lymph # 1.6 0.5 - 4.6 K/UL    Absolute Mono # 1.3 0.1 - 1.3 K/UL    Absolute Eos # 0.0 0.0 - 0.8 K/UL    Basophils Absolute 0.1 0.0 - 0.2 K/UL    Absolute Immature Granulocyte 0.2 0.0 - 0.5 K/UL   Lactate, Sepsis    Collection Time: 10/26/22  7:36 PM   Result Value Ref Range    Lactic Acid, Sepsis 2.6 (H) 0.4 - 2.0 MMOL/L   Lipase    Collection Time: 10/26/22  7:36 PM   Result Value Ref Range    Lipase >30,000 (H) 73 - 393 U/L   CMP    Collection Time: 10/26/22  7:36 PM   Result Value Ref Range    Sodium 136 133 - 143 mmol/L    Potassium 4.5 3.5 - 5.1 mmol/L    Chloride 105 101 - 110 mmol/L    CO2 27 21 - 32 mmol/L    Anion Gap 4 2 - 11 mmol/L    Glucose 133 (H) 65 - 100 mg/dL    BUN 15 6 - 23 MG/DL    Creatinine 1.60 (H) 0.6 - 1.0 MG/DL    Est, Glom Filt Rate 39 (L) >60 ml/min/1.73m2    Calcium 9.9 8.3 - 10.4 MG/DL    Total Bilirubin 1.4 (H) 0.2 - 1.1 MG/DL     (H) 12 - 65 U/L     (H) 15 - 37 U/L    Alk Phosphatase 232 (H) 50 - 136 U/L    Total Protein 8.0 6.3 - 8.2 g/dL    Albumin 3.3 (L) 3.5 - 5.0 g/dL    Globulin 4.7 (H) 2.8 - 4.5 g/dL    Albumin/Globulin Ratio 0.7 0.4 - 1.6     Procalcitonin    Collection Time: 10/26/22  7:36 PM   Result Value Ref Range    Procalcitonin 3.51 (H) 0.00 - 0.49 ng/mL   Lactic Acid    Collection Time: 10/26/22 11:40 PM   Result Value Ref Range    Lactic Acid, Plasma 2.4 (H) 0.4 - 2.0 MMOL/L       I have personally reviewed imaging studies showing:  CT ABDOMEN PELVIS W IV CONTRAST Additional Contrast? None    Result Date: 10/26/2022  EXAM: CT ABDOMEN PELVIS W IV CONTRAST HISTORY: Upper abdominal pain. Diarrhea. TECHNIQUE: Axial images of the abdomen and pelvis were performed following the administration of intravenous contrast. Images were obtained in the axial plane and coronal reformatted images were created and reviewed. Dose reduction technique used: Automated exposure control/Adjustment of the mA and/or kV according to patient size/Use of iterative reconstruction technique. 100 mL of Isovue-370 were utilized. COMPARISON: CT chest dated 10/18/2018 and 11/10/2016. FINDINGS: A moderate amount of motion artifact limits this examination to some degree. The visualized lung bases and mediastinum are unremarkable. The liver, spleen, pancreas, adrenal glands, and kidneys are within normal limits. The left kidney contains a tiny low-density lesion in the midpole which is too small to characterize. The bladder is decompressed and not assessed. The gallbladder shows wall thickening and mucosal enhancement. A few radiopaque stones are appreciated. Multiple prominent lymph nodes are appreciated in the yousuf hepatis and near the head of the pancreas. Distal esophagus and stomach are unremarkable. Small and large bowel show no evidence of obstruction. There is a normal appendix in the right lower quadrant. No evidence of free fluid or free air. No pathologic adenopathy. No abdominal aortic aneurysm. Osseous structures show no evidence of acute fracture or suspicious lesion. The gallbladder shows wall thickening and mucosal enhancement. A few radiopaque stones are appreciated. These findings are suggestive of acute cholecystitis. Multiple prominent lymph nodes are appreciated in the yousuf hepatis and near the head of the pancreas. There significance is unclear. XR CHEST PORTABLE    Result Date: 10/26/2022  EXAMINATION: XR CHEST PORTABLE 10/26/2022 8:05 PM ACCESSION NUMBER: [id number] COMPARISON: None available INDICATION: dyspnea TECHNIQUE: A single view of the chest was obtained. FINDINGS: Lung hypoinflation. Streaky bibasilar opacities. No pneumothorax or sizable pleural effusion. Stable cardiomediastinal silhouette.     -Lung hypoinflation with streaky bibasilar opacities, favored to reflect atelectasis, though infection/aspiration are also considerations in the appropriate clinical context. US ABDOMEN LIMITED Specify organ? GALLBLADDER    Result Date: 10/26/2022  EXAM: US ABDOMEN LIMITED HISTORY: Upper abdominal pain, elevated LFTs and elevated lipase. TECHNIQUE: Grayscale and limited color Doppler ultrasound of the right upper quadrant.  COMPARISON: CT abdomen and pelvis dated 10/26/2022 FINDINGS: Aorta/IVC: Not well visualized. /Visualized portions are within normal limits. Pancreas: Mostly obscured by overlying bowel gas. Liver: There is diffuse increased echogenicity within the liver parenchyma, suggestive of hepatic steatosis. No focal lesions are demonstrated on the provided images. Portal vein: The portal vein shows hepatopedal flow. Gallbladder: The gallbladder is contracted and not assessed. Sonographic Moe sign was negative. CBD: Normal in caliber and measures 2.3 mm. Right kidney: 12.1 cm in length and without evidence of obstruction. The gallbladder is contracted and not assessed. Sonographic Moe sign was negative. Hepatic steatosis. Echocardiogram:  No results found for this or any previous visit.         Orders Placed This Encounter   Medications    0.9 % sodium chloride bolus    iopamidol (ISOVUE-370) 76 % injection 100 mL    DISCONTD: piperacillin-tazobactam (ZOSYN) 3,375 mg in sodium chloride 0.9 % 50 mL IVPB (mini-bag)     Order Specific Question:   Antimicrobial Indications     Answer:   Intra-Abdominal Infection    DISCONTD: piperacillin-tazobactam (ZOSYN) 4,500 mg in sodium chloride 0.9 % 100 mL IVPB (mini-bag)     Order Specific Question:   Antimicrobial Indications     Answer:   Intra-Abdominal Infection    DISCONTD: vancomycin (VANCOCIN) 1500 mg in sodium chloride 0.9% 500 mL IVPB     Order Specific Question:   Antimicrobial Indications     Answer:   Sepsis of Unknown Etiology    vancomycin (VANCOCIN) 2500 mg in sodium chloride 0.9 % 500 mL IVPB     Order Specific Question:   Antimicrobial Indications     Answer:   Sepsis of Unknown Etiology    0.9 % sodium chloride bolus    0.9 % sodium chloride bolus    sodium chloride flush 0.9 % injection 5-40 mL    sodium chloride flush 0.9 % injection 5-40 mL    0.9 % sodium chloride infusion    DISCONTD: enoxaparin (LOVENOX) injection 40 mg     Order Specific Question:   Indication of Use     Answer:   Prophylaxis-DVT/PE    OR Linked Order Group     ondansetron (ZOFRAN-ODT) disintegrating tablet 4 mg     ondansetron (ZOFRAN) injection 4 mg    polyethylene glycol (GLYCOLAX) packet 17 g    OR Linked Order Group     acetaminophen (TYLENOL) tablet 650 mg     acetaminophen (TYLENOL) suppository 650 mg    0.9 % sodium chloride infusion    nicotine (NICODERM CQ) 21 MG/24HR 1 patch    insulin lispro (HUMALOG) injection vial 0-8 Units    insulin lispro (HUMALOG) injection vial 0-4 Units    glucose chewable tablet 16 g    OR Linked Order Group     dextrose bolus 10% 125 mL     dextrose bolus 10% 250 mL    glucagon (rDNA) injection 1 mg    dextrose 10 % infusion    oxyCODONE (ROXICODONE) immediate release tablet 5 mg    morphine injection 4 mg    piperacillin-tazobactam (ZOSYN) 3,375 mg in sodium chloride 0.9 % 50 mL IVPB (mini-bag)     Order Specific Question:   Antimicrobial Indications     Answer:   Intra-Abdominal Infection    heparin (porcine) injection 5,000 Units         Signed:  Rossana Salazar MD    Part of this note may have been written by using a voice dictation software. The note has been proof read but may still contain some grammatical/other typographical errors.

## 2022-10-27 NOTE — ANESTHESIA PRE PROCEDURE
Department of Anesthesiology  Preprocedure Note       Name:  Margarita Muñoz   Age:  50 y.o.  :  1970                                          MRN:  [id number]         Date:  10/27/2022      Surgeon: Acacia Romo):  Vaishnavi Candelaria MD    Procedure: Procedure(s):  ERCP ENDOSCOPIC RETROGRADE CHOLANGIOPANCREATOGRAPHY     Medications prior to admission:   Prior to Admission medications    Medication Sig Start Date End Date Taking? Authorizing Provider   dilTIAZem (CARDIZEM CD) 240 MG extended release capsule  22   Historical Provider, MD   blood glucose test strips (ASCENSIA AUTODISC VI;ONE TOUCH ULTRA TEST VI) strip Use to check blood sugar once daily as directed. Dx:    Historical Provider, MD   Lancets 33G MISC Use to check glucose once daily.   Dx:    Historical Provider, MD   atorvastatin (LIPITOR) 10 MG tablet Take 1 tablet by mouth at bedtime 22   Cory Cabrera PA-C   lisinopril (PRINIVIL;ZESTRIL) 5 MG tablet Take 1 tablet by mouth daily 22   Cory Cabrera PA-C   metFORMIN (GLUCOPHAGE) 500 MG tablet Take 1 tablet by mouth 2 times daily (with meals) 22   Cory Cabrera PA-C   albuterol sulfate  (90 Base) MCG/ACT inhaler Inhale 2 puffs into the lungs every 4 hours as needed for Wheezing 22   FUNMI Parker CNP   ipratropium-albuterol (DUONEB) 0.5-2.5 (3) MG/3ML SOLN nebulizer solution Inhale 3 mLs into the lungs 4 times daily File to Medicare part B--J44.9 22   FUNMI Parker CNP   apixaban (ELIQUIS) 5 MG TABS tablet TAKE ONE TABLET BY MOUTH EVERY 12 HOURS **MUST SCHEDULE APPOINTMENT FOR MORE REFILLS** 22   Ar Automatic Reconciliation   dilTIAZem (TIAZAC) 240 MG extended release capsule TAKE ONE CAPSULE BY MOUTH EVERY DAY (MUST SCHEDULE APPOINTMENT FOR FURTHER REFILLS) 22   Ar Automatic Reconciliation   Fluticasone furoate-vilanterol (BREO ELLIPTA) 200-25 MCG/INH AEPB inhaler INHALE ONE PUFF ONCE Martha Duran MD   Stopped at 10/27/22 1237    glucagon (rDNA) injection 1 mg  1 mg SubCUTAneous PRN Martha Duran MD        dextrose 10 % infusion   IntraVENous Continuous PRN Martha Duran MD        oxyCODONE (ROXICODONE) immediate release tablet 5 mg  5 mg Oral Q4H PRN Martha Duran MD        morphine injection 4 mg  4 mg IntraVENous Q1H PRN Martha Duran MD        piperacillin-tazobactam (ZOSYN) 3,375 mg in sodium chloride 0.9 % 50 mL IVPB (mini-bag)  3,375 mg IntraVENous q8h Martha Duran MD 12.5 mL/hr at 10/27/22 1225 3,375 mg at 10/27/22 1225    [Held by provider] heparin (porcine) injection 5,000 Units  5,000 Units SubCUTAneous 3 times per day Martha Duran MD        ipratropium-albuterol (DUONEB) nebulizer solution 1 ampule  1 ampule Inhalation Q6H PRN Anthony Hendrix MD   1 ampule at 10/27/22 0514    mometasone-formoterol (DULERA) 100-5 MCG/ACT inhaler 2 puff  2 puff Inhalation BID Martha Duran MD   2 puff at 10/27/22 0953    guaiFENesin (MUCINEX) extended release tablet 1,200 mg  1,200 mg Oral BID Martah Duran MD   1,200 mg at 10/27/22 0815    ipratropium-albuterol (DUONEB) nebulizer solution 3 mL  1 vial Inhalation 4x Daily Martha Duran MD   3 mL at 10/27/22 1317    albuterol sulfate HFA (PROVENTIL;VENTOLIN;PROAIR) 108 (90 Base) MCG/ACT inhaler 2 puff  2 puff Inhalation Q4H PRN Martha Duran MD        indomethacin (INDOCIN) 50 MG suppository 100 mg  100 mg Rectal Once C Vidya Kendall MD           Allergies:  No Known Allergies    Problem List:    Patient Active Problem List   Diagnosis Code    COVID-19 U07.1    Type 2 diabetes with nephropathy ([facility] [address] E11.21    Cigarette nicotine dependence in remission F17.211    Nocturnal hypoxemia G47.34    Hypertension I10    COPD, severe ([facility] J44.9    Diabetes mellitus type 2, controlled ([facility] E11.9    Atrial fibrillation, new onset ([facility] I48.91    Chronic back pain M54.9, G89.29    Morbid obesity with BMI of 50.0-59.9, adult ([facility] E66.01, Z68.43    Chronic respiratory failure with hypoxia (HCC) J96.11    THANH (obstructive sleep apnea) G47.33    Obesity hypoventilation syndrome (HCC) E66.2    Acute gallstone pancreatitis K85.10    BMI 50.0-59.9, adult (HCC) Z68.43       Past Medical History:        Diagnosis Date    Childhood asthma     Chronic respiratory failure with hypoxia (HCC)     Continuous at 3L    COPD (chronic obstructive pulmonary disease) (HCC)     Stage 4 by GOLD Criteria    COVID-19 01/07/2021    Hyperlipidemia     Daily meds     Hypertension     Managed with meds     Intraductal papilloma of breast 01/16/2019    Left    Microalbuminuria     Microcytosis     Morbid obesity ([address]     Nonproliferative retinopathy due to secondary diabetes ([facility] [address] 03/07/2020    Mild, Bilateral    THANH on CPAP 3/2/2017    Paroxysmal atrial fibrillation (HCC)     Pneumonia ages 1 and 9    Type 2 diabetes mellitus ([facility] [address]        Past Surgical History:        Procedure Laterality Date    BREAST BIOPSY Left 3/27/2019    LEFT BREAST NEEDLE LOCALIZED LUMPECTOMY  performed by Mariluz Caicedo DO at [address] (CERVIX NOT REMOVED)  08/2012    US BREAST NEEDLE BIOPSY LEFT Left 1/7/2019    US BREAST NEEDLE BIOPSY LEFT 1/7/2019 [facility]    US GUIDED NEEDLE LOC OF LEFT BREAST Left 3/27/2019    US GUIDED NEEDLE LOC OF LEFT BREAST 3/27/2019 [facility]       Social History:    Social History     Tobacco Use    Smoking status: Every Day     Packs/day: 1.00     Years: 32.00     Pack years: 32.00     Types: Cigarettes    Smokeless tobacco: Never    Tobacco comments:     Quit smoking: Currently 10 Cigarettes/day   Substance Use Topics    Alcohol use:  Yes                                Ready to quit: Not Answered  Counseling given: Not Answered  Tobacco comments: Quit smoking: Currently 10 Cigarettes/day      Vital Signs (Current):   Vitals:    10/27/22 0954 10/27/22 1105 10/27/22 1318 10/27/22 1450 BP:  (!) 103/58  130/75   Pulse: 94 99 (!) 108 (!) 109   Resp: 16 20 18 18   Temp:  98.6 °F (37 °C)  98.4 °F (36.9 °C)   TempSrc:  Oral  Oral   SpO2: 96% 96% 90% 90%   Weight:    (!) 327 lb (148.3 kg)   Height:    5' 5\" (1.651 m)                                              BP Readings from Last 3 Encounters:   10/27/22 130/75   09/08/22 120/80   05/23/22 135/83       NPO Status: Time of last liquid consumption: 2300                        Time of last solid consumption: 2300                        Date of last liquid consumption: 10/26/22                        Date of last solid food consumption: 10/26/22    BMI:   Wt Readings from Last 3 Encounters:   10/27/22 (!) 327 lb (148.3 kg)   09/08/22 (!) 327 lb (148.3 kg)   05/23/22 (!) 325 lb (147.4 kg)     Body mass index is 54.42 kg/m². CBC:   Lab Results   Component Value Date/Time    WBC 20.3 10/27/2022 06:00 AM    RBC 5.46 10/27/2022 06:00 AM    HGB 10.9 10/27/2022 06:00 AM    HCT 39.9 10/27/2022 06:00 AM    MCV 73.1 10/27/2022 06:00 AM    RDW 19.0 10/27/2022 06:00 AM     10/27/2022 06:00 AM       CMP:   Lab Results   Component Value Date/Time     10/27/2022 06:00 AM    K 5.0 10/27/2022 06:00 AM     10/27/2022 06:00 AM    CO2 27 10/27/2022 06:00 AM    BUN 23 10/27/2022 06:00 AM    CREATININE 2.00 10/27/2022 06:00 AM    GFRAA >60 09/01/2022 01:32 PM    AGRATIO 1.3 03/28/2022 11:26 AM    LABGLOM 30 10/27/2022 06:00 AM    GLUCOSE 85 10/27/2022 06:00 AM    PROT 7.7 10/27/2022 06:00 AM    CALCIUM 8.8 10/27/2022 06:00 AM    BILITOT 0.5 10/27/2022 06:00 AM    ALKPHOS 210 10/27/2022 06:00 AM    ALKPHOS 145 03/28/2022 11:26 AM    AST QUANTITY NOT SUFFICIENT.  SUGGEST RECOLLECTION 10/27/2022 06:00 AM     10/27/2022 06:00 AM       POC Tests:   Recent Labs     10/27/22  1256   POCGLU 107*       Coags:   Lab Results   Component Value Date/Time    PROTIME 14.8 10/27/2022 06:00 AM    INR 1.1 10/27/2022 06:00 AM    APTT 33.6 10/27/2022 06:00 AM APTT 73.7 08/31/2019 11:30 AM       HCG (If Applicable): No results found for: PREGTESTUR, PREGSERUM, HCG, HCGQUANT     ABGs:   Lab Results   Component Value Date/Time    PHART 7.371 09/02/2019 11:45 AM    PO2ART 63 09/02/2019 11:45 AM    ECQ2OQJ 57.6 09/02/2019 11:45 AM    YIK3HKJ 33.4 09/02/2019 11:45 AM    BEART 6 09/02/2019 11:45 AM        Type & Screen (If Applicable):  No results found for: LABABO, LABRH    Drug/Infectious Status (If Applicable):  No results found for: HIV, HEPCAB    COVID-19 Screening (If Applicable):   Lab Results   Component Value Date/Time    COVID19 Not Detected 08/12/2020 10:06 AM           Anesthesia Evaluation  Patient summary reviewed and Nursing notes reviewed history of anesthetic complications (had to remain intubated post breast lumpectomy due to hypercarbia and somnolence): Airway: Mallampati: I  TM distance: >3 FB     Mouth opening: > = 3 FB   Dental: normal exam         Pulmonary: breath sounds clear to auscultation  (+) COPD (chronic hypoxic respiratory failure): severe,  sleep apnea:                             Cardiovascular:    (+) hypertension:, dysrhythmias: atrial fibrillation and SVT, hyperlipidemia        Rhythm: regular  Rate: abnormal                    Neuro/Psych:   Negative Neuro/Psych ROS              GI/Hepatic/Renal:   (+) renal disease: ARF, morbid obesity          Endo/Other:    (+) DiabetesType II DM, , .                 Abdominal:             Vascular: Other Findings:           Anesthesia Plan      general     ASA 4     (Case cancelled by Dr Lexis Morillo due to the patient's eliquis being taken yesterday)  Induction: intravenous. MIPS: Postoperative ventilation. Anesthetic plan and risks discussed with patient.                         Conor Holder MD   10/27/2022

## 2022-10-27 NOTE — DISCHARGE INSTR - DIET
Good nutrition is important when healing from an illness, injury, or surgery. Follow any nutrition recommendations given to you during your hospital stay. If you were given an oral nutrition supplement while in the hospital, continue to take this supplement at home. You can take it with meals, in-between meals, and/or before bedtime. These supplements can be purchased at most local grocery stores, pharmacies, and chain [employer]stores. If you have any questions about your diet or nutrition, call the hospital and ask for the dietitian. Recommend patient continue nutrition education in outpatient setting. There are three methods available through [address] [place] [address]. 12 week Physician Guided Weight Loss program- [phone] ([facility] fax [phone])   One-on-one, Individualized Outpatient Nutrition Counseling- [phone] (have your doctor send referral to [facility] or fax [phone])   Bariatric Weight Loss program- [phone] or [email or web address]

## 2022-10-28 ENCOUNTER — APPOINTMENT (OUTPATIENT)
Dept: GENERAL RADIOLOGY | Age: 52
DRG: 853 | End: 2022-10-28
Payer: MEDICARE

## 2022-10-28 PROBLEM — I48.91 ATRIAL FIBRILLATION, NEW ONSET (HCC): Status: ACTIVE | Noted: 2019-08-30

## 2022-10-28 LAB
ALBUMIN SERPL-MCNC: 3 G/DL (ref 3.5–5)
ALBUMIN/GLOB SERPL: 0.8 (ref 0.4–1.6)
ALP SERPL-CCNC: 160 U/L (ref 50–136)
ALT SERPL-CCNC: 130 U/L (ref 12–65)
ANION GAP SERPL CALC-SCNC: 4 MMOL/L (ref 2–11)
APTT PPP: 30.6 SEC (ref 24.5–34.2)
AST SERPL-CCNC: 52 U/L (ref 15–37)
BASOPHILS # BLD: 0 K/UL (ref 0–0.2)
BASOPHILS NFR BLD: 0 % (ref 0–2)
BILIRUB SERPL-MCNC: 0.3 MG/DL (ref 0.2–1.1)
BUN SERPL-MCNC: 13 MG/DL (ref 6–23)
CALCIUM SERPL-MCNC: 9.2 MG/DL (ref 8.3–10.4)
CHLORIDE SERPL-SCNC: 108 MMOL/L (ref 101–110)
CO2 SERPL-SCNC: 30 MMOL/L (ref 21–32)
CREAT SERPL-MCNC: 0.9 MG/DL (ref 0.6–1)
DIFFERENTIAL METHOD BLD: ABNORMAL
EOSINOPHIL # BLD: 0.1 K/UL (ref 0–0.8)
EOSINOPHIL NFR BLD: 1 % (ref 0.5–7.8)
ERYTHROCYTE [DISTWIDTH] IN BLOOD BY AUTOMATED COUNT: 18.2 % (ref 11.9–14.6)
FERRITIN SERPL-MCNC: 52 NG/ML (ref 8–388)
GLOBULIN SER CALC-MCNC: 4 G/DL (ref 2.8–4.5)
GLUCOSE BLD STRIP.AUTO-MCNC: 101 MG/DL (ref 65–100)
GLUCOSE BLD STRIP.AUTO-MCNC: 75 MG/DL (ref 65–100)
GLUCOSE BLD STRIP.AUTO-MCNC: 83 MG/DL (ref 65–100)
GLUCOSE BLD STRIP.AUTO-MCNC: 85 MG/DL (ref 65–100)
GLUCOSE BLD STRIP.AUTO-MCNC: 99 MG/DL (ref 65–100)
GLUCOSE SERPL-MCNC: 88 MG/DL (ref 65–100)
HCT VFR BLD AUTO: 33.4 % (ref 35.8–46.3)
HGB BLD-MCNC: 9.1 G/DL (ref 11.7–15.4)
IMM GRANULOCYTES # BLD AUTO: 0.1 K/UL (ref 0–0.5)
IMM GRANULOCYTES NFR BLD AUTO: 1 % (ref 0–5)
INR PPP: 1
IRON SATN MFR SERPL: 18 %
IRON SERPL-MCNC: 66 UG/DL (ref 35–150)
LIPASE SERPL-CCNC: 145 U/L (ref 73–393)
LYMPHOCYTES # BLD: 2 K/UL (ref 0.5–4.6)
LYMPHOCYTES NFR BLD: 19 % (ref 13–44)
MCH RBC QN AUTO: 19.8 PG (ref 26.1–32.9)
MCHC RBC AUTO-ENTMCNC: 27.2 G/DL (ref 31.4–35)
MCV RBC AUTO: 72.8 FL (ref 82–102)
MONOCYTES # BLD: 0.5 K/UL (ref 0.1–1.3)
MONOCYTES NFR BLD: 5 % (ref 4–12)
NEUTS SEG # BLD: 7.5 K/UL (ref 1.7–8.2)
NEUTS SEG NFR BLD: 74 % (ref 43–78)
NRBC # BLD: 0 K/UL (ref 0–0.2)
PLATELET # BLD AUTO: 283 K/UL (ref 150–450)
PMV BLD AUTO: 10.1 FL (ref 9.4–12.3)
POTASSIUM SERPL-SCNC: 3.4 MMOL/L (ref 3.5–5.1)
PROT SERPL-MCNC: 7 G/DL (ref 6.3–8.2)
PROTHROMBIN TIME: 13.6 SEC (ref 12.6–14.3)
RBC # BLD AUTO: 4.59 M/UL (ref 4.05–5.2)
SERVICE CMNT-IMP: ABNORMAL
SERVICE CMNT-IMP: NORMAL
SODIUM SERPL-SCNC: 142 MMOL/L (ref 133–143)
TIBC SERPL-MCNC: 368 UG/DL (ref 250–450)
TRANSFERRIN SERPL-MCNC: 224 MG/DL (ref 202–364)
WBC # BLD AUTO: 10.2 K/UL (ref 4.3–11.1)

## 2022-10-28 PROCEDURE — 2700000000 HC OXYGEN THERAPY PER DAY

## 2022-10-28 PROCEDURE — 6370000000 HC RX 637 (ALT 250 FOR IP): Performed by: HOSPITALIST

## 2022-10-28 PROCEDURE — 85025 COMPLETE CBC W/AUTO DIFF WBC: CPT

## 2022-10-28 PROCEDURE — 85610 PROTHROMBIN TIME: CPT

## 2022-10-28 PROCEDURE — 36415 COLL VENOUS BLD VENIPUNCTURE: CPT

## 2022-10-28 PROCEDURE — 80053 COMPREHEN METABOLIC PANEL: CPT

## 2022-10-28 PROCEDURE — 85730 THROMBOPLASTIN TIME PARTIAL: CPT

## 2022-10-28 PROCEDURE — 2580000003 HC RX 258: Performed by: HOSPITALIST

## 2022-10-28 PROCEDURE — 83690 ASSAY OF LIPASE: CPT

## 2022-10-28 PROCEDURE — 6360000002 HC RX W HCPCS: Performed by: HOSPITALIST

## 2022-10-28 PROCEDURE — 82962 GLUCOSE BLOOD TEST: CPT

## 2022-10-28 PROCEDURE — 82728 ASSAY OF FERRITIN: CPT

## 2022-10-28 PROCEDURE — 94760 N-INVAS EAR/PLS OXIMETRY 1: CPT

## 2022-10-28 PROCEDURE — 99232 SBSQ HOSP IP/OBS MODERATE 35: CPT | Performed by: SURGERY

## 2022-10-28 PROCEDURE — 6360000002 HC RX W HCPCS: Performed by: INTERNAL MEDICINE

## 2022-10-28 PROCEDURE — 94640 AIRWAY INHALATION TREATMENT: CPT

## 2022-10-28 PROCEDURE — 94660 CPAP INITIATION&MGMT: CPT

## 2022-10-28 PROCEDURE — 84466 ASSAY OF TRANSFERRIN: CPT

## 2022-10-28 PROCEDURE — 2580000003 HC RX 258: Performed by: SURGERY

## 2022-10-28 PROCEDURE — 94761 N-INVAS EAR/PLS OXIMETRY MLT: CPT

## 2022-10-28 PROCEDURE — 6370000000 HC RX 637 (ALT 250 FOR IP): Performed by: INTERNAL MEDICINE

## 2022-10-28 PROCEDURE — 1100000000 HC RM PRIVATE

## 2022-10-28 RX ORDER — HEPARIN SODIUM 10000 [USP'U]/100ML
5-30 INJECTION, SOLUTION INTRAVENOUS CONTINUOUS
Status: DISCONTINUED | OUTPATIENT
Start: 2022-10-28 | End: 2022-10-29

## 2022-10-28 RX ORDER — HEPARIN SODIUM 1000 [USP'U]/ML
4000 INJECTION, SOLUTION INTRAVENOUS; SUBCUTANEOUS PRN
Status: DISCONTINUED | OUTPATIENT
Start: 2022-10-28 | End: 2022-10-30

## 2022-10-28 RX ORDER — SODIUM CHLORIDE, SODIUM LACTATE, POTASSIUM CHLORIDE, CALCIUM CHLORIDE 600; 310; 30; 20 MG/100ML; MG/100ML; MG/100ML; MG/100ML
INJECTION, SOLUTION INTRAVENOUS CONTINUOUS
Status: DISCONTINUED | OUTPATIENT
Start: 2022-10-28 | End: 2022-10-28

## 2022-10-28 RX ORDER — LOPERAMIDE HYDROCHLORIDE 2 MG/1
2 CAPSULE ORAL 4 TIMES DAILY PRN
Status: DISCONTINUED | OUTPATIENT
Start: 2022-10-28 | End: 2022-11-01 | Stop reason: HOSPADM

## 2022-10-28 RX ORDER — IPRATROPIUM BROMIDE AND ALBUTEROL SULFATE 2.5; .5 MG/3ML; MG/3ML
1 SOLUTION RESPIRATORY (INHALATION) 4 TIMES DAILY
Status: DISCONTINUED | OUTPATIENT
Start: 2022-10-28 | End: 2022-10-31

## 2022-10-28 RX ORDER — HEPARIN SODIUM 1000 [USP'U]/ML
2000 INJECTION, SOLUTION INTRAVENOUS; SUBCUTANEOUS PRN
Status: DISCONTINUED | OUTPATIENT
Start: 2022-10-28 | End: 2022-10-30

## 2022-10-28 RX ADMIN — SODIUM CHLORIDE, PRESERVATIVE FREE 10 ML: 5 INJECTION INTRAVENOUS at 20:47

## 2022-10-28 RX ADMIN — IPRATROPIUM BROMIDE AND ALBUTEROL SULFATE 1 AMPULE: .5; 3 SOLUTION RESPIRATORY (INHALATION) at 03:52

## 2022-10-28 RX ADMIN — IPRATROPIUM BROMIDE AND ALBUTEROL SULFATE 3 ML: .5; 3 SOLUTION RESPIRATORY (INHALATION) at 20:58

## 2022-10-28 RX ADMIN — HEPARIN SODIUM AND DEXTROSE 6 UNITS/KG/HR: 10000; 5 INJECTION INTRAVENOUS at 18:20

## 2022-10-28 RX ADMIN — PIPERACILLIN SODIUM,TAZOBACTAM SODIUM 3375 MG: 3; .375 INJECTION, POWDER, FOR SOLUTION INTRAVENOUS at 16:41

## 2022-10-28 RX ADMIN — GUAIFENESIN 1200 MG: 600 TABLET, EXTENDED RELEASE ORAL at 20:47

## 2022-10-28 RX ADMIN — MOMETASONE FUROATE AND FORMOTEROL FUMARATE DIHYDRATE 2 PUFF: 100; 5 AEROSOL RESPIRATORY (INHALATION) at 20:58

## 2022-10-28 RX ADMIN — IPRATROPIUM BROMIDE AND ALBUTEROL SULFATE 3 ML: .5; 3 SOLUTION RESPIRATORY (INHALATION) at 15:58

## 2022-10-28 RX ADMIN — HEPARIN SODIUM 5000 UNITS: 5000 INJECTION INTRAVENOUS; SUBCUTANEOUS at 16:41

## 2022-10-28 RX ADMIN — SODIUM CHLORIDE, SODIUM LACTATE, POTASSIUM CHLORIDE, AND CALCIUM CHLORIDE: 600; 310; 30; 20 INJECTION, SOLUTION INTRAVENOUS at 08:12

## 2022-10-28 RX ADMIN — IPRATROPIUM BROMIDE AND ALBUTEROL SULFATE 3 ML: .5; 3 SOLUTION RESPIRATORY (INHALATION) at 07:54

## 2022-10-28 RX ADMIN — IPRATROPIUM BROMIDE AND ALBUTEROL SULFATE 3 ML: .5; 3 SOLUTION RESPIRATORY (INHALATION) at 13:44

## 2022-10-28 RX ADMIN — SODIUM CHLORIDE, PRESERVATIVE FREE 10 ML: 5 INJECTION INTRAVENOUS at 08:17

## 2022-10-28 RX ADMIN — PIPERACILLIN SODIUM,TAZOBACTAM SODIUM 3375 MG: 3; .375 INJECTION, POWDER, FOR SOLUTION INTRAVENOUS at 03:32

## 2022-10-28 RX ADMIN — LOPERAMIDE HYDROCHLORIDE 2 MG: 2 CAPSULE ORAL at 18:37

## 2022-10-28 RX ADMIN — MOMETASONE FUROATE AND FORMOTEROL FUMARATE DIHYDRATE 2 PUFF: 100; 5 AEROSOL RESPIRATORY (INHALATION) at 07:54

## 2022-10-28 ASSESSMENT — PAIN SCALES - GENERAL: PAINLEVEL_OUTOF10: 0

## 2022-10-28 NOTE — H&P
History & Physicial    Patient Name:  Anahi Higgins    :  1970    Medical Record:  [id number]    Date of Service  10/27/2022    Primary Care Physician  Ivan Kay PA-C    Attending Physician  Lawson Do MD    Reason for Admission  Acute Gallstone Pancreatitis   Patient Active Problem List   Diagnosis    COVID-19    Type 2 diabetes with nephropathy ([facility] [address]    Cigarette nicotine dependence in remission    Nocturnal hypoxemia    Hypertension    COPD, severe (HCC)    Diabetes mellitus type 2, controlled ([address]    Atrial fibrillation, new onset ([address]    Chronic back pain    Morbid obesity with BMI of 50.0-59.9, adult (HCC)    Chronic respiratory failure with hypoxia (HCC)    THANH (obstructive sleep apnea)    Obesity hypoventilation syndrome ([address]    Acute gallstone pancreatitis    BMI 50.0-59.9, adult ([address]       History of Present illness  The patient is a 50 y.o. y/o female who who presents to the ED with sharp abdominal pain. 10/26: Pt noted that upon waking up she was nauseated. Pt attributed her nausea to her diabetes medications. At 14:00 pt began to have the constant urge to defecate and then developed sharp pains across her abdomen. Pt then began to vomit the previous evenings dinner and to dry heave. Pt tried to drink water and then states that her stool became \"runny,\" and she became incontinent, weak and lightheaded. Pt thought she had food poisioning. Pt then became SOB in the shower- pt states that she becomes SOB when hot. Pt's son then called EMS. Pt attests that she had a similar episode of what she thought was food poisioning from [employer] a few months ago, but that the episode was not as severe as what she is currently experincing. Pt denies previous history of yellow skin/eyes. Pt has chronic cough producing copious amounts of clear sputum. Pt notes that she was a heavy drinker until 1-2 years ago.    Pt is compliant with all medications and last took her ELIQUIS the morning of 10/26. Pt's LAST MEAL was 10/25. Pt attests that pain meds are working and is not feeling any pain currently. Gastroenterology, Dr. Wilma Diaz MD, visited the pt's room as hx was being taken. GI plan for tomorrow is to consider ERCP tomorrow and surgical consult in am.     Donte Vance MD  Past Medical History  Past Medical History:   Diagnosis Date    Childhood asthma     Chronic respiratory failure with hypoxia ([address]     Continuous at 3L    COPD (chronic obstructive pulmonary disease) ([address]     Stage 4 by GOLD Criteria    COVID-19 01/07/2021    Hyperlipidemia     Daily meds     Hypertension     Managed with meds     Intraductal papilloma of breast 01/16/2019    Left    Microalbuminuria     Microcytosis     Morbid obesity ([address]     Nonproliferative retinopathy due to secondary diabetes ([address] 03/07/2020    Mild, Bilateral    THANH on CPAP 3/2/2017    Paroxysmal atrial fibrillation (HCC)     Pneumonia ages 3 and 9    Type 2 diabetes mellitus ([address]        Past Surgical History  Past Surgical History:   Procedure Laterality Date    BREAST BIOPSY Left 3/27/2019    LEFT BREAST NEEDLE LOCALIZED LUMPECTOMY  performed by Rosalee King DO at [address] [place] [address] (CERVIX NOT REMOVED)  08/2012    US BREAST NEEDLE BIOPSY LEFT Left 1/7/2019    US BREAST NEEDLE BIOPSY LEFT 1/7/2019 [facility]    US GUIDED NEEDLE LOC OF LEFT BREAST Left 3/27/2019    US GUIDED NEEDLE LOC OF LEFT BREAST 3/27/2019 [facility]       Allergies   Shellfish.     Medications  sodium chloride flush, 5-40 mL, IntraVENous, 2 times per day  nicotine, 1 patch, TransDERmal, Daily  insulin lispro, 0-8 Units, SubCUTAneous, TID WC  insulin lispro, 0-4 Units, SubCUTAneous, Nightly  piperacillin-tazobactam, 3,375 mg, IntraVENous, q8h  [Held by provider] heparin (porcine), 5,000 Units, SubCUTAneous, 3 times per day  mometasone-formoterol, 2 puff, Inhalation, BID  guaiFENesin, 1,200 mg, Oral, BID  [START ON 10/28/2022] ipratropium-albuterol, 1 vial, Inhalation, 4x daily  sodium chloride flush, 5-40 mL, IntraVENous, 2 times per day  sodium chloride flush, 5-40 mL, IntraVENous, 2 times per day      sodium chloride flush, 5-40 mL, PRN  sodium chloride, , PRN  ondansetron, 4 mg, Q8H PRN   Or  ondansetron, 4 mg, Q6H PRN  polyethylene glycol, 17 g, Daily PRN  acetaminophen, 650 mg, Q6H PRN   Or  acetaminophen, 650 mg, Q6H PRN  glucose, 4 tablet, PRN  dextrose bolus, 125 mL, PRN   Or  dextrose bolus, 250 mL, PRN  glucagon (rDNA), 1 mg, PRN  dextrose, , Continuous PRN  oxyCODONE, 5 mg, Q4H PRN  morphine, 4 mg, Q1H PRN  ipratropium-albuterol, 1 ampule, Q6H PRN  albuterol sulfate HFA, 2 puff, Q4H PRN  lidocaine 1 % injection, 1 mL, Once PRN  sodium chloride flush, 5-40 mL, PRN  sodium chloride, , PRN  sodium chloride flush, 5-40 mL, PRN  sodium chloride, , PRN  HYDROmorphone, 0.25 mg, Q5 Min PRN  oxyCODONE, 5 mg, Once PRN  ondansetron, 4 mg, Once PRN  haloperidol lactate, 1 mg, Once PRN      Social History  Pt notes that she was a heavy drinker until 1-2 years ago. Pt denies current alcohol use and drinks water mostly. Pt reguarly drinks tea and soda. She has a 32.00 pack-year smoking history. Pt is now smoking 1/4 a pack per day. She has never used smokeless tobacco. She reports current alcohol use. She reports that she does not currently use drugs after having used the following drugs: Cocaine and Marijuana Angelique Coad). Family History  Family History   Problem Relation Age of Onset    Coronary Art Dis Neg Hx     COPD Mother     Parkinsonism Father     Asthma Son     Schizophrenia Brother     Hypertension Brother     Emphysema Paternal Grandfather     Hypertension Mother        Review of Systems  General:  Positive for chills, diaphoresis and fatigue. Negative for fever and unexpected weight change. Skin: Negative for color change and rash.    Eyes: no blurry vision  Ears/Nose/Throat: no nasal congestion, no sore throat  Respiratory: Positive for cough and shortness of breath. Negative for chest tightness. Cardiovascular: no chest pain, no ankle swelling  Gastrointestinal:  Positive for abdominal pain, diarrhea, nausea and vomiting. Negative for blood in stool and constipation. Genitourinary: no dysuria, no hematuria  Musculoskeletal: no joint pain, no myalgia, no muscle weakness  Neurologic: Positive for weakness and light-headedness. Negative for syncope, numbness and headaches. Allergy/Immunology: Positive for food allergies. Physical Exam  Vital Signs:  Patient Vitals for the past 24 hrs:   Temp Pulse Resp BP SpO2   10/27/22 1916 99.5 °F (37.5 °C) (!) 107 20 120/79 92 %   10/27/22 1655 -- (!) 107 20 -- 91 %   10/27/22 1611 99.4 °F (37.4 °C) (!) 109 20 98/68 97 %   10/27/22 1450 98.4 °F (36.9 °C) (!) 109 18 130/75 90 %   10/27/22 1318 -- (!) 108 18 -- 90 %   10/27/22 1105 98.6 °F (37 °C) 99 20 (!) 103/58 96 %   10/27/22 0954 -- 94 16 -- 96 %   10/27/22 0729 98.6 °F (37 °C) 84 18 94/80 92 %   10/27/22 0514 -- -- 22 -- 93 %   10/27/22 0230 -- -- -- -- 93 %   10/27/22 0206 99 °F (37.2 °C) 94 22 (!) 94/49 91 %   10/27/22 0123 -- -- -- -- 92 %   10/27/22 0115 -- 95 26 (!) 99/55 90 %   10/27/22 0100 -- 87 -- (!) 100/56 (!) 89 %   10/27/22 0016 -- -- -- -- 93 %   10/27/22 0015 -- 93 -- (!) 108/55 --   10/26/22 2337 -- -- -- -- 93 %   10/26/22 2330 -- 91 -- (!) 104/49 --   10/26/22 2315 -- 95 -- (!) 96/53 --     Constitutional:    Appearance: She is obese. HENT:      Head: Normocephalic and atraumatic. Eyes:      General: No scleral icterus. Cardiovascular:      Rate and Rhythm: Normal rate and regular rhythm. Pulses: Normal pulses. Comments: Distant heart sounds. Pulmonary:      Breath sounds: Wheezing present. Comments: Wheeze: prominent anteriorly. Distant posterior breath sounds. Pt coughing up clear sputum throughout exam. Sputum negative for blood. Abdominal:      Tenderness: There is no abdominal tenderness. There is no guarding. Musculoskeletal:         General: Swelling present. Comments: BILATERAL feet: dry, sight swelling. Skin:     General: Skin is warm and dry. Coloration: Skin is not jaundiced. Neurological:      Mental Status: She is alert and oriented to person, place, and time. Psychiatric:         Mood and Affect: Mood normal.         Behavior: Behavior normal.       labs    Recent Results (from the past 24 hour(s))   Lactic Acid    Collection Time: 10/26/22 11:40 PM   Result Value Ref Range    Lactic Acid, Plasma 2.4 (H) 0.4 - 2.0 MMOL/L   POCT Glucose    Collection Time: 10/27/22 12:36 AM   Result Value Ref Range    POC Glucose 94 65 - 100 mg/dL    Performed by: Austin    POCT Glucose    Collection Time: 10/27/22  3:42 AM   Result Value Ref Range    POC Glucose 99 65 - 100 mg/dL    Performed by: Pipo    Comprehensive Metabolic Panel w/ Reflex to MG    Collection Time: 10/27/22  6:00 AM   Result Value Ref Range    Sodium 139 133 - 143 mmol/L    Potassium 5.0 3.5 - 5.1 mmol/L    Chloride 106 101 - 110 mmol/L    CO2 27 21 - 32 mmol/L    Anion Gap 6 2 - 11 mmol/L    Glucose 85 65 - 100 mg/dL    BUN 23 6 - 23 MG/DL    Creatinine 2.00 (H) 0.6 - 1.0 MG/DL    Est, Glom Filt Rate 30 (L) >60 ml/min/1.73m2    Calcium 8.8 8.3 - 10.4 MG/DL    Total Bilirubin 0.5 0.2 - 1.1 MG/DL     (H) 12 - 65 U/L    AST QUANTITY NOT SUFFICIENT.  SUGGEST RECOLLECTION 15 - 37 U/L    Alk Phosphatase 210 (H) 50 - 136 U/L    Total Protein 7.7 6.3 - 8.2 g/dL    Albumin 3.2 (L) 3.5 - 5.0 g/dL    Globulin 4.5 2.8 - 4.5 g/dL    Albumin/Globulin Ratio 0.7 0.4 - 1.6     Lactic Acid    Collection Time: 10/27/22  6:00 AM   Result Value Ref Range    Lactic Acid, Plasma 1.8 0.4 - 2.0 MMOL/L   CBC with Auto Differential    Collection Time: 10/27/22  6:00 AM   Result Value Ref Range    WBC 20.3 (H) 4.3 - 11.1 K/uL    RBC 5.46 (H) 4.05 - 5.2 M/uL    Hemoglobin 10.9 (L) 11.7 - 15.4 g/dL    Hematocrit 39.9 35.8 - 46.3 %    MCV 73.1 (L) 82 - 102 FL    MCH 20.0 (L) 26.1 - 32.9 PG    MCHC 27.3 (L) 31.4 - 35.0 g/dL    RDW 19.0 (H) 11.9 - 14.6 %    Platelets 147 350 - 418 K/uL    MPV 10.0 9.4 - 12.3 FL    nRBC 0.00 0.0 - 0.2 K/uL    Differential Type AUTOMATED      Seg Neutrophils 86 (H) 43 - 78 %    Lymphocytes 8 (L) 13 - 44 %    Monocytes 5 4.0 - 12.0 %    Eosinophils % 0 (L) 0.5 - 7.8 %    Basophils 0 0.0 - 2.0 %    Immature Granulocytes 0 0.0 - 5.0 %    Segs Absolute 17.5 (H) 1.7 - 8.2 K/UL    Absolute Lymph # 1.6 0.5 - 4.6 K/UL    Absolute Mono # 1.0 0.1 - 1.3 K/UL    Absolute Eos # 0.0 0.0 - 0.8 K/UL    Basophils Absolute 0.1 0.0 - 0.2 K/UL    Absolute Immature Granulocyte 0.1 0.0 - 0.5 K/UL   Protime-INR    Collection Time: 10/27/22  6:00 AM   Result Value Ref Range    Protime 14.8 (H) 12.6 - 14.3 sec    INR 1.1     APTT    Collection Time: 10/27/22  6:00 AM   Result Value Ref Range    PTT 33.6 24.5 - 34.2 SEC   Urinalysis w rflx microscopic    Collection Time: 10/27/22  6:52 AM   Result Value Ref Range    Color, UA DARK YELLOW      Appearance TURBID      Specific Gravity, UA >1.035 (H) 1.001 - 1.023    pH, Urine 5.0 5.0 - 9.0      Protein,  (A) NEG mg/dL    Glucose, UA Negative mg/dL    Ketones, Urine Negative NEG mg/dL    Bilirubin Urine Negative NEG      Blood, Urine SMALL (A) NEG      Urobilinogen, Urine 1.0 0.2 - 1.0 EU/dL    Nitrite, Urine Negative NEG      Leukocyte Esterase, Urine Negative NEG     Urinalysis, Micro    Collection Time: 10/27/22  6:52 AM   Result Value Ref Range    WBC, UA 5-10 0 /hpf    RBC, UA 3-5 0 /hpf    Epithelial Cells UA 5-10 0 /hpf    BACTERIA, URINE 4+ (H) 0 /hpf    Casts 0 0 /lpf    Crystals 0 0 /LPF    Amorphous Crystal 2+ (H) 0    Mucus, UA 0 0 /lpf    OTHER OBSERVATIONS RESULTS VERIFIED MANUALLY     POCT Glucose    Collection Time: 10/27/22  7:26 AM   Result Value Ref Range    POC Glucose 88 65 - 100 mg/dL    Performed by: Demetria    Lipase    Collection Time: 10/27/22 8: 17 AM   Result Value Ref Range    Lipase 2,507 (H) 73 - 393 U/L   POCT Glucose    Collection Time: 10/27/22 10:54 AM   Result Value Ref Range    POC Glucose 68 65 - 100 mg/dL    Performed by: PruittLindaV    POCT Glucose    Collection Time: 10/27/22 12:56 PM   Result Value Ref Range    POC Glucose 107 (H) 65 - 100 mg/dL    Performed by: PruittLindaV    POCT Glucose    Collection Time: 10/27/22  4:01 PM   Result Value Ref Range    POC Glucose 69 65 - 100 mg/dL    Performed by: PruittLindaV    POCT Glucose    Collection Time: 10/27/22  5:19 PM   Result Value Ref Range    POC Glucose 82 65 - 100 mg/dL    Performed by: Daniel Pepper    POCT Glucose    Collection Time: 10/27/22  8:57 PM   Result Value Ref Range    POC Glucose 102 (H) 65 - 100 mg/dL    Performed by: [employer] [address] organ? GALLBLADDER   Final Result   The gallbladder is contracted and not assessed. Sonographic Moe sign was   negative. Hepatic steatosis. CT ABDOMEN PELVIS W IV CONTRAST Additional Contrast? None   Final Result   The gallbladder shows wall thickening and mucosal enhancement. A few radiopaque   stones are appreciated. These findings are suggestive of acute cholecystitis. Multiple prominent lymph nodes are appreciated in the yousuf hepatis and near the   head of the pancreas. There significance is unclear. XR CHEST PORTABLE   Final Result   -Lung hypoinflation with streaky bibasilar opacities, favored to reflect   atelectasis, though infection/aspiration are also considerations in the   appropriate clinical context.                 Assessment  Principal Problem:    Acute gallstone pancreatitis  Active Problems:    BMI 50.0-59.9, adult (HCC)    Cigarette nicotine dependence in remission    Hypertension    COPD, severe (HCC)    Diabetes mellitus type 2, controlled ([facility] [address]    Morbid obesity with BMI of 50.0-59.9, adult (HCC)    THANH (obstructive sleep apnea)  Resolved Problems:    * No resolved hospital problems. *      IMPRESSION:  Principal Problem: Acute gallstone pancreatitis. Plan  Admit med bed, INPATIENT STATUS due to medical complexity, need for close cardiopulmonary monitoring given initial unstable vitals signs (hypotension) & need for IV medication  Patient is already been evaluated by gastroenterology in the emergency room. We will follow-up on the recommendations. Possible ERCP in a.m. We will hold her Eliquis and keep her n.p.o. Pain and nausea control  Continue IV Zosyn  Because she will be n.p.o., will hold her diabetic medications and use sliding scale insulin. Hold her blood pressure medications for now given relative hypotension. Restart as appropriate  Diet: Diet NPO Exceptions are: Ice Chips, Sips of Water with Meds  VTE ppx: SCDs, Eliquis  Code status: Full Code    The plan was discussed with the patient.

## 2022-10-28 NOTE — PROGRESS NOTES
Hospitalist Progress Note   Admit Date:  10/26/2022  7:27 PM   Name:  Michelle Obrien   Age:  50 y.o. Sex:  female  :  1970   MRN:  [id number]   [place]  [facility] [place]    Presenting Complaint: Nausea and Emesis (Pt with N/V/D since 4am. Alert and oriented x's 4. Sepsis protocol started by EMS enroute)     Reason(s) for Admission: Septicemia [facility]) [A41.9]  Gallstone pancreatitis [K85.10]  Acute gallstone pancreatitis [K85.10]     Hospital Course:   Hector Arreguin is a 50 y.o. female, local history of hypertension hyperlipidemia, diabetes mellitus who presents to the Emergency Department with chief complaint of abdominal pain, vomiting, and diarrhea. Patient states symptoms started this morning. States she believes she \"ate some bad food last evening. \"  States since then this morning she had crampy abdominal pain with vomiting. Denies any hematemesis or melena. Denies any fevers or chills. EMS was called when she was initially found to be hypotensive. Antibiotics were started in route as well as IV fluids and antiemetics. Patient describes a diffuse abdominal pain. Laboratory data significant for lactic acid 2.6, elevated Pro-Dragan.  Lipase greater than 30,000 and mildly elevated bilirubin and LFTs. She got Zosyn in route via EMS. IV fluids have been initiated. Vancomycin added in ER. CT scan does show gallbladder wall thickening as well as stones concern for possible acute cholecystitis. However she has no focal tenderness on exam.  Given acuity of symptoms reported fever and sepsis markers ER MD became concerned for possibly developing cholangitis. GI as well as hospitalist consulted for admission. Patient denies current alcohol use. Subjective & 24hr Events (10/28/22): Patient states her abdominal pain has resolved. She is tolerating her oral diet. Her lipase has returned to normal limits.   Patient complaining of loose stool      Assessment & Plan:     Principal Problem:    Acute gallstone pancreatitis  Plan: resolved,  GI has no plans for an ERCP given resolution of her symptoms. Gen Martinez wants to monitor patient over the weekend. Will continue her clear  Active Problems:    Abdominal infection  Plan: Given elevated Procal will continue Zosyn empirically. Leukocytosis has resolved      BMI 50.0-59.9, adult ([facility] [address]  Plan:  complicates care, lifestyle modifications encouraged        Hypertension  Plan: therapeutically controlled      COPD, severe ([facility] [address]  Plan: compensated. Will continue bd and prn nebs        Afib  Plan: holding Eliquis. Will start a heparin drip      Diabetes mellitus type 2, controlled ([facility] [address]  Plan: fsbs wnl. Will continue prn csi      Microcytic anemia  Plan: Iron studies done 10/28/22 rules out LAURA. ? gastritis      THANH (obstructive sleep apnea)  Plan: cpap qhs      Anticipated discharge needs:      None    Diet:  ADULT DIET; Regular; Low Fat/Low Chol/High Fiber/2 gm Na  DVT PPx: heparin drip  Code status: Full Code    Hospital Problems:  Principal Problem:    Acute gallstone pancreatitis  Active Problems:    BMI 50.0-59.9, adult (HCC)    Cigarette nicotine dependence in remission    Hypertension    COPD, severe (HCC)    Diabetes mellitus type 2, controlled ([facility] [address]    Morbid obesity with BMI of 50.0-59.9, adult (HCC)    THANH (obstructive sleep apnea)  Resolved Problems:    * No resolved hospital problems.  *      Objective:   Patient Vitals for the past 24 hrs:   Temp Pulse Resp BP SpO2   10/28/22 1559 -- (!) 109 18 -- 94 %   10/28/22 1529 98.4 °F (36.9 °C) (!) 107 18 132/70 92 %   10/28/22 1345 -- (!) 103 18 -- 96 %   10/28/22 1125 98.3 °F (36.8 °C) 100 19 129/82 96 %   10/28/22 0754 -- 81 19 -- 90 %   10/28/22 0735 98.6 °F (37 °C) 83 20 115/66 93 %   10/28/22 0434 98.6 °F (37 °C) 95 20 126/78 94 %   10/28/22 0352 -- 99 19 -- 92 %   10/27/22 2325 -- (!) 104 24 -- 93 %   10/27/22 2320 98.6 °F (37 °C) 99 20 131/74 93 %   10/27/22 2107 -- (!) 104 19 -- 92 % 10/27/22 1916 99.5 °F (37.5 °C) (!) 107 20 120/79 92 %       Oxygen Therapy  SpO2: 94 %  Pulse Oximeter Device Mode: Intermittent  O2 Device: Nasal cannula  O2 Flow Rate (L/min): 4 L/min    Estimated body mass index is 54.42 kg/m² as calculated from the following:    Height as of this encounter: 5' 5\" (1.651 m). Weight as of this encounter: 327 lb (148.3 kg). No intake or output data in the 24 hours ending 10/28/22 1655      Physical Exam:     Blood pressure 132/70, pulse (!) 109, temperature 98.4 °F (36.9 °C), temperature source Oral, resp. rate 18, height 5' 5\" (1.651 m), weight (!) 327 lb (148.3 kg), SpO2 94 %. General:    Well nourished. Morbidly obese  Head:  Normocephalic, atraumatic  Eyes:  Sclerae appear normal.  Pupils equally round. ENT:  Nares appear normal, no drainage. Moist oral mucosa  Neck:  No restricted ROM. Trachea midline   CV:   RRR. No m/r/g. No jugular venous distension. Lungs:   CTAB. No wheezing, rhonchi, or rales. Symmetric expansion. Abdomen: Bowel sounds present. Soft, nontender, nondistended. Extremities: No cyanosis or clubbing. No edema  Skin:     No rashes and normal coloration. Warm and dry. Neuro:  CN II-XII grossly intact. Sensation intact. A&Ox3  Psych:  Normal mood and affect.       I have personally reviewed labs and tests showing:  Recent Labs:  Recent Results (from the past 48 hour(s))   CBC with Auto Differential    Collection Time: 10/26/22  7:34 PM   Result Value Ref Range    WBC 19.0 (H) 4.3 - 11.1 K/uL    RBC 6.13 (H) 4.05 - 5.2 M/uL    Hemoglobin 12.6 11.7 - 15.4 g/dL    Hematocrit 45.8 35.8 - 46.3 %    MCV 74.7 (L) 82 - 102 FL    MCH 20.6 (L) 26.1 - 32.9 PG    MCHC 27.5 (L) 31.4 - 35.0 g/dL    RDW 19.5 (H) 11.9 - 14.6 %    Platelets 472 150 - 967 K/uL    MPV 10.4 9.4 - 12.3 FL    nRBC 0.07 0.0 - 0.2 K/uL    Differential Type AUTOMATED      Seg Neutrophils 83 (H) 43 - 78 %    Lymphocytes 9 (L) 13 - 44 %    Monocytes 7 4.0 - 12.0 % Eosinophils % 0 (L) 0.5 - 7.8 %    Basophils 0 0.0 - 2.0 %    Immature Granulocytes 1 0.0 - 5.0 %    Segs Absolute 15.8 (H) 1.7 - 8.2 K/UL    Absolute Lymph # 1.6 0.5 - 4.6 K/UL    Absolute Mono # 1.3 0.1 - 1.3 K/UL    Absolute Eos # 0.0 0.0 - 0.8 K/UL    Basophils Absolute 0.1 0.0 - 0.2 K/UL    Absolute Immature Granulocyte 0.2 0.0 - 0.5 K/UL   Blood Culture 1    Collection Time: 10/26/22  7:36 PM    Specimen: Blood   Result Value Ref Range    Special Requests LEFT  Antecubital        Culture NO GROWTH 2 DAYS     Blood Culture 2    Collection Time: 10/26/22  7:36 PM    Specimen: Blood   Result Value Ref Range    Special Requests RIGHT  HAND        Culture NO GROWTH 2 DAYS     Lactate, Sepsis    Collection Time: 10/26/22  7:36 PM   Result Value Ref Range    Lactic Acid, Sepsis 2.6 (H) 0.4 - 2.0 MMOL/L   Lipase    Collection Time: 10/26/22  7:36 PM   Result Value Ref Range    Lipase >30,000 (H) 73 - 393 U/L   CMP    Collection Time: 10/26/22  7:36 PM   Result Value Ref Range    Sodium 136 133 - 143 mmol/L    Potassium 4.5 3.5 - 5.1 mmol/L    Chloride 105 101 - 110 mmol/L    CO2 27 21 - 32 mmol/L    Anion Gap 4 2 - 11 mmol/L    Glucose 133 (H) 65 - 100 mg/dL    BUN 15 6 - 23 MG/DL    Creatinine 1.60 (H) 0.6 - 1.0 MG/DL    Est, Glom Filt Rate 39 (L) >60 ml/min/1.73m2    Calcium 9.9 8.3 - 10.4 MG/DL    Total Bilirubin 1.4 (H) 0.2 - 1.1 MG/DL     (H) 12 - 65 U/L     (H) 15 - 37 U/L    Alk Phosphatase 232 (H) 50 - 136 U/L    Total Protein 8.0 6.3 - 8.2 g/dL    Albumin 3.3 (L) 3.5 - 5.0 g/dL    Globulin 4.7 (H) 2.8 - 4.5 g/dL    Albumin/Globulin Ratio 0.7 0.4 - 1.6     Procalcitonin    Collection Time: 10/26/22  7:36 PM   Result Value Ref Range    Procalcitonin 3.51 (H) 0.00 - 0.49 ng/mL   Lactic Acid    Collection Time: 10/26/22 11:40 PM   Result Value Ref Range    Lactic Acid, Plasma 2.4 (H) 0.4 - 2.0 MMOL/L   POCT Glucose    Collection Time: 10/27/22 12:36 AM   Result Value Ref Range    POC Glucose 94 65 - 100 mg/dL    Performed by: Austin    POCT Glucose    Collection Time: 10/27/22  3:42 AM   Result Value Ref Range    POC Glucose 99 65 - 100 mg/dL    Performed by: Pipo    Comprehensive Metabolic Panel w/ Reflex to MG    Collection Time: 10/27/22  6:00 AM   Result Value Ref Range    Sodium 139 133 - 143 mmol/L    Potassium 5.0 3.5 - 5.1 mmol/L    Chloride 106 101 - 110 mmol/L    CO2 27 21 - 32 mmol/L    Anion Gap 6 2 - 11 mmol/L    Glucose 85 65 - 100 mg/dL    BUN 23 6 - 23 MG/DL    Creatinine 2.00 (H) 0.6 - 1.0 MG/DL    Est, Glom Filt Rate 30 (L) >60 ml/min/1.73m2    Calcium 8.8 8.3 - 10.4 MG/DL    Total Bilirubin 0.5 0.2 - 1.1 MG/DL     (H) 12 - 65 U/L    AST QUANTITY NOT SUFFICIENT.  SUGGEST RECOLLECTION 15 - 37 U/L    Alk Phosphatase 210 (H) 50 - 136 U/L    Total Protein 7.7 6.3 - 8.2 g/dL    Albumin 3.2 (L) 3.5 - 5.0 g/dL    Globulin 4.5 2.8 - 4.5 g/dL    Albumin/Globulin Ratio 0.7 0.4 - 1.6     Lactic Acid    Collection Time: 10/27/22  6:00 AM   Result Value Ref Range    Lactic Acid, Plasma 1.8 0.4 - 2.0 MMOL/L   CBC with Auto Differential    Collection Time: 10/27/22  6:00 AM   Result Value Ref Range    WBC 20.3 (H) 4.3 - 11.1 K/uL    RBC 5.46 (H) 4.05 - 5.2 M/uL    Hemoglobin 10.9 (L) 11.7 - 15.4 g/dL    Hematocrit 39.9 35.8 - 46.3 %    MCV 73.1 (L) 82 - 102 FL    MCH 20.0 (L) 26.1 - 32.9 PG    MCHC 27.3 (L) 31.4 - 35.0 g/dL    RDW 19.0 (H) 11.9 - 14.6 %    Platelets 348 467 - 872 K/uL    MPV 10.0 9.4 - 12.3 FL    nRBC 0.00 0.0 - 0.2 K/uL    Differential Type AUTOMATED      Seg Neutrophils 86 (H) 43 - 78 %    Lymphocytes 8 (L) 13 - 44 %    Monocytes 5 4.0 - 12.0 %    Eosinophils % 0 (L) 0.5 - 7.8 %    Basophils 0 0.0 - 2.0 %    Immature Granulocytes 0 0.0 - 5.0 %    Segs Absolute 17.5 (H) 1.7 - 8.2 K/UL    Absolute Lymph # 1.6 0.5 - 4.6 K/UL    Absolute Mono # 1.0 0.1 - 1.3 K/UL    Absolute Eos # 0.0 0.0 - 0.8 K/UL    Basophils Absolute 0.1 0.0 - 0.2 K/UL    Absolute Immature Granulocyte 0.1 0.0 - 0.5 K/UL   Protime-INR    Collection Time: 10/27/22  6:00 AM   Result Value Ref Range    Protime 14.8 (H) 12.6 - 14.3 sec    INR 1.1     APTT    Collection Time: 10/27/22  6:00 AM   Result Value Ref Range    PTT 33.6 24.5 - 34.2 SEC   Urinalysis w rflx microscopic    Collection Time: 10/27/22  6:52 AM   Result Value Ref Range    Color, UA DARK YELLOW      Appearance TURBID      Specific Gravity, UA >1.035 (H) 1.001 - 1.023    pH, Urine 5.0 5.0 - 9.0      Protein,  (A) NEG mg/dL    Glucose, UA Negative mg/dL    Ketones, Urine Negative NEG mg/dL    Bilirubin Urine Negative NEG      Blood, Urine SMALL (A) NEG      Urobilinogen, Urine 1.0 0.2 - 1.0 EU/dL    Nitrite, Urine Negative NEG      Leukocyte Esterase, Urine Negative NEG     Urinalysis, Micro    Collection Time: 10/27/22  6:52 AM   Result Value Ref Range    WBC, UA 5-10 0 /hpf    RBC, UA 3-5 0 /hpf    Epithelial Cells UA 5-10 0 /hpf    BACTERIA, URINE 4+ (H) 0 /hpf    Casts 0 0 /lpf    Crystals 0 0 /LPF    Amorphous Crystal 2+ (H) 0    Mucus, UA 0 0 /lpf    OTHER OBSERVATIONS RESULTS VERIFIED MANUALLY     POCT Glucose    Collection Time: 10/27/22  7:26 AM   Result Value Ref Range    POC Glucose 88 65 - 100 mg/dL    Performed by: [employer]    Lipase    Collection Time: 10/27/22  8:17 AM   Result Value Ref Range    Lipase 2,507 (H) 73 - 393 U/L   POCT Glucose    Collection Time: 10/27/22 10:54 AM   Result Value Ref Range    POC Glucose 68 65 - 100 mg/dL    Performed by: PruittLindaV    POCT Glucose    Collection Time: 10/27/22 12:56 PM   Result Value Ref Range    POC Glucose 107 (H) 65 - 100 mg/dL    Performed by: PruittLindaV    POCT Glucose    Collection Time: 10/27/22  4:01 PM   Result Value Ref Range    POC Glucose 69 65 - 100 mg/dL    Performed by: [employer]onders[employer]    POCT Glucose    Collection Time: 10/27/22  5:19 PM   Result Value Ref Range    POC Glucose 82 65 - 100 mg/dL    Performed by: Avril    POCT Glucose    Collection Time: 10/27/22  8:57 PM   Result Value Ref Range    POC Glucose 102 (H) 65 - 100 mg/dL    Performed by: Karri    POCT Glucose    Collection Time: 10/28/22  2:25 AM   Result Value Ref Range    POC Glucose 85 65 - 100 mg/dL    Performed by: Karri    Lipase    Collection Time: 10/28/22  6:36 AM   Result Value Ref Range    Lipase 145 73 - 393 U/L   CBC with Auto Differential    Collection Time: 10/28/22  6:36 AM   Result Value Ref Range    WBC 10.2 4.3 - 11.1 K/uL    RBC 4.59 4.05 - 5.2 M/uL    Hemoglobin 9.1 (L) 11.7 - 15.4 g/dL    Hematocrit 33.4 (L) 35.8 - 46.3 %    MCV 72.8 (L) 82 - 102 FL    MCH 19.8 (L) 26.1 - 32.9 PG    MCHC 27.2 (L) 31.4 - 35.0 g/dL    RDW 18.2 (H) 11.9 - 14.6 %    Platelets 095 443 - 800 K/uL    MPV 10.1 9.4 - 12.3 FL    nRBC 0.00 0.0 - 0.2 K/uL    Differential Type AUTOMATED      Seg Neutrophils 74 43 - 78 %    Lymphocytes 19 13 - 44 %    Monocytes 5 4.0 - 12.0 %    Eosinophils % 1 0.5 - 7.8 %    Basophils 0 0.0 - 2.0 %    Immature Granulocytes 1 0.0 - 5.0 %    Segs Absolute 7.5 1.7 - 8.2 K/UL    Absolute Lymph # 2.0 0.5 - 4.6 K/UL    Absolute Mono # 0.5 0.1 - 1.3 K/UL    Absolute Eos # 0.1 0.0 - 0.8 K/UL    Basophils Absolute 0.0 0.0 - 0.2 K/UL    Absolute Immature Granulocyte 0.1 0.0 - 0.5 K/UL   Comprehensive Metabolic Panel    Collection Time: 10/28/22  6:36 AM   Result Value Ref Range    Sodium 142 133 - 143 mmol/L    Potassium 3.4 (L) 3.5 - 5.1 mmol/L    Chloride 108 101 - 110 mmol/L    CO2 30 21 - 32 mmol/L    Anion Gap 4 2 - 11 mmol/L    Glucose 88 65 - 100 mg/dL    BUN 13 6 - 23 MG/DL    Creatinine 0.90 0.6 - 1.0 MG/DL    Est, Glom Filt Rate >60 >60 ml/min/1.73m2    Calcium 9.2 8.3 - 10.4 MG/DL    Total Bilirubin 0.3 0.2 - 1.1 MG/DL     (H) 12 - 65 U/L    AST 52 (H) 15 - 37 U/L    Alk Phosphatase 160 (H) 50 - 136 U/L    Total Protein 7.0 6.3 - 8.2 g/dL    Albumin 3.0 (L) 3.5 - 5.0 g/dL    Globulin 4.0 2.8 - 4.5 g/dL    Albumin/Globulin Ratio 0.8 0.4 - 1.6 Transferrin Saturation    Collection Time: 10/28/22  6:36 AM   Result Value Ref Range    Iron 66 35 - 150 ug/dL    TIBC 368 250 - 450 ug/dL    TRANSFERRIN SATURATION 18 (L) >20 %   Ferritin    Collection Time: 10/28/22  6:36 AM   Result Value Ref Range    Ferritin 52 8 - 388 NG/ML   Transferrin    Collection Time: 10/28/22  6:36 AM   Result Value Ref Range    Transferrin 224 202 - 364 mg/dL   POCT Glucose    Collection Time: 10/28/22  7:35 AM   Result Value Ref Range    POC Glucose 83 65 - 100 mg/dL    Performed by: Saad    POCT Glucose    Collection Time: 10/28/22 11:27 AM   Result Value Ref Range    POC Glucose 75 65 - 100 mg/dL    Performed by: Freddy Wright        I have personally reviewed imaging studies showing: Other Studies:  US ABDOMEN LIMITED Specify organ? GALLBLADDER   Final Result   The gallbladder is contracted and not assessed. Sonographic Moe sign was   negative. Hepatic steatosis. CT ABDOMEN PELVIS W IV CONTRAST Additional Contrast? None   Final Result   The gallbladder shows wall thickening and mucosal enhancement. A few radiopaque   stones are appreciated. These findings are suggestive of acute cholecystitis. Multiple prominent lymph nodes are appreciated in the yousuf hepatis and near the   head of the pancreas. There significance is unclear. XR CHEST PORTABLE   Final Result   -Lung hypoinflation with streaky bibasilar opacities, favored to reflect   atelectasis, though infection/aspiration are also considerations in the   appropriate clinical context.                 Current Meds:  Current Facility-Administered Medications   Medication Dose Route Frequency    ipratropium-albuterol (DUONEB) nebulizer solution 3 mL  1 vial Inhalation 4x daily    lactated ringers infusion   IntraVENous Continuous    sodium chloride flush 0.9 % injection 5-40 mL  5-40 mL IntraVENous 2 times per day    sodium chloride flush 0.9 % injection 5-40 mL  5-40 mL IntraVENous PRN ondansetron (ZOFRAN-ODT) disintegrating tablet 4 mg  4 mg Oral Q8H PRN    Or    ondansetron (ZOFRAN) injection 4 mg  4 mg IntraVENous Q6H PRN    polyethylene glycol (GLYCOLAX) packet 17 g  17 g Oral Daily PRN    acetaminophen (TYLENOL) tablet 650 mg  650 mg Oral Q6H PRN    Or    acetaminophen (TYLENOL) suppository 650 mg  650 mg Rectal Q6H PRN    nicotine (NICODERM CQ) 21 MG/24HR 1 patch  1 patch TransDERmal Daily    insulin lispro (HUMALOG) injection vial 0-8 Units  0-8 Units SubCUTAneous TID WC    insulin lispro (HUMALOG) injection vial 0-4 Units  0-4 Units SubCUTAneous Nightly    glucose chewable tablet 16 g  4 tablet Oral PRN    dextrose bolus 10% 125 mL  125 mL IntraVENous PRN    Or    dextrose bolus 10% 250 mL  250 mL IntraVENous PRN    glucagon (rDNA) injection 1 mg  1 mg SubCUTAneous PRN    dextrose 10 % infusion   IntraVENous Continuous PRN    oxyCODONE (ROXICODONE) immediate release tablet 5 mg  5 mg Oral Q4H PRN    morphine injection 4 mg  4 mg IntraVENous Q1H PRN    piperacillin-tazobactam (ZOSYN) 3,375 mg in sodium chloride 0.9 % 50 mL IVPB (mini-bag)  3,375 mg IntraVENous q8h    heparin (porcine) injection 5,000 Units  5,000 Units SubCUTAneous 3 times per day    ipratropium-albuterol (DUONEB) nebulizer solution 1 ampule  1 ampule Inhalation Q6H PRN    mometasone-formoterol (DULERA) 100-5 MCG/ACT inhaler 2 puff  2 puff Inhalation BID    guaiFENesin (MUCINEX) extended release tablet 1,200 mg  1,200 mg Oral BID    albuterol sulfate HFA (PROVENTIL;VENTOLIN;PROAIR) 108 (90 Base) MCG/ACT inhaler 2 puff  2 puff Inhalation Q4H PRN    lidocaine 1 % injection 1 mL  1 mL IntraDERmal Once PRN    sodium chloride flush 0.9 % injection 5-40 mL  5-40 mL IntraVENous PRN    sodium chloride flush 0.9 % injection 5-40 mL  5-40 mL IntraVENous PRN    0.9 % sodium chloride infusion   IntraVENous PRN    HYDROmorphone HCl PF (DILAUDID) injection 0.25 mg  0.25 mg IntraVENous Q5 Min PRN    oxyCODONE (ROXICODONE) immediate release tablet 5 mg  5 mg Oral Once PRN    ondansetron (ZOFRAN) injection 4 mg  4 mg IntraVENous Once PRN    haloperidol lactate (HALDOL) injection 1 mg  1 mg IntraVENous Once PRN       Signed:  Pablo Contreras MD    Part of this note may have been written by using a voice dictation software. The note has been proof read but may still contain some grammatical/other typographical errors.

## 2022-10-28 NOTE — PROGRESS NOTES
10/28/2022    Admit Date: 10/26/2022    Subjective:   CHIEF COMPLAINT- abd pain  HPI      Overall-better           Diet-clear    Appetite-good     Nausea-no   Vomiting-no          Pain-no            BM-yesterday   Bleeding-no    Medications-reviewed and adjusted as appropriate   IV FLUIDS-reviewed      FAM HX-per H&P   SH-per H&P   Tob-yes            Etoh-no      Past Medical History:   Diagnosis Date    Childhood asthma     Chronic respiratory failure with hypoxia (HCC)     Continuous at 3L    COPD (chronic obstructive pulmonary disease) ([facility] [address]     Stage 4 by GOLD Criteria    COVID-19 01/07/2021    Hyperlipidemia     Daily meds     Hypertension     Managed with meds     Intraductal papilloma of breast 01/16/2019    Left    Microalbuminuria     Microcytosis     Morbid obesity ([address]     Nonproliferative retinopathy due to secondary diabetes ([address] 03/07/2020    Mild, Bilateral    THANH on CPAP 3/2/2017    Paroxysmal atrial fibrillation ([facility] [address]     Pneumonia ages 3 and 9    Type 2 diabetes mellitus ([facility] [address]                Past Surgical History:   Procedure Laterality Date    BREAST BIOPSY Left 3/27/2019    LEFT BREAST NEEDLE LOCALIZED LUMPECTOMY  performed by Geovanna Eddy DO at [address] [place] [address] (CERVIX NOT REMOVED)  08/2012    US BREAST NEEDLE BIOPSY LEFT Left 1/7/2019    US BREAST NEEDLE BIOPSY LEFT 1/7/2019 [facility]    US GUIDED NEEDLE LOC OF LEFT BREAST Left 3/27/2019    US GUIDED NEEDLE LOC OF LEFT BREAST 3/27/2019 [facility]       ROS--                 RESP-neg            CARDIAC-neg                       -neg             Further ROS as per PMH and PSH- see problem list                            Objective:     Visit Vitals  /66   Pulse 81   Temp 98.6 °F (37 °C) (Oral)   Resp 19   Ht 5' 5\" (1.651 m)   Wt (!) 327 lb (148.3 kg)   SpO2 90%   BMI 54.42 kg/m²       No intake or output data in the 24 hours ending 10/28/22 0857    EXAM:     NEURO-a&o    HEENT-wnl LUNGS-clear       COR-rrr        ABD-soft , min tenderness, no rebound, good bs        EXT-no edema                         LABS-  Recent Labs     10/26/22  1934 10/26/22  1936 10/27/22  0600 10/27/22  0817 10/28/22  0636   WBC 19.0*  --  20.3*  --  10.2   HGB 12.6  --  10.9*  --  9.1*   HCT 45.8  --  39.9  --  33.4*     --  274  --  283   MCV 74.7*  --  73.1*  --  72.8*   NA  --  136 139  --  142   K  --  4.5 5.0  --  3.4*   CL  --  105 106  --  108   CO2  --  27 27  --  30   BUN  --  15 23  --  13   CREATININE  --  1.60* 2.00*  --  0.90   CALCIUM  --  9.9 8.8  --  9.2   GLUCOSE  --  133* 85  --  88   ALKPHOS  --  232* 210*  --  160*   AST  --  202* QUANTITY NOT SUFFICIENT. SUGGEST RECOLLECTION  --  52*   ALT  --  225* 205*  --  130*   BILITOT  --  1.4* 0.5  --  0.3   ALBUMIN  --  0.7 0.7  --  0.8   PROT  --  8.0 7.7  --  7.0   LIPASE  --  >30,000*  --  2,507* 145   INR  --   --  1.1  --   --    APTT  --   --  33.6  --   --      Assessment:     Principal Problem:    Acute gallstone pancreatitis  Active Problems:    BMI 50.0-59.9, adult (HCC)    Cigarette nicotine dependence in remission    Hypertension    COPD, severe (HCC)    Diabetes mellitus type 2, controlled ([facility] [address]    Morbid obesity with BMI of 50.0-59.9, adult (HCC)    THANH (obstructive sleep apnea)  Resolved Problems:    * No resolved hospital problems. *  Likely passed a stone  No evidence for cholangitis  Much better  Numbers improved  No indication for ERCP    Microcytic anemia- fibroids and hysterectomy in past- no recent iron studies      Plan:     Advance po   Cont antibiotics  Elective GB surgery when pancreatitis resolves     Address colon screening in future  Will f/u as outpt      PT SEEN AND EXAMINED AND PLAN DISCUSSED AND IMPLEMENTED.   Sabine Pimentel MD

## 2022-10-28 NOTE — PROGRESS NOTES
Pt resting in bed at present time without complaints. RT contacted of PRN treatment. Pt NPO since midnight. Hourly rounds completed, all needs met this shift. Bed locked and low, call light within reach. Will give report to oncoming day shift nurse.

## 2022-10-28 NOTE — PROGRESS NOTES
H&P/Consult Note/Progress Note/Office Note:   Ale Norris  MRN: [id number]  SVL:5/17/9327  Age:52 y.o.    HPI: Ale Norris is a 50 y.o. female with h/o COPD who presented to the e ER with a 1 day history of severe, constant epigastric pain associated with N/V. No radiation of pain to her back. Nothing in particular made her pain better or worse. She is s/p partial hysterectomy for benign fibroids through a low midline incision. She reported her uterus weighed 5 pounds. Her ovaries remain. Hypotension was noted. She was seen in the ER  WBC 20.3   Lactic acid was 2.6. Lipase >30k. T Bili 1.4, AST//225    CT scan of her abdomen and pelvis is as shown below. She was treated with IV fluids and Zosyn and transported to [address] [place] [address] Dr    Karlie denies ETOH for several years. GI and General surgery were consulted    10/26/22 CT abd/pelvis  Hx: Upper abdominal pain. Diarrhea. FINDINGS: A moderate amount of motion artifact limits this examination to some degree. The visualized lung bases and mediastinum are unremarkable. The liver, spleen, pancreas, adrenal glands, and kidneys are within normal limits. The left kidney contains a tiny low-density lesion in the midpole which is too small to characterize. The bladder is decompressed and not assessed. The gallbladder shows wall thickening and mucosal enhancement. A few radiopaque stones are appreciated. Multiple prominent lymph nodes are appreciated in the yousuf hepatis and near the head of the pancreas. Distal esophagus and stomach are unremarkable. Small and large bowel show no evidence of obstruction. There is a normal appendix in the right lower quadrant. No evidence of free fluid or free air. No pathologic adenopathy. No abdominal aortic aneurysm. Osseous structures show no evidence of acute fracture or suspicious lesion.        Impression:  The gallbladder shows wall thickening and mucosal enhancement. A few radiopaque stones are appreciated. These findings are suggestive of acute cholecystitis. Multiple prominent lymph nodes are appreciated in the yousuf hepatis and near the head of the pancreas. There significance is unclear. 10/26/22 abd US  FINDINGS: Aorta/IVC: Not well visualized. /Visualized portions are within normal limits. Pancreas: Mostly obscured by overlying bowel gas. Liver: There is diffuse increased echogenicity within the liver parenchyma, suggestive of hepatic steatosis. No focal lesions are demonstrated on the provided images. Portal vein: The portal vein shows hepatopedal flow. Gallbladder: The gallbladder is contracted and not assessed. Sonographic Moe sign was negative. CBD: Normal in caliber and measures 2.3 mm. Right kidney: 12.1 cm in length and without evidence of obstruction. Impression:  The gallbladder is contracted and not assessed. Sonographic Moe sign was negative. Hepatic steatosis.              Past Medical History:   Diagnosis Date    Childhood asthma     Chronic respiratory failure with hypoxia (HCC)     Continuous at 3L    COPD (chronic obstructive pulmonary disease) ([address]     Stage 4 by GOLD Criteria    COVID-19 01/07/2021    Hyperlipidemia     Daily meds     Hypertension     Managed with meds     Intraductal papilloma of breast 01/16/2019    Left    Microalbuminuria     Microcytosis     Morbid obesity ([address]     Nonproliferative retinopathy due to secondary diabetes ([address] 03/07/2020    Mild, Bilateral    THANH on CPAP 3/2/2017    Paroxysmal atrial fibrillation ([address]     Pneumonia ages 3 and 9    Type 2 diabetes mellitus ([address]      Past Surgical History:   Procedure Laterality Date    BREAST BIOPSY Left 3/27/2019    LEFT BREAST NEEDLE LOCALIZED LUMPECTOMY  performed by Olayinka Alanis DO at [address] [place] [address] (CERVIX NOT REMOVED)  08/2012    US BREAST NEEDLE BIOPSY LEFT Left 1/7/2019    US BREAST NEEDLE BIOPSY LEFT 1/7/2019 [facility]    US GUIDED NEEDLE LOC OF LEFT BREAST Left 3/27/2019    US GUIDED NEEDLE LOC OF LEFT BREAST 3/27/2019 [facility]     Current Facility-Administered Medications   Medication Dose Route Frequency    ipratropium-albuterol (DUONEB) nebulizer solution 3 mL  1 vial Inhalation 4x daily    lactated ringers infusion   IntraVENous Continuous    sodium chloride flush 0.9 % injection 5-40 mL  5-40 mL IntraVENous 2 times per day    sodium chloride flush 0.9 % injection 5-40 mL  5-40 mL IntraVENous PRN    ondansetron (ZOFRAN-ODT) disintegrating tablet 4 mg  4 mg Oral Q8H PRN    Or    ondansetron (ZOFRAN) injection 4 mg  4 mg IntraVENous Q6H PRN    polyethylene glycol (GLYCOLAX) packet 17 g  17 g Oral Daily PRN    acetaminophen (TYLENOL) tablet 650 mg  650 mg Oral Q6H PRN    Or    acetaminophen (TYLENOL) suppository 650 mg  650 mg Rectal Q6H PRN    nicotine (NICODERM CQ) 21 MG/24HR 1 patch  1 patch TransDERmal Daily    insulin lispro (HUMALOG) injection vial 0-8 Units  0-8 Units SubCUTAneous TID WC    insulin lispro (HUMALOG) injection vial 0-4 Units  0-4 Units SubCUTAneous Nightly    glucose chewable tablet 16 g  4 tablet Oral PRN    dextrose bolus 10% 125 mL  125 mL IntraVENous PRN    Or    dextrose bolus 10% 250 mL  250 mL IntraVENous PRN    glucagon (rDNA) injection 1 mg  1 mg SubCUTAneous PRN    dextrose 10 % infusion   IntraVENous Continuous PRN    oxyCODONE (ROXICODONE) immediate release tablet 5 mg  5 mg Oral Q4H PRN    morphine injection 4 mg  4 mg IntraVENous Q1H PRN    piperacillin-tazobactam (ZOSYN) 3,375 mg in sodium chloride 0.9 % 50 mL IVPB (mini-bag)  3,375 mg IntraVENous q8h    [Held by provider] heparin (porcine) injection 5,000 Units  5,000 Units SubCUTAneous 3 times per day    ipratropium-albuterol (DUONEB) nebulizer solution 1 ampule  1 ampule Inhalation Q6H PRN    mometasone-formoterol (DULERA) 100-5 MCG/ACT inhaler 2 puff  2 puff Inhalation BID    guaiFENesin [facility]) extended release tablet 1,200 mg  1,200 mg Oral BID    albuterol sulfate HFA (PROVENTIL;VENTOLIN;PROAIR) 108 (90 Base) MCG/ACT inhaler 2 puff  2 puff Inhalation Q4H PRN    lidocaine 1 % injection 1 mL  1 mL IntraDERmal Once PRN    sodium chloride flush 0.9 % injection 5-40 mL  5-40 mL IntraVENous PRN    sodium chloride flush 0.9 % injection 5-40 mL  5-40 mL IntraVENous PRN    0.9 % sodium chloride infusion   IntraVENous PRN    HYDROmorphone HCl PF (DILAUDID) injection 0.25 mg  0.25 mg IntraVENous Q5 Min PRN    oxyCODONE (ROXICODONE) immediate release tablet 5 mg  5 mg Oral Once PRN    ondansetron (ZOFRAN) injection 4 mg  4 mg IntraVENous Once PRN    haloperidol lactate (HALDOL) injection 1 mg  1 mg IntraVENous Once PRN     ALLERGIES:  Patient has no known allergies. Social History     Socioeconomic History    Marital status: Single     Spouse name: None    Number of children: None    Years of education: None    Highest education level: None   Tobacco Use    Smoking status: Every Day     Packs/day: 1.00     Years: 32.00     Pack years: 32.00     Types: Cigarettes    Smokeless tobacco: Never    Tobacco comments:     Quit smoking: Currently 10 Cigarettes/day   Vaping Use    Vaping Use: Never used   Substance and Sexual Activity    Alcohol use: Yes    Drug use: Not Currently     Types: Cocaine, Marijuana [facility]emaker)   Social History Narrative    Single and lives alone. Disabled--previously worked at [employer]. Has lived in [place] and [place]. No pets.      Social History     Tobacco Use   Smoking Status Every Day    Packs/day: 1.00    Years: 32.00    Pack years: 32.00    Types: Cigarettes   Smokeless Tobacco Never   Tobacco Comments    Quit smoking: Currently 10 Cigarettes/day     Family History   Problem Relation Age of Onset    Coronary Art Dis Neg Hx     COPD Mother     Parkinsonism Father     Asthma Son     Schizophrenia Brother     Hypertension Brother     Emphysema Paternal Grandfather     Hypertension Mother ROS: The patient has no difficulty with chest pain or shortness of breath. No fever or chills. Comprehensive review of systems was otherwise unremarkable except as noted above. Physical Exam:   /78   Pulse 95   Temp 98.6 °F (37 °C) (Oral)   Resp 20   Ht 5' 5\" (1.651 m)   Wt (!) 327 lb (148.3 kg)   SpO2 94%   BMI 54.42 kg/m²   Vitals:    10/27/22 2320 10/27/22 2325 10/28/22 0352 10/28/22 0434   BP: 131/74   126/78   Pulse: 99 (!) 104 99 95   Resp: 20 24 19 20   Temp: 98.6 °F (37 °C)   98.6 °F (37 °C)   TempSrc: Oral   Oral   SpO2: 93% 93% 92% 94%   Weight:       Height:         No intake/output data recorded. No intake/output data recorded. Constitutional: Alert, oriented, cooperative patient in no acute distress; appears stated age    Eyes:Sclera are clear. EOMs intact  ENMT: no external lesions gross hearing normal; no obvious neck masses, no ear or lip lesions, nares normal  CV: RRR. Normal perfusion  Resp: No JVD. Breathing is  non-labored; no audible wheezing. GI: obese; BMI>54; less tender in epigastrum      Musculoskeletal: unremarkable with normal function. No embolic signs or cyanosis.    Neuro:  Oriented; moves all 4; no focal deficits  Psychiatric: normal affect and mood, no memory impairment    Recent vitals (if inpt):  Patient Vitals for the past 24 hrs:   BP Temp Temp src Pulse Resp SpO2 Height Weight   10/28/22 0434 126/78 98.6 °F (37 °C) Oral 95 20 94 % -- --   10/28/22 0352 -- -- -- 99 19 92 % -- --   10/27/22 2325 -- -- -- (!) 104 24 93 % -- --   10/27/22 2320 131/74 98.6 °F (37 °C) Oral 99 20 93 % -- --   10/27/22 2107 -- -- -- (!) 104 19 92 % -- --   10/27/22 1916 120/79 99.5 °F (37.5 °C) Oral (!) 107 20 92 % -- --   10/27/22 1655 -- -- -- (!) 107 20 91 % -- --   10/27/22 1611 98/68 99.4 °F (37.4 °C) Oral (!) 109 20 97 % -- --   10/27/22 1450 130/75 98.4 °F (36.9 °C) Oral (!) 109 18 90 % 5' 5\" (1.651 m) (!) 327 lb (148.3 kg)   10/27/22 1318 -- -- -- (!) 108 18 90 % -- --   10/27/22 1105 (!) 103/58 98.6 °F (37 °C) Oral 99 20 96 % -- --   10/27/22 0954 -- -- -- 94 16 96 % -- --   10/27/22 0729 94/80 98.6 °F (37 °C) Oral 84 18 92 % -- --       Amount and/or Complexity of Data Reviewed and Analyzed:  I reviewed and analyzed all of the unique labs and radiologic studies that are shown below as well as any that are in the HPI, and any that are in the expanded problem list below  *Each unique test, order, or document contributes to the combination of 2 or combination of 3 in Category 1 below. For this visit I also reviewed old records and prior notes. Recent Labs     10/27/22  0600 10/28/22  0636   WBC 20.3* 10.2   HGB 10.9* 9.1*    283     --    K 5.0  --      --    CO2 27  --    BUN 23  --    INR 1.1  --    APTT 33.6  --    *  --      Review of most recent CBC  Lab Results   Component Value Date    WBC 10.2 10/28/2022    HGB 9.1 (L) 10/28/2022    HCT 33.4 (L) 10/28/2022    MCV 72.8 (L) 10/28/2022     10/28/2022       Review of most recent BMP  Lab Results   Component Value Date/Time     10/27/2022 06:00 AM    K 5.0 10/27/2022 06:00 AM     10/27/2022 06:00 AM    CO2 27 10/27/2022 06:00 AM    BUN 23 10/27/2022 06:00 AM    CREATININE 2.00 10/27/2022 06:00 AM    GLUCOSE 85 10/27/2022 06:00 AM    CALCIUM 8.8 10/27/2022 06:00 AM        Review of most recent LFTs (and lipase if done)  Lab Results   Component Value Date     (H) 10/27/2022    AST QUANTITY NOT SUFFICIENT.  SUGGEST RECOLLECTION 10/27/2022    ALKPHOS 210 (H) 10/27/2022    BILITOT 0.5 10/27/2022     Lab Results   Component Value Date    LIPASE 2,507 (H) 10/27/2022       Lab Results   Component Value Date/Time    INR 1.1 10/27/2022 06:00 AM    APTT 33.6 10/27/2022 06:00 AM    APTT 73.7 08/31/2019 11:30 AM       Review of most recent HgbA1c  Hemoglobin A1C   Date Value Ref Range Status   09/01/2022 6.1 (H) 4.8 - 5.6 % Final       Nutritional assessment screen for wound healing issues:  Lab Results   Component Value Date    LABALBU 3.2 (L) 10/27/2022       @lastcovr@    Xray Result (most recent):  XR CHEST PORTABLE 10/26/2022    Narrative  EXAMINATION: XR CHEST PORTABLE 10/26/2022 8:05 PM    ACCESSION NUMBER: [id number]    COMPARISON: None available    INDICATION: dyspnea    TECHNIQUE: A single view of the chest was obtained. FINDINGS:  Lung hypoinflation. Streaky bibasilar opacities. No pneumothorax or sizable pleural effusion. Stable cardiomediastinal silhouette. Impression  -Lung hypoinflation with streaky bibasilar opacities, favored to reflect  atelectasis, though infection/aspiration are also considerations in the  appropriate clinical context. CT Result (most recent):  CT ABDOMEN PELVIS W IV CONTRAST 10/26/2022    Narrative  EXAM: CT ABDOMEN PELVIS W IV CONTRAST    HISTORY: Upper abdominal pain. Diarrhea. TECHNIQUE: Axial images of the abdomen and pelvis were performed following the  administration of intravenous contrast. Images were obtained in the axial plane  and coronal reformatted images were created and reviewed. Dose reduction technique used: Automated exposure control/Adjustment of the mA  and/or kV according to patient size/Use of iterative reconstruction technique. 100 mL of Isovue-370 were utilized. COMPARISON: CT chest dated 10/18/2018 and 11/10/2016. FINDINGS:  A moderate amount of motion artifact limits this examination to some degree. The visualized lung bases and mediastinum are unremarkable. The liver, spleen, pancreas, adrenal glands, and kidneys are within normal  limits. The left kidney contains a tiny low-density lesion in the midpole which  is too small to characterize. The bladder is decompressed and not assessed. The gallbladder shows wall thickening and mucosal enhancement. A few radiopaque  stones are appreciated.     Multiple prominent lymph nodes are appreciated in the yousuf hepatis and near the  head of the pancreas. Distal esophagus and stomach are unremarkable. Small and large bowel show no  evidence of obstruction. There is a normal appendix in the right lower quadrant. No evidence of free fluid or free air. No pathologic adenopathy. No abdominal  aortic aneurysm. Osseous structures show no evidence of acute fracture or suspicious lesion. Impression  The gallbladder shows wall thickening and mucosal enhancement. A few radiopaque  stones are appreciated. These findings are suggestive of acute cholecystitis. Multiple prominent lymph nodes are appreciated in the yousuf hepatis and near the  head of the pancreas. There significance is unclear. US Result (most recent):  US ABDOMEN LIMITED 10/26/2022    Narrative  EXAM: US ABDOMEN LIMITED    HISTORY: Upper abdominal pain, elevated LFTs and elevated lipase. TECHNIQUE: Grayscale and limited color Doppler ultrasound of the right upper  quadrant. COMPARISON: CT abdomen and pelvis dated 10/26/2022    FINDINGS:  Aorta/IVC: Not well visualized. /Visualized portions are within normal limits. Pancreas: Mostly obscured by overlying bowel gas. Liver: There is diffuse increased echogenicity within the liver parenchyma,  suggestive of hepatic steatosis. No focal lesions are demonstrated on the  provided images. Portal vein: The portal vein shows hepatopedal flow. Gallbladder: The gallbladder is contracted and not assessed. Sonographic Moe  sign was negative. CBD: Normal in caliber and measures 2.3 mm. Right kidney: 12.1 cm in length and without evidence of obstruction. Impression  The gallbladder is contracted and not assessed. Sonographic Moe sign was  negative. Hepatic steatosis.       Admission date (for inpatients): 10/26/2022   1 Day Post-Op  Procedure(s):  ERCP ENDOSCOPIC RETROGRADE CHOLANGIOPANCREATOGRAPHY         ASSESSMENT/PLAN:  [unfilled]  Principal Problem:    Acute gallstone pancreatitis  Active Problems:    BMI 50. 0-59.9, adult ([facility]    Cigarette nicotine dependence in remission    Hypertension    COPD, severe ([facility]    Diabetes mellitus type 2, controlled ([facility]    Morbid obesity with BMI of 50.0-59.9, adult ([facility])    THANH (obstructive sleep apnea)  Resolved Problems:    * No resolved hospital problems. *     Patient Active Problem List    Diagnosis Date Noted    BMI 50.0-59.9, adult ([facility] 10/27/2022     Priority: Medium    Acute gallstone pancreatitis 10/26/2022     Priority: Medium    COVID-19 01/14/2021    Type 2 diabetes with nephropathy ([facility] 01/07/2020    Atrial fibrillation, new onset ([facility] 08/30/2019    Chronic respiratory failure with hypoxia ([facility] 08/28/2019     NC 3L continuous        Cigarette nicotine dependence in remission 03/05/2017    COPD, severe ([facility] 03/02/2017     PFTs 3/2016  FVC 2.22 L and 62 % of predicted. FEV1 1.23 L and 42 % of predicted. Ratio 55%. There was no clinically relevant response to bronchodilator  LUNG VOLUMES:  TLC 4.61 L and 89 % of predicted. RV 2.39 L and 139 % of predicted. DIFFUSION:  The diffusing capacity was Corrected for hemoglobin of 14.7 g/dL  and was 15.9 mL/mmHg/minute and 58 % of predicted. Diabetes mellitus type 2, controlled ([address] 03/02/2017    Morbid obesity with BMI of 50.0-59.9, adult ([facility] 03/02/2017    THANH (obstructive sleep apnea) 03/02/2017    Nocturnal hypoxemia 03/07/2016    Hypertension 03/07/2016    Chronic back pain 03/07/2016    Obesity hypoventilation syndrome ([address] 12/18/2015          Number and Complexity of Problems addressed and   Risks of complications and/or morbidity of management      Epigastric pain, leukocytosis, gallstone pancreatitis, elevated LFTs, abnormal liver echotexture  Continue inpatient admission  WBC normalized by 10/28/22  Repeat CMP pending this am    NPO.   IVF  IV Zosyn  ERCP planned with GI soon; Eliquis held since admission    Interval cholecystectomy and possible liver biopsy after ERCP      BMI>54  Encourage weight loss    Tobacco use  Encourage smoking cessation            Level of MDM (2/3 elements below)  Number and Complexity of Problems Addressed Amount and/or Complexity of Data to be Reviewed and Analyzed  *Each unique test, order, or document contributes to the combination of 2 or combination of 3 in Category 1 below. Risk of Complications and/or Morbidity or Mortality of pt Management     41208  63205 SF Minimal  ?1self-limited or minor problem Minimal or none Minimal risk of morbidity from additional diagnostic testing or Rx   58724  23032 Low Low  ? 2or more self-limited or minor problems;    or  ? 1stable chronic illness;    or  ?1acute, uncomplicated illness or injury   Limited  (Must meet the requirements of at least 1 of the 2 categories)  Category 1: Tests and documents   ? Any combination of 2 from the following:  ?Review of prior external note(s) from each unique source*;  ?review of the result(s) of each unique test*;   ?ordering of each unique test*    or   Category 2: Assessment requiring an independent historian(s)  (For the categories of independent interpretation of tests and discussion of management or test interpretation, see moderate or high) Low risk of morbidity from additional diagnostic testing or treatment     69052  22333 Mod Moderate  ? 1or more chronic illnesses with exacerbation, progression, or side effects of treatment;    or  ?2or more stable chronic illnesses;    or  ?1undiagnosed new problem with uncertain prognosis;    or  ?1acute illness with systemic symptoms;    or  ?1acute complicated injury   Moderate  (Must meet the requirements of at least 1 out of 3 categories)  Category 1: Tests, documents, or independent historian(s)  ? Any combination of 3 from the following:   ?Review of prior external note(s) from each unique source*;  ?Review of the result(s) of each unique test*;  ?Ordering of each unique test*;  ?Assessment requiring an independent historian(s)    or  Category 2: Independent interpretation of tests   ? Independent interpretation of a test performed by another physician/other qualified health care professional (not separately reported);     or  Category 3: Discussion of management or test interpretation  ? Discussion of management or test interpretation with external physician/other qualified health care professional/appropriate source (not separately reported)   Moderate risk of morbidity from additional diagnostic testing or treatment  Examples only:  ?Prescription drug management   ? Decision regarding minor surgery with identified patient or procedure risk factors  ? Decision regarding elective major surgery without identified patient or procedure risk factors   ? Diagnosis or treatment significantly limited by social determinants of health       51554  14568 High High  ? 1or more chronic illnesses with severe exacerbation, progression, or side effects of treatment;    or  ?1 acute or chronic illness or injury that poses a threat to life or bodily function   Extensive  (Must meet the requirements of at least 2 out of 3 categories)  Category 1: Tests, documents, or independent historian(s)  ? Any combination of 3 from the following:   ?Review of prior external note(s) from each unique source*;  ?Review of the result(s) of each unique test*;   ?Ordering of each unique test*;   ?Assessment requiring an independent historian(s)    or   Category 2: Independent interpretation of tests   ? Independent interpretation of a test performed by another physician/other qualified health care professional (not separately reported);     or  Category 3: Discussion of management or test interpretation  ? Discussion of management or test interpretation with external physician/other qualified health care professional/appropriate source (not separately reported)   High risk of morbidity from additional diagnostic testing or treatment  Examples only:  ?Drug therapy requiring intensive monitoring for toxicity  ? Decision regarding elective major surgery with identified patient or procedure risk factors  ? Decision regarding emergency major surgery  ? Decision regarding hospitalization  ? Decision not to resuscitate or to de-escalate care because of poor prognosis             I have personally performed a face-to-face diagnostic evaluation and management  service on this patient. I have independently seen the patient. I have independently obtained the above history from the patient/family. I have independently examined the patient with above findings. I have independently reviewed data/labs for this patient and developed the above plan of care (MDM).       Signed: John Lozano MD  10/28/2022    7:27 AM

## 2022-10-28 NOTE — PROGRESS NOTES
TRANSFER - IN REPORT:    Verbal report received from [facility] on Lesly Obrien  being received from  for ordered procedure      Report consisted of patient's Situation, Background, Assessment and   Recommendations(SBAR). Information from the following report(s) Nurse Handoff Report was reviewed with the receiving nurse. Opportunity for questions and clarification was provided. Assessment completed upon patient's arrival to unit and care assumed.

## 2022-10-28 NOTE — PROGRESS NOTES
Pt placed on BIPAP w/ 4L bled in  - no distress noted at this time      10/27/22 4005   NIV Type   $NIV $Daily Charge   NIV Started/Stopped On   Equipment Type BIPAP   Mode Bilevel   Mask Type Full face mask   Mask Size Medium   Settings/Measurements   IPAP 16 cmH20   CPAP/EPAP 8 cmH2O   Rate Ordered 18   Resp 24   Insp Rise Time (%) 4 %   O2 Flow Rate (L/min) 4 L/min  (bleed in)   I Time/ I Time % 1 s   Mask Leak (lpm)   (mask fits well)   Comfort Level Good   Using Accessory Muscles No   SpO2 92   Patient's Home Machine No   Alarm Settings   Alarms On Y   Apnea (secs) 20 secs   Oxygen Therapy/Pulse Ox   O2 Therapy Oxygen   O2 Device PAP (positive airway pressure)   Heart Rate (!) 104   SpO2 93 %

## 2022-10-29 ENCOUNTER — ANESTHESIA EVENT (OUTPATIENT)
Dept: SURGERY | Age: 52
DRG: 853 | End: 2022-10-29
Payer: MEDICARE

## 2022-10-29 ENCOUNTER — ANESTHESIA (OUTPATIENT)
Dept: SURGERY | Age: 52
DRG: 853 | End: 2022-10-29
Payer: MEDICARE

## 2022-10-29 LAB
ABO + RH BLD: NORMAL
ALBUMIN SERPL-MCNC: 3.2 G/DL (ref 3.5–5)
ALBUMIN/GLOB SERPL: 0.7 (ref 0.4–1.6)
ALP SERPL-CCNC: 151 U/L (ref 50–136)
ALT SERPL-CCNC: 104 U/L (ref 12–65)
ANION GAP SERPL CALC-SCNC: 5 MMOL/L (ref 2–11)
AST SERPL-CCNC: 35 U/L (ref 15–37)
BASOPHILS # BLD: 0 K/UL (ref 0–0.2)
BASOPHILS NFR BLD: 1 % (ref 0–2)
BILIRUB SERPL-MCNC: 0.3 MG/DL (ref 0.2–1.1)
BLOOD GROUP ANTIBODIES SERPL: NORMAL
BUN SERPL-MCNC: 9 MG/DL (ref 6–23)
CALCIUM SERPL-MCNC: 9.4 MG/DL (ref 8.3–10.4)
CHLORIDE SERPL-SCNC: 106 MMOL/L (ref 101–110)
CO2 SERPL-SCNC: 29 MMOL/L (ref 21–32)
CREAT SERPL-MCNC: 0.9 MG/DL (ref 0.6–1)
DIFFERENTIAL METHOD BLD: ABNORMAL
EOSINOPHIL # BLD: 0.2 K/UL (ref 0–0.8)
EOSINOPHIL NFR BLD: 2 % (ref 0.5–7.8)
ERYTHROCYTE [DISTWIDTH] IN BLOOD BY AUTOMATED COUNT: 17.9 % (ref 11.9–14.6)
GLOBULIN SER CALC-MCNC: 4.5 G/DL (ref 2.8–4.5)
GLUCOSE BLD STRIP.AUTO-MCNC: 112 MG/DL (ref 65–100)
GLUCOSE BLD STRIP.AUTO-MCNC: 82 MG/DL (ref 65–100)
GLUCOSE BLD STRIP.AUTO-MCNC: 98 MG/DL (ref 65–100)
GLUCOSE SERPL-MCNC: 97 MG/DL (ref 65–100)
HCG UR QL: NEGATIVE
HCT VFR BLD AUTO: 32.9 % (ref 35.8–46.3)
HGB BLD-MCNC: 9.5 G/DL (ref 11.7–15.4)
IMM GRANULOCYTES # BLD AUTO: 0 K/UL (ref 0–0.5)
IMM GRANULOCYTES NFR BLD AUTO: 0 % (ref 0–5)
LIPASE SERPL-CCNC: 133 U/L (ref 73–393)
LYMPHOCYTES # BLD: 3 K/UL (ref 0.5–4.6)
LYMPHOCYTES NFR BLD: 36 % (ref 13–44)
MCH RBC QN AUTO: 20.3 PG (ref 26.1–32.9)
MCHC RBC AUTO-ENTMCNC: 28.9 G/DL (ref 31.4–35)
MCV RBC AUTO: 70.4 FL (ref 82–102)
MONOCYTES # BLD: 0.5 K/UL (ref 0.1–1.3)
MONOCYTES NFR BLD: 6 % (ref 4–12)
NEUTS SEG # BLD: 4.8 K/UL (ref 1.7–8.2)
NEUTS SEG NFR BLD: 56 % (ref 43–78)
NRBC # BLD: 0.02 K/UL (ref 0–0.2)
PLATELET # BLD AUTO: 301 K/UL (ref 150–450)
PMV BLD AUTO: 9.9 FL (ref 9.4–12.3)
POTASSIUM SERPL-SCNC: 3.5 MMOL/L (ref 3.5–5.1)
PROT SERPL-MCNC: 7.7 G/DL (ref 6.3–8.2)
RBC # BLD AUTO: 4.67 M/UL (ref 4.05–5.2)
SERVICE CMNT-IMP: ABNORMAL
SERVICE CMNT-IMP: NORMAL
SERVICE CMNT-IMP: NORMAL
SODIUM SERPL-SCNC: 140 MMOL/L (ref 133–143)
SPECIMEN EXP DATE BLD: NORMAL
UFH PPP CHRO-ACNC: 0.26 IU/ML (ref 0.3–0.7)
UFH PPP CHRO-ACNC: <0.1 IU/ML (ref 0.3–0.7)
UFH PPP CHRO-ACNC: <0.1 IU/ML (ref 0.3–0.7)
WBC # BLD AUTO: 8.5 K/UL (ref 4.3–11.1)

## 2022-10-29 PROCEDURE — 2700000000 HC OXYGEN THERAPY PER DAY

## 2022-10-29 PROCEDURE — 6360000002 HC RX W HCPCS: Performed by: INTERNAL MEDICINE

## 2022-10-29 PROCEDURE — 36415 COLL VENOUS BLD VENIPUNCTURE: CPT

## 2022-10-29 PROCEDURE — 94640 AIRWAY INHALATION TREATMENT: CPT

## 2022-10-29 PROCEDURE — 6360000002 HC RX W HCPCS: Performed by: NURSE PRACTITIONER

## 2022-10-29 PROCEDURE — 6370000000 HC RX 637 (ALT 250 FOR IP): Performed by: INTERNAL MEDICINE

## 2022-10-29 PROCEDURE — 83690 ASSAY OF LIPASE: CPT

## 2022-10-29 PROCEDURE — 2500000003 HC RX 250 WO HCPCS: Performed by: HOSPITALIST

## 2022-10-29 PROCEDURE — 85520 HEPARIN ASSAY: CPT

## 2022-10-29 PROCEDURE — 94760 N-INVAS EAR/PLS OXIMETRY 1: CPT

## 2022-10-29 PROCEDURE — 85025 COMPLETE CBC W/AUTO DIFF WBC: CPT

## 2022-10-29 PROCEDURE — 2580000003 HC RX 258: Performed by: HOSPITALIST

## 2022-10-29 PROCEDURE — 81025 URINE PREGNANCY TEST: CPT

## 2022-10-29 PROCEDURE — 6370000000 HC RX 637 (ALT 250 FOR IP): Performed by: HOSPITALIST

## 2022-10-29 PROCEDURE — 6360000002 HC RX W HCPCS: Performed by: HOSPITALIST

## 2022-10-29 PROCEDURE — 86900 BLOOD TYPING SEROLOGIC ABO: CPT

## 2022-10-29 PROCEDURE — 1100000003 HC PRIVATE W/ TELEMETRY

## 2022-10-29 PROCEDURE — 94660 CPAP INITIATION&MGMT: CPT

## 2022-10-29 PROCEDURE — 80053 COMPREHEN METABOLIC PANEL: CPT

## 2022-10-29 PROCEDURE — 82962 GLUCOSE BLOOD TEST: CPT

## 2022-10-29 RX ORDER — HEPARIN SODIUM 10000 [USP'U]/100ML
5-30 INJECTION, SOLUTION INTRAVENOUS CONTINUOUS
Status: DISCONTINUED | OUTPATIENT
Start: 2022-10-29 | End: 2022-10-30

## 2022-10-29 RX ADMIN — PIPERACILLIN SODIUM,TAZOBACTAM SODIUM 3375 MG: 3; .375 INJECTION, POWDER, FOR SOLUTION INTRAVENOUS at 09:05

## 2022-10-29 RX ADMIN — IPRATROPIUM BROMIDE AND ALBUTEROL SULFATE 3 ML: .5; 3 SOLUTION RESPIRATORY (INHALATION) at 15:14

## 2022-10-29 RX ADMIN — HEPARIN SODIUM 14 UNITS/KG/HR: 10000 INJECTION, SOLUTION INTRAVENOUS at 18:24

## 2022-10-29 RX ADMIN — ANTI-FUNGAL POWDER MICONAZOLE NITRATE TALC FREE: 1.42 POWDER TOPICAL at 22:02

## 2022-10-29 RX ADMIN — GUAIFENESIN 1200 MG: 600 TABLET, EXTENDED RELEASE ORAL at 09:03

## 2022-10-29 RX ADMIN — ACETAMINOPHEN 650 MG: 325 TABLET, FILM COATED ORAL at 11:58

## 2022-10-29 RX ADMIN — IPRATROPIUM BROMIDE AND ALBUTEROL SULFATE 3 ML: .5; 3 SOLUTION RESPIRATORY (INHALATION) at 11:18

## 2022-10-29 RX ADMIN — MOMETASONE FUROATE AND FORMOTEROL FUMARATE DIHYDRATE 2 PUFF: 100; 5 AEROSOL RESPIRATORY (INHALATION) at 07:28

## 2022-10-29 RX ADMIN — PIPERACILLIN SODIUM,TAZOBACTAM SODIUM 3375 MG: 3; .375 INJECTION, POWDER, FOR SOLUTION INTRAVENOUS at 16:26

## 2022-10-29 RX ADMIN — HEPARIN SODIUM 4000 UNITS: 1000 INJECTION INTRAVENOUS; SUBCUTANEOUS at 05:03

## 2022-10-29 RX ADMIN — HEPARIN SODIUM 2000 UNITS: 1000 INJECTION INTRAVENOUS; SUBCUTANEOUS at 20:53

## 2022-10-29 RX ADMIN — SODIUM CHLORIDE, PRESERVATIVE FREE 5 ML: 5 INJECTION INTRAVENOUS at 09:06

## 2022-10-29 RX ADMIN — GUAIFENESIN 1200 MG: 600 TABLET, EXTENDED RELEASE ORAL at 20:50

## 2022-10-29 RX ADMIN — HEPARIN SODIUM AND DEXTROSE 14 UNITS/KG/HR: 10000; 5 INJECTION INTRAVENOUS at 16:27

## 2022-10-29 RX ADMIN — HEPARIN SODIUM 4000 UNITS: 1000 INJECTION INTRAVENOUS; SUBCUTANEOUS at 14:08

## 2022-10-29 RX ADMIN — ONDANSETRON 4 MG: 4 TABLET, ORALLY DISINTEGRATING ORAL at 11:58

## 2022-10-29 RX ADMIN — IPRATROPIUM BROMIDE AND ALBUTEROL SULFATE 3 ML: .5; 3 SOLUTION RESPIRATORY (INHALATION) at 07:28

## 2022-10-29 RX ADMIN — OXYCODONE 5 MG: 5 TABLET ORAL at 11:58

## 2022-10-29 RX ADMIN — SODIUM CHLORIDE, PRESERVATIVE FREE 5 ML: 5 INJECTION INTRAVENOUS at 20:50

## 2022-10-29 RX ADMIN — MOMETASONE FUROATE AND FORMOTEROL FUMARATE DIHYDRATE 2 PUFF: 100; 5 AEROSOL RESPIRATORY (INHALATION) at 20:39

## 2022-10-29 RX ADMIN — IPRATROPIUM BROMIDE AND ALBUTEROL SULFATE 3 ML: .5; 3 SOLUTION RESPIRATORY (INHALATION) at 20:39

## 2022-10-29 RX ADMIN — PIPERACILLIN SODIUM,TAZOBACTAM SODIUM 3375 MG: 3; .375 INJECTION, POWDER, FOR SOLUTION INTRAVENOUS at 00:06

## 2022-10-29 ASSESSMENT — PAIN SCALES - GENERAL
PAINLEVEL_OUTOF10: 0
PAINLEVEL_OUTOF10: 6
PAINLEVEL_OUTOF10: 0

## 2022-10-29 ASSESSMENT — PAIN DESCRIPTION - LOCATION: LOCATION: HEAD

## 2022-10-29 ASSESSMENT — COPD QUESTIONNAIRES: CAT_SEVERITY: SEVERE

## 2022-10-29 ASSESSMENT — PAIN DESCRIPTION - PAIN TYPE: TYPE: ACUTE PAIN

## 2022-10-29 ASSESSMENT — LIFESTYLE VARIABLES: SMOKING_STATUS: 1

## 2022-10-29 ASSESSMENT — PAIN DESCRIPTION - ONSET: ONSET: GRADUAL

## 2022-10-29 ASSESSMENT — PAIN - FUNCTIONAL ASSESSMENT: PAIN_FUNCTIONAL_ASSESSMENT: PREVENTS OR INTERFERES SOME ACTIVE ACTIVITIES AND ADLS

## 2022-10-29 ASSESSMENT — PAIN DESCRIPTION - DESCRIPTORS: DESCRIPTORS: ACHING

## 2022-10-29 ASSESSMENT — PAIN DESCRIPTION - FREQUENCY: FREQUENCY: INTERMITTENT

## 2022-10-29 NOTE — PERIOP NOTE
Phoned pt's IP nurse Al inquiring about pt's heparin drip and was advised by 3rd floor staff  that the pt's IP nurse is in a room with another patient at this time. Currently awaiting for a response.

## 2022-10-29 NOTE — PROGRESS NOTES
Hospitalist Progress Note   Admit Date:  10/26/2022  7:27 PM   Name:  Judith Obrien   Age:  50 y.o. Sex:  female  :  1970   MRN:  [id number]   Room:  [facility]/    Presenting Complaint: Nausea and Emesis (Pt with N/V/D since 4am. Alert and oriented x's 4. Sepsis protocol started by EMS enroute)     Reason(s) for Admission: Septicemia [facility]) [A41.9]  Gallstone pancreatitis [K85.10]  Acute gallstone pancreatitis [K85.10]     Hospital Course:   Lydia Pizarro is a 50 y.o. female, local history of hypertension hyperlipidemia, diabetes mellitus who presents to the Emergency Department with chief complaint of abdominal pain, vomiting, and diarrhea. Patient states symptoms started this morning. States she believes she \"ate some bad food last evening. \"  States since then this morning she had crampy abdominal pain with vomiting. Denies any hematemesis or melena. Denies any fevers or chills. EMS was called when she was initially found to be hypotensive. Antibiotics were started in route as well as IV fluids and antiemetics. Patient describes a diffuse abdominal pain. Laboratory data significant for lactic acid 2.6, elevated Pro-Dragan.  Lipase greater than 30,000 and mildly elevated bilirubin and LFTs. She got Zosyn in route via EMS. IV fluids have been initiated. Vancomycin added in ER. CT scan does show gallbladder wall thickening as well as stones concern for possible acute cholecystitis. However she has no focal tenderness on exam.  Given acuity of symptoms reported fever and sepsis markers ER MD became concerned for possibly developing cholangitis. GI as well as hospitalist consulted for admission. Patient denies current alcohol use. Subjective & 24hr Events (10/29/22): Patient without acute event overnight. Patient states she may be going for cholecystectomy today. Assessment & Plan:     Principal Problem:    Acute gallstone pancreatitis  Plan: resolved .   General Sx plans for cholecystectomy today    Active Problems:    Abdominal infection  Plan: Given elevated Procal will continue Zosyn empirically. Leukocytosis has resolved      BMI 50.0-59.9, adult ([facility]  Plan:  complicates care, lifestyle modifications encouraged        Hypertension  Plan: therapeutically controlled      COPD, severe ([facility]  Plan: compensated. Will continue bd and prn nebs        Afib  Plan: holding Eliquis. Will start a heparin drip      Diabetes mellitus type 2, controlled ([facility]  Plan: fsbs wnl. Will continue prn csi      Microcytic anemia  Plan: Iron studies done 10/28/22 rules out LAURA. ? gastritis      THANH (obstructive sleep apnea)  Plan: cpap qhs      Anticipated discharge needs:      None    Diet:  Diet NPO  DVT PPx: heparin drip  Code status: Full Code    Hospital Problems:  Principal Problem:    Acute gallstone pancreatitis  Active Problems:    BMI 50.0-59.9, adult (HCC)    Cigarette nicotine dependence in remission    Hypertension    COPD, severe (HCC)    Diabetes mellitus type 2, controlled ([facility]    At[facility] fibrillation, [facility] ([facility]    Morbid obesity with BMI of 50.0-59.9, adult (HCC)    THANH (obstructive sleep apnea)  Resolved Problems:    * No resolved hospital problems.  *      Objective:   Patient Vitals for the past 24 hrs:   Temp Pulse Resp BP SpO2   10/29/22 1217 98.8 °F (37.1 °C) 97 20 (!) 145/84 95 %   10/29/22 1118 -- 94 18 -- 95 %   10/29/22 0833 98.2 °F (36.8 °C) 97 20 132/81 96 %   10/29/22 0728 -- (!) 102 20 -- 95 %   10/29/22 0614 98.6 °F (37 °C) 95 20 (!) 138/91 97 %   10/29/22 0056 -- -- 18 -- --   10/29/22 0000 98.6 °F (37 °C) (!) 111 18 125/74 --   10/28/22 1912 98.9 °F (37.2 °C) (!) 104 16 (!) 142/81 96 %   10/28/22 1559 -- (!) 109 18 -- 94 %   10/28/22 1529 98.4 °F (36.9 °C) (!) 107 18 132/70 92 %   10/28/22 1345 -- (!) 103 18 -- 96 %       Oxygen Therapy  SpO2: 95 %  Pulse Oximeter Device Mode: Intermittent  Pulse Oximeter Device Location: Right, Finger  O2 Device: Nasal cannula  Skin Assessment: Clean, dry, & intact  Skin Protection for O2 Device: No  Orientation: Bilateral  Location: Nose (under the nose mod FFM)  O2 Flow Rate (L/min): 3 L/min (home oxygen)    Estimated body mass index is 53.78 kg/m² as calculated from the following:    Height as of this encounter: 5' 5\" (1.651 m). Weight as of this encounter: 323 lb 3.2 oz (146.6 kg). Intake/Output Summary (Last 24 hours) at 10/29/2022 1333  Last data filed at 10/29/2022 0615  Gross per 24 hour   Intake 250 ml   Output 0 ml   Net 250 ml         Physical Exam:     Blood pressure (!) 145/84, pulse 97, temperature 98.8 °F (37.1 °C), temperature source Oral, resp. rate 20, height 5' 5\" (1.651 m), weight (!) 323 lb 3.2 oz (146.6 kg), SpO2 95 %. General:    Well nourished. Morbidly obese  Head:  Normocephalic, atraumatic  Eyes:  Sclerae appear normal.  Pupils equally round. ENT:  Nares appear normal, no drainage. Moist oral mucosa  Neck:  No restricted ROM. Trachea midline   CV:   RRR. No m/r/g. No jugular venous distension. Lungs:   CTAB. No wheezing, rhonchi, or rales. Symmetric expansion. Abdomen: Bowel sounds present. Soft, nontender, nondistended. Extremities: No cyanosis or clubbing. No edema  Skin:     No rashes and normal coloration. Warm and dry. Neuro:  CN II-XII grossly intact. Sensation intact. A&Ox3  Psych:  Normal mood and affect.       I have personally reviewed labs and tests showing:  Recent Labs:  Recent Results (from the past 48 hour(s))   POCT Glucose    Collection Time: 10/27/22  4:01 PM   Result Value Ref Range    POC Glucose 69 65 - 100 mg/dL    Performed by: Demetria    POCT Glucose    Collection Time: 10/27/22  5:19 PM   Result Value Ref Range    POC Glucose 82 65 - 100 mg/dL    Performed by: Hai Anne    POCT Glucose    Collection Time: 10/27/22  8:57 PM   Result Value Ref Range    POC Glucose 102 (H) 65 - 100 mg/dL    Performed by: Karri    POCT Glucose    Collection Time: 10/28/22  2:25 AM   Result Value Ref Range    POC Glucose 85 65 - 100 mg/dL    Performed by: Karri    Lipase    Collection Time: 10/28/22  6:36 AM   Result Value Ref Range    Lipase 145 73 - 393 U/L   CBC with Auto Differential    Collection Time: 10/28/22  6:36 AM   Result Value Ref Range    WBC 10.2 4.3 - 11.1 K/uL    RBC 4.59 4.05 - 5.2 M/uL    Hemoglobin 9.1 (L) 11.7 - 15.4 g/dL    Hematocrit 33.4 (L) 35.8 - 46.3 %    MCV 72.8 (L) 82 - 102 FL    MCH 19.8 (L) 26.1 - 32.9 PG    MCHC 27.2 (L) 31.4 - 35.0 g/dL    RDW 18.2 (H) 11.9 - 14.6 %    Platelets 411 074 - 419 K/uL    MPV 10.1 9.4 - 12.3 FL    nRBC 0.00 0.0 - 0.2 K/uL    Differential Type AUTOMATED      Seg Neutrophils 74 43 - 78 %    Lymphocytes 19 13 - 44 %    Monocytes 5 4.0 - 12.0 %    Eosinophils % 1 0.5 - 7.8 %    Basophils 0 0.0 - 2.0 %    Immature Granulocytes 1 0.0 - 5.0 %    Segs Absolute 7.5 1.7 - 8.2 K/UL    Absolute Lymph # 2.0 0.5 - 4.6 K/UL    Absolute Mono # 0.5 0.1 - 1.3 K/UL    Absolute Eos # 0.1 0.0 - 0.8 K/UL    Basophils Absolute 0.0 0.0 - 0.2 K/UL    Absolute Immature Granulocyte 0.1 0.0 - 0.5 K/UL   Comprehensive Metabolic Panel    Collection Time: 10/28/22  6:36 AM   Result Value Ref Range    Sodium 142 133 - 143 mmol/L    Potassium 3.4 (L) 3.5 - 5.1 mmol/L    Chloride 108 101 - 110 mmol/L    CO2 30 21 - 32 mmol/L    Anion Gap 4 2 - 11 mmol/L    Glucose 88 65 - 100 mg/dL    BUN 13 6 - 23 MG/DL    Creatinine 0.90 0.6 - 1.0 MG/DL    Est, Glom Filt Rate >60 >60 ml/min/1.73m2    Calcium 9.2 8.3 - 10.4 MG/DL    Total Bilirubin 0.3 0.2 - 1.1 MG/DL     (H) 12 - 65 U/L    AST 52 (H) 15 - 37 U/L    Alk Phosphatase 160 (H) 50 - 136 U/L    Total Protein 7.0 6.3 - 8.2 g/dL    Albumin 3.0 (L) 3.5 - 5.0 g/dL    Globulin 4.0 2.8 - 4.5 g/dL    Albumin/Globulin Ratio 0.8 0.4 - 1.6     Transferrin Saturation    Collection Time: 10/28/22  6:36 AM   Result Value Ref Range    Iron 66 35 - 150 ug/dL    TIBC 368 250 - 450 ug/dL TRANSFERRIN SATURATION 18 (L) >20 %   Ferritin    Collection Time: 10/28/22  6:36 AM   Result Value Ref Range    Ferritin 52 8 - 388 NG/ML   Transferrin    Collection Time: 10/28/22  6:36 AM   Result Value Ref Range    Transferrin 224 202 - 364 mg/dL   POCT Glucose    Collection Time: 10/28/22  7:35 AM   Result Value Ref Range    POC Glucose 83 65 - 100 mg/dL    Performed by: Saad    POCT Glucose    Collection Time: 10/28/22 11:27 AM   Result Value Ref Range    POC Glucose 75 65 - 100 mg/dL    Performed by: Saad    POCT Glucose    Collection Time: 10/28/22  4:45 PM   Result Value Ref Range    POC Glucose 99 65 - 100 mg/dL    Performed by: Saad    APTT    Collection Time: 10/28/22  6:20 PM   Result Value Ref Range    PTT 30.6 24.5 - 34.2 SEC   Protime-INR    Collection Time: 10/28/22  6:20 PM   Result Value Ref Range    Protime 13.6 12.6 - 14.3 sec    INR 1.0     POCT Glucose    Collection Time: 10/28/22  8:20 PM   Result Value Ref Range    POC Glucose 101 (H) 65 - 100 mg/dL    Performed by: Nahun    Lipase    Collection Time: 10/29/22  1:29 AM   Result Value Ref Range    Lipase 133 73 - 393 U/L   CBC with Auto Differential    Collection Time: 10/29/22  1:29 AM   Result Value Ref Range    WBC 8.5 4.3 - 11.1 K/uL    RBC 4.67 4.05 - 5.2 M/uL    Hemoglobin 9.5 (L) 11.7 - 15.4 g/dL    Hematocrit 32.9 (L) 35.8 - 46.3 %    MCV 70.4 (L) 82 - 102 FL    MCH 20.3 (L) 26.1 - 32.9 PG    MCHC 28.9 (L) 31.4 - 35.0 g/dL    RDW 17.9 (H) 11.9 - 14.6 %    Platelets 913 564 - 490 K/uL    MPV 9.9 9.4 - 12.3 FL    nRBC 0.02 0.0 - 0.2 K/uL    Differential Type AUTOMATED      Seg Neutrophils 56 43 - 78 %    Lymphocytes 36 13 - 44 %    Monocytes 6 4.0 - 12.0 %    Eosinophils % 2 0.5 - 7.8 %    Basophils 1 0.0 - 2.0 %    Immature Granulocytes 0 0.0 - 5.0 %    Segs Absolute 4.8 1.7 - 8.2 K/UL    Absolute Lymph # 3.0 0.5 - 4.6 K/UL    Absolute Mono # 0.5 0.1 - 1.3 K/UL    Absolute Eos # 0.2 0.0 - 0.8 K/UL    Basophils Absolute 0.0 0.0 - 0.2 K/UL    Absolute Immature Granulocyte 0.0 0.0 - 0.5 K/UL   Comprehensive Metabolic Panel    Collection Time: 10/29/22  1:29 AM   Result Value Ref Range    Sodium 140 133 - 143 mmol/L    Potassium 3.5 3.5 - 5.1 mmol/L    Chloride 106 101 - 110 mmol/L    CO2 29 21 - 32 mmol/L    Anion Gap 5 2 - 11 mmol/L    Glucose 97 65 - 100 mg/dL    BUN 9 6 - 23 MG/DL    Creatinine 0.90 0.6 - 1.0 MG/DL    Est, Glom Filt Rate >60 >60 ml/min/1.73m2    Calcium 9.4 8.3 - 10.4 MG/DL    Total Bilirubin 0.3 0.2 - 1.1 MG/DL     (H) 12 - 65 U/L    AST 35 15 - 37 U/L    Alk Phosphatase 151 (H) 50 - 136 U/L    Total Protein 7.7 6.3 - 8.2 g/dL    Albumin 3.2 (L) 3.5 - 5.0 g/dL    Globulin 4.5 2.8 - 4.5 g/dL    Albumin/Globulin Ratio 0.7 0.4 - 1.6     Anti-Xa, Unfractionated Heparin    Collection Time: 10/29/22  1:29 AM   Result Value Ref Range    Anti-XA Unfrac Heparin <0.10 (L) 0.3 - 0.7 IU/mL   Anti-Xa, Unfractionated Heparin    Collection Time: 10/29/22 11:01 AM   Result Value Ref Range    Anti-XA Unfrac Heparin <0.10 (L) 0.3 - 0.7 IU/mL   POCT Glucose    Collection Time: 10/29/22  1:03 PM   Result Value Ref Range    POC Glucose 82 65 - 100 mg/dL    Performed by: Dmitriy Patel        I have personally reviewed imaging studies showing: Other Studies:  US ABDOMEN LIMITED Specify organ? GALLBLADDER   Final Result   The gallbladder is contracted and not assessed. Sonographic Moe sign was   negative. Hepatic steatosis. CT ABDOMEN PELVIS W IV CONTRAST Additional Contrast? None   Final Result   The gallbladder shows wall thickening and mucosal enhancement. A few radiopaque   stones are appreciated. These findings are suggestive of acute cholecystitis. Multiple prominent lymph nodes are appreciated in the yousuf hepatis and near the   head of the pancreas. There significance is unclear.          XR CHEST PORTABLE   Final Result   -Lung hypoinflation with streaky bibasilar opacities, favored to reflect   atelectasis, though infection/aspiration are also considerations in the   appropriate clinical context.                 Current Meds:  Current Facility-Administered Medications   Medication Dose Route Frequency    miconazole (MICOTIN) 2 % powder   Topical BID    ipratropium-albuterol (DUONEB) nebulizer solution 3 mL  1 vial Inhalation 4x daily    heparin (porcine) injection 4,000 Units  4,000 Units IntraVENous PRN    heparin (porcine) injection 2,000 Units  2,000 Units IntraVENous PRN    heparin 25,000 units in dextrose 5% 250 mL (premix) infusion  5-30 Units/kg/hr IntraVENous Continuous    loperamide (IMODIUM) capsule 2 mg  2 mg Oral 4x Daily PRN    sodium chloride flush 0.9 % injection 5-40 mL  5-40 mL IntraVENous 2 times per day    sodium chloride flush 0.9 % injection 5-40 mL  5-40 mL IntraVENous PRN    ondansetron (ZOFRAN-ODT) disintegrating tablet 4 mg  4 mg Oral Q8H PRN    Or    ondansetron (ZOFRAN) injection 4 mg  4 mg IntraVENous Q6H PRN    polyethylene glycol (GLYCOLAX) packet 17 g  17 g Oral Daily PRN    acetaminophen (TYLENOL) tablet 650 mg  650 mg Oral Q6H PRN    Or    acetaminophen (TYLENOL) suppository 650 mg  650 mg Rectal Q6H PRN    nicotine (NICODERM CQ) 21 MG/24HR 1 patch  1 patch TransDERmal Daily    insulin lispro (HUMALOG) injection vial 0-8 Units  0-8 Units SubCUTAneous TID WC    insulin lispro (HUMALOG) injection vial 0-4 Units  0-4 Units SubCUTAneous Nightly    glucose chewable tablet 16 g  4 tablet Oral PRN    dextrose bolus 10% 125 mL  125 mL IntraVENous PRN    Or    dextrose bolus 10% 250 mL  250 mL IntraVENous PRN    glucagon (rDNA) injection 1 mg  1 mg SubCUTAneous PRN    dextrose 10 % infusion   IntraVENous Continuous PRN    oxyCODONE (ROXICODONE) immediate release tablet 5 mg  5 mg Oral Q4H PRN    morphine injection 4 mg  4 mg IntraVENous Q1H PRN    piperacillin-tazobactam (ZOSYN) 3,375 mg in sodium chloride 0.9 % 50 mL IVPB (mini-bag)  3,375 mg IntraVENous q8h    ipratropium-albuterol (DUONEB) nebulizer solution 1 ampule  1 ampule Inhalation Q6H PRN    mometasone-formoterol (DULERA) 100-5 MCG/ACT inhaler 2 puff  2 puff Inhalation BID    guaiFENesin (MUCINEX) extended release tablet 1,200 mg  1,200 mg Oral BID    albuterol sulfate HFA (PROVENTIL;VENTOLIN;PROAIR) 108 (90 Base) MCG/ACT inhaler 2 puff  2 puff Inhalation Q4H PRN    sodium chloride flush 0.9 % injection 5-40 mL  5-40 mL IntraVENous PRN    sodium chloride flush 0.9 % injection 5-40 mL  5-40 mL IntraVENous PRN    0.9 % sodium chloride infusion   IntraVENous PRN    HYDROmorphone HCl PF (DILAUDID) injection 0.25 mg  0.25 mg IntraVENous Q5 Min PRN       Signed:  Arturo Rodriguez MD    Part of this note may have been written by using a voice dictation software. The note has been proof read but may still contain some grammatical/other typographical errors.

## 2022-10-29 NOTE — PROGRESS NOTES
TRANSFER - OUT REPORT:    Verbal report given to Karissa Garcia RN on Renetta Obrien  being transferred to [phone] for routine progression of patient care       Report consisted of patient's Situation, Background, Assessment and   Recommendations(SBAR). Information from the following report(s) Nurse Handoff Report was reviewed with the receiving nurse. Lines:   Peripheral IV 10/26/22 Right;Dorsal Hand (Active)   Site Assessment Clean, dry & intact 10/29/22 0250   Line Status Infusing 10/29/22 [address] [place] [address] Connections checked and tightened 10/29/22 0250   Phlebitis Assessment No symptoms 10/29/22 0250   Infiltration Assessment 0 10/29/22 0250   Alcohol Cap Used Yes 10/29/22 0250   Dressing Status Clean, dry & intact 10/29/22 0250   Dressing Type Transparent 10/29/22 0250       Peripheral IV 10/28/22 Right Forearm (Active)   Site Assessment Clean, dry & intact 10/29/22 0250   Line Status Infusing 10/29/22 [address] [place] [address] Connections checked and tightened 10/29/22 0250   Phlebitis Assessment No symptoms 10/29/22 0250   Infiltration Assessment 0 10/29/22 0250   Alcohol Cap Used Yes 10/29/22 0250   Dressing Status Clean, dry & intact 10/29/22 0250   Dressing Type Transparent 10/29/22 0250        Opportunity for questions and clarification was provided.       Patient transported with:  O2 @ 4lpm , RN, CPAP machine

## 2022-10-29 NOTE — PROGRESS NOTES
H&P/Consult Note/Progress Note/Office Note:   Codey Palma  MRN: [id number]  TIFFANIE:[birth year]  Age:52 y.o.    HPI: Codey Palma is a 50 y.o. female with h/o COPD who presented to the e ER with a 1 day history of severe, constant epigastric pain associated with N/V. No radiation of pain to her back. Nothing in particular made her pain better or worse. She is s/p partial hysterectomy for benign fibroids through a low midline incision. She reported her uterus weighed 5 pounds. Her ovaries remain. Hypotension was noted. She was seen in the ER  WBC 20.3   Lactic acid was 2.6. Lipase >30k. T Bili 1.4, AST//225    CT scan of her abdomen and pelvis is as shown below. She was treated with IV fluids and Zosyn and transported to [address] [place] [address]    She denies ETOH for several years. GI and General surgery were consulted    10/26/22 CT abd/pelvis  Hx: Upper abdominal pain. Diarrhea. FINDINGS: A moderate amount of motion artifact limits this examination to some degree. The visualized lung bases and mediastinum are unremarkable. The liver, spleen, pancreas, adrenal glands, and kidneys are within normal limits. The left kidney contains a tiny low-density lesion in the midpole which is too small to characterize. The bladder is decompressed and not assessed. The gallbladder shows wall thickening and mucosal enhancement. A few radiopaque stones are appreciated. Multiple prominent lymph nodes are appreciated in the yousuf hepatis and near the head of the pancreas. Distal esophagus and stomach are unremarkable. Small and large bowel show no evidence of obstruction. There is a normal appendix in the right lower quadrant. No evidence of free fluid or free air. No pathologic adenopathy. No abdominal aortic aneurysm. Osseous structures show no evidence of acute fracture or suspicious lesion.        Impression:  The gallbladder shows wall thickening and mucosal enhancement. A few radiopaque stones are appreciated. These findings are suggestive of acute cholecystitis. Multiple prominent lymph nodes are appreciated in the yousuf hepatis and near the head of the pancreas. There significance is unclear. 10/26/22 abd US  FINDINGS: Aorta/IVC: Not well visualized. /Visualized portions are within normal limits. Pancreas: Mostly obscured by overlying bowel gas. Liver: There is diffuse increased echogenicity within the liver parenchyma, suggestive of hepatic steatosis. No focal lesions are demonstrated on the provided images. Portal vein: The portal vein shows hepatopedal flow. Gallbladder: The gallbladder is contracted and not assessed. Sonographic Moe sign was negative. CBD: Normal in caliber and measures 2.3 mm. Right kidney: 12.1 cm in length and without evidence of obstruction. Impression:  The gallbladder is contracted and not assessed. Sonographic Moe sign was negative. Hepatic steatosis. 10/29/22: Pt sitting on side of bed. LFT's improved. Wants to proceed with cholecystectomy today. AF, NAD.        Past Medical History:   Diagnosis Date    Childhood asthma     Chronic respiratory failure with hypoxia (HCC)     Continuous at 3L    COPD (chronic obstructive pulmonary disease) ([address]     Stage 4 by GOLD Criteria    COVID-19 01/07/2021    Hyperlipidemia     Daily meds     Hypertension     Managed with meds     Intraductal papilloma of breast 01/16/2019    Left    Microalbuminuria     Microcytosis     Morbid obesity ([address]     Nonproliferative retinopathy due to secondary diabetes ([address] 03/07/2020    Mild, Bilateral    THANH on CPAP 3/2/2017    Paroxysmal atrial fibrillation (HCC)     Pneumonia ages 3 and 9    Type 2 diabetes mellitus ([address]      Past Surgical History:   Procedure Laterality Date    BREAST BIOPSY Left 3/27/2019    LEFT BREAST NEEDLE LOCALIZED LUMPECTOMY  performed by Serafin Marquez DO at [address] HYSTERECTOMY (CERVIX NOT REMOVED)  08/2012    US BREAST NEEDLE BIOPSY LEFT Left 1/7/2019    US BREAST NEEDLE BIOPSY LEFT 1/7/2019 [facility]    US GUIDED NEEDLE LOC OF LEFT BREAST Left 3/27/2019    US GUIDED NEEDLE LOC OF LEFT BREAST 3/27/2019 [facility]     Current Facility-Administered Medications   Medication Dose Route Frequency    miconazole (MICOTIN) 2 % powder   Topical BID    ipratropium-albuterol (DUONEB) nebulizer solution 3 mL  1 vial Inhalation 4x daily    heparin (porcine) injection 4,000 Units  4,000 Units IntraVENous PRN    heparin (porcine) injection 2,000 Units  2,000 Units IntraVENous PRN    heparin 25,000 units in dextrose 5% 250 mL (premix) infusion  5-30 Units/kg/hr IntraVENous Continuous    loperamide (IMODIUM) capsule 2 mg  2 mg Oral 4x Daily PRN    sodium chloride flush 0.9 % injection 5-40 mL  5-40 mL IntraVENous 2 times per day    sodium chloride flush 0.9 % injection 5-40 mL  5-40 mL IntraVENous PRN    ondansetron (ZOFRAN-ODT) disintegrating tablet 4 mg  4 mg Oral Q8H PRN    Or    ondansetron (ZOFRAN) injection 4 mg  4 mg IntraVENous Q6H PRN    polyethylene glycol (GLYCOLAX) packet 17 g  17 g Oral Daily PRN    acetaminophen (TYLENOL) tablet 650 mg  650 mg Oral Q6H PRN    Or    acetaminophen (TYLENOL) suppository 650 mg  650 mg Rectal Q6H PRN    nicotine (NICODERM CQ) 21 MG/24HR 1 patch  1 patch TransDERmal Daily    insulin lispro (HUMALOG) injection vial 0-8 Units  0-8 Units SubCUTAneous TID WC    insulin lispro (HUMALOG) injection vial 0-4 Units  0-4 Units SubCUTAneous Nightly    glucose chewable tablet 16 g  4 tablet Oral PRN    dextrose bolus 10% 125 mL  125 mL IntraVENous PRN    Or    dextrose bolus 10% 250 mL  250 mL IntraVENous PRN    glucagon (rDNA) injection 1 mg  1 mg SubCUTAneous PRN    dextrose 10 % infusion   IntraVENous Continuous PRN    oxyCODONE (ROXICODONE) immediate release tablet 5 mg  5 mg Oral Q4H PRN    morphine injection 4 mg  4 mg IntraVENous Q1H PRN    piperacillin-tazobactam (ZOSYN) 3,375 mg in sodium chloride 0.9 % 50 mL IVPB (mini-bag)  3,375 mg IntraVENous q8h    ipratropium-albuterol (DUONEB) nebulizer solution 1 ampule  1 ampule Inhalation Q6H PRN    mometasone-formoterol (DULERA) 100-5 MCG/ACT inhaler 2 puff  2 puff Inhalation BID    guaiFENesin (MUCINEX) extended release tablet 1,200 mg  1,200 mg Oral BID    albuterol sulfate HFA (PROVENTIL;VENTOLIN;PROAIR) 108 (90 Base) MCG/ACT inhaler 2 puff  2 puff Inhalation Q4H PRN    sodium chloride flush 0.9 % injection 5-40 mL  5-40 mL IntraVENous PRN    sodium chloride flush 0.9 % injection 5-40 mL  5-40 mL IntraVENous PRN    0.9 % sodium chloride infusion   IntraVENous PRN    HYDROmorphone HCl PF (DILAUDID) injection 0.25 mg  0.25 mg IntraVENous Q5 Min PRN     ALLERGIES:  Patient has no known allergies. Social History     Socioeconomic History    Marital status: Single     Spouse name: None    Number of children: None    Years of education: None    Highest education level: None   Tobacco Use    Smoking status: Every Day     Packs/day: 1.00     Years: 32.00     Pack years: 32.00     Types: Cigarettes    Smokeless tobacco: Never    Tobacco comments:     Quit smoking: Currently 10 Cigarettes/day   Vaping Use    Vaping Use: Never used   Substance and Sexual Activity    Alcohol use: Yes    Drug use: Not Currently     Types: Cocaine, Marijuana [place] [facility])   Social History Narrative    Single and lives alone. Disabled--previously worked at [employer]. Has lived in [place] and [place]. No pets.      Social History     Tobacco Use   Smoking Status Every Day    Packs/day: 1.00    Years: 32.00    Pack years: 32.00    Types: Cigarettes   Smokeless Tobacco Never   Tobacco Comments    Quit smoking: Currently 10 Cigarettes/day     Family History   Problem Relation Age of Onset    Coronary Art Dis Neg Hx     COPD Mother     Parkinsonism Father     Asthma Son     Schizophrenia Brother     Hypertension Brother     Emphysema Paternal Grandfather     Hypertension Mother      ROS: The patient has no difficulty with chest pain or shortness of breath. No fever or chills. Comprehensive review of systems was otherwise unremarkable except as noted above. Physical Exam:   /81   Pulse 97   Temp 98.2 °F (36.8 °C) (Oral)   Resp 20   Ht 5' 5\" (1.651 m)   Wt (!) 323 lb 3.2 oz (146.6 kg)   SpO2 96%   BMI 53.78 kg/m²   Vitals:    10/29/22 0614 10/29/22 0615 10/29/22 0728 10/29/22 0833   BP: (!) 138/91   132/81   Pulse: 95  (!) 102 97   Resp: 20  20 20   Temp: 98.6 °F (37 °C)   98.2 °F (36.8 °C)   TempSrc: Oral   Oral   SpO2: 97%  95% 96%   Weight:  (!) 323 lb 3.2 oz (146.6 kg)     Height:  5' 5\" (1.651 m)       No intake/output data recorded. 10/27 1901 - 10/29 0700  In: 36 [P.O.:730]  Out: 0     Constitutional: Alert, oriented, cooperative patient in no acute distress; appears stated age    Eyes: Sclera are clear. EOMs intact  ENMT: no external lesions gross hearing normal; no obvious neck masses, no ear or lip lesions, nares normal  CV: RRR. Normal perfusion  Resp: No JVD. Breathing is  non-labored; no audible wheezing. GI: obese; BMI>54; less tender in epigastrum      Musculoskeletal: unremarkable with normal function. No embolic signs or cyanosis.    Neuro:  Oriented; moves all 4; no focal deficits  Psychiatric: normal affect and mood, no memory impairment    Recent vitals (if inpt):  Patient Vitals for the past 24 hrs:   BP Temp Temp src Pulse Resp SpO2 Height Weight   10/29/22 0833 132/81 98.2 °F (36.8 °C) Oral 97 20 96 % -- --   10/29/22 0728 -- -- -- (!) 102 20 95 % -- --   10/29/22 0615 -- -- -- -- -- -- 5' 5\" (1.651 m) (!) 323 lb 3.2 oz (146.6 kg)   10/29/22 0614 (!) 138/91 98.6 °F (37 °C) Oral 95 20 97 % -- --   10/29/22 0056 -- -- -- -- 18 -- -- --   10/29/22 0000 125/74 98.6 °F (37 °C) Oral (!) 111 18 -- -- --   10/28/22 1912 (!) 142/81 98.9 °F (37.2 °C) Oral (!) 104 16 96 % -- --   10/28/22 1559 -- -- -- (!) 109 18 94 % -- --   10/28/22 1529 132/70 98.4 °F (36.9 °C) Oral (!) 107 18 92 % -- --   10/28/22 1345 -- -- -- (!) 103 18 96 % -- --   10/28/22 1125 129/82 98.3 °F (36.8 °C) Oral 100 19 96 % -- --       Amount and/or Complexity of Data Reviewed and Analyzed:  I reviewed and analyzed all of the unique labs and radiologic studies that are shown below as well as any that are in the HPI, and any that are in the expanded problem list below  *Each unique test, order, or document contributes to the combination of 2 or combination of 3 in Category 1 below. For this visit I also reviewed old records and prior notes.       Recent Labs     10/28/22  1820 10/29/22  0129   WBC  --  8.5   HGB  --  9.5*   PLT  --  301   NA  --  140   K  --  3.5   CL  --  106   CO2  --  29   BUN  --  9   INR 1.0  --    APTT 30.6  --    ALT  --  104*     Review of most recent CBC  Lab Results   Component Value Date    WBC 8.5 10/29/2022    HGB 9.5 (L) 10/29/2022    HCT 32.9 (L) 10/29/2022    MCV 70.4 (L) 10/29/2022     10/29/2022       Review of most recent BMP  Lab Results   Component Value Date/Time     10/29/2022 01:29 AM    K 3.5 10/29/2022 01:29 AM     10/29/2022 01:29 AM    CO2 29 10/29/2022 01:29 AM    BUN 9 10/29/2022 01:29 AM    CREATININE 0.90 10/29/2022 01:29 AM    GLUCOSE 97 10/29/2022 01:29 AM    CALCIUM 9.4 10/29/2022 01:29 AM        Review of most recent LFTs (and lipase if done)  Lab Results   Component Value Date     (H) 10/29/2022    AST 35 10/29/2022    ALKPHOS 151 (H) 10/29/2022    BILITOT 0.3 10/29/2022     Lab Results   Component Value Date    LIPASE 133 10/29/2022       Lab Results   Component Value Date/Time    INR 1.0 10/28/2022 06:20 PM    APTT 30.6 10/28/2022 06:20 PM    APTT 73.7 08/31/2019 11:30 AM       Review of most recent HgbA1c  Hemoglobin A1C   Date Value Ref Range Status   09/01/2022 6.1 (H) 4.8 - 5.6 % Final       Nutritional assessment screen for wound healing issues:  Lab Results   Component Value Date    LABALBU 3.2 (L) 10/29/2022       @lastcovr@    Xray Result (most recent):  XR CHEST PORTABLE 10/26/2022    Narrative  EXAMINATION: XR CHEST PORTABLE 10/26/2022 8:05 PM    ACCESSION NUMBER: [id number]    COMPARISON: None available    INDICATION: dyspnea    TECHNIQUE: A single view of the chest was obtained. FINDINGS:  Lung hypoinflation. Streaky bibasilar opacities. No pneumothorax or sizable pleural effusion. Stable cardiomediastinal silhouette. Impression  -Lung hypoinflation with streaky bibasilar opacities, favored to reflect  atelectasis, though infection/aspiration are also considerations in the  appropriate clinical context. CT Result (most recent):  CT ABDOMEN PELVIS W IV CONTRAST 10/26/2022    Narrative  EXAM: CT ABDOMEN PELVIS W IV CONTRAST    HISTORY: Upper abdominal pain. Diarrhea. TECHNIQUE: Axial images of the abdomen and pelvis were performed following the  administration of intravenous contrast. Images were obtained in the axial plane  and coronal reformatted images were created and reviewed. Dose reduction technique used: Automated exposure control/Adjustment of the mA  and/or kV according to patient size/Use of iterative reconstruction technique. 100 mL of Isovue-370 were utilized. COMPARISON: CT chest dated 10/18/2018 and 11/10/2016. FINDINGS:  A moderate amount of motion artifact limits this examination to some degree. The visualized lung bases and mediastinum are unremarkable. The liver, spleen, pancreas, adrenal glands, and kidneys are within normal  limits. The left kidney contains a tiny low-density lesion in the midpole which  is too small to characterize. The bladder is decompressed and not assessed. The gallbladder shows wall thickening and mucosal enhancement. A few radiopaque  stones are appreciated. Multiple prominent lymph nodes are appreciated in the yousuf hepatis and near the  head of the pancreas.     Distal esophagus and stomach are unremarkable. Small and large bowel show no  evidence of obstruction. There is a normal appendix in the right lower quadrant. No evidence of free fluid or free air. No pathologic adenopathy. No abdominal  aortic aneurysm. Osseous structures show no evidence of acute fracture or suspicious lesion. Impression  The gallbladder shows wall thickening and mucosal enhancement. A few radiopaque  stones are appreciated. These findings are suggestive of acute cholecystitis. Multiple prominent lymph nodes are appreciated in the yousuf hepatis and near the  head of the pancreas. There significance is unclear. US Result (most recent):  US ABDOMEN LIMITED 10/26/2022    Narrative  EXAM: US ABDOMEN LIMITED    HISTORY: Upper abdominal pain, elevated LFTs and elevated lipase. TECHNIQUE: Grayscale and limited color Doppler ultrasound of the right upper  quadrant. COMPARISON: CT abdomen and pelvis dated 10/26/2022    FINDINGS:  Aorta/IVC: Not well visualized. /Visualized portions are within normal limits. Pancreas: Mostly obscured by overlying bowel gas. Liver: There is diffuse increased echogenicity within the liver parenchyma,  suggestive of hepatic steatosis. No focal lesions are demonstrated on the  provided images. Portal vein: The portal vein shows hepatopedal flow. Gallbladder: The gallbladder is contracted and not assessed. Sonographic Moe  sign was negative. CBD: Normal in caliber and measures 2.3 mm. Right kidney: 12.1 cm in length and without evidence of obstruction. Impression  The gallbladder is contracted and not assessed. Sonographic Moe sign was  negative. Hepatic steatosis.       Admission date (for inpatients): 10/26/2022   2 Days Post-Op  Procedure(s):  ERCP ENDOSCOPIC RETROGRADE CHOLANGIOPANCREATOGRAPHY         ASSESSMENT/PLAN:  [unfilled]  Principal Problem:    Acute gallstone pancreatitis  Active Problems:    BMI 50.0-59.9, adult (HCC)    Cigarette nicotine dependence in remission    Hypertension    COPD, severe ([facility]    Diabetes mellitus type 2, controlled ([facility] [facility]    Atrial fibrillation, new onset ([facility]    Morbid obesity with BMI of 50.0-59.9, adult ([facility]    THANH (obstructive sleep apnea)  Resolved Problems:    * No resolved hospital problems. *     Patient Active Problem List    Diagnosis Date Noted    BMI 50.0-59.9, adult ([facility] 10/27/2022     Priority: Medium    Acute gallstone pancreatitis 10/26/2022     Priority: Medium    COVID-19 01/14/2021    Type 2 diabetes with nephropathy ([facility] 01/07/2020    Atrial fibrillation, new onset ([facility] 08/30/2019    Chronic respiratory failure with hypoxia ([facility] 08/28/2019     NC 3L continuous        Cigarette nicotine dependence in remission 03/05/2017    COPD, severe ([address] 03/02/2017     PFTs 3/2016  FVC 2.22 L and 62 % of predicted. FEV1 1.23 L and 42 % of predicted. Ratio 55%. There was no clinically relevant response to bronchodilator  LUNG VOLUMES:  TLC 4.61 L and 89 % of predicted. RV 2.39 L and 139 % of predicted. DIFFUSION:  The diffusing capacity was Corrected for hemoglobin of 14.7 g/dL  and was 15.9 mL/mmHg/minute and 58 % of predicted. Diabetes mellitus type 2, controlled ([address] 03/02/2017    Morbid obesity with BMI of 50.0-59.9, adult ([facility] 03/02/2017    THANH (obstructive sleep apnea) 03/02/2017    Nocturnal hypoxemia 03/07/2016    Hypertension 03/07/2016    Chronic back pain 03/07/2016    Obesity hypoventilation syndrome ([facility] 12/18/2015          Number and Complexity of Problems addressed and   Risks of complications and/or morbidity of management      Epigastric pain, leukocytosis, gallstone pancreatitis, elevated LFTs, abnormal liver echotexture  Continue inpatient admission  WBC normalized by 10/28/22  Repeat CMP pending this am    NPO.   IVF  IV Zosyn  At this point, no indication for ERCP per GI    Cholecystectomy and possible liver biopsy today      BMI>54  Encourage weight loss    Tobacco use  Encourage smoking cessation            Level of MDM (2/3 elements below)  Number and Complexity of Problems Addressed Amount and/or Complexity of Data to be Reviewed and Analyzed  *Each unique test, order, or document contributes to the combination of 2 or combination of 3 in Category 1 below. Risk of Complications and/or Morbidity or Mortality of pt Management     55680  71368 SF Minimal  ?1self-limited or minor problem Minimal or none Minimal risk of morbidity from additional diagnostic testing or Rx   98830  12267 Low Low  ? 2or more self-limited or minor problems;    or  ? 1stable chronic illness;    or  ?1acute, uncomplicated illness or injury   Limited  (Must meet the requirements of at least 1 of the 2 categories)  Category 1: Tests and documents   ? Any combination of 2 from the following:  ?Review of prior external note(s) from each unique source*;  ?review of the result(s) of each unique test*;   ?ordering of each unique test*    or   Category 2: Assessment requiring an independent historian(s)  (For the categories of independent interpretation of tests and discussion of management or test interpretation, see moderate or high) Low risk of morbidity from additional diagnostic testing or treatment     24325  32518 Mod Moderate  ? 1or more chronic illnesses with exacerbation, progression, or side effects of treatment;    or  ?2or more stable chronic illnesses;    or  ?1undiagnosed new problem with uncertain prognosis;    or  ?1acute illness with systemic symptoms;    or  ?1acute complicated injury   Moderate  (Must meet the requirements of at least 1 out of 3 categories)  Category 1: Tests, documents, or independent historian(s)  ? Any combination of 3 from the following:   ?Review of prior external note(s) from each unique source*;  ?Review of the result(s) of each unique test*;  ?Ordering of each unique test*;  ?Assessment requiring an independent historian(s)    or  Category 2: Independent interpretation of tests ?Independent interpretation of a test performed by another physician/other qualified health care professional (not separately reported);     or  Category 3: Discussion of management or test interpretation  ? Discussion of management or test interpretation with external physician/other qualified health care professional/appropriate source (not separately reported)   Moderate risk of morbidity from additional diagnostic testing or treatment  Examples only:  ?Prescription drug management   ? Decision regarding minor surgery with identified patient or procedure risk factors  ? Decision regarding elective major surgery without identified patient or procedure risk factors   ? Diagnosis or treatment significantly limited by social determinants of health       [id number] High High  ? 1or more chronic illnesses with severe exacerbation, progression, or side effects of treatment;    or  ?1 acute or chronic illness or injury that poses a threat to life or bodily function   Extensive  (Must meet the requirements of at least 2 out of 3 categories)  Category 1: Tests, documents, or independent historian(s)  ? Any combination of 3 from the following:   ?Review of prior external note(s) from each unique source*;  ?Review of the result(s) of each unique test*;   ?Ordering of each unique test*;   ?Assessment requiring an independent historian(s)    or   Category 2: Independent interpretation of tests   ? Independent interpretation of a test performed by another physician/other qualified health care professional (not separately reported);     or  Category 3: Discussion of management or test interpretation  ? Discussion of management or test interpretation with external physician/other qualified health care professional/appropriate source (not separately reported)   High risk of morbidity from additional diagnostic testing or treatment  Examples only:  ?Drug therapy requiring intensive monitoring for toxicity  ? Decision regarding elective major surgery with identified patient or procedure risk factors  ? Decision regarding emergency major surgery  ? Decision regarding hospitalization  ? Decision not to resuscitate or to de-escalate care because of poor prognosis             I have personally performed a face-to-face diagnostic evaluation and management  service on this patient. I have independently seen the patient. I have independently obtained the above history from the patient/family. I have independently examined the patient with above findings. I have independently reviewed data/labs for this patient and developed the above plan of care (MDM).       Signed: Edelmira Capellan, [id number]    8:52 AM

## 2022-10-29 NOTE — PROGRESS NOTES
Spoke w Nevaeh Handler to clarify heparin orders. Per Jaren Level no need to re-start heparin gtt at minimal rate.  Continue heparin drip at current rate, follow protocol etc.  STOP HEPARIN AT MIDNIGHT

## 2022-10-29 NOTE — ANESTHESIA PRE PROCEDURE
Department of Anesthesiology  Preprocedure Note       Name:  Mona Ocasio   Age:  50 y.o.  :  1970                                          MRN:  [id number]         Date:  10/29/2022      Surgeon: Shala Chang):  Jacoby Yang MD    Procedure: Procedure(s):  CHOLECYSTECTOMY LAPAROSCOPIC WITH POSSIBLE LIVER BIOPSY    Medications prior to admission:   Prior to Admission medications    Medication Sig Start Date End Date Taking? Authorizing Provider   dilTIAZem (CARDIZEM CD) 240 MG extended release capsule  22   Historical Provider, MD   blood glucose test strips (ASCENSIA AUTODISC VI;ONE TOUCH ULTRA TEST VI) strip Use to check blood sugar once daily as directed. Dx:    Historical Provider, MD   Lancets 33G MISC Use to check glucose once daily.   Dx:    Historical Provider, MD   atorvastatin (LIPITOR) 10 MG tablet Take 1 tablet by mouth at bedtime 22   Maged Geller PA-C   lisinopril (PRINIVIL;ZESTRIL) 5 MG tablet Take 1 tablet by mouth daily 22   Maged Geller PA-C   metFORMIN (GLUCOPHAGE) 500 MG tablet Take 1 tablet by mouth 2 times daily (with meals) 22   Maged Geller PA-C   albuterol sulfate  (90 Base) MCG/ACT inhaler Inhale 2 puffs into the lungs every 4 hours as needed for Wheezing 22   Elnor Staff, APRN - CNP   ipratropium-albuterol (DUONEB) 0.5-2.5 (3) MG/3ML SOLN nebulizer solution Inhale 3 mLs into the lungs 4 times daily File to Medicare part B--J44.9 22   Elnor Staff, APRN - CNP   apixaban (ELIQUIS) 5 MG TABS tablet TAKE ONE TABLET BY MOUTH EVERY 12 HOURS **MUST SCHEDULE APPOINTMENT FOR MORE REFILLS** 22   Ar Automatic Reconciliation   dilTIAZem (TIAZAC) 240 MG extended release capsule TAKE ONE CAPSULE BY MOUTH EVERY DAY (MUST SCHEDULE APPOINTMENT FOR FURTHER REFILLS) 22   Ar Automatic Reconciliation   Fluticasone furoate-vilanterol (BREO ELLIPTA) 200-25 MCG/INH AEPB inhaler INHALE ONE PUFF ONCE DAILY *RINSE MOUTH WELL AFTER USE* 11/23/21   Ar Automatic Reconciliation   furosemide (LASIX) 20 MG tablet TAKE ONE TABLET BY MOUTH EVERY DAY ( TAKE ONE TABLET BY MOUTH AS NEEDED )  Patient not taking: Reported on 10/27/2022 1/3/22   Ar Automatic Reconciliation   guaiFENesin 1200 MG TB12 Take 1,200 mg by mouth 2 times daily 1/24/22   Ar Automatic Reconciliation       Current medications:    Current Facility-Administered Medications   Medication Dose Route Frequency Provider Last Rate Last Admin    miconazole (MICOTIN) 2 % powder   Topical BID Reza Jolley MD        ipratropium-albuterol (DUONEB) nebulizer solution 3 mL  1 vial Inhalation 4x daily Jackie Casanova MD   3 mL at 10/29/22 1118    heparin (porcine) injection 4,000 Units  4,000 Units IntraVENous PRN Jackie Casanova MD   4,000 Units at 10/29/22 0503    heparin (porcine) injection 2,000 Units  2,000 Units IntraVENous PRN Jackie Casanova MD        heparin 25,000 units in dextrose 5% 250 mL (premix) infusion  5-30 Units/kg/hr IntraVENous Continuous Jackie Casanova MD 14.8 mL/hr at 10/29/22 0504 10 Units/kg/hr at 10/29/22 0504    loperamide (IMODIUM) capsule 2 mg  2 mg Oral 4x Daily PRN Jackie Casanova MD   2 mg at 10/28/22 1837    sodium chloride flush 0.9 % injection 5-40 mL  5-40 mL IntraVENous 2 times per day Emilio Martinez MD   5 mL at 10/29/22 0906    sodium chloride flush 0.9 % injection 5-40 mL  5-40 mL IntraVENous PRN Emilio Martinez MD   10 mL at 10/27/22 0816    ondansetron (ZOFRAN-ODT) disintegrating tablet 4 mg  4 mg Oral Q8H PRN Emilio Martinez MD   4 mg at 10/29/22 1158    Or    ondansetron (ZOFRAN) injection 4 mg  4 mg IntraVENous Q6H PRN Emilio Martinez MD        polyethylene glycol [facility]) packet 17 g  17 g Oral Daily PRN Emilio Martinez MD        acetaminophen (TYLENOL) tablet 650 mg  650 mg Oral Q6H PRN Emilio Martinez MD   650 mg at 10/29/22 1158    Or    acetaminophen (TYLENOL) suppository 650 mg  650 mg Rectal Q6H PRN Kristi Giraldo Bisi Fontanez MD        nicotine (NICODERM CQ) 21 MG/24HR 1 patch  1 patch TransDERmal Daily Nancy Perdue MD   1 patch at 10/29/22 0904    insulin lispro (HUMALOG) injection vial 0-8 Units  0-8 Units SubCUTAneous TID WC Nancy Perdue MD        insulin lispro (HUMALOG) injection vial 0-4 Units  0-4 Units SubCUTAneous Nightly Nancy Perdue MD        glucose chewable tablet 16 g  4 tablet Oral PRN Nancy Perdue MD        dextrose bolus 10% 125 mL  125 mL IntraVENous PRN Nancy Perdue MD        Or    dextrose bolus 10% 250 mL  250 mL IntraVENous PRN Nancy Perdue MD   Stopped at 10/27/22 1237    glucagon (rDNA) injection 1 mg  1 mg SubCUTAneous PRN Nancy Perdue MD        dextrose 10 % infusion   IntraVENous Continuous PRN Nancy Perdue MD        oxyCODONE (ROXICODONE) immediate release tablet 5 mg  5 mg Oral Q4H PRN Nancy Perdue MD   5 mg at 10/29/22 1158    morphine injection 4 mg  4 mg IntraVENous Q1H PRN Nancy Perdue MD        piperacillin-tazobactam (ZOSYN) 3,375 mg in sodium chloride 0.9 % 50 mL IVPB (mini-bag)  3,375 mg IntraVENous q8h Nancy Perdue MD 12.5 mL/hr at 10/29/22 0905 3,375 mg at 10/29/22 0905    ipratropium-albuterol (DUONEB) nebulizer solution 1 ampule  1 ampule Inhalation Q6H PRN Aston Tucker MD   1 ampule at 10/28/22 0352    mometasone-formoterol (DULERA) 100-5 MCG/ACT inhaler 2 puff  2 puff Inhalation BID Nancy Perdue MD   2 puff at 10/29/22 0728    guaiFENesin (MUCINEX) extended release tablet 1,200 mg  1,200 mg Oral BID Nancy Perdue MD   1,200 mg at 10/29/22 0903    albuterol sulfate HFA (PROVENTIL;VENTOLIN;PROAIR) 108 (90 Base) MCG/ACT inhaler 2 puff  2 puff Inhalation Q4H PRN Nancy Perdue MD        sodium chloride flush 0.9 % injection 5-40 mL  5-40 mL IntraVENous PRN Ladona Scales, MD        sodium chloride flush 0.9 % injection 5-40 mL  5-40 mL IntraVENous PRN Aryaona Scales, MD        0.9 % sodium chloride infusion   IntraVENous PRN Ladona Scales, MD [place] HYDROmorphone HCl PF (DILAUDID) injection 0.25 mg  0.25 mg IntraVENous Q5 Min PRN Chago Lopez MD           Allergies:  No Known Allergies    Problem List:    Patient Active Problem List   Diagnosis Code    COVID-19 U07.1    Type 2 diabetes with nephropathy (HCC) E11.21    Cigarette nicotine dependence in remission F17.211    Nocturnal hypoxemia G47.34    Hypertension I10    COPD, severe ([address] J44.9    Diabetes mellitus type 2, controlled ([address] E11.9    Atrial fibrillation, new onset ([address] I48.91    Chronic back pain M54.9, G89.29    Morbid obesity with BMI of 50.0-59.9, adult ([address] E66.01, Z68.43    Chronic respiratory failure with hypoxia ([address] J96.11    THANH (obstructive sleep apnea) G47.33    Obesity hypoventilation syndrome (HCC) E66.2    Acute gallstone pancreatitis K85.10    BMI 50.0-59.9, adult ([facility]) Z68.43       Past Medical History:        Diagnosis Date    Childhood asthma     Chronic respiratory failure with hypoxia (HCC)     Continuous at 3L    COPD (chronic obstructive pulmonary disease) (HCC)     Stage 4 by GOLD Criteria    COVID-19 01/07/2021    Hyperlipidemia     Daily meds     Hypertension     Managed with meds     Intraductal papilloma of breast 01/16/2019    Left    Microalbuminuria     Microcytosis     Morbid obesity ([address]     Nonproliferative retinopathy due to secondary diabetes ([address] 03/07/2020    Mild, Bilateral    THANH on CPAP 3/2/2017    Paroxysmal atrial fibrillation ([address]     Pneumonia ages 1 and 9    Type 2 diabetes mellitus ([address]        Past Surgical History:        Procedure Laterality Date    BREAST BIOPSY Left 3/27/2019    LEFT BREAST NEEDLE LOCALIZED LUMPECTOMY  performed by Chinedu Chris DO at [address] ([place]ERV[place] NOT REMOVED)  08/2012    US BREAST NEEDLE BIOPSY LEFT Left 1/7/2019    US BREAST NEEDLE BIOPSY LEFT 1/7/2019 [facility]    US GUIDED NEEDLE LOC OF LEFT BREAST Left 3/27/2019    US GUIDED NEEDLE LOC OF LEFT BREAST 3/27/2019 [facility]       Social History:    Social History     Tobacco Use    Smoking status: Every Day     Packs/day: 1.00     Years: 32.00     Pack years: 32.00     Types: Cigarettes    Smokeless tobacco: Never    Tobacco comments:     Quit smoking: Currently 10 Cigarettes/day   Substance Use Topics    Alcohol use: Yes                                Ready to quit: Not Answered  Counseling given: Not Answered  Tobacco comments: Quit smoking: Currently 10 Cigarettes/day      Vital Signs (Current):   Vitals:    10/29/22 0728 10/29/22 0833 10/29/22 1118 10/29/22 1217   BP:  132/81  (!) 145/84   Pulse: (!) 102 97 94 97   Resp: 20 20 18 20   Temp:  98.2 °F (36.8 °C)  98.8 °F (37.1 °C)   TempSrc:  Oral  Oral   SpO2: 95% 96% 95% 95%   Weight:       Height:                                                  BP Readings from Last 3 Encounters:   10/29/22 (!) 145/84   09/08/22 120/80   05/23/22 135/83       NPO Status: Time of last liquid consumption: 2300                        Time of last solid consumption: 2300                        Date of last liquid consumption: 10/26/22                        Date of last solid food consumption: 10/26/22    BMI:   Wt Readings from Last 3 Encounters:   10/29/22 (!) 323 lb 3.2 oz (146.6 kg)   09/08/22 (!) 327 lb (148.3 kg)   05/23/22 (!) 325 lb (147.4 kg)     Body mass index is 53.78 kg/m².     CBC:   Lab Results   Component Value Date/Time    WBC 8.5 10/29/2022 01:29 AM    RBC 4.67 10/29/2022 01:29 AM    HGB 9.5 10/29/2022 01:29 AM    HCT 32.9 10/29/2022 01:29 AM    MCV 70.4 10/29/2022 01:29 AM    RDW 17.9 10/29/2022 01:29 AM     10/29/2022 01:29 AM       CMP:   Lab Results   Component Value Date/Time     10/29/2022 01:29 AM    K 3.5 10/29/2022 01:29 AM     10/29/2022 01:29 AM    CO2 29 10/29/2022 01:29 AM    BUN 9 10/29/2022 01:29 AM    CREATININE 0.90 10/29/2022 01:29 AM    GFRAA >60 09/01/2022 01:32 PM    AGRATIO 1.3 03/28/2022 11:26 AM    LABGLOM >60 10/29/2022 01:29 AM    GLUCOSE 97 10/29/2022 01:29 AM    PROT 7.7 10/29/2022 01:29 AM    CALCIUM 9.4 10/29/2022 01:29 AM    BILITOT 0.3 10/29/2022 01:29 AM    ALKPHOS 151 10/29/2022 01:29 AM    ALKPHOS 145 03/28/2022 11:26 AM    AST 35 10/29/2022 01:29 AM     10/29/2022 01:29 AM       POC Tests:   Recent Labs     10/28/22  2020   POCGLU 101*       Coags:   Lab Results   Component Value Date/Time    PROTIME 13.6 10/28/2022 06:20 PM    INR 1.0 10/28/2022 06:20 PM    APTT 30.6 10/28/2022 06:20 PM    APTT 73.7 08/31/2019 11:30 AM       HCG (If Applicable): No results found for: PREGTESTUR, PREGSERUM, HCG, HCGQUANT     ABGs:   Lab Results   Component Value Date/Time    PHART 7.371 09/02/2019 11:45 AM    PO2ART 63 09/02/2019 11:45 AM    AJS9MDP 57.6 09/02/2019 11:45 AM    NWP6GGT 33.4 09/02/2019 11:45 AM    BEART 6 09/02/2019 11:45 AM        Type & Screen (If Applicable):  No results found for: LABABO, LABRH    Drug/Infectious Status (If Applicable):  No results found for: HIV, HEPCAB    COVID-19 Screening (If Applicable):   Lab Results   Component Value Date/Time    COVID19 Not Detected 08/12/2020 10:06 AM           Anesthesia Evaluation  Patient summary reviewed and Nursing notes reviewed  Airway: Mallampati: I  TM distance: >3 FB   Neck ROM: full  Mouth opening: > = 3 FB   Dental: normal exam         Pulmonary: breath sounds clear to auscultation  (+) COPD (Stable, Chronic respiratory failure with hypoxia - Continuous at 3L, COPD Stage 4 by GOLD Criteria ): severe,  sleep apnea (Obesity hypoventilation syndrome ): on CPAP,  current smoker                           Cardiovascular:  Exercise tolerance: poor (<4 METS),   (+) hypertension:, dysrhythmias (On heparin infusion currently): atrial fibrillation, hyperlipidemia        Rhythm: regular  Rate: normal                 ROS comment: Echo 8/19:    Left ventricle: Systolic function was normal. Ejection fraction was  estimated to be 60 %. Although no diagnostic regional wall motion abnormality  was identified, this possibility cannot be completely excluded on the basis   of  this study.     -  Inferior vena cava, hepatic veins: The inferior vena cava was mildly  dilated. The respirophasic change in diameter was more than 50%.    -  Pericardium: There was no pericardial effusion. Neuro/Psych:   Negative Neuro/Psych ROS              GI/Hepatic/Renal:   (+) morbid obesity (super obese)         ROS comment: Admitted for sepsis secondary acute gallstone pancreatitis (lipase 30,000) via EMS  . Endo/Other:    (+) DiabetesType II DM, using insulin, . Abdominal:   (+) obese,           Vascular: Other Findings:           Anesthesia Plan      general     ASA 4     (Patient scheduled for surgery today at 1500, however, patient is currently on a heparin infusion for anticoagulation bridging for A. Fib. Will delay case until tomorrow morning. Update: heparin off at MN. Pt received neb tx this AM.  Bedside O2 sat 98% on 3 L NC, HR 91. Pt aware of possibility of post op ventilation. Extubated in PACU after surgery in 2019.)  Induction: rapid sequence and intravenous. MIPS: Postoperative ventilation. Anesthetic plan and risks discussed with patient. Use of blood products discussed with patient whom consented to blood products.                      Ethan Decker MD   10/29/2022

## 2022-10-29 NOTE — PROGRESS NOTES
TRANSFER - IN REPORT:    Verbal report received from CORONA Barlow on Michelle Obrien being received from [facility] for routine progression of care. Report consisted of patients Situation, Background, Assessment and Recommendations(SBAR). Information from the following report(s) SBAR, Kardex, ED Summary, Procedure Summary, and Recent Results was reviewed. Opportunity for questions and clarification was provided. Assessment completed upon patients arrival to unit and care assumed. Patient received to room 314. Patient connected to monitor and assessment completed. Plan of care reviewed. Patient oriented to room and call light. Patient aware to use call light to communicate any chest pain or needs. Admission skin assessment completed with second RN and reveals the following:     Sacrum and heels are clean, dry, and intact. BLE rash, scratch marks, and dryness noted. Excoriation and rash noted under breasts.

## 2022-10-30 LAB
ALBUMIN SERPL-MCNC: 3.2 G/DL (ref 3.5–5)
ALBUMIN/GLOB SERPL: 0.9 (ref 0.4–1.6)
ALP SERPL-CCNC: 150 U/L (ref 50–136)
ALT SERPL-CCNC: 83 U/L (ref 12–65)
ANION GAP SERPL CALC-SCNC: 1 MMOL/L (ref 2–11)
AST SERPL-CCNC: 27 U/L (ref 15–37)
BASOPHILS # BLD: 0 K/UL (ref 0–0.2)
BASOPHILS NFR BLD: 1 % (ref 0–2)
BILIRUB SERPL-MCNC: 0.3 MG/DL (ref 0.2–1.1)
BUN SERPL-MCNC: 7 MG/DL (ref 6–23)
CALCIUM SERPL-MCNC: 9.2 MG/DL (ref 8.3–10.4)
CHLORIDE SERPL-SCNC: 104 MMOL/L (ref 101–110)
CO2 SERPL-SCNC: 35 MMOL/L (ref 21–32)
CREAT SERPL-MCNC: 0.8 MG/DL (ref 0.6–1)
DIFFERENTIAL METHOD BLD: ABNORMAL
EOSINOPHIL # BLD: 0.2 K/UL (ref 0–0.8)
EOSINOPHIL NFR BLD: 3 % (ref 0.5–7.8)
ERYTHROCYTE [DISTWIDTH] IN BLOOD BY AUTOMATED COUNT: 18.5 % (ref 11.9–14.6)
GLOBULIN SER CALC-MCNC: 3.6 G/DL (ref 2.8–4.5)
GLUCOSE BLD STRIP.AUTO-MCNC: 108 MG/DL (ref 65–100)
GLUCOSE BLD STRIP.AUTO-MCNC: 112 MG/DL (ref 65–100)
GLUCOSE BLD STRIP.AUTO-MCNC: 117 MG/DL (ref 65–100)
GLUCOSE BLD STRIP.AUTO-MCNC: 86 MG/DL (ref 65–100)
GLUCOSE BLD STRIP.AUTO-MCNC: 94 MG/DL (ref 65–100)
GLUCOSE SERPL-MCNC: 94 MG/DL (ref 65–100)
HCT VFR BLD AUTO: 34.6 % (ref 35.8–46.3)
HGB BLD-MCNC: 9.6 G/DL (ref 11.7–15.4)
IMM GRANULOCYTES # BLD AUTO: 0 K/UL (ref 0–0.5)
IMM GRANULOCYTES NFR BLD AUTO: 0 % (ref 0–5)
INR PPP: 0.9
LYMPHOCYTES # BLD: 1.8 K/UL (ref 0.5–4.6)
LYMPHOCYTES NFR BLD: 22 % (ref 13–44)
MAGNESIUM SERPL-MCNC: 2.1 MG/DL (ref 1.8–2.4)
MCH RBC QN AUTO: 20.3 PG (ref 26.1–32.9)
MCHC RBC AUTO-ENTMCNC: 27.7 G/DL (ref 31.4–35)
MCV RBC AUTO: 73.2 FL (ref 82–102)
MONOCYTES # BLD: 0.5 K/UL (ref 0.1–1.3)
MONOCYTES NFR BLD: 6 % (ref 4–12)
NEUTS SEG # BLD: 5.7 K/UL (ref 1.7–8.2)
NEUTS SEG NFR BLD: 69 % (ref 43–78)
NRBC # BLD: 0 K/UL (ref 0–0.2)
PLATELET # BLD AUTO: 284 K/UL (ref 150–450)
PMV BLD AUTO: 9.9 FL (ref 9.4–12.3)
POTASSIUM SERPL-SCNC: 3.5 MMOL/L (ref 3.5–5.1)
PROT SERPL-MCNC: 6.8 G/DL (ref 6.3–8.2)
PROTHROMBIN TIME: 12.5 SEC (ref 12.6–14.3)
RBC # BLD AUTO: 4.73 M/UL (ref 4.05–5.2)
SERVICE CMNT-IMP: ABNORMAL
SERVICE CMNT-IMP: NORMAL
SERVICE CMNT-IMP: NORMAL
SODIUM SERPL-SCNC: 140 MMOL/L (ref 133–143)
WBC # BLD AUTO: 8.3 K/UL (ref 4.3–11.1)

## 2022-10-30 PROCEDURE — 2500000003 HC RX 250 WO HCPCS: Performed by: ANESTHESIOLOGY

## 2022-10-30 PROCEDURE — 7100000001 HC PACU RECOVERY - ADDTL 15 MIN: Performed by: SURGERY

## 2022-10-30 PROCEDURE — 3600000014 HC SURGERY LEVEL 4 ADDTL 15MIN: Performed by: SURGERY

## 2022-10-30 PROCEDURE — 6370000000 HC RX 637 (ALT 250 FOR IP): Performed by: ANESTHESIOLOGY

## 2022-10-30 PROCEDURE — 88307 TISSUE EXAM BY PATHOLOGIST: CPT

## 2022-10-30 PROCEDURE — 94640 AIRWAY INHALATION TREATMENT: CPT

## 2022-10-30 PROCEDURE — 0FT44ZZ RESECTION OF GALLBLADDER, PERCUTANEOUS ENDOSCOPIC APPROACH: ICD-10-PCS | Performed by: SURGERY

## 2022-10-30 PROCEDURE — 2720000010 HC SURG SUPPLY STERILE: Performed by: SURGERY

## 2022-10-30 PROCEDURE — 6360000002 HC RX W HCPCS: Performed by: HOSPITALIST

## 2022-10-30 PROCEDURE — 2709999900 HC NON-CHARGEABLE SUPPLY: Performed by: SURGERY

## 2022-10-30 PROCEDURE — 1100000000 HC RM PRIVATE

## 2022-10-30 PROCEDURE — 0FB24ZX EXCISION OF LEFT LOBE LIVER, PERCUTANEOUS ENDOSCOPIC APPROACH, DIAGNOSTIC: ICD-10-PCS | Performed by: SURGERY

## 2022-10-30 PROCEDURE — 82962 GLUCOSE BLOOD TEST: CPT

## 2022-10-30 PROCEDURE — 88312 SPECIAL STAINS GROUP 1: CPT

## 2022-10-30 PROCEDURE — 6370000000 HC RX 637 (ALT 250 FOR IP): Performed by: INTERNAL MEDICINE

## 2022-10-30 PROCEDURE — 94761 N-INVAS EAR/PLS OXIMETRY MLT: CPT

## 2022-10-30 PROCEDURE — 85025 COMPLETE CBC W/AUTO DIFF WBC: CPT

## 2022-10-30 PROCEDURE — 36415 COLL VENOUS BLD VENIPUNCTURE: CPT

## 2022-10-30 PROCEDURE — 3600000004 HC SURGERY LEVEL 4 BASE: Performed by: SURGERY

## 2022-10-30 PROCEDURE — 83735 ASSAY OF MAGNESIUM: CPT

## 2022-10-30 PROCEDURE — 94660 CPAP INITIATION&MGMT: CPT

## 2022-10-30 PROCEDURE — 3700000001 HC ADD 15 MINUTES (ANESTHESIA): Performed by: SURGERY

## 2022-10-30 PROCEDURE — 2500000003 HC RX 250 WO HCPCS: Performed by: SURGERY

## 2022-10-30 PROCEDURE — 2580000003 HC RX 258: Performed by: ANESTHESIOLOGY

## 2022-10-30 PROCEDURE — 80053 COMPREHEN METABOLIC PANEL: CPT

## 2022-10-30 PROCEDURE — 6370000000 HC RX 637 (ALT 250 FOR IP): Performed by: HOSPITALIST

## 2022-10-30 PROCEDURE — 3700000000 HC ANESTHESIA ATTENDED CARE: Performed by: SURGERY

## 2022-10-30 PROCEDURE — 88304 TISSUE EXAM BY PATHOLOGIST: CPT

## 2022-10-30 PROCEDURE — 85610 PROTHROMBIN TIME: CPT

## 2022-10-30 PROCEDURE — 2580000003 HC RX 258: Performed by: HOSPITALIST

## 2022-10-30 PROCEDURE — 1100000003 HC PRIVATE W/ TELEMETRY

## 2022-10-30 PROCEDURE — 88313 SPECIAL STAINS GROUP 2: CPT

## 2022-10-30 PROCEDURE — 2700000000 HC OXYGEN THERAPY PER DAY

## 2022-10-30 PROCEDURE — 94760 N-INVAS EAR/PLS OXIMETRY 1: CPT

## 2022-10-30 PROCEDURE — 7100000000 HC PACU RECOVERY - FIRST 15 MIN: Performed by: SURGERY

## 2022-10-30 PROCEDURE — 6360000002 HC RX W HCPCS: Performed by: ANESTHESIOLOGY

## 2022-10-30 RX ORDER — MIDAZOLAM HYDROCHLORIDE 2 MG/2ML
2 INJECTION, SOLUTION INTRAMUSCULAR; INTRAVENOUS
Status: DISCONTINUED | OUTPATIENT
Start: 2022-10-30 | End: 2022-10-30 | Stop reason: HOSPADM

## 2022-10-30 RX ORDER — OXYCODONE HYDROCHLORIDE 5 MG/1
5 TABLET ORAL
Status: DISCONTINUED | OUTPATIENT
Start: 2022-10-30 | End: 2022-10-30 | Stop reason: HOSPADM

## 2022-10-30 RX ORDER — PROPOFOL 10 MG/ML
INJECTION, EMULSION INTRAVENOUS PRN
Status: DISCONTINUED | OUTPATIENT
Start: 2022-10-30 | End: 2022-10-30 | Stop reason: SDUPTHER

## 2022-10-30 RX ORDER — LIDOCAINE HYDROCHLORIDE 20 MG/ML
INJECTION, SOLUTION EPIDURAL; INFILTRATION; INTRACAUDAL; PERINEURAL PRN
Status: DISCONTINUED | OUTPATIENT
Start: 2022-10-30 | End: 2022-10-30 | Stop reason: SDUPTHER

## 2022-10-30 RX ORDER — ROCURONIUM BROMIDE 10 MG/ML
INJECTION, SOLUTION INTRAVENOUS PRN
Status: DISCONTINUED | OUTPATIENT
Start: 2022-10-30 | End: 2022-10-30 | Stop reason: SDUPTHER

## 2022-10-30 RX ORDER — FENTANYL CITRATE 50 UG/ML
INJECTION, SOLUTION INTRAMUSCULAR; INTRAVENOUS PRN
Status: DISCONTINUED | OUTPATIENT
Start: 2022-10-30 | End: 2022-10-30 | Stop reason: SDUPTHER

## 2022-10-30 RX ORDER — ONDANSETRON 2 MG/ML
INJECTION INTRAMUSCULAR; INTRAVENOUS PRN
Status: DISCONTINUED | OUTPATIENT
Start: 2022-10-30 | End: 2022-10-30 | Stop reason: SDUPTHER

## 2022-10-30 RX ORDER — SUCCINYLCHOLINE/SOD CL,ISO/PF 200MG/10ML
SYRINGE (ML) INTRAVENOUS PRN
Status: DISCONTINUED | OUTPATIENT
Start: 2022-10-30 | End: 2022-10-30 | Stop reason: SDUPTHER

## 2022-10-30 RX ORDER — SODIUM CHLORIDE, SODIUM LACTATE, POTASSIUM CHLORIDE, CALCIUM CHLORIDE 600; 310; 30; 20 MG/100ML; MG/100ML; MG/100ML; MG/100ML
INJECTION, SOLUTION INTRAVENOUS CONTINUOUS PRN
Status: DISCONTINUED | OUTPATIENT
Start: 2022-10-30 | End: 2022-10-30 | Stop reason: SDUPTHER

## 2022-10-30 RX ORDER — HYDROMORPHONE HYDROCHLORIDE 2 MG/ML
0.25 INJECTION, SOLUTION INTRAMUSCULAR; INTRAVENOUS; SUBCUTANEOUS EVERY 5 MIN PRN
Status: DISCONTINUED | OUTPATIENT
Start: 2022-10-30 | End: 2022-10-30 | Stop reason: HOSPADM

## 2022-10-30 RX ORDER — ONDANSETRON 2 MG/ML
4 INJECTION INTRAMUSCULAR; INTRAVENOUS
Status: DISCONTINUED | OUTPATIENT
Start: 2022-10-30 | End: 2022-10-30 | Stop reason: HOSPADM

## 2022-10-30 RX ORDER — ALBUTEROL SULFATE 90 UG/1
AEROSOL, METERED RESPIRATORY (INHALATION) PRN
Status: DISCONTINUED | OUTPATIENT
Start: 2022-10-30 | End: 2022-10-30 | Stop reason: SDUPTHER

## 2022-10-30 RX ORDER — DEXAMETHASONE SODIUM PHOSPHATE 4 MG/ML
INJECTION, SOLUTION INTRA-ARTICULAR; INTRALESIONAL; INTRAMUSCULAR; INTRAVENOUS; SOFT TISSUE PRN
Status: DISCONTINUED | OUTPATIENT
Start: 2022-10-30 | End: 2022-10-30 | Stop reason: SDUPTHER

## 2022-10-30 RX ORDER — BUPIVACAINE HYDROCHLORIDE 2.5 MG/ML
INJECTION, SOLUTION EPIDURAL; INFILTRATION; INTRACAUDAL PRN
Status: DISCONTINUED | OUTPATIENT
Start: 2022-10-30 | End: 2022-10-30 | Stop reason: HOSPADM

## 2022-10-30 RX ORDER — SODIUM CHLORIDE, SODIUM LACTATE, POTASSIUM CHLORIDE, CALCIUM CHLORIDE 600; 310; 30; 20 MG/100ML; MG/100ML; MG/100ML; MG/100ML
INJECTION, SOLUTION INTRAVENOUS CONTINUOUS
Status: DISCONTINUED | OUTPATIENT
Start: 2022-10-30 | End: 2022-10-30 | Stop reason: HOSPADM

## 2022-10-30 RX ORDER — DEXTROSE MONOHYDRATE 100 MG/ML
INJECTION, SOLUTION INTRAVENOUS CONTINUOUS PRN
Status: DISCONTINUED | OUTPATIENT
Start: 2022-10-30 | End: 2022-10-30 | Stop reason: HOSPADM

## 2022-10-30 RX ADMIN — LIDOCAINE HYDROCHLORIDE 60 MG: 20 INJECTION, SOLUTION EPIDURAL; INFILTRATION; INTRACAUDAL; PERINEURAL at 08:30

## 2022-10-30 RX ADMIN — SODIUM CHLORIDE, PRESERVATIVE FREE 10 ML: 5 INJECTION INTRAVENOUS at 20:06

## 2022-10-30 RX ADMIN — ONDANSETRON 4 MG: 2 INJECTION INTRAMUSCULAR; INTRAVENOUS at 09:07

## 2022-10-30 RX ADMIN — PIPERACILLIN SODIUM,TAZOBACTAM SODIUM 3375 MG: 3; .375 INJECTION, POWDER, FOR SOLUTION INTRAVENOUS at 08:37

## 2022-10-30 RX ADMIN — ROCURONIUM BROMIDE 10 MG: 10 INJECTION, SOLUTION INTRAVENOUS at 08:54

## 2022-10-30 RX ADMIN — IPRATROPIUM BROMIDE AND ALBUTEROL SULFATE 3 ML: .5; 3 SOLUTION RESPIRATORY (INHALATION) at 11:52

## 2022-10-30 RX ADMIN — IPRATROPIUM BROMIDE AND ALBUTEROL SULFATE 1 AMPULE: .5; 3 SOLUTION RESPIRATORY (INHALATION) at 04:57

## 2022-10-30 RX ADMIN — GUAIFENESIN 1200 MG: 600 TABLET, EXTENDED RELEASE ORAL at 20:06

## 2022-10-30 RX ADMIN — ONDANSETRON 4 MG: 2 INJECTION INTRAMUSCULAR; INTRAVENOUS at 09:57

## 2022-10-30 RX ADMIN — FENTANYL CITRATE 50 MCG: 50 INJECTION, SOLUTION INTRAMUSCULAR; INTRAVENOUS at 08:55

## 2022-10-30 RX ADMIN — DEXAMETHASONE SODIUM PHOSPHATE 4 MG: 4 INJECTION, SOLUTION INTRAMUSCULAR; INTRAVENOUS at 09:07

## 2022-10-30 RX ADMIN — PIPERACILLIN SODIUM,TAZOBACTAM SODIUM 3375 MG: 3; .375 INJECTION, POWDER, FOR SOLUTION INTRAVENOUS at 00:30

## 2022-10-30 RX ADMIN — FENTANYL CITRATE 50 MCG: 50 INJECTION, SOLUTION INTRAMUSCULAR; INTRAVENOUS at 08:30

## 2022-10-30 RX ADMIN — SODIUM CHLORIDE, POTASSIUM CHLORIDE, SODIUM LACTATE AND CALCIUM CHLORIDE: 600; 310; 30; 20 INJECTION, SOLUTION INTRAVENOUS at 06:29

## 2022-10-30 RX ADMIN — ROCURONIUM BROMIDE 5 MG: 10 INJECTION, SOLUTION INTRAVENOUS at 08:30

## 2022-10-30 RX ADMIN — OXYCODONE 5 MG: 5 TABLET ORAL at 23:53

## 2022-10-30 RX ADMIN — MOMETASONE FUROATE AND FORMOTEROL FUMARATE DIHYDRATE 2 PUFF: 100; 5 AEROSOL RESPIRATORY (INHALATION) at 20:09

## 2022-10-30 RX ADMIN — GUAIFENESIN 1200 MG: 600 TABLET, EXTENDED RELEASE ORAL at 12:36

## 2022-10-30 RX ADMIN — ANTI-FUNGAL POWDER MICONAZOLE NITRATE TALC FREE: 1.42 POWDER TOPICAL at 12:37

## 2022-10-30 RX ADMIN — IPRATROPIUM BROMIDE AND ALBUTEROL SULFATE 3 ML: .5; 3 SOLUTION RESPIRATORY (INHALATION) at 20:09

## 2022-10-30 RX ADMIN — IPRATROPIUM BROMIDE AND ALBUTEROL SULFATE 3 ML: .5; 3 SOLUTION RESPIRATORY (INHALATION) at 15:07

## 2022-10-30 RX ADMIN — SODIUM CHLORIDE, SODIUM LACTATE, POTASSIUM CHLORIDE, AND CALCIUM CHLORIDE: 600; 310; 30; 20 INJECTION, SOLUTION INTRAVENOUS at 08:11

## 2022-10-30 RX ADMIN — PROPOFOL 200 MG: 10 INJECTION, EMULSION INTRAVENOUS at 08:30

## 2022-10-30 RX ADMIN — ANTI-FUNGAL POWDER MICONAZOLE NITRATE TALC FREE: 1.42 POWDER TOPICAL at 20:43

## 2022-10-30 RX ADMIN — SODIUM CHLORIDE, PRESERVATIVE FREE 10 ML: 5 INJECTION INTRAVENOUS at 12:37

## 2022-10-30 RX ADMIN — SUGAMMADEX 300 MG: 100 INJECTION, SOLUTION INTRAVENOUS at 09:16

## 2022-10-30 RX ADMIN — Medication 200 MG: at 08:30

## 2022-10-30 RX ADMIN — PIPERACILLIN SODIUM,TAZOBACTAM SODIUM 3375 MG: 3; .375 INJECTION, POWDER, FOR SOLUTION INTRAVENOUS at 16:22

## 2022-10-30 RX ADMIN — OXYCODONE 5 MG: 5 TABLET ORAL at 16:24

## 2022-10-30 RX ADMIN — MOMETASONE FUROATE AND FORMOTEROL FUMARATE DIHYDRATE 2 PUFF: 100; 5 AEROSOL RESPIRATORY (INHALATION) at 11:53

## 2022-10-30 RX ADMIN — ALBUTEROL SULFATE 6 PUFF: 90 AEROSOL, METERED RESPIRATORY (INHALATION) at 09:13

## 2022-10-30 RX ADMIN — ROCURONIUM BROMIDE 25 MG: 10 INJECTION, SOLUTION INTRAVENOUS at 08:39

## 2022-10-30 ASSESSMENT — PAIN SCALES - GENERAL
PAINLEVEL_OUTOF10: 0
PAINLEVEL_OUTOF10: 6
PAINLEVEL_OUTOF10: 0
PAINLEVEL_OUTOF10: 5
PAINLEVEL_OUTOF10: 0

## 2022-10-30 ASSESSMENT — PAIN - FUNCTIONAL ASSESSMENT
PAIN_FUNCTIONAL_ASSESSMENT: PREVENTS OR INTERFERES SOME ACTIVE ACTIVITIES AND ADLS
PAIN_FUNCTIONAL_ASSESSMENT: NONE - DENIES PAIN

## 2022-10-30 ASSESSMENT — PAIN DESCRIPTION - ORIENTATION
ORIENTATION: RIGHT;MID
ORIENTATION: MID;LEFT;RIGHT

## 2022-10-30 ASSESSMENT — PAIN DESCRIPTION - LOCATION
LOCATION: ABDOMEN
LOCATION: ABDOMEN

## 2022-10-30 ASSESSMENT — PAIN DESCRIPTION - DESCRIPTORS
DESCRIPTORS: ACHING
DESCRIPTORS: SORE;ACHING

## 2022-10-30 NOTE — OP NOTE
Operative Note      Patient: Bolivar Obrien  YOB: 1970  MRN: [id number]    Date of Procedure: 10/30/2022    Pre-Op Diagnosis: cholecystitis    Post-Op Diagnosis: Same       Procedure(s):  CHOLECYSTECTOMY LAPAROSCOPIC WITH POSSIBLE LIVER BIOPSY    Surgeon(s):  Ben Garcia MD    Assistant:   * No surgical staff found *    Anesthesia: General    Estimated Blood Loss (mL): 20 cc    Complications: none    Specimens:   ID Type Source Tests Collected by Time Destination   A : Gallbladder Tissue Abdomen SURGICAL PATHOLOGY Ben Garcia MD 10/30/2022 1812    B : Liver Biopsy Tissue Abdomen SURGICAL PATHOLOGY Ben Garcia MD 10/30/2022 8275        Implants:  * No implants in log *      Drains:   [REMOVED] External Urinary Catheter (Removed)   Site Assessment Clean,dry & intact 10/27/22 0206   Placement Replaced 10/27/22 0206   Catheter Care Catheter/Wick replaced;Suction Canister/Tubing changed 10/27/22 0206   Perineal Care Yes 10/27/22 0206   Suction 40 mmgHg continuous 10/27/22 0206       Findings: edematous GB wall    Detailed Description of Procedure:      Detailed Description of Procedure: Patient taken to the OR, sedated, intubated and anesthestized. Access to abdomen was done at the umbilical area; a 10 Chow trocar placed and pneumoperitoneum up to 15 mmhg achieved. GB was cover by omentum. Under direct vision 3 additional 5 mm trocars were placed in the RUQ. With the and Bovie cautery the GB peritoneum was divided at the level of the infundibulum. It was difficult to identified the cystic artery as it was very short and adherent to the infundibulum, finally I was able to placed clips and divide it with the Harmonic The Cystic duct was exposed but it was hard to go around it as it was short and wide . Finally it was double clipped and divided . The GB was dissected of the liver bed with harmonic.   endo cath bag was used to retrieved the GB.  With the help of laparoscopic scissors a liver biopsy was obtained from the edge of the left lobe of the liver. The raw surface was cauterized with the Bovie. Finally fascia at the umbilical area was closed with 2-0 vycryl. Skin incisions with staples.     Electronically signed by Myrtle Narayanan MD on 10/30/2022 at 9:14 AM

## 2022-10-30 NOTE — PERIOP NOTE
TRANSFER - IN REPORT:    Verbal report received from Genoveva Galindo RN on Alisha Obrien being received from [phone] for ordered procedure      Report consisted of patients Situation, Background, Assessment and Recommendations(SBAR). Information from the following report(s) SBAR, Kardex, ED Summary, MAR, Recent Results, Procedure Verification, and Quality Measures was reviewed with the receiving nurse. Opportunity for questions and clarification was provided. Assessment completed upon patients arrival to unit and care assumed.

## 2022-10-30 NOTE — PERIOP NOTE
TRANSFER - OUT REPORT:    Verbal report given to Emanuel JETER on Emre Obrien  being transferred to [phone] for routine progression of patient care       Report consisted of patients Situation, Background, Assessment and   Recommendations(SBAR). Information from the following report(s) Surgery Report, Intake/Output, MAR, Recent Results, and Cardiac Rhythm NSR  was reviewed with the receiving nurse. Lines:   Peripheral IV 10/26/22 Right;Dorsal Hand (Active)   Site Assessment Clean, dry & intact 10/30/22 0624   Line Status Flushed 10/30/22 0624   Line Care Connections checked and tightened;Ports disinfected 10/30/22 0624   Phlebitis Assessment No symptoms 10/30/22 0624   Infiltration Assessment 0 10/30/22 0624   Alcohol Cap Used Yes 10/30/22 0624   Dressing Status New dressing applied;Clean, dry & intact 10/30/22 0624   Dressing Type Transparent; Other (Comment) 10/30/22 0624       Peripheral IV 10/28/22 Right Forearm (Active)   Site Assessment Clean, dry & intact 10/30/22 0624   Line Status Flushed;Normal saline locked 10/30/22 0624   Line Care Connections checked and tightened;Ports disinfected 10/30/22 0624   Phlebitis Assessment No symptoms 10/30/22 0624   Infiltration Assessment 0 10/30/22 0624   Alcohol Cap Used Yes 10/30/22 0624   Dressing Status Clean, dry & intact; Other (Comment) 10/30/22 6173   Dressing Type Gauze; Other (Comment) 10/30/22 2203        Opportunity for questions and clarification was provided. Patient transported with:   O2 @ 8 liters  Tech    VTE prophylaxis orders have been written for Emre Obrien. Patient and family given floor number and nurses name. Family updated re: pt status after security code verified.

## 2022-10-30 NOTE — ANESTHESIA POSTPROCEDURE EVALUATION
Department of Anesthesiology  Postprocedure Note    Patient: Mona Ocasio  MRN: [id number]  YOB: 1970  Date of evaluation: 10/30/2022      Procedure Summary     Date: 10/30/22 Room / Location: [facility] MAIN OR  / [facility] MAIN OR    Anesthesia Start: 5890 Anesthesia Stop: 6954    Procedure: CHOLECYSTECTOMY LAPAROSCOPIC WITH POSSIBLE LIVER BIOPSY (Abdomen) Diagnosis:       Cholecystitis      (cholecystitis)    Providers: Jacoby Yang MD Responsible Provider: Elliot Esparza MD    Anesthesia Type: General ASA Status: 4          Anesthesia Type: General    Meena Phase I: Meena Score: 8    Meena Phase II:        Anesthesia Post Evaluation    Patient location during evaluation: PACU  Patient participation: complete - patient participated  Level of consciousness: awake and alert  Airway patency: patent  Nausea & Vomiting: no nausea and no vomiting  Complications: no  Cardiovascular status: hemodynamically stable  Respiratory status: acceptable, nonlabored ventilation and spontaneous ventilation  Hydration status: euvolemic  Comments: /66   Pulse 92  Temp 97.2 °F (36.2 °C) (Tympanic)   Resp 23   Ht 5' 5\" (1.651 m)   Wt (!) 323 lb 3.2 oz (146.6 kg)   SpO2 97%   BMI 53.78 kg/m²   Pt resting comfortably, no complaints, respirations unlabored, O2 at 4 L NC with O2 sat 97%. Transitioned well from BiPAP to NC while in PACU.     Multimodal analgesia pain management approach

## 2022-10-30 NOTE — PROGRESS NOTES
TRANSFER - IN REPORT:    Verbal report received from Ashlie Reyes on Maximiliano Obrien being received from PACU () for routine progression of care. Report consisted of patients Situation, Background, Assessment and Recommendations(SBAR). Information from the following report(s) Procedure Summary was reviewed. Opportunity for questions and clarification was provided. Assessment completed upon patients arrival to unit and care assumed. Patient received to room 314. Patient connected to monitor and assessment completed. Plan of care reviewed. Patient oriented to room and call light. Patient aware to use call light to communicate any chest pain or needs. Admission skin assessment completed with second RN and reveals the following: sacrum and heels intact and free of redness. Pt presents to unit with excoriation present on bilateral breast and groin. Pt has multiple abdominal incisions s/p lap choly. Dual skin assessment completed with Josue RN.

## 2022-10-30 NOTE — PROGRESS NOTES
TRANSFER - OUT REPORT:    Verbal report given to CORONA Cruz on Jose Obrien being transferred to [facility] for ordered procedure       Report consisted of patients Situation, Background, Assessment and Recommendations (SBAR). Information from the following report(s) SBAR, Kardex, ED Summary, Procedure Summary, Intake/Output, MAR, and Recent Results was reviewed with the receiving nurse. Opportunity for questions and clarification was provided. CHG bath completed. Pt transported on 3L oxygen and with Zosyn due at 0900 as well as pt belongings.

## 2022-10-31 LAB
ALBUMIN SERPL-MCNC: 3.1 G/DL (ref 3.5–5)
ALBUMIN/GLOB SERPL: 0.7 (ref 0.4–1.6)
ALP SERPL-CCNC: 131 U/L (ref 50–136)
ALT SERPL-CCNC: 63 U/L (ref 12–65)
ANION GAP SERPL CALC-SCNC: 1 MMOL/L (ref 2–11)
AST SERPL-CCNC: 22 U/L (ref 15–37)
BACTERIA SPEC CULT: NORMAL
BACTERIA SPEC CULT: NORMAL
BASOPHILS # BLD: 0 K/UL (ref 0–0.2)
BASOPHILS NFR BLD: 0 % (ref 0–2)
BILIRUB SERPL-MCNC: 0.3 MG/DL (ref 0.2–1.1)
BUN SERPL-MCNC: 5 MG/DL (ref 6–23)
CALCIUM SERPL-MCNC: 9.3 MG/DL (ref 8.3–10.4)
CHLORIDE SERPL-SCNC: 105 MMOL/L (ref 101–110)
CO2 SERPL-SCNC: 34 MMOL/L (ref 21–32)
CREAT SERPL-MCNC: 0.7 MG/DL (ref 0.6–1)
DIFFERENTIAL METHOD BLD: ABNORMAL
EOSINOPHIL # BLD: 0 K/UL (ref 0–0.8)
EOSINOPHIL NFR BLD: 0 % (ref 0.5–7.8)
ERYTHROCYTE [DISTWIDTH] IN BLOOD BY AUTOMATED COUNT: 18.7 % (ref 11.9–14.6)
GLOBULIN SER CALC-MCNC: 4.4 G/DL (ref 2.8–4.5)
GLUCOSE BLD STRIP.AUTO-MCNC: 74 MG/DL (ref 65–100)
GLUCOSE BLD STRIP.AUTO-MCNC: 83 MG/DL (ref 65–100)
GLUCOSE BLD STRIP.AUTO-MCNC: 91 MG/DL (ref 65–100)
GLUCOSE BLD STRIP.AUTO-MCNC: 92 MG/DL (ref 65–100)
GLUCOSE SERPL-MCNC: 84 MG/DL (ref 65–100)
HCT VFR BLD AUTO: 34.3 % (ref 35.8–46.3)
HGB BLD-MCNC: 9.6 G/DL (ref 11.7–15.4)
IMM GRANULOCYTES # BLD AUTO: 0.1 K/UL (ref 0–0.5)
IMM GRANULOCYTES NFR BLD AUTO: 0 % (ref 0–5)
LYMPHOCYTES # BLD: 1.8 K/UL (ref 0.5–4.6)
LYMPHOCYTES NFR BLD: 16 % (ref 13–44)
MCH RBC QN AUTO: 20.3 PG (ref 26.1–32.9)
MCHC RBC AUTO-ENTMCNC: 28 G/DL (ref 31.4–35)
MCV RBC AUTO: 72.5 FL (ref 82–102)
MONOCYTES # BLD: 0.7 K/UL (ref 0.1–1.3)
MONOCYTES NFR BLD: 7 % (ref 4–12)
NEUTS SEG # BLD: 8.6 K/UL (ref 1.7–8.2)
NEUTS SEG NFR BLD: 77 % (ref 43–78)
NRBC # BLD: 0 K/UL (ref 0–0.2)
PLATELET # BLD AUTO: 305 K/UL (ref 150–450)
PMV BLD AUTO: 10.3 FL (ref 9.4–12.3)
POTASSIUM SERPL-SCNC: 3.9 MMOL/L (ref 3.5–5.1)
PROT SERPL-MCNC: 7.5 G/DL (ref 6.3–8.2)
RBC # BLD AUTO: 4.73 M/UL (ref 4.05–5.2)
SERVICE CMNT-IMP: NORMAL
SODIUM SERPL-SCNC: 140 MMOL/L (ref 133–143)
WBC # BLD AUTO: 11.2 K/UL (ref 4.3–11.1)

## 2022-10-31 PROCEDURE — 99024 POSTOP FOLLOW-UP VISIT: CPT | Performed by: SURGERY

## 2022-10-31 PROCEDURE — 94640 AIRWAY INHALATION TREATMENT: CPT

## 2022-10-31 PROCEDURE — 94761 N-INVAS EAR/PLS OXIMETRY MLT: CPT

## 2022-10-31 PROCEDURE — 6370000000 HC RX 637 (ALT 250 FOR IP): Performed by: INTERNAL MEDICINE

## 2022-10-31 PROCEDURE — 85025 COMPLETE CBC W/AUTO DIFF WBC: CPT

## 2022-10-31 PROCEDURE — 80053 COMPREHEN METABOLIC PANEL: CPT

## 2022-10-31 PROCEDURE — 2580000003 HC RX 258: Performed by: HOSPITALIST

## 2022-10-31 PROCEDURE — 6360000002 HC RX W HCPCS: Performed by: HOSPITALIST

## 2022-10-31 PROCEDURE — 82962 GLUCOSE BLOOD TEST: CPT

## 2022-10-31 PROCEDURE — 36415 COLL VENOUS BLD VENIPUNCTURE: CPT

## 2022-10-31 PROCEDURE — 2700000000 HC OXYGEN THERAPY PER DAY

## 2022-10-31 PROCEDURE — 6370000000 HC RX 637 (ALT 250 FOR IP): Performed by: HOSPITALIST

## 2022-10-31 PROCEDURE — 1100000000 HC RM PRIVATE

## 2022-10-31 RX ORDER — OXYCODONE HYDROCHLORIDE 5 MG/1
5 TABLET ORAL EVERY 6 HOURS PRN
Qty: 12 TABLET | Refills: 0 | Status: CANCELLED | OUTPATIENT
Start: 2022-10-31 | End: 2022-11-03

## 2022-10-31 RX ORDER — HYDROCODONE BITARTRATE AND ACETAMINOPHEN 5; 325 MG/1; MG/1
1-2 TABLET ORAL EVERY 8 HOURS PRN
Qty: 28 TABLET | Refills: 0 | Status: SHIPPED | OUTPATIENT
Start: 2022-10-31 | End: 2022-11-07

## 2022-10-31 RX ORDER — AMOXICILLIN AND CLAVULANATE POTASSIUM 875; 125 MG/1; MG/1
1 TABLET, FILM COATED ORAL 2 TIMES DAILY
Qty: 20 TABLET | Refills: 0 | Status: SHIPPED | OUTPATIENT
Start: 2022-10-31 | End: 2022-11-10

## 2022-10-31 RX ORDER — IPRATROPIUM BROMIDE AND ALBUTEROL SULFATE 2.5; .5 MG/3ML; MG/3ML
1 SOLUTION RESPIRATORY (INHALATION) 3 TIMES DAILY
Status: DISCONTINUED | OUTPATIENT
Start: 2022-10-31 | End: 2022-11-01 | Stop reason: HOSPADM

## 2022-10-31 RX ADMIN — MOMETASONE FUROATE AND FORMOTEROL FUMARATE DIHYDRATE 2 PUFF: 100; 5 AEROSOL RESPIRATORY (INHALATION) at 07:36

## 2022-10-31 RX ADMIN — IPRATROPIUM BROMIDE AND ALBUTEROL SULFATE 3 ML: .5; 3 SOLUTION RESPIRATORY (INHALATION) at 07:36

## 2022-10-31 RX ADMIN — GUAIFENESIN 1200 MG: 600 TABLET, EXTENDED RELEASE ORAL at 21:04

## 2022-10-31 RX ADMIN — PIPERACILLIN SODIUM,TAZOBACTAM SODIUM 3375 MG: 3; .375 INJECTION, POWDER, FOR SOLUTION INTRAVENOUS at 00:43

## 2022-10-31 RX ADMIN — GUAIFENESIN 1200 MG: 600 TABLET, EXTENDED RELEASE ORAL at 08:46

## 2022-10-31 RX ADMIN — IPRATROPIUM BROMIDE AND ALBUTEROL SULFATE 3 ML: .5; 3 SOLUTION RESPIRATORY (INHALATION) at 10:57

## 2022-10-31 RX ADMIN — PIPERACILLIN SODIUM,TAZOBACTAM SODIUM 3375 MG: 3; .375 INJECTION, POWDER, FOR SOLUTION INTRAVENOUS at 08:49

## 2022-10-31 RX ADMIN — SODIUM CHLORIDE, PRESERVATIVE FREE 10 ML: 5 INJECTION INTRAVENOUS at 21:04

## 2022-10-31 RX ADMIN — ANTI-FUNGAL POWDER MICONAZOLE NITRATE TALC FREE: 1.42 POWDER TOPICAL at 08:51

## 2022-10-31 RX ADMIN — ANTI-FUNGAL POWDER MICONAZOLE NITRATE TALC FREE: 1.42 POWDER TOPICAL at 21:05

## 2022-10-31 RX ADMIN — IPRATROPIUM BROMIDE AND ALBUTEROL SULFATE 3 ML: .5; 3 SOLUTION RESPIRATORY (INHALATION) at 20:16

## 2022-10-31 RX ADMIN — PIPERACILLIN SODIUM,TAZOBACTAM SODIUM 3375 MG: 3; .375 INJECTION, POWDER, FOR SOLUTION INTRAVENOUS at 17:19

## 2022-10-31 RX ADMIN — MOMETASONE FUROATE AND FORMOTEROL FUMARATE DIHYDRATE 2 PUFF: 100; 5 AEROSOL RESPIRATORY (INHALATION) at 20:16

## 2022-10-31 ASSESSMENT — PAIN SCALES - GENERAL
PAINLEVEL_OUTOF10: 0

## 2022-10-31 NOTE — DISCHARGE INSTRUCTIONS
Dressings/Wound Care  Keep incisions covered with gauze and tape until follow-up visit. Try to keep incisions as dry as possible to lower risk of infection. Activity  No heavy lifting (>5lbs) for 6 weeks to reduce risk of developing a hernia in the incisions. No driving until you are off pain meds for 24hrs and have no pain with movements associated with driving. Pain prescription (Norco) electronically sent to your pharmacy    Follow-up with Dr Taylor Alves nurse practitioner Yong burleson in 9 days in the office at [address] [place] [address] on a Wednesday. See the surgical group as needed after that visit. [address], [address]  (Call for an appt time unless one is already made for you by discharge nurse which is preferred -->[phone])    Diet  Resume Regular Diet      DISCHARGE SUMMARY from Nurse    PATIENT INSTRUCTIONS:    What to do at Home:  Recommended activity: activity as tolerated,    *  Please give a list of your current medications to your Primary Care Provider. *  Please update this list whenever your medications are discontinued, doses are      changed, or new medications (including over-the-counter products) are added. *  Please carry medication information at all times in case of emergency situations. These are general instructions for a healthy lifestyle:    No smoking/ No tobacco products/ Avoid exposure to second hand smoke  Surgeon General's Warning:  Quitting smoking now greatly reduces serious risk to your health.     Obesity, smoking, and sedentary lifestyle greatly increases your risk for illness    A healthy diet, regular physical exercise & weight monitoring are important for maintaining a healthy lifestyle    You may be retaining fluid if you have a history of heart failure or if you experience any of the following symptoms:  Weight gain of 3 pounds or more overnight or 5 pounds in a week, increased swelling in our hands or feet or shortness of breath while lying flat in bed. Please call your doctor as soon as you notice any of these symptoms; do not wait until your next office visit. The discharge information has been reviewed with the patient. The patient verbalized understanding. Discharge medications reviewed with the patient and appropriate educational materials and side effects teaching were provided.   ___________________________________________________________________________________________________________________________________

## 2022-10-31 NOTE — PROGRESS NOTES
Pt decline BIPAP for the night. Pt is resting comfortably on 4 L/min of humidified oxygen. Pt stated she just does not want to wear it tonight. Pt in no respiratory distress at this time.

## 2022-10-31 NOTE — CARE COORDINATION
Pt presented to [facility] c/o abdominal pain, vomiting and diarrhea. EMS was called when she was initially found to be hypotensive. While in the ED, CT scan showed gallbladder wall thickening as well as stones. Pt admitted for acute gallstone pancreatitis. GI consulted for admission. Pt has a PMHx of HTN, hyperlipidemia and DM. Pt indep with her ADLs PTA. Denies DME use. On 4L supplemental, [facility] supplies home oxygen. No current home care services. PCP confirmed. Insurance verified and able to afford home meds. May need transportation at discharge. Will remain available. 10/31/22 5326   Service Assessment   Patient Orientation Alert and Oriented   Cognition Alert   History Provided By Patient   Primary Caregiver Self   Support Systems Children;Family Members;[employer] [employer];Friends/Neighbors   PCP Verified by CM Yes  (JOSE ALBERTO Ivy)   Prior Functional Level Independent in ADLs/IADLs   Current Functional Level Independent in ADLs/IADLs   Can patient return to prior living arrangement Yes   Ability to make needs known: Good   Family able to assist with home care needs: Yes   Would you like for me to discuss the discharge plan with any other family members/significant others, and if so, who? No   Financial Resources Medicare;Medicaid   Social/Functional History   Type of Home Apartment   Home Layout One level   ADL Assistance Independent   Ambulation Assistance Independent   Occupation On disability   Discharge Planning   Type of Residence Apartment   Current Services Prior To Admission None   Potential Assistance Needed N/A   DME Ordered? No   Potential Assistance Purchasing Medications No   Type of Home Care Services None   Patient expects to be discharged to: [facility] Discharge   Transition of Care Consult (CM Consult) Discharge [facility] [place] [facility] Discharge None   [employer] Information Provided?  No   Mode of Transport at Discharge Other (see comment)  (Family) Confirm Follow Up Transport Family

## 2022-10-31 NOTE — PROGRESS NOTES
H&P/Consult Note/Progress Note/Office Note:   Kristi Turner  MRN: [id number]  ECH:[birth year]  Age:52 y.o.    HPI: Kristi Turner is a 50 y.o. female who is s/p lap cholecystectomy and liver biopsy by Dr Maria A Warren on 10/30/22      She has h/o COPD and originally presented to the ER with a 1 day history of severe, constant epigastric pain associated with N/V. No radiation of pain to her back. Nothing in particular made her pain better or worse. She is s/p partial hysterectomy for benign fibroids through a low midline incision. She reported her uterus weighed 5lbs. Her ovaries remain. Hypotension was noted. She was seen in the ER  WBC 20.3   Lactic acid was 2.6. Lipase >30k. T Bili 1.4, AST//225    CT scan of her abdomen and pelvis is as shown below. She was treated with IV fluids and Zosyn and transported to [facility]    She denies ETOH for several years. GI and General surgery were consulted    10/26/22 CT abd/pelvis  Hx: Upper abdominal pain. Diarrhea. FINDINGS: A moderate amount of motion artifact limits this examination to some degree. The visualized lung bases and mediastinum are unremarkable. The liver, spleen, pancreas, adrenal glands, and kidneys are within normal limits. The left kidney contains a tiny low-density lesion in the midpole which is too small to characterize. The bladder is decompressed and not assessed. The gallbladder shows wall thickening and mucosal enhancement. A few radiopaque stones are appreciated. Multiple prominent lymph nodes are appreciated in the yousuf hepatis and near the head of the pancreas. Distal esophagus and stomach are unremarkable. Small and large bowel show no evidence of obstruction. There is a normal appendix in the right lower quadrant. No evidence of free fluid or free air. No pathologic adenopathy. No abdominal aortic aneurysm.      Osseous structures show no evidence of acute fracture or suspicious lesion. Impression:  The gallbladder shows wall thickening and mucosal enhancement. A few radiopaque stones are appreciated. These findings are suggestive of acute cholecystitis. Multiple prominent lymph nodes are appreciated in the yousuf hepatis and near the head of the pancreas. There significance is unclear. 10/26/22 abd US  FINDINGS: Aorta/IVC: Not well visualized. /Visualized portions are within normal limits. Pancreas: Mostly obscured by overlying bowel gas. Liver: There is diffuse increased echogenicity within the liver parenchyma, suggestive of hepatic steatosis. No focal lesions are demonstrated on the provided images. Portal vein: The portal vein shows hepatopedal flow. Gallbladder: The gallbladder is contracted and not assessed. Sonographic Moe sign was negative. CBD: Normal in caliber and measures 2.3 mm. Right kidney: 12.1 cm in length and without evidence of obstruction. Impression:  The gallbladder is contracted and not assessed. Sonographic Moe sign was negative. Hepatic steatosis.        Additional hx:  10/29/22 Improved; GI decided not to proceed with pre-op ERCP  10/30/22 s/p lap river and liver biopsy by Dr Jose Armando Whiteside  10/31/22 likely home today        Past Medical History:   Diagnosis Date    Childhood asthma     Chronic respiratory failure with hypoxia (HCC)     Continuous at 3L    COPD (chronic obstructive pulmonary disease) ([address]     Stage 4 by GOLD Criteria    COVID-19 01/07/2021    Hyperlipidemia     Daily meds     Hypertension     Managed with meds     Intraductal papilloma of breast 01/16/2019    Left    Microalbuminuria     Microcytosis     Morbid obesity ([address]     Nonproliferative retinopathy due to secondary diabetes ([address] 03/07/2020    Mild, Bilateral    THANH on CPAP 3/2/2017    Paroxysmal atrial fibrillation (HCC)     Pneumonia ages 3 and 9    Type 2 diabetes mellitus ([address]      Past Surgical History:   Procedure Laterality Date BREAST BIOPSY Left 3/27/2019    LEFT BREAST NEEDLE LOCALIZED LUMPECTOMY  performed by Viv Rob DO at [address] [address] (CERVIX NOT REMOVED)  08/2012    US BREAST NEEDLE BIOPSY LEFT Left 1/7/2019    US BREAST NEEDLE BIOPSY LEFT 1/7/2019 [facility]    US GUIDED NEEDLE LOC OF LEFT BREAST Left 3/27/2019    US GUIDED NEEDLE LOC OF LEFT BREAST 3/27/2019 [facility]     Current Facility-Administered Medications   Medication Dose Route Frequency    miconazole (MICOTIN) 2 % powder   Topical BID    ipratropium-albuterol (DUONEB) nebulizer solution 3 mL  1 vial Inhalation 4x daily    loperamide (IMODIUM) capsule 2 mg  2 mg Oral 4x Daily PRN    sodium chloride flush 0.9 % injection 5-40 mL  5-40 mL IntraVENous 2 times per day    sodium chloride flush 0.9 % injection 5-40 mL  5-40 mL IntraVENous PRN    ondansetron (ZOFRAN-ODT) disintegrating tablet 4 mg  4 mg Oral Q8H PRN    Or    ondansetron (ZOFRAN) injection 4 mg  4 mg IntraVENous Q6H PRN    polyethylene glycol (GLYCOLAX) packet 17 g  17 g Oral Daily PRN    acetaminophen (TYLENOL) tablet 650 mg  650 mg Oral Q6H PRN    Or    acetaminophen (TYLENOL) suppository 650 mg  650 mg Rectal Q6H PRN    nicotine (NICODERM CQ) 21 MG/24HR 1 patch  1 patch TransDERmal Daily    insulin lispro (HUMALOG) injection vial 0-8 Units  0-8 Units SubCUTAneous TID WC    insulin lispro (HUMALOG) injection vial 0-4 Units  0-4 Units SubCUTAneous Nightly    glucose chewable tablet 16 g  4 tablet Oral PRN    dextrose bolus 10% 125 mL  125 mL IntraVENous PRN    Or    dextrose bolus 10% 250 mL  250 mL IntraVENous PRN    glucagon (rDNA) injection 1 mg  1 mg SubCUTAneous PRN    dextrose 10 % infusion   IntraVENous Continuous PRN    oxyCODONE (ROXICODONE) immediate release tablet 5 mg  5 mg Oral Q4H PRN    morphine injection 4 mg  4 mg IntraVENous Q1H PRN    piperacillin-tazobactam (ZOSYN) 3,375 mg in sodium chloride 0.9 % 50 mL IVPB (mini-bag)  3,375 mg IntraVENous q8h    ipratropium-albuterol (DUONEB) nebulizer solution 1 ampule  1 ampule Inhalation Q6H PRN    mometasone-formoterol (DULERA) 100-5 MCG/ACT inhaler 2 puff  2 puff Inhalation BID    guaiFENesin (MUCINEX) extended release tablet 1,200 mg  1,200 mg Oral BID    albuterol sulfate HFA (PROVENTIL;VENTOLIN;PROAIR) 108 (90 Base) MCG/ACT inhaler 2 puff  2 puff Inhalation Q4H PRN     ALLERGIES:  Patient has no known allergies. Social History     Socioeconomic History    Marital status: Single     Spouse name: None    Number of children: None    Years of education: None    Highest education level: None   Tobacco Use    Smoking status: Every Day     Packs/day: 1.00     Years: 32.00     Pack years: 32.00     Types: Cigarettes    Smokeless tobacco: Never    Tobacco comments:     Quit smoking: Currently 10 Cigarettes/day   Vaping Use    Vaping Use: Never used   Substance and Sexual Activity    Alcohol use: Yes    Drug use: Not Currently     Types: Cocaine, Marijuana Aly Gonsalez   Social History Narrative    Single and lives alone. Disabled--previously worked at [employer]. Has lived in [place] and [place]. No pets. Social History     Tobacco Use   Smoking Status Every Day    Packs/day: 1.00    Years: 32.00    Pack years: 32.00    Types: Cigarettes   Smokeless Tobacco Never   Tobacco Comments    Quit smoking: Currently 10 Cigarettes/day     Family History   Problem Relation Age of Onset    Coronary Art Dis Neg Hx     COPD Mother     Parkinsonism Father     Asthma Son     Schizophrenia Brother     Hypertension Brother     Emphysema Paternal Grandfather     Hypertension Mother      ROS: The patient has no difficulty with chest pain or shortness of breath. No fever or chills. Comprehensive review of systems was otherwise unremarkable except as noted above.     Physical Exam:   /76   Pulse 88   Temp 98.4 °F (36.9 °C) (Oral)   Resp 18   Ht 5' 5\" (1.651 m)   Wt (!) 332 lb 9.6 oz (150.9 kg)   SpO2 97%   BMI 55.35 kg/m²   Vitals:    10/30/22 2225 10/30/22 2353 10/31/22 0121 10/31/22 0413   BP: 128/86  128/79 137/76   Pulse: 88  92 88   Resp: 17 19 18 18   Temp: 98.2 °F (36.8 °C)  98.1 °F (36.7 °C) 98.4 °F (36.9 °C)   TempSrc: Oral  Oral Oral   SpO2: 91%  92% 97%   Weight:    (!) 332 lb 9.6 oz (150.9 kg)   Height:         No intake/output data recorded. 10/29 1901 - 10/31 0700  In: 2005 [P.O.:1205; I.V.:800]  Out: 610 [Urine:600]    Constitutional: Alert, oriented, cooperative patient in no acute distress; appears stated age    Eyes:Sclera are clear. EOMs intact  ENMT: no external lesions gross hearing normal; no obvious neck masses, no ear or lip lesions, nares normal  CV: RRR. Normal perfusion  Resp: No JVD. Breathing is  non-labored; no audible wheezing. GI: obese; BMI>54; incisions OK      Musculoskeletal: unremarkable with normal function. No embolic signs or cyanosis.    Neuro:  Oriented; moves all 4; no focal deficits  Psychiatric: normal affect and mood, no memory impairment    Recent vitals (if inpt):  Patient Vitals for the past 24 hrs:   BP Temp Temp src Pulse Resp SpO2 Weight   10/31/22 0413 137/76 98.4 °F (36.9 °C) Oral 88 18 97 % (!) 332 lb 9.6 oz (150.9 kg)   10/31/22 0121 128/79 98.1 °F (36.7 °C) Oral 92 18 92 % --   10/30/22 2353 -- -- -- -- 19 -- --   10/30/22 2225 128/86 98.2 °F (36.8 °C) Oral 88 17 91 % --   10/30/22 2010 -- -- -- (!) 105 -- 97 % --   10/30/22 2009 -- -- -- 88 16 95 % --   10/30/22 1540 (!) 148/87 -- -- -- -- -- --   10/30/22 1534 (!) 149/105 98.2 °F (36.8 °C) Oral 86 17 94 % --   10/30/22 1507 -- -- -- 85 16 94 % --   10/30/22 1155 -- -- -- 88 17 96 % --   10/30/22 1139 (!) 143/83 98.1 °F (36.7 °C) Oral 92 20 91 % --   10/30/22 1100 (!) 152/82 -- -- 97 20 99 % --   10/30/22 1055 (!) 153/85 -- -- 91 22 99 % --   10/30/22 1050 (!) 149/77 -- -- 94 24 97 % --   10/30/22 1045 (!) 148/74 97.8 °F (36.6 °C) Temporal 90 20 98 % --   10/30/22 1040 (!) 170/74 -- -- 97 27 99 % --   10/30/22 1035 134/77 -- -- 91 21 100 % --   10/30/22 1030 (!) 140/75 -- -- 92 17 98 % --   10/30/22 1025 (!) 151/79 -- -- 93 16 98 % --   10/30/22 1022 -- -- -- -- -- 95 % --   10/30/22 1017 130/66 -- -- (!) 105 21 -- --   10/30/22 1015 127/63 -- -- (!) 101 20 98 % --   10/30/22 1010 135/65 -- -- 93 24 96 % --   10/30/22 1002 (!) 141/67 -- -- (!) 101 22 96 % --   10/30/22 0957 (!) 109/58 -- -- (!) 108 21 96 % --   10/30/22 0952 (!) 125/59 -- -- (!) 101 24 94 % --   10/30/22 0950 (!) 143/83 97.2 °F (36.2 °C) Tympanic (!) 101 20 94 % --   10/30/22 0947 (!) 145/63 97.2 °F (36.2 °C) -- (!) 110 24 (!) 84 % --       Amount and/or Complexity of Data Reviewed and Analyzed:  I reviewed and analyzed all of the unique labs and radiologic studies that are shown below as well as any that are in the HPI, and any that are in the expanded problem list below  *Each unique test, order, or document contributes to the combination of 2 or combination of 3 in Category 1 below. For this visit I also reviewed old records and prior notes. Recent Labs     10/28/22  1820 10/29/22  0129 10/30/22  0506 10/31/22  0425   WBC  --    < > 8.3 11.2*   HGB  --    < > 9.6* 9.6*   PLT  --    < > 284 305   NA  --    < > 140 140   K  --    < > 3.5 3.9   CL  --    < > 104 105   CO2  --    < > 35* 34*   BUN  --    < > 7 5*   INR 1.0  --  0.9  --    APTT 30.6  --   --   --    ALT  --    < > 83* 63    < > = values in this interval not displayed.      Review of most recent CBC  Lab Results   Component Value Date    WBC 11.2 (H) 10/31/2022    HGB 9.6 (L) 10/31/2022    HCT 34.3 (L) 10/31/2022    MCV 72.5 (L) 10/31/2022     10/31/2022       Review of most recent BMP  Lab Results   Component Value Date/Time     10/31/2022 04:25 AM    K 3.9 10/31/2022 04:25 AM     10/31/2022 04:25 AM    CO2 34 10/31/2022 04:25 AM    BUN 5 10/31/2022 04:25 AM    CREATININE 0.70 10/31/2022 04:25 AM    GLUCOSE 84 10/31/2022 04:25 AM    CALCIUM 9.3 10/31/2022 04:25 AM Review of most recent LFTs (and lipase if done)  Lab Results   Component Value Date    ALT 63 10/31/2022    AST 22 10/31/2022    ALKPHOS 131 10/31/2022    BILITOT 0.3 10/31/2022     Lab Results   Component Value Date    LIPASE 133 10/29/2022       Lab Results   Component Value Date/Time    INR 0.9 10/30/2022 05:06 AM    APTT 30.6 10/28/2022 06:20 PM    APTT 73.7 08/31/2019 11:30 AM       Review of most recent HgbA1c  Hemoglobin A1C   Date Value Ref Range Status   09/01/2022 6.1 (H) 4.8 - 5.6 % Final       Nutritional assessment screen for wound healing issues:  Lab Results   Component Value Date    LABALBU 3.1 (L) 10/31/2022       @lastcovr@    Xray Result (most recent):  XR CHEST PORTABLE 10/26/2022    Narrative  EXAMINATION: XR CHEST PORTABLE 10/26/2022 8:05 PM    ACCESSION NUMBER: [id number]    COMPARISON: None available    INDICATION: dyspnea    TECHNIQUE: A single view of the chest was obtained. FINDINGS:  Lung hypoinflation. Streaky bibasilar opacities. No pneumothorax or sizable pleural effusion. Stable cardiomediastinal silhouette. Impression  -Lung hypoinflation with streaky bibasilar opacities, favored to reflect  atelectasis, though infection/aspiration are also considerations in the  appropriate clinical context. CT Result (most recent):  CT ABDOMEN PELVIS W IV CONTRAST 10/26/2022    Narrative  EXAM: CT ABDOMEN PELVIS W IV CONTRAST    HISTORY: Upper abdominal pain. Diarrhea. TECHNIQUE: Axial images of the abdomen and pelvis were performed following the  administration of intravenous contrast. Images were obtained in the axial plane  and coronal reformatted images were created and reviewed. Dose reduction technique used: Automated exposure control/Adjustment of the mA  and/or kV according to patient size/Use of iterative reconstruction technique. 100 mL of Isovue-370 were utilized. COMPARISON: CT chest dated 10/18/2018 and 11/10/2016.     FINDINGS:  A moderate amount of motion artifact limits this examination to some degree. The visualized lung bases and mediastinum are unremarkable. The liver, spleen, pancreas, adrenal glands, and kidneys are within normal  limits. The left kidney contains a tiny low-density lesion in the midpole which  is too small to characterize. The bladder is decompressed and not assessed. The gallbladder shows wall thickening and mucosal enhancement. A few radiopaque  stones are appreciated. Multiple prominent lymph nodes are appreciated in the yousuf hepatis and near the  head of the pancreas. Distal esophagus and stomach are unremarkable. Small and large bowel show no  evidence of obstruction. There is a normal appendix in the right lower quadrant. No evidence of free fluid or free air. No pathologic adenopathy. No abdominal  aortic aneurysm. Osseous structures show no evidence of acute fracture or suspicious lesion. Impression  The gallbladder shows wall thickening and mucosal enhancement. A few radiopaque  stones are appreciated. These findings are suggestive of acute cholecystitis. Multiple prominent lymph nodes are appreciated in the yousuf hepatis and near the  head of the pancreas. There significance is unclear. US Result (most recent):  US ABDOMEN LIMITED 10/26/2022    Narrative  EXAM: US ABDOMEN LIMITED    HISTORY: Upper abdominal pain, elevated LFTs and elevated lipase. TECHNIQUE: Grayscale and limited color Doppler ultrasound of the right upper  quadrant. COMPARISON: CT abdomen and pelvis dated 10/26/2022    FINDINGS:  Aorta/IVC: Not well visualized. /Visualized portions are within normal limits. Pancreas: Mostly obscured by overlying bowel gas. Liver: There is diffuse increased echogenicity within the liver parenchyma,  suggestive of hepatic steatosis. No focal lesions are demonstrated on the  provided images. Portal vein: The portal vein shows hepatopedal flow. Gallbladder:  The gallbladder is contracted and not assessed. Sonographic Moe  sign was negative. CBD: Normal in caliber and measures 2.3 mm. Right kidney: 12.1 cm in length and without evidence of obstruction. Impression  The gallbladder is contracted and not assessed. Sonographic Moe sign was  negative. Hepatic steatosis. Admission date (for inpatients): 10/26/2022   1 Day Post-Op  Procedure(s):  ERCP ENDOSCOPIC RETROGRADE CHOLANGIOPANCREATOGRAPHY         ASSESSMENT/PLAN:  [unfilled]  Principal Problem:    Acute gallstone pancreatitis  Active Problems:    BMI 50.0-59.9, adult (HCC)    Cigarette nicotine dependence in remission    Hypertension    COPD, severe ([address]    Diabetes mellitus type 2, controlled ([address]    Atrial fibrillation, new onset ([address]    Morbid obesity with BMI of 50.0-59.9, adult ([facility])    THANH (obstructive sleep apnea)  Resolved Problems:    * No resolved hospital problems. *     Patient Active Problem List    Diagnosis Date Noted    BMI 50.0-59.9, adult ([facility] 10/27/2022     Priority: Medium    Acute gallstone pancreatitis 10/26/2022     Priority: Medium     10/30/22 s/p lap river and liver biopsy; Dr Jn Arroyo      COVID-19 01/14/2021    Type 2 diabetes with nephropathy ([facility] 01/07/2020    Atrial fibrillation, new onset ([address] 08/30/2019    Chronic respiratory failure with hypoxia ([address] 08/28/2019     NC 3L continuous        Cigarette nicotine dependence in remission 03/05/2017    COPD, severe ([address] 03/02/2017     PFTs 3/2016  FVC 2.22 L and 62 % of predicted. FEV1 1.23 L and 42 % of predicted. Ratio 55%. There was no clinically relevant response to bronchodilator  LUNG VOLUMES:  TLC 4.61 L and 89 % of predicted. RV 2.39 L and 139 % of predicted. DIFFUSION:  The diffusing capacity was Corrected for hemoglobin of 14.7 g/dL  and was 15.9 mL/mmHg/minute and 58 % of predicted.         Diabetes mellitus type 2, controlled ([address] 03/02/2017    Morbid obesity with BMI of 50.0-59.9, adult ([address] 03/02/2017    THANH (obstructive sleep apnea) 03/02/2017    Nocturnal hypoxemia 03/07/2016    Hypertension 03/07/2016    Chronic back pain 03/07/2016    Obesity hypoventilation syndrome ([facility] 12/18/2015          Number and Complexity of Problems addressed and   Risks of complications and/or morbidity of management      Epigastric pain, leukocytosis, gallstone pancreatitis, elevated LFTs, abnormal liver echotexture  She is s/p lap cholecystectomy and liver biopsy by Dr Jennifer Finch on 10/30/22    LFTs normal this am  OK for discharge from surgical perspective on PO Abx for 1 more week for recent cholangitis and WBC 11k (possibly Augmentin--Defer to Hospitalists)  We will see her in office in approx 10 days  [place] Rx sent to her pharmacy  Discharge instructions written    We will sign off and see in the office as outpt        BMI>54  Encourage weight loss    Tobacco use  Encourage smoking cessation              I have personally performed a face-to-face diagnostic evaluation and management  service on this patient. I have independently seen the patient. I have independently obtained the above history from the patient/family. I have independently examined the patient with above findings. I have independently reviewed data/labs for this patient and developed the above plan of care (MDM).       Signed: Radha Sanchez MD  10/31/2022    7:26 AM

## 2022-10-31 NOTE — PROGRESS NOTES
10/31/22 1333   Respiratory   Respiratory Pattern Regular   Respiratory Depth Normal   Respiratory Quality/Effort Unlabored   Chest Assessment Chest expansion symmetrical   L Breath Sounds Diminished   R Breath Sounds Diminished   Level of Activity/Mobility 1   Breath Sounds   Right Upper Lobe Diminished   Right Middle Lobe Diminished   Right Lower Lobe Diminished   Left Upper Lobe Diminished   Left Lower Lobe Diminished   Cough/Sputum   Cough Productive   Frequency Occasional   Additional Respiratory Assessments   Heart Rate 93   Resp 18   SpO2 95 %

## 2022-10-31 NOTE — PROGRESS NOTES
H&P/Consult Note/Progress Note/Office Note:   Fidel Stern  MRN: [id number]  LKE:[birth year]  Age:52 y.o.    HPI: Fidel Stern is a 50 y.o. female who is s/p lap cholecystectomy and liver biopsy by Dr Jn Arroyo on 10/30/22      She has h/o COPD and originally presented to the ER with a 1 day history of severe, constant epigastric pain associated with N/V. No radiation of pain to her back. Nothing in particular made her pain better or worse. She is s/p partial hysterectomy for benign fibroids through a low midline incision. She reported her uterus weighed 5lbs. Her ovaries remain. Hypotension was noted. She was seen in the ER  WBC 20.3   Lactic acid was 2.6. Lipase >30k. T Bili 1.4, AST//225    CT scan of her abdomen and pelvis is as shown below. She was treated with IV fluids and Zosyn and transported to [facility]    She denies ETOH for several years. GI and General surgery were consulted    10/26/22 CT abd/pelvis  Hx: Upper abdominal pain. Diarrhea. FINDINGS: A moderate amount of motion artifact limits this examination to some degree. The visualized lung bases and mediastinum are unremarkable. The liver, spleen, pancreas, adrenal glands, and kidneys are within normal limits. The left kidney contains a tiny low-density lesion in the midpole which is too small to characterize. The bladder is decompressed and not assessed. The gallbladder shows wall thickening and mucosal enhancement. A few radiopaque stones are appreciated. Multiple prominent lymph nodes are appreciated in the yousuf hepatis and near the head of the pancreas. Distal esophagus and stomach are unremarkable. Small and large bowel show no evidence of obstruction. There is a normal appendix in the right lower quadrant. No evidence of free fluid or free air. No pathologic adenopathy. No abdominal aortic aneurysm.      Osseous structures show no evidence of acute fracture or suspicious lesion. Impression:  The gallbladder shows wall thickening and mucosal enhancement. A few radiopaque stones are appreciated. These findings are suggestive of acute cholecystitis. Multiple prominent lymph nodes are appreciated in the yousuf hepatis and near the head of the pancreas. There significance is unclear. 10/26/22 abd US  FINDINGS: Aorta/IVC: Not well visualized. /Visualized portions are within normal limits. Pancreas: Mostly obscured by overlying bowel gas. Liver: There is diffuse increased echogenicity within the liver parenchyma, suggestive of hepatic steatosis. No focal lesions are demonstrated on the provided images. Portal vein: The portal vein shows hepatopedal flow. Gallbladder: The gallbladder is contracted and not assessed. Sonographic Moe sign was negative. CBD: Normal in caliber and measures 2.3 mm. Right kidney: 12.1 cm in length and without evidence of obstruction. Impression:  The gallbladder is contracted and not assessed. Sonographic Moe sign was negative. Hepatic steatosis.        Additional hx:  10/29/22 Improved; GI decided not to proceed with pre-op ERCP  10/30/22 s/p lap river and liver biopsy by Dr Randhawa Smaller  10/31/22 likely home today        Past Medical History:   Diagnosis Date    Childhood asthma     Chronic respiratory failure with hypoxia (HCC)     Continuous at 3L    COPD (chronic obstructive pulmonary disease) ([address]     Stage 4 by GOLD Criteria    COVID-19 01/07/2021    Hyperlipidemia     Daily meds     Hypertension     Managed with meds     Intraductal papilloma of breast 01/16/2019    Left    Microalbuminuria     Microcytosis     Morbid obesity ([address]     Nonproliferative retinopathy due to secondary diabetes ([address] 03/07/2020    Mild, Bilateral    THANH on CPAP 3/2/2017    Paroxysmal atrial fibrillation (HCC)     Pneumonia ages 3 and 9    Type 2 diabetes mellitus ([address]      Past Surgical History:   Procedure Laterality Date BREAST BIOPSY Left 3/27/2019    LEFT BREAST NEEDLE LOCALIZED LUMPECTOMY  performed by Serafin Marquez DO at [address] [address] (CERVIX NOT REMOVED)  08/2012    US BREAST NEEDLE BIOPSY LEFT Left 1/7/2019    US BREAST NEEDLE BIOPSY LEFT 1/7/2019 [facility]    US GUIDED NEEDLE LOC OF LEFT BREAST Left 3/27/2019    US GUIDED NEEDLE LOC OF LEFT BREAST 3/27/2019 [facility]     Current Facility-Administered Medications   Medication Dose Route Frequency    miconazole (MICOTIN) 2 % powder   Topical BID    ipratropium-albuterol (DUONEB) nebulizer solution 3 mL  1 vial Inhalation 4x daily    loperamide (IMODIUM) capsule 2 mg  2 mg Oral 4x Daily PRN    sodium chloride flush 0.9 % injection 5-40 mL  5-40 mL IntraVENous 2 times per day    sodium chloride flush 0.9 % injection 5-40 mL  5-40 mL IntraVENous PRN    ondansetron (ZOFRAN-ODT) disintegrating tablet 4 mg  4 mg Oral Q8H PRN    Or    ondansetron (ZOFRAN) injection 4 mg  4 mg IntraVENous Q6H PRN    polyethylene glycol (GLYCOLAX) packet 17 g  17 g Oral Daily PRN    acetaminophen (TYLENOL) tablet 650 mg  650 mg Oral Q6H PRN    Or    acetaminophen (TYLENOL) suppository 650 mg  650 mg Rectal Q6H PRN    nicotine (NICODERM CQ) 21 MG/24HR 1 patch  1 patch TransDERmal Daily    insulin lispro (HUMALOG) injection vial 0-8 Units  0-8 Units SubCUTAneous TID WC    insulin lispro (HUMALOG) injection vial 0-4 Units  0-4 Units SubCUTAneous Nightly    glucose chewable tablet 16 g  4 tablet Oral PRN    dextrose bolus 10% 125 mL  125 mL IntraVENous PRN    Or    dextrose bolus 10% 250 mL  250 mL IntraVENous PRN    glucagon (rDNA) injection 1 mg  1 mg SubCUTAneous PRN    dextrose 10 % infusion   IntraVENous Continuous PRN    oxyCODONE (ROXICODONE) immediate release tablet 5 mg  5 mg Oral Q4H PRN    morphine injection 4 mg  4 mg IntraVENous Q1H PRN    piperacillin-tazobactam (ZOSYN) 3,375 mg in sodium chloride 0.9 % 50 mL IVPB (mini-bag)  3,375 mg IntraVENous q8h    ipratropium-albuterol (DUONEB) nebulizer solution 1 ampule  1 ampule Inhalation Q6H PRN    mometasone-formoterol (DULERA) 100-5 MCG/ACT inhaler 2 puff  2 puff Inhalation BID    guaiFENesin (MUCINEX) extended release tablet 1,200 mg  1,200 mg Oral BID    albuterol sulfate HFA (PROVENTIL;VENTOLIN;PROAIR) 108 (90 Base) MCG/ACT inhaler 2 puff  2 puff Inhalation Q4H PRN     ALLERGIES:  Patient has no known allergies. Social History     Socioeconomic History    Marital status: Single     Spouse name: None    Number of children: None    Years of education: None    Highest education level: None   Tobacco Use    Smoking status: Every Day     Packs/day: 1.00     Years: 32.00     Pack years: 32.00     Types: Cigarettes    Smokeless tobacco: Never    Tobacco comments:     Quit smoking: Currently 10 Cigarettes/day   Vaping Use    Vaping Use: Never used   Substance and Sexual Activity    Alcohol use: Yes    Drug use: Not Currently     Types: Cocaine, Marijuana Dietra Due)   Social History Narrative    Single and lives alone. Disabled--previously worked at [employer]. Has lived in [facility]. No pets. Social History     Tobacco Use   Smoking Status Every Day    Packs/day: 1.00    Years: 32.00    Pack years: 32.00    Types: Cigarettes   Smokeless Tobacco Never   Tobacco Comments    Quit smoking: Currently 10 Cigarettes/day     Family History   Problem Relation Age of Onset    Coronary Art Dis Neg Hx     COPD Mother     Parkinsonism Father     Asthma Son     Schizophrenia Brother     Hypertension Brother     Emphysema Paternal Grandfather     Hypertension Mother      ROS: The patient has no difficulty with chest pain or shortness of breath. No fever or chills. Comprehensive review of systems was otherwise unremarkable except as noted above.     Physical Exam:   /79   Pulse 92   Temp 98.1 °F (36.7 °C) (Oral)   Resp 18   Ht 5' 5\" (1.651 m)   Wt (!) 323 lb 3.2 oz (146.6 kg)   SpO2 92%   BMI 53.78 kg/m²   Vitals:    10/30/22 2010 10/30/22 2225 10/30/22 2353 10/31/22 0121   BP:  128/86  128/79   Pulse: (!) 105 88  92   Resp:  17 19 18   Temp:  98.2 °F (36.8 °C)  98.1 °F (36.7 °C)   TempSrc:  Oral  Oral   SpO2: 97% 91%  92%   Weight:       Height:         10/30 1901 - 10/31 0700  In: 300 [P.O.:300]  Out: 0   10/29 0701 - 10/30 1900  In: 8823 [P. O.:905; I.V.:800]  Out: 610 [Urine:600]    Constitutional: Alert, oriented, cooperative patient in no acute distress; appears stated age    Eyes:Sclera are clear. EOMs intact  ENMT: no external lesions gross hearing normal; no obvious neck masses, no ear or lip lesions, nares normal  CV: RRR. Normal perfusion  Resp: No JVD. Breathing is  non-labored; no audible wheezing. GI: obese; BMI>54; incisions OK      Musculoskeletal: unremarkable with normal function. No embolic signs or cyanosis.    Neuro:  Oriented; moves all 4; no focal deficits  Psychiatric: normal affect and mood, no memory impairment    Recent vitals (if inpt):  Patient Vitals for the past 24 hrs:   BP Temp Temp src Pulse Resp SpO2   10/31/22 0121 128/79 98.1 °F (36.7 °C) Oral 92 18 92 %   10/30/22 2353 -- -- -- -- 19 --   10/30/22 2225 128/86 98.2 °F (36.8 °C) Oral 88 17 91 %   10/30/22 2010 -- -- -- (!) 105 -- 97 %   10/30/22 2009 -- -- -- 88 16 95 %   10/30/22 1540 (!) 148/87 -- -- -- -- --   10/30/22 1534 (!) 149/105 98.2 °F (36.8 °C) Oral 86 17 94 %   10/30/22 1507 -- -- -- 85 16 94 %   10/30/22 1155 -- -- -- 88 17 96 %   10/30/22 1139 (!) 143/83 98.1 °F (36.7 °C) Oral 92 20 91 %   10/30/22 1100 (!) 152/82 -- -- 97 20 99 %   10/30/22 1055 (!) 153/85 -- -- 91 22 99 %   10/30/22 1050 (!) 149/77 -- -- 94 24 97 %   10/30/22 1045 (!) 148/74 97.8 °F (36.6 °C) Temporal 90 20 98 %   10/30/22 1040 (!) 170/74 -- -- 97 27 99 %   10/30/22 1035 134/77 -- -- 91 21 100 %   10/30/22 1030 (!) 140/75 -- -- 92 17 98 %   10/30/22 1025 (!) 151/79 -- -- 93 16 98 %   10/30/22 1022 -- -- -- -- -- 95 %   10/30/22 1017 130/66 -- -- (!) 105 21 --   10/30/22 1015 127/63 -- -- (!) 101 20 98 %   10/30/22 1010 135/65 -- -- 93 24 96 %   10/30/22 1002 (!) 141/67 -- -- (!) 101 22 96 %   10/30/22 0957 (!) 109/58 -- -- (!) 108 21 96 %   10/30/22 0952 (!) 125/59 -- -- (!) 101 24 94 %   10/30/22 0950 (!) 143/83 97.2 °F (36.2 °C) Tympanic (!) 101 20 94 %   10/30/22 0947 (!) 145/63 97.2 °F (36.2 °C) -- (!) 110 24 (!) 84 %       Amount and/or Complexity of Data Reviewed and Analyzed:  I reviewed and analyzed all of the unique labs and radiologic studies that are shown below as well as any that are in the HPI, and any that are in the expanded problem list below  *Each unique test, order, or document contributes to the combination of 2 or combination of 3 in Category 1 below. For this visit I also reviewed old records and prior notes. Recent Labs     10/28/22  1820 10/29/22  0129 10/30/22  0506 10/31/22  0425   WBC  --    < > 8.3 11.2*   HGB  --    < > 9.6* 9.6*   PLT  --    < > 284 305   NA  --    < > 140 140   K  --    < > 3.5 3.9   CL  --    < > 104 105   CO2  --    < > 35* 34*   BUN  --    < > 7 5*   INR 1.0  --  0.9  --    APTT 30.6  --   --   --    ALT  --    < > 83* 63    < > = values in this interval not displayed.      Review of most recent CBC  Lab Results   Component Value Date    WBC 11.2 (H) 10/31/2022    HGB 9.6 (L) 10/31/2022    HCT 34.3 (L) 10/31/2022    MCV 72.5 (L) 10/31/2022     10/31/2022       Review of most recent BMP  Lab Results   Component Value Date/Time     10/31/2022 04:25 AM    K 3.9 10/31/2022 04:25 AM     10/31/2022 04:25 AM    CO2 34 10/31/2022 04:25 AM    BUN 5 10/31/2022 04:25 AM    CREATININE 0.70 10/31/2022 04:25 AM    GLUCOSE 84 10/31/2022 04:25 AM    CALCIUM 9.3 10/31/2022 04:25 AM        Review of most recent LFTs (and lipase if done)  Lab Results   Component Value Date    ALT 63 10/31/2022    AST 22 10/31/2022    ALKPHOS 131 10/31/2022    BILITOT 0.3 10/31/2022     Lab Results   Component Value Date    LIPASE 133 10/29/2022       Lab Results   Component Value Date/Time    INR 0.9 10/30/2022 05:06 AM    APTT 30.6 10/28/2022 06:20 PM    APTT 73.7 08/31/2019 11:30 AM       Review of most recent HgbA1c  Hemoglobin A1C   Date Value Ref Range Status   09/01/2022 6.1 (H) 4.8 - 5.6 % Final       Nutritional assessment screen for wound healing issues:  Lab Results   Component Value Date    LABALBU 3.1 (L) 10/31/2022       @lastcovr@    Xray Result (most recent):  XR CHEST PORTABLE 10/26/2022    Narrative  EXAMINATION: XR CHEST PORTABLE 10/26/2022 8:05 PM    ACCESSION NUMBER: [id number]    COMPARISON: None available    INDICATION: dyspnea    TECHNIQUE: A single view of the chest was obtained. FINDINGS:  Lung hypoinflation. Streaky bibasilar opacities. No pneumothorax or sizable pleural effusion. Stable cardiomediastinal silhouette. Impression  -Lung hypoinflation with streaky bibasilar opacities, favored to reflect  atelectasis, though infection/aspiration are also considerations in the  appropriate clinical context. CT Result (most recent):  CT ABDOMEN PELVIS W IV CONTRAST 10/26/2022    Narrative  EXAM: CT ABDOMEN PELVIS W IV CONTRAST    HISTORY: Upper abdominal pain. Diarrhea. TECHNIQUE: Axial images of the abdomen and pelvis were performed following the  administration of intravenous contrast. Images were obtained in the axial plane  and coronal reformatted images were created and reviewed. Dose reduction technique used: Automated exposure control/Adjustment of the mA  and/or kV according to patient size/Use of iterative reconstruction technique. 100 mL of Isovue-370 were utilized. COMPARISON: CT chest dated 10/18/2018 and 11/10/2016. FINDINGS:  A moderate amount of motion artifact limits this examination to some degree. The visualized lung bases and mediastinum are unremarkable. The liver, spleen, pancreas, adrenal glands, and kidneys are within normal  limits.  The left kidney contains a tiny low-density lesion in the midpole which  is too small to characterize. The bladder is decompressed and not assessed. The gallbladder shows wall thickening and mucosal enhancement. A few radiopaque  stones are appreciated. Multiple prominent lymph nodes are appreciated in the yousuf hepatis and near the  head of the pancreas. Distal esophagus and stomach are unremarkable. Small and large bowel show no  evidence of obstruction. There is a normal appendix in the right lower quadrant. No evidence of free fluid or free air. No pathologic adenopathy. No abdominal  aortic aneurysm. Osseous structures show no evidence of acute fracture or suspicious lesion. Impression  The gallbladder shows wall thickening and mucosal enhancement. A few radiopaque  stones are appreciated. These findings are suggestive of acute cholecystitis. Multiple prominent lymph nodes are appreciated in the yousuf hepatis and near the  head of the pancreas. There significance is unclear. US Result (most recent):  US ABDOMEN LIMITED 10/26/2022    Narrative  EXAM: US ABDOMEN LIMITED    HISTORY: Upper abdominal pain, elevated LFTs and elevated lipase. TECHNIQUE: Grayscale and limited color Doppler ultrasound of the right upper  quadrant. COMPARISON: CT abdomen and pelvis dated 10/26/2022    FINDINGS:  Aorta/IVC: Not well visualized. /Visualized portions are within normal limits. Pancreas: Mostly obscured by overlying bowel gas. Liver: There is diffuse increased echogenicity within the liver parenchyma,  suggestive of hepatic steatosis. No focal lesions are demonstrated on the  provided images. Portal vein: The portal vein shows hepatopedal flow. Gallbladder: The gallbladder is contracted and not assessed. Sonographic Moe  sign was negative. CBD: Normal in caliber and measures 2.3 mm. Right kidney: 12.1 cm in length and without evidence of obstruction.     Impression  The gallbladder is contracted and not assessed. Sonographic Moe sign was  negative. Hepatic steatosis. Admission date (for inpatients): 10/26/2022   1 Day Post-Op  Procedure(s):  ERCP ENDOSCOPIC RETROGRADE CHOLANGIOPANCREATOGRAPHY         ASSESSMENT/PLAN:  [unfilled]  Principal Problem:    Acute gallstone pancreatitis  Active Problems:    BMI 50.0-59.9, adult (HCC)    Cigarette nicotine dependence in remission    Hypertension    COPD, severe ([facility]    Diabetes mellitus type 2, controlled ([facility]    Atrial fibrillation, new onset ([facility]    Morbid obesity with BMI of 50.0-59.9, adult (HCC)    THANH (obstructive sleep apnea)  Resolved Problems:    * No resolved hospital problems. *     Patient Active Problem List    Diagnosis Date Noted    BMI 50.0-59.9, adult ([facility] 10/27/2022     Priority: Medium    Acute gallstone pancreatitis 10/26/2022     Priority: Medium     10/30/22 s/p lap river and liver biopsy; Dr Anay Da Silva      COVID-19 01/14/2021    Type 2 diabetes with nephropathy ([facility] 01/07/2020    Atrial fibrillation, new onset ([facility] 08/30/2019    Chronic respiratory failure with hypoxia ([facility] 08/28/2019     NC 3L continuous        Cigarette nicotine dependence in remission 03/05/2017    COPD, severe ([address] 03/02/2017     PFTs 3/2016  FVC 2.22 L and 62 % of predicted. FEV1 1.23 L and 42 % of predicted. Ratio 55%. There was no clinically relevant response to bronchodilator  LUNG VOLUMES:  TLC 4.61 L and 89 % of predicted. RV 2.39 L and 139 % of predicted. DIFFUSION:  The diffusing capacity was Corrected for hemoglobin of 14.7 g/dL  and was 15.9 mL/mmHg/minute and 58 % of predicted.         Diabetes mellitus type 2, controlled ([address] 03/02/2017    Morbid obesity with BMI of 50.0-59.9, adult ([address] 03/02/2017    THANH (obstructive sleep apnea) 03/02/2017    Nocturnal hypoxemia 03/07/2016    Hypertension 03/07/2016    Chronic back pain 03/07/2016    Obesity hypoventilation syndrome ([address] 12/18/2015          Number and Complexity of Problems addressed and   Risks of complications and/or morbidity of management      Epigastric pain, leukocytosis, gallstone pancreatitis, elevated LFTs, abnormal liver echotexture  She is s/p lap cholecystectomy and liver biopsy by Dr Arcenio Mckeon on 10/30/22    LFTs normal this am  OK for discharge from surgical perspective on PO Abx for 1 more week for recent cholangitis and WBC 11k (possibly Augmentin--Defer to Hospitalists)  We will see her in office in approx 10 days  [facility] Rx sent to her pharmacy  Discharge instructions written    We will sign off and see in the office as outpt        BMI>54  Encourage weight loss    Tobacco use  Encourage smoking cessation              I have personally performed a face-to-face diagnostic evaluation and management  service on this patient. I have independently seen the patient. I have independently obtained the above history from the patient/family. I have independently examined the patient with above findings. I have independently reviewed data/labs for this patient and developed the above plan of care (MDM).       Signed: Emeli Hallman MD  10/31/2022    6:36 AM

## 2022-10-31 NOTE — PROGRESS NOTES
Hospitalist Progress Note   Admit Date:  10/26/2022  7:27 PM   Name:  Basim Obrien   Age:  50 y.o. Sex:  female  :  1970   MRN:  [id number]   Room:  [facility]/    Presenting Complaint: Nausea and Emesis (Pt with N/V/D since 4am. Alert and oriented x's 4. Sepsis protocol started by EMS enroute)     Reason(s) for Admission: Septicemia [facility]) [A41.9]  Gallstone pancreatitis [K85.10]  Acute gallstone pancreatitis [K85.10]     Hospital Course:   Deysi Velazquez is a 50 y.o. female, local history of hypertension hyperlipidemia, diabetes mellitus who presents to the Emergency Department with chief complaint of abdominal pain, vomiting, and diarrhea. Patient states symptoms started this morning. States she believes she \"ate some bad food last evening. \"  States since then this morning she had crampy abdominal pain with vomiting. Denies any hematemesis or melena. Denies any fevers or chills. EMS was called when she was initially found to be hypotensive. Antibiotics were started in route as well as IV fluids and antiemetics. Patient describes a diffuse abdominal pain. Laboratory data significant for lactic acid 2.6, elevated Pro-Dragan.  Lipase greater than 30,000 and mildly elevated bilirubin and LFTs. She got Zosyn in route via EMS. IV fluids have been initiated. Vancomycin added in ER. CT scan does show gallbladder wall thickening as well as stones concern for possible acute cholecystitis. However she has no focal tenderness on exam.  Given acuity of symptoms reported fever and sepsis markers ER MD became concerned for possibly developing cholangitis. GI as well as hospitalist consulted for admission. Patient denies current alcohol use. Subjective & 24hr Events (10/30/22): Pt seen post op. Admits to moderate pain,  mild nausea.  Conversant     Assessment & Plan:     Principal Problem:    Acute gallstone pancreatitis  S/p lap river today  Plan to dc zosyn in AM if ok by surgery      BMI 50.0-59.9, adult ([facility]  Plan:  complicates care, lifestyle modifications encouraged        Hypertension  Plan: therapeutically controlled      COPD, severe ([facility]  Plan: compensated. Will continue bd and prn nebs        Afib  Will d/w surgery in AM when ok to start Eliquis      Diabetes mellitus type 2, controlled ([facility]  Plan: fsbs wnl. Will continue prn csi      Microcytic anemia  Plan: Iron studies done 10/28/22 rules out LAURA. ? gastritis      THANH (obstructive sleep apnea)  Plan: cpap qhs      Anticipated discharge needs:      None    Diet:  ADULT DIET; Clear Liquid; 4 carb choices (60 gm/meal)  DVT PPx: scd  Code status: Full Code    Hospital Problems:  Principal Problem:    Acute gallstone pancreatitis  Active Problems:    BMI 50.0-59.9, adult (HCC)    Cigarette nicotine dependence in remission    Hypertension    COPD, severe (HCC)    Diabetes mellitus type 2, controlled ([facility]    At[facility] fibrillation, [facility] onset ([facility]    Morbid obesity with BMI of 50.0-59.9, adult (HCC)    THANH (obstructive sleep apnea)  Resolved Problems:    * No resolved hospital problems.  *      Objective:   Patient Vitals for the past 24 hrs:   Temp Pulse Resp BP SpO2   10/30/22 1540 -- -- -- (!) 148/87 --   10/30/22 1534 98.2 °F (36.8 °C) 86 17 (!) 149/105 94 %   10/30/22 1507 -- 85 16 -- 94 %   10/30/22 1155 -- 88 17 -- 96 %   10/30/22 1139 98.1 °F (36.7 °C) 92 20 (!) 143/83 91 %   10/30/22 1100 -- 97 20 (!) 152/82 99 %   10/30/22 1055 -- 91 22 (!) 153/85 99 %   10/30/22 1050 -- 94 24 (!) 149/77 97 %   10/30/22 1045 97.8 °F (36.6 °C) 90 20 (!) 148/74 98 %   10/30/22 1040 -- 97 27 (!) 170/74 99 %   10/30/22 1035 -- 91 21 134/77 100 %   10/30/22 1030 -- 92 17 (!) 140/75 98 %   10/30/22 1025 -- 93 16 (!) 151/79 98 %   10/30/22 1022 -- -- -- -- 95 %   10/30/22 1017 -- (!) 105 21 130/66 --   10/30/22 1015 -- (!) 101 20 127/63 98 %   10/30/22 1010 -- 93 24 135/65 96 %   10/30/22 1002 -- (!) 101 22 (!) 141/67 96 %   10/30/22 0957 -- (!) 108 21 (!) 109/58 96 %   10/30/22 0952 -- (!) 101 24 (!) 125/59 94 %   10/30/22 0950 97.2 °F (36.2 °C) (!) 101 20 (!) 143/83 94 %   10/30/22 0947 97.2 °F (36.2 °C) (!) 110 24 (!) 145/63 (!) 84 %   10/30/22 0620 98 °F (36.7 °C) 93 20 (!) 141/72 94 %   10/30/22 0457 -- 80 20 -- 97 %   10/30/22 0419 97.3 °F (36.3 °C) 94 18 (!) 133/96 91 %   10/30/22 0237 -- -- 20 -- --   10/30/22 0010 97.9 °F (36.6 °C) 99 16 117/65 94 %   10/29/22 2250 -- 97 18 -- 93 %   10/29/22 2039 -- 97 20 -- 96 %         Oxygen Therapy  SpO2: 94 %  Pulse Oximetry Type: Continuous  Pulse via Oximetry: 95 beats per minute  Pulse Oximeter Device Mode: Continuous  Pulse Oximeter Device Location: Right, Finger  O2 Device: Nasal cannula  Skin Assessment: Clean, dry, & intact  Skin Protection for O2 Device: No  Orientation: Anterior, Bilateral  Location: Nose (under the nose med)  Intervention(s): Reposition Device  O2 Flow Rate (L/min): 4 L/min    Estimated body mass index is 53.78 kg/m² as calculated from the following:    Height as of this encounter: 5' 5\" (1.651 m). Weight as of this encounter: 323 lb 3.2 oz (146.6 kg). Intake/Output Summary (Last 24 hours) at 10/30/2022 2008  Last data filed at 10/30/2022 1614  Gross per 24 hour   Intake 1705 ml   Output 610 ml   Net 1095 ml           Physical Exam:     Blood pressure (!) 148/87, pulse 86, temperature 98.2 °F (36.8 °C), temperature source Oral, resp. rate 17, height 5' 5\" (1.651 m), weight (!) 323 lb 3.2 oz (146.6 kg), SpO2 94 %. General:    Well nourished. Morbidly obese  Head:  Normocephalic, atraumatic  Eyes:  Sclerae appear normal.  Pupils equally round. ENT:  Nares appear normal, no drainage. Moist oral mucosa  Neck:  No restricted ROM. Trachea midline   CV:   RRR. No m/r/g. No jugular venous distension. Lungs:   CTAB. No wheezing, rhonchi, or rales. Symmetric expansion. Abdomen: Bowel sounds present. Soft, TTP RUQ post op, nondistended.   Extremities: No cyanosis or clubbing. No edema  Skin:     No rashes and normal coloration. Warm and dry. Neuro:  CN II-XII grossly intact. Sensation intact. A&Ox3  Psych:  Normal mood and affect.       I have personally reviewed labs and tests showing:  Recent Labs:  Recent Results (from the past 48 hour(s))   POCT Glucose    Collection Time: 10/28/22  8:20 PM   Result Value Ref Range    POC Glucose 101 (H) 65 - 100 mg/dL    Performed by: [facility]    Lipase    Collection Time: 10/29/22  1:29 AM   Result Value Ref Range    Lipase 133 73 - 393 U/L   CBC with Auto Differential    Collection Time: 10/29/22  1:29 AM   Result Value Ref Range    WBC 8.5 4.3 - 11.1 K/uL    RBC 4.67 4.05 - 5.2 M/uL    Hemoglobin 9.5 (L) 11.7 - 15.4 g/dL    Hematocrit 32.9 (L) 35.8 - 46.3 %    MCV 70.4 (L) 82 - 102 FL    MCH 20.3 (L) 26.1 - 32.9 PG    MCHC 28.9 (L) 31.4 - 35.0 g/dL    RDW 17.9 (H) 11.9 - 14.6 %    Platelets 240 102 - 654 K/uL    MPV 9.9 9.4 - 12.3 FL    nRBC 0.02 0.0 - 0.2 K/uL    Differential Type AUTOMATED      Seg Neutrophils 56 43 - 78 %    Lymphocytes 36 13 - 44 %    Monocytes 6 4.0 - 12.0 %    Eosinophils % 2 0.5 - 7.8 %    Basophils 1 0.0 - 2.0 %    Immature Granulocytes 0 0.0 - 5.0 %    Segs Absolute 4.8 1.7 - 8.2 K/UL    Absolute Lymph # 3.0 0.5 - 4.6 K/UL    Absolute Mono # 0.5 0.1 - 1.3 K/UL    Absolute Eos # 0.2 0.0 - 0.8 K/UL    Basophils Absolute 0.0 0.0 - 0.2 K/UL    Absolute Immature Granulocyte 0.0 0.0 - 0.5 K/UL   Comprehensive Metabolic Panel    Collection Time: 10/29/22  1:29 AM   Result Value Ref Range    Sodium 140 133 - 143 mmol/L    Potassium 3.5 3.5 - 5.1 mmol/L    Chloride 106 101 - 110 mmol/L    CO2 29 21 - 32 mmol/L    Anion Gap 5 2 - 11 mmol/L    Glucose 97 65 - 100 mg/dL    BUN 9 6 - 23 MG/DL    Creatinine 0.90 0.6 - 1.0 MG/DL    Est, Glom Filt Rate >60 >60 ml/min/1.73m2    Calcium 9.4 8.3 - 10.4 MG/DL    Total Bilirubin 0.3 0.2 - 1.1 MG/DL     (H) 12 - 65 U/L    AST 35 15 - 37 U/L    Alk Phosphatase 151 (H) 50 - 136 U/L    Total Protein 7.7 6.3 - 8.2 g/dL    Albumin 3.2 (L) 3.5 - 5.0 g/dL    Globulin 4.5 2.8 - 4.5 g/dL    Albumin/Globulin Ratio 0.7 0.4 - 1.6     Anti-Xa, Unfractionated Heparin    Collection Time: 10/29/22  1:29 AM   Result Value Ref Range    Anti-XA Unfrac Heparin <0.10 (L) 0.3 - 0.7 IU/mL   Anti-Xa, Unfractionated Heparin    Collection Time: 10/29/22 11:01 AM   Result Value Ref Range    Anti-XA Unfrac Heparin <0.10 (L) 0.3 - 0.7 IU/mL   POCT Glucose    Collection Time: 10/29/22  1:03 PM   Result Value Ref Range    POC Glucose 82 65 - 100 mg/dL    Performed by: Lura Sandifer    POCT Glucose    Collection Time: 10/29/22  5:11 PM   Result Value Ref Range    POC Glucose 98 65 - 100 mg/dL    Performed by: Lura Sandifer    Pregnancy, Urine    Collection Time: 10/29/22  6:45 PM   Result Value Ref Range    HCG(Urine) Pregnancy Test Negative NEG     Anti-Xa, Unfractionated Heparin    Collection Time: 10/29/22  7:47 PM   Result Value Ref Range    Anti-XA Unfrac Heparin 0.26 (L) 0.3 - 0.7 IU/mL   TYPE AND SCREEN    Collection Time: 10/29/22  7:47 PM   Result Value Ref Range    Crossmatch expiration date 11/01/2022,2359     ABO/Rh B POSITIVE     Antibody Screen NEG    POCT Glucose    Collection Time: 10/29/22  9:50 PM   Result Value Ref Range    POC Glucose 112 (H) 65 - 100 mg/dL    Performed by: Winifred    CBC with Auto Differential    Collection Time: 10/30/22  5:06 AM   Result Value Ref Range    WBC 8.3 4.3 - 11.1 K/uL    RBC 4.73 4.05 - 5.2 M/uL    Hemoglobin 9.6 (L) 11.7 - 15.4 g/dL    Hematocrit 34.6 (L) 35.8 - 46.3 %    MCV 73.2 (L) 82 - 102 FL    MCH 20.3 (L) 26.1 - 32.9 PG    MCHC 27.7 (L) 31.4 - 35.0 g/dL    RDW 18.5 (H) 11.9 - 14.6 %    Platelets 194 898 - 028 K/uL    MPV 9.9 9.4 - 12.3 FL    nRBC 0.00 0.0 - 0.2 K/uL    Differential Type AUTOMATED      Seg Neutrophils 69 43 - 78 %    Lymphocytes 22 13 - 44 %    Monocytes 6 4.0 - 12.0 %    Eosinophils % 3 0.5 - 7.8 %    Basophils 1 0.0 - 2.0 %    Immature Granulocytes 0 0.0 - 5.0 %    Segs Absolute 5.7 1.7 - 8.2 K/UL    Absolute Lymph # 1.8 0.5 - 4.6 K/UL    Absolute Mono # 0.5 0.1 - 1.3 K/UL    Absolute Eos # 0.2 0.0 - 0.8 K/UL    Basophils Absolute 0.0 0.0 - 0.2 K/UL    Absolute Immature Granulocyte 0.0 0.0 - 0.5 K/UL   Comprehensive Metabolic Panel w/ Reflex to MG    Collection Time: 10/30/22  5:06 AM   Result Value Ref Range    Sodium 140 133 - 143 mmol/L    Potassium 3.5 3.5 - 5.1 mmol/L    Chloride 104 101 - 110 mmol/L    CO2 35 (H) 21 - 32 mmol/L    Anion Gap 1 (L) 2 - 11 mmol/L    Glucose 94 65 - 100 mg/dL    BUN 7 6 - 23 MG/DL    Creatinine 0.80 0.6 - 1.0 MG/DL    Est, Glom Filt Rate >60 >60 ml/min/1.73m2    Calcium 9.2 8.3 - 10.4 MG/DL    Total Bilirubin 0.3 0.2 - 1.1 MG/DL    ALT 83 (H) 12 - 65 U/L    AST 27 15 - 37 U/L    Alk Phosphatase 150 (H) 50 - 136 U/L    Total Protein 6.8 6.3 - 8.2 g/dL    Albumin 3.2 (L) 3.5 - 5.0 g/dL    Globulin 3.6 2.8 - 4.5 g/dL    Albumin/Globulin Ratio 0.9 0.4 - 1.6     Protime-INR    Collection Time: 10/30/22  5:06 AM   Result Value Ref Range    Protime 12.5 (L) 12.6 - 14.3 sec    INR 0.9     Magnesium    Collection Time: 10/30/22  5:06 AM   Result Value Ref Range    Magnesium 2.1 1.8 - 2.4 mg/dL   POCT Glucose    Collection Time: 10/30/22  6:34 AM   Result Value Ref Range    POC Glucose 86 65 - 100 mg/dL    Performed by: Kirstie)    POCT Glucose    Collection Time: 10/30/22 10:00 AM   Result Value Ref Range    POC Glucose 112 (H) 65 - 100 mg/dL    Performed by: Kirstie)    POCT Glucose    Collection Time: 10/30/22 11:34 AM   Result Value Ref Range    POC Glucose 94 65 - 100 mg/dL    Performed by: German Diza    POCT Glucose    Collection Time: 10/30/22  3:36 PM   Result Value Ref Range    POC Glucose 117 (H) 65 - 100 mg/dL    Performed by: German Diaz        I have personally reviewed imaging studies showing: Other Studies:  US ABDOMEN LIMITED Specify organ? GALLBLADDER   Final Result   The gallbladder is contracted and not assessed. Sonographic Moe sign was   negative. Hepatic steatosis. CT ABDOMEN PELVIS W IV CONTRAST Additional Contrast? None   Final Result   The gallbladder shows wall thickening and mucosal enhancement. A few radiopaque   stones are appreciated. These findings are suggestive of acute cholecystitis. Multiple prominent lymph nodes are appreciated in the yousuf hepatis and near the   head of the pancreas. There significance is unclear. XR CHEST PORTABLE   Final Result   -Lung hypoinflation with streaky bibasilar opacities, favored to reflect   atelectasis, though infection/aspiration are also considerations in the   appropriate clinical context.                 Current Meds:  Current Facility-Administered Medications   Medication Dose Route Frequency    miconazole (MICOTIN) 2 % powder   Topical BID    ipratropium-albuterol (DUONEB) nebulizer solution 3 mL  1 vial Inhalation 4x daily    loperamide (IMODIUM) capsule 2 mg  2 mg Oral 4x Daily PRN    sodium chloride flush 0.9 % injection 5-40 mL  5-40 mL IntraVENous 2 times per day    sodium chloride flush 0.9 % injection 5-40 mL  5-40 mL IntraVENous PRN    ondansetron (ZOFRAN-ODT) disintegrating tablet 4 mg  4 mg Oral Q8H PRN    Or    ondansetron (ZOFRAN) injection 4 mg  4 mg IntraVENous Q6H PRN    polyethylene glycol (GLYCOLAX) packet 17 g  17 g Oral Daily PRN    acetaminophen (TYLENOL) tablet 650 mg  650 mg Oral Q6H PRN    Or    acetaminophen (TYLENOL) suppository 650 mg  650 mg Rectal Q6H PRN    nicotine (NICODERM CQ) 21 MG/24HR 1 patch  1 patch TransDERmal Daily    insulin lispro (HUMALOG) injection vial 0-8 Units  0-8 Units SubCUTAneous TID WC    insulin lispro (HUMALOG) injection vial 0-4 Units  0-4 Units SubCUTAneous Nightly    glucose chewable tablet 16 g  4 tablet Oral PRN    dextrose bolus 10% 125 mL  125 mL IntraVENous PRN    Or    dextrose bolus 10% 250 mL  250 mL IntraVENous PRN    glucagon (rDNA) injection 1 mg  1 mg SubCUTAneous PRN    dextrose 10 % infusion   IntraVENous Continuous PRN    oxyCODONE (ROXICODONE) immediate release tablet 5 mg  5 mg Oral Q4H PRN    morphine injection 4 mg  4 mg IntraVENous Q1H PRN    piperacillin-tazobactam (ZOSYN) 3,375 mg in sodium chloride 0.9 % 50 mL IVPB (mini-bag)  3,375 mg IntraVENous q8h    ipratropium-albuterol (DUONEB) nebulizer solution 1 ampule  1 ampule Inhalation Q6H PRN    mometasone-formoterol (DULERA) 100-5 MCG/ACT inhaler 2 puff  2 puff Inhalation BID    guaiFENesin (MUCINEX) extended release tablet 1,200 mg  1,200 mg Oral BID    albuterol sulfate HFA (PROVENTIL;VENTOLIN;PROAIR) 108 (90 Base) MCG/ACT inhaler 2 puff  2 puff Inhalation Q4H PRN       Signed:  Veena Hernandez DO    Part of this note may have been written by using a voice dictation software. The note has been proof read but may still contain some grammatical/other typographical errors.

## 2022-10-31 NOTE — PROGRESS NOTES
Spiritual Care Visit. Initial visit. Patient was visited by [facility]. Entered by Lenita Soulier Larraine Marten, Staff Chaplain. [place]arez, [place]

## 2022-11-01 VITALS
RESPIRATION RATE: 22 BRPM | HEART RATE: 89 BPM | SYSTOLIC BLOOD PRESSURE: 110 MMHG | DIASTOLIC BLOOD PRESSURE: 59 MMHG | WEIGHT: 293 LBS | HEIGHT: 65 IN | OXYGEN SATURATION: 96 % | TEMPERATURE: 98.8 F | BODY MASS INDEX: 48.82 KG/M2

## 2022-11-01 LAB
ALBUMIN SERPL-MCNC: 3 G/DL (ref 3.5–5)
ALBUMIN/GLOB SERPL: 0.8 (ref 0.4–1.6)
ALP SERPL-CCNC: 106 U/L (ref 50–136)
ALT SERPL-CCNC: 57 U/L (ref 12–65)
ANION GAP SERPL CALC-SCNC: 3 MMOL/L (ref 2–11)
AST SERPL-CCNC: 28 U/L (ref 15–37)
BASOPHILS # BLD: 0.1 K/UL (ref 0–0.2)
BASOPHILS NFR BLD: 1 % (ref 0–2)
BILIRUB SERPL-MCNC: 0.3 MG/DL (ref 0.2–1.1)
BUN SERPL-MCNC: 5 MG/DL (ref 6–23)
CALCIUM SERPL-MCNC: 9.3 MG/DL (ref 8.3–10.4)
CHLORIDE SERPL-SCNC: 102 MMOL/L (ref 101–110)
CO2 SERPL-SCNC: 36 MMOL/L (ref 21–32)
CREAT SERPL-MCNC: 0.6 MG/DL (ref 0.6–1)
DIFFERENTIAL METHOD BLD: ABNORMAL
EOSINOPHIL # BLD: 0.2 K/UL (ref 0–0.8)
EOSINOPHIL NFR BLD: 2 % (ref 0.5–7.8)
ERYTHROCYTE [DISTWIDTH] IN BLOOD BY AUTOMATED COUNT: 18.9 % (ref 11.9–14.6)
GLOBULIN SER CALC-MCNC: 4 G/DL (ref 2.8–4.5)
GLUCOSE BLD STRIP.AUTO-MCNC: 114 MG/DL (ref 65–100)
GLUCOSE BLD STRIP.AUTO-MCNC: 81 MG/DL (ref 65–100)
GLUCOSE BLD STRIP.AUTO-MCNC: 82 MG/DL (ref 65–100)
GLUCOSE BLD STRIP.AUTO-MCNC: 87 MG/DL (ref 65–100)
GLUCOSE SERPL-MCNC: 84 MG/DL (ref 65–100)
HCT VFR BLD AUTO: 32.4 % (ref 35.8–46.3)
HGB BLD-MCNC: 9.3 G/DL (ref 11.7–15.4)
IMM GRANULOCYTES # BLD AUTO: 0 K/UL (ref 0–0.5)
IMM GRANULOCYTES NFR BLD AUTO: 0 % (ref 0–5)
LYMPHOCYTES # BLD: 2.2 K/UL (ref 0.5–4.6)
LYMPHOCYTES NFR BLD: 27 % (ref 13–44)
MAGNESIUM SERPL-MCNC: 2.1 MG/DL (ref 1.8–2.4)
MCH RBC QN AUTO: 20.8 PG (ref 26.1–32.9)
MCHC RBC AUTO-ENTMCNC: 28.7 G/DL (ref 31.4–35)
MCV RBC AUTO: 72.5 FL (ref 82–102)
MONOCYTES # BLD: 0.6 K/UL (ref 0.1–1.3)
MONOCYTES NFR BLD: 7 % (ref 4–12)
NEUTS SEG # BLD: 5.4 K/UL (ref 1.7–8.2)
NEUTS SEG NFR BLD: 64 % (ref 43–78)
NRBC # BLD: 0.02 K/UL (ref 0–0.2)
PLATELET # BLD AUTO: 295 K/UL (ref 150–450)
PMV BLD AUTO: 10.6 FL (ref 9.4–12.3)
POTASSIUM SERPL-SCNC: 3.4 MMOL/L (ref 3.5–5.1)
PROT SERPL-MCNC: 7 G/DL (ref 6.3–8.2)
RBC # BLD AUTO: 4.47 M/UL (ref 4.05–5.2)
SERVICE CMNT-IMP: ABNORMAL
SERVICE CMNT-IMP: NORMAL
SODIUM SERPL-SCNC: 141 MMOL/L (ref 133–143)
WBC # BLD AUTO: 8.4 K/UL (ref 4.3–11.1)

## 2022-11-01 PROCEDURE — 2580000003 HC RX 258: Performed by: HOSPITALIST

## 2022-11-01 PROCEDURE — 83735 ASSAY OF MAGNESIUM: CPT

## 2022-11-01 PROCEDURE — 94640 AIRWAY INHALATION TREATMENT: CPT

## 2022-11-01 PROCEDURE — 6370000000 HC RX 637 (ALT 250 FOR IP): Performed by: INTERNAL MEDICINE

## 2022-11-01 PROCEDURE — 36415 COLL VENOUS BLD VENIPUNCTURE: CPT

## 2022-11-01 PROCEDURE — 6360000002 HC RX W HCPCS: Performed by: HOSPITALIST

## 2022-11-01 PROCEDURE — 85025 COMPLETE CBC W/AUTO DIFF WBC: CPT

## 2022-11-01 PROCEDURE — 82962 GLUCOSE BLOOD TEST: CPT

## 2022-11-01 PROCEDURE — 80053 COMPREHEN METABOLIC PANEL: CPT

## 2022-11-01 PROCEDURE — 2700000000 HC OXYGEN THERAPY PER DAY

## 2022-11-01 PROCEDURE — 94760 N-INVAS EAR/PLS OXIMETRY 1: CPT

## 2022-11-01 PROCEDURE — 6370000000 HC RX 637 (ALT 250 FOR IP): Performed by: HOSPITALIST

## 2022-11-01 RX ADMIN — POLYETHYLENE GLYCOL 3350 17 G: 17 POWDER, FOR SOLUTION ORAL at 08:28

## 2022-11-01 RX ADMIN — SODIUM CHLORIDE, PRESERVATIVE FREE 5 ML: 5 INJECTION INTRAVENOUS at 08:17

## 2022-11-01 RX ADMIN — PIPERACILLIN SODIUM,TAZOBACTAM SODIUM 3375 MG: 3; .375 INJECTION, POWDER, FOR SOLUTION INTRAVENOUS at 08:17

## 2022-11-01 RX ADMIN — IPRATROPIUM BROMIDE AND ALBUTEROL SULFATE 3 ML: .5; 3 SOLUTION RESPIRATORY (INHALATION) at 09:31

## 2022-11-01 RX ADMIN — GUAIFENESIN 1200 MG: 600 TABLET, EXTENDED RELEASE ORAL at 08:16

## 2022-11-01 RX ADMIN — IPRATROPIUM BROMIDE AND ALBUTEROL SULFATE 3 ML: .5; 3 SOLUTION RESPIRATORY (INHALATION) at 14:08

## 2022-11-01 RX ADMIN — PIPERACILLIN SODIUM,TAZOBACTAM SODIUM 3375 MG: 3; .375 INJECTION, POWDER, FOR SOLUTION INTRAVENOUS at 01:35

## 2022-11-01 RX ADMIN — MOMETASONE FUROATE AND FORMOTEROL FUMARATE DIHYDRATE 2 PUFF: 100; 5 AEROSOL RESPIRATORY (INHALATION) at 09:32

## 2022-11-01 ASSESSMENT — PAIN SCALES - GENERAL: PAINLEVEL_OUTOF10: 0

## 2022-11-01 NOTE — PROGRESS NOTES
Hospitalist Progress Note   Admit Date:  10/26/2022  7:27 PM   Name:  Joshua Obrien   Age:  50 y.o. Sex:  female  :  1970   MRN:  [id number]   Room:  0/    Presenting Complaint: Nausea and Emesis (Pt with N/V/D since 4am. Alert and oriented x's 4. Sepsis protocol started by EMS enroute)     Reason(s) for Admission: Septicemia [facility]) [A41.9]  Gallstone pancreatitis [K85.10]  Acute gallstone pancreatitis [K85.10]     Hospital Course:   Please refer to the admission H&P for details of presentation. In summary, Phoenix Iverson is a 50 y.o. female with medical history significant for hypertension hyperlipidemia, diabetes mellitus who presents to the Emergency Department with chief complaint of abdominal pain, vomiting, and diarrhea that started on day of presentation. EMS was called when she was initially found to be hypotensive. Antibiotics were started in route as well as IV fluids and antiemetics. Patient describes a diffuse abdominal pain. Laboratory data significant for lactic acid 2.6, elevated Pro-Dragan.  Lipase greater than 30,000 and mildly elevated bilirubin and LFTs. CT scan does show gallbladder wall thickening as well as stones concern for possible acute cholecystitis. Patient underwent lap river on 10/30 without any complications. Patient is now tolerating diet and will be discharged home with Augmentin per surgery's recommendation. Patient was planned for dc on 10/30 but was not able to be discharged due to unclear reason. Subjective/24 hr Events (22) : Patient is seen and examined at bedside. No acute events reported overnight by nursing staff. Slight soreness in her abdomen. Saturating well on O2 which is at her baseline. Patient denies fever, chills, chest pains, shortness of breath, n/v, abdominal pain. Tolerating diet and having BM.      Review of Systems: 10 point review of systems is otherwise negative with the exception of the elements mentioned above. Assessment & Plan:   # Acute gallstone pancreatitis  S/p lap river 10/30  Tolerating diet  Continue Augmentin at discharge per sx recommendations. # BMI 50.0-59.9, adult ([facility] [address]  Plan:  complicates care, lifestyle modifications encouraged      #  Hypertension  BP have been controlled OFF home meds  Hold cardizem and lisinopril at discharge  Monitor at home and d/w PCP when/if need to resume     # COPD, severe ([address]  Compensated, continue nebs at home       # Afib  Ok to resume eliquis per sx recs. # Diabetes mellitus type 2, controlled ([facility] [address]  Resume home meds      # Microcytic anemia  Stable around 9.5. No LAURA by labs  F/u PCP with repeat CBC at follow-up       THANH (obstructive sleep apnea)  Plan: cpap qhs    Diet:  ADULT DIET; Full Liquid; 4 carb choices (60 gm/meal)  DVT PPx: lovenox  Code status: Full Code      I have personally reviewed and ordered clinical lab tests and independently visualized images, tracing. I spent 35 minutes of time caring for this patient at bedside or nearby, and more than 50 percent of which was spent on coordination of care and/or patient/family counseling regarding the disease process, status , and treatment options/plan of care. Hospital Problems:  Principal Problem:    Acute gallstone pancreatitis  Active Problems:    BMI 50.0-59.9, adult (HCC)    Cigarette nicotine dependence in remission    Hypertension    COPD, severe (HCC)    Diabetes mellitus type 2, controlled ([facility] [address]    Atrial fibrillation, new onset ([facility] [address]    Morbid obesity with BMI of 50.0-59.9, adult (HCC)    THANH (obstructive sleep apnea)  Resolved Problems:    * No resolved hospital problems.  *      Objective:   Patient Vitals for the past 24 hrs:   Temp Pulse Resp BP SpO2   11/01/22 1126 98.4 °F (36.9 °C) 84 20 (!) 107/59 94 %   11/01/22 0936 -- 78 18 -- 96 %   11/01/22 0738 98.6 °F (37 °C) 88 20 117/69 93 %   11/01/22 0344 97.3 °F (36.3 °C) 85 19 120/71 91 %   10/31/22 2246 98.4 °F (36.9 °C) 93 20 120/71 91 %   10/31/22 2213 98.1 °F (36.7 °C) 93 18 100/67 98 %   10/31/22 2016 -- 95 18 -- 97 %   10/31/22 1814 98.2 °F (36.8 °C) 100 18 120/74 97 %   10/31/22 1504 98.1 °F (36.7 °C) 94 18 121/82 99 %   10/31/22 1333 -- 93 18 -- 95 %       Oxygen Therapy  SpO2: 94 %  Pulse Oximetry Type: Continuous  Pulse via Oximetry: 95 beats per minute  Pulse Oximeter Device Mode: Intermittent  Pulse Oximeter Device Location: Right, Finger  O2 Device: Nasal cannula  Skin Assessment: Clean, dry, & intact  Skin Protection for O2 Device: No  Orientation: Anterior, Bilateral  Location: Nose (under the nose med)  Intervention(s): Reposition Device  O2 Flow Rate (L/min): 4 L/min    Estimated body mass index is 55.35 kg/m² as calculated from the following:    Height as of this encounter: 5' 5\" (1.651 m). Weight as of this encounter: 332 lb 9.6 oz (150.9 kg). Intake/Output Summary (Last 24 hours) at 11/1/2022 1236  Last data filed at 11/1/2022 1014  Gross per 24 hour   Intake 716 ml   Output --   Net 716 ml         Physical Exam:     Blood pressure (!) 107/59, pulse 84, temperature 98.4 °F (36.9 °C), resp. rate 20, height 5' 5\" (1.651 m), weight (!) 332 lb 9.6 oz (150.9 kg), SpO2 94 %. General:          Well nourished. No overt distress obese  Head:               Normocephalic, atraumatic  Eyes:               Sclerae appear normal.  Pupils equally round. HENT:             Nares appear normal, no drainage. Moist mucous membranes  Neck:               No restricted ROM. Trachea midline  CV:                  RRR. No m/r/g. No JVD  Lungs:             CTAB. No wheezing, rhonchi, or rales. Respirations even, unlabored  Abdomen:        Soft, nontender,  minimally ttp, lap site c/d/i  Extremities:     Warm and dry. No cyanosis or clubbing. No edema. Skin:                No rashes. Normal coloration  Neuro:             CN II-XII grossly intact. Psych:             Normal mood and affect.     I have personally reviewed labs and tests showing:  Recent Labs:  Recent Results (from the past 48 hour(s))   POCT Glucose    Collection Time: 10/30/22  3:36 PM   Result Value Ref Range    POC Glucose 117 (H) 65 - 100 mg/dL    Performed by: Evon Hernandes    POCT Glucose    Collection Time: 10/30/22  8:17 PM   Result Value Ref Range    POC Glucose 108 (H) 65 - 100 mg/dL    Performed by: Obed    CBC with Auto Differential    Collection Time: 10/31/22  4:25 AM   Result Value Ref Range    WBC 11.2 (H) 4.3 - 11.1 K/uL    RBC 4.73 4.05 - 5.2 M/uL    Hemoglobin 9.6 (L) 11.7 - 15.4 g/dL    Hematocrit 34.3 (L) 35.8 - 46.3 %    MCV 72.5 (L) 82 - 102 FL    MCH 20.3 (L) 26.1 - 32.9 PG    MCHC 28.0 (L) 31.4 - 35.0 g/dL    RDW 18.7 (H) 11.9 - 14.6 %    Platelets 294 933 - 328 K/uL    MPV 10.3 9.4 - 12.3 FL    nRBC 0.00 0.0 - 0.2 K/uL    Differential Type AUTOMATED      Seg Neutrophils 77 43 - 78 %    Lymphocytes 16 13 - 44 %    Monocytes 7 4.0 - 12.0 %    Eosinophils % 0 (L) 0.5 - 7.8 %    Basophils 0 0.0 - 2.0 %    Immature Granulocytes 0 0.0 - 5.0 %    Segs Absolute 8.6 (H) 1.7 - 8.2 K/UL    Absolute Lymph # 1.8 0.5 - 4.6 K/UL    Absolute Mono # 0.7 0.1 - 1.3 K/UL    Absolute Eos # 0.0 0.0 - 0.8 K/UL    Basophils Absolute 0.0 0.0 - 0.2 K/UL    Absolute Immature Granulocyte 0.1 0.0 - 0.5 K/UL   Comprehensive Metabolic Panel w/ Reflex to MG    Collection Time: 10/31/22  4:25 AM   Result Value Ref Range    Sodium 140 133 - 143 mmol/L    Potassium 3.9 3.5 - 5.1 mmol/L    Chloride 105 101 - 110 mmol/L    CO2 34 (H) 21 - 32 mmol/L    Anion Gap 1 (L) 2 - 11 mmol/L    Glucose 84 65 - 100 mg/dL    BUN 5 (L) 6 - 23 MG/DL    Creatinine 0.70 0.6 - 1.0 MG/DL    Est, Glom Filt Rate >60 >60 ml/min/1.73m2    Calcium 9.3 8.3 - 10.4 MG/DL    Total Bilirubin 0.3 0.2 - 1.1 MG/DL    ALT 63 12 - 65 U/L    AST 22 15 - 37 U/L    Alk Phosphatase 131 50 - 136 U/L    Total Protein 7.5 6.3 - 8.2 g/dL    Albumin 3.1 (L) 3.5 - 5.0 g/dL    Globulin 4.4 2.8 - 4.5 g/dL    Albumin/Globulin Ratio 0.7 0.4 - 1.6     POCT Glucose    Collection Time: 10/31/22  7:21 AM   Result Value Ref Range    POC Glucose 83 65 - 100 mg/dL    Performed by: Tg    POCT Glucose    Collection Time: 10/31/22 11:35 AM   Result Value Ref Range    POC Glucose 74 65 - 100 mg/dL    Performed by: Bernabe    POCT Glucose    Collection Time: 10/31/22  4:31 PM   Result Value Ref Range    POC Glucose 92 65 - 100 mg/dL    Performed by: YsabelVen    POCT Glucose    Collection Time: 10/31/22  9:48 PM   Result Value Ref Range    POC Glucose 91 65 - 100 mg/dL    Performed by: Huy    POCT Glucose    Collection Time: 11/01/22 12:06 AM   Result Value Ref Range    POC Glucose 114 (H) 65 - 100 mg/dL    Performed by: Toribio    CBC with Auto Differential    Collection Time: 11/01/22  3:15 AM   Result Value Ref Range    WBC 8.4 4.3 - 11.1 K/uL    RBC 4.47 4.05 - 5.2 M/uL    Hemoglobin 9.3 (L) 11.7 - 15.4 g/dL    Hematocrit 32.4 (L) 35.8 - 46.3 %    MCV 72.5 (L) 82 - 102 FL    MCH 20.8 (L) 26.1 - 32.9 PG    MCHC 28.7 (L) 31.4 - 35.0 g/dL    RDW 18.9 (H) 11.9 - 14.6 %    Platelets 754 791 - 033 K/uL    MPV 10.6 9.4 - 12.3 FL    nRBC 0.02 0.0 - 0.2 K/uL    Differential Type AUTOMATED      Seg Neutrophils 64 43 - 78 %    Lymphocytes 27 13 - 44 %    Monocytes 7 4.0 - 12.0 %    Eosinophils % 2 0.5 - 7.8 %    Basophils 1 0.0 - 2.0 %    Immature Granulocytes 0 0.0 - 5.0 %    Segs Absolute 5.4 1.7 - 8.2 K/UL    Absolute Lymph # 2.2 0.5 - 4.6 K/UL    Absolute Mono # 0.6 0.1 - 1.3 K/UL    Absolute Eos # 0.2 0.0 - 0.8 K/UL    Basophils Absolute 0.1 0.0 - 0.2 K/UL    Absolute Immature Granulocyte 0.0 0.0 - 0.5 K/UL   Comprehensive Metabolic Panel w/ Reflex to MG    Collection Time: 11/01/22  3:15 AM   Result Value Ref Range    Sodium 141 133 - 143 mmol/L    Potassium 3.4 (L) 3.5 - 5.1 mmol/L    Chloride 102 101 - 110 mmol/L    CO2 36 (H) 21 - 32 mmol/L    Anion Gap 3 2 - 11 mmol/L    Glucose 84 65 - 100 mg/dL    BUN 5 (L) 6 - 23 MG/DL    Creatinine 0.60 0.6 - 1.0 MG/DL    Est, Glom Filt Rate >60 >60 ml/min/1.73m2    Calcium 9.3 8.3 - 10.4 MG/DL    Total Bilirubin 0.3 0.2 - 1.1 MG/DL    ALT 57 12 - 65 U/L    AST 28 15 - 37 U/L    Alk Phosphatase 106 50 - 136 U/L    Total Protein 7.0 6.3 - 8.2 g/dL    Albumin 3.0 (L) 3.5 - 5.0 g/dL    Globulin 4.0 2.8 - 4.5 g/dL    Albumin/Globulin Ratio 0.8 0.4 - 1.6     Magnesium    Collection Time: 11/01/22  3:15 AM   Result Value Ref Range    Magnesium 2.1 1.8 - 2.4 mg/dL   POCT Glucose    Collection Time: 11/01/22  7:39 AM   Result Value Ref Range    POC Glucose 81 65 - 100 mg/dL    Performed by: Mitzy    POCT Glucose    Collection Time: 11/01/22 11:22 AM   Result Value Ref Range    POC Glucose 87 65 - 100 mg/dL    Performed by: Shanae Boothe        I have personally reviewed imaging studies showing: Other Studies:  US ABDOMEN LIMITED Specify organ? GALLBLADDER   Final Result   The gallbladder is contracted and not assessed. Sonographic Moe sign was   negative. Hepatic steatosis. CT ABDOMEN PELVIS W IV CONTRAST Additional Contrast? None   Final Result   The gallbladder shows wall thickening and mucosal enhancement. A few radiopaque   stones are appreciated. These findings are suggestive of acute cholecystitis. Multiple prominent lymph nodes are appreciated in the yousuf hepatis and near the   head of the pancreas. There significance is unclear. XR CHEST PORTABLE   Final Result   -Lung hypoinflation with streaky bibasilar opacities, favored to reflect   atelectasis, though infection/aspiration are also considerations in the   appropriate clinical context.                 Current Meds:  Current Facility-Administered Medications   Medication Dose Route Frequency    ipratropium-albuterol (DUONEB) nebulizer solution 3 mL  1 vial Inhalation TID    miconazole (MICOTIN) 2 % powder   Topical BID    loperamide (IMODIUM) capsule 2 mg  2 mg Oral 4x Daily PRN    sodium chloride flush 0.9 % injection 5-40 mL  5-40 mL IntraVENous 2 times per day    sodium chloride flush 0.9 % injection 5-40 mL  5-40 mL IntraVENous PRN    ondansetron (ZOFRAN-ODT) disintegrating tablet 4 mg  4 mg Oral Q8H PRN    Or    ondansetron (ZOFRAN) injection 4 mg  4 mg IntraVENous Q6H PRN    polyethylene glycol (GLYCOLAX) packet 17 g  17 g Oral Daily PRN    acetaminophen (TYLENOL) tablet 650 mg  650 mg Oral Q6H PRN    Or    acetaminophen (TYLENOL) suppository 650 mg  650 mg Rectal Q6H PRN    nicotine (NICODERM CQ) 21 MG/24HR 1 patch  1 patch TransDERmal Daily    insulin lispro (HUMALOG) injection vial 0-8 Units  0-8 Units SubCUTAneous TID WC    insulin lispro (HUMALOG) injection vial 0-4 Units  0-4 Units SubCUTAneous Nightly    glucose chewable tablet 16 g  4 tablet Oral PRN    dextrose bolus 10% 125 mL  125 mL IntraVENous PRN    Or    dextrose bolus 10% 250 mL  250 mL IntraVENous PRN    glucagon (rDNA) injection 1 mg  1 mg SubCUTAneous PRN    dextrose 10 % infusion   IntraVENous Continuous PRN    oxyCODONE (ROXICODONE) immediate release tablet 5 mg  5 mg Oral Q4H PRN    morphine injection 4 mg  4 mg IntraVENous Q1H PRN    piperacillin-tazobactam (ZOSYN) 3,375 mg in sodium chloride 0.9 % 50 mL IVPB (mini-bag)  3,375 mg IntraVENous q8h    ipratropium-albuterol (DUONEB) nebulizer solution 1 ampule  1 ampule Inhalation Q6H PRN    mometasone-formoterol (DULERA) 100-5 MCG/ACT inhaler 2 puff  2 puff Inhalation BID    guaiFENesin (MUCINEX) extended release tablet 1,200 mg  1,200 mg Oral BID    albuterol sulfate HFA (PROVENTIL;VENTOLIN;PROAIR) 108 (90 Base) MCG/ACT inhaler 2 puff  2 puff Inhalation Q4H PRN       Signed:  Sae Sanches MD    Part of this note may have been written by using a voice dictation software. The note has been proof read but may still contain some grammatical/other typographical errors.

## 2022-11-01 NOTE — PROGRESS NOTES
Shift exam complete. A/OX. States constipation, no BM since Saturday. PRN Miralax given. Crackles heard in lung fields, clear sputum production. 3 liters O2 in place, this is her baseline. Lap-river sites are covered with gauze and tape, no drainage/blood. Ambulates in room independently. Denies further needs.

## 2022-11-01 NOTE — PLAN OF CARE
Problem: Discharge Planning  Goal: Discharge to home or other facility with appropriate resources  Outcome: Adequate for Discharge     Problem: Chronic Conditions and Co-morbidities  Goal: Patient's chronic conditions and co-morbidity symptoms are monitored and maintained or improved  Outcome: Adequate for Discharge     Problem: Safety - Adult  Goal: Free from fall injury  Outcome: Adequate for Discharge     Problem: Pain  Goal: Verbalizes/displays adequate comfort level or baseline comfort level  Outcome: Adequate for Discharge     Problem: ABCDS Injury Assessment  Goal: Absence of physical injury  Outcome: Adequate for Discharge

## 2022-11-01 NOTE — PROGRESS NOTES
Pt left in private car with family members. Transported on home oxygen tank. All belongings with patient.

## 2022-11-01 NOTE — PROGRESS NOTES
TRANSFER - IN REPORT:    Verbal report received from [facility], RN on Lesly Obrien  being received from 3rd floor for routine progression of patient care      Report consisted of patient's Situation, Background, Assessment and   Recommendations(SBAR). Information from the following report(s) Adult Overview was reviewed with the receiving nurse. Opportunity for questions and clarification was provided. Assessment completed upon patient's arrival to unit and care assumed.

## 2022-11-01 NOTE — CARE COORDINATION
11/01/22 [address] Discharge None   [employer] Information Provided? No   Confirm Follow Up Transport Family   Condition of Participation: Discharge Planning   The Patient and/or Patient Representative was provided with a Choice of Provider? Patient   Freedom of Choice list was provided with basic dialogue that supports the patient's individualized plan of care/goals, treatment preferences, and shares the quality data associated with the providers? Yes   Patient's son will transport patient home. CM did not identify any discharge needs.

## 2022-11-01 NOTE — PROGRESS NOTES
Assumed care of this patient. Pt is alert and oriented times 4. Pt is on 4l NC with even and unlabored respirations. Pt has no complaints of pain or discomfort at this time. Pt made aware to alert staff when needing assistance. Call light is in place.

## 2022-11-01 NOTE — PROGRESS NOTES
Pt is resting in bed on CPAP with even and unlabored respirations. Pt has no complaints of pain at this time.

## 2022-11-01 NOTE — DISCHARGE SUMMARY
Hospitalist Discharge Summary   Admit Date:  10/26/2022  7:27 PM   DC Note date: 10/31/2022  Name:  Elliott Obrien   Age:  50 y.o. Sex:  female  :  1970   MRN:  [id number]   Room:  [facility]  PCP:  Mariluz Heaton PA-C    Presenting Complaint: Nausea and Emesis (Pt with N/V/D since 4am. Alert and oriented x's 4. Sepsis protocol started by EMS enroute)     Initial Admission Diagnosis: Septicemia ([facility] [A41.9]  Gallstone pancreatitis [K85.10]  Acute gallstone pancreatitis [K85.10]     Problem List for this Hospitalization (present on admission):    Principal Problem:    Acute gallstone pancreatitis  Active Problems:    BMI 50.0-59.9, adult ([facility] [address] [facility]    Cigarette nicotine dependence in remission    Hypertension    COPD, severe ([address]    Diabetes mellitus type 2, controlled ([facility] [address]    Atrial fibrillation, new onset ([facility] [address]    Morbid obesity with BMI of 50.0-59.9, adult ([address]    THANH (obstructive sleep apnea)  Resolved Problems:    * No resolved hospital problems. *      Hospital Course:  Sarah Mcgregor is a 50 y.o. female, local history of hypertension hyperlipidemia, diabetes mellitus who presents to the Emergency Department with chief complaint of abdominal pain, vomiting, and diarrhea. Patient states symptoms started this morning. States she believes she \"ate some bad food last evening. \"  States since then this morning she had crampy abdominal pain with vomiting. Denies any hematemesis or melena. Denies any fevers or chills. EMS was called when she was initially found to be hypotensive. Antibiotics were started in route as well as IV fluids and antiemetics. Patient describes a diffuse abdominal pain. Laboratory data significant for lactic acid 2.6, elevated Pro-Dragan.  Lipase greater than 30,000 and mildly elevated bilirubin and LFTs. She got Zosyn in route via EMS. IV fluids have been initiated. Vancomycin added in ER.      CT scan does show gallbladder wall thickening as well as stones concern for possible acute cholecystitis. However she has no focal tenderness on exam.  Given acuity of symptoms reported fever and sepsis markers ER MD became concerned for possibly developing cholangitis. GI as well as hospitalist consulted for admission. Hospital Course    # Acute gallstone pancreatitis  S/p lap river 10/30  Tolerating diet  Continue Augmentin at discharge per sx recommendations. # BMI 50.0-59.9, adult ([facility]  Plan:  complicates care, lifestyle modifications encouraged      #  Hypertension  BP have been controlled OFF home meds  Hold cardizem and lisinopril at discharge  Monitor at home and d/w PCP when/if need to resume     # COPD, severe ([facility]  Compensated, continue nebs at home       # Afib  Ok to resume eliquis per sx recs. # Diabetes mellitus type 2, controlled ([facility] [address]  Resume home meds      # Microcytic anemia  Stable around 9.5. No LAURA by labs  F/u PCP with repeat CBC at follow-up       THANH (obstructive sleep apnea)  Plan: cpap qhs     Disposition: home with son at home to help  Diet: ADULT DIET; Full Liquid; 4 carb choices (60 gm/meal)  Code Status: Full Code    Follow Ups:   Follow-up Information     Charls Brittle, APRN - CNP Follow up on 11/9/2022. Specialty: Nurse Practitioner  Why: For wound re-check  Contact information:  [address] [facility]             Elodia Esquivel PA-C. Schedule an appointment as soon as possible for a   visit in 1 week(s). Specialty: Physician Assistant  Contact information:  [address] [address]  [phone]             JOSE ALBERTO Bueno Specialty: Physician Assistant  Contact information:  [address]  [address] [facility] [address] [id number] [phone]                       Time spent in patient discharge and coordination 32 minutes.         Follow up labs/diagnostics (ultimately defer to outpatient provider):  CBC at PCP follow-up    Plan was discussed with patient, RN, CM. All questions answered. Patient was stable at time of discharge. Instructions given to call a physician or return if any concerns. Current Discharge Medication List        START taking these medications    Details   HYDROcodone-acetaminophen (NORCO) 5-325 MG per tablet Take 1-2 tablets by mouth every 8 hours as needed for Pain for up to 7 days. Qty: 28 tablet, Refills: 0    Comments: Reduce doses taken as pain becomes manageable  Associated Diagnoses: Acute gallstone pancreatitis      amoxicillin-clavulanate (AUGMENTIN) 875-125 MG per tablet Take 1 tablet by mouth 2 times daily for 10 days  Qty: 20 tablet, Refills: 0           CONTINUE these medications which have NOT CHANGED    Details   blood glucose test strips (ASCENSIA AUTODISC VI;ONE TOUCH ULTRA TEST VI) strip Use to check blood sugar once daily as directed. Dx: E11.29      Lancets 33G MISC Use to check glucose once daily.   Dx: E11.29      atorvastatin (LIPITOR) 10 MG tablet Take 1 tablet by mouth at bedtime  Qty: 30 tablet, Refills: 5    Associated Diagnoses: Hyperlipidemia LDL goal <100      metFORMIN (GLUCOPHAGE) 500 MG tablet Take 1 tablet by mouth 2 times daily (with meals)  Qty: 60 tablet, Refills: 5    Associated Diagnoses: Type 2 diabetes mellitus with other diabetic kidney complication (HCC)      albuterol sulfate  (90 Base) MCG/ACT inhaler Inhale 2 puffs into the lungs every 4 hours as needed for Wheezing  Qty: 1 each, Refills: 11      ipratropium-albuterol (DUONEB) 0.5-2.5 (3) MG/3ML SOLN nebulizer solution Inhale 3 mLs into the lungs 4 times daily File to Medicare part B--J44.9  Qty: 120 each, Refills: 11      apixaban (ELIQUIS) 5 MG TABS tablet TAKE ONE TABLET BY MOUTH EVERY 12 HOURS **MUST SCHEDULE APPOINTMENT FOR MORE REFILLS**      Fluticasone furoate-vilanterol (BREO ELLIPTA) 200-25 MCG/INH AEPB inhaler INHALE ONE PUFF ONCE DAILY *RINSE MOUTH WELL AFTER USE*      guaiFENesin 1200 MG TB12 Take 1,200 mg by mouth 2 times daily           STOP taking these medications       dilTIAZem (CARDIZEM CD) 240 MG extended release capsule Comments:   Reason for Stopping:         lisinopril (PRINIVIL;ZESTRIL) 5 MG tablet Comments:   Reason for Stopping:         dilTIAZem (TIAZAC) 240 MG extended release capsule Comments:   Reason for Stopping:         furosemide (LASIX) 20 MG tablet Comments:   Reason for Stopping:               Procedures done this admission:  Procedure(s):  CHOLECYSTECTOMY LAPAROSCOPIC WITH POSSIBLE LIVER BIOPSY    Consults this admission:  IP CONSULT TO GENERAL SURGERY    Echocardiogram results:  No results found for this or any previous visit. Diagnostic Imaging/Tests:   CT ABDOMEN PELVIS W IV CONTRAST Additional Contrast? None    Result Date: 10/26/2022  The gallbladder shows wall thickening and mucosal enhancement. A few radiopaque stones are appreciated. These findings are suggestive of acute cholecystitis. Multiple prominent lymph nodes are appreciated in the yousuf hepatis and near the head of the pancreas. There significance is unclear. XR CHEST PORTABLE    Result Date: 10/26/2022  -Lung hypoinflation with streaky bibasilar opacities, favored to reflect atelectasis, though infection/aspiration are also considerations in the appropriate clinical context. US ABDOMEN LIMITED Specify organ? GALLBLADDER    Result Date: 10/26/2022  The gallbladder is contracted and not assessed. Sonographic Moe sign was negative. Hepatic steatosis.        Labs: Results:       BMP, Mg, Phos Recent Labs     10/29/22  0129 10/30/22  0506 10/31/22  0425    140 140   K 3.5 3.5 3.9    104 105   CO2 29 35* 34*   ANIONGAP 5 1* 1*   BUN 9 7 5*   CREATININE 0.90 0.80 0.70   LABGLOM >60 >60 >60   CALCIUM 9.4 9.2 9.3   GLUCOSE 97 94 84   MG  --  2.1  --       CBC Recent Labs     10/29/22  0129 10/30/22  0506 10/31/22  0425   WBC 8.5 8.3 11.2*   RBC 4.67 4.73 4.73   HGB 9.5* 9.6* 9.6*   HCT 32.9* 34.6* 34.3*   MCV 70.4* 73.2* 72.5*   MCH 20.3* 20.3* 20.3*   MCHC 28.9* 27.7* 28.0*   RDW 17.9* 18.5* 18.7*    284 305   MPV 9.9 9.9 10.3   NRBC 0.02 0.00 0.00   SEGS 56 69 77   LYMPHOPCT 36 22 16   EOSRELPCT 2 3 0*   MONOPCT 6 6 7   BASOPCT 1 1 0   IMMGRAN 0 0 0   SEGSABS 4.8 5.7 8.6*   LYMPHSABS 3.0 1.8 1.8   EOSABS 0.2 0.2 0.0   MONOSABS 0.5 0.5 0.7   BASOSABS 0.0 0.0 0.0   ABSIMMGRAN 0.0 0.0 0.1      LFT Recent Labs     10/29/22  0129 10/30/22  0506 10/31/22  0425   BILITOT 0.3 0.3 0.3   ALKPHOS 151* 150* 131   AST 35 27 22   * 83* 63   PROT 7.7 6.8 7.5   LABALBU 3.2* 3.2* 3.1*   GLOB 4.5 3.6 4.4      Cardiac  No results found for: NTPROBNP, TROPHS   Coags Lab Results   Component Value Date/Time    PROTIME 12.5 10/30/2022 05:06 AM    PROTIME 13.6 10/28/2022 06:20 PM    PROTIME 14.8 10/27/2022 06:00 AM    INR 0.9 10/30/2022 05:06 AM    INR 1.0 10/28/2022 06:20 PM    INR 1.1 10/27/2022 06:00 AM    APTT 30.6 10/28/2022 06:20 PM    APTT 33.6 10/27/2022 06:00 AM    APTT 73.7 08/31/2019 11:30 AM    APTT 102.0 08/31/2019 03:42 AM    APTT 53.9 08/30/2019 07:32 PM      A1c Lab Results   Component Value Date/Time    LABA1C 6.1 09/01/2022 01:32 PM    LABA1C 6.4 03/28/2022 11:26 AM    LABA1C 6.5 11/22/2021 09:49 AM     09/01/2022 01:32 PM     03/28/2022 11:26 AM     11/22/2021 09:49 AM      Lipids Lab Results   Component Value Date/Time    CHOL 134 09/01/2022 01:32 PM    LDLCALC 91.2 09/01/2022 01:32 PM    LABVLDL 14.8 09/01/2022 01:32 PM    HDL 28 09/01/2022 01:32 PM    CHOLHDLRATIO 4.8 09/01/2022 01:32 PM    TRIG 74 09/01/2022 01:32 PM      Thyroid  No results found for: TSHELE, IWO3ORH     Most Recent UA Lab Results   Component Value Date/Time    COLORU DARK YELLOW 10/27/2022 06:52 AM    APPEARANCE TURBID 10/27/2022 06:52 AM    SPECGRAV >1.035 10/27/2022 06:52 AM    LABPH 5.0 10/27/2022 06:52 AM    PROTEINU 300 10/27/2022 06:52 AM    GLUCOSEU Negative 10/27/2022 06:52 AM    KETUA Negative 10/27/2022 06:52 AM BILIRUBINUR Negative 10/27/2022 06:52 AM    BILIRUBINUR Negative 11/09/2020 08:59 AM    BLOODU SMALL 10/27/2022 06:52 AM    UROBILINOGEN 1.0 10/27/2022 06:52 AM    NITRU Negative 10/27/2022 06:52 AM    LEUKOCYTESUR Negative 10/27/2022 06:52 AM    WBCUA 5-10 10/27/2022 06:52 AM    RBCUA 3-5 10/27/2022 06:52 AM    EPITHUA 5-10 10/27/2022 06:52 AM    BACTERIA 4+ 10/27/2022 06:52 AM    LABCAST 0 10/27/2022 06:52 AM    MUCUS 0 10/27/2022 06:52 AM        Recent Labs     10/26/22  1936   CULTURE NO GROWTH 5 DAYS  NO GROWTH 5 DAYS       All Labs from Last 24 Hrs:  Recent Results (from the past 24 hour(s))   CBC with Auto Differential    Collection Time: 10/31/22  4:25 AM   Result Value Ref Range    WBC 11.2 (H) 4.3 - 11.1 K/uL    RBC 4.73 4.05 - 5.2 M/uL    Hemoglobin 9.6 (L) 11.7 - 15.4 g/dL    Hematocrit 34.3 (L) 35.8 - 46.3 %    MCV 72.5 (L) 82 - 102 FL    MCH 20.3 (L) 26.1 - 32.9 PG    MCHC 28.0 (L) 31.4 - 35.0 g/dL    RDW 18.7 (H) 11.9 - 14.6 %    Platelets 756 236 - 791 K/uL    MPV 10.3 9.4 - 12.3 FL    nRBC 0.00 0.0 - 0.2 K/uL    Differential Type AUTOMATED      Seg Neutrophils 77 43 - 78 %    Lymphocytes 16 13 - 44 %    Monocytes 7 4.0 - 12.0 %    Eosinophils % 0 (L) 0.5 - 7.8 %    Basophils 0 0.0 - 2.0 %    Immature Granulocytes 0 0.0 - 5.0 %    Segs Absolute 8.6 (H) 1.7 - 8.2 K/UL    Absolute Lymph # 1.8 0.5 - 4.6 K/UL    Absolute Mono # 0.7 0.1 - 1.3 K/UL    Absolute Eos # 0.0 0.0 - 0.8 K/UL    Basophils Absolute 0.0 0.0 - 0.2 K/UL    Absolute Immature Granulocyte 0.1 0.0 - 0.5 K/UL   Comprehensive Metabolic Panel w/ Reflex to MG    Collection Time: 10/31/22  4:25 AM   Result Value Ref Range    Sodium 140 133 - 143 mmol/L    Potassium 3.9 3.5 - 5.1 mmol/L    Chloride 105 101 - 110 mmol/L    CO2 34 (H) 21 - 32 mmol/L    Anion Gap 1 (L) 2 - 11 mmol/L    Glucose 84 65 - 100 mg/dL    BUN 5 (L) 6 - 23 MG/DL    Creatinine 0.70 0.6 - 1.0 MG/DL    Est, Glom Filt Rate >60 >60 ml/min/1.73m2    Calcium 9.3 8.3 - 10.4 MG/DL    Total Bilirubin 0.3 0.2 - 1.1 MG/DL    ALT 63 12 - 65 U/L    AST 22 15 - 37 U/L    Alk Phosphatase 131 50 - 136 U/L    Total Protein 7.5 6.3 - 8.2 g/dL    Albumin 3.1 (L) 3.5 - 5.0 g/dL    Globulin 4.4 2.8 - 4.5 g/dL    Albumin/Globulin Ratio 0.7 0.4 - 1.6     POCT Glucose    Collection Time: 10/31/22  7:21 AM   Result Value Ref Range    POC Glucose 83 65 - 100 mg/dL    Performed by: YsabelVen    POCT Glucose    Collection Time: 10/31/22 11:35 AM   Result Value Ref Range    POC Glucose 74 65 - 100 mg/dL    Performed by: Bernabe    POCT Glucose    Collection Time: 10/31/22  4:31 PM   Result Value Ref Range    POC Glucose 92 65 - 100 mg/dL    Performed by: YsabelVen    POCT Glucose    Collection Time: 10/31/22  9:48 PM   Result Value Ref Range    POC Glucose 91 65 - 100 mg/dL    Performed by: Huy        No Known Allergies  Immunization History   Administered Date(s) Administered    Influenza Virus Vaccine 10/01/2017, 10/01/2019    Influenza, FLUAD, (age 72 y+), Adjuvanted, 0.5mL 11/16/2020, 12/13/2021    Influenza, FLUARIX, FLULAVAL, FLUZONE (age 10 mo+) AND AFLURIA, (age 1 y+), PF, 0.5mL 12/18/2015, 11/15/2016    Influenza, High Dose (Fluzone 65 yrs and older) 10/25/2018    Influenza, Triv, inactivated, subunit, adjuvanted, IM (Fluad 65 yrs and older) 09/23/2019    Pneumococcal Polysaccharide (Dixbinlcy16) 08/07/2017       Recent Vital Data:  Patient Vitals for the past 24 hrs:   Temp Pulse Resp BP SpO2   10/31/22 2246 98.4 °F (36.9 °C) 93 20 120/71 91 %   10/31/22 2213 98.1 °F (36.7 °C) 93 18 100/67 98 %   10/31/22 2016 -- 95 18 -- 97 %   10/31/22 1814 98.2 °F (36.8 °C) 100 18 120/74 97 %   10/31/22 1504 98.1 °F (36.7 °C) 94 18 121/82 99 %   10/31/22 1333 -- 93 18 -- 95 %   10/31/22 1058 -- -- -- -- 95 %   10/31/22 0932 98.2 °F (36.8 °C) 93 18 99/73 96 %   10/31/22 0739 -- 85 16 -- 94 %   10/31/22 0413 98.4 °F (36.9 °C) 88 18 137/76 97 %   10/31/22 0121 98.1 °F (36.7 °C) 92 18 128/79 92 %       Oxygen Therapy  SpO2: 91 %  Pulse Oximetry Type: Continuous  Pulse via Oximetry: 95 beats per minute  Pulse Oximeter Device Mode: Intermittent  Pulse Oximeter Device Location: Right, Finger  O2 Device: Nasal cannula  Skin Assessment: Clean, dry, & intact  Skin Protection for O2 Device: No  Orientation: Anterior, Bilateral  Location: Nose (under the nose med)  Intervention(s): Reposition Device  O2 Flow Rate (L/min): 4 L/min    Estimated body mass index is 55.35 kg/m² as calculated from the following:    Height as of this encounter: 5' 5\" (1.651 m). Weight as of this encounter: 332 lb 9.6 oz (150.9 kg). Intake/Output Summary (Last 24 hours) at 10/31/2022 2353  Last data filed at 10/31/2022 1814  Gross per 24 hour   Intake 720 ml   Output 0 ml   Net 720 ml         Physical Exam:    General:    Well nourished. No overt distress obese  Head:  Normocephalic, atraumatic  Eyes:  Sclerae appear normal.  Pupils equally round. HENT:  Nares appear normal, no drainage. Moist mucous membranes  Neck:  No restricted ROM. Trachea midline  CV:   RRR. No m/r/g. No JVD  Lungs:   CTAB. No wheezing, rhonchi, or rales. Respirations even, unlabored  Abdomen:   Soft, nontender,  minimally ttp, lap site c/d/i  Extremities: Warm and dry. No cyanosis or clubbing. No edema. Skin:     No rashes. Normal coloration  Neuro:  CN II-XII grossly intact. Psych:  Normal mood and affect. Signed:  Charles Shrestha DO    Part of this note may have been written by using a voice dictation software. The note has been proof read but may still contain some grammatical/other typographical errors.

## 2022-11-01 NOTE — PROGRESS NOTES
IV to left arm removed, tip intact. Verbalized understanding for d/c instructions including where to get prescriptions, how to take medications and all follow up appointments. Abdominal incisions will stay dry and intact until surgery follow up 11/9.

## 2022-11-01 NOTE — PROGRESS NOTES
TRANSFER - OUT REPORT:    Verbal report given to PAZ VIZCARRA RN on Jonathon Obrien  being transferred to   for routine progression of patient care       Report consisted of patient's Situation, Background, Assessment and   Recommendations(SBAR). Information from the following report(s) Nurse Handoff Report, Index, Adult Overview, Surgery Report, MAR, and Recent Results was reviewed with the receiving nurse. Lines:   Peripheral IV 10/28/22 Right Forearm (Active)   Site Assessment Clean, dry & intact 10/31/22 2104   Line Status Flushed;Capped 10/31/22 2104   Line Care Cap changed 10/31/22 2104   Phlebitis Assessment No symptoms 10/31/22 2104   Infiltration Assessment 0 10/31/22 2104   Alcohol Cap Used Yes 10/31/22 2104   Dressing Status Clean, dry & intact 10/31/22 2104   Dressing Type Transparent 10/31/22 2104        Opportunity for questions and clarification was provided.       Patient transported with:  O2 @ 4lpm and Tech

## 2022-11-02 ENCOUNTER — TELEPHONE (OUTPATIENT)
Dept: INTERNAL MEDICINE CLINIC | Facility: CLINIC | Age: 52
End: 2022-11-02

## 2022-11-02 NOTE — TELEPHONE ENCOUNTER
[facility] Transitions Initial Follow Up Call    Outreach made within 2 business days of discharge: Yes    Patient: Jose Obrien Patient : 1970   MRN: [id number]  Reason for Admission: Acute Gallstone Pancreatitis  Discharge Date: 22       Spoke with: Pt    Discharge department/facility: [facility] Interactive Patient Contact:  Was patient able to fill all prescriptions: Yes  Was patient instructed to bring all medications to the follow-up visit: Yes  Is patient taking all medications as directed in the discharge summary?  Yes  Does patient understand their discharge instructions: Yes  Does patient have questions or concerns that need addressed prior to 7-14 day follow up office visit: no    Scheduled appointment with PCP within 7-14 days    Follow Up  Future Appointments   Date Time Provider Lola Jansen   2022  3:30 PM FUNMI Flood CNP, CSA [facility]   11/10/2022  2:30 PM JOSE ALBERTO Portillo [facility]   2022 11:30 AM FUNMI Rodriguez CNP PPS [facility]   2023 10:00 AM JOSE ALBERTO Portillo [facility]       Luma Goodwin MA

## 2022-11-03 ENCOUNTER — CARE COORDINATION (OUTPATIENT)
Dept: OTHER | Facility: CLINIC | Age: 52
End: 2022-11-03

## 2022-11-03 NOTE — CARE COORDINATION
[facility] Care Transitions Initial Follow Up Call    Call within 2 business days of discharge: Yes    Care Transition Nurse contacted the patient by telephone to perform post hospital discharge assessment. Verified name and  with patient as identifiers. Provided introduction to self, and explanation of the Care Transition Nurse role. Patient: Brandan Obrien Patient : 1970   MRN: [id number]  Reason for Admission: Acute gallstone pancreatitis  Discharge Date: 22 RARS: Readmission Risk Score: 13.3      Last Discharge  Street       Date Complaint Diagnosis Description Type Department Provider    10/26/22 Nausea; Emesis Gallstone pancreatitis . .. ED to Hosp-Admission (Discharged) (ADMITTED) [facility] [place] Russell Michelle MD; Sandra Minaya MD... Was this an external facility discharge? No Discharge Facility: N/A    Challenges to be reviewed by the provider   Additional needs identified to be addressed with provider: No  none               Method of communication with provider: none. Patient reports that she is feeling ok. Concerned about changing wound dressing, she voices understanding to leave it until she has her follow up appointment per her discharge instructions. Reviewed diet concerns. Care Transition Nurse reviewed discharge instructions, medical action plan, and red flags with patient who verbalized understanding. The patient was given an opportunity to ask questions and does not have any further questions or concerns at this time. Were discharge instructions available to patient? Yes. Reviewed appropriate site of care based on symptoms and resources available to patient including: PCP  Specialist  Urgent care clinics. The patient agrees to contact the PCP office for questions related to their healthcare. Advance Care Planning:   Does patient have an Advance Directive: reviewed and current.     Medication reconciliation was performed with patient, who verbalizes understanding of administration of home medications. Medications reviewed: yes    Was patient discharged with a pulse oximeter? no    Non-face-to-face services provided:  Obtained and reviewed discharge summary and/or continuity of care documents  Education of patient to support self-management-voices understanding. Assessment and support for treatment adherence and medication management-voices understading. Offered patient enrollment in the Remote Patient Monitoring (RPM) program for in-home monitoring: NA.    Care Transitions 24 Hour Call    Do you have a copy of your discharge instructions?: Yes  Do you have all of your prescriptions and are they filled?: Yes  Have you been contacted by a [address]?: No  Have you scheduled your follow up appointment?: Yes  How are you going to get to your appointment?: Other (Comment: Medicaid Karyl April)  Do you feel like you have everything you need to keep you well at home?: Yes  Are you an active caregiver in your home?: No  Care Transitions Interventions         Follow Up  Future Appointments   Date Time Provider Lola Jansen   11/9/2022  3:30 PM FUNMI Ochoa CNP, CSA [facility]   11/10/2022  2:30 PM JOSE ALBERTO Graham [facility]   11/28/2022 11:30 AM FUNMI Kim CNP, PPS [facility]   1/30/2023 10:00 AM JOSE ALBERTO Graham [facility]       Care Transition Nurse provided contact information. Plan for follow-up call in 5-7 days based on severity of symptoms and risk factors.   Plan for next call:  f/u goals    Lorie Pimentel RN

## 2022-11-09 ENCOUNTER — OFFICE VISIT (OUTPATIENT)
Dept: SURGERY | Age: 52
End: 2022-11-09

## 2022-11-09 VITALS — BODY MASS INDEX: 48.82 KG/M2 | HEIGHT: 65 IN | WEIGHT: 293 LBS

## 2022-11-09 DIAGNOSIS — K85.10 ACUTE GALLSTONE PANCREATITIS: Primary | ICD-10-CM

## 2022-11-09 NOTE — PROGRESS NOTES
H&P/Consult Note/Progress Note/Office Note:   Codey Palma  MRN: [id number]  RTP:1/31/9906  Age:52 y.o.    HPI: Codey Palma is a 50 y.o. female who is s/p lap cholecystectomy and liver biopsy by Dr Shelby Ennis on 10/30/22  Pathology: A:  \"GALLBLADDER\":             CHRONIC CHOLECYSTITIS WITH CHOLESTEROLOSIS. CHOLELITHIASIS. B:  \"LIVER BIOPSY\":             BENIGN HEPATIC TISSUE WITH MILD STEATOSIS AND MINIMAL PORTAL                  INFLAMMATION. She has h/o COPD and originally presented to the ER with a 1 day history of severe, constant epigastric pain associated with N/V. No radiation of pain to her back. Nothing in particular made her pain better or worse. She is s/p partial hysterectomy for benign fibroids through a low midline incision. She reported her uterus weighed 5lbs. Her ovaries remain. Hypotension was noted. She was seen in the ER  WBC 20.3   Lactic acid was 2.6. Lipase >30k. T Bili 1.4, AST//225    CT scan of her abdomen and pelvis is as shown below. She was treated with IV fluids and Zosyn and transported to [facility]    She denies ETOH for several years. GI and General surgery were consulted    10/26/22 CT abd/pelvis  Hx: Upper abdominal pain. Diarrhea. FINDINGS: A moderate amount of motion artifact limits this examination to some degree. The visualized lung bases and mediastinum are unremarkable. The liver, spleen, pancreas, adrenal glands, and kidneys are within normal limits. The left kidney contains a tiny low-density lesion in the midpole which is too small to characterize. The bladder is decompressed and not assessed. The gallbladder shows wall thickening and mucosal enhancement. A few radiopaque stones are appreciated. Multiple prominent lymph nodes are appreciated in the yousuf hepatis and near the head of the pancreas. Distal esophagus and stomach are unremarkable.  Small and large bowel show no evidence of obstruction. There is a normal appendix in the right lower quadrant. No evidence of free fluid or free air. No pathologic adenopathy. No abdominal aortic aneurysm. Osseous structures show no evidence of acute fracture or suspicious lesion. Impression:  The gallbladder shows wall thickening and mucosal enhancement. A few radiopaque stones are appreciated. These findings are suggestive of acute cholecystitis. Multiple prominent lymph nodes are appreciated in the yousuf hepatis and near the head of the pancreas. There significance is unclear. 10/26/22 abd US  FINDINGS: Aorta/IVC: Not well visualized. /Visualized portions are within normal limits. Pancreas: Mostly obscured by overlying bowel gas. Liver: There is diffuse increased echogenicity within the liver parenchyma, suggestive of hepatic steatosis. No focal lesions are demonstrated on the provided images. Portal vein: The portal vein shows hepatopedal flow. Gallbladder: The gallbladder is contracted and not assessed. Sonographic Moe sign was negative. CBD: Normal in caliber and measures 2.3 mm. Right kidney: 12.1 cm in length and without evidence of obstruction. Impression:  The gallbladder is contracted and not assessed. Sonographic Moe sign was negative. Hepatic steatosis. Additional hx:  10/29/22 Improved; GI decided not to proceed with pre-op ERCP  10/30/22 s/p lap river and liver biopsy by Dr Sandrita Shrestha  10/31/22 likely home today  11/9/22 OFFICE VISIT: doing well. No NV.   No abd pain +BM        Past Medical History:   Diagnosis Date    Childhood asthma     Chronic respiratory failure with hypoxia (HCC)     Continuous at 3L    COPD (chronic obstructive pulmonary disease) ([facility]     Stage 4 by GOLD Criteria    COVID-19 01/07/2021    Hyperlipidemia     Daily meds     Hypertension     Managed with meds     Intraductal papilloma of breast 01/16/2019    Left    Microalbuminuria Microcytosis     Morbid obesity ([facility]     Nonproliferative retinopathy due to secondary diabetes ([facility] 03/07/2020    Mild, Bilateral    THANH on CPAP 3/2/2017    Paroxysmal atrial fibrillation ([facility]     Pneumonia ages 3 and 9    Type 2 diabetes mellitus ([facility]      Past Surgical History:   Procedure Laterality Date    BREAST BIOPSY Left 3/27/2019    LEFT BREAST NEEDLE LOCALIZED LUMPECTOMY  performed by Jovanna Gupta DO at [facility] N/A 10/30/2022    CHOLECYSTECTOMY LAPAROSCOPIC WITH POSSIBLE LIVER BIOPSY performed by Ifrah Bateman MD at [address] (CERVIX NOT REMOVED)  08/2012    US BREAST NEEDLE BIOPSY LEFT Left 1/7/2019    US BREAST NEEDLE BIOPSY LEFT 1/7/2019 [facility]    US GUIDED NEEDLE LOC OF LEFT BREAST Left 3/27/2019    US GUIDED NEEDLE LOC OF LEFT BREAST 3/27/2019 [facility]     Current Outpatient Medications   Medication Sig    amoxicillin-clavulanate (AUGMENTIN) 875-125 MG per tablet Take 1 tablet by mouth 2 times daily for 10 days    blood glucose test strips (ASCENSIA AUTODISC VI;ONE TOUCH ULTRA TEST VI) strip Use to check blood sugar once daily as directed. Dx: E11.29    Lancets 33G MISC Use to check glucose once daily.   Dx: E11.29    atorvastatin (LIPITOR) 10 MG tablet Take 1 tablet by mouth at bedtime    metFORMIN (GLUCOPHAGE) 500 MG tablet Take 1 tablet by mouth 2 times daily (with meals)    albuterol sulfate  (90 Base) MCG/ACT inhaler Inhale 2 puffs into the lungs every 4 hours as needed for Wheezing    ipratropium-albuterol (DUONEB) 0.5-2.5 (3) MG/3ML SOLN nebulizer solution Inhale 3 mLs into the lungs 4 times daily File to Medicare part B--J44.9    apixaban (ELIQUIS) 5 MG TABS tablet TAKE ONE TABLET BY MOUTH EVERY 12 HOURS **MUST SCHEDULE APPOINTMENT FOR MORE REFILLS**    Fluticasone furoate-vilanterol (BREO ELLIPTA) 200-25 MCG/INH AEPB inhaler INHALE ONE PUFF ONCE DAILY *RINSE MOUTH WELL AFTER USE* guaiFENesin 1200 MG TB12 Take 1,200 mg by mouth 2 times daily     No current facility-administered medications for this visit. ALLERGIES:  Patient has no known allergies. Social History     Socioeconomic History    Marital status: Single   Tobacco Use    Smoking status: Every Day     Packs/day: 1.00     Years: 32.00     Pack years: 32.00     Types: Cigarettes    Smokeless tobacco: Never    Tobacco comments:     Quit smoking: Currently 10 Cigarettes/day   Vaping Use    Vaping Use: Never used   Substance and Sexual Activity    Alcohol use: Yes    Drug use: Not Currently     Types: Cocaine, Marijuana Seabron Barban)   Social History Narrative    Single and lives alone. Disabled--previously worked at [employer]. Has lived in [place] and [place]. No pets. Social History     Tobacco Use   Smoking Status Every Day    Packs/day: 1.00    Years: 32.00    Pack years: 32.00    Types: Cigarettes   Smokeless Tobacco Never   Tobacco Comments    Quit smoking: Currently 10 Cigarettes/day     Family History   Problem Relation Age of Onset    Coronary Art Dis Neg Hx     COPD Mother     Parkinsonism Father     Asthma Son     Schizophrenia Brother     Hypertension Brother     Emphysema Paternal Grandfather     Hypertension Mother      ROS: The patient has no difficulty with chest pain or shortness of breath. No fever or chills. Comprehensive review of systems was otherwise unremarkable except as noted above. Physical Exam:   There were no vitals taken for this visit. There were no vitals filed for this visit. No intake/output data recorded. 10/29 1901 - 10/31 0700  In: 2005 [P.O.:1205; I.V.:800]  Out: 610 [Urine:600]    Constitutional: Alert, oriented, cooperative patient in no acute distress; appears stated age    Eyes:Sclera are clear. EOMs intact  ENMT: no external lesions gross hearing normal; no obvious neck masses, no ear or lip lesions, nares normal  CV: RRR. Normal perfusion  Resp: No JVD.   Breathing is  non-labored; no audible wheezing. GI: obese; BMI>54; incisions CDI, clips removed. New dressing applied. No drainage, cellulitis, wound edges well approximated. Musculoskeletal: unremarkable with normal function. No embolic signs or cyanosis.    Neuro:  Oriented; moves all 4; no focal deficits  Psychiatric: normal affect and mood, no memory impairment    Recent vitals (if inpt):  Patient Vitals for the past 24 hrs:   BP Temp Temp src Pulse Resp SpO2 Weight   10/31/22 0413 137/76 98.4 °F (36.9 °C) Oral 88 18 97 % (!) 332 lb 9.6 oz (150.9 kg)   10/31/22 0121 128/79 98.1 °F (36.7 °C) Oral 92 18 92 % --   10/30/22 2353 -- -- -- -- 19 -- --   10/30/22 2225 128/86 98.2 °F (36.8 °C) Oral 88 17 91 % --   10/30/22 2010 -- -- -- (!) 105 -- 97 % --   10/30/22 2009 -- -- -- 88 16 95 % --   10/30/22 1540 (!) 148/87 -- -- -- -- -- --   10/30/22 1534 (!) 149/105 98.2 °F (36.8 °C) Oral 86 17 94 % --   10/30/22 1507 -- -- -- 85 16 94 % --   10/30/22 1155 -- -- -- 88 17 96 % --   10/30/22 1139 (!) 143/83 98.1 °F (36.7 °C) Oral 92 20 91 % --   10/30/22 1100 (!) 152/82 -- -- 97 20 99 % --   10/30/22 1055 (!) 153/85 -- -- 91 22 99 % --   10/30/22 1050 (!) 149/77 -- -- 94 24 97 % --   10/30/22 1045 (!) 148/74 97.8 °F (36.6 °C) Temporal 90 20 98 % --   10/30/22 1040 (!) 170/74 -- -- 97 27 99 % --   10/30/22 1035 134/77 -- -- 91 21 100 % --   10/30/22 1030 (!) 140/75 -- -- 92 17 98 % --   10/30/22 1025 (!) 151/79 -- -- 93 16 98 % --   10/30/22 1022 -- -- -- -- -- 95 % --   10/30/22 1017 130/66 -- -- (!) 105 21 -- --   10/30/22 1015 127/63 -- -- (!) 101 20 98 % --   10/30/22 1010 135/65 -- -- 93 24 96 % --   10/30/22 1002 (!) 141/67 -- -- (!) 101 22 96 % --   10/30/22 0957 (!) 109/58 -- -- (!) 108 21 96 % --   10/30/22 0952 (!) 125/59 -- -- (!) 101 24 94 % --   10/30/22 0950 (!) 143/83 97.2 °F (36.2 °C) Tympanic (!) 101 20 94 % --   10/30/22 0947 (!) 145/63 97.2 °F (36.2 °C) -- (!) 110 24 (!) 84 % --       Amount and/or Complexity of Data Reviewed and Analyzed:  I reviewed and analyzed all of the unique labs and radiologic studies that are shown below as well as any that are in the HPI, and any that are in the expanded problem list below  *Each unique test, order, or document contributes to the combination of 2 or combination of 3 in Category 1 below. For this visit I also reviewed old records and prior notes. No results for input(s): WBC, HGB, PLT, NA, K, CL, CO2, BUN, CREA, GLU, INR, APTT, ALT, AML, AML, LCAD, PCO2, PO2, HCO3 in the last 72 hours.     Invalid input(s): PTP, TBIL, TBILI, CBIL, SGOT, GPT, AP, LPSE, NH4, TROPT, TROIQ,  PH    Review of most recent CBC  Lab Results   Component Value Date    WBC 8.4 11/01/2022    HGB 9.3 (L) 11/01/2022    HCT 32.4 (L) 11/01/2022    MCV 72.5 (L) 11/01/2022     11/01/2022       Review of most recent BMP  Lab Results   Component Value Date/Time     11/01/2022 03:15 AM    K 3.4 11/01/2022 03:15 AM     11/01/2022 03:15 AM    CO2 36 11/01/2022 03:15 AM    BUN 5 11/01/2022 03:15 AM    CREATININE 0.60 11/01/2022 03:15 AM    GLUCOSE 84 11/01/2022 03:15 AM    CALCIUM 9.3 11/01/2022 03:15 AM        Review of most recent LFTs (and lipase if done)  Lab Results   Component Value Date    ALT 57 11/01/2022    AST 28 11/01/2022    ALKPHOS 106 11/01/2022    BILITOT 0.3 11/01/2022     Lab Results   Component Value Date    LIPASE 133 10/29/2022       Lab Results   Component Value Date/Time    INR 0.9 10/30/2022 05:06 AM    APTT 30.6 10/28/2022 06:20 PM    APTT 73.7 08/31/2019 11:30 AM       Review of most recent HgbA1c  Hemoglobin A1C   Date Value Ref Range Status   09/01/2022 6.1 (H) 4.8 - 5.6 % Final       Nutritional assessment screen for wound healing issues:  Lab Results   Component Value Date    LABALBU 3.0 (L) 11/01/2022       @lastcovr@    Xray Result (most recent):  XR CHEST PORTABLE 10/26/2022    Narrative  EXAMINATION: XR CHEST PORTABLE 10/26/2022 8:05 PM    ACCESSION NUMBER: [id number]    COMPARISON: None available    INDICATION: dyspnea    TECHNIQUE: A single view of the chest was obtained. FINDINGS:  Lung hypoinflation. Streaky bibasilar opacities. No pneumothorax or sizable pleural effusion. Stable cardiomediastinal silhouette. Impression  -Lung hypoinflation with streaky bibasilar opacities, favored to reflect  atelectasis, though infection/aspiration are also considerations in the  appropriate clinical context. CT Result (most recent):  CT ABDOMEN PELVIS W IV CONTRAST 10/26/2022    Narrative  EXAM: CT ABDOMEN PELVIS W IV CONTRAST    HISTORY: Upper abdominal pain. Diarrhea. TECHNIQUE: Axial images of the abdomen and pelvis were performed following the  administration of intravenous contrast. Images were obtained in the axial plane  and coronal reformatted images were created and reviewed. Dose reduction technique used: Automated exposure control/Adjustment of the mA  and/or kV according to patient size/Use of iterative reconstruction technique. 100 mL of Isovue-370 were utilized. COMPARISON: CT chest dated 10/18/2018 and 11/10/2016. FINDINGS:  A moderate amount of motion artifact limits this examination to some degree. The visualized lung bases and mediastinum are unremarkable. The liver, spleen, pancreas, adrenal glands, and kidneys are within normal  limits. The left kidney contains a tiny low-density lesion in the midpole which  is too small to characterize. The bladder is decompressed and not assessed. The gallbladder shows wall thickening and mucosal enhancement. A few radiopaque  stones are appreciated. Multiple prominent lymph nodes are appreciated in the yousuf hepatis and near the  head of the pancreas. Distal esophagus and stomach are unremarkable. Small and large bowel show no  evidence of obstruction. There is a normal appendix in the right lower quadrant. No evidence of free fluid or free air. No pathologic adenopathy. No abdominal  aortic aneurysm. Osseous structures show no evidence of acute fracture or suspicious lesion. Impression  The gallbladder shows wall thickening and mucosal enhancement. A few radiopaque  stones are appreciated. These findings are suggestive of acute cholecystitis. Multiple prominent lymph nodes are appreciated in the yousuf hepatis and near the  head of the pancreas. There significance is unclear. US Result (most recent):  US ABDOMEN LIMITED 10/26/2022    Narrative  EXAM: US ABDOMEN LIMITED    HISTORY: Upper abdominal pain, elevated LFTs and elevated lipase. TECHNIQUE: Grayscale and limited color Doppler ultrasound of the right upper  quadrant. COMPARISON: CT abdomen and pelvis dated 10/26/2022    FINDINGS:  Aorta/IVC: Not well visualized. /Visualized portions are within normal limits. Pancreas: Mostly obscured by overlying bowel gas. Liver: There is diffuse increased echogenicity within the liver parenchyma,  suggestive of hepatic steatosis. No focal lesions are demonstrated on the  provided images. Portal vein: The portal vein shows hepatopedal flow. Gallbladder: The gallbladder is contracted and not assessed. Sonographic Moe  sign was negative. CBD: Normal in caliber and measures 2.3 mm. Right kidney: 12.1 cm in length and without evidence of obstruction. Impression  The gallbladder is contracted and not assessed. Sonographic Moe sign was  negative. Hepatic steatosis. Admission date (for inpatients): (Not on file)   * No surgery found *  Procedure(s):  ERCP ENDOSCOPIC RETROGRADE CHOLANGIOPANCREATOGRAPHY         ASSESSMENT/PLAN:  [unfilled]  Active Problems:    * No active hospital problems. *  Resolved Problems:    * No resolved hospital problems.  *     Patient Active Problem List    Diagnosis Date Noted    BMI 50.0-59.9, adult ([facility] 10/27/2022     Priority: Medium    Acute gallstone pancreatitis 10/26/2022     Priority: Medium     10/30/22 s/p lap river and liver biopsy; Dr Maria A Warren      COVID-19 01/14/2021    Type 2 diabetes with nephropathy ([facility] 01/07/2020    Atrial fibrillation, new onset ([facility] 08/30/2019    Chronic respiratory failure with hypoxia ([facility] 08/28/2019     NC 3L continuous        Cigarette nicotine dependence in remission 03/05/2017    COPD, severe ([facility] 03/02/2017     PFTs 3/2016  FVC 2.22 L and 62 % of predicted. FEV1 1.23 L and 42 % of predicted. Ratio 55%. There was no clinically relevant response to bronchodilator  LUNG VOLUMES:  TLC 4.61 L and 89 % of predicted. RV 2.39 L and 139 % of predicted. DIFFUSION:  The diffusing capacity was Corrected for hemoglobin of 14.7 g/dL  and was 15.9 mL/mmHg/minute and 58 % of predicted. Diabetes mellitus type 2, controlled ([facility] 03/02/2017    Morbid obesity with BMI of 50.0-59.9, adult ([facility] 03/02/2017    THANH (obstructive sleep apnea) 03/02/2017    Nocturnal hypoxemia 03/07/2016    Hypertension 03/07/2016    Chronic back pain 03/07/2016    Obesity hypoventilation syndrome ([facility] 12/18/2015          Number and Complexity of Problems addressed and   Risks of complications and/or morbidity of management      Epigastric pain, leukocytosis, gallstone pancreatitis, elevated LFTs, abnormal liver echotexture  She is s/p lap cholecystectomy and liver biopsy by Dr Maria A Warren on 10/30/22  Doing well  Follow up here prn  Remove today's dressings in 2 weeks at home    LFTs normal this am  OK for discharge from surgical perspective on PO Abx for 1 more week for recent cholangitis and WBC 11k (possibly Augmentin--Defer to Hospitalists)  No GI referral provided  Liver Bx:          B:  \"LIVER BIOPSY\":             BENIGN HEPATIC TISSUE WITH MILD STEATOSIS AND MINIMAL PORTAL                  INFLAMMATION. BMI>54  Encourage weight loss    Tobacco use  Encourage smoking cessation              I have personally performed a face-to-face diagnostic evaluation and management  service on this patient. I have independently seen the patient. I have independently obtained the above history from the patient/family. I have independently examined the patient with above findings. I have independently reviewed data/labs for this patient and developed the above plan of care (MDM).       Signed: FUNMI Sow CNP  11/9/2022    11:42 AM

## 2022-11-10 ENCOUNTER — OFFICE VISIT (OUTPATIENT)
Dept: INTERNAL MEDICINE CLINIC | Facility: CLINIC | Age: 52
End: 2022-11-10

## 2022-11-10 ENCOUNTER — CARE COORDINATION (OUTPATIENT)
Dept: OTHER | Facility: CLINIC | Age: 52
End: 2022-11-10

## 2022-11-10 VITALS
BODY MASS INDEX: 48.82 KG/M2 | WEIGHT: 293 LBS | HEART RATE: 95 BPM | DIASTOLIC BLOOD PRESSURE: 90 MMHG | HEIGHT: 65 IN | SYSTOLIC BLOOD PRESSURE: 144 MMHG | OXYGEN SATURATION: 93 % | TEMPERATURE: 98.1 F

## 2022-11-10 DIAGNOSIS — Z90.49 S/P CHOLECYSTECTOMY: ICD-10-CM

## 2022-11-10 DIAGNOSIS — A41.9 SEPTICEMIA (HCC): ICD-10-CM

## 2022-11-10 DIAGNOSIS — Z23 NEED FOR IMMUNIZATION AGAINST INFLUENZA: ICD-10-CM

## 2022-11-10 DIAGNOSIS — I10 ESSENTIAL (PRIMARY) HYPERTENSION: ICD-10-CM

## 2022-11-10 DIAGNOSIS — D50.0 ANEMIA DUE TO BLOOD LOSS: ICD-10-CM

## 2022-11-10 DIAGNOSIS — K85.10 GALLSTONE PANCREATITIS: ICD-10-CM

## 2022-11-10 DIAGNOSIS — Z09 HOSPITAL DISCHARGE FOLLOW-UP: Primary | ICD-10-CM

## 2022-11-10 RX ORDER — LISINOPRIL 5 MG/1
TABLET ORAL
COMMUNITY
Start: 2022-10-31

## 2022-11-10 ASSESSMENT — PATIENT HEALTH QUESTIONNAIRE - PHQ9
SUM OF ALL RESPONSES TO PHQ QUESTIONS 1-9: 0
1. LITTLE INTEREST OR PLEASURE IN DOING THINGS: 0
SUM OF ALL RESPONSES TO PHQ QUESTIONS 1-9: 0
SUM OF ALL RESPONSES TO PHQ9 QUESTIONS 1 & 2: 0
SUM OF ALL RESPONSES TO PHQ QUESTIONS 1-9: 0
SUM OF ALL RESPONSES TO PHQ QUESTIONS 1-9: 0
2. FEELING DOWN, DEPRESSED OR HOPELESS: 0

## 2022-11-10 ASSESSMENT — ENCOUNTER SYMPTOMS
ABDOMINAL DISTENTION: 0
ABDOMINAL PAIN: 1
DIARRHEA: 1
SHORTNESS OF BREATH: 1
VOMITING: 0
NAUSEA: 0
BLOOD IN STOOL: 0

## 2022-11-10 NOTE — PATIENT INSTRUCTIONS
Counseled that diarrhea should get better once she completes course of Augmentin  Discussed that fatty foods will likely induce bowel urgency & loose stools now that she is without a gallbladder so would be best advised to limit intake of these foods  Resume Diltiazem daily  Continue to hold Lisinopril for now  Monitor blood pressure 3 times/week and keep record to bring to next appointment  Continue chronic medications as prescribed  Discussed anemia that resulted from surgery and advised that we will monitor the recovery over the next few months but in the meantime she should pause after standing before she starts to walk

## 2022-11-10 NOTE — PROGRESS NOTES
Post-Discharge Transitional Care  Follow Up      Alisha Obrien   YOB: 1970    Date of Office Visit:  11/10/2022  Date of Hospital Admission: 10/26/22  Date of Hospital Discharge: 11/1/22  Risk of hospital readmission (high >=14%. Medium >=10%) :Readmission Risk Score: 13.3      Care management risk score Rising risk (score 2-5) and Complex Care (Scores >=6): No Risk Score On File     Non face to face  following discharge, date last encounter closed (first attempt may have been earlier): 11/03/2022    Call initiated 2 business days of discharge: Yes    ASSESSMENT/PLAN:   Hospital discharge follow-up  -     IA DISCHARGE MEDS RECONCILED W/ CURRENT OUTPATIENT MED LIST  Need for immunization against influenza  -     Influenza, FLUAD, (age 72 y+), IM, Preservative Free, 0.5 mL  Essential (primary) hypertension  Anemia due to blood loss  S/P cholecystectomy  Gallstone pancreatitis  Septicemia ([facility] [address]      Patient Instructions   Counseled that diarrhea should get better once she completes course of Augmentin  Discussed that fatty foods will likely induce bowel urgency & loose stools now that she is without a gallbladder so would be best advised to limit intake of these foods  Resume Diltiazem daily  Continue to hold Lisinopril for now  Monitor blood pressure 3 times/week and keep record to bring to next appointment  Continue chronic medications as prescribed  Discussed anemia that resulted from surgery and advised that we will monitor the recovery over the next few months but in the meantime she should pause after standing before she starts to walk      Medical Decision Making: moderate complexity  Return in about 3 months (around 1/30/2023) for previously scheduled appt. Subjective:   HPI:  Follow up of Hospital problems/diagnosis(es): Gallstone Pancreatitis, Septicemia    Inpatient course: Discharge summary reviewed- see chart.       Hospital Course:  Ashley Hill is a 50 y.o. female, local history of hypertension hyperlipidemia, diabetes mellitus who presents to the Emergency Department with chief complaint of abdominal pain, vomiting, and diarrhea. Patient states symptoms started this morning. States she believes she \"ate some bad food last evening. \"  States since then this morning she had crampy abdominal pain with vomiting. Denies any hematemesis or melena. Denies any fevers or chills. EMS was called when she was initially found to be hypotensive. Antibiotics were started in route as well as IV fluids and antiemetics. Patient describes a diffuse abdominal pain. Laboratory data significant for lactic acid 2.6, elevated Pro-Dragan.  Lipase greater than 30,000 and mildly elevated bilirubin and LFTs. She got Zosyn in route via EMS. IV fluids have been initiated. Vancomycin added in ER. CT scan does show gallbladder wall thickening as well as stones concern for possible acute cholecystitis. However she has no focal tenderness on exam.  Given acuity of symptoms reported fever and sepsis markers ER MD became concerned for possibly developing cholangitis. GI as well as hospitalist consulted for admission. Hospital Course     # Acute gallstone pancreatitis  S/p lap river 10/30  Tolerating diet  Continue Augmentin at discharge per sx recommendations. # BMI 50.0-59.9, adult ([address]  Plan:  complicates care, lifestyle modifications encouraged      #  Hypertension  BP have been controlled OFF home meds  Hold cardizem and lisinopril at discharge  Monitor at home and d/w PCP when/if need to resume     # COPD, severe ([address]  Compensated, continue nebs at home       # Afib  Ok to resume eliquis per sx recs. # Diabetes mellitus type 2, controlled ([address]  Resume home meds      # Microcytic anemia  Stable around 9.5.    No LAURA by labs  F/u PCP with repeat CBC at follow-up       THANH (obstructive sleep apnea)  Plan: cpap qhs      Interval history/Current status: Patient reports bowels are still loose with general tenderness over incision site. She denies nausea, vomiting, or abdominal pain. She has felt mildly fatigued, weak, and dizzy upon standing since being discharged. She is still completing Augmentin as prescribed, but is no longer needing Hydrocodone for pain control. Staples were removed yesterday. Liver biopsy done at time of surgery revealed \"benign hepatic tissue with mild steatosis & minimal portal inflammation\". The patient is a 50 y.o. female who is seen for follow-up of hypertension. She is not exercising and is adherent to low salt diet. Daily caffiene intake: a known amount (2-3 servings/day). Current medication regimen consists of: none - held at discharge from hospital.  Blood pressure is not measured at home. BP Readings from Last 3 Encounters:   11/10/22 (!) 145/95   11/01/22 (!) 110/59   09/08/22 120/80         Patient Active Problem List   Diagnosis    COVID-19    Type 2 diabetes with nephropathy (HCC)    Cigarette nicotine dependence in remission    Nocturnal hypoxemia    Hypertension    COPD, severe ([address]    Diabetes mellitus type 2, controlled ([address]    Atrial fibrillation, new onset ([address]    Chronic back pain    Morbid obesity with BMI of 50.0-59.9, adult ([address]    Chronic respiratory failure with hypoxia (HCC)    THANH (obstructive sleep apnea)    Obesity hypoventilation syndrome ([address]    Acute gallstone pancreatitis    BMI 50.0-59.9, adult ([address]       Medications listed as ordered at the time of discharge from hospital     Medication List            Accurate as of November 10, 2022  3:08 PM. If you have any questions, ask your nurse or doctor.                 CONTINUE taking these medications      albuterol sulfate  (90 Base) MCG/ACT inhaler  Commonly known as: PROVENTIL;VENTOLIN;PROAIR  Inhale 2 puffs into the lungs every 4 hours as needed for Wheezing     amoxicillin-clavulanate 875-125 MG per tablet  Commonly known as: AUGMENTIN  Take 1 tablet by mouth 2 times daily for 10 days     apixaban 5 MG Tabs tablet  Commonly known as: ELIQUIS     atorvastatin 10 MG tablet  Commonly known as: LIPITOR  Take 1 tablet by mouth at bedtime     blood glucose test strips strip  Commonly known as: ASCENSIA AUTODISC VI;ONE TOUCH ULTRA TEST VI     Breo Ellipta 200-25 MCG/INH Aepb inhaler  Generic drug: Fluticasone furoate-vilanterol     guaiFENesin 1200 MG Tb12     ipratropium-albuterol 0.5-2.5 (3) MG/3ML Soln nebulizer solution  Commonly known as: DUONEB  Inhale 3 mLs into the lungs 4 times daily File to Medicare part B--J44.9     Lancets 33G Misc     lisinopril 5 MG tablet  Commonly known as: PRINIVIL;ZESTRIL     metFORMIN 500 MG tablet  Commonly known as: GLUCOPHAGE  Take 1 tablet by mouth 2 times daily (with meals)                Medications marked \"taking\" at this time  Outpatient Medications Marked as Taking for the 11/10/22 encounter (Office Visit) with Farideh Aly PA-C   Medication Sig Dispense Refill    amoxicillin-clavulanate (AUGMENTIN) 875-125 MG per tablet Take 1 tablet by mouth 2 times daily for 10 days 20 tablet 0    blood glucose test strips (ASCENSIA AUTODISC VI;ONE TOUCH ULTRA TEST VI) strip Use to check blood sugar once daily as directed. Dx: E11.29      Lancets 33G MISC Use to check glucose once daily.   Dx: E11.29      atorvastatin (LIPITOR) 10 MG tablet Take 1 tablet by mouth at bedtime 30 tablet 5    metFORMIN (GLUCOPHAGE) 500 MG tablet Take 1 tablet by mouth 2 times daily (with meals) 60 tablet 5    albuterol sulfate  (90 Base) MCG/ACT inhaler Inhale 2 puffs into the lungs every 4 hours as needed for Wheezing 1 each 11    ipratropium-albuterol (DUONEB) 0.5-2.5 (3) MG/3ML SOLN nebulizer solution Inhale 3 mLs into the lungs 4 times daily File to Medicare part B--J44.9 120 each 11    apixaban (ELIQUIS) 5 MG TABS tablet TAKE ONE TABLET BY MOUTH EVERY 12 HOURS **MUST SCHEDULE APPOINTMENT FOR MORE REFILLS**      Fluticasone furoate-vilanterol (BREO ELLIPTA) 200-25 MCG/INH AEPB inhaler INHALE ONE PUFF ONCE DAILY *RINSE MOUTH WELL AFTER USE*      guaiFENesin 1200 MG TB12 Take 1,200 mg by mouth 2 times daily          Medications patient taking as of now reconciled against medications ordered at time of hospital discharge: Yes      Review of Systems   Constitutional:  Positive for fatigue (mild). Respiratory:  Positive for shortness of breath (chronic). Cardiovascular:  Positive for leg swelling (bilateral). Negative for chest pain. Gastrointestinal:  Positive for abdominal pain (only incisional pain) and diarrhea. Negative for abdominal distention, blood in stool, nausea and vomiting. Negative for heartburn   Neurological:  Positive for dizziness (mild upon standing) and weakness (mild). Objective:    BP (!) 145/95   Pulse 95   Temp 98.1 °F (36.7 °C) (Temporal)   Ht 5' 5\" (1.651 m)   Wt (!) 334 lb (151.5 kg)   SpO2 93%   BMI 55.58 kg/m²     BP (!) 144/90   Pulse 95   Temp 98.1 °F (36.7 °C) (Temporal)   Ht 5' 5\" (1.651 m)   Wt (!) 334 lb (151.5 kg)   SpO2 93%   BMI 55.58 kg/m²       Physical Exam  Constitutional:       Appearance: Normal appearance. She is obese. HENT:      Head: Normocephalic and atraumatic. Eyes:      Conjunctiva/sclera: Conjunctivae normal.      Pupils: Pupils are equal, round, and reactive to light. Neck:      Vascular: No carotid bruit. Cardiovascular:      Rate and Rhythm: Normal rate and regular rhythm. Heart sounds: Normal heart sounds. Pulmonary:      Effort: Pulmonary effort is normal.      Breath sounds: Normal breath sounds. Abdominal:      General: Bowel sounds are normal.      Palpations: Abdomen is soft. Tenderness: There is abdominal tenderness (over incision sites). Musculoskeletal:         General: Normal range of motion. Cervical back: Normal range of motion. Skin:     General: Skin is warm and dry.       Comments: Incision sites appear clean & healthy   Neurological:      Mental Status: She is alert and oriented to person, place, and time. Psychiatric:         Mood and Affect: Mood normal.         Behavior: Behavior normal.         Thought Content: Thought content normal.         Judgment: Judgment normal.           An electronic signature was used to authenticate this note.   --Darrian Melendez PA-C

## 2022-11-10 NOTE — CARE COORDINATION
[address] [facility] Care Transitions Follow Up Call    Care Transition Nurse contacted the patient by telephone to follow up after admission on 10/26/2022. Verified name and  with patient as identifiers. Patient: Emre Obrien  Patient : 1970   MRN: [id number]  Reason for Admission: Gallstone pancreatitis  Discharge Date: 22 RARS: Readmission Risk Score: 13.3      Needs to be reviewed by the provider   Additional needs identified to be addressed with provider: No  none             Method of communication with provider: none. Attended follow up appointment on 2022 with surgeon, attended follow up appointment with PCP on 11/10/2022. Voices she feels well. No concerns/questions at this time. Addressed changes since last contact:   attended appointments. Discussed follow-up appointments. If no appointment was previously scheduled, appointment scheduling offered: Yes. Is follow up appointment scheduled within 7 days of discharge? Yes. Follow Up  Future Appointments   Date Time Provider Lola Jansen   2022 11:30 AM FUNMI Snowden - KOBE PPS GVL AMB   2023 10:30 AM SFE LAB DS SFEDS SFE   2023 10:00 AM JOSE ALBERTO Reddy GVL AMB     Non-BS follow up appointment(s): none noted    Care Transition Nurse reviewed discharge instructions, medical action plan, and red flags with patient and discussed any barriers to care and/or understanding of plan of care after discharge. Discussed appropriate site of care based on symptoms and resources available to patient including: PCP  Specialist  Urgent care clinics. The patient agrees to contact the PCP office for questions related to their healthcare. Advance Care Planning:   reviewed and current.      Patients top risk factors for readmission: medical condition-recent surgical procedure  Interventions to address risk factors: Education of patient to support self-management-voices understanding and Assessment and support for treatment adherence and medication management-voices understanding. Offered patient enrollment in the Remote Patient Monitoring (RPM) program for in-home monitoring: NA.     Care Transitions Subsequent and Final Call    Subsequent and Final Calls  Care Transitions Interventions  Other Interventions:             Care Transition Nurse provided contact information for future needs. Plan for follow-up call in 7-10 days based on severity of symptoms and risk factors. Plan for next call:  telephonic support, concerns/questions.     Alea Machado RN

## 2022-11-17 ENCOUNTER — CARE COORDINATION (OUTPATIENT)
Dept: OTHER | Facility: CLINIC | Age: 52
End: 2022-11-17

## 2022-11-18 NOTE — CARE COORDINATION
[facility] Care Transitions Follow Up Call    Care Transition Nurse contacted the patient by telephone to follow up after admission on 10/26/2022. Verified name and  with patient as identifiers. Patient: Basim Obrien  Patient : 1970   MRN: [id number]  Reason for Admission: Acute gallstone pancreatitis  Discharge Date: 22 RARS: Readmission Risk Score: 13.3      Needs to be reviewed by the provider   Additional needs identified to be addressed with provider: No  none             Method of communication with provider: none. Patient reports that she is doing well without concerns/questions at this time. Addressed changes since last contact:   attended hospital follow up appointments. Discussed follow-up appointments. If no appointment was previously scheduled, appointment scheduling offered: Yes. Is follow up appointment scheduled within 7 days of discharge? Yes. Follow Up  Future Appointments   Date Time Provider Lola Jansen   2022 11:30 AM FUNMI London CNP PPS GVL AMB   2023 10:30 AM SFE LAB DS SFEDS SFE   2023 10:00 AM JOSE ALBERTO Reddy GVL AMB     Non-[facility] follow up appointment(s): none noted    Care Transition Nurse reviewed discharge instructions, medical action plan, and red flags with patient and discussed any barriers to care and/or understanding of plan of care after discharge. Discussed appropriate site of care based on symptoms and resources available to patient including: PCP  Specialist  [facility] . The patient agrees to contact the PCP office for questions related to their healthcare. Advance Care Planning:   reviewed and current. Patients top risk factors for readmission: medical condition-recent surgical procedure.   Interventions to address risk factors: Education of patient to support self-management-voices understanding and Assessment and support for treatment adherence and medication management-recognizes need for change since surgical procedure, taking medications as prescribed. Offered patient enrollment in the Remote Patient Monitoring (RPM) program for in-home monitoring: NA.     Care Transitions Subsequent and Final Call    Subsequent and Final Calls  Do you have any ongoing symptoms?: No  Care Transitions Interventions  Other Interventions:             Care Transition Nurse provided contact information for future needs. Plan for follow-up call in 10-14 days based on severity of symptoms and risk factors.   Plan for next call: symptom management-wound concerns  self management-other chronic conditions and recent procedures    Pily Prado RN

## 2022-12-01 ENCOUNTER — CARE COORDINATION (OUTPATIENT)
Dept: OTHER | Facility: CLINIC | Age: 52
End: 2022-12-01

## 2022-12-01 NOTE — CARE COORDINATION
[facility] Care Transitions Follow Up Call    Care Transition Nurse contacted the patient by telephone to follow up after admission on 10/26/2022. Verified name and  with patient as identifiers. Patient: Angelica Obrien  Patient : 1970   MRN: [id number]  Reason for Admission: Pancreatitis  Discharge Date: 22 RARS: Readmission Risk Score: 13.3      Needs to be reviewed by the provider   Additional needs identified to be addressed with provider: No  none             Method of communication with provider: none. Patient states that she is doing well, she does not have any concerns or questions at this time. Addressed changes since last contact:  none  Discussed follow-up appointments. If no appointment was previously scheduled, appointment scheduling offered: Yes. Is follow up appointment scheduled within 7 days of discharge? Yes. Follow Up  Future Appointments   Date Time Provider Lola Jansen   2023 10:30 AM SFE LAB DS SFEDS SFE   2023 10:00 AM JOSE ALBERTO Reddy GVL AMB   2023  2:50 PM FUNMI Che - CNP PPS GVL AMB     Non-[facility] follow up appointment(s): none noted    Care Transition Nurse reviewed discharge instructions, medical action plan, and red flags with patient and discussed any barriers to care and/or understanding of plan of care after discharge. Discussed appropriate site of care based on symptoms and resources available to patient including: PCP. The patient agrees to contact the PCP office for questions related to their healthcare. Advance Care Planning:   reviewed and current.      Patients top risk factors for readmission: medical condition-recent  pancreatitis  Interventions to address risk factors: Obtained and reviewed discharge summary and/or continuity of care documents, Education of patient to support self-management-voices understanding, and Assessment and support for treatment adherence and medication management-voices understanding. Offered patient enrollment in the Remote Patient Monitoring (RPM) program for in-home monitoring: NA.     Care Transitions Subsequent and Final Call    Subsequent and Final Calls  Care Transitions Interventions  Other Interventions:    Reviewed this TURNER episode, goals  Diet details  Plan to close episode with goals met on 12/3/2022. Care Transition Nurse provided contact information for future needs. Plan for follow-up call in 3-5 days based on severity of symptoms and risk factors.   Plan for next call:  close goals    Lorie Shankar RN

## 2022-12-05 ENCOUNTER — CARE COORDINATION (OUTPATIENT)
Dept: OTHER | Facility: CLINIC | Age: 52
End: 2022-12-05

## 2022-12-05 NOTE — CARE COORDINATION
Patient has graduated from the Care Transitions program on 12/5/2022. Patient/family has the ability to self-manage at this time. Patient has no further care management needs, no referral to the [facility] team for further management. Patient has Care Transition Nurse's contact information for any further questions, concerns, or needs. Patients upcoming visits:    Future Appointments   Date Time Provider Lola Jansen   1/23/2023 10:30 AM SFE LAB DS SFEDS SFE   1/30/2023 10:00 AM JOSE ALBERTO Reddy GVL AMB   1/31/2023  2:50 PM FUNMI Flores - CNP PPS GVL AMB         Patient voices understanding of self management and red flags that led to prior hospitalization. Episode to be resolved with goals met.

## 2023-01-28 NOTE — PROGRESS NOTES
Pt bedside report received from [facility], pt resting in bed  watching Tv, assessment completed ,  pt AxOx4, bed on low and locked position with floor free of objects,  call light within reach, pt on oxygen  @3L NC, respirations even and non labored, pt verbalized understanding to call for needs,  no c/o pain, no distress noted. Normal vision: sees adequately in most situations; can see medication labels, newsprint

## 2023-01-30 ENCOUNTER — OFFICE VISIT (OUTPATIENT)
Dept: INTERNAL MEDICINE CLINIC | Facility: CLINIC | Age: 53
End: 2023-01-30
Payer: MEDICARE

## 2023-01-30 VITALS
OXYGEN SATURATION: 87 % | BODY MASS INDEX: 48.82 KG/M2 | DIASTOLIC BLOOD PRESSURE: 80 MMHG | HEART RATE: 93 BPM | WEIGHT: 293 LBS | SYSTOLIC BLOOD PRESSURE: 120 MMHG | HEIGHT: 65 IN | TEMPERATURE: 97.5 F

## 2023-01-30 DIAGNOSIS — Z87.891 PERSONAL HISTORY OF TOBACCO USE: ICD-10-CM

## 2023-01-30 DIAGNOSIS — J96.11 CHRONIC RESPIRATORY FAILURE WITH HYPOXIA (HCC): ICD-10-CM

## 2023-01-30 DIAGNOSIS — Z12.11 SCREENING FOR COLON CANCER: ICD-10-CM

## 2023-01-30 DIAGNOSIS — Z23 NEED FOR PNEUMOCOCCAL VACCINE: ICD-10-CM

## 2023-01-30 DIAGNOSIS — J44.1 CHRONIC OBSTRUCTIVE PULMONARY DISEASE WITH (ACUTE) EXACERBATION (HCC): ICD-10-CM

## 2023-01-30 DIAGNOSIS — F43.21 SITUATIONAL DEPRESSION: ICD-10-CM

## 2023-01-30 DIAGNOSIS — Z12.31 SCREENING MAMMOGRAM FOR BREAST CANCER: ICD-10-CM

## 2023-01-30 DIAGNOSIS — E78.5 HYPERLIPIDEMIA LDL GOAL <100: ICD-10-CM

## 2023-01-30 DIAGNOSIS — K91.5 POST-CHOLECYSTECTOMY SYNDROME: ICD-10-CM

## 2023-01-30 DIAGNOSIS — I10 ESSENTIAL (PRIMARY) HYPERTENSION: ICD-10-CM

## 2023-01-30 DIAGNOSIS — R80.9 TYPE 2 DIABETES MELLITUS WITH MICROALBUMINURIA, WITHOUT LONG-TERM CURRENT USE OF INSULIN (HCC): ICD-10-CM

## 2023-01-30 DIAGNOSIS — Z00.00 MEDICARE ANNUAL WELLNESS VISIT, SUBSEQUENT: Primary | ICD-10-CM

## 2023-01-30 DIAGNOSIS — E11.29 TYPE 2 DIABETES MELLITUS WITH MICROALBUMINURIA, WITHOUT LONG-TERM CURRENT USE OF INSULIN (HCC): ICD-10-CM

## 2023-01-30 DIAGNOSIS — R71.8 MICROCYTOSIS: ICD-10-CM

## 2023-01-30 DIAGNOSIS — R63.4 RECENT UNEXPLAINED WEIGHT LOSS: ICD-10-CM

## 2023-01-30 PROCEDURE — 2022F DILAT RTA XM EVC RTNOPTHY: CPT | Performed by: PHYSICIAN ASSISTANT

## 2023-01-30 PROCEDURE — G0439 PPPS, SUBSEQ VISIT: HCPCS | Performed by: PHYSICIAN ASSISTANT

## 2023-01-30 PROCEDURE — 3079F DIAST BP 80-89 MM HG: CPT | Performed by: PHYSICIAN ASSISTANT

## 2023-01-30 PROCEDURE — 3074F SYST BP LT 130 MM HG: CPT | Performed by: PHYSICIAN ASSISTANT

## 2023-01-30 PROCEDURE — G8427 DOCREV CUR MEDS BY ELIG CLIN: HCPCS | Performed by: PHYSICIAN ASSISTANT

## 2023-01-30 PROCEDURE — G0009 ADMIN PNEUMOCOCCAL VACCINE: HCPCS | Performed by: PHYSICIAN ASSISTANT

## 2023-01-30 PROCEDURE — G8417 CALC BMI ABV UP PARAM F/U: HCPCS | Performed by: PHYSICIAN ASSISTANT

## 2023-01-30 PROCEDURE — G0296 VISIT TO DETERM LDCT ELIG: HCPCS | Performed by: PHYSICIAN ASSISTANT

## 2023-01-30 PROCEDURE — 4004F PT TOBACCO SCREEN RCVD TLK: CPT | Performed by: PHYSICIAN ASSISTANT

## 2023-01-30 PROCEDURE — 3023F SPIROM DOC REV: CPT | Performed by: PHYSICIAN ASSISTANT

## 2023-01-30 PROCEDURE — 99214 OFFICE O/P EST MOD 30 MIN: CPT | Performed by: PHYSICIAN ASSISTANT

## 2023-01-30 PROCEDURE — 90677 PCV20 VACCINE IM: CPT | Performed by: PHYSICIAN ASSISTANT

## 2023-01-30 PROCEDURE — 3044F HG A1C LEVEL LT 7.0%: CPT | Performed by: PHYSICIAN ASSISTANT

## 2023-01-30 PROCEDURE — G8484 FLU IMMUNIZE NO ADMIN: HCPCS | Performed by: PHYSICIAN ASSISTANT

## 2023-01-30 PROCEDURE — 3017F COLORECTAL CA SCREEN DOC REV: CPT | Performed by: PHYSICIAN ASSISTANT

## 2023-01-30 RX ORDER — GUAIFENESIN 1200 MG/1
1 TABLET, EXTENDED RELEASE ORAL 2 TIMES DAILY
Qty: 60 TABLET | Refills: 5 | Status: SHIPPED | OUTPATIENT
Start: 2023-01-30

## 2023-01-30 RX ORDER — DILTIAZEM HYDROCHLORIDE 240 MG/1
240 CAPSULE, COATED, EXTENDED RELEASE ORAL DAILY
Qty: 30 CAPSULE | Refills: 5 | Status: SHIPPED | OUTPATIENT
Start: 2023-01-30

## 2023-01-30 RX ORDER — CHOLESTYRAMINE 4 G/9G
1 POWDER, FOR SUSPENSION ORAL DAILY
Qty: 90 PACKET | Refills: 3 | Status: SHIPPED | OUTPATIENT
Start: 2023-01-30

## 2023-01-30 RX ORDER — ATORVASTATIN CALCIUM 10 MG/1
10 TABLET, FILM COATED ORAL NIGHTLY
Qty: 30 TABLET | Refills: 5 | Status: SHIPPED | OUTPATIENT
Start: 2023-01-30

## 2023-01-30 ASSESSMENT — ANXIETY QUESTIONNAIRES
1. FEELING NERVOUS, ANXIOUS, OR ON EDGE: 1
4. TROUBLE RELAXING: 0
GAD7 TOTAL SCORE: 4
7. FEELING AFRAID AS IF SOMETHING AWFUL MIGHT HAPPEN: 0
5. BEING SO RESTLESS THAT IT IS HARD TO SIT STILL: 0
6. BECOMING EASILY ANNOYED OR IRRITABLE: 1
2. NOT BEING ABLE TO STOP OR CONTROL WORRYING: 1
3. WORRYING TOO MUCH ABOUT DIFFERENT THINGS: 1

## 2023-01-30 ASSESSMENT — PATIENT HEALTH QUESTIONNAIRE - PHQ9
SUM OF ALL RESPONSES TO PHQ9 QUESTIONS 1 & 2: 3
1. LITTLE INTEREST OR PLEASURE IN DOING THINGS: 1
10. IF YOU CHECKED OFF ANY PROBLEMS, HOW DIFFICULT HAVE THESE PROBLEMS MADE IT FOR YOU TO DO YOUR WORK, TAKE CARE OF THINGS AT HOME, OR GET ALONG WITH OTHER PEOPLE: 1
SUM OF ALL RESPONSES TO PHQ QUESTIONS 1-9: 3
4. FEELING TIRED OR HAVING LITTLE ENERGY: 1
6. FEELING BAD ABOUT YOURSELF - OR THAT YOU ARE A FAILURE OR HAVE LET YOURSELF OR YOUR FAMILY DOWN: 2
SUM OF ALL RESPONSES TO PHQ QUESTIONS 1-9: 9
SUM OF ALL RESPONSES TO PHQ QUESTIONS 1-9: 9
1. LITTLE INTEREST OR PLEASURE IN DOING THINGS: 2
SUM OF ALL RESPONSES TO PHQ QUESTIONS 1-9: 3
8. MOVING OR SPEAKING SO SLOWLY THAT OTHER PEOPLE COULD HAVE NOTICED. OR THE OPPOSITE, BEING SO FIGETY OR RESTLESS THAT YOU HAVE BEEN MOVING AROUND A LOT MORE THAN USUAL: 0
7. TROUBLE CONCENTRATING ON THINGS, SUCH AS READING THE NEWSPAPER OR WATCHING TELEVISION: 0
9. THOUGHTS THAT YOU WOULD BE BETTER OFF DEAD, OR OF HURTING YOURSELF: 0
3. TROUBLE FALLING OR STAYING ASLEEP: 3
2. FEELING DOWN, DEPRESSED OR HOPELESS: 1
5. POOR APPETITE OR OVEREATING: 0
SUM OF ALL RESPONSES TO PHQ9 QUESTIONS 1 & 2: 3
SUM OF ALL RESPONSES TO PHQ QUESTIONS 1-9: 9
SUM OF ALL RESPONSES TO PHQ QUESTIONS 1-9: 9
SUM OF ALL RESPONSES TO PHQ QUESTIONS 1-9: 3
SUM OF ALL RESPONSES TO PHQ QUESTIONS 1-9: 3
2. FEELING DOWN, DEPRESSED OR HOPELESS: 2

## 2023-01-30 ASSESSMENT — ENCOUNTER SYMPTOMS
VOMITING: 0
DIARRHEA: 1
NAUSEA: 0
SHORTNESS OF BREATH: 1

## 2023-01-30 ASSESSMENT — LIFESTYLE VARIABLES
HOW OFTEN DO YOU HAVE A DRINK CONTAINING ALCOHOL: MONTHLY OR LESS
HOW MANY STANDARD DRINKS CONTAINING ALCOHOL DO YOU HAVE ON A TYPICAL DAY: 1 OR 2

## 2023-01-30 NOTE — PATIENT INSTRUCTIONS
Increase O2 to 4L with improved O2 to 90-94% within 2-5 minutes  Encouraged to check with insurance regarding coverage of Tdap & Shingrix vaccines  Celebrated weight loss success but given lack of specific effort we need to investigate into underlying cause - specifically get preventative testing updated  Trial addition of cholestyramine powder daily to help bulk up stools  Praised cigarette reduction and correlated to her improved physical capacity with exertional activities and reduction in coughing as a result  Resume Mucinex 12 Hour twice daily  Maintain good compliance with nebulizer treatments  Keep scheduled appt with pulmonology tomorrow given decreased O2 levels noted today  Offered to refer her to counseling if/when she feels that she is willing and/or would benefit  Encouraged her to read through and fill out paperwork to designate power of  & end of life preferences - form given that she can fill out and will need to have witnessed by 2 non-family members    Discussed with the patient the current USPSTF guidelines released March 9, 2021 for screening for lung cancer. For adults aged 48 to [de-identified] years who have a 20 pack-year smoking history and currently smoke or have quit within the past 15 years the grade B recommendation is to:  Screen for lung cancer with low-dose computed tomography (LDCT) every year. Stop screening once a person has not smoked for 15 years or has a health problem that limits life expectancy or the ability to have lung surgery. Preventing Falls: Care Instructions  Overview     Getting around your home safely can be a challenge if you have injuries or health problems that make it easy for you to fall. Loose rugs and furniture in walkways are among the dangers for many older people who have problems walking or who have poor eyesight. People who have conditions such as arthritis, osteoporosis, or dementia also have to be careful not to fall.   You can make your home safer with a few simple measures. Follow-up care is a key part of your treatment and safety. Be sure to make and go to all appointments, and call your doctor if you are having problems. It's also a good idea to know your test results and keep a list of the medicines you take. How can you care for yourself at home? Taking care of yourself  Exercise regularly to improve your strength, muscle tone, and balance. Walk if you can. Swimming may be a good choice if you cannot walk easily. Have your vision and hearing checked each year or any time you notice a change. If you have trouble seeing and hearing, you might not be able to avoid objects and could lose your balance. Know the side effects of the medicines you take. Ask your doctor or pharmacist whether the medicines you take can affect your balance. Sleeping pills or sedatives can affect your balance. Limit the amount of alcohol you drink. Alcohol can impair your balance and other senses. Ask your doctor whether calluses or corns on your feet need to be removed. If you wear loose-fitting shoes because of calluses or corns, you can lose your balance and fall. Talk to your doctor if you have numbness in your feet. You may get dizzy if you do not drink enough water. To prevent dehydration, drink plenty of fluids. Choose water and other clear liquids. If you have kidney, heart, or liver disease and have to limit fluids, talk with your doctor before you increase the amount of fluids you drink. Preventing falls at home  Remove raised doorway thresholds, throw rugs, and clutter. Repair loose carpet or raised areas in the floor. Move furniture and electrical cords to keep them out of walking paths. Use nonskid floor wax, and wipe up spills right away, especially on ceramic tile floors. If you use a walker or cane, put rubber tips on it. If you use crutches, clean the bottoms of them regularly with an abrasive pad, such as steel wool.   Keep your house well lit, especially stairways, porches, and outside walkways. Use night-lights in areas such as hallways and bathrooms. Add extra light switches or use remote switches (such as switches that go on or off when you clap your hands) to make it easier to turn lights on if you have to get up during the night. Install sturdy handrails on stairways. Move items in your cabinets so that the things you use a lot are on the lower shelves (about waist level). Keep a cordless phone and a flashlight with new batteries by your bed. If possible, put a phone in each of the main rooms of your house, or carry a cell phone in case you fall and cannot reach a phone. Or, you can wear a device around your neck or wrist. You push a button that sends a signal for help. Wear low-heeled shoes that fit well and give your feet good support. Use footwear with nonskid soles. Check the heels and soles of your shoes for wear. Repair or replace worn heels or soles. Do not wear socks without shoes on smooth floors, such as wood. Walk on the grass when the sidewalks are slippery. If you live in an area that gets snow and ice in the winter, sprinkle salt on slippery steps and sidewalks. Or ask a family member or friend to do this for you. Preventing falls in the bath  Install grab bars and nonskid mats inside and outside your shower or tub and near the toilet and sinks. Use shower chairs and bath benches. Use a hand-held shower head that will allow you to sit while showering. Get into a tub or shower by putting the weaker leg in first. Get out of a tub or shower with your strong side first.  Repair loose toilet seats and consider installing a raised toilet seat to make getting on and off the toilet easier. Keep your bathroom door unlocked while you are in the shower. Where can you learn more? Go to http://www.jules.com/ and enter G117 to learn more about \"Preventing Falls: Care Instructions. \"  Current as of:  May 4, 2022 Content Version: 13.5  © 2298-4801 Healthwise, [employer]. Care instructions adapted under license by [facility] ([facility]). If you have questions about a medical condition or this instruction, always ask your healthcare professional. [address] any warranty or liability for your use of this information. Learning About Emotional Support  When do you need emotional support? You might find getting support from others helpful when you have a long-term health problem. Often people feel alone, confused, or scared when coping with an illness. But you aren't alone. Other people are going through the same thing you are and know how you feel. Talking with others about your feelings can help you feel better. Your family and friends can give you support. So can your doctor, a support group, or a Episcopalian. If you have a support network, you will not feel as alone. You will learn new ways to deal with your situation, and you may try harder to overcome it. Where you can get support  Family and friends: They can help you cope by giving you comfort and encouragement. Counseling: Professional counseling can help you cope with situations that interfere with your life and cause stress. Counseling can help you understand and deal with your illness. Your doctor: Find a doctor you trust and feel comfortable with. Be open and honest about your fears and concerns. Your doctor can help you get the right medical treatments, including counseling. Spiritual or Bahai groups: They can provide comfort and may be able to help you find counseling or other social support services. Social groups: They can help you meet new people and get involved in activities you enjoy. Community support groups: In a support group, you can talk to others who have dealt with the same problems or illness as you. You can encourage one another and learn ways to cope with tough emotions.   How to find a support group  Ask your doctor, counselor, or other health professional for suggestions. Contact your local Methodist, Hindu, Holiness, or other Yazidism group. Ask your family and friends. Ask people who have the same health concerns. Go online. Forums and blogs let you read messages from others and leave your own messages. You can exchange stories, vent your frustrations, and ask and answer questions. Contact a city, state, or national group that provides support for your health concerns. Your library or [employer] center may have a list of these groups. Or you can look for information online. Look for a support group that works for you. Ask yourself if you prefer structure and would like a , or if you would like a less formal group. Do you prefer face-to-face meetings? Or do you feel more secure in online chat rooms or forums? Supportive relationships  A supportive relationship includes emotional support such as love, trust, and understanding, as well as advice and concrete help, such as help managing your time. Reach out to others  Family and friends can help you. Ask them to:  Listen to you and give you encouragement. This can keep you from feeling hopeless or alone. Help with small daily tasks or with bigger problems. A helping hand can keep you from feeling overwhelmed. Help you manage a health problem. For example, ask them to go to doctor visits with you. Your loved ones can offer support by being involved in your medical care. Respect your relationships  A good relationship is also a two-way street. You count on help from others, but they also count on you. Know your friends' limits. You don't have to see or call your friends every day. If you are going through a rough patch, ask friends if you can contact them outside of the usual boundaries. Don't always complain or talk about yourself. Know when it's time to stop talking and listen or just enjoy your friend's company.   Know that good friends can be a bad influence. For example, if a friend encourages you to drink when you know it will harm you, you may want to end the friendship. Where can you learn more? Go to http://www.woods.com/ and enter G092 to learn more about \"Learning About Emotional Support. \"  Current as of: February 9, 2022               Content Version: 13.5  © 2006-2022 [employer]. Care instructions adapted under license by [facility] ([facility]). If you have questions about a medical condition or this instruction, always ask your healthcare professional. [address] any warranty or liability for your use of this information. Learning About Stress  What is stress? Stress is what you feel when you have to handle more than you are used to. Stress is a fact of life for most people, and it affects everyone differently. What causes stress for you may not be stressful for someone else. A lot of things can cause stress. You may feel stress when you go on a job interview, take a test, or run a race. This kind of short-term stress is normal and even useful. It can help you if you need to work hard or react quickly. For example, stress can help you finish an important job on time. Stress also can last a long time. Long-term stress is caused by stressful situations or events. Examples of long-term stress include long-term health problems, ongoing problems at work, or conflicts in your family. Long-term stress can harm your health. How does stress affect your health? When you are stressed, your body responds as though you are in danger. It makes hormones that speed up your heart, make you breathe faster, and give you a burst of energy. This is called the fight-or-flight stress response. If the stress is over quickly, your body goes back to normal and no harm is done. But if stress happens too often or lasts too long, it can have bad effects.  Long-term stress can make you more likely to get sick, and it can make symptoms of some diseases worse. If you tense up when you are stressed, you may develop neck, shoulder, or low back pain. Stress is linked to high blood pressure and heart disease. Stress also harms your emotional health. It can make you evangelista, tense, or depressed. Your relationships may suffer, and you may not do well at work or school. What can you do to manage stress? How to relax your mind   Write. It may help to write about things that are bothering you. This helps you find out how much stress you feel and what is causing it. When you know this, you can find better ways to cope. Let your feelings out. Talk, laugh, cry, and express anger when you need to. Talking with friends, family, a counselor, or a member of the clergy about your feelings is a healthy way to relieve stress. Do something you enjoy. For example, listen to music or go to a movie. Practice your hobby or do volunteer work. Meditate. This can help you relax, because you are not worrying about what happened before or what may happen in the future. Do guided imagery. Imagine yourself in any setting that helps you feel calm. You can use audiotapes, books, or a teacher to guide you. How to relax your body   Do something active. Exercise or activity can help reduce stress. Walking is a great way to get started. Even everyday activities such as housecleaning or yard work can help. Do breathing exercises. For example:  From a standing position, bend forward from the waist with your knees slightly bent. Let your arms dangle close to the floor. Breathe in slowly and deeply as you return to a standing position. Roll up slowly and lift your head last.  Hold your breath for just a few seconds in the standing position. Breathe out slowly and bend forward from the waist.  Try yoga or venus chi. These techniques combine exercise and meditation. You may need some training at first to learn them. What can you do to prevent stress?   Manage your time. This helps you find time to do the things you want and need to do. Get enough sleep. Your body recovers from the stresses of the day while you are sleeping. Get support. Your family, friends, and community can make a difference in how you experience stress. Where can you learn more? Go to http://www.jules.com/ and enter N032 to learn more about \"Learning About Stress. \"  Current as of: October 6, 2021               Content Version: 13.5  © 2006-2022 [employer]. Care instructions adapted under license by [facility] ([facility]). If you have questions about a medical condition or this instruction, always ask your healthcare professional. [address] any warranty or liability for your use of this information. Hearing Loss: Care Instructions  Overview     Hearing loss is a sudden or slow decrease in how well you hear. It can range from mild to severe. Permanent hearing loss can occur with aging. It also can happen when you are exposed long-term to loud noise. Examples include listening to loud music, riding motorcycles, or being around other loud machines. Hearing loss can affect your work and home life. It can make you feel lonely or depressed. You may feel that you have lost your independence. But hearing aids and other devices can help you hear better and feel connected to others. Follow-up care is a key part of your treatment and safety. Be sure to make and go to all appointments, and call your doctor if you are having problems. It's also a good idea to know your test results and keep a list of the medicines you take. How can you care for yourself at home? Avoid loud noises whenever possible. This helps keep your hearing from getting worse. Always wear hearing protection around loud noises. Wear a hearing aid as directed. See a professional who can help you pick a hearing aid that fits you. Have hearing tests as your doctor suggests.  They can show whether your hearing has changed. Your hearing aid may need to be adjusted.  Use other devices as needed. These may include:  Telephone amplifiers and hearing aids that can connect to a television, stereo, radio, or microphone.  Devices that use lights or vibrations. These alert you to the doorbell, a ringing telephone, or a baby monitor.  Television closed-captioning. This shows the words at the bottom of the screen. Most new TVs can do this.  TTY (text telephone). This lets you type messages back and forth on the telephone instead of talking or listening. These devices are also called TDD. When messages are typed on the keyboard, they are sent over the phone line to a receiving TTY. The message is shown on a monitor.  Use text messaging, social media, and email if it is hard for you to communicate by telephone.  Try to learn a listening technique called speechreading. It is not lipreading. You pay attention to people's gestures, expressions, posture, and tone of voice. These clues can help you understand what a person is saying. Face the person you are talking to, and have them face you. Make sure the lighting is good. You need to see the other person's face clearly.  Think about counseling if you need help to adjust to your hearing loss.  When should you call for help?  Watch closely for changes in your health, and be sure to contact your doctor if:    You think your hearing is getting worse.     You have new symptoms, such as dizziness or nausea.   Where can you learn more?  Go to https://www.[employer].net/patientEd and enter R798 to learn more about \"Hearing Loss: Care Instructions.\"  Current as of: May 4, 2022               Content Version: 13.5  © 3953-2815 [employer].   Care instructions adapted under license by [employer]. If you have questions about a medical condition or this instruction, always ask your healthcare professional. [employer] disclaims any warranty or  liability for your use of this information. Learning About Vision Tests  What are vision tests? The four most common vision tests are visual acuity tests, refraction, visual field tests, and color vision tests. Visual acuity (sharpness) tests  These tests are used: To see if you need glasses or contact lenses. To monitor an eye problem. To check an eye injury. Visual acuity tests are done as part of routine exams. You may also have this test when you get your 's license or apply for some types of jobs. Visual field tests  These tests are used: To check for vision loss in any area of your range of vision. To screen for certain eye diseases. To look for nerve damage after a stroke, head injury, or other problem that could reduce blood flow to the brain. Refraction and color tests  A refraction test is done to find the right prescription for glasses and contact lenses. A color vision test is done to check for color blindness. Color vision is often tested as part of a routine exam. You may also have this test when you apply for a job where recognizing different colors is important, such as , electronics, or the [employer]. How are vision tests done? Visual acuity test   You cover one eye at a time. You read aloud from a wall chart across the room. You read aloud from a small card that you hold in your hand. Refraction   You look into a special device. The device puts lenses of different strengths in front of each eye to see how strong your glasses or contact lenses need to be. Visual field tests   Your doctor may have you look through special machines. Or your doctor may simply have you stare straight ahead while they move a finger into and out of your field of vision. Color vision test   You look at pieces of printed test patterns in various colors. You say what number or symbol you see. Your doctor may have you trace the number or symbol using a pointer.   How do these tests feel? There is very little chance of having a problem from this test. If dilating drops are used for a vision test, they may make the eyes sting and cause a medicine taste in the mouth. Follow-up care is a key part of your treatment and safety. Be sure to make and go to all appointments, and call your doctor if you are having problems. It's also a good idea to know your test results and keep a list of the medicines you take. Where can you learn more? Go to http://www.jules.com/ and enter G551 to learn more about \"Learning About Vision Tests. \"  Current as of: October 12, 2022               Content Version: 13.5  © 2006-2022 Healthwise, [employer]. Care instructions adapted under license by [facility] ([facility]). If you have questions about a medical condition or this instruction, always ask your healthcare professional. [place] [address] any warranty or liability for your use of this information. Learning About Activities of Daily Living  What are activities of daily living? Activities of daily living (ADLs) are the basic self-care tasks you do every day. As you age, and if you have health problems, you may find that it's harder to do these things for yourself. That's when you may need some help. Your doctor uses ADLs to measure how much help you need. Knowing what you can and can't do for yourself is an important first step to getting help. And when you have the help you need, you can stay as independent as possible. Your doctor will want to know if you are able to do tasks such as: Take a bath or shower without help. Go to the bathroom by yourself. Dress and undress without help. Shave, comb your hair, and brush teeth on your own. Get in and out of bed or a chair without help. Feed yourself without help. If you are having trouble doing basic self-care tasks, talk with your doctor.  You may want to bring a caregiver or family member who can help the doctor understand your needs and abilities. How will a doctor assess your ADLs? Asking about ADLs is part of a routine health checkup your doctor will likely do as you age. Your health check might be done in a doctor's office, in your home, or at a hospital. The goal is to find out if you are having any problems that could make your health problems worse or that make it unsafe for you to be on your own. To measure your ADLs, your doctor will ask how hard it is for you to do routine tasks. He or she may also want to know if you have changed the way you do a task because of a health problem. He or she may watch how you:  Walk back and forth. Keep your balance while you stand or walk. Move from sitting to standing or from a bed to a chair. Button or unbutton a shirt or sweater. Remove and put on your shoes. It's normal to feel a little worried or anxious if you find you can't do all the things you used to be able to do. Talking with your doctor about ADLs isn't a test that you either pass or fail. It's just a way to get more information about your health and safety. Follow-up care is a key part of your treatment and safety. Be sure to make and go to all appointments, and call your doctor if you are having problems. It's also a good idea to know your test results and keep a list of the medicines you take. Current as of: October 6, 2021               Content Version: 13.5  © 2006-2022 Healthwise, [employer]. Care instructions adapted under license by [facility] ([facility]). If you have questions about a medical condition or this instruction, always ask your healthcare professional. [place] [address] any warranty or liability for your use of this information. Advance Directives: Care Instructions  Overview  An advance directive is a legal way to state your wishes at the end of your life. It tells your family and your doctor what to do if you can't say what you want.   There are two main types of advance directives. You can change them any time your wishes change. Living will. This form tells your family and your doctor your wishes about life support and other treatment. The form is also called a declaration. Medical power of . This form lets you name a person to make treatment decisions for you when you can't speak for yourself. This person is called a health care agent (health care proxy, health care surrogate). The form is also called a durable power of  for health care. If you do not have an advance directive, decisions about your medical care may be made by a family member, or by a doctor or a  who doesn't know you. It may help to think of an advance directive as a gift to the people who care for you. If you have one, they won't have to make tough decisions by themselves. For more information, including forms for your state, see the [address] website (www.caringinfo.org/planning/advance-directives/). Follow-up care is a key part of your treatment and safety. Be sure to make and go to all appointments, and call your doctor if you are having problems. It's also a good idea to know your test results and keep a list of the medicines you take. What should you include in an advance directive? Many states have a unique advance directive form. (It may ask you to address specific issues.) Or you might use a universal form that's approved by many states. If your form doesn't tell you what to address, it may be hard to know what to include in your advance directive. Use the questions below to help you get started. Who do you want to make decisions about your medical care if you are not able to? What life-support measures do you want if you have a serious illness that gets worse over time or can't be cured? What are you most afraid of that might happen?  (Maybe you're afraid of having pain, losing your independence, or being kept alive by machines.)  Where would you prefer to die? (Your home? A hospital? A nursing home?)  Do you want to donate your organs when you die? Do you want certain Faith practices performed before you die? When should you call for help? Be sure to contact your doctor if you have any questions. Where can you learn more? Go to http://www.jules.com/ and enter R264 to learn more about \"Advance Directives: Care Instructions. \"  Current as of: June 16, 2022               Content Version: 13.5  © 1500-7460 Healthwise, [employer]. Care instructions adapted under license by [facility] ([facility]). If you have questions about a medical condition or this instruction, always ask your healthcare professional. [place] [address] any warranty or liability for your use of this information. Personalized Preventive Plan for [place] - 1/30/2023  Medicare offers a range of preventive health benefits. Some of the tests and screenings are paid in full while other may be subject to a deductible, co-insurance, and/or copay. Some of these benefits include a comprehensive review of your medical history including lifestyle, illnesses that may run in your family, and various assessments and screenings as appropriate. After reviewing your medical record and screening and assessments performed today your provider may have ordered immunizations, labs, imaging, and/or referrals for you. A list of these orders (if applicable) as well as your Preventive Care list are included within your After Visit Summary for your review. Other Preventive Recommendations:    A preventive eye exam performed by an eye specialist is recommended every 1-2 years to screen for glaucoma; cataracts, macular degeneration, and other eye disorders. A preventive dental visit is recommended every 6 months. Try to get at least 150 minutes of exercise per week or 10,000 steps per day on a pedometer .   Order or download the FREE \"Exercise & Physical Activity: Your Everyday Guide\" from [employer]. Call 5-870.894.6231 or search [employer] online. You need 2932-6033 mg of calcium and 5150-8761 IU of vitamin D per day. It is possible to meet your calcium requirement with diet alone, but a vitamin D supplement is usually necessary to meet this goal.  When exposed to the sun, use a sunscreen that protects against both UVA and UVB radiation with an SPF of 30 or greater. Reapply every 2 to 3 hours or after sweating, drying off with a towel, or swimming. Always wear a seat belt when traveling in a car. Always wear a helmet when riding a bicycle or motorcycle. Learning About Lung Cancer Screening  What is screening for lung cancer? Lung cancer screening is a way to find some lung cancers early, before a person has any symptoms of the cancer. Lung cancer screening may help those who have the highest risk for lung cancer--people age 48 and older who are or were heavy smokers. For most people, who aren't at increased risk, screening for lung cancer probably isn't helpful. Screening won't prevent cancer. And it may not find all lung cancers. Lung cancer screening may lower the risk of dying from lung cancer in a small number of people. How is it done? Lung cancer screening is done with a low-dose CT (computed tomography) scan. A CT scan uses X-rays, or radiation, to make detailed pictures of your body. Experts recommend that screening be done in medical centers that focus on finding and treating lung cancer. Who is screening recommended for? Lung cancer screening is recommended for people age 48 and older who are or were heavy smokers. That means people with a smoking history of at least 20 pack years. A pack year is a way to measure how heavy a smoker you are or were. To figure out your pack years, multiply how many packs a day on average (assuming 20 cigarettes per pack) you have smoked by how many years you have smoked.  For example:  If you smoked 1 pack a day for 20 years, that's 1 times 20. So you have a smoking history of 20 pack years.  If you smoked 2 packs a day for 10 years, that's 2 times 10. So you have a smoking history of 20 pack years.  Experts agree that screening is for people who have a high risk of lung cancer. But experts don't agree on what high risk means. Some say people age 50 or older with at least a 20-pack-year smoking history are high risk. Others say it's people age 55 or older with a 30-pack-year history.  To see if you could benefit from screening, first find out if you are at high risk for lung cancer. Your doctor can help you decide your lung cancer risk.  What are the risks of screening?  CT screening for lung cancer isn't perfect. It can show an abnormal result when it turns out there wasn't any cancer. This is called a false-positive result. This means you may need more tests to make sure you don't have cancer. These tests can be harmful and cause a lot of worry.  These tests may include more CT scans and invasive testing like a lung biopsy. In a biopsy, the doctor takes a sample of tissue from inside your lung so it can be looked at under a microscope. A biopsy is the only way to tell if you have lung cancer. If the biopsy finds cancer, you and your doctor will have to decide how or whether to treat it.  Some lung cancers found on CT scans are harmless and would not have caused a problem if they had not been found through screening. But because doctors can't tell which ones will turn out to be harmless, most will be treated. This means that you may get treatment--including surgery, radiation, or chemotherapy--that you don't need.  There is a risk of damage to cells or tissue from being exposed to radiation, including the small amounts used in CTs, X-rays, and other medical tests. Over time, exposure to radiation may cause cancer and other health problems. But in most cases, the risk of getting cancer  from being exposed to small amounts of radiation is low. It's not a reason to avoid these tests for most people. What are the benefits of screening? Your scan may be normal (negative). For some people who are at higher risk, screening lowers the chance of dying of lung cancer. How much and how long you smoked helps to determine your risk level. Screening can find some cancers early, when treatment may be more likely to work. What happens after screening? The results of your CT scan will be sent to your doctor. Someone from your care team will explain the results of your scan and answer any questions you may have. If you need any follow-up, he or she will help you understand what to do next. After a lung cancer screening, you can go back to your usual activities right away. A lung cancer screening test can't tell if you have lung cancer. If your results are positive, your doctor can't tell whether an abnormal finding is a harmless nodule, cancer, or something else without doing more tests. What can you do to help prevent lung cancer? Some lung cancers can't be prevented. But if you smoke, quitting smoking is the best step you can take to prevent lung cancer. If you want to quit, your doctor can recommend medicines or other ways to help. Follow-up care is a key part of your treatment and safety. Be sure to make and go to all appointments, and call your doctor if you are having problems. It's also a good idea to know your test results and keep a list of the medicines you take. Where can you learn more? Go to http://www.jules.com/ and enter Q940 to learn more about \"Learning About Lung Cancer Screening. \"  Current as of: May 4, 2022               Content Version: 13.5  © 4507-9182 Healthwise, [employer]. Care instructions adapted under license by [address]  If you have questions about a medical condition or this instruction, always ask your healthcare professional. [employer] disclaims any warranty or liability for your use of this information. pneumococcal 20-valent conjugate vaccine  Pronunciation: TYSON VO al 20-VAY angel saab VAX een  Brand: Prevnar 20  What is the most important information I should know about pneumococcal 20-valent conjugate vaccine? You should not receive this vaccine if you ever had a severe allergic reactionto a pneumococcal or diphtheria toxoid vaccine. What is pneumococcal 20-valent conjugate vaccine? Pneumococcal disease is a serious infection caused by a bacteria that can infect the sinuses, inner ear, lungs, blood, and brain. These conditions can befatal.  Pneumococcal 20-valent conjugate vaccine is used in adults to help prevent disease caused by pneumococcal bacteria. This vaccine contains 20 differenttypes of pneumococcal bacteria. This vaccine helps your body develop immunity to the disease, but will nottreat an active infection you already have. Like any vaccine, pneumococcal 20-valent conjugate vaccine may not provideprotection from disease in every person. What should I discuss with my healthcare provider before taking pneumococcal 20-valent conjugate vaccine? You should not receive this vaccine if you ever had a severe allergic reactionto a pneumococcal or diphtheria toxoid vaccine. Tell the vaccination provider if you have:  a weak immune system (caused by disease or by using certain medicine); or  if you are receiving radiation or chemotherapy. You can still receive a vaccine if you have a minor cold. In the case of a more severe illness with a fever or any type of infection, wait until you get betterbefore receiving this vaccine. Tell the vaccination provider if you are pregnant or breastfeeding. How should I take pneumococcal 20-valent conjugate vaccine? This vaccine is given as an injection (shot) into a muscle. Pneumococcal 20-valent conjugate vaccine is usually given as 1 shot.   What happens if I miss a dose?  Pneumococcal 20-valent conjugate vaccine is used as a single dose and does nothave a booster schedule. What happens if I overdose? An overdose of this vaccine is unlikely to occur. What should I avoid while taking pneumococcal 20-valent conjugate vaccine? Follow your doctor's instructions about any restrictions on food, beverages, oractivity. What are the possible side effects of pneumococcal 20-valent conjugate vaccine? Get emergency medical help if you have signs of an allergic reaction: hives; difficult breathing; swelling of your face, lips, tongue, or throat. Keep track of all side effects you have. If you ever need another pneumococcal 20-valent conjugate vaccine, you will need to tell the vaccination provider ifthe previous shot caused any side effects. Becoming infected with pneumococcal disease is much more dangerous to your health than receiving this vaccine. However, like any medicine, this vaccinecan cause side effects but the risk of serious side effects is low. Common side effects may include:  pain or swelling where a shot was given;  muscle or joint pain;  headache; or  feeling tired. This is not a complete list of side effects and others may occur. Call your doctor for medical advice about side effects. You may report vaccine sideeffects to the [address] [place] [address] [place] at [phone]. What other drugs will affect pneumococcal 20-valent conjugate vaccine? Tell the vaccination provider if you have recently received drugs or treatmentsthat can weaken the immune system, including:  steroid medicine;  medications to treat psoriasis, rheumatoid arthritis, or other autoimmune disorders; or  medicines to treat or prevent organ transplant rejection. This list is not complete. Other drugs may affect pneumococcal 20-valent conjugate vaccine, including prescription and over-the-counter medicines, vitamins, and herbal products.  Not all possible drug interactions are listedhere. Where can I get more information? Your vaccination provider, pharmacist, or doctor can provide more information about this vaccine. Additional information is available from your local [facility] or the Centers for Disease Control and Prevention. Remember, keep this and all other medicines out of the reach of children, never share your medicines with others, and use this medication only for the indication prescribed. Every effort has been made to ensure that the information provided by [address] is accurate, up-to-date, and complete, but no guarantee is made to that effect. Drug information contained herein may be time sensitive. [facility] information has been compiled for use by healthcare practitioners and consumers in the [place] and therefore [facility] does not warrant that uses outside of the [place] are appropriate, unless specifically indicated otherwise. [facility]'s drug information does not endorse drugs, diagnose patients or recommend therapy. [facility]'s drug information is an informational resource designed to assist licensed healthcare practitioners in caring for their patients and/or to serve consumers viewing this service as a supplement to, and not a substitute for, the expertise, skill, knowledge and judgment of healthcare practitioners. The absence of a warning for a given drug or drug combination in no way should be construed to indicate that the drug or drug combination is safe, effective or appropriate for any given patient. [facility] does not assume any responsibility for any aspect of healthcare administered with the aid of information [facility] provides. The information contained herein is not intended to cover all possible uses, directions, precautions, warnings, drug interactions, allergic reactions, or adverse effects.  If you have questions about the drugs you are taking, check with yourdoctor, nurse or pharmacist.  Copyright 0628-5906 [employer] Version: 1.02. Revision date: 3/4/2022. Care instructions adapted under license by [facility] ([facility]). If you have questions about a medical condition or this instruction, always ask your healthcare professional. [address] any warranty or liability for your use of this information.

## 2023-01-30 NOTE — PROGRESS NOTES
Medicare Annual Wellness Visit    Lien Obrien is here for Medicare AWV and Follow-up (Diabetes, Hypertension, Hyperlipidemia, Obesity, Microcytosis)    Assessment & Plan   Medicare annual wellness visit, subsequent  Type 2 diabetes mellitus with microalbuminuria, without long-term current use of insulin (HCC)  -     metFORMIN (GLUCOPHAGE) 500 MG tablet; Take 1 tablet by mouth 2 times daily (with meals), Disp-60 tablet, R-5Normal  -     Comprehensive Metabolic Panel; Future  -     Hemoglobin A1C; Future  Screening for colon cancer  -     Cologuard (Fecal DNA Colorectal Cancer Screening)  Body mass index (BMI) 50.0-59.9, adult (HCC)  Screening mammogram for breast cancer  -     KANE DIGITAL SCREEN W OR WO CAD BILATERAL; Future  Personal history of tobacco use  -     SD VISIT TO DISCUSS LUNG CA SCREEN W LDCT  -     CT Lung Screen (Annual/Baseline); Future  Chronic obstructive pulmonary disease with (acute) exacerbation (HCC)  -     Pneumococcal, PCV20, PREVNAR 20, (age 25 yrs+), IM, PF  -     guaiFENesin 1200 MG TB12; Take 1 tablet by mouth in the morning and at bedtime, Disp-60 tablet, R-5Normal  Need for pneumococcal vaccine  -     Pneumococcal, PCV20, PREVNAR 20, (age 25 yrs+), IM, PF  Hyperlipidemia LDL goal <100  -     atorvastatin (LIPITOR) 10 MG tablet; Take 1 tablet by mouth at bedtime, Disp-30 tablet, R-5Normal  -     Comprehensive Metabolic Panel; Future  -     Lipid Panel; Future  Essential (primary) hypertension  -     Comprehensive Metabolic Panel; Future  Recent unexplained weight loss  Post-cholecystectomy syndrome  -     cholestyramine (QUESTRAN) 4 g packet;  Take 1 packet by mouth daily, Disp-90 packet, R-3Normal  Chronic respiratory failure with hypoxia (HCC)  Microcytosis  -     CBC with Auto Differential; Future          Patient Instructions   Increase O2 to 4L with improved O2 to 90-94% within 2-5 minutes  Encouraged to check with insurance regarding coverage of Tdap & Shingrix vaccines  Celebrated weight loss success but given lack of specific effort we need to investigate into underlying cause - specifically get preventative testing updated  Trial addition of cholestyramine powder daily to help bulk up stools  Praised cigarette reduction and correlated to her improved physical capacity with exertional activities and reduction in coughing as a result  Resume Mucinex 12 Hour twice daily  Maintain good compliance with nebulizer treatments  Keep scheduled appt with pulmonology tomorrow given decreased O2 levels noted today  Offered to refer her to counseling if/when she feels that she is willing and/or would benefit  Encouraged her to read through and fill out paperwork to designate power of  & end of life preferences - form given that she can fill out and will need to have witnessed by 2 non-family members    Discussed with the patient the current USPSTF guidelines released March 9, 2021 for screening for lung cancer. For adults aged 48 to [de-identified] years who have a 20 pack-year smoking history and currently smoke or have quit within the past 15 years the grade B recommendation is to:  Screen for lung cancer with low-dose computed tomography (LDCT) every year. Stop screening once a person has not smoked for 15 years or has a health problem that limits life expectancy or the ability to have lung surgery. Preventing Falls: Care Instructions  Overview     Getting around your home safely can be a challenge if you have injuries or health problems that make it easy for you to fall. Loose rugs and furniture in walkways are among the dangers for many older people who have problems walking or who have poor eyesight. People who have conditions such as arthritis, osteoporosis, or dementia also have to be careful not to fall. You can make your home safer with a few simple measures. Follow-up care is a key part of your treatment and safety.  Be sure to make and go to all appointments, and call your doctor if you are having problems. It's also a good idea to know your test results and keep a list of the medicines you take. How can you care for yourself at home? Taking care of yourself  Exercise regularly to improve your strength, muscle tone, and balance. Walk if you can. Swimming may be a good choice if you cannot walk easily. Have your vision and hearing checked each year or any time you notice a change. If you have trouble seeing and hearing, you might not be able to avoid objects and could lose your balance. Know the side effects of the medicines you take. Ask your doctor or pharmacist whether the medicines you take can affect your balance. Sleeping pills or sedatives can affect your balance. Limit the amount of alcohol you drink. Alcohol can impair your balance and other senses. Ask your doctor whether calluses or corns on your feet need to be removed. If you wear loose-fitting shoes because of calluses or corns, you can lose your balance and fall. Talk to your doctor if you have numbness in your feet. You may get dizzy if you do not drink enough water. To prevent dehydration, drink plenty of fluids. Choose water and other clear liquids. If you have kidney, heart, or liver disease and have to limit fluids, talk with your doctor before you increase the amount of fluids you drink. Preventing falls at home  Remove raised doorway thresholds, throw rugs, and clutter. Repair loose carpet or raised areas in the floor. Move furniture and electrical cords to keep them out of walking paths. Use nonskid floor wax, and wipe up spills right away, especially on ceramic tile floors. If you use a walker or cane, put rubber tips on it. If you use crutches, clean the bottoms of them regularly with an abrasive pad, such as steel wool. Keep your house well lit, especially stairways, porches, and outside walkways. Use night-lights in areas such as hallways and bathrooms.  Add extra light switches or use remote switches (such as switches that go on or off when you clap your hands) to make it easier to turn lights on if you have to get up during the night. Install sturdy handrails on stairways. Move items in your cabinets so that the things you use a lot are on the lower shelves (about waist level). Keep a cordless phone and a flashlight with new batteries by your bed. If possible, put a phone in each of the main rooms of your house, or carry a cell phone in case you fall and cannot reach a phone. Or, you can wear a device around your neck or wrist. You push a button that sends a signal for help. Wear low-heeled shoes that fit well and give your feet good support. Use footwear with nonskid soles. Check the heels and soles of your shoes for wear. Repair or replace worn heels or soles. Do not wear socks without shoes on smooth floors, such as wood. Walk on the grass when the sidewalks are slippery. If you live in an area that gets snow and ice in the winter, sprinkle salt on slippery steps and sidewalks. Or ask a family member or friend to do this for you. Preventing falls in the bath  Install grab bars and nonskid mats inside and outside your shower or tub and near the toilet and sinks. Use shower chairs and bath benches. Use a hand-held shower head that will allow you to sit while showering. Get into a tub or shower by putting the weaker leg in first. Get out of a tub or shower with your strong side first.  Repair loose toilet seats and consider installing a raised toilet seat to make getting on and off the toilet easier. Keep your bathroom door unlocked while you are in the shower. Where can you learn more? Go to http://www.jules.com/ and enter G117 to learn more about \"Preventing Falls: Care Instructions. \"  Current as of: May 4, 2022               Content Version: 13.5  © 8358-9769 Healthwise, [employer]. Care instructions adapted under license by [facility] ([facility]).  If you have questions about a medical condition or this instruction, always ask your healthcare professional. [address] any warranty or liability for your use of this information. Learning About Emotional Support  When do you need emotional support? You might find getting support from others helpful when you have a long-term health problem. Often people feel alone, confused, or scared when coping with an illness. But you aren't alone. Other people are going through the same thing you are and know how you feel. Talking with others about your feelings can help you feel better. Your family and friends can give you support. So can your doctor, a support group, or a Faith. If you have a support network, you will not feel as alone. You will learn new ways to deal with your situation, and you may try harder to overcome it. Where you can get support  Family and friends: They can help you cope by giving you comfort and encouragement. Counseling: Professional counseling can help you cope with situations that interfere with your life and cause stress. Counseling can help you understand and deal with your illness. Your doctor: Find a doctor you trust and feel comfortable with. Be open and honest about your fears and concerns. Your doctor can help you get the right medical treatments, including counseling. Spiritual or Sikh groups: They can provide comfort and may be able to help you find counseling or other social support services. Social groups: They can help you meet new people and get involved in activities you enjoy. Community support groups: In a support group, you can talk to others who have dealt with the same problems or illness as you. You can encourage one another and learn ways to cope with tough emotions. How to find a support group  Ask your doctor, counselor, or other health professional for suggestions. Contact your local Faith, Catholic, Yazidi, or other Sikh group.   Ask your family and friends. Ask people who have the same health concerns. Go online. Forums and blogs let you read messages from others and leave your own messages. You can exchange stories, vent your frustrations, and ask and answer questions. Contact a city, state, or national group that provides support for your health concerns. Your library or [employer] center may have a list of these groups. Or you can look for information online. Look for a support group that works for you. Ask yourself if you prefer structure and would like a , or if you would like a less formal group. Do you prefer face-to-face meetings? Or do you feel more secure in online chat rooms or forums? Supportive relationships  A supportive relationship includes emotional support such as love, trust, and understanding, as well as advice and concrete help, such as help managing your time. Reach out to others  Family and friends can help you. Ask them to:  Listen to you and give you encouragement. This can keep you from feeling hopeless or alone. Help with small daily tasks or with bigger problems. A helping hand can keep you from feeling overwhelmed. Help you manage a health problem. For example, ask them to go to doctor visits with you. Your loved ones can offer support by being involved in your medical care. Respect your relationships  A good relationship is also a two-way street. You count on help from others, but they also count on you. Know your friends' limits. You don't have to see or call your friends every day. If you are going through a rough patch, ask friends if you can contact them outside of the usual boundaries. Don't always complain or talk about yourself. Know when it's time to stop talking and listen or just enjoy your friend's company. Know that good friends can be a bad influence. For example, if a friend encourages you to drink when you know it will harm you, you may want to end the friendship.   Where can you learn more? Go to http://www.woods.com/ and enter G092 to learn more about \"Learning About Emotional Support. \"  Current as of: February 9, 2022               Content Version: 13.5  © 2006-2022 [email or web address]. Care instructions adapted under license by [facility] ([facility]). If you have questions about a medical condition or this instruction, always ask your healthcare professional. [address] any warranty or liability for your use of this information. Learning About Stress  What is stress? Stress is what you feel when you have to handle more than you are used to. Stress is a fact of life for most people, and it affects everyone differently. What causes stress for you may not be stressful for someone else. A lot of things can cause stress. You may feel stress when you go on a job interview, take a test, or run a race. This kind of short-term stress is normal and even useful. It can help you if you need to work hard or react quickly. For example, stress can help you finish an important job on time. Stress also can last a long time. Long-term stress is caused by stressful situations or events. Examples of long-term stress include long-term health problems, ongoing problems at work, or conflicts in your family. Long-term stress can harm your health. How does stress affect your health? When you are stressed, your body responds as though you are in danger. It makes hormones that speed up your heart, make you breathe faster, and give you a burst of energy. This is called the fight-or-flight stress response. If the stress is over quickly, your body goes back to normal and no harm is done. But if stress happens too often or lasts too long, it can have bad effects. Long-term stress can make you more likely to get sick, and it can make symptoms of some diseases worse. If you tense up when you are stressed, you may develop neck, shoulder, or low back pain.  Stress is linked to high blood pressure and heart disease. Stress also harms your emotional health. It can make you evangelista, tense, or depressed. Your relationships may suffer, and you may not do well at work or school. What can you do to manage stress? How to relax your mind   Write. It may help to write about things that are bothering you. This helps you find out how much stress you feel and what is causing it. When you know this, you can find better ways to cope. Let your feelings out. Talk, laugh, cry, and express anger when you need to. Talking with friends, family, a counselor, or a member of the clergy about your feelings is a healthy way to relieve stress. Do something you enjoy. For example, listen to music or go to a movie. Practice your hobby or do volunteer work. Meditate. This can help you relax, because you are not worrying about what happened before or what may happen in the future. Do guided imagery. Imagine yourself in any setting that helps you feel calm. You can use audiotapes, books, or a teacher to guide you. How to relax your body   Do something active. Exercise or activity can help reduce stress. Walking is a great way to get started. Even everyday activities such as housecleaning or yard work can help. Do breathing exercises. For example:  From a standing position, bend forward from the waist with your knees slightly bent. Let your arms dangle close to the floor. Breathe in slowly and deeply as you return to a standing position. Roll up slowly and lift your head last.  Hold your breath for just a few seconds in the standing position. Breathe out slowly and bend forward from the waist.  Try yoga or venus chi. These techniques combine exercise and meditation. You may need some training at first to learn them. What can you do to prevent stress? Manage your time. This helps you find time to do the things you want and need to do. Get enough sleep.  Your body recovers from the stresses of the day while you are sleeping. Get support. Your family, friends, and community can make a difference in how you experience stress. Where can you learn more? Go to http://www.jules.com/ and enter N032 to learn more about \"Learning About Stress. \"  Current as of: October 6, 2021               Content Version: 13.5  © 2006-2022 [employer]. Care instructions adapted under license by [facility] ([facility]). If you have questions about a medical condition or this instruction, always ask your healthcare professional. [address] any warranty or liability for your use of this information. Hearing Loss: Care Instructions  Overview     Hearing loss is a sudden or slow decrease in how well you hear. It can range from mild to severe. Permanent hearing loss can occur with aging. It also can happen when you are exposed long-term to loud noise. Examples include listening to loud music, riding motorcycles, or being around other loud machines. Hearing loss can affect your work and home life. It can make you feel lonely or depressed. You may feel that you have lost your independence. But hearing aids and other devices can help you hear better and feel connected to others. Follow-up care is a key part of your treatment and safety. Be sure to make and go to all appointments, and call your doctor if you are having problems. It's also a good idea to know your test results and keep a list of the medicines you take. How can you care for yourself at home? Avoid loud noises whenever possible. This helps keep your hearing from getting worse. Always wear hearing protection around loud noises. Wear a hearing aid as directed. See a professional who can help you pick a hearing aid that fits you. Have hearing tests as your doctor suggests. They can show whether your hearing has changed. Your hearing aid may need to be adjusted. Use other devices as needed.  These may include:  Telephone amplifiers and hearing aids that can connect to a television, stereo, radio, or microphone. Devices that use lights or vibrations. These alert you to the doorbell, a ringing telephone, or a baby monitor. Television closed-captioning. This shows the words at the bottom of the screen. Most new TVs can do this. TTY (text telephone). This lets you type messages back and forth on the telephone instead of talking or listening. These devices are also called TDD. When messages are typed on the keyboard, they are sent over the phone line to a receiving TTY. The message is shown on a monitor. Use text messaging, social media, and email if it is hard for you to communicate by telephone. Try to learn a listening technique called speechreading. It is not lipreading. You pay attention to people's gestures, expressions, posture, and tone of voice. These clues can help you understand what a person is saying. Face the person you are talking to, and have them face you. Make sure the lighting is good. You need to see the other person's face clearly. Think about counseling if you need help to adjust to your hearing loss. When should you call for help? Watch closely for changes in your health, and be sure to contact your doctor if:    You think your hearing is getting worse. You have new symptoms, such as dizziness or nausea. Where can you learn more? Go to http://www.jules.com/ and enter R798 to learn more about \"Hearing Loss: Care Instructions. \"  Current as of: May 4, 2022               Content Version: 13.5  © 2006-2022 Healthwise[employer]. Care instructions adapted under license by [facility] ([facility]). If you have questions about a medical condition or this instruction, always ask your healthcare professional. [place] [address] any warranty or liability for your use of this information. Learning About Vision Tests  What are vision tests?      The four most common vision tests are visual acuity tests, refraction, visual field tests, and color vision tests. Visual acuity (sharpness) tests  These tests are used: To see if you need glasses or contact lenses. To monitor an eye problem. To check an eye injury. Visual acuity tests are done as part of routine exams. You may also have this test when you get your 's license or apply for some types of jobs. Visual field tests  These tests are used: To check for vision loss in any area of your range of vision. To screen for certain eye diseases. To look for nerve damage after a stroke, head injury, or other problem that could reduce blood flow to the brain. Refraction and color tests  A refraction test is done to find the right prescription for glasses and contact lenses. A color vision test is done to check for color blindness. Color vision is often tested as part of a routine exam. You may also have this test when you apply for a job where recognizing different colors is important, such as , electronics, or the [employer]. How are vision tests done? Visual acuity test   You cover one eye at a time. You read aloud from a wall chart across the room. You read aloud from a small card that you hold in your hand. Refraction   You look into a special device. The device puts lenses of different strengths in front of each eye to see how strong your glasses or contact lenses need to be. Visual field tests   Your doctor may have you look through special machines. Or your doctor may simply have you stare straight ahead while they move a finger into and out of your field of vision. Color vision test   You look at pieces of printed test patterns in various colors. You say what number or symbol you see. Your doctor may have you trace the number or symbol using a pointer. How do these tests feel?   There is very little chance of having a problem from this test. If dilating drops are used for a vision test, they may make the eyes sting and cause a medicine taste in the mouth. Follow-up care is a key part of your treatment and safety. Be sure to make and go to all appointments, and call your doctor if you are having problems. It's also a good idea to know your test results and keep a list of the medicines you take. Where can you learn more? Go to http://www.jules.com/ and enter G551 to learn more about \"Learning About Vision Tests. \"  Current as of: October 12, 2022               Content Version: 13.5  © 0880-5981 Healthwise, [employer]. Care instructions adapted under license by [facility] ([facility]). If you have questions about a medical condition or this instruction, always ask your healthcare professional. [address] any warranty or liability for your use of this information. Learning About Activities of Daily Living  What are activities of daily living? Activities of daily living (ADLs) are the basic self-care tasks you do every day. As you age, and if you have health problems, you may find that it's harder to do these things for yourself. That's when you may need some help. Your doctor uses ADLs to measure how much help you need. Knowing what you can and can't do for yourself is an important first step to getting help. And when you have the help you need, you can stay as independent as possible. Your doctor will want to know if you are able to do tasks such as: Take a bath or shower without help. Go to the bathroom by yourself. Dress and undress without help. Shave, comb your hair, and brush teeth on your own. Get in and out of bed or a chair without help. Feed yourself without help. If you are having trouble doing basic self-care tasks, talk with your doctor. You may want to bring a caregiver or family member who can help the doctor understand your needs and abilities. How will a doctor assess your ADLs?   Asking about ADLs is part of a routine health checkup your doctor will likely do as you age. Your health check might be done in a doctor's office, in your home, or at a hospital. The goal is to find out if you are having any problems that could make your health problems worse or that make it unsafe for you to be on your own. To measure your ADLs, your doctor will ask how hard it is for you to do routine tasks. He or she may also want to know if you have changed the way you do a task because of a health problem. He or she may watch how you:  Walk back and forth. Keep your balance while you stand or walk. Move from sitting to standing or from a bed to a chair. Button or unbutton a shirt or sweater. Remove and put on your shoes. It's normal to feel a little worried or anxious if you find you can't do all the things you used to be able to do. Talking with your doctor about ADLs isn't a test that you either pass or fail. It's just a way to get more information about your health and safety. Follow-up care is a key part of your treatment and safety. Be sure to make and go to all appointments, and call your doctor if you are having problems. It's also a good idea to know your test results and keep a list of the medicines you take. Current as of: October 6, 2021               Content Version: 13.5  © 2006-2022 Healthwise, Incorporated. Care instructions adapted under license by [facility] ([facility]). If you have questions about a medical condition or this instruction, always ask your healthcare professional. [place] [address] any warranty or liability for your use of this information. Advance Directives: Care Instructions  Overview  An advance directive is a legal way to state your wishes at the end of your life. It tells your family and your doctor what to do if you can't say what you want. There are two main types of advance directives. You can change them any time your wishes change. Living will.   This form tells your family and your doctor your wishes about life support and other treatment. The form is also called a declaration. Medical power of . This form lets you name a person to make treatment decisions for you when you can't speak for yourself. This person is called a health care agent (health care proxy, health care surrogate). The form is also called a durable power of  for health care. If you do not have an advance directive, decisions about your medical care may be made by a family member, or by a doctor or a  who doesn't know you. It may help to think of an advance directive as a gift to the people who care for you. If you have one, they won't have to make tough decisions by themselves. For more information, including forms for your state, see the [address] website (www.caringinfo.org/planning/advance-directives/). Follow-up care is a key part of your treatment and safety. Be sure to make and go to all appointments, and call your doctor if you are having problems. It's also a good idea to know your test results and keep a list of the medicines you take. What should you include in an advance directive? Many states have a unique advance directive form. (It may ask you to address specific issues.) Or you might use a universal form that's approved by many states. If your form doesn't tell you what to address, it may be hard to know what to include in your advance directive. Use the questions below to help you get started. Who do you want to make decisions about your medical care if you are not able to? What life-support measures do you want if you have a serious illness that gets worse over time or can't be cured? What are you most afraid of that might happen? (Maybe you're afraid of having pain, losing your independence, or being kept alive by machines.)  Where would you prefer to die? (Your home? A hospital? A nursing home?)  Do you want to donate your organs when you die?   Do you want certain Restoration practices performed before you die? When should you call for help? Be sure to contact your doctor if you have any questions. Where can you learn more? Go to http://www.jules.com/ and enter R264 to learn more about \"Advance Directives: Care Instructions. \"  Current as of: June 16, 2022               Content Version: 13.5  © 3188-4702 Healthwise, [employer]. Care instructions adapted under license by [facility] ([facility]). If you have questions about a medical condition or this instruction, always ask your healthcare professional. [address] any warranty or liability for your use of this information. Personalized Preventive Plan for Diandra Obrien - 1/30/2023  Medicare offers a range of preventive health benefits. Some of the tests and screenings are paid in full while other may be subject to a deductible, co-insurance, and/or copay. Some of these benefits include a comprehensive review of your medical history including lifestyle, illnesses that may run in your family, and various assessments and screenings as appropriate. After reviewing your medical record and screening and assessments performed today your provider may have ordered immunizations, labs, imaging, and/or referrals for you. A list of these orders (if applicable) as well as your Preventive Care list are included within your After Visit Summary for your review. Other Preventive Recommendations:    A preventive eye exam performed by an eye specialist is recommended every 1-2 years to screen for glaucoma; cataracts, macular degeneration, and other eye disorders. A preventive dental visit is recommended every 6 months. Try to get at least 150 minutes of exercise per week or 10,000 steps per day on a pedometer . Order or download the FREE \"Exercise & Physical Activity: Your Everyday Guide\" from [employer]. Call 7-653.248.3051 or search [employer] online.   You need 6573-0975 mg of calcium and 9530-2678 IU of vitamin D per day. It is possible to meet your calcium requirement with diet alone, but a vitamin D supplement is usually necessary to meet this goal.  When exposed to the sun, use a sunscreen that protects against both UVA and UVB radiation with an SPF of 30 or greater. Reapply every 2 to 3 hours or after sweating, drying off with a towel, or swimming. Always wear a seat belt when traveling in a car. Always wear a helmet when riding a bicycle or motorcycle. Learning About Lung Cancer Screening  What is screening for lung cancer? Lung cancer screening is a way to find some lung cancers early, before a person has any symptoms of the cancer. Lung cancer screening may help those who have the highest risk for lung cancer--people age 48 and older who are or were heavy smokers. For most people, who aren't at increased risk, screening for lung cancer probably isn't helpful. Screening won't prevent cancer. And it may not find all lung cancers. Lung cancer screening may lower the risk of dying from lung cancer in a small number of people. How is it done? Lung cancer screening is done with a low-dose CT (computed tomography) scan. A CT scan uses X-rays, or radiation, to make detailed pictures of your body. Experts recommend that screening be done in medical centers that focus on finding and treating lung cancer. Who is screening recommended for? Lung cancer screening is recommended for people age 48 and older who are or were heavy smokers. That means people with a smoking history of at least 20 pack years. A pack year is a way to measure how heavy a smoker you are or were. To figure out your pack years, multiply how many packs a day on average (assuming 20 cigarettes per pack) you have smoked by how many years you have smoked. For example: If you smoked 1 pack a day for 20 years, that's 1 times 20. So you have a smoking history of 20 pack years.   If you smoked 2 packs a day for 10 years, that's 2 times 10. So you have a smoking history of 20 pack years. Experts agree that screening is for people who have a high risk of lung cancer. But experts don't agree on what high risk means. Some say people age 48 or older with at least a 20-pack-year smoking history are high risk. Others say it's people age 54 or older with a 30-pack-year history. To see if you could benefit from screening, first find out if you are at high risk for lung cancer. Your doctor can help you decide your lung cancer risk. What are the risks of screening? CT screening for lung cancer isn't perfect. It can show an abnormal result when it turns out there wasn't any cancer. This is called a false-positive result. This means you may need more tests to make sure you don't have cancer. These tests can be harmful and cause a lot of worry. These tests may include more CT scans and invasive testing like a lung biopsy. In a biopsy, the doctor takes a sample of tissue from inside your lung so it can be looked at under a microscope. A biopsy is the only way to tell if you have lung cancer. If the biopsy finds cancer, you and your doctor will have to decide how or whether to treat it. Some lung cancers found on CT scans are harmless and would not have caused a problem if they had not been found through screening. But because doctors can't tell which ones will turn out to be harmless, most will be treated. This means that you may get treatment--including surgery, radiation, or chemotherapy--that you don't need. There is a risk of damage to cells or tissue from being exposed to radiation, including the small amounts used in CTs, X-rays, and other medical tests. Over time, exposure to radiation may cause cancer and other health problems. But in most cases, the risk of getting cancer from being exposed to small amounts of radiation is low. It's not a reason to avoid these tests for most people.   What are the benefits of screening? Your scan may be normal (negative). For some people who are at higher risk, screening lowers the chance of dying of lung cancer. How much and how long you smoked helps to determine your risk level. Screening can find some cancers early, when treatment may be more likely to work. What happens after screening? The results of your CT scan will be sent to your doctor. Someone from your care team will explain the results of your scan and answer any questions you may have. If you need any follow-up, he or she will help you understand what to do next. After a lung cancer screening, you can go back to your usual activities right away. A lung cancer screening test can't tell if you have lung cancer. If your results are positive, your doctor can't tell whether an abnormal finding is a harmless nodule, cancer, or something else without doing more tests. What can you do to help prevent lung cancer? Some lung cancers can't be prevented. But if you smoke, quitting smoking is the best step you can take to prevent lung cancer. If you want to quit, your doctor can recommend medicines or other ways to help. Follow-up care is a key part of your treatment and safety. Be sure to make and go to all appointments, and call your doctor if you are having problems. It's also a good idea to know your test results and keep a list of the medicines you take. Where can you learn more? Go to http://www.jules.com/ and enter Q940 to learn more about \"Learning About Lung Cancer Screening. \"  Current as of: May 4, 2022               Content Version: 13.5  © 1975-9058 Healthwise, Incorporated. Care instructions adapted under license by [facility] ([facility]). If you have questions about a medical condition or this instruction, always ask your healthcare professional. [place] [address] any warranty or liability for your use of this information.     pneumococcal 20-valent conjugate vaccine  Pronunciation: NOO zully VO al 20-VAY fauziat DANISHA saab VAX een  Brand: Prevnar 20  What is the most important information I should know about pneumococcal 20-valent conjugate vaccine? You should not receive this vaccine if you ever had a severe allergic reactionto a pneumococcal or diphtheria toxoid vaccine. What is pneumococcal 20-valent conjugate vaccine? Pneumococcal disease is a serious infection caused by a bacteria that can infect the sinuses, inner ear, lungs, blood, and brain. These conditions can befatal.  Pneumococcal 20-valent conjugate vaccine is used in adults to help prevent disease caused by pneumococcal bacteria. This vaccine contains 20 differenttypes of pneumococcal bacteria. This vaccine helps your body develop immunity to the disease, but will nottreat an active infection you already have. Like any vaccine, pneumococcal 20-valent conjugate vaccine may not provideprotection from disease in every person. What should I discuss with my healthcare provider before taking pneumococcal 20-valent conjugate vaccine? You should not receive this vaccine if you ever had a severe allergic reactionto a pneumococcal or diphtheria toxoid vaccine. Tell the vaccination provider if you have:  a weak immune system (caused by disease or by using certain medicine); or  if you are receiving radiation or chemotherapy. You can still receive a vaccine if you have a minor cold. In the case of a more severe illness with a fever or any type of infection, wait until you get betterbefore receiving this vaccine. Tell the vaccination provider if you are pregnant or breastfeeding. How should I take pneumococcal 20-valent conjugate vaccine? This vaccine is given as an injection (shot) into a muscle. Pneumococcal 20-valent conjugate vaccine is usually given as 1 shot. What happens if I miss a dose? Pneumococcal 20-valent conjugate vaccine is used as a single dose and does nothave a booster schedule. What happens if I overdose?   An overdose of this vaccine is unlikely to occur. What should I avoid while taking pneumococcal 20-valent conjugate vaccine? Follow your doctor's instructions about any restrictions on food, beverages, oractivity. What are the possible side effects of pneumococcal 20-valent conjugate vaccine? Get emergency medical help if you have signs of an allergic reaction: hives; difficult breathing; swelling of your face, lips, tongue, or throat. Keep track of all side effects you have. If you ever need another pneumococcal 20-valent conjugate vaccine, you will need to tell the vaccination provider ifthe previous shot caused any side effects. Becoming infected with pneumococcal disease is much more dangerous to your health than receiving this vaccine. However, like any medicine, this vaccinecan cause side effects but the risk of serious side effects is low. Common side effects may include:  pain or swelling where a shot was given;  muscle or joint pain;  headache; or  feeling tired. This is not a complete list of side effects and others may occur. Call your doctor for medical advice about side effects. You may report vaccine sideeffects to the [address] [place] [address] [place] at [phone]. What other drugs will affect pneumococcal 20-valent conjugate vaccine? Tell the vaccination provider if you have recently received drugs or treatmentsthat can weaken the immune system, including:  steroid medicine;  medications to treat psoriasis, rheumatoid arthritis, or other autoimmune disorders; or  medicines to treat or prevent organ transplant rejection. This list is not complete. Other drugs may affect pneumococcal 20-valent conjugate vaccine, including prescription and over-the-counter medicines, vitamins, and herbal products. Not all possible drug interactions are listedhere. Where can I get more information? Your vaccination provider, pharmacist, or doctor can provide more information about this vaccine. Additional information is available from your local [facility] or the Centers for Disease Control and Prevention. Remember, keep this and all other medicines out of the reach of children, never share your medicines with others, and use this medication only for the indication prescribed. Every effort has been made to ensure that the information provided by [address] is accurate, up-to-date, and complete, but no guarantee is made to that effect. Drug information contained herein may be time sensitive. [facility] information has been compiled for use by healthcare practitioners and consumers in the [place] and therefore [facility] does not warrant that uses outside of the [place] are appropriate, unless specifically indicated otherwise. [facility]'s drug information does not endorse drugs, diagnose patients or recommend therapy. [facility]'s drug information is an informational resource designed to assist licensed healthcare practitioners in caring for their patients and/or to serve consumers viewing this service as a supplement to, and not a substitute for, the expertise, skill, knowledge and judgment of healthcare practitioners. The absence of a warning for a given drug or drug combination in no way should be construed to indicate that the drug or drug combination is safe, effective or appropriate for any given patient. [facility] does not assume any responsibility for any aspect of healthcare administered with the aid of information [facility] provides. The information contained herein is not intended to cover all possible uses, directions, precautions, warnings, drug interactions, allergic reactions, or adverse effects. If you have questions about the drugs you are taking, check with yourdoctor, nurse or pharmacist.  Copyright 6268-9495 [address] [place] [address] Dr ISAAC. Version: 1.02. Revision date: 3/4/2022. Care instructions adapted under license by [facility] ([facility]).  If you have questions about a medical condition or this instruction, always ask your healthcare professional. [place] [address] any warranty or liability for your use of this information. Recommendations for Preventive Services Due: see orders and patient instructions/AVS.  Recommended screening schedule for the next 5-10 years is provided to the patient in written form: see Patient Instructions/AVS.     Return in 3 months (on 4/30/2023) for diabetes f/u. Subjective   The patient is a 50 y.o. female who is seen for a comprehensive physical exam.  Health behaviors: smoker - reducing and not smoking daily, not exercising but has been able to do more physically since losing weight, tries to keep sodium intake & sugar/sweets intake to a minimum . Date of last physical exam: Jan 2022. The patient is menopausal and is status post partial hysterectomy. Current gynecologic symptoms include:  hot flashes & night sweats . She does occasional self breast exams. I have reviewed the patient's medical history in detail and updated the computerized patient record. Previous Preventative Testing:  Mammogram: Dec 2019 (results: stable); Pap Smear: 2012 (results: not currently available for review); Bone Density: Never; Colonoscopy: Never; EKG: Jan 2022 (results: mildly abnormal - L axis, non-specific T wave abnormality); Chest X-Ray (if smoker): Oct 2022 (results: mildly abnormal - lung hypoinflation with streaky bibasilar opacities);  Hemoccult: Never       Immunizations: up to date and documented  Immunization History   Administered Date(s) Administered    Influenza Virus Vaccine 10/01/2017, 10/01/2019    Influenza, FLUAD, (age 72 y+), Adjuvanted, 0.5mL 11/16/2020, 12/13/2021, 11/10/2022    Influenza, FLUARIX, FLULAVAL, FLUZONE (age 10 mo+) AND AFLURIA, (age 1 y+), PF, 0.5mL 12/18/2015, 11/15/2016    Influenza, High Dose (Fluzone 65 yrs and older) 10/25/2018    Influenza, Triv, inactivated, subunit, adjuvanted, IM (Fluad 65 yrs and older) 09/23/2019    Pneumococcal Polysaccharide (Peiyzwods90) 08/07/2017           The following acute and/or chronic problems were also addressed today:        The patient is a 50 y.o. female who is seen for follow up of diabetes. Current monitoring regimen: patient does not check sugars regularly. Glucose monitoring frequency: 1-2 times daily  Home blood sugar records: fasting range: , postprandial range: 140-150  Any episodes of hypoglycemia? no  Known diabetic complications: retinopathy and microalbuminuria  Current diabetic medications include: oral agent (monotherapy): metformin (generic) 500 mg bid. Current Eye Exam (within one year): No  Current Urine Microalbumin:   Yes, abnormal - Jan 2023  Lab Results   Component Value Date    LABMICR 48.80 (H) 01/23/2023   Is She on ACE inhibitor or angiotensin II receptor blocker? Yes - lisinopril (generic)  Current Foot Exam (within one year): Unknown      Weight trend: decreasing rapidly  Prior visit with dietician: yes - 2016  Current diet: meals per day on average: 2; restricting intake of the following: soda & sweet tea, white bread, sweets  Current exercise: no regular exercise        Last HbA1C:    Lab Results   Component Value Date    LABA1C 6.1 (H) 01/23/2023     Lab Results   Component Value Date     01/23/2023       The patient is a 50 y.o. female who is seen for evaluation of hyperlipidemia. She was tested because of hyperlipidemia w/ LDL goal < 100 + diabetes. The current state of this condition is no significant medication side effects noted, stable, and well controlled on Lipitor 10 mg q hs - taking as prescribed.        Last Lipid Panel:   Lab Results   Component Value Date    CHOL 145 01/23/2023    CHOL 134 09/01/2022    CHOL 137 03/28/2022     Lab Results   Component Value Date    TRIG 89 01/23/2023    TRIG 74 09/01/2022    TRIG 81 03/28/2022     Lab Results   Component Value Date    HDL 31 (L) 01/23/2023    HDL 28 (L) 09/01/2022    HDL 29 (L) 03/28/2022     Lab Results   Component Value Date    LDLCALC 96.2 01/23/2023    LDLCALC 91.2 09/01/2022    LDLCALC 92 03/28/2022     Lab Results   Component Value Date    LABVLDL 17.8 01/23/2023    LABVLDL 14.8 09/01/2022    LABVLDL 15 06/11/2020    VLDL 16 03/28/2022    VLDL 15 11/22/2021    VLDL 13 07/13/2021     Lab Results   Component Value Date    CHOLHDLRATIO 4.7 01/23/2023    CHOLHDLRATIO 4.8 09/01/2022       The patient is a 50 y.o. female who is seen for follow-up of hypertension. She is not exercising but has been more active physically since losing weight and is adherent to low salt diet. Daily caffiene intake: a known amount (3 servings/day). Current medication regimen consists of: Diltiazem  mg daily. Blood pressure is not measured at home. BP Readings from Last 3 Encounters:   01/30/23 120/80   11/10/22 (!) 144/90   11/01/22 (!) 110/59       Additional concerns addressed today include status of cigarette use. The patient  reports that she has been smoking cigarettes. She has a 32.00 pack-year smoking history. She has never used smokeless tobacco.. Discussed with patient the risks and benefits of screening, including over-diagnosis, false positive rate, and total radiation exposure. The patient currently exhibits no signs or symptoms suggestive of lung cancer. Discussed with patient the importance of compliance with yearly annual lung cancer screenings and willingness to undergo diagnosis and treatment if screening scan is positive. In addition, the patient was counseled regarding the importance of remaining smoke free and/or total smoking cessation.     Also reviewed the following if the patient has Medicare that as of February 10, 2022, Medicare only covers LDCT screening in patients aged 51-72 with at least a 20 pack-year smoking history who currently smoke or have quit in the last 15 years      Review of Systems   Constitutional:  Positive for fatigue and unexpected weight change (27 pounds in 2.5 months). Eyes:  Negative for visual disturbance. Respiratory:  Positive for shortness of breath. Cardiovascular:  Negative for chest pain, palpitations and leg swelling. Gastrointestinal:  Positive for diarrhea (increased frequency since gallbladder surgery). Negative for nausea and vomiting. Endocrine: Positive for polydipsia. Genitourinary:  Positive for frequency. Musculoskeletal:  Negative for myalgias. Neurological:  Negative for dizziness, numbness and headaches.          Past Medical History:   Diagnosis Date    Childhood asthma     Chronic respiratory failure with hypoxia (HCC)     Continuous at 3L    COPD (chronic obstructive pulmonary disease) ([address]     Stage 4 by GOLD Criteria    COVID-19 01/07/2021    Fatty liver determined by biopsy 10/30/2022    Gallstone pancreatitis 10/2022    Hyperlipidemia     Daily meds     Hypertension     Managed with meds     Intraductal papilloma of breast 01/16/2019    Left    Microalbuminuria     Microcytosis     Morbid obesity ([address]     Nonproliferative retinopathy due to secondary diabetes ([address] 03/07/2020    Mild, Bilateral    THANH on CPAP 3/2/2017    Paroxysmal atrial fibrillation ([address]     Pneumonia ages 3 and 9    Type 2 diabetes mellitus ([address]          Past Surgical History:   Procedure Laterality Date    BREAST LUMPECTOMY W/ NEEDLE LOCALIZATION Left 03/27/2019    CHOLECYSTECTOMY, LAPAROSCOPIC N/A 10/30/2022    CHOLECYSTECTOMY LAPAROSCOPIC WITH POSSIBLE LIVER BIOPSY performed by Kit Hurt MD at [address] [place] [address] (CERVIX NOT REMOVED)  08/2012    US BREAST BIOPSY W LOC DEVICE 1ST LESION LEFT Left 01/07/2019    US BREAST NEEDLE BIOPSY LEFT 1/7/2019 [facility]    US GUIDED NEEDLE LOC OF LEFT BREAST Left 03/27/2019    US GUIDED NEEDLE LOC OF LEFT BREAST 3/27/2019 [facility]         Social History     Socioeconomic History    Marital status: Single     Spouse name: None Number of children: None    Years of education: None    Highest education level: None   Tobacco Use    Smoking status: Some Days     Packs/day: 1.00     Years: 32.00     Pack years: 32.00     Types: Cigarettes    Smokeless tobacco: Never    Tobacco comments:     Quit smoking: Currently 0-3 Cigarettes/Day - Not Smoking Daily   Vaping Use    Vaping Use: Never used   Substance and Sexual Activity    Alcohol use: Yes     Comment: Occasional    Drug use: Not Currently     Types: Cocaine, Marijuana Velnilay Blount)   Social History Narrative    Single and lives alone. Disabled--previously worked at [employer]. Has lived in [place] and [place]. No pets. Social Determinants of Health     Physical Activity: Inactive    Days of Exercise per Week: 0 days    Minutes of Exercise per Session: 0 min         Family History   Problem Relation Age of Onset    Coronary Art Dis Neg Hx     COPD Mother     Parkinsonism Father     Asthma Son     Schizophrenia Brother     Hypertension Brother     Emphysema Paternal Grandfather     Hypertension Mother          Current Outpatient Medications   Medication Sig Dispense Refill    lisinopril (PRINIVIL;ZESTRIL) 5 MG tablet       blood glucose test strips (ASCENSIA AUTODISC VI;ONE TOUCH ULTRA TEST VI) strip Use to check blood sugar once daily as directed. Dx: E11.29      Lancets 33G MISC Use to check glucose once daily.   Dx: E11.29      atorvastatin (LIPITOR) 10 MG tablet Take 1 tablet by mouth at bedtime 30 tablet 5    metFORMIN (GLUCOPHAGE) 500 MG tablet Take 1 tablet by mouth 2 times daily (with meals) 60 tablet 5    albuterol sulfate  (90 Base) MCG/ACT inhaler Inhale 2 puffs into the lungs every 4 hours as needed for Wheezing 1 each 11    ipratropium-albuterol (DUONEB) 0.5-2.5 (3) MG/3ML SOLN nebulizer solution Inhale 3 mLs into the lungs 4 times daily File to Medicare part B--J44.9 120 each 11    apixaban (ELIQUIS) 5 MG TABS tablet TAKE ONE TABLET BY MOUTH EVERY 12 HOURS **MUST SCHEDULE APPOINTMENT FOR MORE REFILLS**      Fluticasone furoate-vilanterol (BREO ELLIPTA) 200-25 MCG/INH AEPB inhaler INHALE ONE PUFF ONCE DAILY *RINSE MOUTH WELL AFTER USE*      guaiFENesin 1200 MG TB12 Take 1,200 mg by mouth 2 times daily       No current facility-administered medications for this visit. Patient's complete Health Risk Assessment and screening values have been reviewed and are found in Flowsheets. The following problems were reviewed today and where indicated follow up appointments were made and/or referrals ordered. Positive Risk Factor Screenings with Interventions:    Fall Risk:  Do you feel unsteady or are you worried about falling? : (!) yes  2 or more falls in past year?: no  Fall with injury in past year?: no     Interventions:    Patient declines any further evaluation or treatment    Cognitive:    Words recalled: 3 Words Recalled   Clock Drawing Test (CDT): (!) Abnormal   Total Score: 3   Total Score Interpretation: Normal Mini-Cog      Interventions:  Patient declines any further evaluation or treatment           General HRA Questions:  Select all that apply: (!) Loneliness, Stress    Loneliness Interventions:  Patient declined any further interventions or treatment    Stress Interventions:  Patient declined any further interventions or treatment       Weight and Activity:  Physical Activity: Inactive    Days of Exercise per Week: 0 days    Minutes of Exercise per Session: 0 min     On average, how many days per week do you engage in moderate to strenuous exercise (like a brisk walk)?: 0 days  Have you lost any weight without trying in the past 3 months?: No  Body mass index: (!) 51.08      Inactivity Interventions:  Recommendations: patient agrees to increase physical activity as follows: walk around stores more often rather than using automated cart  Obesity Interventions:  May consider adding GLP-1 for diabetes management to aid with weight loss once preventative testing is updated to rule out cancerous source of weight loss        Dentist Screen:  Have you seen the dentist within the past year?: (!) No    Intervention:  Advised to schedule with their dentist     Vision Screen:  Do you have difficulty driving, watching TV, or doing any of your daily activities because of your eyesight?: No  Have you had an eye exam within the past year?: (!) No  No results found. Interventions:   Patient encouraged to make appointment with their eye specialist    Safety:  Do you have any tripping hazards - loose or unsecured carpets or rugs?: (!) Yes  Do you have any tripping hazards - clutter in doorways, halls, or stairs?: (!) Yes  Interventions:  Patient declined any further interventions or treatment    ADL's:   Patient reports needing help with:  Select all that apply: [employer], Transportation  Interventions:  Patient declined any further interventions or treatment    Advanced Directives:  Do you have a Living Will?: (!) No    Intervention:  has NO advanced directive - information provided      Tobacco Use:  Tobacco Use: High Risk    Smoking Tobacco Use: Some Days    Smokeless Tobacco Use: Never    Passive Exposure: Not on file     E-cigarette/Vaping       Questions Responses    E-cigarette/Vaping Use Never User    Start Date     Passive Exposure     Quit Date     Counseling Given     Comments           Interventions:  Praised reduction of cigarette intake with goals of fully quitting              Objective   Vitals:    01/30/23 1020   BP: 120/80   Site: Left Upper Arm   Position: Sitting   Cuff Size: Large Adult   Pulse: 93   Temp: 97.5 °F (36.4 °C)   TempSrc: Temporal   SpO2: (!) 87%   Weight: (!) 307 lb (139.3 kg)   Height: 5' 5\" (1.651 m)      Body mass index is 51.09 kg/m². O2 on recheck: 84-86% on 3L, Improved to 90-04% on 4L      Physical Exam  Constitutional:       Appearance: Normal appearance. She is obese. HENT:      Head: Normocephalic and atraumatic.       Right Ear: Ear canal and external ear normal.      Left Ear: Ear canal and external ear normal.      Ears:      Comments: Bilateral TMs dulled in appearance     Nose: Nose normal.      Right Turbinates: Swollen. Left Turbinates: Not swollen. Right Sinus: No maxillary sinus tenderness or frontal sinus tenderness. Left Sinus: No maxillary sinus tenderness or frontal sinus tenderness. Mouth/Throat:      Mouth: Mucous membranes are moist.      Pharynx: Oropharynx is clear. Eyes:      Extraocular Movements: Extraocular movements intact. Conjunctiva/sclera: Conjunctivae normal.      Pupils: Pupils are equal, round, and reactive to light. Neck:      Thyroid: No thyromegaly. Vascular: No carotid bruit. Cardiovascular:      Rate and Rhythm: Normal rate and regular rhythm. Heart sounds: Normal heart sounds. Pulmonary:      Effort: Pulmonary effort is normal.      Breath sounds: Normal breath sounds. Abdominal:      General: Bowel sounds are normal.      Palpations: Abdomen is soft. Tenderness: There is no abdominal tenderness. Musculoskeletal:         General: Normal range of motion. Cervical back: Normal range of motion. Right lower leg: No edema. Left lower leg: No edema. Skin:     General: Skin is warm and dry. Neurological:      Mental Status: She is alert and oriented to person, place, and time. Deep Tendon Reflexes:      Reflex Scores:       Bicep reflexes are 2+ on the right side and 2+ on the left side. Psychiatric:         Mood and Affect: Mood normal.         Behavior: Behavior normal.         Thought Content:  Thought content normal.         Judgment: Judgment normal.        PHQ-9 Questionaire 1/30/2023 1/30/2023 11/10/2022 9/8/2022 4/19/2022 11/11/2019 11/5/2018   Little interest or pleasure in doing things 1 2 0 1 3 0 0   Feeling down, depressed, or hopeless 2 1 0 3 0 0 0   Trouble falling or staying asleep, or sleeping too much 3 - - 3 1 - -   Feeling tired or having little energy 1 - - 3 2 - -   Poor appetite or overeating 0 - - 0 0 - -   Feeling bad about yourself - or that you are a failure or have let yourself or your family down 2 - - 1 0 - -   Trouble concentrating on things, such as reading the newspaper or watching television 0 - - 3 0 - -   Moving or speaking so slowly that other people could have noticed. Or the opposite - being so fidgety or restless that you have been moving around a lot more than usual 0 - - 1 0 - -   Thoughts that you would be better off dead, or of hurting yourself in some way 0 - - 0 - - -   PHQ-9 Total Score 9 3 0 15 6 0 0   If you checked off any problems, how difficult have these problems made it for you to do your work, take care of things at home, or get along with other people? 1 - - 3 - - -       TRA-7 SCREENING 1/30/2023   Feeling nervous, anxious, or on edge Several days   Not being able to stop or control worrying Several days   Worrying too much about different things Several days   Trouble relaxing Not at all   Being so restless that it is hard to sit still Not at all   Becoming easily annoyed or irritable Several days   Feeling afraid as if something awful might happen Not at all   TRA-7 Total Score 4         No Known Allergies  Prior to Visit Medications    Medication Sig Taking? Authorizing Provider   lisinopril (PRINIVIL;ZESTRIL) 5 MG tablet  Yes Historical Provider, MD   blood glucose test strips (ASCENSIA AUTODISC VI;ONE TOUCH ULTRA TEST VI) strip Use to check blood sugar once daily as directed. Dx: E11.29 Yes Historical Provider, MD   Lancets 33G MISC Use to check glucose once daily.   Dx: E11.29 Yes Historical Provider, MD   atorvastatin (LIPITOR) 10 MG tablet Take 1 tablet by mouth at bedtime Yes Xochitl Ivy PA-C   metFORMIN (GLUCOPHAGE) 500 MG tablet Take 1 tablet by mouth 2 times daily (with meals) Yes Xochitl Ivy PA-C   albuterol sulfate  (90 Base) MCG/ACT inhaler Inhale 2 puffs into the lungs every 4 hours as needed for Wheezing Yes FUNMI Larose CNP   ipratropium-albuterol (DUONEB) 0.5-2.5 (3) MG/3ML SOLN nebulizer solution Inhale 3 mLs into the lungs 4 times daily File to Medicare part B--J44.9 Yes FUNMI Larose CNP   apixaban (ELIQUIS) 5 MG TABS tablet TAKE ONE TABLET BY MOUTH EVERY 12 HOURS **MUST SCHEDULE APPOINTMENT FOR MORE REFILLS** Yes Ar Automatic Reconciliation   Fluticasone furoate-vilanterol (BREO ELLIPTA) 200-25 MCG/INH AEPB inhaler INHALE ONE PUFF ONCE DAILY *RINSE MOUTH WELL AFTER USE* Yes Ar Automatic Reconciliation   guaiFENesin 1200 MG TB12 Take 1,200 mg by mouth 2 times daily Yes Ar Automatic Reconciliation       CareTeam (Including outside providers/suppliers regularly involved in providing care):   Patient Care Team:  Dakota Arreaga PA-C as PCP - [facility]  Dakota Arreaga PA-C as PCP - [facility] Empaneled Provider  Debora Kay MD as Physician  FUNMI Larose CNP (Pulmonary Disease)  Michael Roberts MD as Consulting Physician (Cardiology)  Glenna Danielson MD (Pulmonary Disease)     Reviewed and updated this visit:  Tobacco  Allergies  Meds  Med Hx  Surg Hx  Soc Hx  Fam Hx          Greater than 50% of this 30 minute visit separate from [facility] components was spent counseling Armando Claire Obrien about her lab results, status of chronic conditions, recommended medication changes and lifestyle modifications to gain or maintain good control of her overall health.

## 2023-01-30 NOTE — PROGRESS NOTES
Alisha Obrien (:  1970) is a 50 y.o. female,Established patient, here for evaluation of the following chief complaint(s):  Medicare AWV and Diabetes         ASSESSMENT/PLAN:  {There are no diagnoses linked to this encounter. (Refresh or delete this SmartLink)}    No follow-ups on file. Subjective   SUBJECTIVE/OBJECTIVE:  Diabetes  Pertinent negatives for hypoglycemia include no dizziness or headaches. Associated symptoms include fatigue and polydipsia. Pertinent negatives for diabetes include no chest pain. Review of Systems   Constitutional:  Positive for fatigue and unexpected weight change (27 pounds in 2.5 months). Eyes:  Negative for visual disturbance. Respiratory:  Positive for shortness of breath. Cardiovascular:  Negative for chest pain, palpitations and leg swelling. Gastrointestinal:  Positive for diarrhea (increased frequency since gallbladder surgery). Negative for nausea and vomiting. Endocrine: Positive for polydipsia. Genitourinary:  Positive for frequency. Musculoskeletal:  Negative for myalgias. Neurological:  Negative for dizziness, numbness and headaches. Objective   Physical Exam       {Time Documentation Optional:[id number]}      An electronic signature was used to authenticate this note.     --Brissa Marquez MA

## 2023-01-30 NOTE — PROGRESS NOTES
Name:  Brandan Obrien  YOB: 1970   MRN: [id number]      Office Visit: 2023        ASSESSMENT AND PLAN:  (Medical Decision Making)    Impression:     1. COPD exacerbation (HCC)  --severe obstruction on previous spirometry, requiring 3-4 lpm of O2. Currently exacerbated. Will place on 8 mg IM Dexamethasone, Prednisone taper, and doxycycline.  - dexamethasone (DECADRON) injection 8 mg    2. Chronic respiratory failure with hypoxia (HCC)  --see above. Orders Placed This Encounter   Medications    fluticasone furoate-vilanterol (BREO ELLIPTA) 200-25 MCG/ACT AEPB inhaler     Sig: Inhale 1 puff into the lungs daily     Dispense:  1 each     Refill:  11    dexamethasone (DECADRON) injection 8 mg    predniSONE (DELTASONE) 20 MG tablet     Si tabs po qd x 3 days, 1.5 tabs po qd x 3 days, 1 tab po qd x 3 days, 1/2 tab po qd x 3 days. Dispense:  15 tablet     Refill:  0    doxycycline hyclate (VIBRA-TABS) 100 MG tablet     Sig: Take 1 tablet by mouth 2 times daily for 7 days     Dispense:  14 tablet     Refill:  0     No orders of the defined types were placed in this encounter. Follow-up and Dispositions    Return in about 6 months (around 2023) for Aliza, COPD, spirometry. Collaborating physician is Dr. Miracle Quiroz. ADS    Robb Loomis NP, APRN - CNP      _________________________________________________________________________    HISTORY OF PRESENT ILLNESS:    Ms. Bull Chacon is a 50 y.o. female who is seen at [facility] today for  COPD     The patient is a 50year old female who is seen for follow up of severe COPD, OHS, chronic respiratory failure, and morbid obesity. She is on O2 at 3 lpm continuously. Her trilogy device was picked up previously due to noncompliance. Has been sick for the past 3-4 days. This is associated with cough, wheezing, and shortness of breath.   She saw her PCP yesterday and O2 sat was 87% on 3 lpm and was increased to 4 lpm.  No fever or chills. No sick contacts. Has been using her nebulizer intermittently and this does help. REVIEW OF SYSTEMS: 10 point review of systems is negative except as reported in HPI. PHYSICAL EXAM: Body mass index is 53.21 kg/m². Vitals:    01/31/23 1443   BP: (!) 144/82   Pulse: 93   Resp: 19   Temp: 97.2 °F (36.2 °C)   TempSrc: Skin   SpO2: 92%   Weight: (!) 310 lb (140.6 kg)   Height: 5' 4\" (1.626 m)         General:   Alert, cooperative, no distress, appears stated age. Eyes:   Conjunctivae/corneas clear. PERRL        Mouth/Throat:  Lips, mucosa, and tongue normal. Teeth and gums normal.        Lungs:     Breath sounds with expiratory wheezing bilaterally. Heart:   Regular rate and rhythm, S1, S2 normal, no murmur, click, rub or gallop. Abdomen:    Soft, non-tender. Extremities:  Extremities normal, atraumatic, no cyanosis or edema. Skin:  Skin color normal. No rashes or lesions     Neurologic:  A&Ox3     DIAGNOSTIC TESTS:                                                                                    LABS:   Lab Results   Component Value Date/Time    WBC 10.6 01/23/2023 10:46 AM    HGB 11.2 01/23/2023 10:46 AM    HCT 41.0 01/23/2023 10:46 AM     01/23/2023 10:46 AM    TSH 1.200 11/09/2020 08:59 AM     Imaging: I performed an independent interpretation of the patient's images. CXR:   XR CHEST PORTABLE 10/26/2022    Narrative  EXAMINATION: XR CHEST PORTABLE 10/26/2022 8:05 PM    ACCESSION NUMBER: [id number]    COMPARISON: None available    INDICATION: dyspnea    TECHNIQUE: A single view of the chest was obtained. FINDINGS:  Lung hypoinflation. Streaky bibasilar opacities. No pneumothorax or sizable pleural effusion. Stable cardiomediastinal silhouette. Impression  -Lung hypoinflation with streaky bibasilar opacities, favored to reflect  atelectasis, though infection/aspiration are also considerations in the  appropriate clinical context.     CT Chest:   CT ABDOMEN PELVIS W IV CONTRAST 10/26/2022    Narrative  EXAM: CT ABDOMEN PELVIS W IV CONTRAST    HISTORY: Upper abdominal pain. Diarrhea. TECHNIQUE: Axial images of the abdomen and pelvis were performed following the  administration of intravenous contrast. Images were obtained in the axial plane  and coronal reformatted images were created and reviewed. Dose reduction technique used: Automated exposure control/Adjustment of the mA  and/or kV according to patient size/Use of iterative reconstruction technique. 100 mL of Isovue-370 were utilized. COMPARISON: CT chest dated 10/18/2018 and 11/10/2016. FINDINGS:  A moderate amount of motion artifact limits this examination to some degree. The visualized lung bases and mediastinum are unremarkable. The liver, spleen, pancreas, adrenal glands, and kidneys are within normal  limits. The left kidney contains a tiny low-density lesion in the midpole which  is too small to characterize. The bladder is decompressed and not assessed. The gallbladder shows wall thickening and mucosal enhancement. A few radiopaque  stones are appreciated. Multiple prominent lymph nodes are appreciated in the yousuf hepatis and near the  head of the pancreas. Distal esophagus and stomach are unremarkable. Small and large bowel show no  evidence of obstruction. There is a normal appendix in the right lower quadrant. No evidence of free fluid or free air. No pathologic adenopathy. No abdominal  aortic aneurysm. Osseous structures show no evidence of acute fracture or suspicious lesion. Impression  The gallbladder shows wall thickening and mucosal enhancement. A few radiopaque  stones are appreciated. These findings are suggestive of acute cholecystitis. Multiple prominent lymph nodes are appreciated in the yousuf hepatis and near the  head of the pancreas. There significance is unclear.         [facility] Reference Info: Past Medical History:   Diagnosis Date    Childhood asthma     Chronic respiratory failure with hypoxia ([facility]     Continuous at 3L    COPD [facility]ch[facility] obstructive [facility] disease) [facility]     Stage 4 by GOLD Criteria    COVID-19 01/07/2021    Fatty liver determined by biopsy 10/30/2022    Gallstone pancreatitis 10/2022    Hyperlipidemia     Daily meds     Hypertension     Managed with meds     Intraductal papilloma of breast 01/16/2019    Left    Microalbuminuria     Microcytosis     Morbid obesity ([facility]     Nonproliferative retinopathy due to secondary diabetes ([facility] 03/07/2020    Mild, Bilateral    THANH on CPAP 3/2/2017    Paroxysmal atrial fibrillation (HCC)     Pneumonia ages 3 and 9    Type 2 diabetes mellitus (HCC)         Tobacco Use      Smoking status: Some Days        Packs/day: 1.00        Years: 32.00        Pack years: 32        Types: Cigarettes      Smokeless tobacco: Never      Tobacco comments: Smoking 0-3 cigarettes/day    No Known Allergies  Current Outpatient Medications   Medication Instructions    albuterol sulfate  (90 Base) MCG/ACT inhaler 2 puffs, Inhalation, EVERY 4 HOURS PRN    apixaban (ELIQUIS) 5 MG TABS tablet TAKE ONE TABLET BY MOUTH EVERY 12 HOURS **MUST SCHEDULE APPOINTMENT FOR MORE REFILLS**    atorvastatin (LIPITOR) 10 mg, Oral, Nightly    blood glucose test strips (ASCENSIA AUTODISC VI;ONE TOUCH ULTRA TEST VI) strip Use to check blood sugar once daily as directed.   Dx: E11.29    cholestyramine (QUESTRAN) 4 g, Oral, DAILY    dilTIAZem (CARDIZEM CD) 240 mg, Oral, DAILY    doxycycline hyclate (VIBRA-TABS) 100 mg, Oral, 2 TIMES DAILY    fluticasone furoate-vilanterol (BREO ELLIPTA) 200-25 MCG/ACT AEPB inhaler 1 puff, Inhalation, DAILY    guaiFENesin 1200 MG TB12 1 tablet, Oral, 2 times daily    ipratropium-albuterol (DUONEB) 0.5-2.5 (3) MG/3ML SOLN nebulizer solution 3 mLs, Inhalation, 4 TIMES DAILY, File to Medicare part B--J44.9    Lancets 33G MISC Use to check glucose once daily. Dx: E11.29    metFORMIN (GLUCOPHAGE) 500 mg, Oral, 2 TIMES DAILY WITH MEALS    predniSONE (DELTASONE) 20 MG tablet 2 tabs po qd x 3 days, 1.5 tabs po qd x 3 days, 1 tab po qd x 3 days, 1/2 tab po qd x 3 days.

## 2023-01-30 NOTE — Clinical Note
Please advise her that I sent in a powder medication that she will need to mix with 8 oz of liquid/drink and consume once daily to see if that helps bowels be more formed and less urgent after eating.

## 2023-01-31 ENCOUNTER — OFFICE VISIT (OUTPATIENT)
Dept: PULMONOLOGY | Age: 53
End: 2023-01-31
Payer: MEDICARE

## 2023-01-31 ENCOUNTER — TELEPHONE (OUTPATIENT)
Dept: INTERNAL MEDICINE CLINIC | Facility: CLINIC | Age: 53
End: 2023-01-31

## 2023-01-31 VITALS
OXYGEN SATURATION: 92 % | HEART RATE: 93 BPM | TEMPERATURE: 97.2 F | BODY MASS INDEX: 50.02 KG/M2 | SYSTOLIC BLOOD PRESSURE: 144 MMHG | DIASTOLIC BLOOD PRESSURE: 82 MMHG | RESPIRATION RATE: 19 BRPM | WEIGHT: 293 LBS | HEIGHT: 64 IN

## 2023-01-31 DIAGNOSIS — J44.1 COPD EXACERBATION (HCC): Primary | ICD-10-CM

## 2023-01-31 DIAGNOSIS — J96.11 CHRONIC RESPIRATORY FAILURE WITH HYPOXIA (HCC): ICD-10-CM

## 2023-01-31 PROCEDURE — 3077F SYST BP >= 140 MM HG: CPT | Performed by: NURSE PRACTITIONER

## 2023-01-31 PROCEDURE — G8427 DOCREV CUR MEDS BY ELIG CLIN: HCPCS | Performed by: NURSE PRACTITIONER

## 2023-01-31 PROCEDURE — 3023F SPIROM DOC REV: CPT | Performed by: NURSE PRACTITIONER

## 2023-01-31 PROCEDURE — 4004F PT TOBACCO SCREEN RCVD TLK: CPT | Performed by: NURSE PRACTITIONER

## 2023-01-31 PROCEDURE — 99214 OFFICE O/P EST MOD 30 MIN: CPT | Performed by: NURSE PRACTITIONER

## 2023-01-31 PROCEDURE — 3079F DIAST BP 80-89 MM HG: CPT | Performed by: NURSE PRACTITIONER

## 2023-01-31 PROCEDURE — 3017F COLORECTAL CA SCREEN DOC REV: CPT | Performed by: NURSE PRACTITIONER

## 2023-01-31 PROCEDURE — G8484 FLU IMMUNIZE NO ADMIN: HCPCS | Performed by: NURSE PRACTITIONER

## 2023-01-31 PROCEDURE — 96372 THER/PROPH/DIAG INJ SC/IM: CPT | Performed by: NURSE PRACTITIONER

## 2023-01-31 PROCEDURE — G8417 CALC BMI ABV UP PARAM F/U: HCPCS | Performed by: NURSE PRACTITIONER

## 2023-01-31 RX ORDER — FLUTICASONE FUROATE AND VILANTEROL 200; 25 UG/1; UG/1
1 POWDER RESPIRATORY (INHALATION) DAILY
Qty: 1 EACH | Refills: 11 | Status: SHIPPED | OUTPATIENT
Start: 2023-01-31

## 2023-01-31 RX ORDER — DOXYCYCLINE HYCLATE 100 MG
100 TABLET ORAL 2 TIMES DAILY
Qty: 14 TABLET | Refills: 0 | Status: SHIPPED | OUTPATIENT
Start: 2023-01-31 | End: 2023-02-07

## 2023-01-31 RX ORDER — DEXAMETHASONE SODIUM PHOSPHATE 10 MG/ML
8 INJECTION INTRAMUSCULAR; INTRAVENOUS ONCE
Status: COMPLETED | OUTPATIENT
Start: 2023-01-31 | End: 2023-01-31

## 2023-01-31 RX ORDER — PREDNISONE 20 MG/1
TABLET ORAL
Qty: 15 TABLET | Refills: 0 | Status: SHIPPED | OUTPATIENT
Start: 2023-01-31

## 2023-01-31 RX ADMIN — DEXAMETHASONE SODIUM PHOSPHATE 8 MG: 10 INJECTION INTRAMUSCULAR; INTRAVENOUS at 15:11

## 2023-01-31 NOTE — PATIENT INSTRUCTIONS
Prednisone 20 mg tabs--2 tabs daily x 3 days, 1 and 1/2 tabs daily x 3 days, 1 tab daily x 3 days, 1/2 tab daily x 3 days. Doxycycline 100 mg twice daily x 7 days. Use nebulizer 3-4 times daily on a scheduled basis until symptoms improve and then decrease to as needed. Continue Breo once daily. May decrease O2 back to 3 lpm as symptoms improve.

## 2023-01-31 NOTE — TELEPHONE ENCOUNTER
----- Message from Renaldo Smallwood PA-C sent at 1/30/2023  5:49 PM EST -----  Please advise her that I sent in a powder medication that she will need to mix with 8 oz of liquid/drink and consume once daily to see if that helps bowels be more formed and less urgent after eating.

## 2023-02-07 ENCOUNTER — CLINICAL DOCUMENTATION (OUTPATIENT)
Dept: OTHER | Facility: CLINIC | Age: 53
End: 2023-02-07

## 2023-02-08 ENCOUNTER — HOSPITAL ENCOUNTER (OUTPATIENT)
Dept: MAMMOGRAPHY | Age: 53
Discharge: HOME | End: 2023-02-11
Payer: MEDICARE

## 2023-02-08 DIAGNOSIS — Z12.31 SCREENING MAMMOGRAM FOR BREAST CANCER: ICD-10-CM

## 2023-02-08 PROCEDURE — 77067 SCR MAMMO BI INCL CAD: CPT

## 2023-02-09 NOTE — RESULT ENCOUNTER NOTE
Mammogram shows no suspicious areas of concern. They note stable postoperative appearance of the L breast.  Remind to do monthly self breast exams. Plan to repeat imaging in 1 year.

## 2023-02-10 ENCOUNTER — HOSPITAL ENCOUNTER (OUTPATIENT)
Dept: CT IMAGING | Age: 53
End: 2023-02-10
Payer: MEDICARE

## 2023-02-10 DIAGNOSIS — Z87.891 PERSONAL HISTORY OF TOBACCO USE: ICD-10-CM

## 2023-02-10 PROCEDURE — 71271 CT THORAX LUNG CANCER SCR C-: CPT

## 2023-02-13 NOTE — RESULT ENCOUNTER NOTE
CT shows no lung nodules but findings of emphysema present in the lungs. Plan to repeat imaging in 1 year for annual lung cancer screening.

## 2023-03-07 ENCOUNTER — TELEMEDICINE (OUTPATIENT)
Dept: INTERNAL MEDICINE CLINIC | Facility: CLINIC | Age: 53
End: 2023-03-07
Payer: MEDICARE

## 2023-03-07 DIAGNOSIS — J44.1 COPD EXACERBATION (HCC): Primary | ICD-10-CM

## 2023-03-07 PROCEDURE — G8427 DOCREV CUR MEDS BY ELIG CLIN: HCPCS | Performed by: NURSE PRACTITIONER

## 2023-03-07 PROCEDURE — 99213 OFFICE O/P EST LOW 20 MIN: CPT | Performed by: NURSE PRACTITIONER

## 2023-03-07 PROCEDURE — 3017F COLORECTAL CA SCREEN DOC REV: CPT | Performed by: NURSE PRACTITIONER

## 2023-03-07 RX ORDER — GUAIFENESIN 1200 MG/1
1 TABLET, EXTENDED RELEASE ORAL 2 TIMES DAILY
Qty: 60 TABLET | Refills: 5 | Status: SHIPPED | OUTPATIENT
Start: 2023-03-07

## 2023-03-07 RX ORDER — DOXYCYCLINE HYCLATE 100 MG
100 TABLET ORAL 2 TIMES DAILY
Qty: 20 TABLET | Refills: 0 | Status: SHIPPED | OUTPATIENT
Start: 2023-03-07 | End: 2023-03-17

## 2023-03-07 RX ORDER — PREDNISONE 20 MG/1
40 TABLET ORAL DAILY
Qty: 10 TABLET | Refills: 0 | Status: SHIPPED | OUTPATIENT
Start: 2023-03-07 | End: 2023-03-12

## 2023-03-07 SDOH — ECONOMIC STABILITY: FOOD INSECURITY: WITHIN THE PAST 12 MONTHS, YOU WORRIED THAT YOUR FOOD WOULD RUN OUT BEFORE YOU GOT MONEY TO BUY MORE.: SOMETIMES TRUE

## 2023-03-07 SDOH — ECONOMIC STABILITY: HOUSING INSECURITY
IN THE LAST 12 MONTHS, WAS THERE A TIME WHEN YOU DID NOT HAVE A STEADY PLACE TO SLEEP OR SLEPT IN A SHELTER (INCLUDING NOW)?: NO

## 2023-03-07 SDOH — ECONOMIC STABILITY: FOOD INSECURITY: WITHIN THE PAST 12 MONTHS, THE FOOD YOU BOUGHT JUST DIDN'T LAST AND YOU DIDN'T HAVE MONEY TO GET MORE.: SOMETIMES TRUE

## 2023-03-07 SDOH — ECONOMIC STABILITY: TRANSPORTATION INSECURITY
IN THE PAST 12 MONTHS, HAS LACK OF TRANSPORTATION KEPT YOU FROM MEETINGS, WORK, OR FROM GETTING THINGS NEEDED FOR DAILY LIVING?: YES

## 2023-03-07 ASSESSMENT — ENCOUNTER SYMPTOMS
SINUS PRESSURE: 0
RHINORRHEA: 1
DIARRHEA: 0
WHEEZING: 1
SINUS PAIN: 0
VOMITING: 0
COUGH: 1
NAUSEA: 0
SHORTNESS OF BREATH: 1
SORE THROAT: 0
ABDOMINAL PAIN: 0

## 2023-03-07 NOTE — PROGRESS NOTES
Clifford Obrien (:  1970) is a Established patient, here for evaluation of the following:    Assessment & Plan   Below is the assessment and plan developed based on review of pertinent history, physical exam, labs, studies, and medications. 1. COPD exacerbation (HCC)  -     guaiFENesin 1200 MG TB12; Take 1 tablet by mouth in the morning and at bedtime, Disp-60 tablet, R-5Normal  -     predniSONE (DELTASONE) 20 MG tablet; Take 2 tablets by mouth daily for 5 days, Disp-10 tablet, R-0Normal  -     doxycycline hyclate (VIBRA-TABS) 100 MG tablet; Take 1 tablet by mouth 2 times daily for 10 days, Disp-20 tablet, R-0Normal  Take medications as prescribed. Call promptly (or present to ER or urgent care) if symptoms worsen or fail to improve. Return if symptoms worsen or fail to improve. Subjective   HPI  Patient presents today with complaint of cough and congestion. Symptoms started about 2 weeks ago. Her cough is worse than baseline and has been productive of thick yellow sputum. Associated symptoms include headache, fatigue, rhinorrhea, wheezing and SOB worse than usual. She continues home oxygen at 4 L as prescribed. She denies any fever, chills. She reports compliance with Breo as prescribed and has been using her albuterol or Duo-neb every 2-4 hours over the past week. She has previously been on Mucinex but reports that she ran out of the prescription. Review of Systems   Constitutional:  Positive for fatigue. Negative for chills and fever. HENT:  Positive for congestion and rhinorrhea. Negative for ear discharge, ear pain, postnasal drip, sinus pressure, sinus pain and sore throat. Respiratory:  Positive for cough, shortness of breath and wheezing. Cardiovascular:  Negative for chest pain and palpitations. Gastrointestinal:  Negative for abdominal pain, diarrhea, nausea and vomiting. Neurological:  Positive for headaches. Negative for dizziness and light-headedness. Objective     Physical Exam  Constitutional:       General: She is not in acute distress. Appearance: Normal appearance. She is not toxic-appearing. HENT:      Head: Normocephalic and atraumatic. Pulmonary:      Effort: No respiratory distress. Comments: Nasal cannula in place  Neurological:      Mental Status: She is alert and oriented to person, place, and time. Psychiatric:         Mood and Affect: Mood normal.            On this date 3/7/2023 I have spent 25 minutes reviewing previous notes, test results and face to face (virtual) with the patient discussing the diagnosis and importance of compliance with the treatment plan as well as documenting on the day of the visit. Jaspreet Obrien, was evaluated through a synchronous (real-time) audio-video encounter. The patient (or guardian if applicable) is aware that this is a billable service, which includes applicable co-pays. This Virtual Visit was conducted with patient's (and/or legal guardian's) consent. The visit was conducted pursuant to the emergency declaration under the [address] [place] [address], [address] [place] [address] authority and the [employer] General Act. Patient identification was verified, and a caregiver was present when appropriate.    The patient was located at Home: [address] [place] [address]  Provider was located at [facility] ([place] [address]): [address] [place] [address] [address],  [address]         --Patrice Coburn, FUNMI - CNP

## 2023-03-08 RX ORDER — APIXABAN 5 MG/1
TABLET, FILM COATED ORAL
Qty: 180 TABLET | Refills: 3 | Status: SHIPPED | OUTPATIENT
Start: 2023-03-08

## 2023-03-31 ENCOUNTER — TELEMEDICINE (OUTPATIENT)
Dept: INTERNAL MEDICINE CLINIC | Facility: CLINIC | Age: 53
End: 2023-03-31
Payer: MEDICARE

## 2023-03-31 ENCOUNTER — TELEPHONE (OUTPATIENT)
Dept: PULMONOLOGY | Age: 53
End: 2023-03-31

## 2023-03-31 DIAGNOSIS — J44.1 COPD EXACERBATION (HCC): Primary | ICD-10-CM

## 2023-03-31 PROCEDURE — 3017F COLORECTAL CA SCREEN DOC REV: CPT | Performed by: NURSE PRACTITIONER

## 2023-03-31 PROCEDURE — 99214 OFFICE O/P EST MOD 30 MIN: CPT | Performed by: NURSE PRACTITIONER

## 2023-03-31 PROCEDURE — G8427 DOCREV CUR MEDS BY ELIG CLIN: HCPCS | Performed by: NURSE PRACTITIONER

## 2023-03-31 RX ORDER — AMOXICILLIN AND CLAVULANATE POTASSIUM 875; 125 MG/1; MG/1
1 TABLET, FILM COATED ORAL 2 TIMES DAILY
Qty: 14 TABLET | Refills: 0 | Status: SHIPPED | OUTPATIENT
Start: 2023-03-31 | End: 2023-04-07

## 2023-03-31 RX ORDER — PREDNISONE 20 MG/1
40 TABLET ORAL DAILY
Qty: 10 TABLET | Refills: 0 | Status: SHIPPED | OUTPATIENT
Start: 2023-03-31 | End: 2023-04-05

## 2023-03-31 ASSESSMENT — ENCOUNTER SYMPTOMS
WHEEZING: 0
SINUS PRESSURE: 0
SHORTNESS OF BREATH: 1
RHINORRHEA: 1
SORE THROAT: 0
ABDOMINAL PAIN: 0
COUGH: 1
SINUS PAIN: 0
NAUSEA: 0
VOMITING: 0

## 2023-03-31 NOTE — TELEPHONE ENCOUNTER
TRIAGE CALL      Complaint: Coughing/Headaches  Cough: yes  Productive:  yes  Bloody Sputum:  no  Increased SOB/Wheezing:  yes  Duration: 2 months  Fever/Chills: no  OTC Meds tried: none  Talked to PCP this morning and they are calling in Prednisone and antibotic.

## 2023-03-31 NOTE — TELEPHONE ENCOUNTER
Last seen: 1/31/23  Hx: COPD, chronic resp fail    Had virtual visit this morning w/ PCP and course of antibiotics & steroids being sent to pharmacy; called to report issue w/ coughing & h/a's over the last 2 months; using duo neb PRN; needs f/u appt in our office at the end of the course to ensure doing better. Advised may need to look at new maintenance inhaler for COPD.

## 2023-03-31 NOTE — PROGRESS NOTES
Negative for dizziness and light-headedness. Objective     Physical Exam  Constitutional:       General: She is not in acute distress. Appearance: Normal appearance. She is not toxic-appearing. HENT:      Head: Normocephalic and atraumatic. Pulmonary:      Effort: No respiratory distress. Neurological:      Mental Status: She is alert and oriented to person, place, and time. Psychiatric:         Mood and Affect: Mood normal.            On this date 3/31/2023 I have spent 30 minutes reviewing previous notes, test results and face to face (virtual) with the patient discussing the diagnosis and importance of compliance with the treatment plan as well as documenting on the day of the visit. Dianna Obrien, was evaluated through a synchronous (real-time) audio-video encounter. The patient (or guardian if applicable) is aware that this is a billable service, which includes applicable co-pays. This Virtual Visit was conducted with patient's (and/or legal guardian's) consent. The visit was conducted pursuant to the emergency declaration under the [address] [place] [address], [address] [place] [address] authority and the [employer] General Act. Patient identification was verified, and a caregiver was present when appropriate.    The patient was located at Home: [address] [place] [address]  Provider was located at [facility] ([place] [address]): [address] [place] [address] [address],  [address]         --FUNMI Odell - CNP

## 2023-04-07 ENCOUNTER — OFFICE VISIT (OUTPATIENT)
Dept: PULMONOLOGY | Age: 53
End: 2023-04-07
Payer: MEDICARE

## 2023-04-07 VITALS
BODY MASS INDEX: 50.02 KG/M2 | TEMPERATURE: 99.3 F | OXYGEN SATURATION: 90 % | HEART RATE: 96 BPM | WEIGHT: 293 LBS | DIASTOLIC BLOOD PRESSURE: 78 MMHG | HEIGHT: 64 IN | SYSTOLIC BLOOD PRESSURE: 126 MMHG

## 2023-04-07 DIAGNOSIS — F17.210 CIGARETTE NICOTINE DEPENDENCE WITHOUT COMPLICATION: ICD-10-CM

## 2023-04-07 DIAGNOSIS — J96.11 CHRONIC RESPIRATORY FAILURE WITH HYPOXIA (HCC): ICD-10-CM

## 2023-04-07 DIAGNOSIS — J44.1 COPD EXACERBATION (HCC): Primary | ICD-10-CM

## 2023-04-07 DIAGNOSIS — E66.2 MORBID (SEVERE) OBESITY WITH ALVEOLAR HYPOVENTILATION (HCC): ICD-10-CM

## 2023-04-07 DIAGNOSIS — J44.9 STAGE 4 VERY SEVERE COPD BY GOLD CLASSIFICATION (HCC): ICD-10-CM

## 2023-04-07 DIAGNOSIS — E11.21 TYPE 2 DIABETES WITH NEPHROPATHY (HCC): ICD-10-CM

## 2023-04-07 DIAGNOSIS — J43.2 CENTRILOBULAR EMPHYSEMA (HCC): ICD-10-CM

## 2023-04-07 PROCEDURE — 3078F DIAST BP <80 MM HG: CPT | Performed by: NURSE PRACTITIONER

## 2023-04-07 PROCEDURE — G8427 DOCREV CUR MEDS BY ELIG CLIN: HCPCS | Performed by: NURSE PRACTITIONER

## 2023-04-07 PROCEDURE — 99214 OFFICE O/P EST MOD 30 MIN: CPT | Performed by: NURSE PRACTITIONER

## 2023-04-07 PROCEDURE — 3017F COLORECTAL CA SCREEN DOC REV: CPT | Performed by: NURSE PRACTITIONER

## 2023-04-07 PROCEDURE — 4004F PT TOBACCO SCREEN RCVD TLK: CPT | Performed by: NURSE PRACTITIONER

## 2023-04-07 PROCEDURE — 3074F SYST BP LT 130 MM HG: CPT | Performed by: NURSE PRACTITIONER

## 2023-04-07 PROCEDURE — G8417 CALC BMI ABV UP PARAM F/U: HCPCS | Performed by: NURSE PRACTITIONER

## 2023-04-07 PROCEDURE — 99406 BEHAV CHNG SMOKING 3-10 MIN: CPT | Performed by: NURSE PRACTITIONER

## 2023-04-07 PROCEDURE — 2022F DILAT RTA XM EVC RTNOPTHY: CPT | Performed by: NURSE PRACTITIONER

## 2023-04-07 PROCEDURE — 3023F SPIROM DOC REV: CPT | Performed by: NURSE PRACTITIONER

## 2023-04-07 PROCEDURE — 96372 THER/PROPH/DIAG INJ SC/IM: CPT | Performed by: NURSE PRACTITIONER

## 2023-04-07 PROCEDURE — 3044F HG A1C LEVEL LT 7.0%: CPT | Performed by: NURSE PRACTITIONER

## 2023-04-07 RX ORDER — DEXAMETHASONE SODIUM PHOSPHATE 10 MG/ML
8 INJECTION INTRAMUSCULAR; INTRAVENOUS ONCE
Status: COMPLETED | OUTPATIENT
Start: 2023-04-07 | End: 2023-04-07

## 2023-04-07 RX ADMIN — DEXAMETHASONE SODIUM PHOSPHATE 8 MG: 10 INJECTION INTRAMUSCULAR; INTRAVENOUS at 11:42

## 2023-04-07 ASSESSMENT — ENCOUNTER SYMPTOMS
SHORTNESS OF BREATH: 1
COUGH: 1
WHEEZING: 1
HEMOPTYSIS: 0
SPUTUM PRODUCTION: 0

## 2023-04-07 NOTE — PATIENT INSTRUCTIONS
or Garbanzo  [place] Peas  Soy Beans  Split Peas  Broccoli  Bok Yvan  Cabbage  Cauliflower  Celery  Cinthia Greens  Cucumbers  Eggplant  Lettuce (All varieties)  Juice (Limit to 6 ounces per day)  Tomato  V-8  Mushrooms  Mustard Greens  Okra  Onion - Limit to 1/2 per day  Peppers (All varieties)  Becky Meckel or those sweetened with [employer] (All varieties)  Rhubarb  Sauerkraut  Snow peas  Spinach  Sprouts, Alfalfa  Squash, Spaghetti  Squash, Summer  Yellow  Zucchini  Tomato - Limit to 1 whole or 10 cherry per serving  FAT CHOICES (with some suggested serving sizes) The following monounsaturated oils are recommended to be consumed daily:    Olive Oil  Canola Oil  Other Oil Choices that may be chosen (Polyunsaturated or a blend of Monounsaturated):    Corn  Enova  Grape seed  Safflower  Soybean  OTHER FAT CHOICES:    Avocado - 1/3 whole = 1 TBS oil  Guacamole - ½ cup = 1 TBS oil  Margarine - Chose those that do not contain Trans Fatty Acids such as Fleish[employer]'s Premium Olive Oil or Smart Balance  Mayonnaise - Regular or Low Fat  Olives (Green or Ripe) 15 = 1/2 TBS  Salad Dressing - Use those < 3 gms sugar per serving  TOPPINGS & SAUCES use sparingly (check labels for added sugar)    Hot Sauce  Salsa - Limit to 2 TBS during phase 1  Soy Sauce - 1/2 TBS  Steak Sauce - 1/2 TBS  [facility] Sauce - 1 TBS  Whipped Topping (Light) - 2 TBS  SPICES AND SEASONINGS    All spices that contain no added sugar  Broth  Extracts (almond, vanilla, or others)  Horseradish sauce  I Can't Believe It's Not Butter!  Spray  Lemon Juice  Lime Juice Pepper (black, cayenne, red, white)  SWEET TREATS (Limit to 75 calories per day)    Candies, hard, sugar-free  Chocolate powder, no-added-sugar  Cocoa powder, baking type  Fudgsicles, sugar-free  Gelatin, sugar-free  Gum, sugar-free  Popsicles, sugar-free  Sugar substitute Some Sugar Free  Products may be made with sugar alcohols (isomalt, lactitol, mannitol, sorbitol or xylitol) and

## 2023-04-07 NOTE — PROGRESS NOTES
She is a 72-year-old female seen today for follow-up of severe COPD, OHS, chronic hypoxic respiratory failure, morbid obesity with BMI of 53. She called in to report recent virtual visit with primary care provider, prescribed antibiotics, steroids. Recommended follow-up here to determine if she needed adjustment in therapy for COPD. She has routine follow-up with her usual provider here in July.
today's office visit was spent in face to face time reviewing test results, prognosis, importance of compliance, education about disease process, benefits of medications, instructions for management of acute symptoms, and follow up plans. Electronically signed. Dictated using voice recognition software.   Proof read but unrecognized errors may exist.

## 2023-04-16 ENCOUNTER — APPOINTMENT (OUTPATIENT)
Dept: GENERAL RADIOLOGY | Age: 53
DRG: 193 | End: 2023-04-16
Payer: MEDICARE

## 2023-04-16 ENCOUNTER — HOSPITAL ENCOUNTER (INPATIENT)
Age: 53
LOS: 3 days | Discharge: HOME | DRG: 193 | End: 2023-04-19
Attending: EMERGENCY MEDICINE | Admitting: FAMILY MEDICINE
Payer: MEDICARE

## 2023-04-16 DIAGNOSIS — Z09 HOSPITAL DISCHARGE FOLLOW-UP: ICD-10-CM

## 2023-04-16 DIAGNOSIS — R09.02 HYPOXIA: ICD-10-CM

## 2023-04-16 DIAGNOSIS — R10.13 EPIGASTRIC ABDOMINAL PAIN: ICD-10-CM

## 2023-04-16 DIAGNOSIS — J44.1 COPD EXACERBATION (HCC): Primary | ICD-10-CM

## 2023-04-16 DIAGNOSIS — J18.9 PNEUMONIA OF RIGHT MIDDLE LOBE DUE TO INFECTIOUS ORGANISM: ICD-10-CM

## 2023-04-16 PROBLEM — K85.10 ACUTE GALLSTONE PANCREATITIS: Status: RESOLVED | Noted: 2022-10-26 | Resolved: 2023-04-16

## 2023-04-16 PROBLEM — F17.211 CIGARETTE NICOTINE DEPENDENCE IN REMISSION: Status: RESOLVED | Noted: 2017-03-05 | Resolved: 2023-04-16

## 2023-04-16 PROBLEM — D68.69 SECONDARY HYPERCOAGULABLE STATE (HCC): Chronic | Status: ACTIVE | Noted: 2023-04-16

## 2023-04-16 PROBLEM — I48.0 PAROXYSMAL ATRIAL FIBRILLATION (HCC): Chronic | Status: ACTIVE | Noted: 2019-08-30

## 2023-04-16 PROBLEM — J96.21 ACUTE ON CHRONIC RESPIRATORY FAILURE WITH HYPOXIA (HCC): Chronic | Status: ACTIVE | Noted: 2019-08-28

## 2023-04-16 PROBLEM — F17.219 CIGARETTE NICOTINE DEPENDENCE WITH NICOTINE-INDUCED DISORDER: Chronic | Status: ACTIVE | Noted: 2023-04-07

## 2023-04-16 PROBLEM — U07.1 COVID-19: Status: RESOLVED | Noted: 2021-01-14 | Resolved: 2023-04-16

## 2023-04-16 PROBLEM — E11.21 TYPE 2 DIABETES WITH NEPHROPATHY (HCC): Status: ACTIVE | Noted: 2020-01-07

## 2023-04-16 LAB
ALBUMIN SERPL-MCNC: 3.3 G/DL (ref 3.5–5)
ALBUMIN/GLOB SERPL: 0.7 (ref 0.4–1.6)
ALP SERPL-CCNC: 98 U/L (ref 50–136)
ALT SERPL-CCNC: 21 U/L (ref 12–65)
ANION GAP SERPL CALC-SCNC: 3 MMOL/L (ref 2–11)
AST SERPL-CCNC: 12 U/L (ref 15–37)
BILIRUB SERPL-MCNC: 0.2 MG/DL (ref 0.2–1.1)
BUN SERPL-MCNC: 8 MG/DL (ref 6–23)
CALCIUM SERPL-MCNC: 9.4 MG/DL (ref 8.3–10.4)
CHLORIDE SERPL-SCNC: 100 MMOL/L (ref 101–110)
CO2 SERPL-SCNC: 35 MMOL/L (ref 21–32)
CREAT SERPL-MCNC: 0.7 MG/DL (ref 0.6–1)
EKG ATRIAL RATE: 91 BPM
EKG DIAGNOSIS: NORMAL
EKG P AXIS: 16 DEGREES
EKG P-R INTERVAL: 146 MS
EKG Q-T INTERVAL: 365 MS
EKG QRS DURATION: 83 MS
EKG QTC CALCULATION (BAZETT): 452 MS
EKG R AXIS: -54 DEGREES
EKG T AXIS: 81 DEGREES
EKG VENTRICULAR RATE: 92 BPM
ERYTHROCYTE [DISTWIDTH] IN BLOOD BY AUTOMATED COUNT: 21.5 % (ref 11.9–14.6)
FLUAV RNA SPEC QL NAA+PROBE: NOT DETECTED
FLUBV RNA SPEC QL NAA+PROBE: NOT DETECTED
GLOBULIN SER CALC-MCNC: 4.5 G/DL (ref 2.8–4.5)
GLUCOSE BLD STRIP.AUTO-MCNC: 118 MG/DL (ref 65–100)
GLUCOSE SERPL-MCNC: 145 MG/DL (ref 65–100)
HCT VFR BLD AUTO: 39.7 % (ref 35.8–46.3)
HGB BLD-MCNC: 10.6 G/DL (ref 11.7–15.4)
LACTATE SERPL-SCNC: 1 MMOL/L (ref 0.4–2)
LACTATE SERPL-SCNC: 1.3 MMOL/L (ref 0.4–2)
MCH RBC QN AUTO: 19.3 PG (ref 26.1–32.9)
MCHC RBC AUTO-ENTMCNC: 26.7 G/DL (ref 31.4–35)
MCV RBC AUTO: 72.4 FL (ref 82–102)
NRBC # BLD: 0 K/UL (ref 0–0.2)
NT PRO BNP: 36 PG/ML (ref 5–125)
PLATELET # BLD AUTO: 342 K/UL (ref 150–450)
PMV BLD AUTO: 10.6 FL (ref 9.4–12.3)
POTASSIUM SERPL-SCNC: 3.6 MMOL/L (ref 3.5–5.1)
PROCALCITONIN SERPL-MCNC: 0.13 NG/ML (ref 0–0.49)
PROT SERPL-MCNC: 7.8 G/DL (ref 6.3–8.2)
RBC # BLD AUTO: 5.48 M/UL (ref 4.05–5.2)
SARS-COV-2 RDRP RESP QL NAA+PROBE: NOT DETECTED
SERVICE CMNT-IMP: ABNORMAL
SODIUM SERPL-SCNC: 138 MMOL/L (ref 133–143)
SOURCE: NORMAL
TROPONIN I SERPL HS-MCNC: <3 PG/ML (ref 0–37)
TROPONIN I SERPL HS-MCNC: <3 PG/ML (ref 0–37)
WBC # BLD AUTO: 11.4 K/UL (ref 4.3–11.1)

## 2023-04-16 PROCEDURE — 71045 X-RAY EXAM CHEST 1 VIEW: CPT

## 2023-04-16 PROCEDURE — 80053 COMPREHEN METABOLIC PANEL: CPT

## 2023-04-16 PROCEDURE — 84484 ASSAY OF TROPONIN QUANT: CPT

## 2023-04-16 PROCEDURE — 87502 INFLUENZA DNA AMP PROBE: CPT

## 2023-04-16 PROCEDURE — 2700000000 HC OXYGEN THERAPY PER DAY

## 2023-04-16 PROCEDURE — 36415 COLL VENOUS BLD VENIPUNCTURE: CPT

## 2023-04-16 PROCEDURE — 96375 TX/PRO/DX INJ NEW DRUG ADDON: CPT | Performed by: EMERGENCY MEDICINE

## 2023-04-16 PROCEDURE — 96365 THER/PROPH/DIAG IV INF INIT: CPT | Performed by: EMERGENCY MEDICINE

## 2023-04-16 PROCEDURE — 6360000002 HC RX W HCPCS: Performed by: FAMILY MEDICINE

## 2023-04-16 PROCEDURE — 6360000002 HC RX W HCPCS: Performed by: EMERGENCY MEDICINE

## 2023-04-16 PROCEDURE — 82962 GLUCOSE BLOOD TEST: CPT

## 2023-04-16 PROCEDURE — 2580000003 HC RX 258: Performed by: EMERGENCY MEDICINE

## 2023-04-16 PROCEDURE — 2580000003 HC RX 258: Performed by: FAMILY MEDICINE

## 2023-04-16 PROCEDURE — 6370000000 HC RX 637 (ALT 250 FOR IP): Performed by: FAMILY MEDICINE

## 2023-04-16 PROCEDURE — 6370000000 HC RX 637 (ALT 250 FOR IP): Performed by: EMERGENCY MEDICINE

## 2023-04-16 PROCEDURE — 93005 ELECTROCARDIOGRAM TRACING: CPT | Performed by: EMERGENCY MEDICINE

## 2023-04-16 PROCEDURE — 87635 SARS-COV-2 COVID-19 AMP PRB: CPT

## 2023-04-16 PROCEDURE — 94640 AIRWAY INHALATION TREATMENT: CPT

## 2023-04-16 PROCEDURE — 84145 PROCALCITONIN (PCT): CPT

## 2023-04-16 PROCEDURE — 99285 EMERGENCY DEPT VISIT HI MDM: CPT | Performed by: EMERGENCY MEDICINE

## 2023-04-16 PROCEDURE — 1100000000 HC RM PRIVATE

## 2023-04-16 PROCEDURE — 85027 COMPLETE CBC AUTOMATED: CPT

## 2023-04-16 PROCEDURE — 94760 N-INVAS EAR/PLS OXIMETRY 1: CPT

## 2023-04-16 PROCEDURE — 83605 ASSAY OF LACTIC ACID: CPT

## 2023-04-16 PROCEDURE — 83880 ASSAY OF NATRIURETIC PEPTIDE: CPT

## 2023-04-16 PROCEDURE — 87040 BLOOD CULTURE FOR BACTERIA: CPT

## 2023-04-16 RX ORDER — PANTOPRAZOLE SODIUM 40 MG/1
40 TABLET, DELAYED RELEASE ORAL
Status: DISCONTINUED | OUTPATIENT
Start: 2023-04-17 | End: 2023-04-17

## 2023-04-16 RX ORDER — CETIRIZINE HYDROCHLORIDE 10 MG/1
10 TABLET ORAL DAILY
Status: DISCONTINUED | OUTPATIENT
Start: 2023-04-17 | End: 2023-04-19 | Stop reason: HOSPADM

## 2023-04-16 RX ORDER — IPRATROPIUM BROMIDE AND ALBUTEROL SULFATE 2.5; .5 MG/3ML; MG/3ML
1 SOLUTION RESPIRATORY (INHALATION)
Status: DISCONTINUED | OUTPATIENT
Start: 2023-04-16 | End: 2023-04-19 | Stop reason: HOSPADM

## 2023-04-16 RX ORDER — ACETAMINOPHEN 650 MG/1
650 SUPPOSITORY RECTAL EVERY 6 HOURS PRN
Status: DISCONTINUED | OUTPATIENT
Start: 2023-04-16 | End: 2023-04-19 | Stop reason: HOSPADM

## 2023-04-16 RX ORDER — POLYETHYLENE GLYCOL 3350 17 G/17G
17 POWDER, FOR SOLUTION ORAL DAILY PRN
Status: DISCONTINUED | OUTPATIENT
Start: 2023-04-16 | End: 2023-04-19 | Stop reason: HOSPADM

## 2023-04-16 RX ORDER — PREDNISONE 20 MG/1
60 TABLET ORAL DAILY
Status: DISCONTINUED | OUTPATIENT
Start: 2023-04-17 | End: 2023-04-17

## 2023-04-16 RX ORDER — SODIUM CHLORIDE 0.9 % (FLUSH) 0.9 %
5-40 SYRINGE (ML) INJECTION EVERY 12 HOURS SCHEDULED
Status: DISCONTINUED | OUTPATIENT
Start: 2023-04-16 | End: 2023-04-19 | Stop reason: HOSPADM

## 2023-04-16 RX ORDER — BENZONATATE 100 MG/1
200 CAPSULE ORAL 3 TIMES DAILY PRN
Status: DISCONTINUED | OUTPATIENT
Start: 2023-04-16 | End: 2023-04-19 | Stop reason: HOSPADM

## 2023-04-16 RX ORDER — AZITHROMYCIN 250 MG/1
500 TABLET, FILM COATED ORAL DAILY
Status: CANCELLED | OUTPATIENT
Start: 2023-04-17 | End: 2023-04-19

## 2023-04-16 RX ORDER — ACETAMINOPHEN 325 MG/1
650 TABLET ORAL EVERY 6 HOURS PRN
Status: DISCONTINUED | OUTPATIENT
Start: 2023-04-16 | End: 2023-04-19 | Stop reason: HOSPADM

## 2023-04-16 RX ORDER — HYDROXYZINE PAMOATE 25 MG/1
25 CAPSULE ORAL 3 TIMES DAILY PRN
Status: DISCONTINUED | OUTPATIENT
Start: 2023-04-16 | End: 2023-04-19 | Stop reason: HOSPADM

## 2023-04-16 RX ORDER — INSULIN LISPRO 100 [IU]/ML
0-4 INJECTION, SOLUTION INTRAVENOUS; SUBCUTANEOUS NIGHTLY
Status: DISCONTINUED | OUTPATIENT
Start: 2023-04-16 | End: 2023-04-19 | Stop reason: HOSPADM

## 2023-04-16 RX ORDER — FAMOTIDINE 20 MG/1
20 TABLET, FILM COATED ORAL 2 TIMES DAILY
Status: DISCONTINUED | OUTPATIENT
Start: 2023-04-16 | End: 2023-04-17

## 2023-04-16 RX ORDER — CHOLESTYRAMINE LIGHT 4 G/5.7G
1 POWDER, FOR SUSPENSION ORAL DAILY
Status: DISCONTINUED | OUTPATIENT
Start: 2023-04-17 | End: 2023-04-18

## 2023-04-16 RX ORDER — NICOTINE 21 MG/24HR
1 PATCH, TRANSDERMAL 24 HOURS TRANSDERMAL DAILY
Status: DISCONTINUED | OUTPATIENT
Start: 2023-04-17 | End: 2023-04-19 | Stop reason: HOSPADM

## 2023-04-16 RX ORDER — FLUTICASONE PROPIONATE 50 MCG
2 SPRAY, SUSPENSION (ML) NASAL DAILY
Status: DISCONTINUED | OUTPATIENT
Start: 2023-04-17 | End: 2023-04-19 | Stop reason: HOSPADM

## 2023-04-16 RX ORDER — SODIUM CHLORIDE 9 MG/ML
INJECTION, SOLUTION INTRAVENOUS PRN
Status: DISCONTINUED | OUTPATIENT
Start: 2023-04-16 | End: 2023-04-19 | Stop reason: HOSPADM

## 2023-04-16 RX ORDER — DILTIAZEM HYDROCHLORIDE 240 MG/1
240 CAPSULE, COATED, EXTENDED RELEASE ORAL DAILY
Status: DISCONTINUED | OUTPATIENT
Start: 2023-04-17 | End: 2023-04-19 | Stop reason: HOSPADM

## 2023-04-16 RX ORDER — GUAIFENESIN 600 MG/1
1200 TABLET, EXTENDED RELEASE ORAL 2 TIMES DAILY
Status: DISCONTINUED | OUTPATIENT
Start: 2023-04-16 | End: 2023-04-19 | Stop reason: HOSPADM

## 2023-04-16 RX ORDER — GUAIFENESIN/DEXTROMETHORPHAN 100-10MG/5
5 SYRUP ORAL EVERY 4 HOURS PRN
Status: DISCONTINUED | OUTPATIENT
Start: 2023-04-16 | End: 2023-04-19 | Stop reason: HOSPADM

## 2023-04-16 RX ORDER — ARFORMOTEROL TARTRATE 15 UG/2ML
15 SOLUTION RESPIRATORY (INHALATION) 2 TIMES DAILY
Status: DISCONTINUED | OUTPATIENT
Start: 2023-04-16 | End: 2023-04-19 | Stop reason: HOSPADM

## 2023-04-16 RX ORDER — ATORVASTATIN CALCIUM 10 MG/1
10 TABLET, FILM COATED ORAL NIGHTLY
Status: DISCONTINUED | OUTPATIENT
Start: 2023-04-16 | End: 2023-04-19 | Stop reason: HOSPADM

## 2023-04-16 RX ORDER — ONDANSETRON 2 MG/ML
4 INJECTION INTRAMUSCULAR; INTRAVENOUS EVERY 6 HOURS PRN
Status: DISCONTINUED | OUTPATIENT
Start: 2023-04-16 | End: 2023-04-19 | Stop reason: HOSPADM

## 2023-04-16 RX ORDER — BUDESONIDE 0.5 MG/2ML
0.5 INHALANT ORAL 2 TIMES DAILY
Status: DISCONTINUED | OUTPATIENT
Start: 2023-04-16 | End: 2023-04-19 | Stop reason: HOSPADM

## 2023-04-16 RX ORDER — SODIUM CHLORIDE FOR INHALATION 3 %
4 VIAL, NEBULIZER (ML) INHALATION 2 TIMES DAILY
Status: DISCONTINUED | OUTPATIENT
Start: 2023-04-16 | End: 2023-04-19 | Stop reason: HOSPADM

## 2023-04-16 RX ORDER — ONDANSETRON 4 MG/1
4 TABLET, ORALLY DISINTEGRATING ORAL EVERY 8 HOURS PRN
Status: DISCONTINUED | OUTPATIENT
Start: 2023-04-16 | End: 2023-04-19 | Stop reason: HOSPADM

## 2023-04-16 RX ORDER — DEXTROSE MONOHYDRATE 100 MG/ML
INJECTION, SOLUTION INTRAVENOUS CONTINUOUS PRN
Status: DISCONTINUED | OUTPATIENT
Start: 2023-04-16 | End: 2023-04-19 | Stop reason: HOSPADM

## 2023-04-16 RX ORDER — SODIUM CHLORIDE 0.9 % (FLUSH) 0.9 %
5-40 SYRINGE (ML) INJECTION PRN
Status: DISCONTINUED | OUTPATIENT
Start: 2023-04-16 | End: 2023-04-19 | Stop reason: HOSPADM

## 2023-04-16 RX ORDER — INSULIN GLARGINE 100 [IU]/ML
5 INJECTION, SOLUTION SUBCUTANEOUS NIGHTLY
Status: DISCONTINUED | OUTPATIENT
Start: 2023-04-16 | End: 2023-04-19 | Stop reason: HOSPADM

## 2023-04-16 RX ORDER — DEXAMETHASONE SODIUM PHOSPHATE 10 MG/ML
10 INJECTION INTRAMUSCULAR; INTRAVENOUS
Status: COMPLETED | OUTPATIENT
Start: 2023-04-16 | End: 2023-04-16

## 2023-04-16 RX ORDER — MAGNESIUM HYDROXIDE/ALUMINUM HYDROXICE/SIMETHICONE 120; 1200; 1200 MG/30ML; MG/30ML; MG/30ML
30 SUSPENSION ORAL EVERY 6 HOURS PRN
Status: DISCONTINUED | OUTPATIENT
Start: 2023-04-16 | End: 2023-04-19 | Stop reason: HOSPADM

## 2023-04-16 RX ORDER — INSULIN LISPRO 100 [IU]/ML
0-8 INJECTION, SOLUTION INTRAVENOUS; SUBCUTANEOUS
Status: DISCONTINUED | OUTPATIENT
Start: 2023-04-17 | End: 2023-04-19 | Stop reason: HOSPADM

## 2023-04-16 RX ORDER — LEVOFLOXACIN 500 MG/1
750 TABLET, FILM COATED ORAL DAILY
Status: DISCONTINUED | OUTPATIENT
Start: 2023-04-17 | End: 2023-04-19 | Stop reason: HOSPADM

## 2023-04-16 RX ORDER — IPRATROPIUM BROMIDE AND ALBUTEROL SULFATE 2.5; .5 MG/3ML; MG/3ML
1 SOLUTION RESPIRATORY (INHALATION)
Status: COMPLETED | OUTPATIENT
Start: 2023-04-16 | End: 2023-04-16

## 2023-04-16 RX ADMIN — SALINE NASAL SPRAY 2 SPRAY: 1.5 SOLUTION NASAL at 21:58

## 2023-04-16 RX ADMIN — FAMOTIDINE 20 MG: 20 TABLET, FILM COATED ORAL at 21:52

## 2023-04-16 RX ADMIN — AZITHROMYCIN DIHYDRATE 500 MG: 500 INJECTION, POWDER, LYOPHILIZED, FOR SOLUTION INTRAVENOUS at 18:47

## 2023-04-16 RX ADMIN — ATORVASTATIN CALCIUM 10 MG: 10 TABLET, FILM COATED ORAL at 21:53

## 2023-04-16 RX ADMIN — IPRATROPIUM BROMIDE AND ALBUTEROL SULFATE 1 AMPULE: 2.5; .5 SOLUTION RESPIRATORY (INHALATION) at 16:05

## 2023-04-16 RX ADMIN — CEFTRIAXONE 1000 MG: 1 INJECTION, POWDER, FOR SOLUTION INTRAMUSCULAR; INTRAVENOUS at 18:15

## 2023-04-16 RX ADMIN — APIXABAN 5 MG: 5 TABLET, FILM COATED ORAL at 21:52

## 2023-04-16 RX ADMIN — GUAIFENESIN 1200 MG: 600 TABLET ORAL at 21:57

## 2023-04-16 RX ADMIN — SODIUM CHLORIDE, PRESERVATIVE FREE 10 ML: 5 INJECTION INTRAVENOUS at 21:53

## 2023-04-16 RX ADMIN — Medication: at 21:59

## 2023-04-16 RX ADMIN — DEXAMETHASONE SODIUM PHOSPHATE 10 MG: 10 INJECTION, SOLUTION INTRAMUSCULAR; INTRAVENOUS at 16:09

## 2023-04-16 RX ADMIN — ARFORMOTEROL TARTRATE 15 MCG: 15 SOLUTION RESPIRATORY (INHALATION) at 20:25

## 2023-04-16 RX ADMIN — IPRATROPIUM BROMIDE AND ALBUTEROL SULFATE 1 AMPULE: 2.5; .5 SOLUTION RESPIRATORY (INHALATION) at 20:24

## 2023-04-16 RX ADMIN — SODIUM CHLORIDE SOLN NEBU 3% 4 ML: 3 NEBU SOLN at 23:10

## 2023-04-16 RX ADMIN — INSULIN GLARGINE 5 UNITS: 100 INJECTION, SOLUTION SUBCUTANEOUS at 21:49

## 2023-04-16 RX ADMIN — BENZONATATE 200 MG: 100 CAPSULE ORAL at 21:52

## 2023-04-16 RX ADMIN — BUDESONIDE 500 MCG: 0.5 INHALANT RESPIRATORY (INHALATION) at 20:24

## 2023-04-16 ASSESSMENT — ENCOUNTER SYMPTOMS
SHORTNESS OF BREATH: 1
COUGH: 1
RHINORRHEA: 0
ABDOMINAL PAIN: 0
WHEEZING: 1
VOMITING: 0
NAUSEA: 0

## 2023-04-16 ASSESSMENT — LIFESTYLE VARIABLES
HOW OFTEN DO YOU HAVE A DRINK CONTAINING ALCOHOL: NEVER
HOW MANY STANDARD DRINKS CONTAINING ALCOHOL DO YOU HAVE ON A TYPICAL DAY: PATIENT DOES NOT DRINK

## 2023-04-17 PROBLEM — E55.9 VITAMIN D DEFICIENCY: Chronic | Status: ACTIVE | Noted: 2023-04-17

## 2023-04-17 LAB
25(OH)D3 SERPL-MCNC: 8 NG/ML (ref 30–100)
ALBUMIN SERPL-MCNC: 3.2 G/DL (ref 3.5–5)
ALBUMIN/GLOB SERPL: 0.8 (ref 0.4–1.6)
ALP SERPL-CCNC: 100 U/L (ref 50–136)
ALT SERPL-CCNC: 19 U/L (ref 12–65)
ANION GAP SERPL CALC-SCNC: 2 MMOL/L (ref 2–11)
AST SERPL-CCNC: 10 U/L (ref 15–37)
BACTERIA SPEC CULT: NORMAL
BASOPHILS # BLD: 0 K/UL (ref 0–0.2)
BASOPHILS NFR BLD: 0 % (ref 0–2)
BILIRUB SERPL-MCNC: 0.3 MG/DL (ref 0.2–1.1)
BUN SERPL-MCNC: 10 MG/DL (ref 6–23)
CALCIUM SERPL-MCNC: 9.3 MG/DL (ref 8.3–10.4)
CHLORIDE SERPL-SCNC: 101 MMOL/L (ref 101–110)
CO2 SERPL-SCNC: 34 MMOL/L (ref 21–32)
CREAT SERPL-MCNC: 0.6 MG/DL (ref 0.6–1)
DIFFERENTIAL METHOD BLD: ABNORMAL
EOSINOPHIL # BLD: 0 K/UL (ref 0–0.8)
EOSINOPHIL NFR BLD: 0 % (ref 0.5–7.8)
ERYTHROCYTE [DISTWIDTH] IN BLOOD BY AUTOMATED COUNT: 21.2 % (ref 11.9–14.6)
FERRITIN SERPL-MCNC: 23 NG/ML (ref 8–388)
FOLATE SERPL-MCNC: 9.7 NG/ML (ref 3.1–17.5)
GLOBULIN SER CALC-MCNC: 4 G/DL (ref 2.8–4.5)
GLUCOSE BLD STRIP.AUTO-MCNC: 127 MG/DL (ref 65–100)
GLUCOSE BLD STRIP.AUTO-MCNC: 131 MG/DL (ref 65–100)
GLUCOSE BLD STRIP.AUTO-MCNC: 133 MG/DL (ref 65–100)
GLUCOSE BLD STRIP.AUTO-MCNC: 162 MG/DL (ref 65–100)
GLUCOSE SERPL-MCNC: 176 MG/DL (ref 65–100)
HCT VFR BLD AUTO: 37 % (ref 35.8–46.3)
HEMOCCULT STL QL: POSITIVE
HGB BLD-MCNC: 10 G/DL (ref 11.7–15.4)
IMM GRANULOCYTES # BLD AUTO: 0 K/UL (ref 0–0.5)
IMM GRANULOCYTES NFR BLD AUTO: 0 % (ref 0–5)
IRON SATN MFR SERPL: 6 %
IRON SERPL-MCNC: 23 UG/DL (ref 35–150)
LYMPHOCYTES # BLD: 0.9 K/UL (ref 0.5–4.6)
LYMPHOCYTES NFR BLD: 7 % (ref 13–44)
MAGNESIUM SERPL-MCNC: 2.1 MG/DL (ref 1.8–2.4)
MCH RBC QN AUTO: 19.4 PG (ref 26.1–32.9)
MCHC RBC AUTO-ENTMCNC: 27 G/DL (ref 31.4–35)
MCV RBC AUTO: 71.7 FL (ref 82–102)
MONOCYTES # BLD: 0.1 K/UL (ref 0.1–1.3)
MONOCYTES NFR BLD: 1 % (ref 4–12)
NEUTS SEG # BLD: 11.3 K/UL (ref 1.7–8.2)
NEUTS SEG NFR BLD: 91 % (ref 43–78)
NRBC # BLD: 0.02 K/UL (ref 0–0.2)
PLATELET # BLD AUTO: 324 K/UL (ref 150–450)
PMV BLD AUTO: 10.5 FL (ref 9.4–12.3)
POTASSIUM SERPL-SCNC: 4.8 MMOL/L (ref 3.5–5.1)
PROT SERPL-MCNC: 7.2 G/DL (ref 6.3–8.2)
RBC # BLD AUTO: 5.16 M/UL (ref 4.05–5.2)
SERVICE CMNT-IMP: ABNORMAL
SERVICE CMNT-IMP: NORMAL
SODIUM SERPL-SCNC: 137 MMOL/L (ref 133–143)
TIBC SERPL-MCNC: 397 UG/DL (ref 250–450)
VIT B12 SERPL-MCNC: 455 PG/ML (ref 193–986)
WBC # BLD AUTO: 12.4 K/UL (ref 4.3–11.1)

## 2023-04-17 PROCEDURE — 83540 ASSAY OF IRON: CPT

## 2023-04-17 PROCEDURE — 2580000003 HC RX 258: Performed by: FAMILY MEDICINE

## 2023-04-17 PROCEDURE — 82746 ASSAY OF FOLIC ACID SERUM: CPT

## 2023-04-17 PROCEDURE — 82306 VITAMIN D 25 HYDROXY: CPT

## 2023-04-17 PROCEDURE — 97161 PT EVAL LOW COMPLEX 20 MIN: CPT

## 2023-04-17 PROCEDURE — 97165 OT EVAL LOW COMPLEX 30 MIN: CPT

## 2023-04-17 PROCEDURE — 94761 N-INVAS EAR/PLS OXIMETRY MLT: CPT

## 2023-04-17 PROCEDURE — 2700000000 HC OXYGEN THERAPY PER DAY

## 2023-04-17 PROCEDURE — 6370000000 HC RX 637 (ALT 250 FOR IP): Performed by: INTERNAL MEDICINE

## 2023-04-17 PROCEDURE — 87641 MR-STAPH DNA AMP PROBE: CPT

## 2023-04-17 PROCEDURE — 6370000000 HC RX 637 (ALT 250 FOR IP): Performed by: FAMILY MEDICINE

## 2023-04-17 PROCEDURE — 2580000003 HC RX 258: Performed by: INTERNAL MEDICINE

## 2023-04-17 PROCEDURE — 82607 VITAMIN B-12: CPT

## 2023-04-17 PROCEDURE — 6360000002 HC RX W HCPCS: Performed by: FAMILY MEDICINE

## 2023-04-17 PROCEDURE — 97530 THERAPEUTIC ACTIVITIES: CPT

## 2023-04-17 PROCEDURE — 94760 N-INVAS EAR/PLS OXIMETRY 1: CPT

## 2023-04-17 PROCEDURE — 82962 GLUCOSE BLOOD TEST: CPT

## 2023-04-17 PROCEDURE — 36415 COLL VENOUS BLD VENIPUNCTURE: CPT

## 2023-04-17 PROCEDURE — 97112 NEUROMUSCULAR REEDUCATION: CPT

## 2023-04-17 PROCEDURE — 80053 COMPREHEN METABOLIC PANEL: CPT

## 2023-04-17 PROCEDURE — 83735 ASSAY OF MAGNESIUM: CPT

## 2023-04-17 PROCEDURE — 82728 ASSAY OF FERRITIN: CPT

## 2023-04-17 PROCEDURE — 94640 AIRWAY INHALATION TREATMENT: CPT

## 2023-04-17 PROCEDURE — 1100000000 HC RM PRIVATE

## 2023-04-17 PROCEDURE — 82272 OCCULT BLD FECES 1-3 TESTS: CPT

## 2023-04-17 PROCEDURE — 97535 SELF CARE MNGMENT TRAINING: CPT

## 2023-04-17 PROCEDURE — 85025 COMPLETE CBC W/AUTO DIFF WBC: CPT

## 2023-04-17 PROCEDURE — 6360000002 HC RX W HCPCS: Performed by: INTERNAL MEDICINE

## 2023-04-17 PROCEDURE — 87070 CULTURE OTHR SPECIMN AEROBIC: CPT

## 2023-04-17 RX ORDER — ERGOCALCIFEROL 1.25 MG/1
50000 CAPSULE ORAL WEEKLY
Status: DISCONTINUED | OUTPATIENT
Start: 2023-04-18 | End: 2023-04-19 | Stop reason: HOSPADM

## 2023-04-17 RX ORDER — PANTOPRAZOLE SODIUM 40 MG/1
40 TABLET, DELAYED RELEASE ORAL
Status: DISCONTINUED | OUTPATIENT
Start: 2023-04-17 | End: 2023-04-19 | Stop reason: HOSPADM

## 2023-04-17 RX ORDER — PREDNISONE 20 MG/1
40 TABLET ORAL DAILY
Status: DISCONTINUED | OUTPATIENT
Start: 2023-04-18 | End: 2023-04-19 | Stop reason: HOSPADM

## 2023-04-17 RX ADMIN — IPRATROPIUM BROMIDE AND ALBUTEROL SULFATE 1 AMPULE: 2.5; .5 SOLUTION RESPIRATORY (INHALATION) at 02:19

## 2023-04-17 RX ADMIN — ATORVASTATIN CALCIUM 10 MG: 10 TABLET, FILM COATED ORAL at 22:29

## 2023-04-17 RX ADMIN — LEVOFLOXACIN 750 MG: 500 TABLET, FILM COATED ORAL at 08:36

## 2023-04-17 RX ADMIN — GUAIFENESIN SYRUP AND DEXTROMETHORPHAN 5 ML: 100; 10 SYRUP ORAL at 07:00

## 2023-04-17 RX ADMIN — GUAIFENESIN 1200 MG: 600 TABLET ORAL at 22:28

## 2023-04-17 RX ADMIN — ARFORMOTEROL TARTRATE 15 MCG: 15 SOLUTION RESPIRATORY (INHALATION) at 20:22

## 2023-04-17 RX ADMIN — GUAIFENESIN 1200 MG: 600 TABLET ORAL at 08:36

## 2023-04-17 RX ADMIN — SODIUM CHLORIDE SOLN NEBU 3% 4 ML: 3 NEBU SOLN at 20:22

## 2023-04-17 RX ADMIN — APIXABAN 5 MG: 5 TABLET, FILM COATED ORAL at 08:36

## 2023-04-17 RX ADMIN — PREDNISONE 60 MG: 20 TABLET ORAL at 08:36

## 2023-04-17 RX ADMIN — BUDESONIDE 500 MCG: 0.5 INHALANT RESPIRATORY (INHALATION) at 20:22

## 2023-04-17 RX ADMIN — PANTOPRAZOLE SODIUM 40 MG: 40 TABLET, DELAYED RELEASE ORAL at 06:54

## 2023-04-17 RX ADMIN — SODIUM CHLORIDE 125 MG: 9 INJECTION, SOLUTION INTRAVENOUS at 12:09

## 2023-04-17 RX ADMIN — CETIRIZINE HYDROCHLORIDE 10 MG: 10 TABLET, FILM COATED ORAL at 08:36

## 2023-04-17 RX ADMIN — FAMOTIDINE 20 MG: 20 TABLET, FILM COATED ORAL at 08:37

## 2023-04-17 RX ADMIN — PANTOPRAZOLE SODIUM 40 MG: 40 TABLET, DELAYED RELEASE ORAL at 16:59

## 2023-04-17 RX ADMIN — ARFORMOTEROL TARTRATE 15 MCG: 15 SOLUTION RESPIRATORY (INHALATION) at 08:58

## 2023-04-17 RX ADMIN — SALINE NASAL SPRAY 2 SPRAY: 1.5 SOLUTION NASAL at 08:38

## 2023-04-17 RX ADMIN — IPRATROPIUM BROMIDE AND ALBUTEROL SULFATE 1 AMPULE: 2.5; .5 SOLUTION RESPIRATORY (INHALATION) at 12:00

## 2023-04-17 RX ADMIN — INSULIN GLARGINE 5 UNITS: 100 INJECTION, SOLUTION SUBCUTANEOUS at 22:36

## 2023-04-17 RX ADMIN — IPRATROPIUM BROMIDE AND ALBUTEROL SULFATE 1 AMPULE: 2.5; .5 SOLUTION RESPIRATORY (INHALATION) at 20:22

## 2023-04-17 RX ADMIN — GUAIFENESIN SYRUP AND DEXTROMETHORPHAN 5 ML: 100; 10 SYRUP ORAL at 22:29

## 2023-04-17 RX ADMIN — BUDESONIDE 500 MCG: 0.5 INHALANT RESPIRATORY (INHALATION) at 08:58

## 2023-04-17 RX ADMIN — Medication: at 22:49

## 2023-04-17 RX ADMIN — Medication: at 08:37

## 2023-04-17 RX ADMIN — IPRATROPIUM BROMIDE AND ALBUTEROL SULFATE 1 AMPULE: 2.5; .5 SOLUTION RESPIRATORY (INHALATION) at 08:58

## 2023-04-17 RX ADMIN — DILTIAZEM HYDROCHLORIDE 240 MG: 240 CAPSULE, EXTENDED RELEASE ORAL at 08:36

## 2023-04-17 RX ADMIN — FLUTICASONE PROPIONATE 2 SPRAY: 50 SPRAY, METERED NASAL at 08:34

## 2023-04-17 RX ADMIN — IPRATROPIUM BROMIDE AND ALBUTEROL SULFATE 1 AMPULE: 2.5; .5 SOLUTION RESPIRATORY (INHALATION) at 15:51

## 2023-04-17 RX ADMIN — DOCUSATE SODIUM 50 MG: 50 LIQUID ORAL at 08:34

## 2023-04-17 RX ADMIN — SALINE NASAL SPRAY 2 SPRAY: 1.5 SOLUTION NASAL at 22:50

## 2023-04-17 RX ADMIN — SODIUM CHLORIDE, PRESERVATIVE FREE 10 ML: 5 INJECTION INTRAVENOUS at 08:36

## 2023-04-17 RX ADMIN — BENZONATATE 200 MG: 100 CAPSULE ORAL at 22:27

## 2023-04-17 RX ADMIN — SODIUM CHLORIDE, PRESERVATIVE FREE 10 ML: 5 INJECTION INTRAVENOUS at 22:31

## 2023-04-17 ASSESSMENT — ENCOUNTER SYMPTOMS
ABDOMINAL DISTENTION: 1
BLOOD IN STOOL: 1
CHEST TIGHTNESS: 1
SHORTNESS OF BREATH: 1
COUGH: 1

## 2023-04-17 NOTE — PLAN OF CARE
Problem: Discharge Planning  Goal: Discharge to home or other facility with appropriate resources  4/16/2023 2332 by Princess Simmonds, RN  Outcome: Progressing  4/16/2023 2332 by Princess Simmonds, RN  Outcome: Progressing     Problem: ABCDS Injury Assessment  Goal: Absence of physical injury  4/16/2023 2332 by Princess Simmonds, RN  Outcome: Progressing  4/16/2023 2332 by Princess Simmonds, RN  Outcome: Progressing

## 2023-04-17 NOTE — CONSULTS
retinopathy due to secondary diabetes ([facility] 03/07/2020    Mild, Bilateral    THANH on CPAP 3/2/2017    Paroxysmal atrial fibrillation (HCC)     Pneumonia ages 3 and 9    Type 2 diabetes mellitus ([facility]         Past Surgical History     Past Surgical History:   Procedure Laterality Date    BREAST LUMPECTOMY W/ NEEDLE LOCALIZATION Left 03/27/2019    CHOLECYSTECTOMY, LAPAROSCOPIC N/A 10/30/2022    CHOLECYSTECTOMY LAPAROSCOPIC WITH POSSIBLE LIVER BIOPSY performed by Boni Dang MD at [address] [place] [address] (CERVIX NOT REMOVED)  08/2012    US BREAST BIOPSY W LOC DEVICE 1ST LESION LEFT Left 01/07/2019    US BREAST NEEDLE BIOPSY LEFT 1/7/2019 [facility]    US GUIDED NEEDLE LOC OF LEFT BREAST Left 03/27/2019    US GUIDED NEEDLE LOC OF LEFT BREAST 3/27/2019 [facility]        Medications     Prior to Admission medications    Medication Sig Start Date End Date Taking? Authorizing Provider   ELIQUIS 5 MG TABS tablet TAKE ONE TABLET BY MOUTH EVERY 12 HOURS 3/8/23   Robin Lyman MD   guaiFENesin 1200 MG TB12 Take 1 tablet by mouth in the morning and at bedtime 3/7/23   FUNMI Marie CNP   fluticasone furoate-vilanterol (BREO ELLIPTA) 200-25 MCG/ACT AEPB inhaler Inhale 1 puff into the lungs daily 1/31/23   FUNMI Hickey CNP   atorvastatin (LIPITOR) 10 MG tablet Take 1 tablet by mouth at bedtime 1/30/23   Torrie Oro PA-C   metFORMIN (GLUCOPHAGE) 500 MG tablet Take 1 tablet by mouth 2 times daily (with meals) 1/30/23   Torrie Oro PA-C   dilTIAZem (CARDIZEM CD) 240 MG extended release capsule Take 1 capsule by mouth daily 1/30/23   Torrie Oro PA-C   cholestyramine (QUESTRAN) 4 g packet Take 1 packet by mouth daily 1/30/23   Xochitl Ivy PA-C   blood glucose test strips (ASCENSIA AUTODISC VI;ONE TOUCH ULTRA TEST VI) strip Use to check blood sugar once daily as directed.   Dx: E11.29 8/24/21   Historical Provider, MD   Lancets 33G MISC Use to check

## 2023-04-17 NOTE — WOUND CARE
Patient seen per nursing request for reported foot callous. Noted left plantar foot has 3 areas that have plantar wart characteristics. Painful to walk on. She has seen a podiatrist in the past (years ago) but currently is not under their care. Recommend she go back to podiatrist for foot care as patient reports she can not perform and has no family that can help. She stated she lives with her nephew but he would not be able to help her with her feet. Answered all questions. Will sign off. Please call if a wound care need arises.

## 2023-04-18 ENCOUNTER — ANESTHESIA (OUTPATIENT)
Dept: ENDOSCOPY | Age: 53
DRG: 193 | End: 2023-04-18
Payer: MEDICARE

## 2023-04-18 ENCOUNTER — ANESTHESIA EVENT (OUTPATIENT)
Dept: ENDOSCOPY | Age: 53
DRG: 193 | End: 2023-04-18
Payer: MEDICARE

## 2023-04-18 PROBLEM — K92.2 UPPER GI BLEED: Status: ACTIVE | Noted: 2023-04-18

## 2023-04-18 PROBLEM — E83.52 HYPERCALCEMIA: Status: ACTIVE | Noted: 2023-04-18

## 2023-04-18 PROBLEM — D64.9 ANEMIA: Status: ACTIVE | Noted: 2023-04-16

## 2023-04-18 PROBLEM — R10.13 EPIGASTRIC ABDOMINAL PAIN: Status: ACTIVE | Noted: 2023-04-16

## 2023-04-18 LAB
25(OH)D3 SERPL-MCNC: 9 NG/ML (ref 30–100)
ALBUMIN SERPL-MCNC: 3.1 G/DL (ref 3.5–5)
ALBUMIN/GLOB SERPL: 0.7 (ref 0.4–1.6)
ALP SERPL-CCNC: 89 U/L (ref 50–136)
ALT SERPL-CCNC: 19 U/L (ref 12–65)
ANION GAP SERPL CALC-SCNC: ABNORMAL MMOL/L (ref 2–11)
AST SERPL-CCNC: 5 U/L (ref 15–37)
BASOPHILS # BLD: 0 K/UL (ref 0–0.2)
BASOPHILS NFR BLD: 0 % (ref 0–2)
BILIRUB SERPL-MCNC: 0.2 MG/DL (ref 0.2–1.1)
BUN SERPL-MCNC: 11 MG/DL (ref 6–23)
CALCIUM SERPL-MCNC: 10.1 MG/DL (ref 8.3–10.4)
CALCIUM SERPL-MCNC: 11.2 MG/DL (ref 8.3–10.4)
CHLORIDE SERPL-SCNC: 102 MMOL/L (ref 101–110)
CO2 SERPL-SCNC: 35 MMOL/L (ref 21–32)
CREAT SERPL-MCNC: 0.7 MG/DL (ref 0.6–1)
DIFFERENTIAL METHOD BLD: ABNORMAL
EOSINOPHIL # BLD: 0 K/UL (ref 0–0.8)
EOSINOPHIL NFR BLD: 0 % (ref 0.5–7.8)
ERYTHROCYTE [DISTWIDTH] IN BLOOD BY AUTOMATED COUNT: 21.7 % (ref 11.9–14.6)
GLOBULIN SER CALC-MCNC: 4.5 G/DL (ref 2.8–4.5)
GLUCOSE BLD STRIP.AUTO-MCNC: 100 MG/DL (ref 65–100)
GLUCOSE BLD STRIP.AUTO-MCNC: 97 MG/DL (ref 65–100)
GLUCOSE BLD STRIP.AUTO-MCNC: 98 MG/DL (ref 65–100)
GLUCOSE SERPL-MCNC: 157 MG/DL (ref 65–100)
HCT VFR BLD AUTO: 36.4 % (ref 35.8–46.3)
HGB BLD-MCNC: 10 G/DL (ref 11.7–15.4)
IMM GRANULOCYTES # BLD AUTO: 0.1 K/UL (ref 0–0.5)
IMM GRANULOCYTES NFR BLD AUTO: 1 % (ref 0–5)
LYMPHOCYTES # BLD: 1.6 K/UL (ref 0.5–4.6)
LYMPHOCYTES NFR BLD: 9 % (ref 13–44)
MAGNESIUM SERPL-MCNC: 2.6 MG/DL (ref 1.8–2.4)
MCH RBC QN AUTO: 19.3 PG (ref 26.1–32.9)
MCHC RBC AUTO-ENTMCNC: 27.5 G/DL (ref 31.4–35)
MCV RBC AUTO: 70.3 FL (ref 82–102)
MONOCYTES # BLD: 1.2 K/UL (ref 0.1–1.3)
MONOCYTES NFR BLD: 6 % (ref 4–12)
NEUTS SEG # BLD: 15.8 K/UL (ref 1.7–8.2)
NEUTS SEG NFR BLD: 85 % (ref 43–78)
NRBC # BLD: 0.09 K/UL (ref 0–0.2)
PLATELET # BLD AUTO: 348 K/UL (ref 150–450)
PMV BLD AUTO: 11.1 FL (ref 9.4–12.3)
POTASSIUM SERPL-SCNC: 4.2 MMOL/L (ref 3.5–5.1)
PROT SERPL-MCNC: 7.6 G/DL (ref 6.3–8.2)
PTH-INTACT SERPL-MCNC: 105.2 PG/ML (ref 18.5–88)
RBC # BLD AUTO: 5.18 M/UL (ref 4.05–5.2)
SERVICE CMNT-IMP: NORMAL
SODIUM SERPL-SCNC: 136 MMOL/L (ref 133–143)
WBC # BLD AUTO: 18.7 K/UL (ref 4.3–11.1)

## 2023-04-18 PROCEDURE — 80053 COMPREHEN METABOLIC PANEL: CPT

## 2023-04-18 PROCEDURE — 6360000002 HC RX W HCPCS: Performed by: INTERNAL MEDICINE

## 2023-04-18 PROCEDURE — 7100000010 HC PHASE II RECOVERY - FIRST 15 MIN: Performed by: INTERNAL MEDICINE

## 2023-04-18 PROCEDURE — 2580000003 HC RX 258: Performed by: FAMILY MEDICINE

## 2023-04-18 PROCEDURE — 2580000003 HC RX 258: Performed by: INTERNAL MEDICINE

## 2023-04-18 PROCEDURE — 3609012400 HC EGD TRANSORAL BIOPSY SINGLE/MULTIPLE: Performed by: INTERNAL MEDICINE

## 2023-04-18 PROCEDURE — 82962 GLUCOSE BLOOD TEST: CPT

## 2023-04-18 PROCEDURE — 6370000000 HC RX 637 (ALT 250 FOR IP): Performed by: FAMILY MEDICINE

## 2023-04-18 PROCEDURE — 6370000000 HC RX 637 (ALT 250 FOR IP): Performed by: INTERNAL MEDICINE

## 2023-04-18 PROCEDURE — 2700000000 HC OXYGEN THERAPY PER DAY

## 2023-04-18 PROCEDURE — 36415 COLL VENOUS BLD VENIPUNCTURE: CPT

## 2023-04-18 PROCEDURE — 1100000000 HC RM PRIVATE

## 2023-04-18 PROCEDURE — 0DB68ZX EXCISION OF STOMACH, VIA NATURAL OR ARTIFICIAL OPENING ENDOSCOPIC, DIAGNOSTIC: ICD-10-PCS | Performed by: INTERNAL MEDICINE

## 2023-04-18 PROCEDURE — 83735 ASSAY OF MAGNESIUM: CPT

## 2023-04-18 PROCEDURE — 82306 VITAMIN D 25 HYDROXY: CPT

## 2023-04-18 PROCEDURE — 94760 N-INVAS EAR/PLS OXIMETRY 1: CPT

## 2023-04-18 PROCEDURE — 3700000001 HC ADD 15 MINUTES (ANESTHESIA): Performed by: INTERNAL MEDICINE

## 2023-04-18 PROCEDURE — 94640 AIRWAY INHALATION TREATMENT: CPT

## 2023-04-18 PROCEDURE — 83970 ASSAY OF PARATHORMONE: CPT

## 2023-04-18 PROCEDURE — 85025 COMPLETE CBC W/AUTO DIFF WBC: CPT

## 2023-04-18 PROCEDURE — 6360000002 HC RX W HCPCS

## 2023-04-18 PROCEDURE — 88305 TISSUE EXAM BY PATHOLOGIST: CPT

## 2023-04-18 PROCEDURE — 2500000003 HC RX 250 WO HCPCS

## 2023-04-18 PROCEDURE — 6360000002 HC RX W HCPCS: Performed by: FAMILY MEDICINE

## 2023-04-18 PROCEDURE — 2709999900 HC NON-CHARGEABLE SUPPLY: Performed by: INTERNAL MEDICINE

## 2023-04-18 PROCEDURE — 3700000000 HC ANESTHESIA ATTENDED CARE: Performed by: INTERNAL MEDICINE

## 2023-04-18 PROCEDURE — 88312 SPECIAL STAINS GROUP 1: CPT

## 2023-04-18 PROCEDURE — 7100000011 HC PHASE II RECOVERY - ADDTL 15 MIN: Performed by: INTERNAL MEDICINE

## 2023-04-18 RX ORDER — PROPOFOL 10 MG/ML
INJECTION, EMULSION INTRAVENOUS PRN
Status: DISCONTINUED | OUTPATIENT
Start: 2023-04-18 | End: 2023-04-18 | Stop reason: SDUPTHER

## 2023-04-18 RX ORDER — KETAMINE HYDROCHLORIDE 50 MG/ML
INJECTION, SOLUTION, CONCENTRATE INTRAMUSCULAR; INTRAVENOUS PRN
Status: DISCONTINUED | OUTPATIENT
Start: 2023-04-18 | End: 2023-04-18 | Stop reason: SDUPTHER

## 2023-04-18 RX ORDER — IPRATROPIUM BROMIDE AND ALBUTEROL SULFATE 2.5; .5 MG/3ML; MG/3ML
1 SOLUTION RESPIRATORY (INHALATION) ONCE
Status: CANCELLED | OUTPATIENT
Start: 2023-04-18

## 2023-04-18 RX ORDER — LIDOCAINE HYDROCHLORIDE 20 MG/ML
INJECTION, SOLUTION EPIDURAL; INFILTRATION; INTRACAUDAL; PERINEURAL PRN
Status: DISCONTINUED | OUTPATIENT
Start: 2023-04-18 | End: 2023-04-18 | Stop reason: SDUPTHER

## 2023-04-18 RX ADMIN — ARFORMOTEROL TARTRATE 15 MCG: 15 SOLUTION RESPIRATORY (INHALATION) at 20:03

## 2023-04-18 RX ADMIN — ATORVASTATIN CALCIUM 10 MG: 10 TABLET, FILM COATED ORAL at 22:51

## 2023-04-18 RX ADMIN — GUAIFENESIN 1200 MG: 600 TABLET ORAL at 22:50

## 2023-04-18 RX ADMIN — SODIUM CHLORIDE SOLN NEBU 3% 4 ML: 3 NEBU SOLN at 20:07

## 2023-04-18 RX ADMIN — PREDNISONE 40 MG: 20 TABLET ORAL at 08:09

## 2023-04-18 RX ADMIN — SODIUM CHLORIDE, PRESERVATIVE FREE 10 ML: 5 INJECTION INTRAVENOUS at 22:56

## 2023-04-18 RX ADMIN — SALINE NASAL SPRAY 2 SPRAY: 1.5 SOLUTION NASAL at 08:10

## 2023-04-18 RX ADMIN — SODIUM CHLORIDE 125 MG: 9 INJECTION, SOLUTION INTRAVENOUS at 14:30

## 2023-04-18 RX ADMIN — IPRATROPIUM BROMIDE AND ALBUTEROL SULFATE 1 AMPULE: 2.5; .5 SOLUTION RESPIRATORY (INHALATION) at 20:03

## 2023-04-18 RX ADMIN — CETIRIZINE HYDROCHLORIDE 10 MG: 10 TABLET, FILM COATED ORAL at 08:09

## 2023-04-18 RX ADMIN — INSULIN GLARGINE 5 UNITS: 100 INJECTION, SOLUTION SUBCUTANEOUS at 22:53

## 2023-04-18 RX ADMIN — IPRATROPIUM BROMIDE AND ALBUTEROL SULFATE 1 AMPULE: 2.5; .5 SOLUTION RESPIRATORY (INHALATION) at 11:04

## 2023-04-18 RX ADMIN — ARFORMOTEROL TARTRATE 15 MCG: 15 SOLUTION RESPIRATORY (INHALATION) at 07:46

## 2023-04-18 RX ADMIN — PANTOPRAZOLE SODIUM 40 MG: 40 TABLET, DELAYED RELEASE ORAL at 07:24

## 2023-04-18 RX ADMIN — GUAIFENESIN SYRUP AND DEXTROMETHORPHAN 5 ML: 100; 10 SYRUP ORAL at 22:51

## 2023-04-18 RX ADMIN — KETAMINE HYDROCHLORIDE 20 MG: 50 INJECTION, SOLUTION INTRAMUSCULAR; INTRAVENOUS at 12:43

## 2023-04-18 RX ADMIN — PROPOFOL 30 MG: 10 INJECTION, EMULSION INTRAVENOUS at 12:43

## 2023-04-18 RX ADMIN — KETAMINE HYDROCHLORIDE 20 MG: 50 INJECTION, SOLUTION INTRAMUSCULAR; INTRAVENOUS at 12:44

## 2023-04-18 RX ADMIN — PANTOPRAZOLE SODIUM 40 MG: 40 TABLET, DELAYED RELEASE ORAL at 16:30

## 2023-04-18 RX ADMIN — Medication: at 08:09

## 2023-04-18 RX ADMIN — LEVOFLOXACIN 750 MG: 500 TABLET, FILM COATED ORAL at 08:09

## 2023-04-18 RX ADMIN — IPRATROPIUM BROMIDE AND ALBUTEROL SULFATE 1 AMPULE: 2.5; .5 SOLUTION RESPIRATORY (INHALATION) at 15:16

## 2023-04-18 RX ADMIN — Medication: at 22:53

## 2023-04-18 RX ADMIN — SODIUM CHLORIDE SOLN NEBU 3% 4 ML: 3 NEBU SOLN at 07:46

## 2023-04-18 RX ADMIN — SALINE NASAL SPRAY 2 SPRAY: 1.5 SOLUTION NASAL at 22:52

## 2023-04-18 RX ADMIN — IPRATROPIUM BROMIDE AND ALBUTEROL SULFATE 1 AMPULE: 2.5; .5 SOLUTION RESPIRATORY (INHALATION) at 07:45

## 2023-04-18 RX ADMIN — SODIUM CHLORIDE, PRESERVATIVE FREE 5 ML: 5 INJECTION INTRAVENOUS at 08:10

## 2023-04-18 RX ADMIN — BUDESONIDE 500 MCG: 0.5 INHALANT RESPIRATORY (INHALATION) at 07:46

## 2023-04-18 RX ADMIN — LIDOCAINE HYDROCHLORIDE 100 MG: 20 INJECTION, SOLUTION EPIDURAL; INFILTRATION; INTRACAUDAL; PERINEURAL at 12:41

## 2023-04-18 RX ADMIN — PROPOFOL 30 MG: 10 INJECTION, EMULSION INTRAVENOUS at 12:47

## 2023-04-18 RX ADMIN — BENZONATATE 200 MG: 100 CAPSULE ORAL at 22:51

## 2023-04-18 RX ADMIN — BUDESONIDE 500 MCG: 0.5 INHALANT RESPIRATORY (INHALATION) at 20:03

## 2023-04-18 RX ADMIN — FLUTICASONE PROPIONATE 2 SPRAY: 50 SPRAY, METERED NASAL at 08:09

## 2023-04-18 RX ADMIN — PROPOFOL 10 MG: 10 INJECTION, EMULSION INTRAVENOUS at 12:44

## 2023-04-18 ASSESSMENT — COPD QUESTIONNAIRES: CAT_SEVERITY: SEVERE

## 2023-04-18 ASSESSMENT — PAIN - FUNCTIONAL ASSESSMENT: PAIN_FUNCTIONAL_ASSESSMENT: NONE - DENIES PAIN

## 2023-04-18 ASSESSMENT — LIFESTYLE VARIABLES: SMOKING_STATUS: 1

## 2023-04-18 NOTE — ANESTHESIA POSTPROCEDURE EVALUATION
Department of Anesthesiology  Postprocedure Note    Patient: Seven Ferreira  MRN: [id number]  YOB: 1970  Date of evaluation: 4/18/2023      Procedure Summary     Date: 04/18/23 Room / Location: [facility] ENDO FLOURO 1 / [facility] ENDOSCOPY    Anesthesia Start: 0744 Anesthesia Stop: 1213    Procedure: EGD BIOPSY (Upper GI Region) Diagnosis:       Epigastric abdominal pain      Anemia      (Epigastric abdominal pain [R10.13])      (Anemia [D64.9])    Surgeons: Susan Prado MD Responsible Provider: Isabel Muhammad MD    Anesthesia Type: general ASA Status: 4          Anesthesia Type: No value filed.     Meena Phase I: Meena Score: 9    Meena Phase II: Meena Score: 9      Anesthesia Post Evaluation    Patient location during evaluation: PACU  Patient participation: complete - patient participated  Level of consciousness: awake and alert  Airway patency: patent  Nausea & Vomiting: no nausea and no vomiting  Complications: no  Cardiovascular status: hemodynamically stable  Respiratory status: acceptable, nonlabored ventilation and spontaneous ventilation  Hydration status: euvolemic  Comments: BP (!) 143/89   Pulse 95   Temp 99 °F (37.2 °C)   Resp 24   Ht 5' 5\" (1.651 m)   Wt (!) 305 lb (138.3 kg)   SpO2 92%   BMI 50.75 kg/m²     Multimodal analgesia pain management approach

## 2023-04-18 NOTE — ANESTHESIA PRE PROCEDURE
Department of Anesthesiology  Preprocedure Note       Name:  Margaret Aly   Age:  48 y.o.  :  1970                                          MRN:  [id number]         Date:  2023      Surgeon: Silver Hogan):  Deepthi Gee MD    Procedure: Procedure(s):  EGD ESOPHAGOGASTRODUODENOSCOPY    Medications prior to admission:   Prior to Admission medications    Medication Sig Start Date End Date Taking? Authorizing Provider   ELIQUIS 5 MG TABS tablet TAKE ONE TABLET BY MOUTH EVERY 12 HOURS 3/8/23   Katelynn Zamorano MD   guaiFENesin 1200 MG TB12 Take 1 tablet by mouth in the morning and at bedtime 3/7/23   FUNMI An CNP   fluticasone furoate-vilanterol (BREO ELLIPTA) 200-25 MCG/ACT AEPB inhaler Inhale 1 puff into the lungs daily 23   FUNMI Cooley CNP   atorvastatin (LIPITOR) 10 MG tablet Take 1 tablet by mouth at bedtime 23   [place] PA-C   metFORMIN (GLUCOPHAGE) 500 MG tablet Take 1 tablet by mouth 2 times daily (with meals) 23   [place] PA-C   dilTIAZem (CARDIZEM CD) 240 MG extended release capsule Take 1 capsule by mouth daily 23   [place] PA-C   cholestyramine (QUESTRAN) 4 g packet Take 1 packet by mouth daily 23   Xochitl Ivy PA-C   blood glucose test strips (ASCENSIA AUTODISC VI;ONE TOUCH ULTRA TEST VI) strip Use to check blood sugar once daily as directed. Dx: E11.29 21   Historical Provider, MD   Lancets 33G MISC Use to check glucose once daily.   Dx: E11.29 21   Historical Provider, MD   albuterol sulfate  (90 Base) MCG/ACT inhaler Inhale 2 puffs into the lungs every 4 hours as needed for Wheezing 22   FUNMI Cooley CNP   ipratropium-albuterol (DUONEB) 0.5-2.5 (3) MG/3ML SOLN nebulizer solution Inhale 3 mLs into the lungs 4 times daily File to Medicare part B--J44.9 22   Charmaine Farmer, APRN - CNP       Current medications:    No current facility-administered medications for this

## 2023-04-19 VITALS
DIASTOLIC BLOOD PRESSURE: 70 MMHG | WEIGHT: 293 LBS | TEMPERATURE: 99.1 F | OXYGEN SATURATION: 92 % | HEIGHT: 65 IN | HEART RATE: 96 BPM | SYSTOLIC BLOOD PRESSURE: 137 MMHG | RESPIRATION RATE: 20 BRPM | BODY MASS INDEX: 48.82 KG/M2

## 2023-04-19 PROBLEM — K25.0 ACUTE GASTRIC ULCER WITH BLEEDING: Status: RESOLVED | Noted: 2023-04-19 | Resolved: 2023-04-19

## 2023-04-19 PROBLEM — J96.21 ACUTE ON CHRONIC RESPIRATORY FAILURE WITH HYPOXIA (HCC): Chronic | Status: RESOLVED | Noted: 2019-08-28 | Resolved: 2023-04-19

## 2023-04-19 PROBLEM — K25.0 ACUTE GASTRIC ULCER WITH BLEEDING: Status: ACTIVE | Noted: 2023-04-19

## 2023-04-19 LAB
ALBUMIN SERPL-MCNC: 3 G/DL (ref 3.5–5)
ALBUMIN/GLOB SERPL: 0.7 (ref 0.4–1.6)
ALP SERPL-CCNC: 74 U/L (ref 50–136)
ALT SERPL-CCNC: 20 U/L (ref 12–65)
ANION GAP SERPL CALC-SCNC: 2 MMOL/L (ref 2–11)
AST SERPL-CCNC: 13 U/L (ref 15–37)
BACTERIA SPEC CULT: NORMAL
BASOPHILS # BLD: 0 K/UL (ref 0–0.2)
BASOPHILS NFR BLD: 0 % (ref 0–2)
BILIRUB SERPL-MCNC: 0.2 MG/DL (ref 0.2–1.1)
BUN SERPL-MCNC: 12 MG/DL (ref 6–23)
CALCIUM SERPL-MCNC: 9.7 MG/DL (ref 8.3–10.4)
CHLORIDE SERPL-SCNC: 100 MMOL/L (ref 101–110)
CO2 SERPL-SCNC: 35 MMOL/L (ref 21–32)
CREAT SERPL-MCNC: 0.7 MG/DL (ref 0.6–1)
DIFFERENTIAL METHOD BLD: ABNORMAL
EOSINOPHIL # BLD: 0 K/UL (ref 0–0.8)
EOSINOPHIL NFR BLD: 0 % (ref 0.5–7.8)
ERYTHROCYTE [DISTWIDTH] IN BLOOD BY AUTOMATED COUNT: 21.2 % (ref 11.9–14.6)
GLOBULIN SER CALC-MCNC: 4.1 G/DL (ref 2.8–4.5)
GLUCOSE BLD STRIP.AUTO-MCNC: 133 MG/DL (ref 65–100)
GLUCOSE BLD STRIP.AUTO-MCNC: 133 MG/DL (ref 65–100)
GLUCOSE BLD STRIP.AUTO-MCNC: 80 MG/DL (ref 65–100)
GLUCOSE SERPL-MCNC: 79 MG/DL (ref 65–100)
GRAM STN SPEC: NORMAL
HCT VFR BLD AUTO: 33.9 % (ref 35.8–46.3)
HGB BLD-MCNC: 9.4 G/DL (ref 11.7–15.4)
IMM GRANULOCYTES # BLD AUTO: 0.1 K/UL (ref 0–0.5)
IMM GRANULOCYTES NFR BLD AUTO: 0 % (ref 0–5)
LYMPHOCYTES # BLD: 3.2 K/UL (ref 0.5–4.6)
LYMPHOCYTES NFR BLD: 20 % (ref 13–44)
MAGNESIUM SERPL-MCNC: 2.5 MG/DL (ref 1.8–2.4)
MCH RBC QN AUTO: 19.3 PG (ref 26.1–32.9)
MCHC RBC AUTO-ENTMCNC: 27.7 G/DL (ref 31.4–35)
MCV RBC AUTO: 69.5 FL (ref 82–102)
MONOCYTES # BLD: 1.4 K/UL (ref 0.1–1.3)
MONOCYTES NFR BLD: 9 % (ref 4–12)
NEUTS SEG # BLD: 11.4 K/UL (ref 1.7–8.2)
NEUTS SEG NFR BLD: 71 % (ref 43–78)
NRBC # BLD: 0.07 K/UL (ref 0–0.2)
PLATELET # BLD AUTO: 343 K/UL (ref 150–450)
PMV BLD AUTO: 10.9 FL (ref 9.4–12.3)
POTASSIUM SERPL-SCNC: 3.6 MMOL/L (ref 3.5–5.1)
PROT SERPL-MCNC: 7.1 G/DL (ref 6.3–8.2)
RBC # BLD AUTO: 4.88 M/UL (ref 4.05–5.2)
SERVICE CMNT-IMP: ABNORMAL
SERVICE CMNT-IMP: ABNORMAL
SERVICE CMNT-IMP: NORMAL
SERVICE CMNT-IMP: NORMAL
SODIUM SERPL-SCNC: 137 MMOL/L (ref 133–143)
WBC # BLD AUTO: 16 K/UL (ref 4.3–11.1)

## 2023-04-19 PROCEDURE — 82962 GLUCOSE BLOOD TEST: CPT

## 2023-04-19 PROCEDURE — 36415 COLL VENOUS BLD VENIPUNCTURE: CPT

## 2023-04-19 PROCEDURE — 83735 ASSAY OF MAGNESIUM: CPT

## 2023-04-19 PROCEDURE — 2580000003 HC RX 258: Performed by: FAMILY MEDICINE

## 2023-04-19 PROCEDURE — 6360000002 HC RX W HCPCS: Performed by: INTERNAL MEDICINE

## 2023-04-19 PROCEDURE — 6370000000 HC RX 637 (ALT 250 FOR IP): Performed by: INTERNAL MEDICINE

## 2023-04-19 PROCEDURE — 85025 COMPLETE CBC W/AUTO DIFF WBC: CPT

## 2023-04-19 PROCEDURE — 6370000000 HC RX 637 (ALT 250 FOR IP): Performed by: FAMILY MEDICINE

## 2023-04-19 PROCEDURE — 80053 COMPREHEN METABOLIC PANEL: CPT

## 2023-04-19 PROCEDURE — 94660 CPAP INITIATION&MGMT: CPT

## 2023-04-19 PROCEDURE — 97530 THERAPEUTIC ACTIVITIES: CPT

## 2023-04-19 PROCEDURE — 94640 AIRWAY INHALATION TREATMENT: CPT

## 2023-04-19 PROCEDURE — 94761 N-INVAS EAR/PLS OXIMETRY MLT: CPT

## 2023-04-19 PROCEDURE — 6360000002 HC RX W HCPCS: Performed by: FAMILY MEDICINE

## 2023-04-19 PROCEDURE — 2580000003 HC RX 258: Performed by: INTERNAL MEDICINE

## 2023-04-19 PROCEDURE — 2700000000 HC OXYGEN THERAPY PER DAY

## 2023-04-19 RX ORDER — LEVOFLOXACIN 750 MG/1
750 TABLET ORAL DAILY
Qty: 2 TABLET | Refills: 0 | Status: SHIPPED | OUTPATIENT
Start: 2023-04-20 | End: 2023-04-22

## 2023-04-19 RX ORDER — BENZONATATE 200 MG/1
200 CAPSULE ORAL 3 TIMES DAILY PRN
Qty: 30 CAPSULE | Refills: 0 | Status: SHIPPED | OUTPATIENT
Start: 2023-04-19 | End: 2023-04-26

## 2023-04-19 RX ORDER — PANTOPRAZOLE SODIUM 40 MG/1
40 TABLET, DELAYED RELEASE ORAL
Qty: 60 TABLET | Refills: 0 | Status: SHIPPED | OUTPATIENT
Start: 2023-04-19

## 2023-04-19 RX ORDER — PREDNISONE 20 MG/1
40 TABLET ORAL DAILY
Qty: 2 TABLET | Refills: 0 | Status: SHIPPED | OUTPATIENT
Start: 2023-04-20 | End: 2023-04-21

## 2023-04-19 RX ADMIN — Medication: at 07:57

## 2023-04-19 RX ADMIN — IPRATROPIUM BROMIDE AND ALBUTEROL SULFATE 1 AMPULE: 2.5; .5 SOLUTION RESPIRATORY (INHALATION) at 15:45

## 2023-04-19 RX ADMIN — SODIUM CHLORIDE, PRESERVATIVE FREE 5 ML: 5 INJECTION INTRAVENOUS at 08:00

## 2023-04-19 RX ADMIN — DOCUSATE SODIUM 50 MG: 50 LIQUID ORAL at 07:59

## 2023-04-19 RX ADMIN — FLUTICASONE PROPIONATE 2 SPRAY: 50 SPRAY, METERED NASAL at 07:57

## 2023-04-19 RX ADMIN — IPRATROPIUM BROMIDE AND ALBUTEROL SULFATE 1 AMPULE: 2.5; .5 SOLUTION RESPIRATORY (INHALATION) at 11:27

## 2023-04-19 RX ADMIN — IPRATROPIUM BROMIDE AND ALBUTEROL SULFATE 1 AMPULE: 2.5; .5 SOLUTION RESPIRATORY (INHALATION) at 08:07

## 2023-04-19 RX ADMIN — SALINE NASAL SPRAY 2 SPRAY: 1.5 SOLUTION NASAL at 07:57

## 2023-04-19 RX ADMIN — PREDNISONE 40 MG: 20 TABLET ORAL at 08:00

## 2023-04-19 RX ADMIN — ARFORMOTEROL TARTRATE 15 MCG: 15 SOLUTION RESPIRATORY (INHALATION) at 08:07

## 2023-04-19 RX ADMIN — SODIUM CHLORIDE SOLN NEBU 3% 4 ML: 3 NEBU SOLN at 08:07

## 2023-04-19 RX ADMIN — DILTIAZEM HYDROCHLORIDE 240 MG: 240 CAPSULE, EXTENDED RELEASE ORAL at 08:00

## 2023-04-19 RX ADMIN — CETIRIZINE HYDROCHLORIDE 10 MG: 10 TABLET, FILM COATED ORAL at 08:00

## 2023-04-19 RX ADMIN — BUDESONIDE 500 MCG: 0.5 INHALANT RESPIRATORY (INHALATION) at 08:07

## 2023-04-19 RX ADMIN — PANTOPRAZOLE SODIUM 40 MG: 40 TABLET, DELAYED RELEASE ORAL at 05:38

## 2023-04-19 RX ADMIN — LEVOFLOXACIN 750 MG: 500 TABLET, FILM COATED ORAL at 07:59

## 2023-04-19 RX ADMIN — PANTOPRAZOLE SODIUM 40 MG: 40 TABLET, DELAYED RELEASE ORAL at 16:17

## 2023-04-19 RX ADMIN — SODIUM CHLORIDE 125 MG: 9 INJECTION, SOLUTION INTRAVENOUS at 09:37

## 2023-04-19 RX ADMIN — GUAIFENESIN 1200 MG: 600 TABLET ORAL at 08:00

## 2023-04-19 NOTE — PLAN OF CARE
Problem: Discharge Planning  Goal: Discharge to home or other facility with appropriate resources  Outcome: Progressing     Problem: ABCDS Injury Assessment  Goal: Absence of physical injury  Outcome: Progressing     Problem: Safety - Adult  Goal: Free from fall injury  Outcome: Progressing     Problem: Chronic Conditions and Co-morbidities  Goal: Patient's chronic conditions and co-morbidity symptoms are monitored and maintained or improved  Outcome: Progressing     Problem: Skin/Tissue Integrity  Goal: Absence of new skin breakdown  Description: 1. Monitor for areas of redness and/or skin breakdown  2. Assess vascular access sites hourly  3. Every 4-6 hours minimum:  Change oxygen saturation probe site  4. Every 4-6 hours:  If on nasal continuous positive airway pressure, respiratory therapy assess nares and determine need for appliance change or resting period.   Outcome: Progressing     Problem: Respiratory - Adult  Goal: Achieves optimal ventilation and oxygenation  4/19/2023 0207 by Chantelle Martinez RN  Outcome: Progressing  4/18/2023 1313 by Chau Henderson RCP  Outcome: Progressing

## 2023-04-19 NOTE — PLAN OF CARE
Problem: Discharge Planning  Goal: Discharge to home or other facility with appropriate resources  4/19/2023 1145 by Enrique Puente RN  Outcome: Adequate for Discharge  4/19/2023 0207 by Benjamin Sheets RN  Outcome: Progressing     Problem: ABCDS Injury Assessment  Goal: Absence of physical injury  4/19/2023 1145 by Enrique Puente RN  Outcome: Adequate for Discharge  4/19/2023 0207 by Benjamin Sheets RN  Outcome: Progressing     Problem: Safety - Adult  Goal: Free from fall injury  4/19/2023 1145 by Enrique Puente RN  Outcome: Adequate for Discharge  4/19/2023 0207 by Benjamin Sheets RN  Outcome: Progressing     Problem: Chronic Conditions and Co-morbidities  Goal: Patient's chronic conditions and co-morbidity symptoms are monitored and maintained or improved  4/19/2023 1145 by Enrique Puente RN  Outcome: Adequate for Discharge  4/19/2023 0207 by Benjamin Sheets RN  Outcome: Progressing     Problem: Skin/Tissue Integrity  Goal: Absence of new skin breakdown  Description: 1. Monitor for areas of redness and/or skin breakdown  2. Assess vascular access sites hourly  3. Every 4-6 hours minimum:  Change oxygen saturation probe site  4. Every 4-6 hours:  If on nasal continuous positive airway pressure, respiratory therapy assess nares and determine need for appliance change or resting period.   4/19/2023 1145 by Enrique Puente RN  Outcome: Adequate for Discharge  4/19/2023 0207 by Benjamin Sheets RN  Outcome: Progressing     Problem: Respiratory - Adult  Goal: Achieves optimal ventilation and oxygenation  4/19/2023 1145 by Enrique Puente RN  Outcome: Adequate for Discharge  4/19/2023 0207 by Benjamin Sheets RN  Outcome: Progressing

## 2023-04-19 NOTE — PROGRESS NOTES
04/19/23 1151   Resting (Room Air)   SpO2 84      Resting (On O2)   SpO2 93      O2 Device Nasal cannula   O2 Flow Rate (l/min) 3 l/min   FIO2 (%) 32   During Walk (On O2)   SpO2 89      O2 Device Nasal cannula   O2 Flow Rate (l/min) 8 l/min   Walk/Assistance Device Ambulation   Rate of Dyspnea 1   Symptoms Shortness of breath   Comments 84%  3L    84% 4L  86% 5L  87% 6L    89% 8L   After Walk   SpO2 93      O2 Device Nasal cannula   O2 Flow Rate (l/min) 3 l/min   FIO2 (%) 32   Rate of Dyspnea 1   Symptoms Shortness of breath   Does the Patient Qualify for Home O2 Yes   Liter Flow at Rest 3   Liter Flow on Exertion 8   Does the Patient Need Portable Oxygen Tanks Yes     The patient was additionally walked on an oxymizer and required 6L with ambulation and 3L at rest.  Ambulation: 84% on 3L   86% on 4L  88% on 5L  89% 6L.
1040-Rubén, RT notified of new order for duo-neb. Reports will come give treatment.
ACUTE PHYSICAL THERAPY GOALS:   (Developed with and agreed upon by patient and/or caregiver.)  (Developed with and agreed upon by patient and/or caregiver. )  LTG:  (1.)Ms. Obrien will move from supine to sit and sit to supine , scoot up and down, and roll side to side in bed with INDEPENDENT within 7 treatment day(s). (2.)Ms. Obrien will transfer from bed to chair and chair to bed with INDEPENDENT using the least restrictive device within 7 treatment day(s). (3.)Ms. Obrien will ambulate with INDEPENDENT for 50+ feet with the least restrictive device within 7 treatment day(s) while maintaining normal vital signs. (4.)Ms. Obrien will perform 14 stairs with HR and SBA within 7 treatment days for ascending and descending stairs for home while maintaining normal vital signs. PHYSICAL THERAPY: Daily Note AM   (Link to Caseload Tracking: PT Visit Days : 2  Time In/Out PT Charge Capture  Rehab Caseload Tracker  Orders    Sophy Mendoza is a 48 y.o. female   PRIMARY DIAGNOSIS: COPD with acute exacerbation ([facility] [address]  Hypoxia [R09.02]  COPD exacerbation (HCC) [J44.1]  COPD with acute exacerbation ([facility] [address] [J44.1]  Pneumonia of right middle lobe due to infectious organism [J18.9]  Procedure(s) (LRB):  EGD BIOPSY (N/A)  1 Day Post-Op  Inpatient: Payor: [employer] / Plan: [employer] HMO / Product Type: *No Product type* /     ASSESSMENT:     REHAB RECOMMENDATIONS:   Recommendation to date pending progress:  Setting:  Home Health Therapy    Equipment:    To Be Determined     ASSESSMENT:  Ms. Kayla Valadez was supine and agreeable to work with therapy. She is on 4L O2 via nasal cannula. She sat to EOB with the use of the bed rail. She was able to stand and amb  a total of 30' around in room with multiple standing rest. SpO2 remained above 90% until right after she sat down when they dropped to 84%. Quickly returned to >90%. After another rest in sitting she performed B LE ex. Left in chair with needs in reach.
DC instructions given to pt. Pt being DC home to self care with home O2 already arranged. Home O 2 tank in room. Pt medications, appts and instructions given and understood. Prescriptions sent home. Pt awaiting ride and then will be DC home to self care.
Dr. Gutierrez Heading made aware of O2 needs when ambulating.  MD edwards with proceeding with DC
Duo neb treatment given   hr 84 sat 97 on 5 liters  rr 22
Hospitalist Progress Note   Admit Date:  2023  3:15 PM   Name:  Luis Manuel Obrien   Age:  48 y.o. Sex:  female  :  1970   MRN:  [id number]   Room:  ENDO/PL    Presenting/Chief Complaint: Shortness of Breath     Reason(s) for Admission: Hypoxia [R09.02]  COPD exacerbation ([address] [J44.1]  COPD with acute exacerbation ([facility] [address] [J44.1]  Pneumonia of right middle lobe due to infectious organism [J18.9]     Hospital Course:   Nithya Corea is a 48 y.o. female with medical history of  obesity, stage 4 COPD, chronic hypoxic respiratory failure, tobacco dependence, T2DM  who presented with worsening SOB x 1 month. Treated with a course of steroids and doxycyline and was okay for about a week then symptoms returned and progressed. Saw PCP again and then another around of steroids and course of augmentin. Saw pulmonology  and rec'd steroid shot and was told it was her lungs. Subjective & 24hr Events (23): Patient was seen and examined at bedside. Patient was seen and examined at bedside. No acute events noted overnight. Currently on 5 L O2 nasal cannula. Reports that she normally is on 3 to 4 L of O2 nasal cannula. Currently with some congestions, wheezing with yellow-clear sputum. Assessment & Plan:     Acute on chronic respiratory failure likely secondary to COPD exacerbation and pneumonia  Patient is normally on 3 to 4 L of O2 nasal cannula but has required 5 L since admission. MRSA nares neg. COVID and Flu neg  23: respiratory status improving per patient but still on 5L O2 NC this AM.   -Continue with Levaquin for risk for Pseudomonas  -Continue with steroids  x 5 days. Improved in her breathing with resolution of wheezing today. -O2 supplement and wean to baseline as tolerated. -brovana/pulmicort, [id number], mucinex   -6mwt tomorrow    pAFIB // secondary hypercoaguable state   - holding apixban due to possible upper GI bleed. - on CCB. Rate controlled.
[place] completed  states breathing better decreased cough and work of breathing  hr 83, sat 92 on 5 liters via nasal canula  rr 20
Ordered labs  hemoccult stool was not obtained because micro-lab need stool sample without urine comtaination. A hat was placed in the [facility] for 2nd stool collection. Vidya Castro MSSA/ MRSA nasal swap and sputum culture sent to the lab.
Patient has been discharged. Patient was wheeled down to the lobby by RN. Patient was safely put into her son's car.
Patient is requesting to see Dr Joanne Spicer Md ( pulmonologist) [phone] for outpatient sleep study for a CPAP. The request will be given to day-shift nurse.
Patient placed on V60. Connected to the Nurse Call System and Continuous Pulse Oximetry utilizing Vital Sync. Alarms are activated and nurse has been notified. Documentation completed.    04/19/23 0132   NIV Type   $NIV $Daily Charge   NIV Started/Stopped On   Equipment Type V60   Mode CPAP   Mask Type Under the nose   Mask Size Large   Settings/Measurements   PIP Observed 11 cm H20   CPAP/EPAP 10 cmH2O   Vt (Measured) 418 mL   Resp 20   Insp Rise Time (%) 3 %   FiO2  45 %   Minute Volume (L/min) 8.2 Liters   Mask Leak (lpm) 0 lpm   Comfort Level Good   Using Accessory Muscles No   SpO2 92   Patient's Home Machine No   Alarm Settings   Alarms On Y   Low Pressure (cmH2O) 5 cmH2O   High Pressure (cmH2O) 35 cmH2O   Apnea (secs) 20 secs   RR Low (bpm) 8   RR High (bpm) 40 br/min
Pt going to GI lab via [employer].
Pt resting in bed with BIPAP in place. , awaken when spoken to. Pt oriented times 3 at this time, denies  pain or distress. BIPAP removed, placed on 4 L HFNC. Pt encouraged to call for assistance if needed call light in reach, will monitor.
Pt resting in bed with eyes closed. Pt reports feeling better. Pt alert oriented times  3 at this time. Denies pain or distress, 5 L HF NC. Pt NPO for procedure. Pt encouraged to call for assistance if needed call light in reach, will monitor.
Pt resting in bed. Pt alert oriented times 3 at this time. Pt complaints of not feeling like herself and just not feeling well. Pt complaints of not sleeping last light but denies pain or distress. Pt on 5 L HFNC. Pt encouraged to call for assistance if needed call light in reach, will monitor.
Pt sitting up in chair. Pt on 5 L HF NC. No distress noted at this time. Call light in reach, will monitor.
Pt sitting up in chair. Pt on 5 L HF NC. No s/sx of  distress noted at this time. Pt  made aware of NPO after MN for EGD. Pt encouraged to call for assistance if needed call light in reach, will monitor.
Received report from [facility]. Patient is pending discharge. All tasks have been completed and patient is just waiting on the arrival of her son for transport home.
Returns from GI lab. Pt complains of feeling congestion. No distress noted at this time. Call light in each, will monitor.
Sitting up in bed. Tolerating full liquids well. No distress noted. Pt on 4 L HF NC. Call light in reach, will monitor.
TRANSFER - IN REPORT:    Verbal report received from [address], RN on [facility]  being received from 837 for ordered procedure      Report consisted of patient's Situation, Background, Assessment and   Recommendations(SBAR). Information from the following report(s) Nurse Handoff Report, Intake/Output, MAR, and Recent Results was reviewed with the receiving nurse. Opportunity for questions and clarification was provided. Assessment completed upon patient's arrival to unit and care assumed.
TRANSFER - IN REPORT:    Verbal report received from [facility] on Osmar Obrien  being received from GI lab for routine progression of patient care      Report consisted of patient's Situation, Background, Assessment and   Recommendations(SBAR). Information from the following report(s) Nurse Handoff Report was reviewed with the receiving nurse. Opportunity for questions and clarification was provided. Assessment completed upon patient's arrival to unit and care assumed.
TRANSFER - OUT REPORT:    Verbal report given to Wanda Nixvard on Easton Obrien  being transferred to GI lab for ordered procedure       Report consisted of patient's Situation, Background, Assessment and   Recommendations(SBAR). Information from the following report(s) Nurse Handoff Report was reviewed with the receiving nurse. [place] Assessment: Presents to emergency department  because of falls (Syncope, seizure, or loss of consciousness): No, Age > 79: No, Altered Mental Status, Intoxication with alcohol or substance confusion (Disorientation, impaired judgment, poor safety awaremess, or inability to follow instructions): No, Impaired Mobility: Ambulates or transfers with assistive devices or assistance; Unable to ambulate or transer.: No, Nursing Judgement: No  Lines:   Peripheral IV 04/16/23 Right Antecubital (Active)   Site Assessment Clean, dry & intact 04/17/23 2036   Line Status Capped 04/17/23 2036   Line Care Connections checked and tightened 04/16/23 2236   Phlebitis Assessment No symptoms 04/17/23 2036   Infiltration Assessment 0 04/17/23 2036   Alcohol Cap Used Yes 04/17/23 2036   Dressing Status Clean, dry & intact 04/17/23 2036   Dressing Type Transparent 04/17/23 2036       Peripheral IV 04/16/23 Left Hand (Active)   Site Assessment Clean, dry & intact 04/17/23 2036   Line Status Capped 04/17/23 2036   Line Care Cap changed; Connections checked and tightened 04/17/23 2036   Phlebitis Assessment No symptoms 04/17/23 2036   Infiltration Assessment 0 04/17/23 2036   Alcohol Cap Used Yes 04/17/23 2036   Dressing Status Clean, dry & intact 04/17/23 2036   Dressing Type Transparent 04/17/23 2036        Opportunity for questions and clarification was provided.       Patient transported with:  [employer]
TRANSFER - OUT REPORT:    Verbal report given to primary nurse on Madai Obrien  being transferred to 8th floor for routine progression of patient care       Report consisted of patient's Situation, Background, Assessment and   Recommendations(SBAR). Information from the following report(s) Nurse Handoff Report was reviewed with the receiving nurse. [place] Assessment: Presents to emergency department  because of falls (Syncope, seizure, or loss of consciousness): No, Age > 79: No, Altered Mental Status, Intoxication with alcohol or substance confusion (Disorientation, impaired judgment, poor safety awaremess, or inability to follow instructions): No, Impaired Mobility: Ambulates or transfers with assistive devices or assistance; Unable to ambulate or transer.: No, Nursing Judgement: No  Lines:   Peripheral IV 04/16/23 Right Antecubital (Active)   Site Assessment Clean, dry & intact 04/18/23 0859   Line Status Capped 04/18/23 0859   Line Care Connections checked and tightened 04/16/23 2236   Phlebitis Assessment No symptoms 04/18/23 0859   Infiltration Assessment 0 04/18/23 0859   Alcohol Cap Used Yes 04/18/23 0859   Dressing Status Clean, dry & intact 04/18/23 0859   Dressing Type Transparent 04/18/23 0859       Peripheral IV 04/16/23 Left Hand (Active)   Site Assessment Clean, dry & intact 04/18/23 1355   Line Status Flushed 04/18/23 [address] changed; Connections checked and tightened 04/17/23 2036   Phlebitis Assessment No symptoms 04/18/23 1355   Infiltration Assessment 0 04/18/23 1355   Alcohol Cap Used Yes 04/18/23 0859   Dressing Status Clean, dry & intact 04/18/23 1020   Dressing Type Transparent 04/18/23 0859        Opportunity for questions and clarification was provided.       Patient transported with:  [employer]
increased SOB with activity--required multiple, extended, seated rest breaks during session. O2 sats ranged between 85-90% throughout session. Provided education on energy conservation strategies during bADLs--verbalized and demonstrated understanding. Recommend pt return home with [facility] therapy at discharge. Gerson Obrien currently demonstrates overall deficits in strength, balance, activity tolerance, and ADL performance. Patient would benefit from skilled OT services at this time in order to address functional deficits and OT goals stated above. [facility] [address] [id number] AM-PAC 6 Clicks Daily Activity Inpatient Short Form:    AM-PAC Daily Activity - Inpatient   How much help is needed for putting on and taking off regular lower body clothing?: A Little  How much help is needed for bathing (which includes washing, rinsing, drying)?: A Little  How much help is needed for toileting (which includes using toilet, bedpan, or urinal)?: A Little  How much help is needed for putting on and taking off regular upper body clothing?: A Little  How much help is needed for taking care of personal grooming?: A Little  How much help for eating meals?: None  AM-[facility] Inpatient Daily Activity Raw Score: 19  AM-PAC Inpatient ADL T-Scale Score : 40.22  ADL Inpatient CMS 0-100% Score: 42.8  ADL Inpatient CMS G-Code Modifier : CK       SUBJECTIVE:     Ms. Karlie Stafford states, \"I need to sit for a minute. \"     Social/Functional Lives With: Family (nephew)  Type of Home: Apartment (2nd floor)  Home Layout: One level  Home Access: Stairs to enter with rails  Entrance Stairs - Number of Steps: 14  Bathroom Equipment: Shower chair  Has the patient had two or more falls in the past year or any fall with injury in the past year?: No  ADL Assistance: Independent  Homemaking Responsibilities: Yes  Ambulation Assistance: Independent    OBJECTIVE:     Sarah Castellanos / Femi [employer]Wsdom / AIRWAY: NA    RESTRICTIONS/PRECAUTIONS:       PAIN: VITALS / O2:   Pre Treatment:
bleeding 4/19/2023 Yes     Assessment & Plan acute gastric ulcer. Follow-up pathology continue proton pump inhibitor. Colonoscopy was not scheduled at this moment because of the patient's poor pulmonary status she did not tolerate well her upper endoscopy and could not be fully sedated. We can follow the patient as outpatient once her COPD status has improved we can arrange for her to have a colonoscopy. We will follow in the office.   GI will sign off ,please call us if needed    Electronically signed by Claude Booth, MD on 4/19/23 at 7:34 AM EDT
intervention is medically necessary to address:)  Decreased ADL/Functional Activities  Decreased Activity Tolerance  Decreased Balance  Decreased Gait Ability  Decreased Strength  Decreased Transfer Abilities INTERVENTIONS PLANNED:   (Benefits and precautions of physical therapy have been discussed with the patient.)  Therapeutic Activity  Therapeutic Exercise/HEP  Neuromuscular Re-education  Gait Training  Education       TREATMENT:   EVALUATION: LOW COMPLEXITY: (Untimed Charge)    TREATMENT:   Co-Treatment PT/OT necessary due to patient's decreased overall endurance/tolerance levels, as well as need for high level skilled assistance to complete functional transfers/mobility and functional tasks  Therapeutic Activity (23 Minutes): Therapeutic activity included Supine to Sit, Scooting, Transfer Training, Ambulation on level ground, Sitting balance , and Standing balance to improve functional Activity tolerance, Balance, Coordination, and Mobility.     TREATMENT GRID:  N/A    AFTER TREATMENT PRECAUTIONS: Bed/Chair Locked, Call light within reach, Chair, Needs within reach, and RN notified    INTERDISCIPLINARY COLLABORATION:  RN/ PCT, PT/ PTA, and OT/ DAHL    EDUCATION: Education Given To: Patient  Education Provided: Role of Therapy;Plan of Care  Education Method: Verbal  Barriers to Learning: None  Education Outcome: Verbalized understanding    TIME IN/OUT:  Time In: 1129  Time Out: [address]  Minutes: [facility], PT
Result Value Ref Range    Vit D, 25-Hydroxy 8.0 (L) 30.0 - 100.0 ng/mL   POCT Glucose    Collection Time: 04/17/23  8:14 AM   Result Value Ref Range    POC Glucose 133 (H) 65 - 100 mg/dL    Performed by: Mohinder    POCT Glucose    Collection Time: 04/17/23 11:31 AM   Result Value Ref Range    POC Glucose 131 (H) 65 - 100 mg/dL    Performed by: Juan Kirk          Other Studies:  XR CHEST PORTABLE   Final Result   Interval increased attenuation in the medial right lung base potentially   reflecting a middle lobe infiltrate.              Current Meds:  Current Facility-Administered Medications   Medication Dose Route Frequency    ferric gluconate (FERRLECIT) 125 mg in sodium chloride 0.9 % 100 mL IVPB  125 mg IntraVENous Daily    pantoprazole (PROTONIX) tablet 40 mg  40 mg Oral BID AC    [START ON 4/18/2023] predniSONE (DELTASONE) tablet 40 mg  40 mg Oral Daily    arformoterol tartrate (BROVANA) nebulizer solution 15 mcg  15 mcg Nebulization BID    budesonide (PULMICORT) nebulizer suspension 500 mcg  0.5 mg Nebulization BID    ipratropium-albuterol (DUONEB) nebulizer solution 1 ampule  1 ampule Inhalation Q4H WA    [Held by provider] apixaban (ELIQUIS) tablet 5 mg  5 mg Oral BID    guaiFENesin (MUCINEX) extended release tablet 1,200 mg  1,200 mg Oral BID    dilTIAZem (CARDIZEM CD) extended release capsule 240 mg  240 mg Oral Daily    cholestyramine light packet 4 g  1 packet Oral Daily    atorvastatin (LIPITOR) tablet 10 mg  10 mg Oral Nightly    sodium chloride flush 0.9 % injection 5-40 mL  5-40 mL IntraVENous 2 times per day    sodium chloride flush 0.9 % injection 5-40 mL  5-40 mL IntraVENous PRN    0.9 % sodium chloride infusion   IntraVENous PRN    ondansetron (ZOFRAN-ODT) disintegrating tablet 4 mg  4 mg Oral Q8H PRN    Or    ondansetron (ZOFRAN) injection 4 mg  4 mg IntraVENous Q6H PRN    polyethylene glycol (GLYCOLAX) packet 17 g  17 g Oral Daily PRN    acetaminophen (TYLENOL) tablet 650 mg

## 2023-04-19 NOTE — CARE COORDINATION
RNLANEY spoke with [address] [place] [address] bedside RN about patient's increased 02 need during ambulation. [address] [place] [address] reached out to MD.   Patient is uncertain as to how high her home concentrator goes to. RNLANEY contacted [facility] patient currently has a 5L concentrator in the home.  Order sent to [facility] with new 02 needs
Hospice     Reason Outside Agency [address]     Partner SNF     Reason Why Partner SNF Not Chosen     Internal Comfort Care     Reason Outside [address] Discharge None   [place] Resource Information Provided? No   Mode of Transport at Discharge [facility] Time of Discharge     Confirm Follow Up Transport       Condition of Participation: Discharge Planning  The plan for Transition of Care is related to the following treatment goals: ongoing therapy needs   The Patient and/or Patient Representative was provided with a Choice of Provider? Patient   Name of the Patient Representative who was provided with the Choice of Provider and agrees with the Discharge Plan? The Patient and/or Patient Representative Agree with the Discharge Plan? Yes   Freedom of Choice list was provided with basic dialogue that supports the individualized plan of care/goals, treatment preferences, and shares the quality data associated with the providers? Yes     Pt is for discharge home today with changes  in home 02  [facility] medical given updated 02 needs and will cordinate with patient to exchange to concentrator to a 10L from a  5L. At the request of Shwetha a bigger portable 02 transport tanke was given to patient. No further needs/supportive care orders received for CM at this time.   Pt will have close follow up with PCP    Milestones met          Tonya Price RN 04/19/23 4:08 PM
Representative Name:       The Patient and/or Patient Representative Agree with the Discharge Plan?  (P) Yes    Carla Nguyen RN  Case Management Department

## 2023-04-19 NOTE — DISCHARGE SUMMARY
Hospitalist Discharge Summary   Admit Date:  2023  3:15 PM   DC Note date: 2023  Name:  Elinor Obrien   Age:  48 y.o. Sex:  female  :  1970   MRN:  [id number]   Room:  [facility]  PCP:  Sandy Dickey PA-C    Presenting Complaint: Shortness of Breath     Initial Admission Diagnosis: Hypoxia [R09.02]  COPD exacerbation ([address] [J44.1]  COPD with acute exacerbation ([address] [J44.1]  Pneumonia of right middle lobe due to infectious organism [J18.9]     Problem List for this Hospitalization (present on admission):    Principal Problem:    COPD with acute exacerbation ([address]  Active Problems:    Type 2 diabetes with nephropathy ([address]    Nocturnal hypoxemia    Stage 4 very severe COPD by GOLD classification ([address]    Paroxysmal atrial fibrillation (HCC)    THANH (obstructive sleep apnea)    Morbid (severe) obesity with alveolar hypoventilation (HCC)    Cigarette nicotine dependence with nicotine-induced disorder    Centrilobular emphysema ([address]    Secondary hypercoagulable state ([address]    Community acquired pneumonia of right middle lobe of lung    Vitamin D deficiency    Epigastric abdominal pain    Anemia    Upper GI bleed    Hypercalcemia  Resolved Problems:    Acute on chronic respiratory failure with hypoxia (HCC)    Acute gastric ulcer with bleeding      Hospital Course:  Ms. Kleber Muse is a pleasant 49 y/o AAF with a h/o  obesity, stage 4 COPD, chronic hypoxic respiratory failure (3.5L NC), atrial fibrillation, tobacco dependence and DM2 who was admitted to our service on  with AoC hypoxemic respiratory failure 2/2 COPDe and CAP. Treated outpatient with steroids and doxycycline with brief improvement, though symptoms returned and took a course of Augmentin. CXR in the ER showed a RML infiltrate. She was started on steroids, nebulizers and levofloxacin. She was on 5-6L initially. GI was consulted for anemia. She underwent EGD On  which showed a gastric ulcer which was biopsied.  Hemoglobin has

## 2023-04-19 NOTE — DISCHARGE INSTRUCTIONS
DISCHARGE SUMMARY from Nurse    PATIENT INSTRUCTIONS:    After general anesthesia or intravenous sedation, for 24 hours or while taking prescription Narcotics:  Limit your activities  Do not drive and operate hazardous machinery  Do not make important personal or business decisions  Do  not drink alcoholic beverages  If you have not urinated within 8 hours after discharge, please contact your surgeon on call. Report the following to your surgeon:  Excessive pain, swelling, redness or odor of or around the surgical area  Temperature over 100.5  Nausea and vomiting lasting longer than 4 hours or if unable to take medications  Any signs of decreased circulation or nerve impairment to extremity: change in color, persistent  numbness, tingling, coldness or increase pain  Any questions    What to do at Home:  Recommended activity: activity as tolerated    If you experience any of the following symptoms shortness of breath not relieved by rest, pain not relieved by medications, chest pain or pressure, temperature greater than 101, nausea, vomiting, diarrhea, or increase in weakness fatigue or swelling please follow up with MD.    *  Please give a list of your current medications to your Primary Care Provider. *  Please update this list whenever your medications are discontinued, doses are      changed, or new medications (including over-the-counter products) are added. *  Please carry medication information at all times in case of emergency situations. These are general instructions for a healthy lifestyle:    No smoking/ No tobacco products/ Avoid exposure to second hand smoke  Surgeon General's Warning:  Quitting smoking now greatly reduces serious risk to your health.     Obesity, smoking, and sedentary lifestyle greatly increases your risk for illness    A healthy diet, regular physical exercise & weight monitoring are important for maintaining a healthy lifestyle    You may be retaining fluid if you have

## 2023-04-20 ENCOUNTER — CARE COORDINATION (OUTPATIENT)
Dept: CARE COORDINATION | Facility: CLINIC | Age: 53
End: 2023-04-20

## 2023-04-20 ENCOUNTER — TELEPHONE (OUTPATIENT)
Dept: INTERNAL MEDICINE CLINIC | Facility: CLINIC | Age: 53
End: 2023-04-20

## 2023-04-20 NOTE — CARE COORDINATION
Care Transitions Outreach Attempt    Call within 2 business days of discharge: Yes   Attempted to reach patient for transitions of care follow up. Unable to reach patient. Patient: Easton Obrien Patient : 1970 MRN: [id number]    Last Discharge  Street       Date Complaint Diagnosis Description Type Department Provider    23 Shortness of Breath COPD exacerbation ([address] . .. ED to Hosp-Admission (Discharged) (ADMITTED) [id number] Heather Gunn MD; Jarrod Zhou.. Was this an external facility discharge?  No Discharge Facility: N/A    Noted following upcoming appointments from discharge chart review:   [facility] follow up appointment(s):   Future Appointments   Date Time Provider Lola Jansen   2023  1:45 PM SFE LAB DS SFEDS SFE   2023  2:30 PM Xochitl Ivy PA-C MAT GVL AMB   2023 11:30 AM Ofelia Rubin PA-C MAT GVL AMB   2023  8:15 AM MD GINA Landa GVL AMB   2023 10:45 AM MD LADONNA Mcclellan GVL AMB   2023 10:10 AM Zeynep Acevedo APRN - CNP PPS GVL AMB     Non-[facility] follow up appointment(s): none noted

## 2023-04-20 NOTE — TELEPHONE ENCOUNTER
Called to reschedule her hospital fu for next Monday to next Thursday since her transportation service technically needs 3 days notice and they don't count the weekends.  --LS

## 2023-04-21 ENCOUNTER — CARE COORDINATION (OUTPATIENT)
Dept: CARE COORDINATION | Facility: CLINIC | Age: 53
End: 2023-04-21

## 2023-04-21 LAB
BACTERIA SPEC CULT: NORMAL
BACTERIA SPEC CULT: NORMAL
SERVICE CMNT-IMP: NORMAL
SERVICE CMNT-IMP: NORMAL

## 2023-04-21 NOTE — CARE COORDINATION
resources available to patient including: PCP  Specialist  Urgent care clinics  When to call 911. The patient agrees to contact the PCP office for questions related to their healthcare. Advance Care Planning:   Does patient have an Advance Directive:  has ACP docs . Medication reconciliation was performed with patient, who verbalizes understanding of administration of home medications. Medications reviewed: yes    Was patient discharged with a pulse oximeter? no    Non-face-to-face services provided:  Obtained and reviewed discharge summary and/or continuity of care documents    Offered patient enrollment in the Remote Patient Monitoring (RPM) program for in-home monitoring: NA.    Care Transitions 24 Hour Call    Do you have a copy of your discharge instructions?: Yes  Do you have all of your prescriptions and are they filled?: Yes  Have you scheduled your follow up appointment?: Yes  How are you going to get to your appointment?: Car - family or friend to transport  Do you feel like you have everything you need to keep you well at home?: Yes  Are you an active caregiver in your home?: No  Care Transitions Interventions         Discussed follow-up appointments. If no appointment was previously scheduled, appointment scheduling offered: Yes. Is follow up appointment scheduled within 7 days of discharge? Yes. Follow Up  Future Appointments   Date Time Provider Lola Jansen   4/24/2023  1:45 PM SFE LAB DS SFEDS SFE   4/27/2023  2:30 PM Xochitl Ivy PA-C MAT GVL AMB   5/1/2023 11:30 AM JOSE ALBERTO Foreman GVL AMB   5/25/2023  8:15 AM Ileana Mccarty MD UCDG GVL AMB   6/2/2023 10:45 AM Griselda Humphreys MD [facility] AMB   6/8/2023 10:10 AM FUNMI Viveros - CNP [facility] GVL AMB       Care Transition Nurse provided contact information. Plan for follow-up call in 5-7 days based on severity of symptoms and risk factors.   Plan for next call: symptom management-oxygen saturations, shortness of

## 2023-04-27 ENCOUNTER — OFFICE VISIT (OUTPATIENT)
Dept: INTERNAL MEDICINE CLINIC | Facility: CLINIC | Age: 53
End: 2023-04-27

## 2023-04-27 VITALS
OXYGEN SATURATION: 97 % | HEART RATE: 102 BPM | SYSTOLIC BLOOD PRESSURE: 118 MMHG | DIASTOLIC BLOOD PRESSURE: 78 MMHG | WEIGHT: 293 LBS | RESPIRATION RATE: 16 BRPM | TEMPERATURE: 97.1 F | HEIGHT: 65 IN | BODY MASS INDEX: 48.82 KG/M2

## 2023-04-27 DIAGNOSIS — E61.1 IRON DEFICIENCY: ICD-10-CM

## 2023-04-27 DIAGNOSIS — Z09 HOSPITAL DISCHARGE FOLLOW-UP: Primary | ICD-10-CM

## 2023-04-27 DIAGNOSIS — J44.1 COPD WITH ACUTE EXACERBATION (HCC): ICD-10-CM

## 2023-04-27 DIAGNOSIS — J18.9 PNEUMONIA OF RIGHT MIDDLE LOBE DUE TO INFECTIOUS ORGANISM: ICD-10-CM

## 2023-04-27 DIAGNOSIS — E83.41 HYPERMAGNESEMIA: ICD-10-CM

## 2023-04-27 DIAGNOSIS — K25.0 ACUTE GASTRIC ULCER WITH BLEEDING: ICD-10-CM

## 2023-04-27 RX ORDER — PANTOPRAZOLE SODIUM 40 MG/1
40 TABLET, DELAYED RELEASE ORAL
Qty: 60 TABLET | Refills: 3 | Status: SHIPPED | OUTPATIENT
Start: 2023-04-27

## 2023-04-27 SDOH — ECONOMIC STABILITY: FOOD INSECURITY: WITHIN THE PAST 12 MONTHS, YOU WORRIED THAT YOUR FOOD WOULD RUN OUT BEFORE YOU GOT MONEY TO BUY MORE.: NEVER TRUE

## 2023-04-27 SDOH — ECONOMIC STABILITY: INCOME INSECURITY: HOW HARD IS IT FOR YOU TO PAY FOR THE VERY BASICS LIKE FOOD, HOUSING, MEDICAL CARE, AND HEATING?: NOT HARD AT ALL

## 2023-04-27 SDOH — ECONOMIC STABILITY: FOOD INSECURITY: WITHIN THE PAST 12 MONTHS, THE FOOD YOU BOUGHT JUST DIDN'T LAST AND YOU DIDN'T HAVE MONEY TO GET MORE.: NEVER TRUE

## 2023-04-27 ASSESSMENT — ENCOUNTER SYMPTOMS
DIARRHEA: 0
VOMITING: 0
BLOOD IN STOOL: 0
CONSTIPATION: 0
SHORTNESS OF BREATH: 1
WHEEZING: 1
ABDOMINAL DISTENTION: 0
ABDOMINAL PAIN: 0
COUGH: 1
NAUSEA: 0

## 2023-04-27 ASSESSMENT — PATIENT HEALTH QUESTIONNAIRE - PHQ9
6. FEELING BAD ABOUT YOURSELF - OR THAT YOU ARE A FAILURE OR HAVE LET YOURSELF OR YOUR FAMILY DOWN: 0
10. IF YOU CHECKED OFF ANY PROBLEMS, HOW DIFFICULT HAVE THESE PROBLEMS MADE IT FOR YOU TO DO YOUR WORK, TAKE CARE OF THINGS AT HOME, OR GET ALONG WITH OTHER PEOPLE: 0
SUM OF ALL RESPONSES TO PHQ QUESTIONS 1-9: 0
7. TROUBLE CONCENTRATING ON THINGS, SUCH AS READING THE NEWSPAPER OR WATCHING TELEVISION: 0
1. LITTLE INTEREST OR PLEASURE IN DOING THINGS: 0
9. THOUGHTS THAT YOU WOULD BE BETTER OFF DEAD, OR OF HURTING YOURSELF: 0
2. FEELING DOWN, DEPRESSED OR HOPELESS: 0
SUM OF ALL RESPONSES TO PHQ QUESTIONS 1-9: 0
3. TROUBLE FALLING OR STAYING ASLEEP: 0
4. FEELING TIRED OR HAVING LITTLE ENERGY: 0
5. POOR APPETITE OR OVEREATING: 0
SUM OF ALL RESPONSES TO PHQ9 QUESTIONS 1 & 2: 0
8. MOVING OR SPEAKING SO SLOWLY THAT OTHER PEOPLE COULD HAVE NOTICED. OR THE OPPOSITE, BEING SO FIGETY OR RESTLESS THAT YOU HAVE BEEN MOVING AROUND A LOT MORE THAN USUAL: 0

## 2023-04-27 NOTE — PROGRESS NOTES
Toña Obrien (:  1970) is a 48 y.o. female,Established patient, here for evaluation of the following chief complaint(s):  Follow-Up from Hospital (COPD Exacerbation, Found Ulcer in Stomach)         ASSESSMENT/PLAN:  {There are no diagnoses linked to this encounter. (Refresh or delete this SmartLink)}    No follow-ups on file. Subjective   SUBJECTIVE/OBJECTIVE:  HPI    Review of Systems       Objective   Physical Exam       On this date 2023 I have spent  minutes reviewing previous notes, test results and face to face with the patient discussing the diagnosis and importance of compliance with the treatment plan as well as documenting on the day of the visit. An electronic signature was used to authenticate this note.     --Antonio Flynn MA

## 2023-04-27 NOTE — PROGRESS NOTES
Post-Discharge Transitional Care  Follow Up      Angelica Obrien   YOB: 1970    Date of Office Visit:  4/27/2023  Date of Hospital Admission: 4/16/23  Date of Hospital Discharge: 4/19/23  Risk of hospital readmission (high >=14%. Medium >=10%) :Readmission Risk Score: 16.8      Care management risk score Rising risk (score 2-5) and Complex Care (Scores >=6): No Risk Score On File     Non face to face  following discharge, date last encounter closed (first attempt may have been earlier): 04/21/2023    Call initiated 2 business days of discharge: Yes    ASSESSMENT/PLAN:   Hospital discharge follow-up  -     AK DISCHARGE MEDS RECONCILED W/ CURRENT OUTPATIENT MED LIST  Hypermagnesemia  -     Magnesium; Future  COPD with acute exacerbation ([address]  Acute gastric ulcer with bleeding  Pneumonia of right middle lobe due to infectious organism  -     XR CHEST (2 VW); Future  Iron deficiency  -     Ferritin; Future  -     Transferrin Saturation; Future    Medical Decision Making: moderate complexity      Patient Instructions   Maintain Protonix 40 mg twice daily until follow-up with GI at which time she needs to discuss long term need for this med and at what dose? Praised success with smoking cessation - urged to write a note/letter to herself to help describe the reasons she quit and what she will want herself to hear when temptation creeps up to smoke again  Counseled that with each exacerbation she has there is lung function lost that will never be regained and we definitely need to minimize any further loss moving forward  Continue chronic medications as prescribed        Return in about 18 days (around 5/15/2023) for diabetes f/u @ 2:30 pm - please cancel 5/1 appt and move to here.         On this date 4/27/2023 I have spent 40 minutes reviewing previous notes, test results and face to face with the patient discussing the diagnosis and importance of compliance with the treatment plan as well as

## 2023-04-27 NOTE — PATIENT INSTRUCTIONS
Maintain Protonix 40 mg twice daily until follow-up with GI at which time she needs to discuss long term need for this med and at what dose?   Praised success with smoking cessation - urged to write a note/letter to herself to help describe the reasons she quit and what she will want herself to hear when temptation creeps up to smoke again  Counseled that with each exacerbation she has there is lung function lost that will never be regained and we definitely need to minimize any further loss moving forward  Continue chronic medications as prescribed

## 2023-04-28 ENCOUNTER — CARE COORDINATION (OUTPATIENT)
Dept: CARE COORDINATION | Facility: CLINIC | Age: 53
End: 2023-04-28

## 2023-04-28 NOTE — CARE COORDINATION
Care Transitions Outreach Attempt    Call within 2 business days of discharge: Yes   Attempted to reach patient for transitions of care follow up. Unable to reach patient. Patient: Lien Obrien Patient : 1970 MRN: [id number]    Last Discharge  Street       Date Complaint Diagnosis Description Type Department Provider    23 Shortness of Breath COPD exacerbation ([address] . .. ED to Hosp-Admission (Discharged) (ADMITTED) [id number] Joslyn Blanca MD; Taty Bravo... Was this an external facility discharge?  No Discharge Facility: N/A    Noted following upcoming appointments from discharge chart review:   [facility]  follow up appointment(s):   Future Appointments   Date Time Provider Lola Jansen   5/10/2023 10:00 AM SFE LAB DS SFEDS SFE   5/15/2023  2:30 PM JOSE ALBERTO Reddy GVL AMB   2023  8:15 AM Edenilson Parra MD UCSHANIQUA GVL AMB   2023 10:45 AM MD LADONNA Zimmerman GVL AMB   2023 10:10 AM FUNMI Lion - CNP PPS GVL AMB     Non-[facility] follow up appointment(s): none noted

## 2023-05-04 ENCOUNTER — CARE COORDINATION (OUTPATIENT)
Dept: CARE COORDINATION | Facility: CLINIC | Age: 53
End: 2023-05-04

## 2023-05-05 ENCOUNTER — CARE COORDINATION (OUTPATIENT)
Dept: CARE COORDINATION | Facility: CLINIC | Age: 53
End: 2023-05-05

## 2023-05-05 NOTE — CARE COORDINATION
[facility] Care Transitions Follow Up Call    Patient Current Location:  Home: [address] [place] [address] Transition Nurse contacted the patient by telephone to follow up after admission on 2023. Verified name and  with patient as identifiers. Patient: Sebastian Obrien  Patient : 1970   MRN: [id number]  Reason for Admission: COPD exacerbation  Discharge Date: 23 RARS: Readmission Risk Score: 16.8      Needs to be reviewed by the provider   Additional needs identified to be addressed with provider: No  none             Method of communication with provider: none. Patient reports that she is doing better. States that her shortness of breath has improved. Two weeks ago she could not walk around her bedroom without becoming dyspneic, she is able to do this now with less dyspnea. Discussed getting a pulse oximeter, patient states that she will follow up with her insurance company. Addressed changes since last contact:  none  Discussed follow-up appointments. If no appointment was previously scheduled, appointment scheduling offered: Yes. Is follow up appointment scheduled within 7 days of discharge? Yes. Follow Up  Future Appointments   Date Time Provider Lola Jansen   5/10/2023 10:00 AM SFE LAB DS SFEDS SFE   5/15/2023  2:30 PM JOSE ALBERTO Reddy GVL AMB   2023  8:15 AM Theresa Lund MD UC GVL AMB   2023 10:45 AM Marvin Mcintosh MD SFGA GVL AMB   2023 10:10 AM FUNMI Cardenas - CNP [facility] GVL [facility]     Non-[facility] follow up appointment(s): none noted    Care Transition Nurse reviewed discharge instructions and red flags with patient and discussed any barriers to care and/or understanding of plan of care after discharge. Discussed appropriate site of care based on symptoms and resources available to patient including: PCP  Specialist  Urgent care clinics  When to call 911.  The patient agrees to contact the PCP office for

## 2023-05-10 ENCOUNTER — TELEPHONE (OUTPATIENT)
Dept: SLEEP MEDICINE | Age: 53
End: 2023-05-10

## 2023-05-12 RX ORDER — ALBUTEROL SULFATE 90 UG/1
2 AEROSOL, METERED RESPIRATORY (INHALATION) EVERY 4 HOURS PRN
Qty: 1 EACH | Refills: 11 | Status: SHIPPED | OUTPATIENT
Start: 2023-05-12

## 2023-05-12 RX ORDER — IPRATROPIUM BROMIDE AND ALBUTEROL SULFATE 2.5; .5 MG/3ML; MG/3ML
1 SOLUTION RESPIRATORY (INHALATION) 4 TIMES DAILY
Qty: 120 EACH | Refills: 11 | Status: SHIPPED | OUTPATIENT
Start: 2023-05-12

## 2023-05-12 NOTE — TELEPHONE ENCOUNTER
Patient is calling because she is completely out of :    ipratropium-albuterol (DUONEB) 0.5-2.5 (3) MG/3ML SOLN nebulizer solution

## 2023-05-12 NOTE — TELEPHONE ENCOUNTER
Dr Yousif Banda, patient needs these two refills, can you sign and send scripts, thanks Karli Cruz, [facility]

## 2023-05-24 ENCOUNTER — CARE COORDINATION (OUTPATIENT)
Dept: CARE COORDINATION | Facility: CLINIC | Age: 53
End: 2023-05-24

## 2023-05-26 ENCOUNTER — CARE COORDINATION (OUTPATIENT)
Dept: CARE COORDINATION | Facility: CLINIC | Age: 53
End: 2023-05-26

## 2023-05-26 NOTE — CARE COORDINATION
Care Transitions Outreach Attempt    Call within 2 business days of discharge: Yes   Attempted to reach patient for transitions of care follow up. Unable to reach patient. Patient: Tarry Peabody Sherred Patient : 1970 MRN: [id number]    Last Discharge  Street       Date Complaint Diagnosis Description Type Department Provider    23 Shortness of Breath COPD exacerbation ([facility] [address] [facility] . .. ED to Hosp-Admission (Discharged) (ADMITTED) [id number] James Elam MD; Beronica Croft... Was this an external facility discharge?  No Discharge Facility: N/A    Noted following upcoming appointments from discharge chart review:   [facility] follow up appointment(s):   Future Appointments   Date Time Provider Lola Jansen   2023 10:45 AM MD LADONNA Christian GVL AMB   2023 10:30 AM JOSE ALBERTO Chávez GVL AMB   2023 10:10 AM FUNMI Rajan - CNP PPS GVL AMB     Non-[facility] follow up appointment(s): none noted

## 2023-06-02 ENCOUNTER — TELEPHONE (OUTPATIENT)
Dept: GASTROENTEROLOGY | Age: 53
End: 2023-06-02

## 2023-07-26 ENCOUNTER — HOSPITAL ENCOUNTER (INPATIENT)
Age: 53
LOS: 3 days | Discharge: HOME | End: 2023-07-29
Attending: EMERGENCY MEDICINE | Admitting: INTERNAL MEDICINE
Payer: MEDICARE

## 2023-07-26 ENCOUNTER — APPOINTMENT (OUTPATIENT)
Dept: CT IMAGING | Age: 53
End: 2023-07-26
Payer: MEDICARE

## 2023-07-26 ENCOUNTER — APPOINTMENT (OUTPATIENT)
Dept: GENERAL RADIOLOGY | Age: 53
End: 2023-07-26
Payer: MEDICARE

## 2023-07-26 DIAGNOSIS — J44.1 COPD EXACERBATION (HCC): Primary | ICD-10-CM

## 2023-07-26 DIAGNOSIS — A41.9 SEPSIS, DUE TO UNSPECIFIED ORGANISM, UNSPECIFIED WHETHER ACUTE ORGAN DYSFUNCTION PRESENT (HCC): ICD-10-CM

## 2023-07-26 DIAGNOSIS — R09.02 HYPOXIA: ICD-10-CM

## 2023-07-26 DIAGNOSIS — J96.01 ACUTE RESPIRATORY FAILURE WITH HYPOXIA (HCC): ICD-10-CM

## 2023-07-26 PROBLEM — J96.91 RESPIRATORY FAILURE WITH HYPOXIA (HCC): Status: ACTIVE | Noted: 2023-07-26

## 2023-07-26 LAB
ALBUMIN SERPL-MCNC: 3.3 G/DL (ref 3.5–5)
ALBUMIN/GLOB SERPL: 0.6 (ref 0.4–1.6)
ALP SERPL-CCNC: 115 U/L (ref 50–136)
ALT SERPL-CCNC: 22 U/L (ref 12–65)
ANION GAP SERPL CALC-SCNC: 1 MMOL/L (ref 2–11)
APPEARANCE UR: CLEAR
AST SERPL-CCNC: 11 U/L (ref 15–37)
B PERT DNA SPEC QL NAA+PROBE: NOT DETECTED
BACTERIA URNS QL MICRO: ABNORMAL /HPF
BASOPHILS # BLD: 0.1 K/UL (ref 0–0.2)
BASOPHILS NFR BLD: 0 % (ref 0–2)
BILIRUB SERPL-MCNC: 0.3 MG/DL (ref 0.2–1.1)
BILIRUB UR QL: NEGATIVE
BORDETELLA PARAPERTUSSIS BY PCR: NOT DETECTED
BUN SERPL-MCNC: 10 MG/DL (ref 6–23)
C PNEUM DNA SPEC QL NAA+PROBE: NOT DETECTED
CALCIUM SERPL-MCNC: 9.9 MG/DL (ref 8.3–10.4)
CHLORIDE SERPL-SCNC: 93 MMOL/L (ref 101–110)
CO2 SERPL-SCNC: 44 MMOL/L (ref 21–32)
COLOR UR: ABNORMAL
CREAT SERPL-MCNC: 0.6 MG/DL (ref 0.6–1)
DIFFERENTIAL METHOD BLD: ABNORMAL
EOSINOPHIL # BLD: 0 K/UL (ref 0–0.8)
EOSINOPHIL NFR BLD: 0 % (ref 0.5–7.8)
EPI CELLS #/AREA URNS HPF: ABNORMAL /HPF
ERYTHROCYTE [DISTWIDTH] IN BLOOD BY AUTOMATED COUNT: 22.1 % (ref 11.9–14.6)
EST. AVERAGE GLUCOSE BLD GHB EST-MCNC: 126 MG/DL
FLUAV SUBTYP SPEC NAA+PROBE: NOT DETECTED
FLUBV RNA SPEC QL NAA+PROBE: NOT DETECTED
GLOBULIN SER CALC-MCNC: 5.5 G/DL (ref 2.8–4.5)
GLUCOSE BLD STRIP.AUTO-MCNC: 122 MG/DL (ref 65–100)
GLUCOSE BLD STRIP.AUTO-MCNC: 193 MG/DL (ref 65–100)
GLUCOSE SERPL-MCNC: 130 MG/DL (ref 65–100)
GLUCOSE UR STRIP.AUTO-MCNC: NEGATIVE MG/DL
HADV DNA SPEC QL NAA+PROBE: NOT DETECTED
HBA1C MFR BLD: 6 % (ref 4.8–5.6)
HCOV 229E RNA SPEC QL NAA+PROBE: NOT DETECTED
HCOV HKU1 RNA SPEC QL NAA+PROBE: NOT DETECTED
HCOV NL63 RNA SPEC QL NAA+PROBE: NOT DETECTED
HCOV OC43 RNA SPEC QL NAA+PROBE: NOT DETECTED
HCT VFR BLD AUTO: 34.9 % (ref 35.8–46.3)
HGB BLD-MCNC: 8.7 G/DL (ref 11.7–15.4)
HGB UR QL STRIP: NEGATIVE
HMPV RNA SPEC QL NAA+PROBE: NOT DETECTED
HPIV1 RNA SPEC QL NAA+PROBE: NOT DETECTED
HPIV2 RNA SPEC QL NAA+PROBE: NOT DETECTED
HPIV3 RNA SPEC QL NAA+PROBE: NOT DETECTED
HPIV4 RNA SPEC QL NAA+PROBE: NOT DETECTED
IMM GRANULOCYTES # BLD AUTO: 0.1 K/UL (ref 0–0.5)
IMM GRANULOCYTES NFR BLD AUTO: 1 % (ref 0–5)
KETONES UR QL STRIP.AUTO: NEGATIVE MG/DL
LACTATE SERPL-SCNC: 1.1 MMOL/L (ref 0.4–2)
LACTATE SERPL-SCNC: 1.2 MMOL/L (ref 0.4–2)
LEUKOCYTE ESTERASE UR QL STRIP.AUTO: NEGATIVE
LYMPHOCYTES # BLD: 1.1 K/UL (ref 0.5–4.6)
LYMPHOCYTES NFR BLD: 10 % (ref 13–44)
M PNEUMO DNA SPEC QL NAA+PROBE: NOT DETECTED
MCH RBC QN AUTO: 17.9 PG (ref 26.1–32.9)
MCHC RBC AUTO-ENTMCNC: 24.9 G/DL (ref 31.4–35)
MCV RBC AUTO: 71.8 FL (ref 82–102)
MONOCYTES # BLD: 0.6 K/UL (ref 0.1–1.3)
MONOCYTES NFR BLD: 5 % (ref 4–12)
NEUTS SEG # BLD: 9.8 K/UL (ref 1.7–8.2)
NEUTS SEG NFR BLD: 84 % (ref 43–78)
NITRITE UR QL STRIP.AUTO: NEGATIVE
NRBC # BLD: 0.11 K/UL (ref 0–0.2)
OTHER OBSERVATIONS: ABNORMAL
PH UR STRIP: 8.5 (ref 5–9)
PLATELET # BLD AUTO: 346 K/UL (ref 150–450)
PMV BLD AUTO: 10 FL (ref 9.4–12.3)
POTASSIUM SERPL-SCNC: 3.8 MMOL/L (ref 3.5–5.1)
PROCALCITONIN SERPL-MCNC: <0.05 NG/ML (ref 0–0.49)
PROT SERPL-MCNC: 8.8 G/DL (ref 6.3–8.2)
PROT UR STRIP-MCNC: 300 MG/DL
RBC # BLD AUTO: 4.86 M/UL (ref 4.05–5.2)
RBC #/AREA URNS HPF: ABNORMAL /HPF
RSV RNA SPEC QL NAA+PROBE: NOT DETECTED
RV+EV RNA SPEC QL NAA+PROBE: NOT DETECTED
SARS-COV-2 RNA RESP QL NAA+PROBE: NOT DETECTED
SERVICE CMNT-IMP: ABNORMAL
SERVICE CMNT-IMP: ABNORMAL
SODIUM SERPL-SCNC: 138 MMOL/L (ref 133–143)
SP GR UR REFRACTOMETRY: 1.01 (ref 1–1.02)
TROPONIN I SERPL HS-MCNC: 50.9 PG/ML (ref 0–14)
UROBILINOGEN UR QL STRIP.AUTO: 0.2 EU/DL (ref 0.2–1)
WBC # BLD AUTO: 11.8 K/UL (ref 4.3–11.1)
WBC URNS QL MICRO: ABNORMAL /HPF

## 2023-07-26 PROCEDURE — 6360000002 HC RX W HCPCS: Performed by: EMERGENCY MEDICINE

## 2023-07-26 PROCEDURE — 0202U NFCT DS 22 TRGT SARS-COV-2: CPT

## 2023-07-26 PROCEDURE — 81001 URINALYSIS AUTO W/SCOPE: CPT

## 2023-07-26 PROCEDURE — 94640 AIRWAY INHALATION TREATMENT: CPT

## 2023-07-26 PROCEDURE — 2580000003 HC RX 258: Performed by: EMERGENCY MEDICINE

## 2023-07-26 PROCEDURE — 84484 ASSAY OF TROPONIN QUANT: CPT

## 2023-07-26 PROCEDURE — 82962 GLUCOSE BLOOD TEST: CPT

## 2023-07-26 PROCEDURE — 80053 COMPREHEN METABOLIC PANEL: CPT

## 2023-07-26 PROCEDURE — 6360000002 HC RX W HCPCS: Performed by: INTERNAL MEDICINE

## 2023-07-26 PROCEDURE — 94760 N-INVAS EAR/PLS OXIMETRY 1: CPT

## 2023-07-26 PROCEDURE — 96367 TX/PROPH/DG ADDL SEQ IV INF: CPT

## 2023-07-26 PROCEDURE — 97530 THERAPEUTIC ACTIVITIES: CPT

## 2023-07-26 PROCEDURE — 87040 BLOOD CULTURE FOR BACTERIA: CPT

## 2023-07-26 PROCEDURE — 83605 ASSAY OF LACTIC ACID: CPT

## 2023-07-26 PROCEDURE — 96375 TX/PRO/DX INJ NEW DRUG ADDON: CPT

## 2023-07-26 PROCEDURE — 84145 PROCALCITONIN (PCT): CPT

## 2023-07-26 PROCEDURE — 70450 CT HEAD/BRAIN W/O DYE: CPT

## 2023-07-26 PROCEDURE — 71260 CT THORAX DX C+: CPT

## 2023-07-26 PROCEDURE — 96365 THER/PROPH/DIAG IV INF INIT: CPT

## 2023-07-26 PROCEDURE — 6370000000 HC RX 637 (ALT 250 FOR IP): Performed by: EMERGENCY MEDICINE

## 2023-07-26 PROCEDURE — 2580000003 HC RX 258: Performed by: INTERNAL MEDICINE

## 2023-07-26 PROCEDURE — 71045 X-RAY EXAM CHEST 1 VIEW: CPT

## 2023-07-26 PROCEDURE — 2700000000 HC OXYGEN THERAPY PER DAY

## 2023-07-26 PROCEDURE — 6360000004 HC RX CONTRAST MEDICATION: Performed by: INTERNAL MEDICINE

## 2023-07-26 PROCEDURE — 99285 EMERGENCY DEPT VISIT HI MDM: CPT

## 2023-07-26 PROCEDURE — 97161 PT EVAL LOW COMPLEX 20 MIN: CPT

## 2023-07-26 PROCEDURE — 83036 HEMOGLOBIN GLYCOSYLATED A1C: CPT

## 2023-07-26 PROCEDURE — 6370000000 HC RX 637 (ALT 250 FOR IP): Performed by: INTERNAL MEDICINE

## 2023-07-26 PROCEDURE — 85025 COMPLETE CBC W/AUTO DIFF WBC: CPT

## 2023-07-26 PROCEDURE — 1100000003 HC PRIVATE W/ TELEMETRY

## 2023-07-26 RX ORDER — ALBUTEROL SULFATE 2.5 MG/3ML
2.5 SOLUTION RESPIRATORY (INHALATION) EVERY 8 HOURS PRN
Status: DISCONTINUED | OUTPATIENT
Start: 2023-07-26 | End: 2023-07-29 | Stop reason: HOSPADM

## 2023-07-26 RX ORDER — SODIUM CHLORIDE 0.9 % (FLUSH) 0.9 %
5-40 SYRINGE (ML) INJECTION EVERY 12 HOURS SCHEDULED
Status: DISCONTINUED | OUTPATIENT
Start: 2023-07-26 | End: 2023-07-29 | Stop reason: HOSPADM

## 2023-07-26 RX ORDER — ONDANSETRON 2 MG/ML
4 INJECTION INTRAMUSCULAR; INTRAVENOUS EVERY 6 HOURS PRN
Status: DISCONTINUED | OUTPATIENT
Start: 2023-07-26 | End: 2023-07-29 | Stop reason: HOSPADM

## 2023-07-26 RX ORDER — POLYETHYLENE GLYCOL 3350 17 G/17G
17 POWDER, FOR SOLUTION ORAL DAILY PRN
Status: DISCONTINUED | OUTPATIENT
Start: 2023-07-26 | End: 2023-07-29 | Stop reason: HOSPADM

## 2023-07-26 RX ORDER — ALBUTEROL SULFATE 2.5 MG/3ML
10 SOLUTION RESPIRATORY (INHALATION) ONCE
Status: COMPLETED | OUTPATIENT
Start: 2023-07-26 | End: 2023-07-26

## 2023-07-26 RX ORDER — INSULIN LISPRO 100 [IU]/ML
0-4 INJECTION, SOLUTION INTRAVENOUS; SUBCUTANEOUS NIGHTLY
Status: DISCONTINUED | OUTPATIENT
Start: 2023-07-26 | End: 2023-07-29 | Stop reason: HOSPADM

## 2023-07-26 RX ORDER — ACETAMINOPHEN 325 MG/1
650 TABLET ORAL
Status: COMPLETED | OUTPATIENT
Start: 2023-07-26 | End: 2023-07-26

## 2023-07-26 RX ORDER — IBUPROFEN 600 MG/1
1 TABLET ORAL PRN
Status: DISCONTINUED | OUTPATIENT
Start: 2023-07-26 | End: 2023-07-29 | Stop reason: HOSPADM

## 2023-07-26 RX ORDER — ONDANSETRON 4 MG/1
4 TABLET, ORALLY DISINTEGRATING ORAL EVERY 8 HOURS PRN
Status: DISCONTINUED | OUTPATIENT
Start: 2023-07-26 | End: 2023-07-29 | Stop reason: HOSPADM

## 2023-07-26 RX ORDER — PANTOPRAZOLE SODIUM 40 MG/1
40 TABLET, DELAYED RELEASE ORAL
Status: CANCELLED | OUTPATIENT
Start: 2023-07-26

## 2023-07-26 RX ORDER — SODIUM CHLORIDE 9 MG/ML
INJECTION, SOLUTION INTRAVENOUS CONTINUOUS
Status: DISCONTINUED | OUTPATIENT
Start: 2023-07-26 | End: 2023-07-27

## 2023-07-26 RX ORDER — INSULIN LISPRO 100 [IU]/ML
0-8 INJECTION, SOLUTION INTRAVENOUS; SUBCUTANEOUS
Status: DISCONTINUED | OUTPATIENT
Start: 2023-07-26 | End: 2023-07-29 | Stop reason: HOSPADM

## 2023-07-26 RX ORDER — DEXTROSE MONOHYDRATE 100 MG/ML
INJECTION, SOLUTION INTRAVENOUS CONTINUOUS PRN
Status: DISCONTINUED | OUTPATIENT
Start: 2023-07-26 | End: 2023-07-29 | Stop reason: HOSPADM

## 2023-07-26 RX ORDER — DILTIAZEM HYDROCHLORIDE 120 MG/1
240 CAPSULE, COATED, EXTENDED RELEASE ORAL DAILY
Status: CANCELLED | OUTPATIENT
Start: 2023-07-26

## 2023-07-26 RX ORDER — SODIUM CHLORIDE 0.9 % (FLUSH) 0.9 %
5-40 SYRINGE (ML) INJECTION PRN
Status: DISCONTINUED | OUTPATIENT
Start: 2023-07-26 | End: 2023-07-29 | Stop reason: HOSPADM

## 2023-07-26 RX ORDER — ACETAMINOPHEN 325 MG/1
650 TABLET ORAL EVERY 6 HOURS PRN
Status: DISCONTINUED | OUTPATIENT
Start: 2023-07-26 | End: 2023-07-29 | Stop reason: HOSPADM

## 2023-07-26 RX ORDER — ATORVASTATIN CALCIUM 10 MG/1
10 TABLET, FILM COATED ORAL NIGHTLY
Status: CANCELLED | OUTPATIENT
Start: 2023-07-26

## 2023-07-26 RX ORDER — ALBUTEROL SULFATE 2.5 MG/3ML
2.5 SOLUTION RESPIRATORY (INHALATION)
Status: DISCONTINUED | OUTPATIENT
Start: 2023-07-26 | End: 2023-07-28

## 2023-07-26 RX ORDER — 0.9 % SODIUM CHLORIDE 0.9 %
100 INTRAVENOUS SOLUTION INTRAVENOUS
Status: DISCONTINUED | OUTPATIENT
Start: 2023-07-26 | End: 2023-07-29 | Stop reason: HOSPADM

## 2023-07-26 RX ORDER — 0.9 % SODIUM CHLORIDE 0.9 %
1000 INTRAVENOUS SOLUTION INTRAVENOUS ONCE
Status: COMPLETED | OUTPATIENT
Start: 2023-07-26 | End: 2023-07-26

## 2023-07-26 RX ORDER — ACETAMINOPHEN 650 MG/1
650 SUPPOSITORY RECTAL EVERY 6 HOURS PRN
Status: DISCONTINUED | OUTPATIENT
Start: 2023-07-26 | End: 2023-07-29 | Stop reason: HOSPADM

## 2023-07-26 RX ORDER — ENOXAPARIN SODIUM 100 MG/ML
30 INJECTION SUBCUTANEOUS 2 TIMES DAILY
Status: DISCONTINUED | OUTPATIENT
Start: 2023-07-26 | End: 2023-07-26

## 2023-07-26 RX ORDER — DOXYCYCLINE HYCLATE 100 MG/1
100 CAPSULE ORAL EVERY 12 HOURS SCHEDULED
Status: DISCONTINUED | OUTPATIENT
Start: 2023-07-26 | End: 2023-07-29 | Stop reason: HOSPADM

## 2023-07-26 RX ORDER — SODIUM CHLORIDE 9 MG/ML
INJECTION, SOLUTION INTRAVENOUS PRN
Status: DISCONTINUED | OUTPATIENT
Start: 2023-07-26 | End: 2023-07-29 | Stop reason: HOSPADM

## 2023-07-26 RX ADMIN — WATER 125 MG: 1 INJECTION INTRAMUSCULAR; INTRAVENOUS; SUBCUTANEOUS at 11:19

## 2023-07-26 RX ADMIN — WATER 40 MG: 1 INJECTION INTRAMUSCULAR; INTRAVENOUS; SUBCUTANEOUS at 21:08

## 2023-07-26 RX ADMIN — SODIUM CHLORIDE 1000 ML: 9 INJECTION, SOLUTION INTRAVENOUS at 10:39

## 2023-07-26 RX ADMIN — IOPAMIDOL 75 ML: 755 INJECTION, SOLUTION INTRAVENOUS at 16:06

## 2023-07-26 RX ADMIN — ALBUTEROL SULFATE 2.5 MG: 2.5 SOLUTION RESPIRATORY (INHALATION) at 17:11

## 2023-07-26 RX ADMIN — SODIUM CHLORIDE, PRESERVATIVE FREE 10 ML: 5 INJECTION INTRAVENOUS at 21:08

## 2023-07-26 RX ADMIN — CEFTRIAXONE 1000 MG: 1 INJECTION, POWDER, FOR SOLUTION INTRAMUSCULAR; INTRAVENOUS at 11:20

## 2023-07-26 RX ADMIN — SODIUM CHLORIDE: 9 INJECTION, SOLUTION INTRAVENOUS at 17:34

## 2023-07-26 RX ADMIN — ACETAMINOPHEN 650 MG: 325 TABLET ORAL at 10:39

## 2023-07-26 RX ADMIN — DOXYCYCLINE HYCLATE 100 MG: 100 CAPSULE ORAL at 21:08

## 2023-07-26 RX ADMIN — AZITHROMYCIN MONOHYDRATE 500 MG: 500 INJECTION, POWDER, LYOPHILIZED, FOR SOLUTION INTRAVENOUS at 12:21

## 2023-07-26 RX ADMIN — ALBUTEROL SULFATE 10 MG: 2.5 SOLUTION RESPIRATORY (INHALATION) at 10:21

## 2023-07-26 ASSESSMENT — ENCOUNTER SYMPTOMS
COUGH: 1
GASTROINTESTINAL NEGATIVE: 1
SHORTNESS OF BREATH: 1
BACK PAIN: 0

## 2023-07-26 ASSESSMENT — PAIN SCALES - GENERAL
PAINLEVEL_OUTOF10: 0

## 2023-07-26 ASSESSMENT — PAIN - FUNCTIONAL ASSESSMENT: PAIN_FUNCTIONAL_ASSESSMENT: 0-10

## 2023-07-26 NOTE — ED NOTES
TRANSFER - OUT REPORT:    Verbal report given to CORONA Rashid on Thewilfred Obrien  being transferred to  23-14-20-09 for routine progression of patient care       Report consisted of patient's Situation, Background, Assessment and   Recommendations(SBAR). Information from the following report(s) ED SBAR was reviewed with the receiving nurse. Lines:   Peripheral IV 07/26/23 Left;Posterior Hand (Active)   Site Assessment Clean, dry & intact 07/26/23 1009   Line Status Blood return noted; Flushed 07/26/23 1009   Phlebitis Assessment No symptoms 07/26/23 1009   Infiltration Assessment 0 07/26/23 1009       Peripheral IV 07/26/23 Right Antecubital (Active)   Site Assessment Clean, dry & intact 07/26/23 1026   Line Status Flushed;Blood return noted 07/26/23 1026   Phlebitis Assessment No symptoms 07/26/23 1026   Infiltration Assessment 0 07/26/23 1026        Opportunity for questions and clarification was provided.       Patient transported with:  O2 @ 4lpm        Trev Mckoy RN  07/26/23 [id number]

## 2023-07-26 NOTE — ED NOTES
Blood Culture drawn and 2nd staff member assisted [facility]     Seema Pineda RN  07/26/23 [phone]

## 2023-07-26 NOTE — ED PROVIDER NOTES
Emergency Department Provider Note       PCP: Alfreda Paez PA-C   Age: 48 y.o. Sex: female     [address]    1. COPD exacerbation ([address])  J44.1       2. Hypoxia  R09.02       3. Sepsis, due to unspecified organism, unspecified whether acute organ dysfunction present [facility])  A41.9           Medical Decision Making     Complexity of Problems Addressed:  1 or more chronic illnesses with a severe exacerbation or progression. 1 or more acute illnesses that pose a threat to life or bodily function. Data Reviewed and Analyzed:  Category 1:   I independently ordered and reviewed each unique test.  I reviewed external records: provider visit note from PCP. I reviewed external records: provider visit note from outside specialist.  I reviewed external records: Previous hospitalization    The patients assessment required an independent historian: EMS. The reason they were needed is important historical information not provided by the patient. Category 2:   I independently ordered and interpreted the ED EKG in the absence of a Cardiologist.    See below    I interpreted the X-rays chest x-ray with haziness noted at the right lung base and right middle. Appears similar to prior however appears more prominent compared to prior chest x-ray. I interpreted the labs. Category 3: Discussion of management or test interpretation. 48 female known history of COPD, type 2 diabetes, on 4 L continuous oxygen, paroxysmal A-fib, morbid obesity. On arrival oxygen was in the 60s which is transporting from the stretcher over to the bed. Nasal cannula turned up to 5 L. Improved to the 90s. Patient did states she was feeling somewhat better. However she is still severely diminished throughout all lung field. Will give albuterol 10 mg continuous over the next hour. Patient is also febrile here 100.7. Possible viral versus recurrent pneumonia. She is tachycardic.   Possibly secondary to albuterol provider      Orders Placed This Encounter   Procedures    Critical Care    Blood Culture 1    Blood Culture 2    Respiratory Panel, Molecular, with COVID-19 (Restricted: peds pts or suitable admitted adults)    XR CHEST PORTABLE    Comprehensive Metabolic Panel    CBC with Auto Differential    Lactate, Sepsis    Procalcitonin    Urinalysis    Troponin    Neurologic Status Assessment    Strict intake and output    Vital Signs Per Unit Routine    EKG 12 Lead    Saline lock IV        Medications   sodium chloride 0.9 % bolus 1,000 mL (1,000 mLs IntraVENous New Bag 7/26/23 1039)   cefTRIAXone (ROCEPHIN) 1,000 mg in sodium chloride 0.9 % 50 mL IVPB (mini-bag) (1,000 mg IntraVENous New Bag 7/26/23 1120)     And   azithromycin (ZITHROMAX) 500 mg in sodium chloride 0.9 % 250 mL IVPB ([id number]) (has no administration in time range)   albuterol (PROVENTIL) (2.5 MG/3ML) 0.083% nebulizer solution 10 mg (10 mg Nebulization Given 7/26/23 1021)   acetaminophen (TYLENOL) tablet 650 mg (650 mg Oral Given 7/26/23 1039)   methylPREDNISolone sodium (SOLU-MEDROL) 125 mg in sterile water 2 mL injection (125 mg IntraVENous Given 7/26/23 1119)       New Prescriptions    No medications on file        Past Medical History:   Diagnosis Date    Acute gallstone pancreatitis 10/26/2022    10/30/22 s/p lap river and liver biopsy; Dr Umesh Charles Pathology: ALaymond Shape":            CHRONIC CHOLECYSTITIS WITH CHOLESTEROLOSIS. CHOLELITHIASIS. B:  \"LIVER BIOPSY\":            BENIGN HEPATIC TISSUE WITH MILD STEATOSIS AND MINIMAL PORTAL                 INFLAMMATION.             Childhood asthma     Chronic respiratory failure with hypoxia (HCC)     Continuous at 3L    COPD (chronic obstructive pulmonary disease) ([address] )     Stage 4 by GOLD Criteria    COVID-19 01/07/2021    Fatty liver determined by biopsy 10/30/2022    Gallstone pancreatitis 10/2022    Hyperlipidemia     Daily meds     Hypertension     Managed with meds     Intraductal

## 2023-07-26 NOTE — ED NOTES
RN has spoken with MD Mcbride about antibiotics, no orders at this time, pending labwork.       Richy Mail, CORONA  07/26/23 1038

## 2023-07-26 NOTE — CARE COORDINATION
Patient is sleeping in room and does not arouse. Questions are answered by chao Luz [phone]. Patient lives with nephew and son is nearby. She wears oxygen at home but son does not know the supplier. Patient has hx of back injury and currently uses a rollator to get around. Today patient has fall. Patient struggles to get up 14-15 steps to her apartment and typically does not leave her apartment unless it is for a Doctor appointment. She calls for [facility] transport to get her to and from [facility]. CM to follow clinical course to determine needs. 07/26/23 1220   Service Assessment   Patient Orientation Unable to Assess  (patient is sleeping)   Cognition Other (see comment)  (patient is sleeping)   History Provided By Child/Family   Primary Caregiver Other (Comment)  (nephew)   Accompanied By/Relationship chao Luz [address] [facility] Children;Family Members   [address] is: Legal Next of Kin  (chao Luz)   PCP Verified by CM Yes   Prior Functional Level Assistance with the following:;Cooking;Housework; Shopping   Current Functional Level Assistance with the following:;Cooking;Housework; Shopping;Mobility   Can patient return to prior living arrangement Unknown at present   Ability to make needs known: Fair   Family able to assist with home care needs: Yes   Would you like for me to discuss the discharge plan with any other family members/significant others, and if so, who? Yes  (son)   Financial Resources Medicaid; Medicare   Community Resources Transportation   Social/Functional History   Lives With Family   Type of [address] One level   Home Access Stairs to enter with rails   Entrance Stairs - Number of Steps 15 steps   Bathroom Toilet Standard   Bathroom Equipment None   Bathroom Accessibility Accessible   Home Equipment Rollator   Receives Help From Family   Active  No   Patient's  Info patient call Medicare Heidi Trujillo to get to Dr. Bonnie Daugherty   Occupation On disability   Discharge Planning   Type of Residence Acute Rehab  (pending clinical course patient may qualify for short term rehab)   Living Arrangements Family Members   Current Services Prior To Admission Durable Medical Equipment   Current DME Prior to Arrival Oxygen Therapy (Comment)  (on 4 L NC in the ED. Son does not know what agency supplies her home oxygen)   Potential Assistance Purchasing Medications No   Type of Home Care Services None   Patient expects to be discharged to: Unknown   One/Two Story Residence One story   History of falls? 1   Services At/After Discharge   Transition of Care Consult (CM Consult) Discharge Planning   Mode of Transport at Discharge [facility]. Follow Up Transport Wheelchair [employer]   Condition of Participation: Discharge Planning   The Plan for Transition of Care is related to the following treatment goals: based on clinical course   The Patient and/or Patient Representative was provided with a Choice of Provider? Patient   The Patient and/Or Patient Representative agree with the Discharge Plan? Yes   Freedom of Choice list was provided with basic dialogue that supports the patient's individualized plan of care/goals, treatment preferences, and shares the quality data associated with the providers?   Yes

## 2023-07-26 NOTE — PROGRESS NOTES
ACUTE PHYSICAL THERAPY GOALS:   (Developed with and agreed upon by patient and/or caregiver.)  Pt will perform bed mobility with ind in 7 therapy sessions. Pt will perform sit-to-stand/ stand-to-sit transfers ind in 7 therapy sessions. Pt will ambulate 250 ft SBA with use of LRAD/no device and breaks as needed in 7 therapy sessions. Pt will ambulate up and down 5 steps CGA. Pt will perform standing dynamic balance activities with minimal postural sway in 7 therapy sessions. Pt will tolerate multiple sets and reps of BLE exercises in 7 therapy sessions. PHYSICAL THERAPY Initial Assessment  (Link to Caseload Tracking: PT Visit Days : 1  Acknowledge Orders  Time In/Out  PT Charge Capture  Rehab Caseload Tracker    Lee Ann Ramos is a 48 y.o. female   PRIMARY DIAGNOSIS: Respiratory failure with hypoxia ([address])  Hypoxia [R09.02]  COPD exacerbation (HCC) [J44.1]  Respiratory failure with hypoxia ([address]) [J96.91]  Sepsis, due to unspecified organism, unspecified whether acute organ dysfunction present ([address]) [A41.9]       Reason for Referral: Other abnormalities of gait and mobility (R26.89)  Inpatient: Payor: Josesito Christy / Plan: [employer] GOLD PLUS HMO / Product Type: *No Product type* /     ASSESSMENT:     REHAB RECOMMENDATIONS:   Recommendation to date pending progress:  Setting:  Home Health Therapy    Equipment:    To Be Determined     ASSESSMENT:  Ms. Rolo Marcos Is a 48 y.o. female presenting to PT following a hospitalization due to respiratory failure with hypoxia. At baseline, pt ambulates with no AD and lives alone in an apt with 14 steps to enter on the second sangita. At time of initial evaluation, pt presents below baseline LOF with deficits in bed mobility, strength, transfers, balance, gait and activity tolerance limiting overall functional mobility.  Today, pt performed all mobility and transfers with CGA including ambulation of 3x trials with seated rest breaks in between each trial. During

## 2023-07-26 NOTE — PROGRESS NOTES
Pt sitting on side of bed with family at the bedside. No s/s of distress. Respirations even and unlabored on 4L NC at this time. AxOx4 with delayed responded. No needs expressed. Call light in reach and pt encouraged to use if needed. Will continue to monitor.

## 2023-07-26 NOTE — ACP (ADVANCE CARE PLANNING)
Advance Care Planning   Healthcare Decision Maker:    Primary Decision Maker: Chevy Celia Child - [phone]    Secondary Decision Maker: Jesus Marquez - Brother/Sister - [phone]    Click here to complete Healthcare Decision Makers including selection of the Healthcare Decision Maker Relationship (ie \"Primary\").

## 2023-07-26 NOTE — ED TRIAGE NOTES
Pt brought in by ems from home, found on bathroom floor by son, unknown time down. Per EMS pt had expiratory wheezing, SpO2 60s on RA, given 5mg albuterol PTA, SpO2 94% on 4L/min NC after treatment. Given 0.25mg terbutaline IM PTA  [facility] SpO2 69% on RA       Pt alert and oriented but with difficulty following commands and slow to respond. Per pt wears more than 4L/min NC O2 at baseline but per ems did not have oxygen on when ems arrived.

## 2023-07-27 LAB
ANION GAP SERPL CALC-SCNC: 1 MMOL/L (ref 2–11)
BUN SERPL-MCNC: 10 MG/DL (ref 6–23)
CALCIUM SERPL-MCNC: 10.1 MG/DL (ref 8.3–10.4)
CHLORIDE SERPL-SCNC: 99 MMOL/L (ref 101–110)
CO2 SERPL-SCNC: 42 MMOL/L (ref 21–32)
CREAT SERPL-MCNC: 0.6 MG/DL (ref 0.6–1)
ERYTHROCYTE [DISTWIDTH] IN BLOOD BY AUTOMATED COUNT: 23.3 % (ref 11.9–14.6)
GLUCOSE BLD STRIP.AUTO-MCNC: 114 MG/DL (ref 65–100)
GLUCOSE BLD STRIP.AUTO-MCNC: 119 MG/DL (ref 65–100)
GLUCOSE BLD STRIP.AUTO-MCNC: 169 MG/DL (ref 65–100)
GLUCOSE BLD STRIP.AUTO-MCNC: 192 MG/DL (ref 65–100)
GLUCOSE SERPL-MCNC: 119 MG/DL (ref 65–100)
HCT VFR BLD AUTO: 35.1 % (ref 35.8–46.3)
HGB BLD-MCNC: 9 G/DL (ref 11.7–15.4)
MCH RBC QN AUTO: 18.1 PG (ref 26.1–32.9)
MCHC RBC AUTO-ENTMCNC: 25.6 G/DL (ref 31.4–35)
MCV RBC AUTO: 70.6 FL (ref 82–102)
NRBC # BLD: 0.03 K/UL (ref 0–0.2)
PLATELET # BLD AUTO: 345 K/UL (ref 150–450)
PMV BLD AUTO: ABNORMAL FL (ref 9.4–12.3)
POTASSIUM SERPL-SCNC: 4.8 MMOL/L (ref 3.5–5.1)
RBC # BLD AUTO: 4.97 M/UL (ref 4.05–5.2)
SERVICE CMNT-IMP: ABNORMAL
SODIUM SERPL-SCNC: 142 MMOL/L (ref 133–143)
WBC # BLD AUTO: 7.9 K/UL (ref 4.3–11.1)

## 2023-07-27 PROCEDURE — 2580000003 HC RX 258: Performed by: INTERNAL MEDICINE

## 2023-07-27 PROCEDURE — 97530 THERAPEUTIC ACTIVITIES: CPT

## 2023-07-27 PROCEDURE — 6370000000 HC RX 637 (ALT 250 FOR IP): Performed by: INTERNAL MEDICINE

## 2023-07-27 PROCEDURE — 82962 GLUCOSE BLOOD TEST: CPT

## 2023-07-27 PROCEDURE — 85027 COMPLETE CBC AUTOMATED: CPT

## 2023-07-27 PROCEDURE — 94760 N-INVAS EAR/PLS OXIMETRY 1: CPT

## 2023-07-27 PROCEDURE — 94640 AIRWAY INHALATION TREATMENT: CPT

## 2023-07-27 PROCEDURE — 36415 COLL VENOUS BLD VENIPUNCTURE: CPT

## 2023-07-27 PROCEDURE — 6360000002 HC RX W HCPCS: Performed by: INTERNAL MEDICINE

## 2023-07-27 PROCEDURE — 80048 BASIC METABOLIC PNL TOTAL CA: CPT

## 2023-07-27 PROCEDURE — 1100000003 HC PRIVATE W/ TELEMETRY

## 2023-07-27 PROCEDURE — 2700000000 HC OXYGEN THERAPY PER DAY

## 2023-07-27 PROCEDURE — 97165 OT EVAL LOW COMPLEX 30 MIN: CPT

## 2023-07-27 RX ORDER — PREDNISONE 20 MG/1
40 TABLET ORAL DAILY
Status: DISCONTINUED | OUTPATIENT
Start: 2023-07-27 | End: 2023-07-29 | Stop reason: HOSPADM

## 2023-07-27 RX ADMIN — WATER 40 MG: 1 INJECTION INTRAMUSCULAR; INTRAVENOUS; SUBCUTANEOUS at 05:40

## 2023-07-27 RX ADMIN — ALBUTEROL SULFATE 2.5 MG: 2.5 SOLUTION RESPIRATORY (INHALATION) at 08:03

## 2023-07-27 RX ADMIN — DOXYCYCLINE HYCLATE 100 MG: 100 CAPSULE ORAL at 21:02

## 2023-07-27 RX ADMIN — ALBUTEROL SULFATE 2.5 MG: 2.5 SOLUTION RESPIRATORY (INHALATION) at 11:51

## 2023-07-27 RX ADMIN — ALBUTEROL SULFATE 2.5 MG: 2.5 SOLUTION RESPIRATORY (INHALATION) at 16:20

## 2023-07-27 RX ADMIN — CEFTRIAXONE 1000 MG: 1 INJECTION, POWDER, FOR SOLUTION INTRAMUSCULAR; INTRAVENOUS at 00:36

## 2023-07-27 RX ADMIN — DOXYCYCLINE HYCLATE 100 MG: 100 CAPSULE ORAL at 08:45

## 2023-07-27 RX ADMIN — SODIUM CHLORIDE: 9 INJECTION, SOLUTION INTRAVENOUS at 11:10

## 2023-07-27 RX ADMIN — SODIUM CHLORIDE, PRESERVATIVE FREE 10 ML: 5 INJECTION INTRAVENOUS at 08:43

## 2023-07-27 RX ADMIN — ALBUTEROL SULFATE 2.5 MG: 2.5 SOLUTION RESPIRATORY (INHALATION) at 19:59

## 2023-07-27 RX ADMIN — APIXABAN 5 MG: 5 TABLET, FILM COATED ORAL at 21:03

## 2023-07-27 RX ADMIN — WATER 40 MG: 1 INJECTION INTRAMUSCULAR; INTRAVENOUS; SUBCUTANEOUS at 14:14

## 2023-07-27 RX ADMIN — SODIUM CHLORIDE: 9 INJECTION, SOLUTION INTRAVENOUS at 02:07

## 2023-07-27 RX ADMIN — PREDNISONE 40 MG: 20 TABLET ORAL at 21:03

## 2023-07-27 RX ADMIN — SODIUM CHLORIDE, PRESERVATIVE FREE 5 ML: 5 INJECTION INTRAVENOUS at 21:05

## 2023-07-27 ASSESSMENT — PAIN SCALES - GENERAL: PAINLEVEL_OUTOF10: 0

## 2023-07-27 NOTE — PROGRESS NOTES
Pt resting in bed. Respirations even and unlabored on 4L NC which is her baseline. Pt is alert and oriented but has poor judgement at times. Bed alarm on. No s/s of distress. Call light within reach. Preparing to give report to oncoming RN.

## 2023-07-27 NOTE — PROGRESS NOTES
level  Home Access: Stairs to enter with rails  Entrance Stairs - Number of Steps: 15 steps  Bathroom Toilet: Standard  Bathroom Equipment: None  Bathroom Accessibility: Accessible  Home Equipment: Rollator  Receives Help From: Family  Active : No  Patient's  Info: patient call Medicare Cristi Salvage to get to Dr. Bing Wiggins  Occupation: On disability  OBJECTIVE:     PAIN: Rachelle Bake / O2: PRECAUTION / Bela Rowe / Carla Salty:   Pre Treatment: None reported         Post Treatment: N/A Vitals        Oxygen    IV    RESTRICTIONS/PRECAUTIONS:        MOBILITY: I Mod I S SBA CGA Min Mod Max Total  NT x2 Comments:   Bed Mobility    Rolling [] [] [] [] [] [] [] [] [] [x] []    Supine to Sit [] [] [] [x] [] [] [] [] [] [] []    Scooting [] [] [] [x] [] [] [] [] [] [] []    Sit to Supine [] [] [] [] [] [] [] [] [] [x] []    Transfers    Sit to Stand [] [] [] [] [x] [] [] [] [] [] []    Bed to Chair [] [] [] [] [] [x] [] [] [] [] []    Stand to Sit [] [] [] [] [x] [] [] [] [] [] []     [] [] [] [] [] [] [] [] [] [] []    I=Independent, Mod I=Modified Independent, S=Supervision, SBA=Standby Assistance, CGA=Contact Guard Assistance,   Min=Minimal Assistance, Mod=Moderate Assistance, Max=Maximal Assistance, Total=Total Assistance, NT=Not Tested    BALANCE: Good Fair+ Fair Fair- Poor NT Comments   Sitting Static [] [x] [] [] [] []    Sitting Dynamic [] [x] [] [] [] []              Standing Static [] [] [x] [] [] []    Standing Dynamic [] [] [x] [] [] []      GAIT: I Mod I S SBA CGA Min Mod Max Total  NT x2 Comments:   Level of Assistance [] [] [] [] [] [x] [] [] [] [] [] [facility]   Distance 10, 48' feet    DME None    Gait Quality Decreased tyler  and Trunk sway increased    Weightbearing Status      Stairs      I=Independent, Mod I=Modified Independent, S=Supervision, SBA=Standby Assistance, CGA=Contact Guard Assistance,   Min=Minimal Assistance, Mod=Moderate Assistance, Max=Maximal Assistance, Total=Total Assistance, NT=Not Tested    PLAN: FREQUENCY AND DURATION: 3 times/week for duration of hospital stay or until stated goals are met, whichever comes first.    TREATMENT:   TREATMENT:   Therapeutic Activity (24 Minutes): Therapeutic activity included Supine to Sit, Transfer Training, Ambulation on level ground, Sitting balance , and Standing balance to improve functional Activity tolerance, Balance, Coordination, Mobility, Strength, and ROM.     TREATMENT GRID:  N/A    AFTER TREATMENT PRECAUTIONS: Bed/Chair Locked, Call light within reach, Chair, and Needs within reach    INTERDISCIPLINARY COLLABORATION:  RN/ PCT, PT/ PTA, and OT/ DAHL    EDUCATION:      TIME IN/OUT:  Time In: 1316  Time Out: 120 [facility]  Minutes: 24    Marcelino Do PT

## 2023-07-27 NOTE — PLAN OF CARE
Problem: Respiratory - Adult  Goal: Achieves optimal ventilation and oxygenation  Outcome: Progressing  Flowsheets (Taken 7/27/2023 1623)  Achieves optimal ventilation and oxygenation:   Assess for changes in respiratory status   Position to facilitate oxygenation and minimize respiratory effort   Respiratory therapy support as indicated   Assess and instruct to report shortness of breath or any respiratory difficulty   Encourage broncho-pulmonary hygiene including cough, deep breathe, incentive spirometry   Assess for changes in mentation and behavior

## 2023-07-27 NOTE — PROGRESS NOTES
ACUTE OCCUPATIONAL THERAPY GOALS:   (Developed with and agreed upon by patient and/or caregiver.)  1. Pt will complete LB ADL Min A with AE as needed. 2. Pt will complete toileting supervision only with AE as needed. 3. Pt will complete UB ADL set up only. 4. Pt will tolerate 25 minutes of OT treatment requiring 1-2 breaks as needed. 5. Pt will complete grooming tasks while standing at sink I.  6. Pt will complete functional mobility with least restrictive device or no AD supervision. 7. Pt will tolerate BUE exercises to increase strength/endurance for safe, functional transfers, ADL participation. 8. Pt will self initiate using energy conservation techniques during functional mobility and ADLs 100% of the time. 9. Pt will perform functional activity while keeping O2 above 90% throughout entire session on 4-5 L. Timeframe: 7 visits     OCCUPATIONAL THERAPY Initial Assessment, Daily Note, and PM       OT Visit Days: 1  Acknowledge Orders  Time  OT Charge Capture  Rehab Caseload Tracker      Jeronimo Hall is a 48 y.o. female   PRIMARY DIAGNOSIS: Respiratory failure with hypoxia ([address])  Hypoxia [R09.02]  COPD exacerbation (HCC) [J44.1]  Respiratory failure with hypoxia ([address]) [J96.91]  Sepsis, due to unspecified organism, unspecified whether acute organ dysfunction present ([address]) [A41.9]       Reason for Referral: Generalized Muscle Weakness (M62.81)  Inpatient: Payor: Jefferson Solano / Plan: [employer]US HMO / Product Type: *No Product type* /     ASSESSMENT:     REHAB RECOMMENDATIONS:   Recommendation to date pending progress:  Setting:  Home Health Therapy    Equipment:    To Be Determined     ASSESSMENT:  Ms. Saeid Reece is a 48 y F who was admitted for respiratory failure with hypoxia. Son found pt on bathroom on the floor SOB. At baseline pt states she needs A with LB ADLs at times. Pt was received supine in bed on 4L O2, 95%.  Pt required assistance for functional mobility and ADLs [] [] [] []    Feeding [] [] [] [] [] [] [] [] [] []    Grooming [] [] [x] [x] [] [] [] [] [] [] Washing hands standing EOS; propping self on counter to allow for rest break afterward   Personal Device Care [] [] [] [] [] [] [] [] [] []    Functional Mobility [] [] [] [] [x] [x] [] [] [] [] Bed > toilet > EOS > bed > hallway > chair HHA, occasional use of hallway rail   I=Independent, Mod I=Modified Independent, S=Supervision/Setup, SBA=Standby Assistance, CGA=Contact Guard Assistance, Min=Minimal Assistance, Mod=Moderate Assistance, Max=Maximal Assistance, Total=Total Assistance, NT=Not Tested    PLAN:   [address] of Care: 3 times/week for duration of hospital stay or until stated goals are met, whichever comes first.    PROBLEM LIST:   (Skilled intervention is medically necessary to address:)  Decreased ADL/Functional Activities  Decreased Activity Tolerance  Decreased Balance  Decreased Gait Ability  Decreased Safety Awareness  Decreased Strength   INTERVENTIONS PLANNED:  (Benefits and precautions of occupational therapy have been discussed with the patient.)  Self Care Training  Therapeutic Activity  Therapeutic Exercise/HEP  Neuromuscular Re-education  Gait Training  Education         TREATMENT:     EVALUATION: LOW COMPLEXITY: (Untimed Charge)    TREATMENT:   Co-Treatment PT/OT necessary due to patient's decreased overall endurance/tolerance levels, as well as need for high level skilled assistance to complete functional transfers/mobility and functional tasks  Self Care (15 minutes): Patient participated in toileting and grooming ADLs in supported sitting and standing with minimal verbal cueing to increase independence, decrease assistance required, increase activity tolerance, and increase safety awareness.  Patient also participated in functional mobility, energy conservation, and adaptive equipment training to increase independence, decrease assistance required, increase activity

## 2023-07-28 ENCOUNTER — APPOINTMENT (OUTPATIENT)
Dept: NON INVASIVE DIAGNOSTICS | Age: 53
End: 2023-07-28
Attending: INTERNAL MEDICINE
Payer: MEDICARE

## 2023-07-28 LAB
ANION GAP SERPL CALC-SCNC: 0 MMOL/L (ref 2–11)
BASOPHILS # BLD: 0 K/UL (ref 0–0.2)
BASOPHILS NFR BLD: 0 % (ref 0–2)
BUN SERPL-MCNC: 14 MG/DL (ref 6–23)
CALCIUM SERPL-MCNC: 9.6 MG/DL (ref 8.3–10.4)
CHLORIDE SERPL-SCNC: 98 MMOL/L (ref 101–110)
CO2 SERPL-SCNC: 43 MMOL/L (ref 21–32)
CREAT SERPL-MCNC: 0.6 MG/DL (ref 0.6–1)
DIFFERENTIAL METHOD BLD: ABNORMAL
ECHO AO ASC DIAM: 2.3 CM
ECHO AO ASCENDING AORTA INDEX: 0.97 CM/M2
ECHO AO ROOT DIAM: 2.7 CM
ECHO AO ROOT INDEX: 1.14 CM/M2
ECHO AV AREA PEAK VELOCITY: 2.5 CM2
ECHO AV AREA VTI: 2.6 CM2
ECHO AV AREA/BSA PEAK VELOCITY: 1.1 CM2/M2
ECHO AV AREA/BSA VTI: 1.1 CM2/M2
ECHO AV MEAN GRADIENT: 11 MMHG
ECHO AV MEAN VELOCITY: 1.5 M/S
ECHO AV PEAK GRADIENT: 19 MMHG
ECHO AV PEAK VELOCITY: 2.2 M/S
ECHO AV VELOCITY RATIO: 0.77
ECHO AV VTI: 36.5 CM
ECHO BSA: 2.52 M2
ECHO EST RA PRESSURE: 3 MMHG
ECHO IVC PROX: 1.9 CM
ECHO LA AREA 2C: 18.9 CM2
ECHO LA AREA 4C: 18.7 CM2
ECHO LA DIAMETER INDEX: 1.48 CM/M2
ECHO LA DIAMETER: 3.5 CM
ECHO LA MAJOR AXIS: 5.6 CM
ECHO LA MINOR AXIS: 5.5 CM
ECHO LA TO AORTIC ROOT RATIO: 1.3
ECHO LA VOL 2C: 53 ML (ref 22–52)
ECHO LA VOL 4C: 49 ML (ref 22–52)
ECHO LA VOL BP: 52 ML (ref 22–52)
ECHO LA VOL/BSA BIPLANE: 22 ML/M2 (ref 16–34)
ECHO LA VOLUME INDEX A2C: 22 ML/M2 (ref 16–34)
ECHO LA VOLUME INDEX A4C: 21 ML/M2 (ref 16–34)
ECHO LV E' LATERAL VELOCITY: 12 CM/S
ECHO LV E' SEPTAL VELOCITY: 10 CM/S
ECHO LV EDV A2C: 87 ML
ECHO LV EDV A4C: 109 ML
ECHO LV EDV INDEX A4C: 46 ML/M2
ECHO LV EDV NDEX A2C: 37 ML/M2
ECHO LV EJECTION FRACTION A2C: 62 %
ECHO LV EJECTION FRACTION A4C: 58 %
ECHO LV EJECTION FRACTION BIPLANE: 62 % (ref 55–100)
ECHO LV ESV A2C: 33 ML
ECHO LV ESV A4C: 45 ML
ECHO LV ESV INDEX A2C: 14 ML/M2
ECHO LV ESV INDEX A4C: 19 ML/M2
ECHO LV FRACTIONAL SHORTENING: 36 % (ref 28–44)
ECHO LV INTERNAL DIMENSION DIASTOLE INDEX: 1.98 CM/M2
ECHO LV INTERNAL DIMENSION DIASTOLIC: 4.7 CM (ref 3.9–5.3)
ECHO LV INTERNAL DIMENSION SYSTOLIC INDEX: 1.27 CM/M2
ECHO LV INTERNAL DIMENSION SYSTOLIC: 3 CM
ECHO LV IVSD: 0.8 CM (ref 0.6–0.9)
ECHO LV MASS 2D: 142.7 G (ref 67–162)
ECHO LV MASS INDEX 2D: 60.2 G/M2 (ref 43–95)
ECHO LV POSTERIOR WALL DIASTOLIC: 1 CM (ref 0.6–0.9)
ECHO LV RELATIVE WALL THICKNESS RATIO: 0.43
ECHO LVOT AREA: 3.1 CM2
ECHO LVOT AV VTI INDEX: 0.82
ECHO LVOT DIAM: 2 CM
ECHO LVOT MEAN GRADIENT: 6 MMHG
ECHO LVOT PEAK GRADIENT: 12 MMHG
ECHO LVOT PEAK VELOCITY: 1.7 M/S
ECHO LVOT STROKE VOLUME INDEX: 39.7 ML/M2
ECHO LVOT SV: 94.2 ML
ECHO LVOT VTI: 30 CM
ECHO MV A VELOCITY: 0.99 M/S
ECHO MV E DECELERATION TIME (DT): 201 MS
ECHO MV E VELOCITY: 1.11 M/S
ECHO MV E/A RATIO: 1.12
ECHO MV E/E' LATERAL: 9.25
ECHO MV E/E' RATIO (AVERAGED): 10.18
ECHO MV E/E' SEPTAL: 11.1
ECHO PV ACCELERATION TIME (AT): 145 MS
ECHO PV MAX VELOCITY: 1.6 M/S
ECHO PV PEAK GRADIENT: 10 MMHG
ECHO RV BASAL DIMENSION: 3.4 CM
ECHO RV FREE WALL PEAK S': 14 CM/S
ECHO RV INTERNAL DIMENSION: 3 CM
ECHO RV TAPSE: 2.7 CM (ref 1.7–?)
EOSINOPHIL # BLD: 0 K/UL (ref 0–0.8)
EOSINOPHIL NFR BLD: 0 % (ref 0.5–7.8)
ERYTHROCYTE [DISTWIDTH] IN BLOOD BY AUTOMATED COUNT: 23 % (ref 11.9–14.6)
GLUCOSE BLD STRIP.AUTO-MCNC: 102 MG/DL (ref 65–100)
GLUCOSE BLD STRIP.AUTO-MCNC: 126 MG/DL (ref 65–100)
GLUCOSE BLD STRIP.AUTO-MCNC: 133 MG/DL (ref 65–100)
GLUCOSE BLD STRIP.AUTO-MCNC: 98 MG/DL (ref 65–100)
GLUCOSE SERPL-MCNC: 101 MG/DL (ref 65–100)
HCT VFR BLD AUTO: 31.6 % (ref 35.8–46.3)
HGB BLD-MCNC: 8 G/DL (ref 11.7–15.4)
IMM GRANULOCYTES # BLD AUTO: 0.1 K/UL (ref 0–0.5)
IMM GRANULOCYTES NFR BLD AUTO: 1 % (ref 0–5)
LYMPHOCYTES # BLD: 1.2 K/UL (ref 0.5–4.6)
LYMPHOCYTES NFR BLD: 12 % (ref 13–44)
MCH RBC QN AUTO: 18.2 PG (ref 26.1–32.9)
MCHC RBC AUTO-ENTMCNC: 25.3 G/DL (ref 31.4–35)
MCV RBC AUTO: 71.8 FL (ref 82–102)
MONOCYTES # BLD: 0.4 K/UL (ref 0.1–1.3)
MONOCYTES NFR BLD: 4 % (ref 4–12)
NEUTS SEG # BLD: 8.6 K/UL (ref 1.7–8.2)
NEUTS SEG NFR BLD: 84 % (ref 43–78)
NRBC # BLD: 0.03 K/UL (ref 0–0.2)
PLATELET # BLD AUTO: 326 K/UL (ref 150–450)
PMV BLD AUTO: ABNORMAL FL (ref 9.4–12.3)
POTASSIUM SERPL-SCNC: 4.1 MMOL/L (ref 3.5–5.1)
RBC # BLD AUTO: 4.4 M/UL (ref 4.05–5.2)
SERVICE CMNT-IMP: ABNORMAL
SERVICE CMNT-IMP: NORMAL
SODIUM SERPL-SCNC: 141 MMOL/L (ref 133–143)
WBC # BLD AUTO: 10.3 K/UL (ref 4.3–11.1)

## 2023-07-28 PROCEDURE — 80048 BASIC METABOLIC PNL TOTAL CA: CPT

## 2023-07-28 PROCEDURE — 2700000000 HC OXYGEN THERAPY PER DAY

## 2023-07-28 PROCEDURE — 82962 GLUCOSE BLOOD TEST: CPT

## 2023-07-28 PROCEDURE — 93306 TTE W/DOPPLER COMPLETE: CPT

## 2023-07-28 PROCEDURE — 6360000002 HC RX W HCPCS: Performed by: INTERNAL MEDICINE

## 2023-07-28 PROCEDURE — 36415 COLL VENOUS BLD VENIPUNCTURE: CPT

## 2023-07-28 PROCEDURE — 94640 AIRWAY INHALATION TREATMENT: CPT

## 2023-07-28 PROCEDURE — 6370000000 HC RX 637 (ALT 250 FOR IP): Performed by: INTERNAL MEDICINE

## 2023-07-28 PROCEDURE — 94761 N-INVAS EAR/PLS OXIMETRY MLT: CPT

## 2023-07-28 PROCEDURE — 2580000003 HC RX 258: Performed by: INTERNAL MEDICINE

## 2023-07-28 PROCEDURE — 1100000003 HC PRIVATE W/ TELEMETRY

## 2023-07-28 PROCEDURE — 85025 COMPLETE CBC W/AUTO DIFF WBC: CPT

## 2023-07-28 RX ORDER — DILTIAZEM HYDROCHLORIDE 240 MG/1
240 CAPSULE, COATED, EXTENDED RELEASE ORAL DAILY
Status: DISCONTINUED | OUTPATIENT
Start: 2023-07-28 | End: 2023-07-29 | Stop reason: HOSPADM

## 2023-07-28 RX ORDER — IPRATROPIUM BROMIDE AND ALBUTEROL SULFATE 2.5; .5 MG/3ML; MG/3ML
1 SOLUTION RESPIRATORY (INHALATION)
Status: DISCONTINUED | OUTPATIENT
Start: 2023-07-29 | End: 2023-07-29 | Stop reason: HOSPADM

## 2023-07-28 RX ORDER — ATORVASTATIN CALCIUM 10 MG/1
10 TABLET, FILM COATED ORAL NIGHTLY
Status: DISCONTINUED | OUTPATIENT
Start: 2023-07-28 | End: 2023-07-29 | Stop reason: HOSPADM

## 2023-07-28 RX ADMIN — ACETAMINOPHEN 650 MG: 325 TABLET ORAL at 19:23

## 2023-07-28 RX ADMIN — CEFTRIAXONE 1000 MG: 1 INJECTION, POWDER, FOR SOLUTION INTRAMUSCULAR; INTRAVENOUS at 00:08

## 2023-07-28 RX ADMIN — ALBUTEROL SULFATE 2.5 MG: 2.5 SOLUTION RESPIRATORY (INHALATION) at 20:35

## 2023-07-28 RX ADMIN — APIXABAN 5 MG: 5 TABLET, FILM COATED ORAL at 08:42

## 2023-07-28 RX ADMIN — DILTIAZEM HYDROCHLORIDE 240 MG: 240 CAPSULE, EXTENDED RELEASE ORAL at 11:13

## 2023-07-28 RX ADMIN — PREDNISONE 40 MG: 20 TABLET ORAL at 08:41

## 2023-07-28 RX ADMIN — ALBUTEROL SULFATE 2.5 MG: 2.5 SOLUTION RESPIRATORY (INHALATION) at 08:38

## 2023-07-28 RX ADMIN — DOXYCYCLINE HYCLATE 100 MG: 100 CAPSULE ORAL at 08:42

## 2023-07-28 RX ADMIN — ALBUTEROL SULFATE 2.5 MG: 2.5 SOLUTION RESPIRATORY (INHALATION) at 15:31

## 2023-07-28 RX ADMIN — SODIUM CHLORIDE, PRESERVATIVE FREE 10 ML: 5 INJECTION INTRAVENOUS at 21:08

## 2023-07-28 RX ADMIN — ALBUTEROL SULFATE 2.5 MG: 2.5 SOLUTION RESPIRATORY (INHALATION) at 11:15

## 2023-07-28 RX ADMIN — DOXYCYCLINE HYCLATE 100 MG: 100 CAPSULE ORAL at 21:07

## 2023-07-28 RX ADMIN — APIXABAN 5 MG: 5 TABLET, FILM COATED ORAL at 21:07

## 2023-07-28 RX ADMIN — ATORVASTATIN CALCIUM 10 MG: 10 TABLET, FILM COATED ORAL at 21:07

## 2023-07-28 RX ADMIN — SODIUM CHLORIDE, PRESERVATIVE FREE 10 ML: 5 INJECTION INTRAVENOUS at 08:42

## 2023-07-28 ASSESSMENT — PAIN SCALES - GENERAL
PAINLEVEL_OUTOF10: 0
PAINLEVEL_OUTOF10: 3

## 2023-07-28 ASSESSMENT — PAIN DESCRIPTION - DESCRIPTORS: DESCRIPTORS: ACHING

## 2023-07-28 ASSESSMENT — PAIN DESCRIPTION - LOCATION: LOCATION: HEAD

## 2023-07-28 NOTE — PLAN OF CARE
Problem: Chronic Conditions and Co-morbidities  Goal: Patient's chronic conditions and co-morbidity symptoms are monitored and maintained or improved  Outcome: Progressing     Problem: Discharge Planning  Goal: Discharge to home or other facility with appropriate resources  Outcome: Progressing     Problem: Safety - Adult  Goal: Free from fall injury  Outcome: Progressing     Problem: Pain  Goal: Verbalizes/displays adequate comfort level or baseline comfort level  Outcome: Progressing     Problem: Respiratory - Adult  Goal: Achieves optimal ventilation and oxygenation  7/28/2023 1827 by JUANITA Vasquez  Outcome: Progressing  7/28/2023 0842 by Ralph Ortiz RCP  Outcome: Progressing  Flowsheets (Taken 7/28/2023 0842)  Achieves optimal ventilation and oxygenation:   Assess for changes in respiratory status   Respiratory therapy support as indicated   Assess for changes in mentation and behavior   Oxygen supplementation based on oxygen saturation or arterial blood gases   Encourage broncho-pulmonary hygiene including cough, deep breathe, incentive spirometry   Assess and instruct to report shortness of breath or any respiratory difficulty

## 2023-07-28 NOTE — PROGRESS NOTES
Hospitalist Progress Note   Admit Date:  2023  9:58 AM   Name:  Harinder Obrien   Age:  48 y.o. Sex:  female  :  1970   MRN:  [id number]   Room:  4/    Presenting/Chief Complaint: Shortness of Breath and Altered Mental Status     Reason(s) for Admission: Hypoxia [R09.02]  COPD exacerbation ([address]) [J44.1]  Respiratory failure with hypoxia ([address]) [J96.91]  Sepsis, due to unspecified organism, unspecified whether acute organ dysfunction present [facility]) [A41.9]     Hospital Course:   Monik Alcala is a 48 y.o. female with medical history of atrial fibrillation on Eliquis, COPD, chronic hypoxic respiratory failure, diabetes mellitus, obesity class III, hyperlipidemia, that presented in the setting of altered mental status. History obtained by patient and chart since currently little confused. Apparently she was found by her son at home on the floor. She does not recall exactly what happened and she is unclear if passed out or if she hit her head. Son called EMS and the patient was found to be hypoxic with an oxygen saturation on the 80s on room air, so she was placed on oxygen by nasal cannula and was transferred to the emergency department. Currently the patient denies any chest pain, no abdominal pain, no nausea, no vomiting, no diarrhea. She states that she has been coughing for the past few days with whitish sputum. In the emergency department the patient was found to be mildly tachycardic, tachypneic and febrile. On blood work mildly leukocytosis. On imaging concerns of possible pneumonia. She was given IV fluids and IV antibiotics. She will be admitted to the medical floors for further management. Subjective & 24hr Events:   Patient examined at bedside. No acute overnight events. Mild improvement in shortness of breath. Currently on 4L NC. Still with cough. Some swelling in her lower legs. Denies chest pain. Denies fevers/chills.      10 point ROS negative except for % injection 5-40 mL  5-40 mL IntraVENous 2 times per day    sodium chloride flush 0.9 % injection 5-40 mL  5-40 mL IntraVENous PRN    0.9 % sodium chloride infusion   IntraVENous PRN    ondansetron (ZOFRAN-ODT) disintegrating tablet 4 mg  4 mg Oral Q8H PRN    Or    ondansetron (ZOFRAN) injection 4 mg  4 mg IntraVENous Q6H PRN    polyethylene glycol (GLYCOLAX) packet 17 g  17 g Oral Daily PRN    acetaminophen (TYLENOL) tablet 650 mg  650 mg Oral Q6H PRN    Or    acetaminophen (TYLENOL) suppository 650 mg  650 mg Rectal Q6H PRN    apixaban (ELIQUIS) tablet 5 mg  5 mg Oral BID    cefTRIAXone (ROCEPHIN) 1,000 mg in sodium chloride 0.9 % 50 mL IVPB (mini-bag)  1,000 mg IntraVENous Q24H    doxycycline hyclate (VIBRAMYCIN) capsule 100 mg  100 mg Oral 2 times per day    albuterol (PROVENTIL) (2.5 MG/3ML) 0.083% nebulizer solution 2.5 mg  2.5 mg Nebulization 4x Daily RT    glucose chewable tablet 16 g  4 tablet Oral PRN    dextrose bolus 10% 125 mL  125 mL IntraVENous PRN    Or    dextrose bolus 10% 250 mL  250 mL IntraVENous PRN    Glucagon Emergency KIT 1 mg  1 mg SubCUTAneous PRN    dextrose 10 % infusion   IntraVENous Continuous PRN    insulin lispro (HUMALOG) injection vial 0-8 Units  0-8 Units SubCUTAneous TID WC    insulin lispro (HUMALOG) injection vial 0-4 Units  0-4 Units SubCUTAneous Nightly    sodium chloride 0.9 % bolus 100 mL  100 mL IntraVENous ONCE PRN    albuterol (PROVENTIL) (2.5 MG/3ML) 0.083% nebulizer solution 2.5 mg  2.5 mg Nebulization Q8H PRN       Signed:  NORMA SHARIF DO    Part of this note may have been written by using a voice dictation software. The note has been proof read but may still contain some grammatical/other typographical errors.

## 2023-07-28 NOTE — PROGRESS NOTES
Pt sitting up in bed, experiencing orthopnea and dyspnea on exertion. RT at bedside providing breathing treatment. Pt alert and oriented x4. Safety measures in place. Will continue to monitor.

## 2023-07-28 NOTE — PROGRESS NOTES
Physical Therapy Note:    Attempted to see patient for physical therapy treatment session. At time of attempt patient getting breathing treatment. Will follow and re-attempt as schedule permits/patient available.     Thank you,  Nancy Scott, PT, DPT

## 2023-07-28 NOTE — PLAN OF CARE
Problem: Respiratory - Adult  Goal: Achieves optimal ventilation and oxygenation  Outcome: Progressing  Flowsheets (Taken 7/28/2023 0842)  Achieves optimal ventilation and oxygenation:   Assess for changes in respiratory status   Respiratory therapy support as indicated   Assess for changes in mentation and behavior   Oxygen supplementation based on oxygen saturation or arterial blood gases   Encourage broncho-pulmonary hygiene including cough, deep breathe, incentive spirometry   Assess and instruct to report shortness of breath or any respiratory difficulty

## 2023-07-28 NOTE — FLOWSHEET NOTE
07/28/23 0848   Handoff   Communication Given Shift Handoff   Handoff Given To Li JETER   Handoff Received From [facility] Communication Face to Face   Time Handoff Given 0700

## 2023-07-28 NOTE — PROGRESS NOTES
If we can be of any service to you during your stay please let us know      Melinda Lagos     [phone]

## 2023-07-28 NOTE — CARE COORDINATION
CM met with the patient to discuss discharge plans. Patient has 02 through [facility]. Patient is agreeable to [address] [place] [address],[address] at discharge.

## 2023-07-29 VITALS
HEART RATE: 91 BPM | WEIGHT: 293 LBS | HEIGHT: 65 IN | BODY MASS INDEX: 48.82 KG/M2 | DIASTOLIC BLOOD PRESSURE: 85 MMHG | OXYGEN SATURATION: 96 % | SYSTOLIC BLOOD PRESSURE: 130 MMHG | TEMPERATURE: 97.9 F | RESPIRATION RATE: 18 BRPM

## 2023-07-29 LAB
ANION GAP SERPL CALC-SCNC: 0 MMOL/L (ref 2–11)
BASOPHILS # BLD: 0 K/UL (ref 0–0.2)
BASOPHILS NFR BLD: 0 % (ref 0–2)
BUN SERPL-MCNC: 15 MG/DL (ref 6–23)
CALCIUM SERPL-MCNC: 9 MG/DL (ref 8.3–10.4)
CHLORIDE SERPL-SCNC: 99 MMOL/L (ref 101–110)
CO2 SERPL-SCNC: 42 MMOL/L (ref 21–32)
CREAT SERPL-MCNC: 0.5 MG/DL (ref 0.6–1)
DIFFERENTIAL METHOD BLD: ABNORMAL
EOSINOPHIL # BLD: 0 K/UL (ref 0–0.8)
EOSINOPHIL NFR BLD: 0 % (ref 0.5–7.8)
ERYTHROCYTE [DISTWIDTH] IN BLOOD BY AUTOMATED COUNT: 23.1 % (ref 11.9–14.6)
GLUCOSE BLD STRIP.AUTO-MCNC: 80 MG/DL (ref 65–100)
GLUCOSE SERPL-MCNC: 93 MG/DL (ref 65–100)
HCT VFR BLD AUTO: 29.8 % (ref 35.8–46.3)
HGB BLD-MCNC: 7.8 G/DL (ref 11.7–15.4)
IMM GRANULOCYTES # BLD AUTO: 0.1 K/UL (ref 0–0.5)
IMM GRANULOCYTES NFR BLD AUTO: 1 % (ref 0–5)
LYMPHOCYTES # BLD: 3.1 K/UL (ref 0.5–4.6)
LYMPHOCYTES NFR BLD: 26 % (ref 13–44)
MCH RBC QN AUTO: 18.3 PG (ref 26.1–32.9)
MCHC RBC AUTO-ENTMCNC: 26.2 G/DL (ref 31.4–35)
MCV RBC AUTO: 69.8 FL (ref 82–102)
MONOCYTES # BLD: 1.1 K/UL (ref 0.1–1.3)
MONOCYTES NFR BLD: 9 % (ref 4–12)
NEUTS SEG # BLD: 7.6 K/UL (ref 1.7–8.2)
NEUTS SEG NFR BLD: 64 % (ref 43–78)
NRBC # BLD: 0.02 K/UL (ref 0–0.2)
PLATELET # BLD AUTO: 283 K/UL (ref 150–450)
PMV BLD AUTO: ABNORMAL FL (ref 9.4–12.3)
POTASSIUM SERPL-SCNC: 3.8 MMOL/L (ref 3.5–5.1)
RBC # BLD AUTO: 4.27 M/UL (ref 4.05–5.2)
SERVICE CMNT-IMP: NORMAL
SODIUM SERPL-SCNC: 141 MMOL/L (ref 133–143)
WBC # BLD AUTO: 11.9 K/UL (ref 4.3–11.1)

## 2023-07-29 PROCEDURE — 2580000003 HC RX 258: Performed by: INTERNAL MEDICINE

## 2023-07-29 PROCEDURE — 80048 BASIC METABOLIC PNL TOTAL CA: CPT

## 2023-07-29 PROCEDURE — 94640 AIRWAY INHALATION TREATMENT: CPT

## 2023-07-29 PROCEDURE — 6370000000 HC RX 637 (ALT 250 FOR IP): Performed by: INTERNAL MEDICINE

## 2023-07-29 PROCEDURE — 82103 ALPHA-1-ANTITRYPSIN TOTAL: CPT

## 2023-07-29 PROCEDURE — 6370000000 HC RX 637 (ALT 250 FOR IP)

## 2023-07-29 PROCEDURE — 82962 GLUCOSE BLOOD TEST: CPT

## 2023-07-29 PROCEDURE — 82104 ALPHA-1-ANTITRYPSIN PHENO: CPT

## 2023-07-29 PROCEDURE — 6360000002 HC RX W HCPCS: Performed by: INTERNAL MEDICINE

## 2023-07-29 PROCEDURE — 85025 COMPLETE CBC W/AUTO DIFF WBC: CPT

## 2023-07-29 PROCEDURE — 36415 COLL VENOUS BLD VENIPUNCTURE: CPT

## 2023-07-29 PROCEDURE — 94761 N-INVAS EAR/PLS OXIMETRY MLT: CPT

## 2023-07-29 PROCEDURE — 2700000000 HC OXYGEN THERAPY PER DAY

## 2023-07-29 RX ORDER — PREDNISONE 20 MG/1
40 TABLET ORAL DAILY
Qty: 8 TABLET | Refills: 0 | Status: SHIPPED | OUTPATIENT
Start: 2023-07-30 | End: 2023-07-29 | Stop reason: SDUPTHER

## 2023-07-29 RX ORDER — PREDNISONE 20 MG/1
40 TABLET ORAL DAILY
Qty: 8 TABLET | Refills: 0 | Status: SHIPPED | OUTPATIENT
Start: 2023-07-30 | End: 2023-08-03

## 2023-07-29 RX ORDER — ACETAZOLAMIDE 250 MG/1
500 TABLET ORAL DAILY
Status: DISCONTINUED | OUTPATIENT
Start: 2023-07-29 | End: 2023-07-29 | Stop reason: HOSPADM

## 2023-07-29 RX ORDER — DOXYCYCLINE HYCLATE 100 MG/1
100 CAPSULE ORAL EVERY 12 HOURS SCHEDULED
Qty: 4 CAPSULE | Refills: 0 | Status: SHIPPED | OUTPATIENT
Start: 2023-07-29 | End: 2023-07-31

## 2023-07-29 RX ORDER — DOXYCYCLINE HYCLATE 100 MG/1
100 CAPSULE ORAL EVERY 12 HOURS SCHEDULED
Qty: 4 CAPSULE | Refills: 0 | Status: SHIPPED | OUTPATIENT
Start: 2023-07-29 | End: 2023-07-29 | Stop reason: SDUPTHER

## 2023-07-29 RX ORDER — CEFPODOXIME PROXETIL 200 MG/1
200 TABLET, FILM COATED ORAL 2 TIMES DAILY
Qty: 4 TABLET | Refills: 0 | Status: SHIPPED | OUTPATIENT
Start: 2023-07-29 | End: 2023-07-31

## 2023-07-29 RX ADMIN — PREDNISONE 40 MG: 20 TABLET ORAL at 08:43

## 2023-07-29 RX ADMIN — SODIUM CHLORIDE, PRESERVATIVE FREE 10 ML: 5 INJECTION INTRAVENOUS at 08:44

## 2023-07-29 RX ADMIN — IPRATROPIUM BROMIDE AND ALBUTEROL SULFATE 1 DOSE: .5; 3 SOLUTION RESPIRATORY (INHALATION) at 12:02

## 2023-07-29 RX ADMIN — IPRATROPIUM BROMIDE AND ALBUTEROL SULFATE 1 DOSE: .5; 3 SOLUTION RESPIRATORY (INHALATION) at 08:28

## 2023-07-29 RX ADMIN — CEFTRIAXONE 1000 MG: 1 INJECTION, POWDER, FOR SOLUTION INTRAMUSCULAR; INTRAVENOUS at 00:15

## 2023-07-29 RX ADMIN — DOXYCYCLINE HYCLATE 100 MG: 100 CAPSULE ORAL at 08:43

## 2023-07-29 RX ADMIN — APIXABAN 5 MG: 5 TABLET, FILM COATED ORAL at 08:43

## 2023-07-29 RX ADMIN — ACETAZOLAMIDE 500 MG: 250 TABLET ORAL at 08:47

## 2023-07-29 RX ADMIN — DILTIAZEM HYDROCHLORIDE 240 MG: 240 CAPSULE, EXTENDED RELEASE ORAL at 08:43

## 2023-07-29 NOTE — PROGRESS NOTES
Pt is stable and  ready to be discharged home in private car. Education on medication and follow-up appointments have been reviewed. Pt sent home on 4L O2. Pt has no questions at this time.

## 2023-07-29 NOTE — DISCHARGE SUMMARY
Hospitalist Discharge Summary   Admit Date:  2023  9:58 AM   DC Note date: 2023  Name:  Wing Damian Obrien   Age:  48 y.o. Sex:  female  :  1970   MRN:  [id number]   Room:  [facility]  PCP:  Alfreda Paez PA-C    Presenting Complaint: Shortness of Breath and Altered Mental Status     Initial Admission Diagnosis: Hypoxia [R09.02]  COPD exacerbation ([address]) [J44.1]  Respiratory failure with hypoxia ([address]) [J96.91]  Sepsis, due to unspecified organism, unspecified whether acute organ dysfunction present ([address]) [A41.9]     Problem List for this Hospitalization (present on admission):    Principal Problem:    Respiratory failure with hypoxia ([address])  Active Problems:    Type 2 diabetes with nephropathy ([address])    Paroxysmal atrial fibrillation ([address])    COPD with acute exacerbation ([address])    Secondary hypercoagulable state ([address])    Community acquired pneumonia of right middle lobe of lung    Sepsis ([address])  Resolved Problems:    * No resolved hospital problems. *    Enmanuel Horta is a 48 y.o. female with medical history of atrial fibrillation on Eliquis, COPD, chronic hypoxic respiratory failure, diabetes mellitus, obesity class III, hyperlipidemia, that presented in the setting of altered mental status. History obtained by patient and chart since currently little confused. Apparently she was found by her son at home on the floor. She does not recall exactly what happened and she is unclear if passed out or if she hit her head. Son called EMS and the patient was found to be hypoxic with an oxygen saturation on the 80s on room air, so she was placed on oxygen by nasal cannula and was transferred to the emergency department. Currently the patient denies any chest pain, no abdominal pain, no nausea, no vomiting, no diarrhea. She states that she has been coughing for the past few days with whitish sputum.   In the emergency department the patient was found to be mildly tachycardic, tachypneic and febrile. On blood work mildly leukocytosis. On imaging concerns of possible pneumonia. She was given IV fluids and IV antibiotics. She will be admitted to the medical floors for further management. Interval History (7/29): patient examined at bedside. No acute overnight events. Breathing has improved and she feels better. Denies any worsening cough. No worsening swelling in her legs. No chest pain. No abdominal pain, nausea/vomiting or diarrhea. Hospital Course:  Patient admitted with COPD exacerbation and likely bronchitis/pneumonia. She was started on steroids and empiric antibiotics and jet nebulizers with clinical improvement. TTE unrevealing for any other etiology. O2 qualifier completed and she is clinically stable for hospital discharge. Will give scripts for antibiotics and steroids. She will need follow-up with her PCP and with pulmonology. Details of hospitalization have been discussed with patient at bedside who understands and agrees with plan of care and discharge home. Return precautions provided. All question answered. Disposition: Home  Diet: ADULT DIET; Regular  Code Status: Full Code    Follow Ups:      Time spent in patient discharge and coordination 37 minutes. Follow up labs/diagnostics (ultimately defer to outpatient provider):  PCP and pulmonary    Plan was discussed with patient at bedside. All questions answered. Patient was stable at time of discharge. Instructions given to call a physician or return if any concerns.     Current Discharge Medication List        START taking these medications    Details   cefpodoxime (VANTIN) 200 MG tablet Take 1 tablet by mouth 2 times daily for 2 days  Qty: 4 tablet, Refills: 0      doxycycline hyclate (VIBRAMYCIN) 100 MG capsule Take 1 capsule by mouth every 12 hours for 2 days  Qty: 4 capsule, Refills: 0      predniSONE (DELTASONE) 20 MG tablet Take 2 tablets by mouth daily for 4 days  Qty: 8 tablet, Refills: 0           CONTINUE

## 2023-07-29 NOTE — CARE COORDINATION
Patient has discharge orders for home today. Patient is currently active with [address]  for home 02. CM provided patient to be transport home with. Patient declined HH. Family will transport patient home. Patient has met all treatment goals / milestones. CM will continue to follow and remain available for any needs, concerns or questions that may arise. 07/26/23 1220   Service Assessment   Patient Orientation Unable to Assess  (patient is sleeping)   Cognition Other (see comment)  (patient is sleeping)   History Provided By Child/Family   Primary Caregiver Other (Comment)  (nephew)   Accompanied By/Relationship chao Sandoval [facility] Children;Family Members   [address] is: Legal Next of Kin  (chao Sandoval)   PCP Verified by CM Yes   Prior Functional Level Assistance with the following:;Cooking;Housework; Shopping   Current Functional Level Assistance with the following:;Cooking;Housework; Shopping;Mobility   Can patient return to prior living arrangement Unknown at present   Ability to make needs known: Fair   Family able to assist with home care needs: Yes   Would you like for me to discuss the discharge plan with any other family members/significant others, and if so, who? Yes  (son)   Financial Resources Medicaid; Medicare   Community Resources Transportation   Social/Functional History   Lives With Family   Type of Home Apartment   Home Layout One level   Home Access Stairs to enter with rails   Entrance Stairs - Number of Steps 15 steps   Bathroom Toilet Standard   Bathroom Equipment None   Bathroom Accessibility [address] Help From Family   ADL Assistance Needs assistance  (LB ADLs at times)   Ambulation Assistance Independent  (rollator for doctors appointments)   Active  No   Patient's  Info patient call Medicare Amara Granados to get to Dr. Martin Sole   Occupation On disability   Discharge Planning

## 2023-07-29 NOTE — PLAN OF CARE
Problem: Chronic Conditions and Co-morbidities  Goal: Patient's chronic conditions and co-morbidity symptoms are monitored and maintained or improved  Outcome: Adequate for Discharge     Problem: Discharge Planning  Goal: Discharge to home or other facility with appropriate resources  Outcome: Adequate for Discharge     Problem: Safety - Adult  Goal: Free from fall injury  Outcome: Adequate for Discharge     Problem: Pain  Goal: Verbalizes/displays adequate comfort level or baseline comfort level  Outcome: Adequate for Discharge     Problem: Respiratory - Adult  Goal: Achieves optimal ventilation and oxygenation  Outcome: Adequate for Discharge

## 2023-07-29 NOTE — FLOWSHEET NOTE
07/29/23 0741   Handoff   Communication Given Shift Handoff   Handoff Given To Elsi Correa RN   Handoff Received From [facility] Communication Face to Face   Time Handoff Given 0700

## 2023-07-29 NOTE — PROGRESS NOTES
07/29/23 1105   Resting (Room Air)   SpO2 84   HR 98   Resting (On O2)   SpO2 92   HR 92   O2 Device Nasal cannula   O2 Flow Rate (l/min) 3 l/min   Comments 1L = 87% / 95HR; 2L = 89% / 95HR   During Walk (On O2)   SpO2 87      O2 Device Nasal cannula   O2 Flow Rate (l/min) 3 l/min   Need Additional O2 Flow Rate Rows Yes   O2 Flow Rate (l/min) 4 l/min   O2 Saturation 91   Walk/Assistance Device Ambulation   Rate of Dyspnea 1   Symptoms Fatigue; Shortness of breath   After Walk   SpO2 93   O2 Device Nasal cannula   O2 Flow Rate (l/min) 4 l/min   Rate of Dyspnea 0   Symptoms Fatigue   Comments Pt needs a home pulse oximeter for O2 titration.  Perhaps case management can assist.   Does the Patient Qualify for Home O2 Yes   Liter Flow at Rest 3   Liter Flow on Exertion -1   Does the Patient Need Portable Oxygen Tanks Yes

## 2023-07-30 LAB
A1AT PHENOTYP SERPL IFE: NORMAL
A1AT SERPL-MCNC: 147 MG/DL (ref 101–187)
BACTERIA SPEC CULT: NORMAL
BACTERIA SPEC CULT: NORMAL
SERVICE CMNT-IMP: NORMAL
SERVICE CMNT-IMP: NORMAL

## 2023-07-31 ENCOUNTER — CARE COORDINATION (OUTPATIENT)
Dept: CARE COORDINATION | Facility: CLINIC | Age: 53
End: 2023-07-31

## 2023-07-31 ENCOUNTER — TELEPHONE (OUTPATIENT)
Dept: INTERNAL MEDICINE CLINIC | Facility: CLINIC | Age: 53
End: 2023-07-31

## 2023-07-31 DIAGNOSIS — J44.9 STAGE 4 VERY SEVERE COPD BY GOLD CLASSIFICATION (HCC): Primary | ICD-10-CM

## 2023-07-31 DIAGNOSIS — J18.9 COMMUNITY ACQUIRED PNEUMONIA OF RIGHT MIDDLE LOBE OF LUNG: ICD-10-CM

## 2023-07-31 NOTE — TELEPHONE ENCOUNTER
Patient admitted with COPD exacerbation and likely bronchitis/pneumonia. Admittted on 7/26 and discharged on 7/29. Need a TCM call done by 8/1/23.

## 2023-07-31 NOTE — TELEPHONE ENCOUNTER
Care Transitions Initial Follow Up Call    Outreach made within 2 business days of discharge: Yes    Patient: Porsche Obrien Patient : 1970   MRN: [id number]  Reason for Admission: Respiratory failure with hypoxia  Discharge Date: 23       Spoke with: Patient     Discharge department/facility: [facility] Interactive Patient Contact:  Was patient able to fill all prescriptions: Yes  Was patient instructed to bring all medications to the follow-up visit: Yes  Is patient taking all medications as directed in the discharge summary?  Yes  Does patient understand their discharge instructions: Yes  Does patient have questions or concerns that need addressed prior to 7-14 day follow up office visit: no    Scheduled appointment with PCP within 7-14 days    Follow Up  Future Appointments   Date Time Provider [address] [place] [address]   2023  2:30 PM JOSE ALBERTO Solis AMB   2023  2:10 PM Elia Kovacs MD [facility] AMB       [place] LPN

## 2023-07-31 NOTE — CARE COORDINATION
Remote Patient Kit Ordering Note      Date/Time:  7/31/2023 11:22 AM      [x] CCSS confirmed patient shipping address  [x] Patient will receive package over the next 1-3 business days. Someone 21 years or older must be present to sign for UPS delivery. [x] HRS will contact patient within 24 hours, an [address] [place] [address] will call the patient directly: If the patient does not answer, HRS will follow up with the clinical team notifying them about the unsuccessful attempt to contact the patient. HRS will make three call attempts to the patient. Provide patient with [facility] Virtual install number is: [phone]. [x] CTN will contact patient once equipment is active to welcome them to the program.                                                         [x] Hours of RPM monitoring - Monday-Friday 2897-3972; encourage patient to get vitals entered by [facility] each day to have the alert addressed same day. [x]CCSS mailed RPM Patient flyer to patient. All questions answered at this time. CTN made aware the RPM kit has been ordered. CCSS notified patient of RPM equipment order.

## 2023-07-31 NOTE — PROGRESS NOTES
Remote Patient Monitoring Treatment Plan    Received request from ACM/CTADRIANA De Jesus RN  to order remote patient monitoring for in home monitoring of Pneumonia and COPD and order completed. Patient will be monitoring   --blood pressure   --pulse ox   --temperature  --weight Please set alert for ONLY weight gain of 5# in 7 days. Pt has no documented hx of HF.    --survey questions. Patient will engage in Remote Patient Monitoring each day to develop the skills necessary for self management. RPM Care Team Responsibilities:   Alerts will be reviewed daily and addressed within 2-4 hours during operational hours (Monday -Friday 9 am-4 pm)  Alert response and intervention documented in patient medical record  Alert response escalated to PCP per protocol and documented in patient medical record  Patient monitored over approximately  days  Discharge from program based on self-management readiness    See care coordination encounters for additional details.

## 2023-07-31 NOTE — CARE COORDINATION
[facility] Care Transitions Initial Follow Up Call    Call within 2 business days of discharge: Yes    Patient Current Location:  Home: [facility]    Care Transition Nurse contacted the patient by telephone to perform post hospital discharge assessment. Verified name and  with patient as identifiers. Provided introduction to self, and explanation of the Care Transition Nurse role. Patient: Paula Obrien Patient : 1970   MRN: [id number]  Reason for Admission: COPD exacerbation   Discharge Date: 23 RARS: Readmission Risk Score: 19    Last Discharge [address] [facility]       Date Complaint Diagnosis Description Type Department Provider    23 Shortness of Breath; Altered Mental Status COPD exacerbation ([address] ) . .. ED to Hosp-Admission (Discharged) (ADMITTED) [id number] Martin Araiza DO; Adrienne Rhodes MD... Was this an external facility discharge? No Discharge Facility: [facility]    Challenges to be reviewed by the provider   Additional needs identified to be addressed with provider: No  none               Method of communication with provider: none. Care Transition Nurse reviewed discharge instructions, medical action plan, and red flags with patient who verbalized understanding. The patient was given an opportunity to ask questions and does not have any further questions or concerns at this time. Were discharge instructions available to patient? Yes. Reviewed appropriate site of care based on symptoms and resources available to patient including: PCP  Specialist  When to call Devante Sood. The patient agrees to contact the PCP office for questions related to their healthcare. Advance Care Planning:   Does patient have an Advance Directive: not on file; education provided. Medication reconciliation was performed with patient, who verbalizes understanding of administration of home medications.  Medications reviewed, 1111F entered:

## 2023-08-02 LAB
A1AT PHENOTYP SERPL IFE: NORMAL
A1AT SERPL-MCNC: 147 MG/DL (ref 101–187)

## 2023-08-07 ENCOUNTER — CARE COORDINATION (OUTPATIENT)
Dept: CARE COORDINATION | Facility: CLINIC | Age: 53
End: 2023-08-07

## 2023-08-07 NOTE — CARE COORDINATION
[facility] Care Transitions Follow Up Call    Patient Current Location:  Home: [address] [facility] Transition Nurse contacted the patient by telephone to follow up after admission on 2023. Verified name and  with patient as identifiers. Patient: Wing Damian Obiren  Patient : 1970   MRN: [id number]  Reason for Admission: COPD exacerbation   Discharge Date: 23 RARS: Readmission Risk Score: 19    Needs to be reviewed by the provider   Additional needs identified to be addressed with provider: No  none             Method of communication with provider: none. Addressed changes since last contact:   Patient reports she continues to use supplemental oxygen and denies any new or worsening symptoms at this time. Patient reports she completed antibiotics and endorses compliance with her medications. Patient reports she did not get a chance to set up her RPM kit at this time. CTN encouraged patient to call to install as soon as possible. Patient states she has to go stay with her son for a few days because they are spraying her apartment and she plans to be back home by the end of the week. Patient denies any worsening symptoms at this time. Discussed follow-up appointments. If no appointment was previously scheduled, appointment scheduling offered: Yes. Is follow up appointment scheduled within 7 days of discharge? Yes. Follow Up  Future Appointments   Date Time Provider [address]   2023  2:30 PM JOSE ALBERTO Rooney AMB   2023  2:10 PM Vinny Samuel MD [facility] AMB     Non-[facility] follow up appointment(s): no    Care Transition Nurse reviewed medical action plan and red flags with patient and discussed any barriers to care and/or understanding of plan of care after discharge. Discussed appropriate site of care based on symptoms and resources available to patient including: PCP  Specialist  When to call Devante Sood.

## 2023-08-15 ENCOUNTER — CARE COORDINATION (OUTPATIENT)
Dept: CARE COORDINATION | Facility: CLINIC | Age: 53
End: 2023-08-15

## 2023-08-15 ENCOUNTER — TELEPHONE (OUTPATIENT)
Dept: INTERNAL MEDICINE CLINIC | Facility: CLINIC | Age: 53
End: 2023-08-15

## 2023-08-15 NOTE — CARE COORDINATION
[facility] Care Transitions Follow Up Call    Patient Current Location:  [facility] Transition Nurse contacted the patient by telephone to follow up after admission on 23. Verified name and  with patient as identifiers. Patient: Blanca Obrien  Patient : 1970   MRN: [id number]  Reason for Admission: COPD exacerbation   Discharge Date: 23 RARS: Readmission Risk Score: 19    Needs to be reviewed by the provider   Additional needs identified to be addressed with provider: No  none             Method of communication with provider: none. Addressed changes since last contact:   Patient reports she is doing well at this time. Patient reports she had to evacuate her apartment and is staying with her son temporarily. Patient states it was supposed to be a few days, but now it may be a few more days. Patient states she received RPM kit but prefers to set up once she gets back to her apartment. Patient plans to call to rescheduled her hospital follow up appointment as she missed her last one due to moving. Patient endorses compliance with her medications and supplemental oxygen use. Patient denies any new or worsening symptoms at this time. Discussed follow-up appointments. If no appointment was previously scheduled, appointment scheduling offered: Yes. Is follow up appointment scheduled within 7 days of discharge? Patient missed due to unexpected circumstance. Follow Up  Future Appointments   Date Time Provider [facility] [address]   2023  2:10 PM Paola Hector MD [facility] AMB     Non-[facility] follow up appointment(s): no    Care Transition Nurse reviewed medical action plan and red flags with patient and discussed any barriers to care and/or understanding of plan of care after discharge. Discussed appropriate site of care based on symptoms and resources available to patient including: PCP  Specialist  When to call Devante Sood.  The patient agrees to contact the PCP

## 2023-08-22 ENCOUNTER — CARE COORDINATION (OUTPATIENT)
Dept: CARE COORDINATION | Facility: CLINIC | Age: 53
End: 2023-08-22

## 2023-08-22 NOTE — CARE COORDINATION
[address] [facility] Care Transitions Follow Up Call    Patient Current Location:  Home: [address] [place] [address] Transition Nurse contacted the patient by telephone to follow up after admission on 2023. Verified name and  with patient as identifiers. Patient: Rico Obrien  Patient : 1970   MRN: [id number]  Reason for Admission: COPD exacerbation  Discharge Date: 23 RARS: Readmission Risk Score: 19    Needs to be reviewed by the provider   Additional needs identified to be addressed with provider: No               Method of communication with provider: none. Addressed changes since last contact:   Patient reports she is doing well. Patient states no symptoms of concern at this time. Patient states she finally moved back into her apartment and plans to call to initiate her RPM kit. Patient reports she continues to use supplemental oxygen and she continues to monitor her FSBS at home. Patient states she already contacted her PCP to rescheduled her follow up and they advised that they will contact her with a follow up. Discussed follow-up appointments. If no appointment was previously scheduled, appointment scheduling offered: Yes. Follow Up  Future Appointments   Date Time Provider [address] [place] [address]   2023  2:10 PM Alonzo Fischer MD [facility] AMB     Non-[facility] follow up appointment(s): no    Care Transition Nurse reviewed medical action plan and red flags with patient and discussed any barriers to care and/or understanding of plan of care after discharge. Discussed appropriate site of care based on symptoms and resources available to patient including: PCP  Specialist  When to call [address]ante [address] The patient agrees to contact the PCP office for questions related to their healthcare.      Patients top risk factors for readmission: medical condition-Respiratory failure with hypoxia, Afib, COPD, DM, PAP, sepsis  Interventions to address risk

## 2023-08-30 ENCOUNTER — TELEPHONE (OUTPATIENT)
Dept: INTERNAL MEDICINE CLINIC | Facility: CLINIC | Age: 53
End: 2023-08-30

## 2023-08-30 ENCOUNTER — CARE COORDINATION (OUTPATIENT)
Dept: CARE COORDINATION | Facility: CLINIC | Age: 53
End: 2023-08-30

## 2023-08-30 NOTE — CARE COORDINATION
Patient has graduated from the Care Transitions program on 8/30/2023. Patient/family has the ability to self-manage at this time. CTN rescheduled appointment with PCP for 9/11/23 based on availability. Patient notified. Patient still has RPM kit but did not call to install at this time. CTN offered support, but patient states she will call. Patient accepted referral to the [facility] team for further management. ACM notified. Goals Addressed                   This Visit's Progress     Conditions and Symptoms   On track     Patient/Family verbalizes understanding of self-management of chronic disease. Assess barriers to safe and effective d/c AEB  Patient/family is able to verbalize and obtain medicine after d/c  Patient/family is aware and attends follow up appointment's s/p d/c               Patient has Care Transition Nurse's contact information for any further questions, concerns, or needs.   Patients upcoming visits:    Future Appointments   Date Time Provider [address]   9/8/2023  2:10 PM MD MITCHELL Guidry   9/11/2023  9:30 AM FUNMI Sr - KOBE LONG AMB

## 2023-09-06 RX ORDER — DILTIAZEM HYDROCHLORIDE 240 MG/1
CAPSULE, COATED, EXTENDED RELEASE ORAL
Qty: 30 CAPSULE | Refills: 5 | OUTPATIENT
Start: 2023-09-06

## 2023-09-13 ENCOUNTER — TELEPHONE (OUTPATIENT)
Dept: INTERNAL MEDICINE CLINIC | Facility: CLINIC | Age: 53
End: 2023-09-13

## 2023-09-13 NOTE — TELEPHONE ENCOUNTER
----- Message from Kavon Jordan sent at 9/13/2023  9:48 AM EDT -----  Subject: Message to Provider    QUESTIONS  Information for Provider? Patient needs reschedule of hospital follow up   appt adam Graves did not come pick her up and phone was broken. ---------------------------------------------------------------------------  --------------  Rolanda Gilford INFO  [phone]; OK to leave message on voicemail  ---------------------------------------------------------------------------  --------------  SCRIPT ANSWERS  Relationship to Patient?  Self

## 2023-09-20 ENCOUNTER — CARE COORDINATION (OUTPATIENT)
Dept: CARE COORDINATION | Facility: CLINIC | Age: 53
End: 2023-09-20

## 2023-09-20 NOTE — CARE COORDINATION
Ambulatory Care Coordination Note  2023    Patient Current Location:  [address] [place] [address]     ACM contacted the patient by telephone. Verified name and  with patient as identifiers. Provided introduction to self, and explanation of the ACM role. Challenges to be reviewed by the provider   Additional needs identified to be addressed with provider: No  none               Method of communication with provider: phone. ACM: Jaison Pressley RN    Welcome call for the RESAAS, reviewed equipment and data. Questions and concerns were addressed about RPM, and how the program works. Will f/u next week on . Offered patient enrollment in the Remote Patient Monitoring (RPM) program for in-home monitoring: Yes, patient already enrolled. Remote Patient Monitoring Welcome Note  Date/Time:  2023 11:30 AM  Patient Current Location: [address] [place] [address]  Verified patients name and  as identifiers.   Completed and confirmed the following:   Emergency Contact: Jennifer Obrien (Brother/Sister)   [phone]   [x] Patient received all RPM equipment (tablet, scale, blood pressure device and cuff, and pulse oximeter)  Cuff Size: large (13.8\"-19.68\")    Weight Scale:  regular (<330lbs)                    [x] Instructed patient keep box for use when returning equipment                                                          [x] Reviewed Patient Welcome Letter with patient                         [x] Reviewed expectations for patient and care team  Monitoring hours M-F 9-4pm  Completing monitoring by 12pm on  so that alerts can be responded to in the same day  Patient weighs self at same time every day (or after urinating and waking up)  Take blood pressure 1-2 hrs after medications   RPM team may have different phone area code (including [place], [place], [place] or [place])                              [x] Instructed patient to keep scale on flat surface                                                         [x] Instructed

## 2023-09-28 ENCOUNTER — CARE COORDINATION (OUTPATIENT)
Dept: CARE COORDINATION | Facility: CLINIC | Age: 53
End: 2023-09-28

## 2023-09-28 NOTE — CARE COORDINATION
Ambulatory Care Coordination Note  2023    Patient Current Location:  [place]     ACM contacted the patient by telephone. Verified name and  with patient as identifiers. Provided introduction to self, and explanation of the ACM role. Challenges to be reviewed by the provider   Additional needs identified to be addressed with provider: No  none               Method of communication with provider: phone. ACM: Meredith Hernadez, RN    F/u call with pt. She is having a \"good day and has been feeling pretty good\". Has been able to get around in the house and not feeling SOB, trying to make better dietary choices. She has been compliant with the RPM, checking BP, weight, pulse ox every morning. She denies and further needs at this time and I will follow back up on 10/5, pt has my contact info in the event something changes. Offered patient enrollment in the Remote Patient Monitoring (RPM) program for in-home monitoring: Yes, patient already enrolled. Lab Results       None                 Goals Addressed                      This Visit's Progress      Conditions and Symptoms   On track      Patient/Family verbalizes understanding of self-management of chronic disease. Assess barriers to safe and effective d/c AEB  Patient/family is able to verbalize and obtain medicine after d/c  Patient/family is aware and attends follow up appointment's s/p d/c          Patient Stated (pt-stated)   On track      Patient will: Attend follow up appointment with PCP on 2023. Call providers as needed with concerns/questions. Follow discharge instructions. (Monitor oxygen saturations, shortness of breath)  Take medications as prescribed.      Barriers: overwhelmed by complexity of regimen  Plan for overcoming my barriers: education, telephonic support  Confidence: 8/  Anticipated Goal Completion Date: 2023              Future Appointments   Date Time Provider [address]   2023 11:00 AM

## 2023-10-06 ENCOUNTER — CARE COORDINATION (OUTPATIENT)
Dept: CARE COORDINATION | Facility: CLINIC | Age: 53
End: 2023-10-06

## 2023-10-06 NOTE — CARE COORDINATION
Ambulatory Care Coordination Note  10/6/2023    Patient Current Location:  [facility]     ACM contacted the patient by telephone. Verified name and  with patient as identifiers. Provided introduction to self, and explanation of the ACM role. Challenges to be reviewed by the provider   Additional needs identified to be addressed with provider: No  none               Method of communication with provider: phone. ACM: Papa Lopez RN    F/u call with pt. She is having a \" good day\". States weight has been up/down 3 lbs, but has been steady for the past few days. Also BGL has been WNL. She has been compliant with RPM and find it to be useful and user friendly. We discussed medications, and the importance of diet and exercise. Plans of getting flu shot @ PCP visit on . Pt asked about needing a podiatrists, I advised her to look up providers in [place] and when we talk next week, I can send a message to PCP for a referral. Pt agreed and we will talk again on 10/13. Offered patient enrollment in the Remote Patient Monitoring (RPM) program for in-home monitoring: Yes, patient already enrolled. Lab Results       None                 Goals Addressed                      This Visit's Progress      Conditions and Symptoms   On track      Patient/Family verbalizes understanding of self-management of chronic disease. Assess barriers to safe and effective d/c AEB  Patient/family is able to verbalize and obtain medicine after d/c  Patient/family is aware and attends follow up appointment's s/p d/c          Patient Stated (pt-stated)   On track      Patient will: Attend follow up appointment with PCP on 2023. Call providers as needed with concerns/questions. Follow discharge instructions. (Monitor oxygen saturations, shortness of breath)  Take medications as prescribed.      Barriers: overwhelmed by complexity of regimen  Plan for overcoming my barriers: education, telephonic

## 2023-10-11 ENCOUNTER — CARE COORDINATION (OUTPATIENT)
Dept: CARE COORDINATION | Facility: CLINIC | Age: 53
End: 2023-10-11

## 2023-10-11 NOTE — CARE COORDINATION
Remote Patient Monitoring Note      Date/Time:  10/11/2023 2:46 PM  Patient Current Location: [place]  LPN 2nd attempt to contact patient by telephone regarding red alert received for pulse ox reading (88%). Unable to reach pt and unable to leave message; \"Cannot accept calls at this time\" recording received. LPN contacted patients emergency contact, Jennifer Obrien, per RPM protocol. Verified patients name and  as identifiers. Background: Pt enrolled in RPM r/t pneumonia and COPD. Clinical Interventions:  Discussed RPM alert with patients emergency contact, Jennifer Obrien. Jennifer Obrien stated she will attempt to contact pt and request pt return LPNs call. Plan/Follow Up: Will continue to review, monitor and address alerts with follow up based on severity of symptoms and risk factors.

## 2023-10-11 NOTE — CARE COORDINATION
Remote Patient Monitoring Note      Date/Time:  10/11/2023 8:59 AM  Patient Current Location: [place]  LPN attempted to contact patient by telephone regarding red alert received for pulse ox reading (88%). Background: Pt enrolled in RPM r/t pneumonia and COPD. Clinical Interventions:  Left HIPAA compliant message requesting a return call. Plan/Follow Up: Will continue to review, monitor and address alerts with follow up based on severity of symptoms and risk factors.

## 2023-10-12 ENCOUNTER — CARE COORDINATION (OUTPATIENT)
Dept: CARE COORDINATION | Facility: CLINIC | Age: 53
End: 2023-10-12

## 2023-10-12 NOTE — CARE COORDINATION
Ambulatory Care Coordination Note  10/12/2023    Patient Current Location:  [address] [place] [address]     ACM contacted the patient by telephone. Verified name and  with patient as identifiers. Provided introduction to self, and explanation of the ACM role. Challenges to be reviewed by the provider   Additional needs identified to be addressed with provider: No  {AMB CC Provider Discharge Needs:[id number]}               Method of communication with provider: {[facility] CC Method of Communication:098251485}. ACM: Eros Rodriguez RN    {Insert Care Summary Note DMCO:[id number]}***    Offered patient enrollment in the Remote Patient Monitoring (RPM) program for in-home monitoring: {[facility] CTN VUZ:[id number]}. Lab Results       None                 Goals Addressed                      This Visit's Progress      Conditions and Symptoms   On track      Patient/Family verbalizes understanding of self-management of chronic disease. Assess barriers to safe and effective d/c AEB  Patient/family is able to verbalize and obtain medicine after d/c  Patient/family is aware and attends follow up appointment's s/p d/c          Patient Stated (pt-stated)   On track      Patient will: Attend follow up appointment with PCP on 2023. Call providers as needed with concerns/questions. Follow discharge instructions. (Monitor oxygen saturations, shortness of breath)  Take medications as prescribed.      Barriers: overwhelmed by complexity of regimen  Plan for overcoming my barriers: education, telephonic support  Confidence: 8/  Anticipated Goal Completion Date: 2023              Future Appointments   Date Time Provider [address]   2023 11:00 AM Momo CAMARA, PA-C [facility] AMB   2023  1:10 PM Luis Martinez MD [facility] AM[facility]   ,   Diabetes Assessment    Medic Alert ID: No  Meal Planning: None   How often do you test your blood sugar?: Daily   Do you have barriers with adherence to non-pharmacologic

## 2023-10-12 NOTE — CARE COORDINATION
Remote Alert Monitoring Note  Rpm alert to be reviewed by the provider   red alert   pulse ox reading (91%)   Additional needs to be addressed by N/A: No                    Date/Time:  10/12/2023 9:18 AM  Patient Current Location: Home: [facility]  SULLY contacted patient by telephone. Verified patients name and  as identifiers. Background:  Pt enrolled in RPM r/t pneumonia and COPD per HRS. Pt enrolled in RPM r/t COPD and DM per [facility]. Refer to 911 immediately if:  Patient unresponsive or unable to provide history  Change in cognition or sudden confusion  Patient unable to respond in complete sentences  Intense chest pain/tightness  Any concern for any clinical emergency  Red Alert: Provider response time of 1 hr required for any red alert requiring intervention  Yellow Alert: Provider response time of 3hr required for any escalated yellow alert    O2 Triage  Are you having any Chest Pain? no   Are you having any Shortness of Breath? Yes, with exertion    Swelling in your hands or feet? no     Are you having any other health concerns or issues? no      Clinical Interventions: Reviewed and followed up on alerts and treatments-Pt speaking in full sentences, wearing home O2 @ 4L continuous, denies CP, SOB, and new or worsening edema at this time. Pt reports mild SOB with exertion and states, \"that's normal for me though\". Pt scheduled to see pulmonologist on 23 and states she will contact the office to see if an earlier appointment is available. Pt agreeable to recheck pulse ox. Updated reading of 95% noted in HRS. Pt v/u of when to notify provider of changes / concerns and when to seek emergent medical care. Request to add glucose monitoring per pt request and to update reason(s) for RPM enrollment sent to [facility]. Plan/Follow Up: Will continue to review, monitor and address alerts with follow up based on severity of symptoms and risk factors.

## 2023-10-16 ENCOUNTER — CARE COORDINATION (OUTPATIENT)
Dept: CARE COORDINATION | Facility: CLINIC | Age: 53
End: 2023-10-16

## 2023-10-16 NOTE — CARE COORDINATION
Remote Patient Monitoring Note      Date/Time:  10/16/2023 1:06 PM  Patient Current Location: [facility]  LPN attempted to contact patient by telephone regarding yellow alert received for no weight taken x 2 days. Background: Pt enrolled in RPM r/t pneumonia and COPD per   Clinical Interventions:  Left HIPAA compliant message requesting a return call. Plan/Follow Up: Will continue to review, monitor and address alerts with follow up based on severity of symptoms and risk factors.

## 2023-10-17 ENCOUNTER — CARE COORDINATION (OUTPATIENT)
Dept: CARE COORDINATION | Facility: CLINIC | Age: 53
End: 2023-10-17

## 2023-10-17 NOTE — CARE COORDINATION
Ambulatory Care Coordination Note  10/17/2023    Patient Current Location:  [facility]     ACM contacted the patient by telephone. Verified name and  with patient as identifiers. Provided introduction to self, and explanation of the ACM role. Challenges to be reviewed by the provider   Additional needs identified to be addressed with provider: Yes     Pt has requested glucose monitoring through the RPM. I will order to the [facility] pool. Method of communication with provider: phone. ACM: Reyes Sack, RN    F/u call with pt to ensure compliance with RPM and if having any s/s. Pt states that her machine has not been transferring the data. She weighted herself this am and she was 297.8 lbs. O2 sat @ 93% on 4L and HR 86. She has not done her breathing tx yet, but states she has been \"staying around 93%\". Pt also interested in BGL monitoring and I will forward this info to have someone reach out to her. I will check back on Friday (10/20)    Offered patient enrollment in the Remote Patient Monitoring (RPM) program for in-home monitoring: Yes, patient already enrolled.     Lab Results       None                 Goals Addressed    None         Future Appointments   Date Time Provider [address] [place] [address]   2023 11:00 AM JOSE ALBERTO Taylor AMB   2023  1:10 PM Benito Roach MD [facility] AMB   ,   COPD Assessment    Does the patient understand envrionmental exposure?: Yes  Is the patient able to verbalize Rescue vs. Long Acting medications?: Yes  Does the patient have a nebulizer?: Yes  Does the patient use a space with inhaled medications?: Yes     No patient-reported symptoms         Symptoms:          ,   General Assessment         , and No linked episodes

## 2023-10-18 NOTE — PROGRESS NOTES
Remote Patient Monitoring Change to Monitoring Parameters    Patient currently being managed with remote patient monitoring for Pneumonia and COPD. Request received to add DM as RPM disease preset in order to accommodate patient's baseline measurements, or associated changes in patient's status. Pt is currently getting daily BS readings at home and is agreeable to manually input these into the RPM system. She will continue to be offered monitoring of temperature, BP, pulse ox, HR, weight, and both COPD and PNA symptom survey responses    See care coordination encounters for additional details.

## 2023-10-20 ENCOUNTER — CARE COORDINATION (OUTPATIENT)
Dept: CARE COORDINATION | Facility: CLINIC | Age: 53
End: 2023-10-20

## 2023-10-20 NOTE — CARE COORDINATION
Remote Alert Monitoring Note  Rpm alert to be reviewed by the provider   red alert   survey response (Pt answered yes to: Has your congestion worsened in the past 24 hours?)  Additional needs to be addressed by PCP: FYI Pt denies CP and SOB. Pt c/o mild congestion and productive cough with clear sputum. Pulse ox of 92% noted in HRS. Discussed S/Sx of infection and when to seek medical care. Pt denies all other S/Sx of infection, v/u of of when to notify provider(s) of changes / concerns, and when to seek emergent medical care. Date/Time:  10/20/2023 9:44 AM  Patient Current Location: Home: [facility] contacted patient by telephone. Verified patients name and  as identifiers. Background: Pt enrolled in RPM r/t pneumonia, , and DM per HRS. Pt enrolled in RPM r/t COPD and DM per [facility]. Refer to 911 immediately if:  Patient unresponsive or unable to provide history  Change in cognition or sudden confusion  Patient unable to respond in complete sentences  Intense chest pain/tightness  Any concern for any clinical emergency  Red Alert: Provider response time of 1 hr required for any red alert requiring intervention  Yellow Alert: Provider response time of 3hr required for any escalated yellow alert      Clinical Interventions: Reviewed and followed up on alerts and treatments-Pt denies CP and SOB. Pt c/o mild congestion and productive cough with clear sputum. Pulse ox of 92% noted in HRS. Discussed S/Sx of infection and when to seek medical care. Pt denies all other S/Sx of infection, v/u of of when to notify provider(s) of changes / concerns, and when to seek emergent medical care. Escalated alert to RN-FYI update provided  Escalated alert to PCP-FYI update provided   Plan/Follow Up: Will continue to review, monitor and address alerts with follow up based on severity of symptoms and risk factors.

## 2023-10-23 ENCOUNTER — TELEPHONE (OUTPATIENT)
Dept: INTERNAL MEDICINE CLINIC | Facility: CLINIC | Age: 53
End: 2023-10-23

## 2023-10-23 NOTE — TELEPHONE ENCOUNTER
Pt notified and verbalized understanding. She is scheduled for fasting labs on 11/9/23 and to see Marbin Joshi on 11/13/23. She states that she ha also rescheduled with her Pulmonologist for Dec. She was having issues with her insurance transportation but has family that is going to be helping her with that now.

## 2023-10-23 NOTE — TELEPHONE ENCOUNTER
Requested Prescriptions     Pending Prescriptions Disp Refills    dilTIAZem (CARDIZEM CD) 240 MG extended release capsule 30 capsule 0     Sig: Take 1 capsule by mouth daily

## 2023-10-23 NOTE — TELEPHONE ENCOUNTER
MEDICATION REFILL REQUEST      Name of Medication:  Diltiazem  Dose:  240 MG  Frequency:  take 1 capsule by mouth daily   Quantity:  30  Days' supply:  30      Pharmacy Name/Location:  [id number]

## 2023-10-23 NOTE — TELEPHONE ENCOUNTER
Per Bruna Vasquez: She is EXTREMELY overdue for diabetes f/u with me and did not show up for her hospital follow-up either. Please get diabetes f/u scheduled with me with fasting labs done 3-7 days prior. She also needs to be seeing pulmonary with all the respiratory issues she's been having. Looks like she's been hospitalized twice since she's seen them which is not good. She should call and get scheduled with them also!

## 2023-10-24 RX ORDER — DILTIAZEM HYDROCHLORIDE 240 MG/1
240 CAPSULE, COATED, EXTENDED RELEASE ORAL DAILY
Qty: 30 CAPSULE | Refills: 0 | Status: SHIPPED | OUTPATIENT
Start: 2023-10-24

## 2023-10-26 ENCOUNTER — CARE COORDINATION (OUTPATIENT)
Dept: CARE COORDINATION | Facility: CLINIC | Age: 53
End: 2023-10-26

## 2023-10-26 NOTE — CARE COORDINATION
F/u call made, no answer and a message was left. Pt informed that if they have any questions, concerns or any needs that need to be addressed to please call back.  Otherwise I will reach out again on 11/2

## 2023-10-27 ENCOUNTER — CARE COORDINATION (OUTPATIENT)
Dept: CARE COORDINATION | Facility: CLINIC | Age: 53
End: 2023-10-27

## 2023-10-27 NOTE — CARE COORDINATION
Remote Alert Monitoring Note  Rpm alert to be reviewed by the provider   red alert   pulse ox reading (91%)   Additional needs to be addressed by N/A: No                    Date/Time:  10/27/2023 9:55 AM  Patient Current Location: Home: [facility] contacted patient by telephone. Verified patients name and  as identifiers. Background: Pt enrolled in RPM r/t pneumonia, , and DM per HRS. Refer to 911 immediately if:  Patient unresponsive or unable to provide history  Change in cognition or sudden confusion  Patient unable to respond in complete sentences  Intense chest pain/tightness  Any concern for any clinical emergency  Red Alert: Provider response time of 1 hr required for any red alert requiring intervention  Yellow Alert: Provider response time of 3hr required for any escalated yellow alert    O2 Triage  Are you having any Chest Pain? no   Are you having any Shortness of Breath? no   Swelling in your hands or feet? no     Are you having any other health concerns or issues? no      Clinical Interventions: Reviewed and followed up on alerts and treatments-Pt wearing home O2 @ 4LPM continuous, denies acute distress / is asymptomatic. Pt agreeable to recheck pulse ox at this time. Update reading of 92% noted in HRS; increased to 95% per pt. Pt v/u of albuterol inhaler and Duoneb use, when to notify provider(s) of changes / concerns and when to seek emergent medical care. No further action necessary at this time. Plan/Follow Up: Will continue to review, monitor and address alerts with follow up based on severity of symptoms and risk factors.

## 2023-10-30 ENCOUNTER — CARE COORDINATION (OUTPATIENT)
Dept: CARE COORDINATION | Facility: CLINIC | Age: 53
End: 2023-10-30

## 2023-10-30 NOTE — CARE COORDINATION
Remote Alert Monitoring Note  Rpm alert to be reviewed by the provider   red alert   temperature reading (95.6F)   Additional needs to be addressed by N/A: No                    Date/Time:  10/30/2023 11:09 AM  Patient Current Location: [facility]: [facility] contacted patient by telephone. Verified patients name and  as identifiers. Background:  Pt enrolled in RPM r/t pneumonia, , and DM per HRS. Refer to 911 immediately if:  Patient unresponsive or unable to provide history  Change in cognition or sudden confusion  Patient unable to respond in complete sentences  Intense chest pain/tightness  Any concern for any clinical emergency  Red Alert: Provider response time of 1 hr required for any red alert requiring intervention  Yellow Alert: Provider response time of 3hr required for any escalated yellow alert      Clinical Interventions: Reviewed and followed up on alerts and treatments-Pt denies acute distress / is asymptomatic. Pt states, \"I took a sip of cold drink right before\" taking temperature orally. Pt agreeable to recheck temperature at this time. Updated reading 97.5F reported. Pt v/u of when to notify provider(s) of changes / concerns and when to seek emergent medical care. No further action necessary at this time. Plan/Follow Up: Will continue to review, monitor and address alerts with follow up based on severity of symptoms and risk factors.

## 2023-11-02 ENCOUNTER — CARE COORDINATION (OUTPATIENT)
Dept: CARE COORDINATION | Facility: CLINIC | Age: 53
End: 2023-11-02

## 2023-11-02 NOTE — CARE COORDINATION
Ambulatory Care Coordination Note  2023    Patient Current Location:  [place]     ACM contacted the patient by telephone. Verified name and  with patient as identifiers. Provided introduction to self, and explanation of the ACM role. Challenges to be reviewed by the provider   Additional needs identified to be addressed with provider: No  none               Method of communication with provider: phone. ACM: Gerson Stapleton RN  F/u call and pt has had a few episodes on low O2 readings. She is dealing with some head congestion which may be contributing to her O2 readings. We discussed a [place]a pot, pt may look into purchasing one. She is also experiencing some dryness, pt does not have a humidifier and feels that be helpful coming into the winter months. I will forward this to PCP so that a ordered can be sent via Fax#[phone], to [facility]. Otherwise, she is doing well and grateful for the continued calls to check on her. I will reach out in 2 weeks. Offered patient enrollment in the Remote Patient Monitoring (RPM) program for in-home monitoring: Yes, patient already enrolled. Lab Results       None            Care Coordination Interventions    Referral from Primary Care Provider: No  Suggested Interventions and Community Resources          Goals Addressed                      This Visit's Progress      Conditions and Symptoms   On track      Patient/Family verbalizes understanding of self-management of chronic disease. Assess barriers to safe and effective d/c AEB  Patient/family is able to verbalize and obtain medicine after d/c  Patient/family is aware and attends follow up appointment's s/p d/c          Patient Stated (pt-stated)   On track      Patient will: Attend follow up appointment with PCP on 2023. Call providers as needed with concerns/questions. Follow discharge instructions.  (Monitor oxygen saturations, shortness of breath)  Take medications as

## 2023-11-06 ENCOUNTER — CARE COORDINATION (OUTPATIENT)
Dept: CARE COORDINATION | Facility: CLINIC | Age: 53
End: 2023-11-06

## 2023-11-06 NOTE — CARE COORDINATION
Remote Patient Monitoring Note      Date/Time:  11/6/2023 11:11 AM  Patient Current Location: [place]  LPN attempted to contact patient by telephone regarding red alert received for pulse ox reading recheck (89%). Background:  Pt enrolled in RPM r/t pneumonia, COPD, and DM per HRS. Clinical Interventions:  Left HIPAA compliant message requesting a return call. Plan/Follow Up: Will continue to review, monitor and address alerts with follow up based on severity of symptoms and risk factors.

## 2023-11-06 NOTE — CARE COORDINATION
Remote Alert Monitoring Note  Rpm alert to be reviewed by the provider   red alert   pulse ox reading (89%)   Additional needs to be addressed by PCP and Pulmonologist: Pt wearing home O2 @ 4L continuous, denies CP, and new or worsening edema at this time. Pt c/o SOB with exertion and congestion. Pt believes congestion is due to weather change. Pt reports continued compliance with Breo QD, Duoneb PRN, and Albuterol inhaler PRN. Pt previously stated, \"I'm about to do a breathing treatment and check it again\". Pulse ox recheck 89%. HRS survey detail reviewed. Pt answered 'No' to the following questions: Have you had chills in the past 24 hours? Has your congestion worsened in the past 24 hours? Has your breathing worsened in the past 24 hours? Pt agreeable to recheck pulse ox at this time. Updated readings: 88%-91%. Pt v/u of when to notify provider(s) of changes / concerns and when to seek emergent medical care. Pt states she will contact PCP and/or pulmonologist office to further discuss concerns. Please advise. Date/Time:  2023 1:11 PM  [facility] Verified patients name and  as identifiers. Background: Pt enrolled in RPM r/t pneumonia, COPD, and DM per HRS. Refer to 911 immediately if:  Patient unresponsive or unable to provide history  Change in cognition or sudden confusion  Patient unable to respond in complete sentences  Intense chest pain/tightness  Any concern for any clinical emergency  Red Alert: Provider response time of 1 hr required for any red alert requiring intervention  Yellow Alert: Provider response time of 3hr required for any escalated yellow alert    O2 Triage  Are you having any Chest Pain? no   Are you having any Shortness of Breath? Yes, with exertion   Swelling in your hands or feet? no     Are you having any other health concerns or issues?  Yes,

## 2023-11-06 NOTE — CARE COORDINATION
Remote Alert Monitoring Note  Rpm alert to be reviewed by the provider   red alert   pulse ox reading (90%)   Additional needs to be addressed by PCP: FYI Pt wearing home O2 @ 4 L continuous, denies CP, and new or worsening edema at this time. Pt c/o SOB with exertion and congestion. Pt believes congestion is due to weather change. Pt reports continued compliance with Breo QD, Duoneb PRN, and Albuterol inhaler PRN. Pt agreeable to recheck pulse ox. Update reading of 87 - 90% reported. Pt states \"I'm about to do a breathing treatment and check it again\". HRS survey detail reviewed. Pt answered 'No' to the following questions: Have you had chills in the past 24 hours? Has your congestion worsened in the past 24 hours? Has your breathing worsened in the past 24 hours? Pt v/u of when to notify provider(s) of changes / concerns and when to seek emergent medical care. Will continue to review, monitor and address alerts with follow up based on severity of symptoms and risk factors. Date/Time:  2023 9:19 AM  Patient Current Location: Home: [facility] contacted patient by telephone. Verified patients name and  as identifiers. Background: Pt enrolled in RPM r/t pneumonia, COPD, and DM per HRS. Refer to 911 immediately if:  Patient unresponsive or unable to provide history  Change in cognition or sudden confusion  Patient unable to respond in complete sentences  Intense chest pain/tightness  Any concern for any clinical emergency  Red Alert: Provider response time of 1 hr required for any red alert requiring intervention  Yellow Alert: Provider response time of 3hr required for any escalated yellow alert    O2 Triage  Are you having any Chest Pain? no   Are you having any Shortness of Breath? With exertion   Swelling in your hands or feet? no     Are you having any other health concerns or issues?  Yes, congestion      Clinical Interventions:

## 2023-11-07 ENCOUNTER — TELEPHONE (OUTPATIENT)
Dept: PULMONOLOGY | Age: 53
End: 2023-11-07

## 2023-11-07 ENCOUNTER — CARE COORDINATION (OUTPATIENT)
Dept: CARE COORDINATION | Facility: CLINIC | Age: 53
End: 2023-11-07

## 2023-11-07 DIAGNOSIS — E11.29 TYPE 2 DIABETES MELLITUS WITH MICROALBUMINURIA, WITHOUT LONG-TERM CURRENT USE OF INSULIN (HCC): ICD-10-CM

## 2023-11-07 DIAGNOSIS — R80.9 TYPE 2 DIABETES MELLITUS WITH MICROALBUMINURIA, WITHOUT LONG-TERM CURRENT USE OF INSULIN (HCC): ICD-10-CM

## 2023-11-07 DIAGNOSIS — E61.1 IRON DEFICIENCY: Primary | ICD-10-CM

## 2023-11-07 NOTE — CARE COORDINATION
Remote Patient Monitoring Note      Date/Time:  2023 12:44 PM  Patient Current Location: Home: [address] [place] [address]  Voicemail from patient received. LPN returned patients call. Verified patients name and  as identifiers. Background: Pt enrolled in RPM r/t pneumonia, COPD, and DM per HRS. Clinical Interventions:   Pt reports receiving a call from pulmonology office. Pt states she was advised to be seen in office, needs a CXR, flu test, and COVID test. Pt states she will contact pulmonology office \"tomorrow morning\" to schedule an appointment once she is able to find out her sons availability to transport. Discussed importance of being seen in office ASAP and when to seek emergent medical care with pt; pt v/u. Escalated alert to RN-Update provided    Plan/Follow Up: Will continue to review, monitor and address alerts with follow up based on severity of symptoms and risk factors.
compliance with Breo QD, Duoneb PRN, and Albuterol inhaler PRN. Pt has contacted pulmonologist office and is awaiting response. Current temperature of 98.6 noted in HRS. Pt agreeable to recheck pulse ox at this time. Updated readings: 87-88%. Pt v/u of when to notify provider(s) of changes / concerns and when to seek emergent medical care. Escalated alert to RN-Update provided  Escalated alert to PCP-awaiting response  Escalated alert to Specialist-awaiting response    Plan/Follow Up: Will continue to review, monitor and address alerts with follow up based on severity of symptoms and risk factors.

## 2023-11-07 NOTE — TELEPHONE ENCOUNTER
TRIAGE CALL      Complaint: coughing/SOB  Cough: yes  Productive:  yes  Bloody Sputum:  no  Increased SOB/Wheezing:  yes  Duration: 2 days  Fever/Chills: no  OTC Meds tried: none  02 sat are dropping to 89 to 87%

## 2023-11-07 NOTE — CARE COORDINATION
Remote Alert Monitoring Note  Rpm alert to be reviewed by the provider   red alert   pulse ox reading (87%)   Additional needs to be addressed by Pulmonologist aware. See previous notes: No                    Date/Time:  2023 2:36 PM  [facility]  SULLY contacted patient by telephone. Verified patients name and  as identifiers. Background: Pt enrolled in RPM r/t pneumonia, COPD, and DM per HRS. Refer to 911 immediately if:  Patient unresponsive or unable to provide history  Change in cognition or sudden confusion  Patient unable to respond in complete sentences  Intense chest pain/tightness  Any concern for any clinical emergency  Red Alert: Provider response time of 1 hr required for any red alert requiring intervention  Yellow Alert: Provider response time of 3hr required for any escalated yellow alert    O2 Triage  Are you having any Chest Pain? no   Are you having any Shortness of Breath? Yes, with mild exertion   Swelling in your hands or feet? no      Are you having any other health concerns or issues? yes     Clinical Interventions: Reviewed and followed up on alerts and treatments-Pt wearing home O2 @ 4L continuous, denies CP, and new or worsening edema at this time. Pt continues to c/o SOB with exertion, congestion, headache, and productive cough with \"milky looking\" sputum. No change in symptoms since last phone encounter. Pulmonologist has address escalated alert; see notes. Pt v/u of recommendations. Pt reports pulse ox recheck 88-89%, v/u of when to notify provider(s) of changes / concerns, and when to seek emergent medical care. Plan/Follow Up: Will continue to review, monitor and address alerts with follow up based on severity of symptoms and risk factors.

## 2023-11-07 NOTE — TELEPHONE ENCOUNTER
Spoke with pt who is agreeable to all suggestions. Needs to call her son to make sure she can have transportation prior to scheduling appt.    When pt calls back please schedule in available appt with CXR prior.

## 2023-11-08 ENCOUNTER — CARE COORDINATION (OUTPATIENT)
Dept: CARE COORDINATION | Facility: CLINIC | Age: 53
End: 2023-11-08

## 2023-11-08 NOTE — CARE COORDINATION
JUAN DAVISON left with pt regarding referral for transportation assistance. Pt has Medicaid and is able to access Medicaid van. However, they are unreliable at times. JUAN DAVISON can schedule Roundtrip but pt will be responsible for bill unless MD office is willing to cover cost.  Awaiting response from pt.

## 2023-11-09 ENCOUNTER — TELEPHONE (OUTPATIENT)
Dept: PULMONOLOGY | Age: 53
End: 2023-11-09

## 2023-11-09 ENCOUNTER — CARE COORDINATION (OUTPATIENT)
Dept: CARE COORDINATION | Facility: CLINIC | Age: 53
End: 2023-11-09

## 2023-11-09 ENCOUNTER — HOSPITAL ENCOUNTER (OUTPATIENT)
Dept: LAB | Age: 53
Discharge: HOME | End: 2023-11-12

## 2023-11-09 DIAGNOSIS — E61.1 IRON DEFICIENCY: ICD-10-CM

## 2023-11-09 DIAGNOSIS — E11.29 TYPE 2 DIABETES MELLITUS WITH MICROALBUMINURIA, WITHOUT LONG-TERM CURRENT USE OF INSULIN (HCC): ICD-10-CM

## 2023-11-09 DIAGNOSIS — R80.9 TYPE 2 DIABETES MELLITUS WITH MICROALBUMINURIA, WITHOUT LONG-TERM CURRENT USE OF INSULIN (HCC): ICD-10-CM

## 2023-11-09 LAB
ALBUMIN SERPL-MCNC: 3.6 G/DL (ref 3.5–5)
ALBUMIN/GLOB SERPL: 0.9 (ref 0.4–1.6)
ALP SERPL-CCNC: 116 U/L (ref 50–136)
ALT SERPL-CCNC: 17 U/L (ref 12–65)
ANION GAP SERPL CALC-SCNC: 7 MMOL/L (ref 2–11)
AST SERPL-CCNC: 8 U/L (ref 15–37)
BASOPHILS # BLD: 0.1 K/UL (ref 0–0.2)
BASOPHILS NFR BLD: 1 % (ref 0–2)
BILIRUB SERPL-MCNC: 0.2 MG/DL (ref 0.2–1.1)
BUN SERPL-MCNC: 14 MG/DL (ref 6–23)
CALCIUM SERPL-MCNC: 10 MG/DL (ref 8.3–10.4)
CHLORIDE SERPL-SCNC: 103 MMOL/L (ref 101–110)
CHOLEST SERPL-MCNC: 117 MG/DL
CO2 SERPL-SCNC: 30 MMOL/L (ref 21–32)
CREAT SERPL-MCNC: 0.5 MG/DL (ref 0.6–1)
DIFFERENTIAL METHOD BLD: ABNORMAL
EOSINOPHIL # BLD: 0.2 K/UL (ref 0–0.8)
EOSINOPHIL NFR BLD: 2 % (ref 0.5–7.8)
ERYTHROCYTE [DISTWIDTH] IN BLOOD BY AUTOMATED COUNT: 23.9 % (ref 11.9–14.6)
FERRITIN SERPL-MCNC: 19 NG/ML (ref 8–388)
GLOBULIN SER CALC-MCNC: 4.1 G/DL (ref 2.8–4.5)
GLUCOSE SERPL-MCNC: 90 MG/DL (ref 65–100)
HCT VFR BLD AUTO: 37.6 % (ref 35.8–46.3)
HDLC SERPL-MCNC: 30 MG/DL (ref 40–60)
HDLC SERPL: 3.9
HGB BLD-MCNC: 9.5 G/DL (ref 11.7–15.4)
IMM GRANULOCYTES # BLD AUTO: 0 K/UL (ref 0–0.5)
IMM GRANULOCYTES NFR BLD AUTO: 0 % (ref 0–5)
IRON SATN MFR SERPL: 5 %
IRON SERPL-MCNC: 19 UG/DL (ref 35–150)
LDLC SERPL CALC-MCNC: 70 MG/DL
LYMPHOCYTES # BLD: 2.1 K/UL (ref 0.5–4.6)
LYMPHOCYTES NFR BLD: 21 % (ref 13–44)
MCH RBC QN AUTO: 17.4 PG (ref 26.1–32.9)
MCHC RBC AUTO-ENTMCNC: 25.3 G/DL (ref 31.4–35)
MCV RBC AUTO: 68.9 FL (ref 82–102)
MONOCYTES # BLD: 0.6 K/UL (ref 0.1–1.3)
MONOCYTES NFR BLD: 7 % (ref 4–12)
NEUTS SEG # BLD: 6.6 K/UL (ref 1.7–8.2)
NEUTS SEG NFR BLD: 69 % (ref 43–78)
NRBC # BLD: 0 K/UL (ref 0–0.2)
PLATELET # BLD AUTO: 270 K/UL (ref 150–450)
PMV BLD AUTO: ABNORMAL FL (ref 9.4–12.3)
POTASSIUM SERPL-SCNC: 4.1 MMOL/L (ref 3.5–5.1)
PROT SERPL-MCNC: 7.7 G/DL (ref 6.3–8.2)
RBC # BLD AUTO: 5.46 M/UL (ref 4.05–5.2)
SODIUM SERPL-SCNC: 140 MMOL/L (ref 133–143)
TIBC SERPL-MCNC: 410 UG/DL (ref 250–450)
TRIGL SERPL-MCNC: 85 MG/DL (ref 35–150)
VLDLC SERPL CALC-MCNC: 17 MG/DL (ref 6–23)
WBC # BLD AUTO: 9.6 K/UL (ref 4.3–11.1)

## 2023-11-09 NOTE — CARE COORDINATION
Per LPN note, pt still not interested in scheduling pulm appt. SW CM will remain on care team for a few more weeks.

## 2023-11-09 NOTE — TELEPHONE ENCOUNTER
FW: humidifier for O2  Received: Today  CORONA Cook MA         Previous Messages       ----- Message -----   From: Tere Oliveira RN   Sent: 11/8/2023  12:03 PM EST   To: Lars Perrin RN   Subject: RE: humidifier for O2                             Would you be able to forward this to the appropriate person. Thanks again   ----- Message -----   From: Lynn Olszewski, PA-C   Sent: 11/7/2023  10:27 PM EST   To: Tere Oliveira RN   Subject: RE: humidifier for O2                             Fabio Baird,   I am deferring any pulmonary related needs to her pulmonologist.  She has advanced pulmonary disease. Could you send this request to them? Thanks! Abebe Sanches   ----- Message -----   From: Tere Oliveira RN   Sent: 11/3/2023   1:09 PM EST   To: Lynn Olszewski, PA-C   Subject: humidifier for O2                                 Afternoon, could a order be placed for a humidifier through [facility]. There fax # [phone]. Thank you      Spoke to Dimas Mitchell NP and order placed and put in [employer] for her to sign.

## 2023-11-09 NOTE — CARE COORDINATION
Remote Alert Monitoring Note  Rpm alert to be reviewed by the provider   red alert   pulse ox reading (89%)   Additional needs to be addressed by PCP and Pulmonologist: FYI Pt wearing home O2 @ 4L continuous, denies CP, SOB, and new or worsening edema at this time. Pt stated, \"I'm feeling a lot better actually\", reports improving congestion, decrease in cough, continued adherence with Breo QD, Duoneb PRN, Albuterol inhaler PRN, and denies headache. \"Improving cough\" remains productive and sputum is \"still milky\" per pt. Discussed scheduling pulmonary appointment based on patients sons availability to transport (see recent RPM notes) Pt stated, \"I know I probably need to go ahead and go but I have a lot to do\" and declined assistance with scheduling. Pt agreeable to recheck pulse ox. Updated readin%. Pt currently scheduled for labs today, cardiology office visit tomorrow, PCP F/U on 23, and pulmonary office visit on 23. Pt encouraged to return MSW's call to discuss transportation assistance options. Pt verbalizes understanding of when to notify provider(s) of changes / concerns and when to seek emergent medical care. Date/Time:  2023 9:45 AM  Patient Current Location: Home: [address] [place] [address] contacted patient by telephone. Verified patients name and  as identifiers. Background: Pt enrolled in RPM r/t pneumonia, COPD, and DM per HRS.    Refer to 911 immediately if:  Patient unresponsive or unable to provide history  Change in cognition or sudden confusion  Patient unable to respond in complete sentences  Intense chest pain/tightness  Any concern for any clinical emergency  Red Alert: Provider response time of 1 hr required for any red alert requiring intervention  Yellow Alert: Provider response time of 3hr required for any escalated yellow alert    O2 Triage  Are you having any Chest Pain? no   Are you having any Shortness of

## 2023-11-10 ENCOUNTER — OFFICE VISIT (OUTPATIENT)
Age: 53
End: 2023-11-10
Payer: MEDICARE

## 2023-11-10 VITALS
SYSTOLIC BLOOD PRESSURE: 130 MMHG | BODY MASS INDEX: 49.84 KG/M2 | HEART RATE: 92 BPM | DIASTOLIC BLOOD PRESSURE: 78 MMHG | WEIGHT: 293 LBS

## 2023-11-10 DIAGNOSIS — I48.0 PAROXYSMAL ATRIAL FIBRILLATION (HCC): Primary | ICD-10-CM

## 2023-11-10 DIAGNOSIS — Z79.01 LONG TERM (CURRENT) USE OF ANTICOAGULANTS: ICD-10-CM

## 2023-11-10 DIAGNOSIS — I10 ESSENTIAL (PRIMARY) HYPERTENSION: ICD-10-CM

## 2023-11-10 LAB
EST. AVERAGE GLUCOSE BLD GHB EST-MCNC: 108 MG/DL
HBA1C MFR BLD: 5.4 % (ref 4.8–5.6)

## 2023-11-10 PROCEDURE — G8427 DOCREV CUR MEDS BY ELIG CLIN: HCPCS | Performed by: INTERNAL MEDICINE

## 2023-11-10 PROCEDURE — 3017F COLORECTAL CA SCREEN DOC REV: CPT | Performed by: INTERNAL MEDICINE

## 2023-11-10 PROCEDURE — 3074F SYST BP LT 130 MM HG: CPT | Performed by: INTERNAL MEDICINE

## 2023-11-10 PROCEDURE — 99214 OFFICE O/P EST MOD 30 MIN: CPT | Performed by: INTERNAL MEDICINE

## 2023-11-10 PROCEDURE — 4004F PT TOBACCO SCREEN RCVD TLK: CPT | Performed by: INTERNAL MEDICINE

## 2023-11-10 PROCEDURE — G8484 FLU IMMUNIZE NO ADMIN: HCPCS | Performed by: INTERNAL MEDICINE

## 2023-11-10 PROCEDURE — 3078F DIAST BP <80 MM HG: CPT | Performed by: INTERNAL MEDICINE

## 2023-11-10 PROCEDURE — G8417 CALC BMI ABV UP PARAM F/U: HCPCS | Performed by: INTERNAL MEDICINE

## 2023-11-10 RX ORDER — DILTIAZEM HYDROCHLORIDE 240 MG/1
240 CAPSULE, COATED, EXTENDED RELEASE ORAL DAILY
Qty: 90 CAPSULE | Refills: 3 | Status: SHIPPED | OUTPATIENT
Start: 2023-11-10

## 2023-11-10 ASSESSMENT — ENCOUNTER SYMPTOMS
ABDOMINAL PAIN: 0
VOMITING: 0
NAUSEA: 0
BLURRED VISION: 0
BACK PAIN: 0
COUGH: 0
BLOATING: 0
ORTHOPNEA: 0
HEMOPTYSIS: 0
SHORTNESS OF BREATH: 1
DOUBLE VISION: 0

## 2023-11-10 NOTE — PROGRESS NOTES
5.2 M/uL    Hemoglobin 9.5 (L) 11.7 - 15.4 g/dL    Hematocrit 37.6 35.8 - 46.3 %    MCV 68.9 (L) 82 - 102 FL    MCH 17.4 (L) 26.1 - 32.9 PG    MCHC 25.3 (L) 31.4 - 35.0 g/dL    RDW 23.9 (H) 11.9 - 14.6 %    Platelets 582 361 - 542 K/uL    MPV Unable to calculate. Recommend adding IPF. 9.4 - 12.3 FL    nRBC 0.00 0.0 - 0.2 K/uL    Differential Type AUTOMATED      Neutrophils % 69 43 - 78 %    Lymphocytes % 21 13 - 44 %    Monocytes % 7 4.0 - 12.0 %    Eosinophils % 2 0.5 - 7.8 %    Basophils % 1 0.0 - 2.0 %    Immature Granulocytes 0 0.0 - 5.0 %    Neutrophils Absolute 6.6 1.7 - 8.2 K/UL    Lymphocytes Absolute 2.1 0.5 - 4.6 K/UL    Monocytes Absolute 0.6 0.1 - 1.3 K/UL    Eosinophils Absolute 0.2 0.0 - 0.8 K/UL    Basophils Absolute 0.1 0.0 - 0.2 K/UL    Absolute Immature Granulocyte 0.0 0.0 - 0.5 K/UL     Lab Results   Component Value Date/Time    CHOL 117 11/09/2023 11:07 AM    HDL 30 11/09/2023 11:07 AM    VLDL 16 03/28/2022 11:26 AM       No results found for any visits on 11/10/23. ASSESSMENT and PLAN    Demar Pearson was seen today for atrial fibrillation, hypertension and follow-up. Diagnoses and all orders for this visit:    Paroxysmal atrial fibrillation ([address])    Long term (current) use of anticoagulants    Essential (primary) hypertension    Other orders  -     dilTIAZem (CARDIZEM CD) 240 MG extended release capsule; Take 1 capsule by mouth daily  -     apixaban (ELIQUIS) 5 MG TABS tablet; Take 1 tablet by mouth 2 times daily TAKE ONE TABLET BY MOUTH EVERY 12 HOURS Strength: 5 mg        Overall Impression    Paroxysmal Atrial fibrillation-in sinus rhythm. Last bout likely triggered by COPD exacerbation. Given severity of lung disease, unlikely to be successful with rhythm control long-term . Continue Eliquis. On Cardizem 240 mg daily and continue. Long-term use of anticoagulation-risk/benefits/alternatives discussed. Continue Eliquis 5 mg twice daily. Hypertension-stable.   Notified to maintain

## 2023-11-13 ENCOUNTER — OFFICE VISIT (OUTPATIENT)
Dept: INTERNAL MEDICINE CLINIC | Facility: CLINIC | Age: 53
End: 2023-11-13
Payer: MEDICARE

## 2023-11-13 ENCOUNTER — CARE COORDINATION (OUTPATIENT)
Dept: CARE COORDINATION | Facility: CLINIC | Age: 53
End: 2023-11-13

## 2023-11-13 VITALS
SYSTOLIC BLOOD PRESSURE: 120 MMHG | HEIGHT: 65 IN | TEMPERATURE: 97.9 F | DIASTOLIC BLOOD PRESSURE: 78 MMHG | WEIGHT: 293 LBS | HEART RATE: 94 BPM | BODY MASS INDEX: 48.82 KG/M2 | OXYGEN SATURATION: 93 %

## 2023-11-13 DIAGNOSIS — J44.1 CHRONIC OBSTRUCTIVE PULMONARY DISEASE WITH ACUTE EXACERBATION (HCC): ICD-10-CM

## 2023-11-13 DIAGNOSIS — R80.9 TYPE 2 DIABETES MELLITUS WITH MICROALBUMINURIA, WITHOUT LONG-TERM CURRENT USE OF INSULIN (HCC): Primary | ICD-10-CM

## 2023-11-13 DIAGNOSIS — E11.29 TYPE 2 DIABETES MELLITUS WITH MICROALBUMINURIA, WITHOUT LONG-TERM CURRENT USE OF INSULIN (HCC): Primary | ICD-10-CM

## 2023-11-13 DIAGNOSIS — I10 ESSENTIAL (PRIMARY) HYPERTENSION: ICD-10-CM

## 2023-11-13 DIAGNOSIS — G47.34 NOCTURNAL HYPOXIA: ICD-10-CM

## 2023-11-13 DIAGNOSIS — Z09 HOSPITAL DISCHARGE FOLLOW-UP: ICD-10-CM

## 2023-11-13 DIAGNOSIS — K29.51 CHRONIC GASTRITIS WITH BLEEDING, UNSPECIFIED GASTRITIS TYPE: ICD-10-CM

## 2023-11-13 DIAGNOSIS — E83.41 HYPERMAGNESEMIA: ICD-10-CM

## 2023-11-13 DIAGNOSIS — J96.21 ACUTE ON CHRONIC RESPIRATORY FAILURE WITH HYPOXIA (HCC): ICD-10-CM

## 2023-11-13 DIAGNOSIS — D50.0 IRON DEFICIENCY ANEMIA DUE TO CHRONIC BLOOD LOSS: ICD-10-CM

## 2023-11-13 DIAGNOSIS — E78.5 HYPERLIPIDEMIA LDL GOAL <100: ICD-10-CM

## 2023-11-13 LAB
EXP DATE SOLUTION: NORMAL
EXP DATE SWAB: NORMAL
EXPIRATION DATE: NORMAL
LOT NUMBER POC: NORMAL
LOT NUMBER SOLUTION: NORMAL
LOT NUMBER SWAB: NORMAL
MAGNESIUM SERPL-MCNC: 2.1 MG/DL (ref 1.8–2.4)
QUICKVUE INFLUENZA TEST: NEGATIVE
SARS-COV-2 RNA, POC: NEGATIVE
VALID INTERNAL CONTROL, POC: YES

## 2023-11-13 PROCEDURE — 87804 INFLUENZA ASSAY W/OPTIC: CPT | Performed by: PHYSICIAN ASSISTANT

## 2023-11-13 PROCEDURE — 99215 OFFICE O/P EST HI 40 MIN: CPT | Performed by: PHYSICIAN ASSISTANT

## 2023-11-13 PROCEDURE — 3078F DIAST BP <80 MM HG: CPT | Performed by: PHYSICIAN ASSISTANT

## 2023-11-13 PROCEDURE — 4004F PT TOBACCO SCREEN RCVD TLK: CPT | Performed by: PHYSICIAN ASSISTANT

## 2023-11-13 PROCEDURE — 3023F SPIROM DOC REV: CPT | Performed by: PHYSICIAN ASSISTANT

## 2023-11-13 PROCEDURE — 1111F DSCHRG MED/CURRENT MED MERGE: CPT | Performed by: PHYSICIAN ASSISTANT

## 2023-11-13 PROCEDURE — 3074F SYST BP LT 130 MM HG: CPT | Performed by: PHYSICIAN ASSISTANT

## 2023-11-13 PROCEDURE — 87635 SARS-COV-2 COVID-19 AMP PRB: CPT | Performed by: PHYSICIAN ASSISTANT

## 2023-11-13 PROCEDURE — 3017F COLORECTAL CA SCREEN DOC REV: CPT | Performed by: PHYSICIAN ASSISTANT

## 2023-11-13 PROCEDURE — G8427 DOCREV CUR MEDS BY ELIG CLIN: HCPCS | Performed by: PHYSICIAN ASSISTANT

## 2023-11-13 PROCEDURE — 3044F HG A1C LEVEL LT 7.0%: CPT | Performed by: PHYSICIAN ASSISTANT

## 2023-11-13 PROCEDURE — G2212 PROLONG OUTPT/OFFICE VIS: HCPCS | Performed by: PHYSICIAN ASSISTANT

## 2023-11-13 PROCEDURE — G8417 CALC BMI ABV UP PARAM F/U: HCPCS | Performed by: PHYSICIAN ASSISTANT

## 2023-11-13 PROCEDURE — 2022F DILAT RTA XM EVC RTNOPTHY: CPT | Performed by: PHYSICIAN ASSISTANT

## 2023-11-13 PROCEDURE — G8484 FLU IMMUNIZE NO ADMIN: HCPCS | Performed by: PHYSICIAN ASSISTANT

## 2023-11-13 RX ORDER — GUAIFENESIN 1200 MG/1
1 TABLET, EXTENDED RELEASE ORAL 2 TIMES DAILY
Qty: 60 TABLET | Refills: 5 | Status: ON HOLD | OUTPATIENT
Start: 2023-11-13

## 2023-11-13 RX ORDER — ESOMEPRAZOLE MAGNESIUM 40 MG/1
40 CAPSULE, DELAYED RELEASE ORAL
Qty: 90 CAPSULE | Refills: 1 | Status: ON HOLD | OUTPATIENT
Start: 2023-11-13

## 2023-11-13 RX ORDER — ATORVASTATIN CALCIUM 10 MG/1
10 TABLET, FILM COATED ORAL NIGHTLY
Qty: 30 TABLET | Refills: 5 | Status: ON HOLD | OUTPATIENT
Start: 2023-11-13

## 2023-11-13 RX ORDER — FERROUS SULFATE 325(65) MG
325 TABLET ORAL
Qty: 30 TABLET | Refills: 3 | Status: ON HOLD | OUTPATIENT
Start: 2023-11-13

## 2023-11-13 ASSESSMENT — ENCOUNTER SYMPTOMS
WHEEZING: 1
SHORTNESS OF BREATH: 1
COUGH: 1
APNEA: 0
BLOOD IN STOOL: 0

## 2023-11-13 NOTE — PATIENT INSTRUCTIONS
Restart PPI - will try Nexium 40 mg every morning on an empty stomach with water only x 30 minutes  Start Ferrous Sulfate 325 mg daily with dinner + vitamin c supplement or small glass of OJ to enhance absorption  Resume Mucinex 12 Hour twice daily to aid in chest congestion  Will hold off on flu vaccine given current respiratory struggles  Will add magnesium on to blood already drawn since never completed in follow-up after levels were found to be high during April hospitalization  Explained the need to get scheduled with pulmonary so they can help get things settled with CPAP/BiPAP or whatever she needs to manage O2 levels nocturnally  Discussed the need for follow-up with GI given worsening anemia & iron   Reminded to stay well hydrated with water  Monitor blood pressure daily and keep record to bring to next appointment  Continue chronic medications as prescribed  Monitor blood sugar daily and keep record to bring to next appointment

## 2023-11-13 NOTE — CARE COORDINATION
Remote Alert Monitoring Note  Rpm alert to be reviewed by the provider   red alert   pulse ox reading (90%) and survey response (Pt answered yes to:Has your congestion worsened in the past 24 hours? Yes to: Has your breathing worsened in the past 24 hours?)   Additional needs to be addressed by PCP and Pulmonologist: FYI Pt wearing home O2 @ 4L continuous, denies CP and new or worsening edema. Pt c/o SOB with exertion, productive cough with \"milky\" colored sputum, and congestion. Pt reports continued adherence with Breo QD, Duoneb PRN, and Albuterol inhaler PRN. Pt agreeable to recheck pulse ox. Update readings: 89% - 92%. Pt currently scheduled for PCP F/U today, 12, and pulmonary office visit on 23. Pt declined assistance with rescheduling pulmonary appointment for earlier date. Pt verbalizes understanding of when to notify provider(s) of changes / concerns and when to seek emergent medical care. Date/Time:  2023 10:02 AM  [facility]. Verified patients name and  as identifiers. Background: Pt enrolled in RPM r/t pneumonia, COPD, and DM per HRS. Refer to 911 immediately if:  Patient unresponsive or unable to provide history  Change in cognition or sudden confusion  Patient unable to respond in complete sentences  Intense chest pain/tightness  Any concern for any clinical emergency  Red Alert: Provider response time of 1 hr required for any red alert requiring intervention  Yellow Alert: Provider response time of 3hr required for any escalated yellow alert    O2 Triage  Are you having any Chest Pain? no   Are you having any Shortness of Breath? Yes, with exertion    Swelling in your hands or feet? no     Are you having any other health concerns or issues?  yes      Clinical Interventions: Reviewed and followed up on alerts and treatments-Pt wearing home O2 @ 4L

## 2023-11-14 ENCOUNTER — CARE COORDINATION (OUTPATIENT)
Dept: CARE COORDINATION | Facility: CLINIC | Age: 53
End: 2023-11-14

## 2023-11-14 ENCOUNTER — TELEPHONE (OUTPATIENT)
Dept: PULMONOLOGY | Age: 53
End: 2023-11-14

## 2023-11-14 DIAGNOSIS — Z09 HOSPITAL DISCHARGE FOLLOW-UP: Primary | ICD-10-CM

## 2023-11-14 NOTE — CARE COORDINATION
Remote Alert Monitoring Note  Rpm alert to be reviewed by the provider   red alert   pulse ox reading (88%)   Additional needs to be addressed by PCP and Pulmonologist: FYI Pt wearing home O2 @ 4L continuous, denies CP and new or worsening edema. Pt c/o SOB with exertion, productive cough with \"milky\" colored sputum, and congestion. Pt reports continued adherence with Breo QD, Duoneb PRN, and Albuterol inhaler PRN and is agreeable to recheck pulse ox. Update readings: 87% - 88%. Pt completed PCP F/U on 23, states flu and COVID tests were negative, verbalizes understanding of new orders and recommendation to F/U with pulmonary. Pt currently scheduled for pulmonary office visit on 23 and declined assistance with rescheduling for earlier date. Pt stated, \"I'll give them a call today and see what they got\". Pt verbalizes understanding of when to notify provider(s) of changes / concerns and when to seek emergent medical care. Date/Time:  2023 10:52 AM  Patient Current Location: Home: [facility]  SULLY attempted to contact patient by telephone. Left HIPAA compliant message requesting a return call. Return call received. Verified patients name and  as identifiers. Background: Pt enrolled in RPM r/t pneumonia, COPD, and DM per HRS. Refer to 911 immediately if:  Patient unresponsive or unable to provide history  Change in cognition or sudden confusion  Patient unable to respond in complete sentences  Intense chest pain/tightness  Any concern for any clinical emergency  Red Alert: Provider response time of 1 hr required for any red alert requiring intervention  Yellow Alert: Provider response time of 3hr required for any escalated yellow alert    O2 Triage  Are you having any Chest Pain? no   Are you having any Shortness of Breath? Yes, with exertion    Swelling in your hands or feet?  no     Are you having any other health concerns or

## 2023-11-14 NOTE — TELEPHONE ENCOUNTER
TRIAGE CALL      Complaint: congestion,cough/wheezing  Cough: yes   Productive:  yes milky ciolor  Bloody Sputum:  no  Increased SOB/Wheezing:  yes both  Duration: a wk   Fever/Chills: no  OTC Meds tried: no      Patient says she had covid and flu test done yesterday and both negative . She went to her PCP .

## 2023-11-15 ENCOUNTER — CARE COORDINATION (OUTPATIENT)
Dept: CARE COORDINATION | Facility: CLINIC | Age: 53
End: 2023-11-15

## 2023-11-15 NOTE — CARE COORDINATION
F/u call made, no answer and a message was left. Pt informed that if they have any questions, concerns or any needs that need to be addressed to please call back. Otherwise I will reach out again on 11/17.

## 2023-11-15 NOTE — CARE COORDINATION
Remote Patient Monitoring Note      Date/Time:  11/15/2023 9:35 AM  [facility] [id number] [facility] by telephone regarding red alert received for pulse ox reading (90%). Left HIPAA compliant message requesting a return call. Background: Pt enrolled in RPM r/t pneumonia, COPD, and DM per HRS. Plan/Follow Up: Will continue to review, monitor and address alerts with follow up based on severity of symptoms and risk factors.
worsening edema. Pt continues to c/o SOB with exertion, productive cough with \"milky\" colored sputum, and congestion. Pt reports continued adherence with Breo QD, Duoneb PRN, and Albuterol inhaler PRN and is agreeable to recheck pulse ox. Update readings: 89% - 93%. Pt educated on the importance of rescheduling pulmonology appointment for earlier date, S/Sx of infection, and risks associated with untreated infection. Pt verbalizes understanding and states she will contact pulmonology office today to schedule appointment. Pt also verbalizes understanding of when to notify provider(s) of changes / concerns and when to seek emergent medical care. Escalated alert to RN-FYI update provided   Escalated alert to PCP-FYI update provided  Escalated alert to Specialist-FYI update provided   Plan/Follow Up: Will continue to review, monitor and address alerts with follow up based on severity of symptoms and risk factors.

## 2023-11-16 ENCOUNTER — TELEPHONE (OUTPATIENT)
Facility: CLINIC | Age: 53
End: 2023-11-16

## 2023-11-16 ENCOUNTER — CARE COORDINATION (OUTPATIENT)
Dept: CARE COORDINATION | Facility: CLINIC | Age: 53
End: 2023-11-16

## 2023-11-16 NOTE — TELEPHONE ENCOUNTER
CXR but she states she didn't get the message and has had some trouble with her phone. Reports transportation barrier but has a son who can sometimes take her to appts if he has time to ask off from work. Attempted to place 3-way call to pulmonologist but office was closed for lunch until 1 PM. Offered to call her back and try again to place 3-way call to pulmonologist after 1 PM if I am available then. Otherwise, pt is agreeable to attempt to call them herself if I haven't reached her again by 1:10 PM. Will ask ACM if she can continue to help pt with transportation resources in her area.     REVIEW OF SYSTEMS    CONSTITUTIONAL: worsening fatigue today and Denies fever and chills, RESPIRATORY: worsening shortness of breath at rest and GAONA today, wheezing, cough, and Denies increased sputum production  and change in color of sputum, and CARDIOVASCULAR: chest pain/pressure/tightness     CURRENT MEDICATIONS    Current Outpatient Rx   Medication Sig Dispense Refill    metFORMIN (GLUCOPHAGE) 500 MG tablet Take 1 tablet by mouth 2 times daily (with meals) 60 tablet 5    atorvastatin (LIPITOR) 10 MG tablet Take 1 tablet by mouth at bedtime 30 tablet 5    esomeprazole (NEXIUM) 40 MG delayed release capsule Take 1 capsule by mouth every morning (before breakfast) 90 capsule 1    ferrous sulfate (IRON 325) 325 (65 Fe) MG tablet Take 1 tablet by mouth Daily with supper 30 tablet 3    guaiFENesin 1200 MG TB12 Take 1 tablet by mouth 2 times daily 60 tablet 5    dilTIAZem (CARDIZEM CD) 240 MG extended release capsule Take 1 capsule by mouth daily 90 capsule 3    apixaban (ELIQUIS) 5 MG TABS tablet Take 1 tablet by mouth 2 times daily TAKE ONE TABLET BY MOUTH EVERY 12 HOURS Strength: 5 mg 180 tablet 3    ipratropium-albuterol (DUONEB) 0.5-2.5 (3) MG/3ML SOLN nebulizer solution Inhale 3 mLs into the lungs 4 times daily File to Medicare part B--J44.9 120 each 11    albuterol sulfate HFA (PROVENTIL;VENTOLIN;PROAIR) 108 (90 Base)

## 2023-11-16 NOTE — CARE COORDINATION
Remote Alert Monitoring Note  Rpm alert to be reviewed by the provider   red alert   pulse ox reading (86%)   Additional needs to be addressed by PCP and Pulmonologist: Pt wearing home O2 @ 4L continuous, denies CP and new or worsening edema. Pt continues to c/o SOB with exertion, productive cough with \"milky\" colored sputum, and congestion. Pt reports continued adherence with Breo QD, Duoneb, and Albuterol inhaler PRN and is agreeable to recheck pulse ox. Update readings: 81% - 84%. Pt reports awaiting a return call from pulmonology office to reschedule office visit for earlier date and states, \"I'll probably just go ahead and go to the hospital if they don't call\". Pt declined assistance with contacting EMS and stated, \"I don't feel too terribly bad right now. Just laying down. I can call them myself if I need to\". Pt verbalizes understanding of when to notify provider(s) of changes / concerns and when to seek emergent medical care. Please advise. Update: pulse ox increase to 89%                    Date/Time:  2023 9:59 AM  Patient Current Location: Home: [facility] contacted patient by telephone. Verified patients name and  as identifiers. Background: Pt enrolled in RPM r/t pneumonia, COPD, and DM per HRS. Refer to 911 immediately if:  Patient unresponsive or unable to provide history  Change in cognition or sudden confusion  Patient unable to respond in complete sentences  Intense chest pain/tightness  Any concern for any clinical emergency  Red Alert: Provider response time of 1 hr required for any red alert requiring intervention  Yellow Alert: Provider response time of 3hr required for any escalated yellow alert    O2 Triage  Are you having any Chest Pain? no   Are you having any Shortness of Breath? Yes, with exertion    Swelling in your hands or feet? no     Are you having any other health concerns or issues?  yes      Clinical Interventions:

## 2023-11-17 ENCOUNTER — CARE COORDINATION (OUTPATIENT)
Dept: CARE COORDINATION | Facility: CLINIC | Age: 53
End: 2023-11-17

## 2023-11-17 ENCOUNTER — HOSPITAL ENCOUNTER (INPATIENT)
Age: 53
LOS: 6 days | Discharge: HOME HEALTH | DRG: 189 | End: 2023-11-23
Attending: EMERGENCY MEDICINE | Admitting: FAMILY MEDICINE
Payer: MEDICARE

## 2023-11-17 ENCOUNTER — APPOINTMENT (OUTPATIENT)
Dept: GENERAL RADIOLOGY | Age: 53
DRG: 189 | End: 2023-11-17
Payer: MEDICARE

## 2023-11-17 VITALS
WEIGHT: 293 LBS | DIASTOLIC BLOOD PRESSURE: 82 MMHG | OXYGEN SATURATION: 90 % | SYSTOLIC BLOOD PRESSURE: 112 MMHG | BODY MASS INDEX: 49.42 KG/M2 | HEART RATE: 90 BPM | TEMPERATURE: 97.2 F

## 2023-11-17 DIAGNOSIS — J96.02 ACUTE RESPIRATORY FAILURE WITH HYPOXIA AND HYPERCAPNIA (HCC): ICD-10-CM

## 2023-11-17 DIAGNOSIS — J44.1 COPD EXACERBATION (HCC): Primary | ICD-10-CM

## 2023-11-17 DIAGNOSIS — J96.01 ACUTE RESPIRATORY FAILURE WITH HYPOXIA AND HYPERCAPNIA (HCC): ICD-10-CM

## 2023-11-17 LAB
ALBUMIN SERPL-MCNC: 3.5 G/DL (ref 3.5–5)
ALBUMIN/GLOB SERPL: 0.7 (ref 0.4–1.6)
ALP SERPL-CCNC: 120 U/L (ref 50–136)
ALT SERPL-CCNC: 18 U/L (ref 12–65)
ANION GAP BLD CALC-SCNC: ABNORMAL MMOL/L
ANION GAP SERPL CALC-SCNC: 3 MMOL/L (ref 2–11)
ARTERIAL PATENCY WRIST A: POSITIVE
AST SERPL-CCNC: 13 U/L (ref 15–37)
BASE EXCESS BLD CALC-SCNC: 10.8 MMOL/L
BASE EXCESS BLD CALC-SCNC: 10.8 MMOL/L
BASE EXCESS BLD CALC-SCNC: 9.7 MMOL/L
BASOPHILS # BLD: 0 K/UL (ref 0–0.2)
BASOPHILS NFR BLD: 1 % (ref 0–2)
BDY SITE: ABNORMAL
BILIRUB SERPL-MCNC: 0.3 MG/DL (ref 0.2–1.1)
BUN SERPL-MCNC: 7 MG/DL (ref 6–23)
CA-I BLD-MCNC: 1.25 MMOL/L (ref 1.12–1.32)
CALCIUM SERPL-MCNC: 9.3 MG/DL (ref 8.3–10.4)
CHLORIDE SERPL-SCNC: 98 MMOL/L (ref 101–110)
CO2 BLD-SCNC: 41 MMOL/L (ref 13–23)
CO2 SERPL-SCNC: 38 MMOL/L (ref 21–32)
CREAT SERPL-MCNC: 0.5 MG/DL (ref 0.6–1)
CRP SERPL-MCNC: 1.8 MG/DL (ref 0–0.9)
DIFFERENTIAL METHOD BLD: ABNORMAL
EKG ATRIAL RATE: 89 BPM
EKG DIAGNOSIS: NORMAL
EKG P AXIS: 45 DEGREES
EKG P-R INTERVAL: 153 MS
EKG Q-T INTERVAL: 377 MS
EKG QRS DURATION: 80 MS
EKG QTC CALCULATION (BAZETT): 457 MS
EKG R AXIS: -38 DEGREES
EKG T AXIS: 79 DEGREES
EKG VENTRICULAR RATE: 88 BPM
EOSINOPHIL # BLD: 0.2 K/UL (ref 0–0.8)
EOSINOPHIL NFR BLD: 3 % (ref 0.5–7.8)
ERYTHROCYTE [DISTWIDTH] IN BLOOD BY AUTOMATED COUNT: 23.5 % (ref 11.9–14.6)
ERYTHROCYTE [SEDIMENTATION RATE] IN BLOOD: >130 MM/HR (ref 0–30)
EST. AVERAGE GLUCOSE BLD GHB EST-MCNC: 117 MG/DL
FERRITIN SERPL-MCNC: 22 NG/ML (ref 8–388)
FIO2 ON VENT: 50 %
FLUAV RNA SPEC QL NAA+PROBE: NOT DETECTED
FLUBV RNA SPEC QL NAA+PROBE: NOT DETECTED
GAS FLOW.O2 O2 DELIVERY SYS: ABNORMAL
GLOBULIN SER CALC-MCNC: 4.7 G/DL (ref 2.8–4.5)
GLUCOSE BLD STRIP.AUTO-MCNC: 112 MG/DL (ref 65–100)
GLUCOSE BLD STRIP.AUTO-MCNC: 117 MG/DL (ref 65–100)
GLUCOSE SERPL-MCNC: 85 MG/DL (ref 65–100)
HBA1C MFR BLD: 5.7 % (ref 4.8–5.6)
HCO3 BLD-SCNC: 38.6 MMOL/L (ref 22–26)
HCO3 BLD-SCNC: 39.9 MMOL/L (ref 22–26)
HCO3 BLD-SCNC: 40.7 MMOL/L (ref 22–26)
HCT VFR BLD AUTO: 37.6 % (ref 35.8–46.3)
HGB BLD-MCNC: 9.6 G/DL (ref 11.7–15.4)
IMM GRANULOCYTES # BLD AUTO: 0 K/UL (ref 0–0.5)
IMM GRANULOCYTES NFR BLD AUTO: 0 % (ref 0–5)
INSPIRATION.DURATION SETTING TIME VENT: 0.9 SEC
IPAP/PIP/HIGH PEEP: 13
IRON SATN MFR SERPL: 6 %
IRON SERPL-MCNC: 21 UG/DL (ref 35–150)
LYMPHOCYTES # BLD: 1.9 K/UL (ref 0.5–4.6)
LYMPHOCYTES NFR BLD: 22 % (ref 13–44)
MAGNESIUM SERPL-MCNC: 2.3 MG/DL (ref 1.8–2.4)
MCH RBC QN AUTO: 17.8 PG (ref 26.1–32.9)
MCHC RBC AUTO-ENTMCNC: 25.5 G/DL (ref 31.4–35)
MCV RBC AUTO: 69.8 FL (ref 82–102)
MONOCYTES # BLD: 0.5 K/UL (ref 0.1–1.3)
MONOCYTES NFR BLD: 6 % (ref 4–12)
NEUTS SEG # BLD: 5.9 K/UL (ref 1.7–8.2)
NEUTS SEG NFR BLD: 68 % (ref 43–78)
NRBC # BLD: 0.02 K/UL (ref 0–0.2)
NT PRO BNP: 30 PG/ML (ref 5–125)
O2/TOTAL GAS SETTING VFR VENT: 36 %
O2/TOTAL GAS SETTING VFR VENT: 50 %
PCO2 BLD: 70.7 MMHG (ref 35–45)
PCO2 BLD: 74.8 MMHG (ref 35–45)
PCO2 BLD: 79.1 MMHG (ref 35–45)
PEEP RESPIRATORY: 8
PEEP RESPIRATORY: 8 CMH2O
PH BLD: 7.32 (ref 7.35–7.45)
PH BLD: 7.34 (ref 7.35–7.45)
PH BLD: 7.35 (ref 7.35–7.45)
PLATELET # BLD AUTO: 279 K/UL (ref 150–450)
PMV BLD AUTO: ABNORMAL FL (ref 9.4–12.3)
PO2 BLD: 59 MMHG (ref 75–100)
PO2 BLD: 75 MMHG (ref 75–100)
PO2 BLD: 77 MMHG (ref 75–100)
POTASSIUM BLD-SCNC: 4.6 MMOL/L (ref 3.5–5.1)
POTASSIUM SERPL-SCNC: 4.2 MMOL/L (ref 3.5–5.1)
PRESSURE SUPPORT SETTING VENT: 12
PRESSURE SUPPORT SETTING VENT: 4 CMH2O
PROCALCITONIN SERPL-MCNC: <0.05 NG/ML (ref 0–0.49)
PROT SERPL-MCNC: 8.2 G/DL (ref 6.3–8.2)
RBC # BLD AUTO: 5.39 M/UL (ref 4.05–5.2)
RESPIRATORY RATE, POC: 28 (ref 5–40)
RESPIRATORY RATE: 14
SAO2 % BLD: 86.6 % (ref 95–98)
SAO2 % BLD: 93 %
SAO2 % BLD: 93.7 % (ref 95–98)
SARS-COV-2 RDRP RESP QL NAA+PROBE: NOT DETECTED
SERVICE CMNT-IMP: ABNORMAL
SODIUM BLD-SCNC: 139 MMOL/L (ref 136–145)
SODIUM SERPL-SCNC: 139 MMOL/L (ref 133–143)
SOURCE: NORMAL
SPECIMEN SITE: ABNORMAL
SPECIMEN TYPE: ABNORMAL
SPECIMEN TYPE: ABNORMAL
TIBC SERPL-MCNC: 374 UG/DL (ref 250–450)
TROPONIN I SERPL HS-MCNC: 6.9 PG/ML (ref 0–14)
VENTILATION MODE VENT: ABNORMAL
WBC # BLD AUTO: 8.6 K/UL (ref 4.3–11.1)

## 2023-11-17 PROCEDURE — 96365 THER/PROPH/DIAG IV INF INIT: CPT

## 2023-11-17 PROCEDURE — 6360000002 HC RX W HCPCS: Performed by: EMERGENCY MEDICINE

## 2023-11-17 PROCEDURE — 94640 AIRWAY INHALATION TREATMENT: CPT

## 2023-11-17 PROCEDURE — 71045 X-RAY EXAM CHEST 1 VIEW: CPT

## 2023-11-17 PROCEDURE — 84145 PROCALCITONIN (PCT): CPT

## 2023-11-17 PROCEDURE — 86140 C-REACTIVE PROTEIN: CPT

## 2023-11-17 PROCEDURE — 6370000000 HC RX 637 (ALT 250 FOR IP): Performed by: FAMILY MEDICINE

## 2023-11-17 PROCEDURE — 83036 HEMOGLOBIN GLYCOSYLATED A1C: CPT

## 2023-11-17 PROCEDURE — 83550 IRON BINDING TEST: CPT

## 2023-11-17 PROCEDURE — 82947 ASSAY GLUCOSE BLOOD QUANT: CPT

## 2023-11-17 PROCEDURE — 87070 CULTURE OTHR SPECIMN AEROBIC: CPT

## 2023-11-17 PROCEDURE — 82803 BLOOD GASES ANY COMBINATION: CPT

## 2023-11-17 PROCEDURE — 94660 CPAP INITIATION&MGMT: CPT

## 2023-11-17 PROCEDURE — 93010 ELECTROCARDIOGRAM REPORT: CPT | Performed by: INTERNAL MEDICINE

## 2023-11-17 PROCEDURE — 87502 INFLUENZA DNA AMP PROBE: CPT

## 2023-11-17 PROCEDURE — 2700000000 HC OXYGEN THERAPY PER DAY

## 2023-11-17 PROCEDURE — 84484 ASSAY OF TROPONIN QUANT: CPT

## 2023-11-17 PROCEDURE — 96375 TX/PRO/DX INJ NEW DRUG ADDON: CPT

## 2023-11-17 PROCEDURE — 6370000000 HC RX 637 (ALT 250 FOR IP): Performed by: EMERGENCY MEDICINE

## 2023-11-17 PROCEDURE — 82330 ASSAY OF CALCIUM: CPT

## 2023-11-17 PROCEDURE — 2580000003 HC RX 258: Performed by: FAMILY MEDICINE

## 2023-11-17 PROCEDURE — 84295 ASSAY OF SERUM SODIUM: CPT

## 2023-11-17 PROCEDURE — 85025 COMPLETE CBC W/AUTO DIFF WBC: CPT

## 2023-11-17 PROCEDURE — 83735 ASSAY OF MAGNESIUM: CPT

## 2023-11-17 PROCEDURE — 6360000002 HC RX W HCPCS: Performed by: FAMILY MEDICINE

## 2023-11-17 PROCEDURE — 85652 RBC SED RATE AUTOMATED: CPT

## 2023-11-17 PROCEDURE — 2000000000 HC ICU R&B

## 2023-11-17 PROCEDURE — 82962 GLUCOSE BLOOD TEST: CPT

## 2023-11-17 PROCEDURE — 36600 WITHDRAWAL OF ARTERIAL BLOOD: CPT

## 2023-11-17 PROCEDURE — 84132 ASSAY OF SERUM POTASSIUM: CPT

## 2023-11-17 PROCEDURE — 5A09457 ASSISTANCE WITH RESPIRATORY VENTILATION, 24-96 CONSECUTIVE HOURS, CONTINUOUS POSITIVE AIRWAY PRESSURE: ICD-10-PCS | Performed by: INTERNAL MEDICINE

## 2023-11-17 PROCEDURE — 99285 EMERGENCY DEPT VISIT HI MDM: CPT

## 2023-11-17 PROCEDURE — 82728 ASSAY OF FERRITIN: CPT

## 2023-11-17 PROCEDURE — 80053 COMPREHEN METABOLIC PANEL: CPT

## 2023-11-17 PROCEDURE — 83540 ASSAY OF IRON: CPT

## 2023-11-17 PROCEDURE — 87205 SMEAR GRAM STAIN: CPT

## 2023-11-17 PROCEDURE — 87635 SARS-COV-2 COVID-19 AMP PRB: CPT

## 2023-11-17 PROCEDURE — 83880 ASSAY OF NATRIURETIC PEPTIDE: CPT

## 2023-11-17 PROCEDURE — 93005 ELECTROCARDIOGRAM TRACING: CPT | Performed by: EMERGENCY MEDICINE

## 2023-11-17 RX ORDER — DEXTROSE MONOHYDRATE 100 MG/ML
INJECTION, SOLUTION INTRAVENOUS CONTINUOUS PRN
Status: DISCONTINUED | OUTPATIENT
Start: 2023-11-17 | End: 2023-11-23 | Stop reason: HOSPADM

## 2023-11-17 RX ORDER — POLYETHYLENE GLYCOL 3350 17 G/17G
17 POWDER, FOR SOLUTION ORAL DAILY PRN
Status: DISCONTINUED | OUTPATIENT
Start: 2023-11-17 | End: 2023-11-23 | Stop reason: HOSPADM

## 2023-11-17 RX ORDER — ATORVASTATIN CALCIUM 10 MG/1
10 TABLET, FILM COATED ORAL NIGHTLY
Status: DISCONTINUED | OUTPATIENT
Start: 2023-11-17 | End: 2023-11-23 | Stop reason: HOSPADM

## 2023-11-17 RX ORDER — ALBUTEROL SULFATE 90 UG/1
2 AEROSOL, METERED RESPIRATORY (INHALATION) EVERY 4 HOURS PRN
Status: DISCONTINUED | OUTPATIENT
Start: 2023-11-17 | End: 2023-11-23 | Stop reason: HOSPADM

## 2023-11-17 RX ORDER — ASCORBIC ACID 500 MG
500 TABLET ORAL 2 TIMES DAILY WITH MEALS
Status: DISCONTINUED | OUTPATIENT
Start: 2023-11-17 | End: 2023-11-23 | Stop reason: HOSPADM

## 2023-11-17 RX ORDER — BUDESONIDE 0.5 MG/2ML
0.5 INHALANT ORAL
Status: DISCONTINUED | OUTPATIENT
Start: 2023-11-17 | End: 2023-11-23 | Stop reason: HOSPADM

## 2023-11-17 RX ORDER — POTASSIUM CHLORIDE 20 MEQ/1
40 TABLET, EXTENDED RELEASE ORAL PRN
Status: DISCONTINUED | OUTPATIENT
Start: 2023-11-17 | End: 2023-11-23 | Stop reason: HOSPADM

## 2023-11-17 RX ORDER — ENOXAPARIN SODIUM 100 MG/ML
30 INJECTION SUBCUTANEOUS 2 TIMES DAILY
Status: CANCELLED | OUTPATIENT
Start: 2023-11-17

## 2023-11-17 RX ORDER — INSULIN LISPRO 100 [IU]/ML
0-4 INJECTION, SOLUTION INTRAVENOUS; SUBCUTANEOUS NIGHTLY
Status: DISCONTINUED | OUTPATIENT
Start: 2023-11-17 | End: 2023-11-23 | Stop reason: HOSPADM

## 2023-11-17 RX ORDER — ONDANSETRON 4 MG/1
4 TABLET, ORALLY DISINTEGRATING ORAL EVERY 8 HOURS PRN
Status: DISCONTINUED | OUTPATIENT
Start: 2023-11-17 | End: 2023-11-23 | Stop reason: HOSPADM

## 2023-11-17 RX ORDER — ACETAMINOPHEN 650 MG/1
650 SUPPOSITORY RECTAL EVERY 6 HOURS PRN
Status: DISCONTINUED | OUTPATIENT
Start: 2023-11-17 | End: 2023-11-23 | Stop reason: HOSPADM

## 2023-11-17 RX ORDER — SODIUM CHLORIDE 0.9 % (FLUSH) 0.9 %
5-40 SYRINGE (ML) INJECTION EVERY 12 HOURS SCHEDULED
Status: DISCONTINUED | OUTPATIENT
Start: 2023-11-17 | End: 2023-11-23 | Stop reason: HOSPADM

## 2023-11-17 RX ORDER — ACETAMINOPHEN 325 MG/1
650 TABLET ORAL EVERY 6 HOURS PRN
Status: DISCONTINUED | OUTPATIENT
Start: 2023-11-17 | End: 2023-11-23 | Stop reason: HOSPADM

## 2023-11-17 RX ORDER — SODIUM CHLORIDE 9 MG/ML
INJECTION, SOLUTION INTRAVENOUS PRN
Status: DISCONTINUED | OUTPATIENT
Start: 2023-11-17 | End: 2023-11-23 | Stop reason: HOSPADM

## 2023-11-17 RX ORDER — GUAIFENESIN 600 MG/1
1200 TABLET, EXTENDED RELEASE ORAL 2 TIMES DAILY
Status: DISCONTINUED | OUTPATIENT
Start: 2023-11-17 | End: 2023-11-23 | Stop reason: HOSPADM

## 2023-11-17 RX ORDER — DILTIAZEM HYDROCHLORIDE 240 MG/1
240 CAPSULE, COATED, EXTENDED RELEASE ORAL DAILY
Status: DISCONTINUED | OUTPATIENT
Start: 2023-11-18 | End: 2023-11-23 | Stop reason: HOSPADM

## 2023-11-17 RX ORDER — MAGNESIUM SULFATE IN WATER 40 MG/ML
2000 INJECTION, SOLUTION INTRAVENOUS PRN
Status: DISCONTINUED | OUTPATIENT
Start: 2023-11-17 | End: 2023-11-23 | Stop reason: HOSPADM

## 2023-11-17 RX ORDER — PANTOPRAZOLE SODIUM 40 MG/1
40 TABLET, DELAYED RELEASE ORAL
Status: DISCONTINUED | OUTPATIENT
Start: 2023-11-18 | End: 2023-11-23 | Stop reason: HOSPADM

## 2023-11-17 RX ORDER — SODIUM CHLORIDE 0.9 % (FLUSH) 0.9 %
5-40 SYRINGE (ML) INJECTION PRN
Status: DISCONTINUED | OUTPATIENT
Start: 2023-11-17 | End: 2023-11-23 | Stop reason: HOSPADM

## 2023-11-17 RX ORDER — IPRATROPIUM BROMIDE AND ALBUTEROL SULFATE 2.5; .5 MG/3ML; MG/3ML
1 SOLUTION RESPIRATORY (INHALATION)
Status: COMPLETED | OUTPATIENT
Start: 2023-11-17 | End: 2023-11-17

## 2023-11-17 RX ORDER — IBUPROFEN 600 MG/1
1 TABLET ORAL PRN
Status: DISCONTINUED | OUTPATIENT
Start: 2023-11-17 | End: 2023-11-23 | Stop reason: HOSPADM

## 2023-11-17 RX ORDER — INSULIN LISPRO 100 [IU]/ML
0-4 INJECTION, SOLUTION INTRAVENOUS; SUBCUTANEOUS
Status: DISCONTINUED | OUTPATIENT
Start: 2023-11-17 | End: 2023-11-23 | Stop reason: HOSPADM

## 2023-11-17 RX ORDER — FERROUS SULFATE 325(65) MG
325 TABLET ORAL 2 TIMES DAILY WITH MEALS
Status: DISCONTINUED | OUTPATIENT
Start: 2023-11-18 | End: 2023-11-23 | Stop reason: HOSPADM

## 2023-11-17 RX ORDER — ARFORMOTEROL TARTRATE 15 UG/2ML
15 SOLUTION RESPIRATORY (INHALATION)
Status: DISCONTINUED | OUTPATIENT
Start: 2023-11-17 | End: 2023-11-23 | Stop reason: HOSPADM

## 2023-11-17 RX ORDER — ONDANSETRON 2 MG/ML
4 INJECTION INTRAMUSCULAR; INTRAVENOUS EVERY 6 HOURS PRN
Status: DISCONTINUED | OUTPATIENT
Start: 2023-11-17 | End: 2023-11-23 | Stop reason: HOSPADM

## 2023-11-17 RX ORDER — IPRATROPIUM BROMIDE AND ALBUTEROL SULFATE 2.5; .5 MG/3ML; MG/3ML
1 SOLUTION RESPIRATORY (INHALATION)
Status: DISCONTINUED | OUTPATIENT
Start: 2023-11-17 | End: 2023-11-23 | Stop reason: HOSPADM

## 2023-11-17 RX ORDER — FERROUS SULFATE 325(65) MG
325 TABLET ORAL
Status: DISCONTINUED | OUTPATIENT
Start: 2023-11-17 | End: 2023-11-17

## 2023-11-17 RX ORDER — POTASSIUM CHLORIDE 7.45 MG/ML
10 INJECTION INTRAVENOUS PRN
Status: DISCONTINUED | OUTPATIENT
Start: 2023-11-17 | End: 2023-11-23 | Stop reason: HOSPADM

## 2023-11-17 RX ADMIN — METHYLPREDNISOLONE SODIUM SUCCINATE 125 MG: 125 INJECTION, POWDER, FOR SOLUTION INTRAMUSCULAR; INTRAVENOUS at 15:35

## 2023-11-17 RX ADMIN — CEFEPIME 2000 MG: 2 INJECTION, POWDER, FOR SOLUTION INTRAVENOUS at 16:41

## 2023-11-17 RX ADMIN — ARFORMOTEROL TARTRATE 15 MCG: 15 SOLUTION RESPIRATORY (INHALATION) at 20:18

## 2023-11-17 RX ADMIN — GUAIFENESIN 1200 MG: 600 TABLET ORAL at 20:45

## 2023-11-17 RX ADMIN — IPRATROPIUM BROMIDE AND ALBUTEROL SULFATE 1 DOSE: 2.5; .5 SOLUTION RESPIRATORY (INHALATION) at 15:27

## 2023-11-17 RX ADMIN — ATORVASTATIN CALCIUM 10 MG: 10 TABLET, FILM COATED ORAL at 20:45

## 2023-11-17 RX ADMIN — IPRATROPIUM BROMIDE AND ALBUTEROL SULFATE 1 DOSE: 2.5; .5 SOLUTION RESPIRATORY (INHALATION) at 20:18

## 2023-11-17 RX ADMIN — BUDESONIDE 500 MCG: 0.5 INHALANT RESPIRATORY (INHALATION) at 20:18

## 2023-11-17 RX ADMIN — APIXABAN 5 MG: 5 TABLET, FILM COATED ORAL at 20:45

## 2023-11-17 ASSESSMENT — LIFESTYLE VARIABLES
HOW MANY STANDARD DRINKS CONTAINING ALCOHOL DO YOU HAVE ON A TYPICAL DAY: PATIENT DOES NOT DRINK
HOW OFTEN DO YOU HAVE A DRINK CONTAINING ALCOHOL: NEVER

## 2023-11-17 ASSESSMENT — PAIN - FUNCTIONAL ASSESSMENT: PAIN_FUNCTIONAL_ASSESSMENT: NONE - DENIES PAIN

## 2023-11-17 ASSESSMENT — PAIN SCALES - GENERAL: PAINLEVEL_OUTOF10: 0

## 2023-11-17 NOTE — H&P
Eosinophils % 3 0.5 - 7.8 %    Basophils % 1 0.0 - 2.0 %    Immature Granulocytes 0 0.0 - 5.0 %    Neutrophils Absolute 5.9 1.7 - 8.2 K/UL    Lymphocytes Absolute 1.9 0.5 - 4.6 K/UL    Monocytes Absolute 0.5 0.1 - 1.3 K/UL    Eosinophils Absolute 0.2 0.0 - 0.8 K/UL    Basophils Absolute 0.0 0.0 - 0.2 K/UL    Absolute Immature Granulocyte 0.0 0.0 - 0.5 K/UL   Brain Natriuretic Peptide    Collection Time: 11/17/23  2:58 PM   Result Value Ref Range    NT Pro-BNP 30 5 - 125 PG/ML   Magnesium    Collection Time: 11/17/23  2:58 PM   Result Value Ref Range    Magnesium 2.3 1.8 - 2.4 mg/dL   Comprehensive Metabolic Panel    Collection Time: 11/17/23  2:58 PM   Result Value Ref Range    Sodium 139 133 - 143 mmol/L    Potassium 4.2 3.5 - 5.1 mmol/L    Chloride 98 (L) 101 - 110 mmol/L    CO2 38 (H) 21 - 32 mmol/L    Anion Gap 3 2 - 11 mmol/L    Glucose 85 65 - 100 mg/dL    BUN 7 6 - 23 MG/DL    Creatinine 0.50 (L) 0.6 - 1.0 MG/DL    Est, Glom Filt Rate >60 >60 ml/min/1.73m2    Calcium 9.3 8.3 - 10.4 MG/DL    Total Bilirubin 0.3 0.2 - 1.1 MG/DL    ALT 18 12 - 65 U/L    AST 13 (L) 15 - 37 U/L    Alk Phosphatase 120 50 - 136 U/L    Total Protein 8.2 6.3 - 8.2 g/dL    Albumin 3.5 3.5 - 5.0 g/dL    Globulin 4.7 (H) 2.8 - 4.5 g/dL    Albumin/Globulin Ratio 0.7 0.4 - 1.6     Arterial Blood Gas, POC    Collection Time: 11/17/23  3:24 PM   Result Value Ref Range    DEVICE NASAL CANNULA      FIO2 36 %    POC pH 7.34 (L) 7.35 - 7.45      POC pCO2 74.8 (HH) 35 - 45 MMHG    POC PO2 59 (L) 75 - 100 MMHG    POC HCO3 39.9 (H) 22 - 26 MMOL/L    POC O2 SAT 86.6 (L) 95 - 98 %    Base Excess 10.8 mmol/L    POC Merlin's Test Positive      Site RIGHT RADIAL      Specimen type: ARTERIAL      Performed by: Torres (Hawkins Wallace)     Critical Value Read Back SUERT    Influenza A/B, Molecular    Collection Time: 11/17/23  3:35 PM    Specimen: Not Specified   Result Value Ref Range    Influenza A, ADEEL Not detected NOTD      Influenza B, ADEEL Not

## 2023-11-17 NOTE — CARE COORDINATION
Remote Patient Monitoring Note      Date/Time:  11/17/2023 2:06 PM  Patient Current Location: [facility]  No return call received as of this time. RN DEANGELO has been in contact with pt. Per RN ACM documentation, Deandre Gillespie has increased work of breathing, c/o SOB and some chest tightness. She did speak to Pulmonary and are able to see her on Nov 21( Monday). But, pt admits that she cannot wait that long. She is going to call for EMS as soon as we hang up. \" No further outreach by this LPN indicated at this time. Background: Pt enrolled in RPM r/t pneumonia, COPD, and DM per HRS. Plan/Follow Up: Will continue to review, monitor and address alerts with follow up based on severity of symptoms and risk factors.

## 2023-11-17 NOTE — CARE COORDINATION
Remote Patient Monitoring Note      Date/Time:  11/17/2023 10:39 AM  Patient Current Location: [facility]  LPN attempted to contact patient by telephone regarding red alert received for pulse ox reading (90%) and survey response (Pt answered yes to:Has your congestion worsened in the past 24 hours? And Has your breathing worsened in the past 24 hours? ). Left HIPAA compliant message requesting a return call. Background: Pt enrolled in RPM r/t pneumonia, COPD, and DM per HRS. Plan/Follow Up: Will continue to review, monitor and address alerts with follow up based on severity of symptoms and risk factors.

## 2023-11-17 NOTE — CARE COORDINATION
Remote Patient Monitoring Note      Date/Time:  2023 12:07 PM  Patient Current Location: [facility]  LPN 2nd attempt to contact patient by telephone regarding red alert received for pulse ox reading (90%). Left another HIPAA compliant message requesting a return call. LPN contacted patients emergency contact, Jennifer Obrien, per [facility] protocol. Verified patients name and  as identifiers. Background: Pt enrolled in RPM r/t pneumonia, COPD, and DM per HRS. Clinical Interventions: Jennifercolleen Obrien states she is currently at work but will attempt to reach pt and request pt to return LPN's call. Jennifer Obrien verbalizes understanding of when to seek emergent medical care for pt. Plan/Follow Up: Will continue to review, monitor and address alerts with follow up based on severity of symptoms and risk factors.

## 2023-11-17 NOTE — CARE COORDINATION
Ambulatory Care Coordination Note  2023    Patient Current Location:  [place]     ACM contacted the patient by telephone. Verified name and  with patient as identifiers. Provided introduction to self, and explanation of the ACM role. Challenges to be reviewed by the provider   Additional needs identified to be addressed with provider: Yes               Method of communication with provider: phone. ACM: Thalia Myers RN    Was able to connect with pt today. She has increased work of breathing, c/o SOB and some chest tightness. She did speak to Pulmonary and are able to see her on ( Monday). But, pt admits that she cannot wait that long. She is going to call for EMS as soon as we hang up. States that her son is at work. I advised pt that this was a persaud decision. I will inform her PCP. I will check back next wek    Offered patient enrollment in the Remote Patient Monitoring (RPM) program for in-home monitoring: Yes, patient already enrolled.     Lab Results       None            Care Coordination Interventions    Referral from Primary Care Provider: No  Suggested Interventions and Community Resources          Goals Addressed    None         Future Appointments   Date Time Provider [address]   2023  4:10 PM Jose Lopez MD [facility] AMB   2023 10:15 AM Saroj Bustos MD [facility] AMB   2023  1:10 PM Jose Lopez MD [facility] AMB   2024  9:15 AM MAT [facility] MAT [facility] AMB   2024 10:30 AM Xochitl Ivy, JOSE ALBERTO MAT [facility] AMB   ,   Congestive Heart Failure Assessment       Shortness of breath (worse than baseline)      Symptoms:          ,   COPD Assessment    Does the patient understand envrionmental exposure?: Yes  Is the patient able to verbalize Rescue vs. Long Acting medications?: Yes  Does the patient have a nebulizer?: Yes  Does the patient use a space with inhaled medications?: Yes     Shortness of breath (worse than baseline), Increase in cough

## 2023-11-17 NOTE — ED TRIAGE NOTES
Pt brought in by EMS from home c/o worsening SOB and productive cough (x2 wks). Pt reports chest tightness today that resolved w/ her at home breathing treatment. Pt denies chest pain, N/V/D at this time. Pt reports wearing 4L NC O2 at home. Pt reports hx of COPD, asthma, bronchitis, and pneumonia. Pt A&O 4/4. GCS 15. Speech clear. Pt resting quietly in bed, attached to bedside monitor.

## 2023-11-17 NOTE — ED PROVIDER NOTES
Emergency Department Provider Note       PCP: Osmar Kennedy PA-C   Age: 48 y.o. Sex: female     DISPOSITION       No diagnosis found. Medical Decision Making     Complexity of Problems Addressed:  1 or more chronic illnesses with a severe exacerbation or progression. Data Reviewed and Analyzed:   I independently ordered and reviewed each unique test.  I reviewed external records: ED visit note from an outside group. I reviewed external records: provider visit note from PCP. I reviewed external records: provider visit note from outside specialist.  I reviewed external records: previous EKG including cardiologist interpretation. I reviewed external records: previous lab results from outside ED. I reviewed external records: previous imaging study including radiologist interpretation. {Historian (state who, why needed, what they said):00413}    I independently ordered and interpreted the ED EKG in the absence of a Cardiologist.    Rate: ***  EKG Interpretation: {EKG Interpretation:30804:::1}  ST Segments: {ST Segments:35130}      I interpreted the X-rays no pneumothorax. Discussion of management or test interpretation. DDX:    Acute exacerbation asthma, COPD, CHF, COVID-19, influenza    Bronchitis, aspiration pneumonia, pneumonia,    PE, rib fracture, rib dysfunction, pleurisy, pneumothorax, aspiration of foreign body    {Admitted or Consultants TUJGKAML.:53049}    Risk of Complications and/or Morbidity of Patient Management:  {COEM:31436}            History      61-year-old female with a history of COPD on 4 L nasal cannula 24 hours a day presenting to the emergency department complaining of increasing shortness of breath over the last 2 weeks. Patient states that she has been on her oxygen and doing her breathing treatments but her symptoms are not improving. The patient says she used to be on a CPAP machine but no longer has it.   She is in for breathing treatments today without

## 2023-11-18 LAB
ALBUMIN SERPL-MCNC: 3.4 G/DL (ref 3.5–5)
ALBUMIN/GLOB SERPL: 0.7 (ref 0.4–1.6)
ALP SERPL-CCNC: 114 U/L (ref 50–136)
ALT SERPL-CCNC: 18 U/L (ref 12–65)
ANION GAP SERPL CALC-SCNC: 3 MMOL/L (ref 2–11)
ARTERIAL PATENCY WRIST A: POSITIVE
ARTERIAL PATENCY WRIST A: POSITIVE
AST SERPL-CCNC: 10 U/L (ref 15–37)
BASE EXCESS BLD CALC-SCNC: 10.8 MMOL/L
BASE EXCESS BLD CALC-SCNC: 11.3 MMOL/L
BASOPHILS # BLD: 0 K/UL (ref 0–0.2)
BASOPHILS NFR BLD: 0 % (ref 0–2)
BDY SITE: ABNORMAL
BDY SITE: ABNORMAL
BILIRUB SERPL-MCNC: 0.3 MG/DL (ref 0.2–1.1)
BUN SERPL-MCNC: 12 MG/DL (ref 6–23)
CALCIUM SERPL-MCNC: 9.5 MG/DL (ref 8.3–10.4)
CHLORIDE SERPL-SCNC: 96 MMOL/L (ref 101–110)
CO2 SERPL-SCNC: 36 MMOL/L (ref 21–32)
CREAT SERPL-MCNC: 0.5 MG/DL (ref 0.6–1)
DIFFERENTIAL METHOD BLD: ABNORMAL
EOSINOPHIL # BLD: 0 K/UL (ref 0–0.8)
EOSINOPHIL NFR BLD: 0 % (ref 0.5–7.8)
ERYTHROCYTE [DISTWIDTH] IN BLOOD BY AUTOMATED COUNT: 23.3 % (ref 11.9–14.6)
GAS FLOW.O2 O2 DELIVERY SYS: ABNORMAL
GAS FLOW.O2 O2 DELIVERY SYS: ABNORMAL
GLOBULIN SER CALC-MCNC: 4.8 G/DL (ref 2.8–4.5)
GLUCOSE BLD STRIP.AUTO-MCNC: 129 MG/DL (ref 65–100)
GLUCOSE BLD STRIP.AUTO-MCNC: 163 MG/DL (ref 65–100)
GLUCOSE BLD STRIP.AUTO-MCNC: 177 MG/DL (ref 65–100)
GLUCOSE BLD STRIP.AUTO-MCNC: 211 MG/DL (ref 65–100)
GLUCOSE SERPL-MCNC: 128 MG/DL (ref 65–100)
HCO3 BLD-SCNC: 40.1 MMOL/L (ref 22–26)
HCO3 BLD-SCNC: 40.7 MMOL/L (ref 22–26)
HCT VFR BLD AUTO: 36.7 % (ref 35.8–46.3)
HGB BLD-MCNC: 9.5 G/DL (ref 11.7–15.4)
IMM GRANULOCYTES # BLD AUTO: 0 K/UL (ref 0–0.5)
IMM GRANULOCYTES NFR BLD AUTO: 0 % (ref 0–5)
INSPIRATION.DURATION SETTING TIME VENT: 0.9 SEC
INSPIRATION.DURATION SETTING TIME VENT: 0.9 SEC
IPAP/PIP/HIGH PEEP: 18
IPAP/PIP/HIGH PEEP: 22
LYMPHOCYTES # BLD: 0.9 K/UL (ref 0.5–4.6)
LYMPHOCYTES NFR BLD: 10 % (ref 13–44)
MAGNESIUM SERPL-MCNC: 2.3 MG/DL (ref 1.8–2.4)
MCH RBC QN AUTO: 17.9 PG (ref 26.1–32.9)
MCHC RBC AUTO-ENTMCNC: 25.9 G/DL (ref 31.4–35)
MCV RBC AUTO: 69 FL (ref 82–102)
MONOCYTES # BLD: 0 K/UL (ref 0.1–1.3)
MONOCYTES NFR BLD: 0 % (ref 4–12)
NEUTS SEG # BLD: 7.9 K/UL (ref 1.7–8.2)
NEUTS SEG NFR BLD: 89 % (ref 43–78)
NRBC # BLD: 0.02 K/UL (ref 0–0.2)
O2/TOTAL GAS SETTING VFR VENT: 70 %
O2/TOTAL GAS SETTING VFR VENT: 70 %
PCO2 BLD: 75.5 MMHG (ref 35–45)
PCO2 BLD: 79.5 MMHG (ref 35–45)
PEEP RESPIRATORY: 10 CMH2O
PEEP RESPIRATORY: 10 CMH2O
PH BLD: 7.32 (ref 7.35–7.45)
PH BLD: 7.33 (ref 7.35–7.45)
PLATELET # BLD AUTO: 275 K/UL (ref 150–450)
PMV BLD AUTO: ABNORMAL FL (ref 9.4–12.3)
PO2 BLD: 91 MMHG (ref 75–100)
PO2 BLD: 96 MMHG (ref 75–100)
POTASSIUM SERPL-SCNC: 4.5 MMOL/L (ref 3.5–5.1)
PRESSURE SUPPORT SETTING VENT: 12 CMH2O
PRESSURE SUPPORT SETTING VENT: 8 CMH2O
PROT SERPL-MCNC: 8.2 G/DL (ref 6.3–8.2)
RBC # BLD AUTO: 5.32 M/UL (ref 4.05–5.2)
RESPIRATORY RATE, POC: 28 (ref 5–40)
RESPIRATORY RATE, POC: 29 (ref 5–40)
SAO2 % BLD: 95.9 % (ref 95–98)
SAO2 % BLD: 96.3 % (ref 95–98)
SERVICE CMNT-IMP: 9.2
SERVICE CMNT-IMP: ABNORMAL
SODIUM SERPL-SCNC: 135 MMOL/L (ref 133–143)
SPECIMEN TYPE: ABNORMAL
SPECIMEN TYPE: ABNORMAL
T4 FREE SERPL-MCNC: 1 NG/DL (ref 0.78–1.46)
TSH W FREE THYROID IF ABNORMAL: 0.19 UIU/ML (ref 0.36–3.74)
VENTILATION MODE VENT: ABNORMAL
WBC # BLD AUTO: 8.9 K/UL (ref 4.3–11.1)

## 2023-11-18 PROCEDURE — 2580000003 HC RX 258: Performed by: INTERNAL MEDICINE

## 2023-11-18 PROCEDURE — 6360000002 HC RX W HCPCS: Performed by: FAMILY MEDICINE

## 2023-11-18 PROCEDURE — 99291 CRITICAL CARE FIRST HOUR: CPT | Performed by: INTERNAL MEDICINE

## 2023-11-18 PROCEDURE — 36600 WITHDRAWAL OF ARTERIAL BLOOD: CPT

## 2023-11-18 PROCEDURE — 82962 GLUCOSE BLOOD TEST: CPT

## 2023-11-18 PROCEDURE — 2700000000 HC OXYGEN THERAPY PER DAY

## 2023-11-18 PROCEDURE — 6370000000 HC RX 637 (ALT 250 FOR IP): Performed by: FAMILY MEDICINE

## 2023-11-18 PROCEDURE — 6370000000 HC RX 637 (ALT 250 FOR IP): Performed by: INTERNAL MEDICINE

## 2023-11-18 PROCEDURE — 83735 ASSAY OF MAGNESIUM: CPT

## 2023-11-18 PROCEDURE — 94640 AIRWAY INHALATION TREATMENT: CPT

## 2023-11-18 PROCEDURE — 94664 DEMO&/EVAL PT USE INHALER: CPT

## 2023-11-18 PROCEDURE — 80053 COMPREHEN METABOLIC PANEL: CPT

## 2023-11-18 PROCEDURE — 84443 ASSAY THYROID STIM HORMONE: CPT

## 2023-11-18 PROCEDURE — 36415 COLL VENOUS BLD VENIPUNCTURE: CPT

## 2023-11-18 PROCEDURE — 6360000002 HC RX W HCPCS: Performed by: INTERNAL MEDICINE

## 2023-11-18 PROCEDURE — 84439 ASSAY OF FREE THYROXINE: CPT

## 2023-11-18 PROCEDURE — 94660 CPAP INITIATION&MGMT: CPT

## 2023-11-18 PROCEDURE — 85025 COMPLETE CBC W/AUTO DIFF WBC: CPT

## 2023-11-18 PROCEDURE — 2000000000 HC ICU R&B

## 2023-11-18 PROCEDURE — 2580000003 HC RX 258: Performed by: FAMILY MEDICINE

## 2023-11-18 PROCEDURE — 82803 BLOOD GASES ANY COMBINATION: CPT

## 2023-11-18 RX ORDER — NICOTINE 21 MG/24HR
1 PATCH, TRANSDERMAL 24 HOURS TRANSDERMAL DAILY
Status: DISCONTINUED | OUTPATIENT
Start: 2023-11-18 | End: 2023-11-23 | Stop reason: HOSPADM

## 2023-11-18 RX ADMIN — IPRATROPIUM BROMIDE AND ALBUTEROL SULFATE 1 DOSE: 2.5; .5 SOLUTION RESPIRATORY (INHALATION) at 07:32

## 2023-11-18 RX ADMIN — IPRATROPIUM BROMIDE AND ALBUTEROL SULFATE 1 DOSE: 2.5; .5 SOLUTION RESPIRATORY (INHALATION) at 19:54

## 2023-11-18 RX ADMIN — IPRATROPIUM BROMIDE AND ALBUTEROL SULFATE 1 DOSE: 2.5; .5 SOLUTION RESPIRATORY (INHALATION) at 11:32

## 2023-11-18 RX ADMIN — WATER 40 MG: 1 INJECTION INTRAMUSCULAR; INTRAVENOUS; SUBCUTANEOUS at 08:55

## 2023-11-18 RX ADMIN — Medication 500 MG: at 08:54

## 2023-11-18 RX ADMIN — GUAIFENESIN 1200 MG: 600 TABLET ORAL at 08:53

## 2023-11-18 RX ADMIN — APIXABAN 5 MG: 5 TABLET, FILM COATED ORAL at 08:54

## 2023-11-18 RX ADMIN — ACETAMINOPHEN 650 MG: 325 TABLET ORAL at 17:23

## 2023-11-18 RX ADMIN — CEFEPIME 2000 MG: 2 INJECTION, POWDER, FOR SOLUTION INTRAVENOUS at 01:06

## 2023-11-18 RX ADMIN — Medication 500 MG: at 17:09

## 2023-11-18 RX ADMIN — IPRATROPIUM BROMIDE AND ALBUTEROL SULFATE 1 DOSE: 2.5; .5 SOLUTION RESPIRATORY (INHALATION) at 15:37

## 2023-11-18 RX ADMIN — WATER 40 MG: 1 INJECTION INTRAMUSCULAR; INTRAVENOUS; SUBCUTANEOUS at 23:39

## 2023-11-18 RX ADMIN — GUAIFENESIN 1200 MG: 600 TABLET ORAL at 20:38

## 2023-11-18 RX ADMIN — PANTOPRAZOLE SODIUM 40 MG: 40 TABLET, DELAYED RELEASE ORAL at 06:33

## 2023-11-18 RX ADMIN — BUDESONIDE 500 MCG: 0.5 INHALANT RESPIRATORY (INHALATION) at 07:28

## 2023-11-18 RX ADMIN — FERROUS SULFATE TAB 325 MG (65 MG ELEMENTAL FE) 325 MG: 325 (65 FE) TAB at 17:09

## 2023-11-18 RX ADMIN — AZITHROMYCIN MONOHYDRATE 500 MG: 500 INJECTION, POWDER, LYOPHILIZED, FOR SOLUTION INTRAVENOUS at 08:30

## 2023-11-18 RX ADMIN — SODIUM CHLORIDE, PRESERVATIVE FREE 10 ML: 5 INJECTION INTRAVENOUS at 09:10

## 2023-11-18 RX ADMIN — ARFORMOTEROL TARTRATE 15 MCG: 15 SOLUTION RESPIRATORY (INHALATION) at 19:54

## 2023-11-18 RX ADMIN — ATORVASTATIN CALCIUM 10 MG: 10 TABLET, FILM COATED ORAL at 20:38

## 2023-11-18 RX ADMIN — FERROUS SULFATE TAB 325 MG (65 MG ELEMENTAL FE) 325 MG: 325 (65 FE) TAB at 08:54

## 2023-11-18 RX ADMIN — ARFORMOTEROL TARTRATE 15 MCG: 15 SOLUTION RESPIRATORY (INHALATION) at 07:29

## 2023-11-18 RX ADMIN — SODIUM CHLORIDE, PRESERVATIVE FREE 10 ML: 5 INJECTION INTRAVENOUS at 20:43

## 2023-11-18 RX ADMIN — BUDESONIDE 500 MCG: 0.5 INHALANT RESPIRATORY (INHALATION) at 19:54

## 2023-11-18 RX ADMIN — WATER 40 MG: 1 INJECTION INTRAMUSCULAR; INTRAVENOUS; SUBCUTANEOUS at 17:09

## 2023-11-18 RX ADMIN — INSULIN LISPRO 1 UNITS: 100 INJECTION, SOLUTION INTRAVENOUS; SUBCUTANEOUS at 12:20

## 2023-11-18 RX ADMIN — APIXABAN 5 MG: 5 TABLET, FILM COATED ORAL at 20:38

## 2023-11-18 RX ADMIN — WATER 40 MG: 1 INJECTION INTRAMUSCULAR; INTRAVENOUS; SUBCUTANEOUS at 01:01

## 2023-11-18 RX ADMIN — DILTIAZEM HYDROCHLORIDE 240 MG: 240 CAPSULE, COATED, EXTENDED RELEASE ORAL at 08:53

## 2023-11-18 ASSESSMENT — PAIN DESCRIPTION - DESCRIPTORS: DESCRIPTORS: ACHING

## 2023-11-18 ASSESSMENT — PAIN - FUNCTIONAL ASSESSMENT: PAIN_FUNCTIONAL_ASSESSMENT: ACTIVITIES ARE NOT PREVENTED

## 2023-11-18 ASSESSMENT — PAIN SCALES - GENERAL
PAINLEVEL_OUTOF10: 0
PAINLEVEL_OUTOF10: 0
PAINLEVEL_OUTOF10: 3
PAINLEVEL_OUTOF10: 0
PAINLEVEL_OUTOF10: 0

## 2023-11-18 ASSESSMENT — PAIN DESCRIPTION - LOCATION: LOCATION: HEAD

## 2023-11-18 NOTE — ED NOTES
Dr. Agustin Gordon notified regarding rpt ABG results. Respiratory therapist at bedside.       Yanna Lehman RN  11/17/23 2128

## 2023-11-18 NOTE — PLAN OF CARE
Problem: Discharge Planning  Goal: Discharge to home or other facility with appropriate resources  11/17/2023 2254 by Anthony Beebe, RN  Outcome: Progressing  11/17/2023 2254 by Anthony Beebe, RN  Outcome: Progressing  Flowsheets (Taken 11/17/2023 2216)  Discharge to home or other facility with appropriate resources:   Identify barriers to discharge with patient and caregiver   Arrange for needed discharge resources and transportation as appropriate     Problem: Safety - Adult  Goal: Free from fall injury  Outcome: Progressing

## 2023-11-18 NOTE — ED NOTES
TRANSFER - OUT REPORT:    Verbal report given to Catrachita Tan RN on Donta Obrien  being transferred to ICU for routine progression of patient care       Report consisted of patient's Situation, Background, Assessment and   Recommendations(SBAR). Information from the following report(s) Nurse Handoff Report was reviewed with the receiving nurse. Lincoln Fall Assessment:    Presents to emergency department  because of falls (Syncope, seizure, or loss of consciousness): No  Age > 70: No  Altered Mental Status, Intoxication with alcohol or substance confusion (Disorientation, impaired judgment, poor safety awaremess, or inability to follow instructions): No  Impaired Mobility: Ambulates or transfers with assistive devices or assistance; Unable to ambulate or transer.: No  Nursing Judgement: No          Lines:   Peripheral IV 11/17/23 Right Antecubital (Active)   Site Assessment Clean, dry & intact 11/17/23 1808   Line Status Flushed; Infusing 11/17/23 1808   Line Care Connections checked and tightened 11/17/23 1808   Phlebitis Assessment No symptoms 11/17/23 1808   Infiltration Assessment 0 11/17/23 1808   Alcohol Cap Used Yes 11/17/23 1808   Dressing Status Clean, dry & intact 11/17/23 1808   Dressing Type Transparent 11/17/23 1808        Opportunity for questions and clarification was provided.       Patient transported with:  Registered Nurse           Constanza Lozada RN  11/17/23 8884

## 2023-11-19 LAB
ANION GAP SERPL CALC-SCNC: 2 MMOL/L (ref 2–11)
ARTERIAL PATENCY WRIST A: POSITIVE
BASE EXCESS BLD CALC-SCNC: 11.5 MMOL/L
BDY SITE: ABNORMAL
BUN SERPL-MCNC: 19 MG/DL (ref 6–23)
CALCIUM SERPL-MCNC: 9.4 MG/DL (ref 8.3–10.4)
CHLORIDE SERPL-SCNC: 98 MMOL/L (ref 101–110)
CO2 SERPL-SCNC: 33 MMOL/L (ref 21–32)
CREAT SERPL-MCNC: 0.6 MG/DL (ref 0.6–1)
ERYTHROCYTE [DISTWIDTH] IN BLOOD BY AUTOMATED COUNT: 23.6 % (ref 11.9–14.6)
GAS FLOW.O2 O2 DELIVERY SYS: ABNORMAL
GLUCOSE BLD STRIP.AUTO-MCNC: 129 MG/DL (ref 65–100)
GLUCOSE BLD STRIP.AUTO-MCNC: 223 MG/DL (ref 65–100)
GLUCOSE BLD STRIP.AUTO-MCNC: 244 MG/DL (ref 65–100)
GLUCOSE BLD STRIP.AUTO-MCNC: 99 MG/DL (ref 65–100)
GLUCOSE SERPL-MCNC: 145 MG/DL (ref 65–100)
HCO3 BLD-SCNC: 38.2 MMOL/L (ref 22–26)
HCT VFR BLD AUTO: 35 % (ref 35.8–46.3)
HGB BLD-MCNC: 9 G/DL (ref 11.7–15.4)
INSPIRATION.DURATION SETTING TIME VENT: 0.9 SEC
IPAP/PIP/HIGH PEEP: 21
MCH RBC QN AUTO: 17.8 PG (ref 26.1–32.9)
MCHC RBC AUTO-ENTMCNC: 25.7 G/DL (ref 31.4–35)
MCV RBC AUTO: 69.2 FL (ref 82–102)
NRBC # BLD: 0.05 K/UL (ref 0–0.2)
O2/TOTAL GAS SETTING VFR VENT: 50 %
PCO2 BLD: 58.4 MMHG (ref 35–45)
PEEP RESPIRATORY: 10 CMH2O
PH BLD: 7.42 (ref 7.35–7.45)
PLATELET # BLD AUTO: 230 K/UL (ref 150–450)
PMV BLD AUTO: ABNORMAL FL (ref 9.4–12.3)
PO2 BLD: 98 MMHG (ref 75–100)
POTASSIUM SERPL-SCNC: 4.5 MMOL/L (ref 3.5–5.1)
PRESSURE SUPPORT SETTING VENT: 12 CMH2O
RBC # BLD AUTO: 5.06 M/UL (ref 4.05–5.2)
RESPIRATORY RATE, POC: 25 (ref 5–40)
SAO2 % BLD: 97.5 % (ref 95–98)
SERVICE CMNT-IMP: ABNORMAL
SERVICE CMNT-IMP: NORMAL
SODIUM SERPL-SCNC: 133 MMOL/L (ref 133–143)
SPECIMEN TYPE: ABNORMAL
VENTILATION MODE VENT: ABNORMAL
WBC # BLD AUTO: 13.7 K/UL (ref 4.3–11.1)

## 2023-11-19 PROCEDURE — 99233 SBSQ HOSP IP/OBS HIGH 50: CPT | Performed by: INTERNAL MEDICINE

## 2023-11-19 PROCEDURE — 6370000000 HC RX 637 (ALT 250 FOR IP): Performed by: FAMILY MEDICINE

## 2023-11-19 PROCEDURE — 82962 GLUCOSE BLOOD TEST: CPT

## 2023-11-19 PROCEDURE — 97530 THERAPEUTIC ACTIVITIES: CPT

## 2023-11-19 PROCEDURE — 36600 WITHDRAWAL OF ARTERIAL BLOOD: CPT

## 2023-11-19 PROCEDURE — 6360000002 HC RX W HCPCS: Performed by: FAMILY MEDICINE

## 2023-11-19 PROCEDURE — 6370000000 HC RX 637 (ALT 250 FOR IP): Performed by: INTERNAL MEDICINE

## 2023-11-19 PROCEDURE — 2580000003 HC RX 258: Performed by: FAMILY MEDICINE

## 2023-11-19 PROCEDURE — 97161 PT EVAL LOW COMPLEX 20 MIN: CPT

## 2023-11-19 PROCEDURE — 94640 AIRWAY INHALATION TREATMENT: CPT

## 2023-11-19 PROCEDURE — 36415 COLL VENOUS BLD VENIPUNCTURE: CPT

## 2023-11-19 PROCEDURE — 97112 NEUROMUSCULAR REEDUCATION: CPT

## 2023-11-19 PROCEDURE — 82803 BLOOD GASES ANY COMBINATION: CPT

## 2023-11-19 PROCEDURE — 2580000003 HC RX 258: Performed by: INTERNAL MEDICINE

## 2023-11-19 PROCEDURE — 6360000002 HC RX W HCPCS: Performed by: INTERNAL MEDICINE

## 2023-11-19 PROCEDURE — 80048 BASIC METABOLIC PNL TOTAL CA: CPT

## 2023-11-19 PROCEDURE — 85027 COMPLETE CBC AUTOMATED: CPT

## 2023-11-19 PROCEDURE — 2060000000 HC ICU INTERMEDIATE R&B

## 2023-11-19 PROCEDURE — 94660 CPAP INITIATION&MGMT: CPT

## 2023-11-19 PROCEDURE — 94761 N-INVAS EAR/PLS OXIMETRY MLT: CPT

## 2023-11-19 PROCEDURE — 97165 OT EVAL LOW COMPLEX 30 MIN: CPT

## 2023-11-19 PROCEDURE — 2700000000 HC OXYGEN THERAPY PER DAY

## 2023-11-19 RX ORDER — SENNA AND DOCUSATE SODIUM 50; 8.6 MG/1; MG/1
2 TABLET, FILM COATED ORAL DAILY
Status: DISCONTINUED | OUTPATIENT
Start: 2023-11-19 | End: 2023-11-23 | Stop reason: HOSPADM

## 2023-11-19 RX ADMIN — IPRATROPIUM BROMIDE AND ALBUTEROL SULFATE 1 DOSE: 2.5; .5 SOLUTION RESPIRATORY (INHALATION) at 07:30

## 2023-11-19 RX ADMIN — ACETAMINOPHEN 650 MG: 325 TABLET ORAL at 18:13

## 2023-11-19 RX ADMIN — AZITHROMYCIN MONOHYDRATE 500 MG: 500 INJECTION, POWDER, LYOPHILIZED, FOR SOLUTION INTRAVENOUS at 08:18

## 2023-11-19 RX ADMIN — GUAIFENESIN 1200 MG: 600 TABLET ORAL at 08:15

## 2023-11-19 RX ADMIN — PANTOPRAZOLE SODIUM 40 MG: 40 TABLET, DELAYED RELEASE ORAL at 06:07

## 2023-11-19 RX ADMIN — IPRATROPIUM BROMIDE AND ALBUTEROL SULFATE 1 DOSE: 2.5; .5 SOLUTION RESPIRATORY (INHALATION) at 12:40

## 2023-11-19 RX ADMIN — Medication 500 MG: at 08:15

## 2023-11-19 RX ADMIN — DOCUSATE SODIUM 50 MG AND SENNOSIDES 8.6 MG 2 TABLET: 8.6; 5 TABLET, FILM COATED ORAL at 09:06

## 2023-11-19 RX ADMIN — IPRATROPIUM BROMIDE AND ALBUTEROL SULFATE 1 DOSE: 2.5; .5 SOLUTION RESPIRATORY (INHALATION) at 16:04

## 2023-11-19 RX ADMIN — SODIUM CHLORIDE, PRESERVATIVE FREE 10 ML: 5 INJECTION INTRAVENOUS at 08:17

## 2023-11-19 RX ADMIN — GUAIFENESIN 1200 MG: 600 TABLET ORAL at 21:22

## 2023-11-19 RX ADMIN — APIXABAN 5 MG: 5 TABLET, FILM COATED ORAL at 21:22

## 2023-11-19 RX ADMIN — FERROUS SULFATE TAB 325 MG (65 MG ELEMENTAL FE) 325 MG: 325 (65 FE) TAB at 08:15

## 2023-11-19 RX ADMIN — Medication 500 MG: at 18:08

## 2023-11-19 RX ADMIN — IPRATROPIUM BROMIDE AND ALBUTEROL SULFATE 1 DOSE: 2.5; .5 SOLUTION RESPIRATORY (INHALATION) at 20:32

## 2023-11-19 RX ADMIN — APIXABAN 5 MG: 5 TABLET, FILM COATED ORAL at 09:02

## 2023-11-19 RX ADMIN — INSULIN LISPRO 1 UNITS: 100 INJECTION, SOLUTION INTRAVENOUS; SUBCUTANEOUS at 18:17

## 2023-11-19 RX ADMIN — BUDESONIDE 500 MCG: 0.5 INHALANT RESPIRATORY (INHALATION) at 20:32

## 2023-11-19 RX ADMIN — FERROUS SULFATE TAB 325 MG (65 MG ELEMENTAL FE) 325 MG: 325 (65 FE) TAB at 18:08

## 2023-11-19 RX ADMIN — ARFORMOTEROL TARTRATE 15 MCG: 15 SOLUTION RESPIRATORY (INHALATION) at 20:32

## 2023-11-19 RX ADMIN — SODIUM CHLORIDE, PRESERVATIVE FREE 10 ML: 5 INJECTION INTRAVENOUS at 21:22

## 2023-11-19 RX ADMIN — WATER 40 MG: 1 INJECTION INTRAMUSCULAR; INTRAVENOUS; SUBCUTANEOUS at 08:16

## 2023-11-19 RX ADMIN — DILTIAZEM HYDROCHLORIDE 240 MG: 240 CAPSULE, COATED, EXTENDED RELEASE ORAL at 08:15

## 2023-11-19 RX ADMIN — ARFORMOTEROL TARTRATE 15 MCG: 15 SOLUTION RESPIRATORY (INHALATION) at 07:30

## 2023-11-19 RX ADMIN — ATORVASTATIN CALCIUM 10 MG: 10 TABLET, FILM COATED ORAL at 21:22

## 2023-11-19 RX ADMIN — WATER 40 MG: 1 INJECTION INTRAMUSCULAR; INTRAVENOUS; SUBCUTANEOUS at 18:07

## 2023-11-19 RX ADMIN — BUDESONIDE 500 MCG: 0.5 INHALANT RESPIRATORY (INHALATION) at 07:30

## 2023-11-19 ASSESSMENT — PAIN SCALES - GENERAL
PAINLEVEL_OUTOF10: 0

## 2023-11-19 NOTE — PLAN OF CARE
Problem: Discharge Planning  Goal: Discharge to home or other facility with appropriate resources  Outcome: Progressing  Flowsheets  Taken 11/18/2023 1916 by Roxana Templeton RN  Discharge to home or other facility with appropriate resources:   Identify barriers to discharge with patient and caregiver   Arrange for needed discharge resources and transportation as appropriate   Identify discharge learning needs (meds, wound care, etc)  Taken 11/18/2023 0700 by Julia Wiggins RN  Discharge to home or other facility with appropriate resources:   Identify barriers to discharge with patient and caregiver   Arrange for needed discharge resources and transportation as appropriate   Identify discharge learning needs (meds, wound care, etc)     Problem: Safety - Adult  Goal: Free from fall injury  Outcome: Progressing

## 2023-11-20 PROBLEM — J44.1 COPD EXACERBATION (HCC): Status: ACTIVE | Noted: 2023-11-20

## 2023-11-20 LAB
ANION GAP SERPL CALC-SCNC: 2 MMOL/L (ref 2–11)
BUN SERPL-MCNC: 18 MG/DL (ref 6–23)
CALCIUM SERPL-MCNC: 8.9 MG/DL (ref 8.3–10.4)
CHLORIDE SERPL-SCNC: 100 MMOL/L (ref 101–110)
CO2 SERPL-SCNC: 36 MMOL/L (ref 21–32)
CREAT SERPL-MCNC: 0.5 MG/DL (ref 0.6–1)
ERYTHROCYTE [DISTWIDTH] IN BLOOD BY AUTOMATED COUNT: 23.9 % (ref 11.9–14.6)
GLUCOSE BLD STRIP.AUTO-MCNC: 105 MG/DL (ref 65–100)
GLUCOSE BLD STRIP.AUTO-MCNC: 126 MG/DL (ref 65–100)
GLUCOSE BLD STRIP.AUTO-MCNC: 189 MG/DL (ref 65–100)
GLUCOSE BLD STRIP.AUTO-MCNC: 253 MG/DL (ref 65–100)
GLUCOSE SERPL-MCNC: 172 MG/DL (ref 65–100)
HCT VFR BLD AUTO: 33 % (ref 35.8–46.3)
HGB BLD-MCNC: 8.8 G/DL (ref 11.7–15.4)
MCH RBC QN AUTO: 18.1 PG (ref 26.1–32.9)
MCHC RBC AUTO-ENTMCNC: 26.7 G/DL (ref 31.4–35)
MCV RBC AUTO: 68 FL (ref 82–102)
NRBC # BLD: 0.04 K/UL (ref 0–0.2)
PLATELET # BLD AUTO: 280 K/UL (ref 150–450)
PMV BLD AUTO: ABNORMAL FL (ref 9.4–12.3)
POTASSIUM SERPL-SCNC: 4.2 MMOL/L (ref 3.5–5.1)
RBC # BLD AUTO: 4.85 M/UL (ref 4.05–5.2)
SERVICE CMNT-IMP: ABNORMAL
SODIUM SERPL-SCNC: 138 MMOL/L (ref 133–143)
WBC # BLD AUTO: 15.3 K/UL (ref 4.3–11.1)

## 2023-11-20 PROCEDURE — 36415 COLL VENOUS BLD VENIPUNCTURE: CPT

## 2023-11-20 PROCEDURE — 6370000000 HC RX 637 (ALT 250 FOR IP): Performed by: INTERNAL MEDICINE

## 2023-11-20 PROCEDURE — 6370000000 HC RX 637 (ALT 250 FOR IP): Performed by: FAMILY MEDICINE

## 2023-11-20 PROCEDURE — 94640 AIRWAY INHALATION TREATMENT: CPT

## 2023-11-20 PROCEDURE — 80048 BASIC METABOLIC PNL TOTAL CA: CPT

## 2023-11-20 PROCEDURE — 94660 CPAP INITIATION&MGMT: CPT

## 2023-11-20 PROCEDURE — 6360000002 HC RX W HCPCS: Performed by: INTERNAL MEDICINE

## 2023-11-20 PROCEDURE — 85027 COMPLETE CBC AUTOMATED: CPT

## 2023-11-20 PROCEDURE — 82962 GLUCOSE BLOOD TEST: CPT

## 2023-11-20 PROCEDURE — 6360000002 HC RX W HCPCS: Performed by: FAMILY MEDICINE

## 2023-11-20 PROCEDURE — 2060000000 HC ICU INTERMEDIATE R&B

## 2023-11-20 PROCEDURE — 2700000000 HC OXYGEN THERAPY PER DAY

## 2023-11-20 PROCEDURE — 2580000003 HC RX 258: Performed by: INTERNAL MEDICINE

## 2023-11-20 PROCEDURE — 2580000003 HC RX 258: Performed by: FAMILY MEDICINE

## 2023-11-20 PROCEDURE — 97530 THERAPEUTIC ACTIVITIES: CPT

## 2023-11-20 PROCEDURE — 94761 N-INVAS EAR/PLS OXIMETRY MLT: CPT

## 2023-11-20 PROCEDURE — 99232 SBSQ HOSP IP/OBS MODERATE 35: CPT | Performed by: INTERNAL MEDICINE

## 2023-11-20 RX ORDER — AZITHROMYCIN 250 MG/1
500 TABLET, FILM COATED ORAL DAILY
Status: COMPLETED | OUTPATIENT
Start: 2023-11-21 | End: 2023-11-22

## 2023-11-20 RX ADMIN — APIXABAN 5 MG: 5 TABLET, FILM COATED ORAL at 21:25

## 2023-11-20 RX ADMIN — FERROUS SULFATE TAB 325 MG (65 MG ELEMENTAL FE) 325 MG: 325 (65 FE) TAB at 17:00

## 2023-11-20 RX ADMIN — AZITHROMYCIN MONOHYDRATE 500 MG: 500 INJECTION, POWDER, LYOPHILIZED, FOR SOLUTION INTRAVENOUS at 10:59

## 2023-11-20 RX ADMIN — DILTIAZEM HYDROCHLORIDE 240 MG: 240 CAPSULE, COATED, EXTENDED RELEASE ORAL at 08:29

## 2023-11-20 RX ADMIN — WATER 40 MG: 1 INJECTION INTRAMUSCULAR; INTRAVENOUS; SUBCUTANEOUS at 08:36

## 2023-11-20 RX ADMIN — INSULIN LISPRO 2 UNITS: 100 INJECTION, SOLUTION INTRAVENOUS; SUBCUTANEOUS at 12:17

## 2023-11-20 RX ADMIN — ARFORMOTEROL TARTRATE 15 MCG: 15 SOLUTION RESPIRATORY (INHALATION) at 21:01

## 2023-11-20 RX ADMIN — IPRATROPIUM BROMIDE AND ALBUTEROL SULFATE 1 DOSE: 2.5; .5 SOLUTION RESPIRATORY (INHALATION) at 15:40

## 2023-11-20 RX ADMIN — ACETAMINOPHEN 650 MG: 325 TABLET ORAL at 21:25

## 2023-11-20 RX ADMIN — SODIUM CHLORIDE, PRESERVATIVE FREE 10 ML: 5 INJECTION INTRAVENOUS at 21:24

## 2023-11-20 RX ADMIN — IPRATROPIUM BROMIDE AND ALBUTEROL SULFATE 1 DOSE: 2.5; .5 SOLUTION RESPIRATORY (INHALATION) at 21:01

## 2023-11-20 RX ADMIN — IPRATROPIUM BROMIDE AND ALBUTEROL SULFATE 1 DOSE: 2.5; .5 SOLUTION RESPIRATORY (INHALATION) at 11:17

## 2023-11-20 RX ADMIN — BUDESONIDE 500 MCG: 0.5 INHALANT RESPIRATORY (INHALATION) at 07:41

## 2023-11-20 RX ADMIN — IPRATROPIUM BROMIDE AND ALBUTEROL SULFATE 1 DOSE: 2.5; .5 SOLUTION RESPIRATORY (INHALATION) at 07:41

## 2023-11-20 RX ADMIN — PANTOPRAZOLE SODIUM 40 MG: 40 TABLET, DELAYED RELEASE ORAL at 06:21

## 2023-11-20 RX ADMIN — Medication 500 MG: at 08:30

## 2023-11-20 RX ADMIN — WATER 40 MG: 1 INJECTION INTRAMUSCULAR; INTRAVENOUS; SUBCUTANEOUS at 00:00

## 2023-11-20 RX ADMIN — WATER 40 MG: 1 INJECTION INTRAMUSCULAR; INTRAVENOUS; SUBCUTANEOUS at 16:29

## 2023-11-20 RX ADMIN — FERROUS SULFATE TAB 325 MG (65 MG ELEMENTAL FE) 325 MG: 325 (65 FE) TAB at 08:29

## 2023-11-20 RX ADMIN — APIXABAN 5 MG: 5 TABLET, FILM COATED ORAL at 08:30

## 2023-11-20 RX ADMIN — Medication 500 MG: at 17:00

## 2023-11-20 RX ADMIN — WATER 40 MG: 1 INJECTION INTRAMUSCULAR; INTRAVENOUS; SUBCUTANEOUS at 23:25

## 2023-11-20 RX ADMIN — ATORVASTATIN CALCIUM 10 MG: 10 TABLET, FILM COATED ORAL at 21:25

## 2023-11-20 RX ADMIN — GUAIFENESIN 1200 MG: 600 TABLET ORAL at 08:29

## 2023-11-20 RX ADMIN — DOCUSATE SODIUM 50 MG AND SENNOSIDES 8.6 MG 2 TABLET: 8.6; 5 TABLET, FILM COATED ORAL at 08:30

## 2023-11-20 RX ADMIN — ARFORMOTEROL TARTRATE 15 MCG: 15 SOLUTION RESPIRATORY (INHALATION) at 07:41

## 2023-11-20 RX ADMIN — BUDESONIDE 500 MCG: 0.5 INHALANT RESPIRATORY (INHALATION) at 21:01

## 2023-11-20 RX ADMIN — GUAIFENESIN 1200 MG: 600 TABLET ORAL at 21:25

## 2023-11-20 RX ADMIN — SODIUM CHLORIDE, PRESERVATIVE FREE 10 ML: 5 INJECTION INTRAVENOUS at 08:32

## 2023-11-20 ASSESSMENT — PAIN SCALES - GENERAL
PAINLEVEL_OUTOF10: 6
PAINLEVEL_OUTOF10: 0

## 2023-11-20 ASSESSMENT — PAIN DESCRIPTION - DESCRIPTORS: DESCRIPTORS: ACHING

## 2023-11-20 ASSESSMENT — PAIN DESCRIPTION - LOCATION: LOCATION: HEAD

## 2023-11-20 NOTE — CARE COORDINATION
11/20/23 1103   Service Assessment   Patient Orientation Alert and Oriented   Cognition Alert   History Provided By Patient   Primary [facility]   Patient's Healthcare Decision Maker is: Named in [place]   PCP Verified by CM Yes   Last Visit to PCP Within last 3 months   Prior Functional Level Independent in ADLs/IADLs   Current Functional Level Independent in ADLs/IADLs   Can patient return to prior living arrangement Unknown at present   Ability to make needs known: Good   Family able to assist with home care needs: Yes   Would you like for me to discuss the discharge plan with any other family members/significant others, and if so, who? Yes   Financial Resources Medicare   Community Resources Transportation   Social/Functional History   Lives With Son   Type of Home Apartment   Home Layout One level   Home Access Stairs to enter with rails   Entrance Stairs - Number of Steps [address] Help From [address] Responsibilities No   Ambulation Assistance Independent   Transfer Assistance Independent   Active  No   Mode of Transportation Other   Discharge Planning   Type of [address]   Current Services Prior To Admission Oxygen Therapy   Potential Assistance Needed N/A   DME Ordered? No   Potential Assistance Purchasing Medications No   Type of Home Care Services None   Patient expects to be discharged to: [address] [place] [address]     Patient's son lives with her. Patient was using [employer] through Medicaid to get to medical appointments but had difficulty with the service so her son is driving her now. Patient uses home 02 through [facility]. No history of HH or rehab. CM following.

## 2023-11-21 LAB
ANION GAP SERPL CALC-SCNC: 2 MMOL/L (ref 2–11)
BACTERIA SPEC CULT: NORMAL
BUN SERPL-MCNC: 15 MG/DL (ref 6–23)
CALCIUM SERPL-MCNC: 9.7 MG/DL (ref 8.3–10.4)
CHLORIDE SERPL-SCNC: 99 MMOL/L (ref 101–110)
CO2 SERPL-SCNC: 36 MMOL/L (ref 21–32)
CREAT SERPL-MCNC: 0.6 MG/DL (ref 0.6–1)
ERYTHROCYTE [DISTWIDTH] IN BLOOD BY AUTOMATED COUNT: 23.9 % (ref 11.9–14.6)
GLUCOSE BLD STRIP.AUTO-MCNC: 124 MG/DL (ref 65–100)
GLUCOSE BLD STRIP.AUTO-MCNC: 127 MG/DL (ref 65–100)
GLUCOSE BLD STRIP.AUTO-MCNC: 164 MG/DL (ref 65–100)
GLUCOSE BLD STRIP.AUTO-MCNC: 176 MG/DL (ref 65–100)
GLUCOSE SERPL-MCNC: 135 MG/DL (ref 65–100)
GRAM STN SPEC: NORMAL
HCT VFR BLD AUTO: 33.4 % (ref 35.8–46.3)
HGB BLD-MCNC: 8.9 G/DL (ref 11.7–15.4)
MCH RBC QN AUTO: 18 PG (ref 26.1–32.9)
MCHC RBC AUTO-ENTMCNC: 26.6 G/DL (ref 31.4–35)
MCV RBC AUTO: 67.6 FL (ref 82–102)
NRBC # BLD: 0.03 K/UL (ref 0–0.2)
PLATELET # BLD AUTO: 289 K/UL (ref 150–450)
PMV BLD AUTO: ABNORMAL FL (ref 9.4–12.3)
POTASSIUM SERPL-SCNC: 4.7 MMOL/L (ref 3.5–5.1)
RBC # BLD AUTO: 4.94 M/UL (ref 4.05–5.2)
SERVICE CMNT-IMP: ABNORMAL
SERVICE CMNT-IMP: NORMAL
SODIUM SERPL-SCNC: 137 MMOL/L (ref 133–143)
WBC # BLD AUTO: 15.3 K/UL (ref 4.3–11.1)

## 2023-11-21 PROCEDURE — 6370000000 HC RX 637 (ALT 250 FOR IP): Performed by: FAMILY MEDICINE

## 2023-11-21 PROCEDURE — 6360000002 HC RX W HCPCS: Performed by: FAMILY MEDICINE

## 2023-11-21 PROCEDURE — 80048 BASIC METABOLIC PNL TOTAL CA: CPT

## 2023-11-21 PROCEDURE — 2700000000 HC OXYGEN THERAPY PER DAY

## 2023-11-21 PROCEDURE — 94660 CPAP INITIATION&MGMT: CPT

## 2023-11-21 PROCEDURE — 82962 GLUCOSE BLOOD TEST: CPT

## 2023-11-21 PROCEDURE — 99232 SBSQ HOSP IP/OBS MODERATE 35: CPT | Performed by: INTERNAL MEDICINE

## 2023-11-21 PROCEDURE — 6370000000 HC RX 637 (ALT 250 FOR IP): Performed by: INTERNAL MEDICINE

## 2023-11-21 PROCEDURE — 85027 COMPLETE CBC AUTOMATED: CPT

## 2023-11-21 PROCEDURE — 94761 N-INVAS EAR/PLS OXIMETRY MLT: CPT

## 2023-11-21 PROCEDURE — 36415 COLL VENOUS BLD VENIPUNCTURE: CPT

## 2023-11-21 PROCEDURE — 2580000003 HC RX 258: Performed by: FAMILY MEDICINE

## 2023-11-21 PROCEDURE — 94640 AIRWAY INHALATION TREATMENT: CPT

## 2023-11-21 PROCEDURE — 2060000000 HC ICU INTERMEDIATE R&B

## 2023-11-21 PROCEDURE — 97530 THERAPEUTIC ACTIVITIES: CPT

## 2023-11-21 RX ADMIN — IPRATROPIUM BROMIDE AND ALBUTEROL SULFATE 1 DOSE: 2.5; .5 SOLUTION RESPIRATORY (INHALATION) at 15:31

## 2023-11-21 RX ADMIN — APIXABAN 5 MG: 5 TABLET, FILM COATED ORAL at 20:58

## 2023-11-21 RX ADMIN — FERROUS SULFATE TAB 325 MG (65 MG ELEMENTAL FE) 325 MG: 325 (65 FE) TAB at 08:45

## 2023-11-21 RX ADMIN — DOCUSATE SODIUM 50 MG AND SENNOSIDES 8.6 MG 2 TABLET: 8.6; 5 TABLET, FILM COATED ORAL at 08:43

## 2023-11-21 RX ADMIN — IPRATROPIUM BROMIDE AND ALBUTEROL SULFATE 1 DOSE: 2.5; .5 SOLUTION RESPIRATORY (INHALATION) at 19:44

## 2023-11-21 RX ADMIN — BUDESONIDE 500 MCG: 0.5 INHALANT RESPIRATORY (INHALATION) at 19:43

## 2023-11-21 RX ADMIN — Medication 500 MG: at 08:44

## 2023-11-21 RX ADMIN — Medication 500 MG: at 16:01

## 2023-11-21 RX ADMIN — IPRATROPIUM BROMIDE AND ALBUTEROL SULFATE 1 DOSE: 2.5; .5 SOLUTION RESPIRATORY (INHALATION) at 11:27

## 2023-11-21 RX ADMIN — ARFORMOTEROL TARTRATE 15 MCG: 15 SOLUTION RESPIRATORY (INHALATION) at 08:27

## 2023-11-21 RX ADMIN — APIXABAN 5 MG: 5 TABLET, FILM COATED ORAL at 08:45

## 2023-11-21 RX ADMIN — SODIUM CHLORIDE, PRESERVATIVE FREE 10 ML: 5 INJECTION INTRAVENOUS at 20:58

## 2023-11-21 RX ADMIN — ARFORMOTEROL TARTRATE 15 MCG: 15 SOLUTION RESPIRATORY (INHALATION) at 19:42

## 2023-11-21 RX ADMIN — WATER 40 MG: 1 INJECTION INTRAMUSCULAR; INTRAVENOUS; SUBCUTANEOUS at 08:46

## 2023-11-21 RX ADMIN — GUAIFENESIN 1200 MG: 600 TABLET ORAL at 08:45

## 2023-11-21 RX ADMIN — FERROUS SULFATE TAB 325 MG (65 MG ELEMENTAL FE) 325 MG: 325 (65 FE) TAB at 16:01

## 2023-11-21 RX ADMIN — WATER 40 MG: 1 INJECTION INTRAMUSCULAR; INTRAVENOUS; SUBCUTANEOUS at 16:01

## 2023-11-21 RX ADMIN — IPRATROPIUM BROMIDE AND ALBUTEROL SULFATE 1 DOSE: 2.5; .5 SOLUTION RESPIRATORY (INHALATION) at 08:27

## 2023-11-21 RX ADMIN — GUAIFENESIN 1200 MG: 600 TABLET ORAL at 20:57

## 2023-11-21 RX ADMIN — AZITHROMYCIN DIHYDRATE 500 MG: 250 TABLET, FILM COATED ORAL at 08:44

## 2023-11-21 RX ADMIN — DILTIAZEM HYDROCHLORIDE 240 MG: 240 CAPSULE, COATED, EXTENDED RELEASE ORAL at 08:44

## 2023-11-21 RX ADMIN — ATORVASTATIN CALCIUM 10 MG: 10 TABLET, FILM COATED ORAL at 20:58

## 2023-11-21 RX ADMIN — PANTOPRAZOLE SODIUM 40 MG: 40 TABLET, DELAYED RELEASE ORAL at 06:51

## 2023-11-21 RX ADMIN — BUDESONIDE 500 MCG: 0.5 INHALANT RESPIRATORY (INHALATION) at 08:27

## 2023-11-21 RX ADMIN — SODIUM CHLORIDE, PRESERVATIVE FREE 10 ML: 5 INJECTION INTRAVENOUS at 08:47

## 2023-11-21 RX ADMIN — ACETAMINOPHEN 650 MG: 325 TABLET ORAL at 19:32

## 2023-11-21 ASSESSMENT — PAIN SCALES - GENERAL
PAINLEVEL_OUTOF10: 0
PAINLEVEL_OUTOF10: 0
PAINLEVEL_OUTOF10: 5
PAINLEVEL_OUTOF10: 0

## 2023-11-21 ASSESSMENT — PAIN DESCRIPTION - DESCRIPTORS: DESCRIPTORS: ACHING

## 2023-11-21 ASSESSMENT — PAIN DESCRIPTION - ORIENTATION: ORIENTATION: MID

## 2023-11-21 ASSESSMENT — PAIN DESCRIPTION - LOCATION: LOCATION: HEAD

## 2023-11-21 NOTE — ADT AUTH CERT
UPDATED PROGRESS NOTES 11/21/23    Chance Kaba MD  Physician  Pulmonology  Progress Notes     Addendum  Date of Service:  11/21/2023  8:31 AM                                                                                                                                                                                      Gunnar Hamman Sherred  Admission Date: 11/17/2023                                                                  Daily Progress Note: 11/21/2023     The patient's chart is reviewed and the patient is discussed with the staff. Background: 48 y.o. female with severe COPD, OHS, chronic hypoxic respiratory failure on 4L, morbid obesity with BMI of 53, tobacco use, diabetes. At home she is supposed to be on [place], Duone[place]. Continues to smoke. She reportedly had negative COVID and flu test 11/13 with PCP. She presented to ED 11/17 with 2 weeks of shortness of breath, coughing and white sputum. She is not consistent with BIPAP use at home. Despite BIPAP and adjustments her CO2 continued to climb slowly and was consequently admitted to ICU. BNP is normal.      Subjective:      States her breathing is getting better. Not coughing as much but still coughing up some sputum. Respiratory cultures only grew NRF. Still on 50%/50L airvo with sats >95%. Wore BIPAP all night last night. RT attempting to wean today. Now on 40L 45% and sats are >90%. Suspect that desaturations witnessed are due to poor connection of the probe when she is moving her hands. Wheezing is less today. [employer] working on getting qualified for Rajesh Stewart.              Current Facility-Administered Medications   Medication Dose Route Frequency    azithromycin (ZITHROMAX) tablet 500 mg  500 mg Oral Daily    sennosides-docusate sodium (SENOKOT-S) 8.6-50 MG tablet 2 tablet  2 tablet Oral Daily    nicotine (NICODERM CQ) 14 MG/24HR 1 patch  1 patch TransDERmal Daily    methylPREDNISolone sodium succ (SOLU-MEDROL) 40 mg in sterile water 1
source Oral, resp. rate 26, height 1.651 m (5' 5\"), weight 134.7 kg (297 lb), SpO2 94 %. Intake/Output Summary (Last 24 hours) at 11/19/2023 0747  Last data filed at 11/19/2023 0130      Gross per 24 hour   Intake 1210 ml   Output 1900 ml   Net -690 ml      Constitutional: alert, awake, fully conversant, obese  EENMT:  Sclera clear, pupils equal, oral mucosa moist  Respiratory: diminished  Cardiovascular: RRR  Gastrointestinal:  soft with no tenderness; positive bowel sounds present  Musculoskeletal:  warm with no cyanosis, no lower extremity edema  Skin:  no jaundice or ecchymosis  Neurologic: alert, fluent speech, moves all extremities  Psychiatric: calm     CXR: 11/17: clear       CT Chest: 7/2023: mild emphysema, no other findings              Recent Labs     11/17/23  1458 11/17/23  1641 11/18/23  0343 11/19/23  0429   WBC 8.6  --  8.9 13.7*   HGB 9.6*  --  9.5* 9.0*   HCT 37.6  --  36.7 35.0*     --  275 230   PROCAL  --  <0.05  --   --                Recent Labs     11/17/23  1458 11/18/23  0343 11/19/23  0429    135 133   K 4.2 4.5 4.5   CL 98* 96* 98*   CO2 38* 36* 33*   GLUCOSE 85 128* 145*   BUN 7 12 19   CREATININE 0.50* 0.50* 0.60   MG 2.3 2.3  --    BILITOT 0.3 0.3  --    AST 13* 10*  --    ALT 18 18  --    ALKPHOS 120 114  --              Recent Labs     11/17/23  1458   TROPHS 6.9   NTPROBNP 30   CRP 1.8*               Recent Labs     11/17/23  1458 11/18/23  0343 11/19/23  0429   GLUCOSE 85 128* 145*         ECHO:   TRANSTHORACIC ECHOCARDIOGRAM (TTE) COMPLETE (CONTRAST/BUBBLE/3D PRN) 07/28/2023     Interpretation Summary    Left Ventricle: Normal left ventricular systolic function. EF by 2D Simpsons Biplane is 62%. Left ventricle size is normal. Normal wall thickness. Normal wall motion. Normal diastolic function.      Antibiotics:  Inpat Anti-Infectives (From admission, onward)          Start     Ordered Stop     11/17/23 1700   ceFEPIme (MAXIPIME) 2,000 mg in sodium chloride 0.9 %
exacerbation with acute on chronic hypoxemic and  hypercapneic respirtaory failure. She would benefit for home NIPPV and arrangements are in progress. Continue current supportive care. Zara Marino MD              ED to Hosp-Admission (Current) on 11/17/2023    ED to Hosp-Admission (Current) on 11/17/2023      Revision History      Detailed Report    Note shared with patient  Progress Notes Info    Author Note Status Last Update User Last Update Date/Time   Alfred Preciado MD Addendum Zara Marino MD 11/20/2023  1:03 PM     Chart Review Routing History    No routing history on file.   Care Timeline    11/17   Admitted to [facility] 3 INTENSIVE CARE from ED 2148 11/19   Transferred out of [facility] [address]
0-4 Units     SubCUTAneous     Nightly                 Signed:    Shivani Schmidt DO    11/20/2023         Part of this note may have been written by using a voice dictation software. The note has been proof read but may still contain some grammatical/other typographical errors. ED to Hosp-Admission (Current) on 11/17/2023                            Detailed Report                        Note shared with patient                        Progress Notes Info        Author    Note Status    Last Update User    Last Update Date/Time      Shivani Schmidt DO Signed Shivani Schmidt DO 11/20/2023  3:34 PM           Chart Review Routing History      No routing history on file.
2000 mg in 50 mL IVPB premix      2,000 mg     IntraVENous     PRN            ondansetron (ZOFRAN-ODT) disintegrating tablet 4 mg      4 mg     Oral     Q8H PRN             Or            ondansetron (ZOFRAN) injection 4 mg      4 mg     IntraVENous     Q6H PRN            polyethylene glycol (GLYCOLAX) packet 17 g      17 g     Oral     Daily PRN            acetaminophen (TYLENOL) tablet 650 mg      650 mg     Oral     Q6H PRN             Or            acetaminophen (TYLENOL) suppository 650 mg      650 mg     Rectal     Q6H PRN            albuterol sulfate HFA (PROVENTIL;VENTOLIN;PROAIR) 108 (90 Base) MCG/ACT inhaler 2 puff      2 puff     Inhalation     Q4H PRN            apixaban (ELIQUIS) tablet 5 mg      5 mg     Oral     BID            atorvastatin (LIPITOR) tablet 10 mg      10 mg     Oral     Nightly            dilTIAZem (CARDIZEM CD) extended release capsule 240 mg      240 mg     Oral     Daily            guaiFENesin (MUCINEX) extended release tablet 1,200 mg      1,200 mg     Oral     BID            ferrous sulfate (IRON 325) tablet 325 mg      325 mg     Oral     BID WC            vitamin C tablet 500 mg      500 mg     Oral     BID with meals            glucose chewable tablet 16 g      4 tablet     Oral     PRN            dextrose bolus 10% 125 mL      125 mL     IntraVENous     PRN             Or            dextrose bolus 10% 250 mL      250 mL     IntraVENous     PRN            Glucagon Emergency KIT 1 mg      1 mg     SubCUTAneous     PRN            dextrose 10 % infusion           IntraVENous     Continuous PRN            insulin lispro (HUMALOG) injection vial 0-4 Units      0-4 Units     SubCUTAneous     TID WC            insulin lispro (HUMALOG) injection vial 0-4 Units      0-4 Units     SubCUTAneous     Nightly                 Signed:    Rachelle Otto DO    11/19/2023         Part of this note may have been written by using a voice dictation software.   The note has been proof read but may

## 2023-11-22 ENCOUNTER — TELEPHONE (OUTPATIENT)
Dept: PULMONOLOGY | Age: 53
End: 2023-11-22

## 2023-11-22 LAB
ANION GAP SERPL CALC-SCNC: 5 MMOL/L (ref 2–11)
BUN SERPL-MCNC: 16 MG/DL (ref 6–23)
CALCIUM SERPL-MCNC: 9.3 MG/DL (ref 8.3–10.4)
CHLORIDE SERPL-SCNC: 100 MMOL/L (ref 101–110)
CO2 SERPL-SCNC: 31 MMOL/L (ref 21–32)
CREAT SERPL-MCNC: 0.5 MG/DL (ref 0.6–1)
ERYTHROCYTE [DISTWIDTH] IN BLOOD BY AUTOMATED COUNT: 25.2 % (ref 11.9–14.6)
GLUCOSE BLD STRIP.AUTO-MCNC: 104 MG/DL (ref 65–100)
GLUCOSE BLD STRIP.AUTO-MCNC: 147 MG/DL (ref 65–100)
GLUCOSE BLD STRIP.AUTO-MCNC: 190 MG/DL (ref 65–100)
GLUCOSE BLD STRIP.AUTO-MCNC: 248 MG/DL (ref 65–100)
GLUCOSE SERPL-MCNC: 110 MG/DL (ref 65–100)
HCT VFR BLD AUTO: 35.5 % (ref 35.8–46.3)
HGB BLD-MCNC: 9.2 G/DL (ref 11.7–15.4)
MCH RBC QN AUTO: 18.4 PG (ref 26.1–32.9)
MCHC RBC AUTO-ENTMCNC: 25.9 G/DL (ref 31.4–35)
MCV RBC AUTO: 71.1 FL (ref 82–102)
NRBC # BLD: 0.08 K/UL (ref 0–0.2)
PLATELET # BLD AUTO: 237 K/UL (ref 150–450)
PMV BLD AUTO: ABNORMAL FL (ref 9.4–12.3)
POTASSIUM SERPL-SCNC: 4.4 MMOL/L (ref 3.5–5.1)
RBC # BLD AUTO: 4.99 M/UL (ref 4.05–5.2)
SERVICE CMNT-IMP: ABNORMAL
SODIUM SERPL-SCNC: 136 MMOL/L (ref 133–143)
WBC # BLD AUTO: 15.4 K/UL (ref 4.3–11.1)

## 2023-11-22 PROCEDURE — 6370000000 HC RX 637 (ALT 250 FOR IP): Performed by: INTERNAL MEDICINE

## 2023-11-22 PROCEDURE — 94761 N-INVAS EAR/PLS OXIMETRY MLT: CPT

## 2023-11-22 PROCEDURE — 80048 BASIC METABOLIC PNL TOTAL CA: CPT

## 2023-11-22 PROCEDURE — 2580000003 HC RX 258: Performed by: FAMILY MEDICINE

## 2023-11-22 PROCEDURE — 6370000000 HC RX 637 (ALT 250 FOR IP): Performed by: FAMILY MEDICINE

## 2023-11-22 PROCEDURE — 2060000000 HC ICU INTERMEDIATE R&B

## 2023-11-22 PROCEDURE — 6370000000 HC RX 637 (ALT 250 FOR IP): Performed by: NURSE PRACTITIONER

## 2023-11-22 PROCEDURE — 94640 AIRWAY INHALATION TREATMENT: CPT

## 2023-11-22 PROCEDURE — 6360000002 HC RX W HCPCS: Performed by: FAMILY MEDICINE

## 2023-11-22 PROCEDURE — 94660 CPAP INITIATION&MGMT: CPT

## 2023-11-22 PROCEDURE — 85027 COMPLETE CBC AUTOMATED: CPT

## 2023-11-22 PROCEDURE — 97535 SELF CARE MNGMENT TRAINING: CPT

## 2023-11-22 PROCEDURE — 99232 SBSQ HOSP IP/OBS MODERATE 35: CPT | Performed by: INTERNAL MEDICINE

## 2023-11-22 PROCEDURE — 36415 COLL VENOUS BLD VENIPUNCTURE: CPT

## 2023-11-22 PROCEDURE — 97530 THERAPEUTIC ACTIVITIES: CPT

## 2023-11-22 PROCEDURE — 2700000000 HC OXYGEN THERAPY PER DAY

## 2023-11-22 PROCEDURE — 82962 GLUCOSE BLOOD TEST: CPT

## 2023-11-22 RX ORDER — PREDNISONE 20 MG/1
40 TABLET ORAL DAILY
Status: DISCONTINUED | OUTPATIENT
Start: 2023-11-22 | End: 2023-11-23 | Stop reason: HOSPADM

## 2023-11-22 RX ADMIN — FERROUS SULFATE TAB 325 MG (65 MG ELEMENTAL FE) 325 MG: 325 (65 FE) TAB at 09:07

## 2023-11-22 RX ADMIN — SODIUM CHLORIDE, PRESERVATIVE FREE 10 ML: 5 INJECTION INTRAVENOUS at 20:54

## 2023-11-22 RX ADMIN — ATORVASTATIN CALCIUM 10 MG: 10 TABLET, FILM COATED ORAL at 20:56

## 2023-11-22 RX ADMIN — Medication 500 MG: at 17:35

## 2023-11-22 RX ADMIN — FERROUS SULFATE TAB 325 MG (65 MG ELEMENTAL FE) 325 MG: 325 (65 FE) TAB at 17:35

## 2023-11-22 RX ADMIN — PREDNISONE 40 MG: 20 TABLET ORAL at 14:22

## 2023-11-22 RX ADMIN — APIXABAN 5 MG: 5 TABLET, FILM COATED ORAL at 09:07

## 2023-11-22 RX ADMIN — ARFORMOTEROL TARTRATE 15 MCG: 15 SOLUTION RESPIRATORY (INHALATION) at 07:44

## 2023-11-22 RX ADMIN — GUAIFENESIN 1200 MG: 600 TABLET ORAL at 20:56

## 2023-11-22 RX ADMIN — DILTIAZEM HYDROCHLORIDE 240 MG: 240 CAPSULE, COATED, EXTENDED RELEASE ORAL at 09:06

## 2023-11-22 RX ADMIN — IPRATROPIUM BROMIDE AND ALBUTEROL SULFATE 1 DOSE: 2.5; .5 SOLUTION RESPIRATORY (INHALATION) at 15:26

## 2023-11-22 RX ADMIN — GUAIFENESIN 1200 MG: 600 TABLET ORAL at 09:07

## 2023-11-22 RX ADMIN — BUDESONIDE 500 MCG: 0.5 INHALANT RESPIRATORY (INHALATION) at 20:18

## 2023-11-22 RX ADMIN — IPRATROPIUM BROMIDE AND ALBUTEROL SULFATE 1 DOSE: 2.5; .5 SOLUTION RESPIRATORY (INHALATION) at 20:18

## 2023-11-22 RX ADMIN — BUDESONIDE 500 MCG: 0.5 INHALANT RESPIRATORY (INHALATION) at 07:44

## 2023-11-22 RX ADMIN — IPRATROPIUM BROMIDE AND ALBUTEROL SULFATE 1 DOSE: 2.5; .5 SOLUTION RESPIRATORY (INHALATION) at 11:30

## 2023-11-22 RX ADMIN — Medication 500 MG: at 09:08

## 2023-11-22 RX ADMIN — AZITHROMYCIN DIHYDRATE 500 MG: 250 TABLET, FILM COATED ORAL at 09:06

## 2023-11-22 RX ADMIN — APIXABAN 5 MG: 5 TABLET, FILM COATED ORAL at 20:56

## 2023-11-22 RX ADMIN — IPRATROPIUM BROMIDE AND ALBUTEROL SULFATE 1 DOSE: 2.5; .5 SOLUTION RESPIRATORY (INHALATION) at 07:44

## 2023-11-22 RX ADMIN — DOCUSATE SODIUM 50 MG AND SENNOSIDES 8.6 MG 2 TABLET: 8.6; 5 TABLET, FILM COATED ORAL at 09:08

## 2023-11-22 RX ADMIN — PANTOPRAZOLE SODIUM 40 MG: 40 TABLET, DELAYED RELEASE ORAL at 06:22

## 2023-11-22 RX ADMIN — SODIUM CHLORIDE, PRESERVATIVE FREE 5 ML: 5 INJECTION INTRAVENOUS at 09:05

## 2023-11-22 RX ADMIN — ARFORMOTEROL TARTRATE 15 MCG: 15 SOLUTION RESPIRATORY (INHALATION) at 20:18

## 2023-11-22 ASSESSMENT — PAIN SCALES - GENERAL
PAINLEVEL_OUTOF10: 0

## 2023-11-22 NOTE — DISCHARGE INSTRUCTIONS
bupropion (brand names: Zyban, Wellbutrin). ?  Nora Asp a date to quit smoking. Tell friends and family about your plan. ? Seek support through free telephone quitlines (eg, in the [facility], 1-800-QUIT-NOW), text messaging programs, or other tools that can be accessed for free online (www.smokefree.gov). ? Begin making changes in your behavior; avoid situations that may tempt you to smoke. ?  Start varenicline (one to four weeks before your quit date) or bupropion (two weeks before your quit date) or start nicotine replacement (on the day you quit). ?  Prepare for withdrawal symptoms. Consider using nicotine replacement therapy (such as nicotine patch, gum, or lozenge) to help manage your symptoms. Try to resist the urge to smoke \"just one\" cigarette to get through a rough day. Consider support groups for encouragement as well as tips on coping with withdrawal.    WHERE TO GET MORE INFORMATION    Your health care provider is the best source of information for questions and concerns related to your medical problem. This article will be updated as needed on our web site (www.[facility].Onfido/patients). Related topics for patients, as well as selected articles written for health care professionals, are also available. Some of the most relevant are listed below. The following organizations also provide useful health information. ? Smokefree. gov  (https://smokefree.gov/)    ? National Heart, Lung and [facility]  (www.nhlbi.nih.gov/)    ? American Lung Association  (www.lungusa.org/)    ? American Heart Association  (www.americanheart. org)    ? [place] [facility] Department of [employer] and [employer]  (www. FastCAP.gov)    ? [employer]  (www.[employer]. "[employer]/)    ?   Agency for [employer] and Quality  (www.ahrq.gov/consumer/tobacco/)

## 2023-11-22 NOTE — TELEPHONE ENCOUNTER
Cas Pratt from [facility] called states she needs a peer to peer and for us to resend expedited request. [phone] to call.  Emmy high

## 2023-11-22 NOTE — CARE COORDINATION
CM met with the patient to discuss discharge plans. Patient is agreeable to [facility] at discharge. [facility]  RN/PT/OT has been ordered. CM remains available.

## 2023-11-23 VITALS
RESPIRATION RATE: 19 BRPM | DIASTOLIC BLOOD PRESSURE: 76 MMHG | HEIGHT: 65 IN | OXYGEN SATURATION: 93 % | WEIGHT: 293 LBS | BODY MASS INDEX: 48.82 KG/M2 | TEMPERATURE: 98.3 F | SYSTOLIC BLOOD PRESSURE: 135 MMHG | HEART RATE: 103 BPM

## 2023-11-23 PROBLEM — J96.22 ACUTE ON CHRONIC RESPIRATORY FAILURE WITH HYPOXIA AND HYPERCAPNIA (HCC): Status: ACTIVE | Noted: 2019-08-28

## 2023-11-23 PROBLEM — J96.21 ACUTE ON CHRONIC RESPIRATORY FAILURE WITH HYPOXIA AND HYPERCAPNIA (HCC): Status: RESOLVED | Noted: 2019-08-28 | Resolved: 2023-11-23

## 2023-11-23 PROBLEM — J96.22 ACUTE ON CHRONIC RESPIRATORY FAILURE WITH HYPOXIA AND HYPERCAPNIA (HCC): Status: RESOLVED | Noted: 2019-08-28 | Resolved: 2023-11-23

## 2023-11-23 PROBLEM — J96.21 ACUTE ON CHRONIC RESPIRATORY FAILURE WITH HYPOXIA AND HYPERCAPNIA (HCC): Status: ACTIVE | Noted: 2019-08-28

## 2023-11-23 PROBLEM — J44.1 ACUTE EXACERBATION OF CHRONIC OBSTRUCTIVE PULMONARY DISEASE (COPD) (HCC): Status: RESOLVED | Noted: 2023-04-16 | Resolved: 2023-11-23

## 2023-11-23 PROBLEM — J44.1 COPD EXACERBATION (HCC): Status: RESOLVED | Noted: 2023-11-20 | Resolved: 2023-11-23

## 2023-11-23 LAB
ANION GAP SERPL CALC-SCNC: 6 MMOL/L (ref 2–11)
BUN SERPL-MCNC: 14 MG/DL (ref 6–23)
CALCIUM SERPL-MCNC: 8.6 MG/DL (ref 8.3–10.4)
CHLORIDE SERPL-SCNC: 100 MMOL/L (ref 101–110)
CO2 SERPL-SCNC: 31 MMOL/L (ref 21–32)
CREAT SERPL-MCNC: 0.5 MG/DL (ref 0.6–1)
ERYTHROCYTE [DISTWIDTH] IN BLOOD BY AUTOMATED COUNT: 24.9 % (ref 11.9–14.6)
GLUCOSE BLD STRIP.AUTO-MCNC: 129 MG/DL (ref 65–100)
GLUCOSE BLD STRIP.AUTO-MCNC: 75 MG/DL (ref 65–100)
GLUCOSE SERPL-MCNC: 95 MG/DL (ref 65–100)
HCT VFR BLD AUTO: 34.5 % (ref 35.8–46.3)
HGB BLD-MCNC: 9.3 G/DL (ref 11.7–15.4)
MCH RBC QN AUTO: 18.5 PG (ref 26.1–32.9)
MCHC RBC AUTO-ENTMCNC: 27 G/DL (ref 31.4–35)
MCV RBC AUTO: 68.7 FL (ref 82–102)
NRBC # BLD: 0.1 K/UL (ref 0–0.2)
PLATELET # BLD AUTO: 248 K/UL (ref 150–450)
PMV BLD AUTO: ABNORMAL FL (ref 9.4–12.3)
POTASSIUM SERPL-SCNC: 4 MMOL/L (ref 3.5–5.1)
RBC # BLD AUTO: 5.02 M/UL (ref 4.05–5.2)
SERVICE CMNT-IMP: ABNORMAL
SERVICE CMNT-IMP: NORMAL
SODIUM SERPL-SCNC: 137 MMOL/L (ref 133–143)
WBC # BLD AUTO: 16.6 K/UL (ref 4.3–11.1)

## 2023-11-23 PROCEDURE — 2580000003 HC RX 258: Performed by: FAMILY MEDICINE

## 2023-11-23 PROCEDURE — 2700000000 HC OXYGEN THERAPY PER DAY

## 2023-11-23 PROCEDURE — 80048 BASIC METABOLIC PNL TOTAL CA: CPT

## 2023-11-23 PROCEDURE — 6370000000 HC RX 637 (ALT 250 FOR IP): Performed by: NURSE PRACTITIONER

## 2023-11-23 PROCEDURE — 85027 COMPLETE CBC AUTOMATED: CPT

## 2023-11-23 PROCEDURE — 94660 CPAP INITIATION&MGMT: CPT

## 2023-11-23 PROCEDURE — 90686 IIV4 VACC NO PRSV 0.5 ML IM: CPT | Performed by: STUDENT IN AN ORGANIZED HEALTH CARE EDUCATION/TRAINING PROGRAM

## 2023-11-23 PROCEDURE — 6370000000 HC RX 637 (ALT 250 FOR IP): Performed by: FAMILY MEDICINE

## 2023-11-23 PROCEDURE — 99232 SBSQ HOSP IP/OBS MODERATE 35: CPT | Performed by: INTERNAL MEDICINE

## 2023-11-23 PROCEDURE — 36415 COLL VENOUS BLD VENIPUNCTURE: CPT

## 2023-11-23 PROCEDURE — 6370000000 HC RX 637 (ALT 250 FOR IP): Performed by: INTERNAL MEDICINE

## 2023-11-23 PROCEDURE — 82962 GLUCOSE BLOOD TEST: CPT

## 2023-11-23 PROCEDURE — G0008 ADMIN INFLUENZA VIRUS VAC: HCPCS | Performed by: STUDENT IN AN ORGANIZED HEALTH CARE EDUCATION/TRAINING PROGRAM

## 2023-11-23 PROCEDURE — 6360000002 HC RX W HCPCS: Performed by: FAMILY MEDICINE

## 2023-11-23 PROCEDURE — 6360000002 HC RX W HCPCS: Performed by: STUDENT IN AN ORGANIZED HEALTH CARE EDUCATION/TRAINING PROGRAM

## 2023-11-23 PROCEDURE — 94761 N-INVAS EAR/PLS OXIMETRY MLT: CPT

## 2023-11-23 PROCEDURE — 94640 AIRWAY INHALATION TREATMENT: CPT

## 2023-11-23 RX ORDER — PREDNISONE 10 MG/1
TABLET ORAL
Qty: 21 TABLET | Refills: 0 | Status: SHIPPED | OUTPATIENT
Start: 2023-11-24 | End: 2023-12-03

## 2023-11-23 RX ORDER — NICOTINE 21 MG/24HR
1 PATCH, TRANSDERMAL 24 HOURS TRANSDERMAL DAILY
Qty: 30 PATCH | Refills: 0 | Status: SHIPPED | OUTPATIENT
Start: 2023-11-24

## 2023-11-23 RX ORDER — FLUTICASONE FUROATE, UMECLIDINIUM BROMIDE AND VILANTEROL TRIFENATATE 200; 62.5; 25 UG/1; UG/1; UG/1
1 POWDER RESPIRATORY (INHALATION) DAILY
Qty: 1 EACH | Refills: 0 | Status: SHIPPED | OUTPATIENT
Start: 2023-11-23

## 2023-11-23 RX ADMIN — SODIUM CHLORIDE, PRESERVATIVE FREE 5 ML: 5 INJECTION INTRAVENOUS at 08:53

## 2023-11-23 RX ADMIN — BUDESONIDE 500 MCG: 0.5 INHALANT RESPIRATORY (INHALATION) at 07:53

## 2023-11-23 RX ADMIN — Medication 500 MG: at 08:45

## 2023-11-23 RX ADMIN — FERROUS SULFATE TAB 325 MG (65 MG ELEMENTAL FE) 325 MG: 325 (65 FE) TAB at 08:45

## 2023-11-23 RX ADMIN — ARFORMOTEROL TARTRATE 15 MCG: 15 SOLUTION RESPIRATORY (INHALATION) at 07:54

## 2023-11-23 RX ADMIN — GUAIFENESIN 1200 MG: 600 TABLET ORAL at 08:45

## 2023-11-23 RX ADMIN — PANTOPRAZOLE SODIUM 40 MG: 40 TABLET, DELAYED RELEASE ORAL at 06:33

## 2023-11-23 RX ADMIN — APIXABAN 5 MG: 5 TABLET, FILM COATED ORAL at 08:45

## 2023-11-23 RX ADMIN — IPRATROPIUM BROMIDE AND ALBUTEROL SULFATE 1 DOSE: 2.5; .5 SOLUTION RESPIRATORY (INHALATION) at 07:53

## 2023-11-23 RX ADMIN — PREDNISONE 40 MG: 20 TABLET ORAL at 08:45

## 2023-11-23 RX ADMIN — IPRATROPIUM BROMIDE AND ALBUTEROL SULFATE 1 DOSE: 2.5; .5 SOLUTION RESPIRATORY (INHALATION) at 11:16

## 2023-11-23 RX ADMIN — INFLUENZA VIRUS VACCINE 0.5 ML: 15; 15; 15; 15 SUSPENSION INTRAMUSCULAR at 12:59

## 2023-11-23 RX ADMIN — DOCUSATE SODIUM 50 MG AND SENNOSIDES 8.6 MG 2 TABLET: 8.6; 5 TABLET, FILM COATED ORAL at 08:45

## 2023-11-23 RX ADMIN — DILTIAZEM HYDROCHLORIDE 240 MG: 240 CAPSULE, COATED, EXTENDED RELEASE ORAL at 08:45

## 2023-11-23 ASSESSMENT — PAIN SCALES - GENERAL
PAINLEVEL_OUTOF10: 0

## 2023-11-23 NOTE — DISCHARGE SUMMARY
inhaler Inhale 2 puffs into the lungs every 4 hours as needed for Wheezing  Qty: 1 each, Refills: 11      blood glucose test strips (ASCENSIA AUTODISC VI;ONE TOUCH ULTRA TEST VI) strip Use to check blood sugar once daily as directed. Dx: E11.29      Lancets 33G MISC Use to check glucose once daily. Dx: E11.29           STOP taking these medications       pantoprazole (PROTONIX) 40 MG tablet Comments:   Reason for Stopping:         fluticasone furoate-vilanterol (BREO ELLIPTA) 200-25 MCG/ACT AEPB inhaler Comments:   Reason for Stopping:               Some of the medications may be marked as \"stop taking\" by the system; but in reality pt or family reported already being off these meds; defer to outpatient/prescribing providers. Procedures done this admission:  * No surgery found *    Consults this admission:  IP CONSULT TO RESPIRATORY CARE  IP CONSULT TO PULMONOLOGY  IP CONSULT HOME HEALTH    Echocardiogram results:  07/26/23    TRANSTHORACIC ECHOCARDIOGRAM (TTE) COMPLETE (CONTRAST/BUBBLE/3D PRN) 07/28/2023  4:24 PM (Final)    Interpretation Summary    Left Ventricle: Normal left ventricular systolic function. EF by 2D Simpsons Biplane is 62%. Left ventricle size is normal. Normal wall thickness. Normal wall motion. Normal diastolic function. Signed by: Raúl Jiang MD on 7/28/2023  4:24 PM      Diagnostic Imaging/Tests:   XR CHEST PORTABLE    Result Date: 11/17/2023  No acute cardiopulmonary process.         Labs: Results:       BMP, Mg, Phos Recent Labs     11/21/23 0412 11/22/23 0456 11/23/23 0432    136 137   K 4.7 4.4 4.0   CL 99* 100* 100*   CO2 36* 31 31   ANIONGAP 2 5 6   BUN 15 16 14   CREATININE 0.60 0.50* 0.50*   LABGLOM >60 >60 >60   CALCIUM 9.7 9.3 8.6   GLUCOSE 135* 110* 95      CBC Recent Labs     11/21/23 0412 11/22/23 0456 11/23/23 0432   WBC 15.3* 15.4* 16.6*   RBC 4.94 4.99 5.02   HGB 8.9* 9.2* 9.3*   HCT 33.4* 35.5* 34.5*   MCV 67.6* 71.1* 68.7*   MCH 18.0* 18.4* 18.5*

## 2023-11-23 NOTE — PLAN OF CARE
Problem: Discharge Planning  Goal: Discharge to home or other facility with appropriate resources  Outcome: Adequate for Discharge     Problem: Safety - Adult  Goal: Free from fall injury  Outcome: Adequate for Discharge  Flowsheets (Taken 11/23/2023 0810 by Laya Hodgson RN)  Free From Fall Injury: Instruct family/caregiver on patient safety     Problem: Skin/Tissue Integrity  Goal: Absence of new skin breakdown  Description: 1. Monitor for areas of redness and/or skin breakdown  2. Assess vascular access sites hourly  3. Every 4-6 hours minimum:  Change oxygen saturation probe site  4. Every 4-6 hours:  If on nasal continuous positive airway pressure, respiratory therapy assess nares and determine need for appliance change or resting period.   Outcome: Adequate for Discharge     Problem: Pain  Goal: Verbalizes/displays adequate comfort level or baseline comfort level  Outcome: Adequate for Discharge  Flowsheets (Taken 11/23/2023 0810 by Laya Hodgson RN)  Verbalizes/displays adequate comfort level or baseline comfort level: Assess pain using appropriate pain scale     Problem: Chronic Conditions and Co-morbidities  Goal: Patient's chronic conditions and co-morbidity symptoms are monitored and maintained or improved  Outcome: Adequate for Discharge     Problem: ABCDS Injury Assessment  Goal: Absence of physical injury  Outcome: Adequate for Discharge  Flowsheets (Taken 11/23/2023 0810 by Laya Hodgson RN)  Absence of Physical Injury: Implement safety measures based on patient assessment

## 2023-11-23 NOTE — CARE COORDINATION
Pt is for discharge home today with home health services. Referral called/faxed to [facility] for follow up home care as ordered. No additional CM orders received or supportive care needs expressed at this time.

## 2023-11-24 ENCOUNTER — CARE COORDINATION (OUTPATIENT)
Dept: CARE COORDINATION | Facility: CLINIC | Age: 53
End: 2023-11-24

## 2023-11-24 ENCOUNTER — TELEPHONE (OUTPATIENT)
Dept: INTERNAL MEDICINE CLINIC | Facility: CLINIC | Age: 53
End: 2023-11-24

## 2023-11-24 NOTE — TELEPHONE ENCOUNTER
Keena from [facility] called, asks if PCP can follow home health orders for pt.

## 2023-11-24 NOTE — CARE COORDINATION
Care Transitions Initial Follow Up Call    Call within 2 business days of discharge: Yes    Patient: Yehuda Obrien Patient : 1970   MRN: [id number]  Reason for Admission: COPD exacerbation   Discharge Date: 23 RARS: Readmission Risk Score: 20.5  CTN attempted to contact patient for transitions of care outreach after hospital discharge on 23. Unable to reach at this time. HIPPA compliant message left with contact information requesting a return call. Will try again later. Last Discharge Facility       Date Complaint Diagnosis Description Type Department Provider    23 Shortness of Breath; Cough COPD exacerbation ([facility]) . .. ED to Hosp-Admission (Discharged) (ADMITTED) SFD8MS Ambrocio Juarez MD; Roe Harvey. .. Was this an external facility discharge?  No Discharge Facility: [facility]  Follow Up  Future Appointments   Date Time Provider [address]   2023 10:15 AM Zuleika Pacheco MD [facility] AMB   2023  1:10 PM Yulisa Mancilla MD PPS [facility] AMB   2024  9:15 AM MAT LAB MAT [facility] AMB   2024 10:30 AM Taylor Ivy PA-C MAT [facility] AMB   Citlalli Abraham RN

## 2023-11-26 ENCOUNTER — HOME CARE VISIT (OUTPATIENT)
Dept: SCHEDULING | Facility: HOME HEALTH | Age: 53
End: 2023-11-26

## 2023-11-26 VITALS
HEART RATE: 92 BPM | SYSTOLIC BLOOD PRESSURE: 134 MMHG | DIASTOLIC BLOOD PRESSURE: 86 MMHG | OXYGEN SATURATION: 95 % | RESPIRATION RATE: 20 BRPM | TEMPERATURE: 97.8 F

## 2023-11-26 PROCEDURE — G0299 HHS/HOSPICE OF RN EA 15 MIN: HCPCS

## 2023-11-26 ASSESSMENT — ENCOUNTER SYMPTOMS
COUGH: 1
DYSPNEA ACTIVITY LEVEL: AFTER AMBULATING LESS THAN 10 FT
PAIN LOCATION - PAIN QUALITY: THROBBING
COUGH CHARACTERISTICS: NON-PRODUCTIVE

## 2023-11-27 ENCOUNTER — HOME CARE VISIT (OUTPATIENT)
Dept: SCHEDULING | Facility: HOME HEALTH | Age: 53
End: 2023-11-27

## 2023-11-27 ENCOUNTER — CARE COORDINATION (OUTPATIENT)
Dept: CARE COORDINATION | Facility: CLINIC | Age: 53
End: 2023-11-27

## 2023-11-27 ENCOUNTER — TELEPHONE (OUTPATIENT)
Dept: INTERNAL MEDICINE CLINIC | Facility: CLINIC | Age: 53
End: 2023-11-27

## 2023-11-27 ENCOUNTER — TELEPHONE (OUTPATIENT)
Dept: HOME HEALTH SERVICES | Facility: HOME HEALTH | Age: 53
End: 2023-11-27

## 2023-11-27 VITALS
DIASTOLIC BLOOD PRESSURE: 80 MMHG | RESPIRATION RATE: 16 BRPM | OXYGEN SATURATION: 97 % | TEMPERATURE: 98.1 F | SYSTOLIC BLOOD PRESSURE: 130 MMHG | HEART RATE: 81 BPM

## 2023-11-27 PROCEDURE — G0152 HHCP-SERV OF OT,EA 15 MIN: HCPCS

## 2023-11-27 NOTE — TELEPHONE ENCOUNTER
TCM is being completed by Pulmonology. Ok per [employer] for pt to only f/u with Pulmonology. Pt notified and verbalized understanding.

## 2023-11-27 NOTE — TELEPHONE ENCOUNTER
Pt is scheduled for a hospital follow up on 11/28 with Zandra Plascencia [address] [place] [address]. Pt is needing a TCM call completed. Pt was discharged on 11/23.

## 2023-11-27 NOTE — TELEPHONE ENCOUNTER
Care Transitions Initial Follow Up Call    Outreach made within 2 business days of discharge: Yes    Patient: Vicente Obrien Patient : 1970   MRN: [id number]  Reason for Admission: COPD exacerbation, acute respiratory failure with hypoxia and hypercapnia. Discharge Date: 23       Spoke with: Patient    Discharge department/facility: [facility] Patient Contact:  Was patient able to fill all prescriptions: Yes  Was patient instructed to bring all medications to the follow-up visit: Yes  Is patient taking all medications as directed in the discharge summary?  Yes  Does patient understand their discharge instructions: Yes  Does patient have questions or concerns that need addressed prior to 7-14 day follow up office visit: no    Scheduled appointment with PCP within 7-14 days    Follow Up  Future Appointments   Date Time Provider [address]   2023  1:00 PM Marcelina Royal, [address]   2023 To Be Determined Arin Dominguez, RN [address]   2023 To Be Determined Arin Dominguez, RN [address]   2023 To Be Determined Arin Dominguez, RN [address]   2023 10:15 AM Catherine Mendoza MD [facility] AMB   2023 To Be Determined Arin Dominguez, RN [address]   2023  1:10 PM Patricia Willams MD PPS [facility] AMB   2023 To Be Determined Arin Dominguez, RN [address]   2024  9:30 AM FUNMI Ocampo CNP MAT [facility] AMB   2024  9:15 AM MAT LAB MAT [facility] AMB   2024 10:30 AM Sarah Ivy PA-C MAT [facility] AMB       Sandra Alaniz LPN

## 2023-11-27 NOTE — TELEPHONE ENCOUNTER
[facility] Pharmacist consult. Mrs. Obrien was discharged from [facility] with COPD. I called to review her discharge medications with her and got her voice mail. I left my name and cell phone number. I asked her to call me with any medication questions or issues.

## 2023-11-27 NOTE — CARE COORDINATION
Care Transitions Initial Follow Up Call    Call within 2 business days of discharge: Yes    Patient Current Location:  Home: [facility]    Care Transition Nurse contacted the patient by telephone to perform post hospital discharge assessment. Verified name and  with patient as identifiers. Provided introduction to self, and explanation of the Care Transition Nurse role. Patient: Yehuda Obrien Patient : 1970   MRN: [id number]  Reason for Admission: COPD exacerbation   Discharge Date: 23 RARS: Readmission Risk Score: 20.5    Last Discharge Facility       Date Complaint Diagnosis Description Type Department Provider    23 Shortness of Breath; Cough COPD exacerbation ([address] ) . .. ED to Hosp-Admission (Discharged) (ADMITTED) SFD8MS Ambrocio Juarez MD; Roe Harvey. .. Was this an external facility discharge? No Discharge Facility: [facility]    Challenges to be reviewed by the provider   Additional needs identified to be addressed with provider: No  none               Method of communication with provider: none. Care Transition Nurse reviewed discharge instructions, medical action plan, and red flags with patient who verbalized understanding. The patient was given an opportunity to ask questions and does not have any further questions or concerns at this time. Were discharge instructions available to patient? Yes. Reviewed appropriate site of care based on symptoms and resources available to patient including: PCP  Specialist  Home health  When to call [address] The patient agrees to contact the PCP office for questions related to their healthcare. Advance Care Planning:   Does patient have an Advance Directive: Decision maker updated    Medication reconciliation was performed with patient, who verbalizes understanding of administration of home medications.  Medications reviewed, 1111F entered: yes    Was patient discharged

## 2023-11-28 ENCOUNTER — CARE COORDINATION (OUTPATIENT)
Dept: CARE COORDINATION | Facility: CLINIC | Age: 53
End: 2023-11-28

## 2023-11-28 ENCOUNTER — TELEPHONE (OUTPATIENT)
Dept: INTERNAL MEDICINE CLINIC | Facility: CLINIC | Age: 53
End: 2023-11-28

## 2023-11-28 ENCOUNTER — HOME CARE VISIT (OUTPATIENT)
Dept: SCHEDULING | Facility: HOME HEALTH | Age: 53
End: 2023-11-28

## 2023-11-28 VITALS — HEART RATE: 76 BPM | TEMPERATURE: 98.4 F | RESPIRATION RATE: 18 BRPM | OXYGEN SATURATION: 96 %

## 2023-11-28 PROCEDURE — G0299 HHS/HOSPICE OF RN EA 15 MIN: HCPCS

## 2023-11-28 ASSESSMENT — ENCOUNTER SYMPTOMS
DYSPNEA ACTIVITY LEVEL: AFTER AMBULATING MORE THAN 20 FT
STOOL DESCRIPTION: FORMED

## 2023-11-28 NOTE — TELEPHONE ENCOUNTER
Britney Cramer with [facility] is calling to advise WANG Gomes that the pt has been admitted to their services. Britney Cramer states they need a verbal order to begin home health for the pt. Please advise.

## 2023-11-28 NOTE — CARE COORDINATION
Remote Patient Monitoring Note      Date/Time:  11/28/2023 12:34 PM  Patient Current Location: [facility]  LPN attempted to contact patient by telephone regarding yellow alert received for no BP or weight taken x 2 days. Left HIPAA compliant message requesting a return call. Background: Pt enrolled in Rpm r/t COPD, DM, and Pneumonia. Plan/Follow Up: Will continue to review, monitor and address alerts with follow up based on severity of symptoms and risk factors.

## 2023-11-28 NOTE — TELEPHONE ENCOUNTER
Spoke to [employer] at [facility] and he said just got denial letter today and will call [employer] and see what is missing on their end and call the patient and call us back once gets and answer on next step needed.

## 2023-11-29 ENCOUNTER — CARE COORDINATION (OUTPATIENT)
Dept: CARE COORDINATION | Facility: CLINIC | Age: 53
End: 2023-11-29

## 2023-11-29 NOTE — CARE COORDINATION
Remote Patient Monitoring Note      Date/Time:  2023 11:58 AM  Patient Current Location: Home: [facility]  HAIMN contacted patient by telephone regarding yellow alert received for no BP or weight taken x 3 days. Verified patients name and  as identifiers. Background: Pt enrolled in RPM r/t COPD, DM, and Pneumonia per listed conditions in HRS. Clinical Interventions: Discussed RPM adherence with pt. Pt verbalizes understanding and states, she accidentally \"hit restart\" on the RPM tablet and is now needing to enter \"new patient information\". [facility] Tech Support contact number provided per pt request. Pt states she will contact [facility] today. Pt verbalizes understanding of when to notify provider(s) of changes / concerns and when to seek emergent medical care. Plan/Follow Up: Will continue to review, monitor and address alerts with follow up based on severity of symptoms and risk factors.

## 2023-11-30 ENCOUNTER — HOME CARE VISIT (OUTPATIENT)
Dept: SCHEDULING | Facility: HOME HEALTH | Age: 53
End: 2023-11-30

## 2023-11-30 ENCOUNTER — CARE COORDINATION (OUTPATIENT)
Dept: CARE COORDINATION | Facility: CLINIC | Age: 53
End: 2023-11-30

## 2023-11-30 VITALS
RESPIRATION RATE: 19 BRPM | DIASTOLIC BLOOD PRESSURE: 82 MMHG | TEMPERATURE: 97.4 F | OXYGEN SATURATION: 98 % | SYSTOLIC BLOOD PRESSURE: 136 MMHG | HEART RATE: 88 BPM

## 2023-11-30 VITALS — TEMPERATURE: 98.2 F

## 2023-11-30 PROCEDURE — G0299 HHS/HOSPICE OF RN EA 15 MIN: HCPCS

## 2023-11-30 PROCEDURE — G0151 HHCP-SERV OF PT,EA 15 MIN: HCPCS

## 2023-11-30 ASSESSMENT — ENCOUNTER SYMPTOMS
PAIN LOCATION - PAIN QUALITY: ACHING
DYSPNEA ACTIVITY LEVEL: AFTER AMBULATING MORE THAN 20 FT
DYSPNEA ACTIVITY LEVEL: AFTER AMBULATING LESS THAN 10 FT
STOOL DESCRIPTION: FORMED

## 2023-11-30 NOTE — CARE COORDINATION
Remote Patient Monitoring Note      Date/Time:  11/30/2023 3:38 PM  Patient Current Location: [place]  LPN attempted to contact patient by telephone regarding yellow alert received for no BP or weight taken x 3 days. Left HIPAA compliant message requesting a return call. Spoke with pt on 11/29/23 regarding RPM adherence. [facility] Tech Support contact number was provided per pt request and pt stated she would contact [facility] same day for assistance with RPM equipment. Pt has not updated metrics as of this time. RN DEANGELO notified. Background: Pt enrolled in RPM r/t COPD, DM, and Pneumonia per listed conditions in HRS. Plan/Follow Up: Will continue to review, monitor and address alerts with follow up based on severity of symptoms and risk factors.

## 2023-12-01 ENCOUNTER — CARE COORDINATION (OUTPATIENT)
Dept: CARE COORDINATION | Facility: CLINIC | Age: 53
End: 2023-12-01

## 2023-12-01 NOTE — CARE COORDINATION
Remote Patient Monitoring Note      Date/Time:  12/1/2023 1:58 PM  Patient Current Location: [facility]  RPM yellow alert received for no BP or weight taken x 5 days. LPN previously attempted to contact pt on 11/30/23. No return call received as of this time. Spoke with pt on 11/29/23 regarding RPM adherence. [facility] Tech Support contact number was provided per pt request and pt stated she would contact [facility] same day for assistance with RPM equipment. Pt has not updated metrics as of this time. This nurse noted RPM equipment is still not connected and contact [address] [place] [address] 24 for assistance. Lady Merino states she will attempt to contact pt to assist with reconnection and that there is no record of pt contacting them for assistance. Background: Pt enrolled in RPM r/t COPD, DM, and Pneumonia per listed conditions in HRS. Clinical Interventions: Escalated alert to RN-update provided      Plan/Follow Up: Will continue to review, monitor and address alerts with follow up based on severity of symptoms and risk factors. Error noted in RPM noted from 11/30/23. On 11/30/23, RPM yellow alert received for no BP or weight x 4 days NOT x 3 days.

## 2023-12-04 ENCOUNTER — CARE COORDINATION (OUTPATIENT)
Dept: CARE COORDINATION | Facility: CLINIC | Age: 53
End: 2023-12-04

## 2023-12-04 ENCOUNTER — TELEPHONE (OUTPATIENT)
Dept: PHARMACY | Age: 53
End: 2023-12-04

## 2023-12-04 NOTE — TELEPHONE ENCOUNTER
Ms. Denita Velazquez said she was doing very well. Her breathing is good also. I reviewed her discharge medications. She took her last prednisone on Sunday. She is using the Trelegy inhaler and doing well with it. She had no medication questions at this time. I gave her my cell phone number and asked her to call me with any medication questions or issues. She said OK to call back any time. She sounded very .

## 2023-12-04 NOTE — CARE COORDINATION
Remote Alert Monitoring Note  Rpm alert to be reviewed by the provider   red alert   weight (40.1# increase since 23)   Additional needs to be addressed by N/A: No                    Date/Time:  2023 9:50 AM  Patient Current Location: Home: [facility]  SULLY contacted patient by telephone. Verified patients name and  as identifiers. Background: Pt enrolled in RPM r/t COPD, DM, and Pneumonia per listed conditions in HRS. Refer to 911 immediately if:  Patient unresponsive or unable to provide history  Change in cognition or sudden confusion  Patient unable to respond in complete sentences  Intense chest pain/tightness  Any concern for any clinical emergency  Red Alert: Provider response time of 1 hr required for any red alert requiring intervention  Yellow Alert: Provider response time of 3hr required for any escalated yellow alert    Weight Scale Triage  Was your weight obtained upon rising/waking today? yes   Was your weight obtained after voiding and/or use of the bathroom today? yes   Did you weigh yourself in the same amount of clothing today, compared to how you typically do? yes   Was the scale bumped or moved prior to today's weight? yes   Is your scale on a flat/hard surface? yes   Did you obtain your weight with shoes on? no   If yes, is this something you normally do during your daily weights? NA   Were you standing up straight on the scale today? yes   Were you leaning on anything while obtaining your weight today? no      Clinical Interventions: Reviewed and followed up on alerts and treatments-Pt denies CP, SOB, and new or worsening edema at this time. Patients current recorded weight is 294.8#. Last recorded RPM weight before 23 - 23 admission was 297.0# obtained on 23. Discussed 23 RPM recorded weight of 254. 7# with pt. Pt stated, \"I was like that can't be right. Either I wasn't on the scale good or something.  I did

## 2023-12-05 ENCOUNTER — HOME CARE VISIT (OUTPATIENT)
Dept: SCHEDULING | Facility: HOME HEALTH | Age: 53
End: 2023-12-05

## 2023-12-05 ENCOUNTER — CARE COORDINATION (OUTPATIENT)
Dept: CARE COORDINATION | Facility: CLINIC | Age: 53
End: 2023-12-05

## 2023-12-05 VITALS
TEMPERATURE: 97.9 F | HEART RATE: 74 BPM | RESPIRATION RATE: 19 BRPM | DIASTOLIC BLOOD PRESSURE: 74 MMHG | OXYGEN SATURATION: 98 % | SYSTOLIC BLOOD PRESSURE: 124 MMHG

## 2023-12-05 PROCEDURE — G0299 HHS/HOSPICE OF RN EA 15 MIN: HCPCS

## 2023-12-05 ASSESSMENT — ENCOUNTER SYMPTOMS
STOOL DESCRIPTION: FORMED
DYSPNEA ACTIVITY LEVEL: AFTER AMBULATING MORE THAN 20 FT

## 2023-12-05 NOTE — CARE COORDINATION
Care Transitions Follow Up Call    Patient Current Location:  Home: [address] [place] [address] Transition Nurse contacted the patient by telephone to follow up after admission on 2023. Verified name and  with patient as identifiers. Patient: Brandee Obrien  Patient : 1970   MRN: [id number]  Reason for Admission: COPD exacerbation   Discharge Date: 23 RARS: Readmission Risk Score: 20.5    Needs to be reviewed by the provider   Additional needs identified to be addressed with provider: No  none             Method of communication with provider: none. Addressed changes since last contact:   Patient reports she is doing better since last week. Patient endorses compliance with her medications, supplemental oxygen, and Trilogy vent. Patient reports she is working with home health. Patient reports she finally reconnected to the RPM system. Patient denies any new or worsening symptoms or needs at this time. Patient is aware of upcoming appointments and plans to attend. Discussed follow-up appointments. If no appointment was previously scheduled, appointment scheduling offered: Yes.    Follow Up  Future Appointments   Date Time Provider [address] [place] [address]   2023 10:15 AM Isiah Kahn [address]   2023 To Be Determined Hood Caballero RN [address]   2023 10:15 AM Nichol Lee MD SFGA [facility] AMB   2023 To Be Determined Isiah Hernandezet [address]   2023 To Be Determined Sandhya Mcpherson, PT [address]   2023 To Be Determined Hood Caballero RN [address]   2023  1:10 PM Lita Lange MD PPS [facility] AMB   2023 To Be Determined Hood Caballero RN [address]   2024  9:30 AM FUNMI Sr - CNP MAT [facility] AMB   2024  9:15 AM MAT LAB MAT [facility] AMB   2024 10:30 AM Xochitl Ivy, PAZevC MAT [facility] AMB     External follow up

## 2023-12-06 ENCOUNTER — HOME CARE VISIT (OUTPATIENT)
Dept: SCHEDULING | Facility: HOME HEALTH | Age: 53
End: 2023-12-06

## 2023-12-06 PROCEDURE — G0157 HHC PT ASSISTANT EA 15: HCPCS

## 2023-12-07 ENCOUNTER — HOME CARE VISIT (OUTPATIENT)
Dept: SCHEDULING | Facility: HOME HEALTH | Age: 53
End: 2023-12-07

## 2023-12-07 VITALS
DIASTOLIC BLOOD PRESSURE: 78 MMHG | OXYGEN SATURATION: 97 % | RESPIRATION RATE: 16 BRPM | SYSTOLIC BLOOD PRESSURE: 138 MMHG | HEART RATE: 102 BPM | TEMPERATURE: 97.5 F

## 2023-12-07 VITALS
OXYGEN SATURATION: 98 % | HEART RATE: 84 BPM | RESPIRATION RATE: 18 BRPM | TEMPERATURE: 98.1 F | DIASTOLIC BLOOD PRESSURE: 78 MMHG | SYSTOLIC BLOOD PRESSURE: 128 MMHG

## 2023-12-07 PROCEDURE — G0299 HHS/HOSPICE OF RN EA 15 MIN: HCPCS

## 2023-12-07 ASSESSMENT — ENCOUNTER SYMPTOMS
PAIN LOCATION - PAIN QUALITY: ACHES
DYSPNEA ACTIVITY LEVEL: AFTER AMBULATING MORE THAN 20 FT
STOOL DESCRIPTION: FORMED

## 2023-12-08 ENCOUNTER — HOME CARE VISIT (OUTPATIENT)
Dept: SCHEDULING | Facility: HOME HEALTH | Age: 53
End: 2023-12-08

## 2023-12-08 VITALS
DIASTOLIC BLOOD PRESSURE: 70 MMHG | HEART RATE: 74 BPM | SYSTOLIC BLOOD PRESSURE: 130 MMHG | TEMPERATURE: 97.9 F | RESPIRATION RATE: 17 BRPM

## 2023-12-08 PROCEDURE — G0157 HHC PT ASSISTANT EA 15: HCPCS

## 2023-12-08 ASSESSMENT — ENCOUNTER SYMPTOMS
WHEEZING: 1
PAIN LOCATION - PAIN QUALITY: ACHES
SHORTNESS OF BREATH: 1

## 2023-12-11 ENCOUNTER — HOME CARE VISIT (OUTPATIENT)
Dept: SCHEDULING | Facility: HOME HEALTH | Age: 53
End: 2023-12-11

## 2023-12-11 ENCOUNTER — TELEPHONE (OUTPATIENT)
Dept: HOME HEALTH SERVICES | Facility: HOME HEALTH | Age: 53
End: 2023-12-11

## 2023-12-11 VITALS
HEART RATE: 96 BPM | TEMPERATURE: 98.2 F | SYSTOLIC BLOOD PRESSURE: 136 MMHG | RESPIRATION RATE: 18 BRPM | DIASTOLIC BLOOD PRESSURE: 82 MMHG | OXYGEN SATURATION: 96 %

## 2023-12-11 PROCEDURE — G0151 HHCP-SERV OF PT,EA 15 MIN: HCPCS

## 2023-12-11 ASSESSMENT — ENCOUNTER SYMPTOMS
DYSPNEA ACTIVITY LEVEL: AFTER AMBULATING 10 - 20 FT
NAUSEA: 1

## 2023-12-11 NOTE — TELEPHONE ENCOUNTER
My call was answered by a machine message stating \"the person you are calling is unable to accept calls at this time\" and hangs up. I will try again later.

## 2023-12-12 ENCOUNTER — TELEPHONE (OUTPATIENT)
Dept: HOME HEALTH SERVICES | Facility: HOME HEALTH | Age: 53
End: 2023-12-12

## 2023-12-12 ENCOUNTER — CARE COORDINATION (OUTPATIENT)
Dept: CARE COORDINATION | Facility: CLINIC | Age: 53
End: 2023-12-12

## 2023-12-12 ENCOUNTER — HOME CARE VISIT (OUTPATIENT)
Dept: SCHEDULING | Facility: HOME HEALTH | Age: 53
End: 2023-12-12

## 2023-12-12 PROCEDURE — G0299 HHS/HOSPICE OF RN EA 15 MIN: HCPCS

## 2023-12-12 NOTE — CARE COORDINATION
Care Transitions Follow Up Call  Patient: Cheyenne Obrien  Patient : 1970   MRN: [id number]  Reason for Admission: COPD Exacerbation  Discharge Date: 23 RARS: Readmission Risk Score: 20.5  Multiple unsuccessful attempts to outreach for transitions of care. Unable to reach patient/family. Will attempt again later.   Follow Up  Future Appointments   Date Time Provider [address] [place] [address]   2023  1:10 PM Shubham Resendez MD PPS [facility] AMB   2023 To Be Determined Shamir Marr RN [address] [place] [address]   2024  9:30 AM FUNMI Aj - CNP MAT [facility] AMB   2024 10:45 AM Alfredito Le MD [facility] AMB   2024  9:15 AM MAT LAB MAT [facility] AMB   2024 10:30 AM Francia Ivy PA-C MAT [facility] AMB   Sanam Garcias RN

## 2023-12-12 NOTE — CARE COORDINATION
Remote Patient Monitoring Note      Date/Time:  2023 11:05 AM  Patient Current Location: [facility]  LPN 2nd attempt to contact patient by telephone regarding red alert received for weight increase (of 5 lbs In Last 7 Days). Unable to reach pt and unable to leave message. Recording stating, \"the person you are calling cannot accept calls at this time\" received. LPN contacted patients emergency contact, Jennifer Obrien. Verified patients name and  as identifiers. Background: Pt enrolled in RPM r/t pneumonia, COPD, and DM. Clinical Interventions: Discussed RPM alert with patients emergency contact / sister, Kelli Richardson, who stated, \"I saw my sister yesterday. She was doing fine\" and \"her phones been cut off\". No alternative number available. Unable to complete RPM Weight Scale Triage questions at this time. Edilia Obrien reports that she anticipates to visit pt today after work and will request pt return this LPNs call before 0:72 PM today. Jennifer Obrien verbalizes understanding of when to notify provider(s) of changes / concerns and when to seek emergent medical care for pt. Plan/Follow Up: Will continue to review, monitor and address alerts with follow up based on severity of symptoms and risk factors.

## 2023-12-12 NOTE — TELEPHONE ENCOUNTER
My call answered by a machine recording stating, \"the person you are calling cannot accept calls at this time\" received. I had given her my cell phone number when talking to her on 12/4/23. She can call me with any medication questions.

## 2023-12-12 NOTE — CARE COORDINATION
Remote Patient Monitoring Note      Date/Time:  12/12/2023 3:39 PM  Patient Current Location: [facility]  No incoming / return call from pt or emergency contact, Jennifer Obrien, received as of this time. Will route FYI update to RN ACM and RN CTN. Background: Pt enrolled in RPM r/t pneumonia, COPD, and DM. Plan/Follow Up: Will continue to review, monitor and address alerts with follow up based on severity of symptoms and risk factors.

## 2023-12-12 NOTE — CARE COORDINATION
Remote Patient Monitoring Note      Date/Time:  12/12/2023 10:29 AM  [facility] [address] [facility]  HAIMN attempted to contact patient by telephone regarding red alert received for weight increase (of 5 lbs In Last 7 Days). Unable to reach pt and unable to leave message. Recording stating, \"the person you are calling cannot accept calls at this time\" received. Will continue outreach per RPM protocol. Background: Pt enrolled in RPM r/t pneumonia, COPD, and DM. Plan/Follow Up: Will continue to review, monitor and address alerts with follow up based on severity of symptoms and risk factors.

## 2023-12-12 NOTE — PROGRESS NOTES
contrast dated July 26, 2023. Portable radiograph of the chest dated July 26, 2023. Findings:  Frontal view of the chest was obtained. Upper lung predominant central lucencies are compatible with emphysematous  changes. No new consolidation, effusion, or pneumothorax. Stable small linear  alveolar infiltrates within the bilateral infrahilar regions. The  cardiomediastinal silhouette is within normal limits. There are no acute  osseous abnormalities. Impression  No acute cardiopulmonary process. CT Chest:   CT CHEST PULMONARY EMBOLISM W CONTRAST on 7/26/2023 4:52 PM     Clinical History: The Female patient is 48years old  presenting for Hypoxia. Technique: Thin section axial images were obtained through the chest with intravenous  contrast.  Coronal reformatted images were also provided for review. A total of 75 ml of Iopamidol (ISOVUE-370) 76 % contrast was administered  intravenously. All CT scans at this facility are performed using dose reduction/dose modulation  techniques, as appropriate the performed exam, including the following:   Automated Exposure Control; Adjustment of the mA and/or kV according to patient  size (this includes techniques or standardized protocols for targeted exams  where dose is matched to indication/reason for exam); and Use of Iterative  Reconstruction Technique. Radiation Exposure Indices:  Reference Air Kerma (Ka,r) = 875 mGy-cm     Comparison:  Chest x-ray 7/26/2023. FINDINGS:   Images through the lungs demonstrate no evidence of pulmonary nodule or mass. No  effusions are identified. Mild emphysematous changes are demonstrated     Normal vascular enhancement is demonstrated. No evidence of aortic dissection or  aneurysmal dilatation is seen. There is no evidence of pulmonary embolic  disease. There is no significant hilar or mediastinal lymphadenopathy.      Images chest wall structures as well as visualized portions of the upper abdomen  are

## 2023-12-13 ENCOUNTER — CARE COORDINATION (OUTPATIENT)
Dept: CARE COORDINATION | Facility: CLINIC | Age: 53
End: 2023-12-13

## 2023-12-13 ENCOUNTER — OFFICE VISIT (OUTPATIENT)
Dept: PULMONOLOGY | Age: 53
End: 2023-12-13
Payer: MEDICARE

## 2023-12-13 VITALS
OXYGEN SATURATION: 98 % | RESPIRATION RATE: 18 BRPM | HEART RATE: 80 BPM | SYSTOLIC BLOOD PRESSURE: 130 MMHG | DIASTOLIC BLOOD PRESSURE: 78 MMHG | TEMPERATURE: 98.7 F

## 2023-12-13 VITALS
SYSTOLIC BLOOD PRESSURE: 120 MMHG | OXYGEN SATURATION: 96 % | TEMPERATURE: 98.2 F | HEIGHT: 65 IN | HEART RATE: 88 BPM | WEIGHT: 293 LBS | RESPIRATION RATE: 15 BRPM | BODY MASS INDEX: 48.82 KG/M2 | DIASTOLIC BLOOD PRESSURE: 68 MMHG

## 2023-12-13 DIAGNOSIS — J44.9 STAGE 4 VERY SEVERE COPD BY GOLD CLASSIFICATION (HCC): Primary | ICD-10-CM

## 2023-12-13 DIAGNOSIS — Z87.891 PERSONAL HISTORY OF TOBACCO USE, PRESENTING HAZARDS TO HEALTH: ICD-10-CM

## 2023-12-13 DIAGNOSIS — Z09 HOSPITAL DISCHARGE FOLLOW-UP: ICD-10-CM

## 2023-12-13 DIAGNOSIS — J96.11 CHRONIC RESPIRATORY FAILURE WITH HYPOXIA (HCC): ICD-10-CM

## 2023-12-13 PROCEDURE — G8417 CALC BMI ABV UP PARAM F/U: HCPCS | Performed by: INTERNAL MEDICINE

## 2023-12-13 PROCEDURE — 99406 BEHAV CHNG SMOKING 3-10 MIN: CPT | Performed by: INTERNAL MEDICINE

## 2023-12-13 PROCEDURE — G8427 DOCREV CUR MEDS BY ELIG CLIN: HCPCS | Performed by: INTERNAL MEDICINE

## 2023-12-13 PROCEDURE — 3078F DIAST BP <80 MM HG: CPT | Performed by: INTERNAL MEDICINE

## 2023-12-13 PROCEDURE — 99215 OFFICE O/P EST HI 40 MIN: CPT | Performed by: INTERNAL MEDICINE

## 2023-12-13 PROCEDURE — 4004F PT TOBACCO SCREEN RCVD TLK: CPT | Performed by: INTERNAL MEDICINE

## 2023-12-13 PROCEDURE — 3074F SYST BP LT 130 MM HG: CPT | Performed by: INTERNAL MEDICINE

## 2023-12-13 PROCEDURE — 1111F DSCHRG MED/CURRENT MED MERGE: CPT | Performed by: INTERNAL MEDICINE

## 2023-12-13 PROCEDURE — 3023F SPIROM DOC REV: CPT | Performed by: INTERNAL MEDICINE

## 2023-12-13 PROCEDURE — G8482 FLU IMMUNIZE ORDER/ADMIN: HCPCS | Performed by: INTERNAL MEDICINE

## 2023-12-13 PROCEDURE — 3017F COLORECTAL CA SCREEN DOC REV: CPT | Performed by: INTERNAL MEDICINE

## 2023-12-13 RX ORDER — FLUTICASONE FUROATE, UMECLIDINIUM BROMIDE AND VILANTEROL TRIFENATATE 100; 62.5; 25 UG/1; UG/1; UG/1
1 POWDER RESPIRATORY (INHALATION) DAILY
Qty: 1 EACH | Refills: 11 | Status: SHIPPED | OUTPATIENT
Start: 2023-12-13

## 2023-12-13 RX ORDER — FLUTICASONE FUROATE, UMECLIDINIUM BROMIDE AND VILANTEROL TRIFENATATE 200; 62.5; 25 UG/1; UG/1; UG/1
1 POWDER RESPIRATORY (INHALATION) DAILY
Qty: 1 EACH | Refills: 11 | Status: CANCELLED | OUTPATIENT
Start: 2023-12-13

## 2023-12-13 RX ORDER — ALBUTEROL SULFATE 2.5 MG/3ML
2.5 SOLUTION RESPIRATORY (INHALATION) EVERY 6 HOURS PRN
Qty: 120 EACH | Refills: 3 | Status: SHIPPED | OUTPATIENT
Start: 2023-12-13

## 2023-12-13 RX ORDER — IPRATROPIUM BROMIDE AND ALBUTEROL SULFATE 2.5; .5 MG/3ML; MG/3ML
1 SOLUTION RESPIRATORY (INHALATION) 4 TIMES DAILY
Qty: 120 EACH | Refills: 11 | Status: CANCELLED | OUTPATIENT
Start: 2023-12-13

## 2023-12-13 RX ORDER — ALBUTEROL SULFATE 90 UG/1
2 AEROSOL, METERED RESPIRATORY (INHALATION) EVERY 4 HOURS PRN
Qty: 1 EACH | Refills: 11 | Status: SHIPPED | OUTPATIENT
Start: 2023-12-13

## 2023-12-13 ASSESSMENT — ENCOUNTER SYMPTOMS: DYSPNEA ACTIVITY LEVEL: AFTER AMBULATING MORE THAN 20 FT

## 2023-12-13 NOTE — CARE COORDINATION
Care Transitions Follow Up Call  Patient: Nehemias Obrien  Patient : 1970   MRN: [id number]  Reason for Admission: COPD Exacerbation  Discharge Date: 23 RARS: Readmission Risk Score: 20.5  Multiple unsuccessful attempts to outreach for transitions of care. Unable to reach patient/family. Letter sent.    Follow Up  Future Appointments   Date Time Provider [address] [place] [address]   2023  1:10 PM Monet Orourke MD [facility] AM[facility]   2023 12:00 PM Claudell Rosier, RN [address] [place] [address]   2024  9:30 AM Dao Viveros APRN - CNP MAT [facility] AMB   2024 10:45 AM Nicolasa Castaneda MD [facility] AMB   2024  9:15 AM MAT LAB MAT [facility] AMB   2024 10:30 AM Howard Ivy PA-C MAT [facility] AMB     Care Transitions Subsequent and Final Call    Subsequent and Final Calls  Care Transitions Interventions                          Other Interventions:         Valery Garcia RN

## 2023-12-13 NOTE — PATIENT INSTRUCTIONS
negotiating with them to stop smoking at home or in the car.    ? Recognize that cravings frequently lead to relapse. Cravings can be prevented to some degree by avoiding situations associated with smoking, by minimizing stress, and by avoiding alcohol. Cravings will subside. Keep oral substitutes (such as sugarless gum, carrots, sunflower seeds, etc) handy for when cravings develop. ?  Try to avoid thoughts like \"having one cigarette will not hurt\"; one cigarette typically leads to many more. ? Have as much information as possible about what to expect during a quit attempt and how to cope during this time. These can easily be found online, by calling a smokers' quitline, or by talking with a health care provider or counselor. Support groups can be helpful. Some medical centers have patient resources or learning centers with self-help materials. (See 'Where to get more information' below.)    Support -- Having consistent support is extremely helpful in quitting smoking and staying off cigarettes. Support can come from family and friends, a health care provider, a counselor, a telephone hotline (in [facility]iatric hospital, 1-800-QUIT-NOW), a text messaging program (in Psychiatric hospital, sign up at [employer]), online resources, and/or support groups. In addition to getting ongoing encouragement, it is important to have someone you can talk to about any problems you have while trying to quit, such as weight gain, lack of support from family and friends, or prolonged withdrawal symptoms. In-person support -- Some people find it helpful to talk with a \"\" who can help support you throughout the process. This often involves regular visits beginning before your quit date and continuing for several months afterwards. Group counseling sessions are another option; many different organizations offer group programs.  These typically include lectures, group meetings for mutual support, discussion of coping

## 2023-12-13 NOTE — CARE COORDINATION
Remote Patient Monitoring Note      Date/Time:  2023 10:25 AM  Patient Current Location: [facility]  LPN attempted to contact patient by telephone regarding red alert received for weight increase (of 5 lbs In Last 7 Days). Unable to reach pt and unable to leave message. Recording stating, \"the person you are calling cannot accept calls at this time\" received. Patients emergency contact / sister previously reported that patients phone has been cut off. See RPM note from 23. LPN contacted patients emergency contact, Jennifer Obrien. Verified patients name and  as identifiers. Background: Pt enrolled in RPM r/t pneumonia, COPD, and DM. Clinical Interventions: Reviewed and followed up on alerts and treatments-Discussed RPM red alert with Cody Nuñez who stated pt is \"doing pretty good\", is aware of weight gain, and had no c/o CP, SOB, and new / worsening edema. Patients phone should be back on soon. No approximate date known at this time. Patients sister states she will go to patients house after work and request pt return this LPN's call before 0/5:74 PM today. Jennifer Obrien verbalizes understanding of when to notify provider(s) of changes / concerns and when to seek emergent medical care for pt. Escalated alert to RN-CHAROI update provided     Plan/Follow Up: Will continue to review, monitor and address alerts with follow up based on severity of symptoms and risk factors.
understanding of when to notify provider(s) of changes / concerns and when to seek emergent medical care. Escalated alert to RN-FYI update provided   Plan/Follow Up: Will continue to review, monitor and address alerts with follow up based on severity of symptoms and risk factors.

## 2023-12-14 ENCOUNTER — CARE COORDINATION (OUTPATIENT)
Dept: CARE COORDINATION | Facility: CLINIC | Age: 53
End: 2023-12-14

## 2023-12-14 ENCOUNTER — HOME CARE VISIT (OUTPATIENT)
Dept: SCHEDULING | Facility: HOME HEALTH | Age: 53
End: 2023-12-14

## 2023-12-14 PROCEDURE — G0299 HHS/HOSPICE OF RN EA 15 MIN: HCPCS

## 2023-12-14 NOTE — CARE COORDINATION
Remote Patient Monitoring Note      Date/Time:  12/14/2023 10:22 AM  Patient Current Location: [facility] red alert received for weight increase (of 5 lbs In Last 7 Days). 6.2# increase since 12/8/23, 5.7# increase per 7 day look back, and 1.7# decrease in the last day noted in HRS. LPN previously spoke with pt and patients sister. Patients sister stated pt was \"doing pretty good\", was aware of weight gain, and had no c/o CP, SOB, and new / worsening edema. LPN spoke with pt who denied CP, SOB, and new or worsening edema. Pt was asymptomatic, reported continued medication compliance, and stated, \"I've been snacking a little bit too much on chips, cookies and junk\". Pt verbalized understanding of when to notify provider(s) of changes / concerns and when to seek emergent medical care. See RPM note from 12/13/23. Based on previous conversations and 1.7# decrease in the last day, no outreach by this LPN indicated at this time. Update routed to RN for review. Background: Pt enrolled in RPM r/t pneumonia, COPD, and DM. Plan/Follow Up: Will continue to review, monitor and address alerts with follow up based on severity of symptoms and risk factors.

## 2023-12-14 NOTE — CARE COORDINATION
Care Transitions Follow Up Call    Patient Current Location:  Home: [address] [place] [address] Pky Transition Nurse contacted the family by telephone to follow up after admission on 2023. Verified name and  with patient as identifiers. Patient: Brandee Obrien  Patient : 1970   MRN: [id number]  Reason for Admission: COPD Exacerbation   Discharge Date: 23 RARS: Readmission Risk Score: 20.5    Needs to be reviewed by the provider   Additional needs identified to be addressed with provider: Patient with weight gain of 5 lbs in the last 7 days, but decreased by 1.7 pound this morning. Patient denies any symptoms of shortness of breath, swelling, or congestion. Patient reports oxygen saturation of 96% this morning. Patient declines for CTN to contact provider for weight gain at this time. Patient states she was seen by Pulmonary yesterday. Patient verbalizes understanding of when to call provider and agrees to contact provider for symptoms or weight gain. Patient will be continued with RPM.   none             Method of communication with provider: none. Addressed changes since last contact:   Patient  RPM Nurse notified CTN of RPM red alert of weight gain. RPM red alert received for weight increase (of 5 lbs In Last 7 Days). 6.2 pounds increase since 23, 5.7 pounds increase per 7 day look back, and 1.7 pounds decrease in the last day noted in HRS. CTN spoke with the patient as her phone is working again. Patient denies any new or worsening symptoms. Patient with weight gain of 5 lbs in the last 7 days, but decreased by 1.7 pound this morning. Patient denies any symptoms of shortness of breath, swelling, or congestion or chest pain. Patient reports oxygen saturation of 96% this morning. Patient declines for CTN to contact provider for weight gain at this time. Patient states she was seen by Pulmonary yesterday.  Patient endorses compliance

## 2023-12-16 VITALS
RESPIRATION RATE: 18 BRPM | HEART RATE: 86 BPM | OXYGEN SATURATION: 98 % | SYSTOLIC BLOOD PRESSURE: 128 MMHG | DIASTOLIC BLOOD PRESSURE: 70 MMHG | TEMPERATURE: 98.2 F

## 2023-12-29 ENCOUNTER — TELEPHONE (OUTPATIENT)
Dept: PULMONOLOGY | Age: 53
End: 2023-12-29

## 2023-12-29 DIAGNOSIS — J44.9 STAGE 4 VERY SEVERE COPD BY GOLD CLASSIFICATION (HCC): Primary | ICD-10-CM

## 2023-12-29 NOTE — TELEPHONE ENCOUNTER
Order placed for nebulizer and put in [facility] to [facility]. Patient notified and voices understanding.

## 2023-12-29 NOTE — TELEPHONE ENCOUNTER
Patient is needing to get order for another nebulizer. Nebulizer is making a noise like it's going to stop any day.   Send order to  [facility]

## 2024-01-02 ENCOUNTER — CARE COORDINATION (OUTPATIENT)
Dept: CARE COORDINATION | Facility: CLINIC | Age: 54
End: 2024-01-02

## 2024-01-02 NOTE — CARE COORDINATION
Ambulatory Care Coordination Note  2024    Patient Current Location:  [place]     ACM contacted the patient by telephone. Verified name and  with patient as identifiers. Provided introduction to self, and explanation of the ACM role.     Challenges to be reviewed by the provider   Additional needs identified to be addressed with provider: No  none               Method of communication with provider: phone.    ACM: Kaya Martinez RN    F/u call with pt, she has been doing well. Had a nice [place] and [place]. Denies any problems with O2, BP of BS. Has all her medications and aware of upcoming appointments. Will check back with pt in 2 weeks.    Oxffered patient enrollment in the Remote Patient Monitoring (RPM) program for in-home monitoring: Patient declined.    Lab Results       None            Care Coordination Interventions    Referral from Primary Care Provider: No  Suggested Interventions and Community Resources  Home Health Services: In Process  Occupational Therapy: In Process  Physical Therapy: In Process          Goals Addressed                      This Visit's Progress      Conditions and Symptoms   On track      Patient/Family verbalizes understanding of self-management of chronic disease.  Assess barriers to safe and effective d/c AEB  Patient/family is able to verbalize and obtain medicine after d/c  Patient/family is aware and attends follow up appointment's s/p d/c          Patient Stated (pt-stated)   On track      Patient will:      Attend follow up appointment with PCP on 2023.  Call providers as needed with concerns/questions.  Follow discharge instructions. (Monitor oxygen saturations, shortness of breath)  Take medications as prescribed.     Barriers: overwhelmed by complexity of regimen  Plan for overcoming my barriers: education, telephonic support  Confidence: 8/  Anticipated Goal Completion Date: 2023              Future Appointments   Date Time Provider Department

## 2024-01-08 ENCOUNTER — CARE COORDINATION (OUTPATIENT)
Dept: CARE COORDINATION | Facility: CLINIC | Age: 54
End: 2024-01-08

## 2024-01-08 NOTE — CARE COORDINATION
Remote Alert Monitoring Note  Rpm alert to be reviewed by the provider   red alert   weight (Increase of 5 lbs In Last 7 Days)  Additional needs to be addressed by PCP: FYI Pt enrolled in RPM r/t pneumonia, COPD, and DM per HRS.   Discussed 9.2# weight increase since 24 with pt. Current recorded weight is 303.1#. 7.0# gain per 7 day look back noted in HRS. Pt denies CP and SOB at this time. Pt reports left knee is \"a little puffy\", states she has a family history of arthritis, swelling to knee\"comes and goes\", and increases with cold weather. Pt denies any known injury and states she has been elevating and applying ice to knee. All other metrics are WNL. Pt currently scheduled for gastro F/U on 24 and PCP office visit on 24. Pt verbalizes understanding of when to notify provider(s) of changes / concerns and when to seek emergent medical care.   Will continue to review, monitor and address alerts with follow up based on severity of symptoms and risk factors.                Date/Time:  2024 9:42 AM  Patient Current Location: [facility] contacted patient by telephone. Verified patients name and  as identifiers.  Background: Pt enrolled in RPM r/t pneumonia, COPD, and DM per HRS.   Refer to 911 immediately if:  Patient unresponsive or unable to provide history  Change in cognition or sudden confusion  Patient unable to respond in complete sentences  Intense chest pain/tightness  Any concern for any clinical emergency  Red Alert: Provider response time of 1 hr required for any red alert requiring intervention  Yellow Alert: Provider response time of 3hr required for any escalated yellow alert    Weight Scale Triage  Was your weight obtained upon rising/waking today? yes   Was your weight obtained after voiding and/or use of the bathroom today? yes   Did you weigh yourself in the same amount of clothing today, compared to how you typically do? yes   Was the scale bumped or moved prior to

## 2024-01-09 ENCOUNTER — CARE COORDINATION (OUTPATIENT)
Dept: CARE COORDINATION | Facility: CLINIC | Age: 54
End: 2024-01-09

## 2024-01-09 NOTE — CARE COORDINATION
Remote Alert Monitoring Note  Rpm alert to be reviewed by the provider   red alert   weight (Increase of 5 lbs In Last 7 Days)   Additional needs to be addressed by PCP: FYHALEIGH Pt enrolled in RPM r/t pneumonia, COPD, and DM per HRS.   Discussed 9.2# weight increase since 24 with pt. Current recorded weight is same as yesterdays, 303.1#. 7.0# gain per 7 day look back noted in HRS. Pt denies CP, SOB, and new or worsening edema at this time. Pt states swelling to left knee, reported on 24, is currently unchanged and denies acute distress. Pt previously reported left knee is \"a little puffy\", stated she has a family history of arthritis, swelling to knee \"comes and goes\", and increases with cold weather. Pt denied any known injury and stated she has been elevating and applying ice to knee. All other metrics are WNL. Pt currently scheduled for gastro F/U on 24 and PCP office visit on 24. Pt verbalizes understanding of when to notify provider(s) of changes / concerns and when to seek emergent medical care.                   Date/Time:  2024 10:24 AM  Patient Current Location: [place]  Return call / incoming call from pt received. Verified patients name and  as identifiers.  Background: Pt enrolled in RPM r/t pneumonia, COPD, and DM per HRS. (will notify ACM of need to clarify reason for monitoring via staff message)   Refer to 911 immediately if:  Patient unresponsive or unable to provide history  Change in cognition or sudden confusion  Patient unable to respond in complete sentences  Intense chest pain/tightness  Any concern for any clinical emergency  Red Alert: Provider response time of 1 hr required for any red alert requiring intervention  Yellow Alert: Provider response time of 3hr required for any escalated yellow alert    Weight Scale Triage  Was your weight obtained upon rising/waking today? yes   Was your weight obtained after voiding and/or use of the bathroom today? yes   Did you

## 2024-01-09 NOTE — CARE COORDINATION
Remote Patient Monitoring Note      Date/Time:  1/9/2024 10:22 AM  Patient Current Location: [place]  LPN attempted to contact patient by telephone regarding red alert received for weight increase (of 5 lbs In Last 7 Days). Left HIPAA compliant message requesting a return call.     Background: Pt enrolled in RPM r/t pneumonia, COPD, and DM per HRS.       Plan/Follow Up: Will continue to review, monitor and address alerts with follow up based on severity of symptoms and risk factors.

## 2024-01-10 ENCOUNTER — CARE COORDINATION (OUTPATIENT)
Dept: CARE COORDINATION | Facility: CLINIC | Age: 54
End: 2024-01-10

## 2024-01-10 NOTE — CARE COORDINATION
Remote Alert Monitoring Note  Rpm alert to be reviewed by the provider   red alert   weight (Increase of 5 lbs In Last 7 Days)   Additional needs to be addressed by PCP: Pt enrolled in RPM r/t Pneumonia and COPD.   Discussed alert for 7.1# weight increase in last 7 days with pt. Current recorded weight is 303.6#. Pt denies CP, SOB, and new or worsening edema. Pt stated \"my knee is still puffy but I'm not swollen anywhere else\", reported \"a little\" increase in snacking and decrease in activity. Pt was receiving home health services and participating in PT \"home exercises\", some with PT and some independently. Last PT home visit was 23 and patients recorded weight was 300.9#. Pt canceled PCP hospital F/U that was scheduled for 24, is currently scheduled for gastro OV on 24, and PCP OV on 24. Pt declined assistance with rescheduling PCP OV for earlier date. Pt encouraged to resume home exercises taught by PT and offered nutrition related education; pt declined education and states she knows what she should and should not eat. Pt verbalizes understanding of when to notify provider(s) of changes / concerns and when to seek emergent medical care.                       Date/Time:  1/10/2024 2:13 PM  Patient Current Location: Home: [address] [place] [address]  [address] [place] [id number]  LPN contacted patient by telephone. Verified patients name and  as identifiers.  Background: Pt enrolled in RPM r/t Pneumonia and COPD.   Refer to 911 immediately if:  Patient unresponsive or unable to provide history  Change in cognition or sudden confusion  Patient unable to respond in complete sentences  Intense chest pain/tightness  Any concern for any clinical emergency  Red Alert: Provider response time of 1 hr required for any red alert requiring intervention  Yellow Alert: Provider response time of 3hr required for any escalated yellow alert    Weight Scale Triage  Was your weight obtained upon

## 2024-01-11 ENCOUNTER — TELEPHONE (OUTPATIENT)
Facility: CLINIC | Age: 54
End: 2024-01-11

## 2024-01-11 ENCOUNTER — CARE COORDINATION (OUTPATIENT)
Dept: CARE COORDINATION | Facility: CLINIC | Age: 54
End: 2024-01-11

## 2024-01-11 NOTE — CARE COORDINATION
Remote Patient Monitoring Note      Date/Time:  1/11/2024 2:58 PM  Patient Current Location: [place]  Escalated alert rerouted to RPM NP, Cinda Cox.   Upcoming PCP office visit error date corrected in previous RPM note. Pt currently scheduled for PCP OV on 02/13/24 not 01/13/24.     Background: Pt enrolled in RPM r/t Pneumonia and COPD.       Plan/Follow Up: Will continue to review, monitor and address alerts with follow up based on severity of symptoms and risk factors.         
assistance with transportation. /84, BP HR 85, pulse ox 96% on home O2 @ 4lpm, pulse ox HR 97, and oral temperature 97.8F. Pt verbalizes understanding of when to notify provider(s) of changes / concerns and when to seek emergent medical care.   Escalated alert to RN-will route update  Escalated alert to PCP-will route update   Plan/Follow Up: Will continue to review, monitor and address alerts with follow up based on severity of symptoms and risk factors.

## 2024-01-11 NOTE — TELEPHONE ENCOUNTER
01/11/24 2:59 PM    REMOTE PATIENT MONITORING red ALERT RESPONSE         ASSESSMENT AND PLAN   Weight Gain:  Does not report any concerning respiratory or cardiac symptoms.  No h/o CHF. Patient admits to not following a special diet or exercising.   Recommend watching fluid and salt intake and increase daily activity.  Follow up with PCP as scheduled.     CHIEF COMPLAINT    Responding to an [facility] red alert for weight gain    HPI    Michelle Obrien is a 53 y.o. female who is enrolled in [facility] for pneumonia and COPD. We have been monitoring her weight and she has gained 7.5# in 7 days. She does not report any symptoms to the LPN, except new onset swelling in her knee.  She did not answer my call.           CURRENT MEDICATIONS    Current Outpatient Rx   Medication Sig Dispense Refill    albuterol sulfate HFA (PROVENTIL;VENTOLIN;PROAIR) 108 (90 Base) MCG/ACT inhaler Inhale 2 puffs into the lungs every 4 hours as needed for Wheezing 1 each 11    fluticasone-umeclidin-vilant (TRELEGY ELLIPTA) 100-62.5-25 MCG/ACT AEPB inhaler Inhale 1 puff into the lungs daily 1 each 11    albuterol (PROVENTIL) (2.5 MG/3ML) 0.083% nebulizer solution Take 3 mLs by nebulization every 6 hours as needed for Wheezing 120 each 3    OXYGEN 4 L/min by Other route continuous. Via NC during the day   Via Trilogy machine at night      ibuprofen (ADVIL;MOTRIN) 200 MG tablet Take 1 tablet by mouth every 8 hours as needed for Pain      nicotine (NICODERM CQ) 14 MG/24HR Place 1 patch onto the skin daily (Patient not taking: Reported on 12/13/2023) 30 patch 0    metFORMIN (GLUCOPHAGE) 500 MG tablet Take 1 tablet by mouth 2 times daily (with meals) 60 tablet 5    atorvastatin (LIPITOR) 10 MG tablet Take 1 tablet by mouth at bedtime 30 tablet 5    ferrous sulfate (IRON 325) 325 (65 Fe) MG tablet Take 1 tablet by mouth Daily with supper 30 tablet 3    guaiFENesin 1200 MG TB12 Take 1 tablet by mouth 2 times daily 60 tablet 5    dilTIAZem (CARDIZEM CD) 240 MG

## 2024-01-12 ENCOUNTER — CARE COORDINATION (OUTPATIENT)
Dept: CARE COORDINATION | Facility: CLINIC | Age: 54
End: 2024-01-12

## 2024-01-12 NOTE — CARE COORDINATION
Ambulatory Care Coordination Note  2024    Patient Current Location:  [place]     ACM contacted the patient by telephone. Verified name and  with patient as identifiers. Provided introduction to self, and explanation of the ACM role.     Challenges to be reviewed by the provider   Additional needs identified to be addressed with provider: No  none               Method of communication with provider: phone.    ACM: Kaya Martinez RN    Pt been having some fluctuations in weight over the past few days. Today I spoke with there and her weight is down to 303 lbs. She denies any SOB, CP or cough. The only fluid she notices some fluid on her Lt knee, she has been applying ice and heat to aid in fluid relief along with elevating legs when sitting. Pt denies any other concerns and generally \"feels good\". Will check back in 2 weeks, unless something changes. Pt PCP is aware of pt events.    Offered patient enrollment in the Remote Patient Monitoring (RPM) program for in-home monitoring: Yes, patient already enrolled.    Lab Results       None            Care Coordination Interventions    Referral from Primary Care Provider: No  Suggested Interventions and Community Resources  Home Health Services: In Process  Occupational Therapy: In Process  Physical Therapy: In Process          Goals Addressed    None         Future Appointments   Date Time Provider Department Center   2024 10:45 AM Nikolas Mark MD [facility] AMB   2024  9:15 AM MAT [facility] AMB   2024 10:30 AM Xochitl Ivy PAZev[facility] AMB   3/25/2024  9:30 AM Gisela Webber MD [facility] AMB   ,   COPD Assessment    Does the patient understand envrionmental exposure?: Yes  Is the patient able to verbalize Rescue vs. Long Acting medications?: Yes  Does the patient have a nebulizer?: Yes  Does the patient use a space with inhaled medications?: Yes     No patient-reported symptoms         Symptoms:          ,   General Assessment

## 2024-01-16 ENCOUNTER — CARE COORDINATION (OUTPATIENT)
Dept: CARE COORDINATION | Facility: CLINIC | Age: 54
End: 2024-01-16

## 2024-01-16 NOTE — CARE COORDINATION
F/u call made, no answer and a message was left. Pt informed that if they have any questions, concerns or any needs that need to be addressed to please call back. Otherwise I will reach out again 1/23

## 2024-01-22 ENCOUNTER — CARE COORDINATION (OUTPATIENT)
Dept: CARE COORDINATION | Facility: CLINIC | Age: 54
End: 2024-01-22

## 2024-01-22 NOTE — CARE COORDINATION
rising and drank prior to obtaining daily weight. Current recorded weight is 309.7#. Pt verbalizes understanding of RPM weight instructions, when to notify provider(s) of changes / concerns, and when to seek emergent medical care.   Escalated alert to RN-will route update to ACM   Plan/Follow Up: Will continue to review, monitor and address alerts with follow up based on severity of symptoms and risk factors.

## 2024-01-24 ENCOUNTER — CARE COORDINATION (OUTPATIENT)
Dept: CARE COORDINATION | Facility: CLINIC | Age: 54
End: 2024-01-24

## 2024-01-24 NOTE — CARE COORDINATION
Ambulatory Care Coordination Note  2024    Patient Current Location:  [place]     ACM contacted the patient by telephone. Verified name and  with patient as identifiers. Provided introduction to self, and explanation of the ACM role.     Challenges to be reviewed by the provider   Additional needs identified to be addressed with provider: No  none               Method of communication with provider: phone.    ACM: Kaya Martinez RN    F/u call with pt, she is aware of the the fluctuations in her weight. she is unclear why, she has not changed anything. She is trying to move around more, monitor fluid intake. She denies SOB, CP and states that her knee swelling has seemed to improve. She asked for assistance with finding a Podiatrists. We found [facility] that is only 5 miles form her home. She will call and schedule appt. Pt instructed to call if she has any issues or in need of a referral. I will check back in a few weeks otherwise.    Offered patient enrollment in the Remote Patient Monitoring (RPM) program for in-home monitoring: Yes, patient already enrolled.    Lab Results       None            Care Coordination Interventions    Referral from Primary Care Provider: No  Suggested Interventions and Community Resources  Home Health Services: In Process  Occupational Therapy: In Process  Physical Therapy: In Process          Goals Addressed                      This Visit's Progress      Conditions and Symptoms   On track      Patient/Family verbalizes understanding of self-management of chronic disease.  Assess barriers to safe and effective d/c AEB  Patient/family is able to verbalize and obtain medicine after d/c  Patient/family is aware and attends follow up appointment's s/p d/c          Patient Stated (pt-stated)   On track      Patient will:      Attend follow up appointment with PCP on 2023.  Call providers as needed with concerns/questions.  Follow discharge instructions.

## 2024-01-29 ENCOUNTER — CARE COORDINATION (OUTPATIENT)
Dept: CARE COORDINATION | Facility: CLINIC | Age: 54
End: 2024-01-29

## 2024-01-29 NOTE — CARE COORDINATION
Remote Alert Monitoring Note  Rpm alert to be reviewed by the provider   red alert   weight (Increase of 5 lbs In Last 7 Days)   Additional needs to be addressed by N/A: No                    Date/Time:  2024 11:43 AM  Patient Current Location: [place]  LPN contacted patient by telephone. Verified patients name and  as identifiers.  Background: Pt enrolled in RPM r/t pneumonia and COPD.   Refer to 911 immediately if:  Patient unresponsive or unable to provide history  Change in cognition or sudden confusion  Patient unable to respond in complete sentences  Intense chest pain/tightness  Any concern for any clinical emergency  Red Alert: Provider response time of 1 hr required for any red alert requiring intervention  Yellow Alert: Provider response time of 3hr required for any escalated yellow alert    Clinical Interventions: Reviewed and followed up on alerts and treatments-Discussed alert for 22.8# increase since 24 and 22.0# weight decrease from 24-24 with pt. Current recorded weight is 306.6#. 3.1# decrease per 7 day look back noted in HRS. Pt speaking in complete sentences, denies CP, SOB, and new or worsening edema at this time. Pt stated, \"that must have been wrong\" in regards to weight of 283.8# recorded on 24. Inaccurate weight removed from HRS. Pt verbalizes understanding of when to notify provider(s) of changes / concerns and when to seek emergent medical care.   Plan/Follow Up: Will continue to review, monitor and address alerts with follow up based on severity of symptoms and risk factors.

## 2024-02-05 ENCOUNTER — TELEPHONE (OUTPATIENT)
Dept: INTERNAL MEDICINE CLINIC | Facility: CLINIC | Age: 54
End: 2024-02-05

## 2024-02-05 ENCOUNTER — TRANSCRIBE ORDERS (OUTPATIENT)
Dept: SCHEDULING | Age: 54
End: 2024-02-05

## 2024-02-05 DIAGNOSIS — Z12.31 SCREENING MAMMOGRAM FOR HIGH-RISK PATIENT: Primary | ICD-10-CM

## 2024-02-05 NOTE — TELEPHONE ENCOUNTER
----- Message from Natalie Khalif sent at 2/5/2024  9:33 AM EST -----  Subject: Message to Provider    QUESTIONS  Information for Provider? Patient is calling in to reschedule her labs. I   WT to office no answer. Patient did have an appointment on 3/6/24 @ 9:15   needs to be rescheduled. Please contact patient  ---------------------------------------------------------------------------  --------------  CALL BACK INFO  [phone]; OK to leave message on voicemail  ---------------------------------------------------------------------------  --------------  SCRIPT ANSWERS  Relationship to Patient? Self

## 2024-02-07 NOTE — TELEPHONE ENCOUNTER
----- Message from Felisa Kinney sent at 2/7/2024  9:03 AM EST -----  Subject: Message to Provider    QUESTIONS  Information for Provider? pt returning call again to get labs scheduled   for her appt next month. please call her back again. thanks.   ---------------------------------------------------------------------------  --------------  CALL BACK INFO  [phone]; OK to leave message on voicemail,OK to respond with electronic   message via [employer] portal (only for patients who have registered [employer]   account)  ---------------------------------------------------------------------------  --------------  SCRIPT ANSWERS  undefined

## 2024-02-13 ENCOUNTER — CARE COORDINATION (OUTPATIENT)
Dept: CARE COORDINATION | Facility: CLINIC | Age: 54
End: 2024-02-13

## 2024-02-13 NOTE — CARE COORDINATION
Ambulatory Care Coordination Note  2024    Patient Current Location:  [place]     ACM contacted the patient by telephone. Verified name and  with patient as identifiers. Provided introduction to self, and explanation of the ACM role.     Challenges to be reviewed by the provider   Additional needs identified to be addressed with provider: No  none               Method of communication with provider: phone.    ACM: Kaya Martinez RN    F/u call with pt, she has maintain and doing well these past 4 weeks. She was able to obtain a appt with [facility] and she sees them on . She has continued to take medications monitor her vs and check bs. She has my information in the event something changes, otherwise I will reach out in 4 weeks.     Offered patient enrollment in the Remote Patient Monitoring (RPM) program for in-home monitoring: Yes, patient already enrolled.    Lab Results       None            Care Coordination Interventions    Referral from Primary Care Provider: No  Suggested Interventions and Community Resources  Home Health Services: In Process  Occupational Therapy: In Process  Physical Therapy: In Process          Goals Addressed                      This Visit's Progress      Conditions and Symptoms   On track      Patient/Family verbalizes understanding of self-management of chronic disease.  Assess barriers to safe and effective d/c AEB  Patient/family is able to verbalize and obtain medicine after d/c  Patient/family is aware and attends follow up appointment's s/p d/c          Patient Stated (pt-stated)   On track      Patient will:      Attend follow up appointment with PCP on 2023.  Call providers as needed with concerns/questions.  Follow discharge instructions. (Monitor oxygen saturations, shortness of breath)  Take medications as prescribed.     Barriers: overwhelmed by complexity of regimen  Plan for overcoming my barriers: education, telephonic support  Confidence:

## 2024-03-01 ENCOUNTER — CARE COORDINATION (OUTPATIENT)
Dept: CARE COORDINATION | Facility: CLINIC | Age: 54
End: 2024-03-01

## 2024-03-01 NOTE — CARE COORDINATION
Remote Patient Monitoring Note      Date/Time:  3/1/2024 3:55 PM  Patient Current Location: [place]  Pt has not updated / rechecked BP as of this time. All current recorded metrics are within RPM parameters. Will route to [facility].     Background: Pt enrolled in RPM r/t Pneumonia and COPD       Plan/Follow Up: Will continue to review, monitor and address alerts with follow up based on severity of symptoms and risk factors.

## 2024-03-01 NOTE — CARE COORDINATION
Remote Alert Monitoring Note  Rpm alert to be reviewed by the provider   red alert   blood pressure reading (113/190)   Additional needs to be addressed by N/A: No                    Date/Time:  3/1/2024 9:15 AM  Patient Current Location: [facility] contacted patient by telephone. Verified patients name and  as identifiers.  Background: Pt enrolled in RPM r/t Pneumonia and COPD  Refer to 911 immediately if:  Patient unresponsive or unable to provide history  Change in cognition or sudden confusion  Patient unable to respond in complete sentences  Intense chest pain/tightness  Any concern for any clinical emergency  Red Alert: Provider response time of 1 hr required for any red alert requiring intervention  Yellow Alert: Provider response time of 3hr required for any escalated yellow alert    BP Triage  Are you having any Chest Pain? no   Are you having any Shortness of Breath? no   Do you have a headache or have any vision changes? no   Are you having any numbness or tingling? no   Are you having any other health concerns or issues? no        Clinical Interventions: Reviewed and followed up on alerts and treatments-Pt denies acute distress/ is asymptomatic. Per pt, manually entered BP of 113/190 is incorrect and \"it was 90 not 190\". Inaccurate BP removed from HRS and updated to correct BP reading obtained. Pt is agreeable to recheck BP when she returns home, verbalizes understanding RPM monitoring hours, of when to notify provider(s) of changes / concerns, and when to seek emergent medical care.   Plan/Follow Up: Will continue to review, monitor and address alerts with follow up based on severity of symptoms and risk factors.

## 2024-03-08 PROBLEM — J44.9 COPD, SEVERE (HCC): Status: ACTIVE | Noted: 2017-03-02

## 2024-03-08 PROBLEM — F17.211 CIGARETTE NICOTINE DEPENDENCE IN REMISSION: Status: ACTIVE | Noted: 2017-03-05

## 2024-03-11 ENCOUNTER — TELEPHONE (OUTPATIENT)
Dept: INTERNAL MEDICINE CLINIC | Facility: CLINIC | Age: 54
End: 2024-03-11

## 2024-03-11 NOTE — TELEPHONE ENCOUNTER
No show letter mailed on 3/11. Pt is rescheduled for labs on 4/16 and an OV on 4/23 with WANG Barron.

## 2024-03-13 ENCOUNTER — CARE COORDINATION (OUTPATIENT)
Dept: CARE COORDINATION | Facility: CLINIC | Age: 54
End: 2024-03-13

## 2024-03-25 ENCOUNTER — CARE COORDINATION (OUTPATIENT)
Dept: CARE COORDINATION | Facility: CLINIC | Age: 54
End: 2024-03-25

## 2024-03-25 NOTE — CARE COORDINATION
Remote Alert Monitoring Note  Rpm alert to be reviewed by the provider   red alert   weight (of 5 lbs in last 7 days)   Additional needs to be addressed by N/A: No                    Date/Time:  3/25/2024 11:36 AM  Patient Current Location: [place]  LPN contacted patient by telephone. Verified patients name and  as identifiers.  Background: Pt enrolled in RPM r/t pneumonia and COPD  Refer to 911 immediately if:  Patient unresponsive or unable to provide history  Change in cognition or sudden confusion  Patient unable to respond in complete sentences  Intense chest pain/tightness  Any concern for any clinical emergency  Red Alert: Provider response time of 1 hr required for any red alert requiring intervention  Yellow Alert: Provider response time of 3hr required for any escalated yellow alert    Weight Scale Triage  Was your weight obtained upon rising/waking today? Yes    Was your weight obtained after voiding and/or use of the bathroom today? no   Did you weigh yourself in the same amount of clothing today, compared to how you typically do? no   Was the scale bumped or moved prior to today's weight? no   Is your scale on a flat/hard surface? yes   Did you obtain your weight with shoes on? no   If yes, is this something you normally do during your daily weights? NA   Were you standing up straight on the scale today? yes   Were you leaning on anything while obtaining your weight today? no      Clinical Interventions: Reviewed and followed up on alerts and treatments- Current recorded weight is 310.2#. 5.3# increase since 24 and 0.4# decrease per 7 day look back noted in HRS. Pt denies CP and SOB at this time. Pt reports swelling to left knee that chronically \"comes and goes\"; Denies any known injury and has previously discussed with PCP. Pt also reports a recent increase in eating, states she ate chinese food and a chicken finger sandwich last night, believes swelling to knee, and increase in eating

## 2024-03-26 ENCOUNTER — CARE COORDINATION (OUTPATIENT)
Dept: CARE COORDINATION | Facility: CLINIC | Age: 54
End: 2024-03-26

## 2024-03-27 ENCOUNTER — CARE COORDINATION (OUTPATIENT)
Dept: CARE COORDINATION | Facility: CLINIC | Age: 54
End: 2024-03-27

## 2024-03-27 NOTE — CARE COORDINATION
Remote Patient Monitoring Note      Date/Time:  3/27/2024 1:42 PM  Patient Current Location: [place]  LPN attempted to contact patient by telephone regarding red alert received for weight increase (of 5 lbs in last 7 days). Left HIPAA compliant message requesting a return call.      Background: Pt enrolled in RPM r/t pneumonia and COPD.       Plan/Follow Up: Will continue to review, monitor and address alerts with follow up based on severity of symptoms and risk factors.

## 2024-03-27 NOTE — CARE COORDINATION
Remote Patient Monitoring Note      Date/Time:  3/27/2024 3:04 PM  Patient Current Location: [place]  LPN 2nd attempt to contact patient by telephone regarding red alert received for weight increase (of 5 lbs in last 7 days). Left another HIPAA compliant message requesting a return call. LPN contacted patients emergency contact, Jennifer Camille. Verified patients name and  as identifiers. Jennifer Obrien is currently at work and states she will attempt to contact pt after work and request pt return this LPN's call. Jennifercolleen Obrien verbalizes understanding of RPM monitoring hours, when to notify provider(s) of changes / concerns, and when to seek emergent medical care for pt. Will route to ACM.     Background: Pt enrolled in RPM r/t pneumonia and COPD.      Plan/Follow Up: Will continue to review, monitor and address alerts with follow up based on severity of symptoms and risk factors.

## 2024-03-28 ENCOUNTER — CARE COORDINATION (OUTPATIENT)
Dept: CARE COORDINATION | Facility: CLINIC | Age: 54
End: 2024-03-28

## 2024-03-28 NOTE — CARE COORDINATION
Remote Alert Monitoring Note  Rpm alert to be reviewed by the provider   red alert   weight (increase of 5 lbs in last 7 days)   Additional needs to be addressed by N/A: No                    Date/Time:  3/28/2024 8:59 AM  Patient Current Location: [place]  LPN contacted patient by telephone. Verified patients name and  as identifiers.  Background: Pt enrolled in RPM r/t pneumonia and COPD.     Refer to 911 immediately if:  Patient unresponsive or unable to provide history  Change in cognition or sudden confusion  Patient unable to respond in complete sentences  Intense chest pain/tightness  Any concern for any clinical emergency  Red Alert: Provider response time of 1 hr required for any red alert requiring intervention  Yellow Alert: Provider response time of 3hr required for any escalated yellow alert    Weight Scale Triage  Was your weight obtained upon rising/waking today? yes   Was your weight obtained after voiding and/or use of the bathroom today? yes   Did you weigh yourself in the same amount of clothing today, compared to how you typically do? no   Was the scale bumped or moved prior to today's weight? no   Is your scale on a flat/hard surface? yes   Did you obtain your weight with shoes on? no   If yes, is this something you normally do during your daily weights? NA   Were you standing up straight on the scale today? yes   Were you leaning on anything while obtaining your weight today? no      Clinical Interventions: Reviewed and followed up on alerts and treatments-Pt denies CP, SOB, and new / worsening edema at this time. Current recorded weight is 311.5#. 6.6# increase per 7 day look back noted. Pt reports left knee is still swollen / \"puffy\" and states \"but it's no bigger\". Encouraged pt to ice left knee 20 minutes on 20 minutes off. Denies pain at this time. Pt states \"I know exactly what it is\" in regards to weight gain and reports she eats fast food daily towards the end of every month

## 2024-04-10 ENCOUNTER — CARE COORDINATION (OUTPATIENT)
Dept: CARE COORDINATION | Facility: CLINIC | Age: 54
End: 2024-04-10

## 2024-04-16 DIAGNOSIS — E78.5 HYPERLIPIDEMIA LDL GOAL <100: ICD-10-CM

## 2024-04-16 DIAGNOSIS — D50.0 IRON DEFICIENCY ANEMIA DUE TO CHRONIC BLOOD LOSS: ICD-10-CM

## 2024-04-16 DIAGNOSIS — E11.29 TYPE 2 DIABETES MELLITUS WITH MICROALBUMINURIA, WITHOUT LONG-TERM CURRENT USE OF INSULIN (HCC): ICD-10-CM

## 2024-04-16 DIAGNOSIS — I10 ESSENTIAL (PRIMARY) HYPERTENSION: ICD-10-CM

## 2024-04-16 DIAGNOSIS — R80.9 TYPE 2 DIABETES MELLITUS WITH MICROALBUMINURIA, WITHOUT LONG-TERM CURRENT USE OF INSULIN (HCC): ICD-10-CM

## 2024-04-16 LAB
ALBUMIN SERPL-MCNC: 3.7 G/DL (ref 3.5–5)
ALBUMIN/GLOB SERPL: 0.9 (ref 0.4–1.6)
ALP SERPL-CCNC: 132 U/L (ref 50–136)
ALT SERPL-CCNC: 20 U/L (ref 12–65)
ANION GAP SERPL CALC-SCNC: 2 MMOL/L (ref 2–11)
AST SERPL-CCNC: 10 U/L (ref 15–37)
BASOPHILS # BLD: 0 K/UL (ref 0–0.2)
BASOPHILS NFR BLD: 1 % (ref 0–2)
BILIRUB SERPL-MCNC: 0.2 MG/DL (ref 0.2–1.1)
BUN SERPL-MCNC: 10 MG/DL (ref 6–23)
CALCIUM SERPL-MCNC: 9.9 MG/DL (ref 8.3–10.4)
CHLORIDE SERPL-SCNC: 102 MMOL/L (ref 103–113)
CHOLEST SERPL-MCNC: 136 MG/DL
CO2 SERPL-SCNC: 36 MMOL/L (ref 21–32)
CREAT SERPL-MCNC: 0.6 MG/DL (ref 0.6–1)
CREAT UR-MCNC: 246 MG/DL
DIFFERENTIAL METHOD BLD: ABNORMAL
EOSINOPHIL # BLD: 0.2 K/UL (ref 0–0.8)
EOSINOPHIL NFR BLD: 2 % (ref 0.5–7.8)
ERYTHROCYTE [DISTWIDTH] IN BLOOD BY AUTOMATED COUNT: 19.4 % (ref 11.9–14.6)
FERRITIN SERPL-MCNC: 26 NG/ML (ref 8–388)
GLOBULIN SER CALC-MCNC: 4 G/DL (ref 2.8–4.5)
GLUCOSE SERPL-MCNC: 100 MG/DL (ref 65–100)
HCT VFR BLD AUTO: 38.7 % (ref 35.8–46.3)
HDLC SERPL-MCNC: 33 MG/DL (ref 40–60)
HDLC SERPL: 4.1
HGB BLD-MCNC: 10.5 G/DL (ref 11.7–15.4)
IMM GRANULOCYTES # BLD AUTO: 0 K/UL (ref 0–0.5)
IMM GRANULOCYTES NFR BLD AUTO: 0 % (ref 0–5)
IRON SATN MFR SERPL: 7 %
IRON SERPL-MCNC: 25 UG/DL (ref 35–150)
LDLC SERPL CALC-MCNC: 87.4 MG/DL
LYMPHOCYTES # BLD: 1.8 K/UL (ref 0.5–4.6)
LYMPHOCYTES NFR BLD: 22 % (ref 13–44)
MCH RBC QN AUTO: 20 PG (ref 26.1–32.9)
MCHC RBC AUTO-ENTMCNC: 27.1 G/DL (ref 31.4–35)
MCV RBC AUTO: 73.7 FL (ref 82–102)
MICROALBUMIN UR-MCNC: 95 MG/DL
MICROALBUMIN/CREAT UR-RTO: 386 MG/G (ref 0–30)
MONOCYTES # BLD: 0.4 K/UL (ref 0.1–1.3)
MONOCYTES NFR BLD: 5 % (ref 4–12)
NEUTS SEG # BLD: 5.8 K/UL (ref 1.7–8.2)
NEUTS SEG NFR BLD: 70 % (ref 43–78)
NRBC # BLD: 0 K/UL (ref 0–0.2)
PLATELET # BLD AUTO: 257 K/UL (ref 150–450)
PMV BLD AUTO: 11.2 FL (ref 9.4–12.3)
POTASSIUM SERPL-SCNC: 4.7 MMOL/L (ref 3.5–5.1)
PROT SERPL-MCNC: 7.7 G/DL (ref 6.3–8.2)
RBC # BLD AUTO: 5.25 M/UL (ref 4.05–5.2)
SODIUM SERPL-SCNC: 140 MMOL/L (ref 136–146)
TIBC SERPL-MCNC: 338 UG/DL (ref 250–450)
TRIGL SERPL-MCNC: 78 MG/DL (ref 35–150)
VLDLC SERPL CALC-MCNC: 15.6 MG/DL (ref 6–23)
WBC # BLD AUTO: 8.2 K/UL (ref 4.3–11.1)

## 2024-04-17 ENCOUNTER — CARE COORDINATION (OUTPATIENT)
Dept: CARE COORDINATION | Facility: CLINIC | Age: 54
End: 2024-04-17

## 2024-04-17 LAB
EST. AVERAGE GLUCOSE BLD GHB EST-MCNC: 123 MG/DL
HBA1C MFR BLD: 5.9 % (ref 4.8–5.6)

## 2024-04-17 ASSESSMENT — ENCOUNTER SYMPTOMS: DYSPNEA ASSOCIATED WITH: MINIMAL EXERTION

## 2024-04-17 NOTE — CARE COORDINATION
Remote Patient Monitoring Note      Date/Time:  4/17/2024 9:33 AM  Patient Current Location: [place]  LPN attempted to contact patient by telephone regarding red alert received for survey response (Pt answered yes to: Has your congestion worsened in the past 24 hours? And Has your breathing worsened in the last 24 hours?). Left HIPAA compliant message requesting a return call.     Background: Pt enrolled in RPM r/t Pneumonia and COPD.       Plan/Follow Up: Will continue to review, monitor and address alerts with follow up based on severity of symptoms and risk factors.

## 2024-04-17 NOTE — CARE COORDINATION
Ambulatory Care Coordination Note  2024    Patient Current Location:  [place]     ACM contacted the patient by telephone. Verified name and  with patient as identifiers. Provided introduction to self, and explanation of the ACM role.     Challenges to be reviewed by the provider   Additional needs identified to be addressed with provider: No  Pt c/o increased productive cough, SOB and not slepping. Please see note below and advise in necessary. She does have a virtual  am w/ Heidi Nichols.               Method of communication with provider: phone.    ACM: Kaya Martinez, RN    F/u call today and pt is not feeling well. C/o 2 days with increased/productive cough w/ white sputum. She has not been sleeping, feels SOB at rest, maintaining 4L NC at all times, with resting O2 sat of 93-94%. Temp today was 99.8 oral. She has been using her albuterol multiple times this morning and it has provided some relief. Pt is out of her Mucinex, she will contact son and see if he can pick some up on his way home. Pt has reached out to PCP office and made a virtual appt for tomorrow. Pt aware of when to call 911, and I will reach out again tomorrow.    Offered patient enrollment in the Remote Patient Monitoring (RPM) program for in-home monitoring: Yes, patient already enrolled.    Lab Results       None                 Goals Addressed    None         Future Appointments   Date Time Provider Department Center   2024  7:00 AM Heidi Nichols APRN - CNP [facility]   2024  3:00 PM Xochitl Ivy, PA-C [facility]   ,   COPD Assessment    Does the patient understand envrionmental exposure?: Yes  Is the patient able to verbalize Rescue vs. Long Acting medications?: Yes  Does the patient have a nebulizer?: Yes  Does the patient use a space with inhaled medications?: Yes     Increase in cough, Shortness of breath (worse than baseline), Changes in color of sputum         Symptoms:     Symptom course:

## 2024-04-18 ENCOUNTER — TELEMEDICINE (OUTPATIENT)
Dept: INTERNAL MEDICINE CLINIC | Facility: CLINIC | Age: 54
End: 2024-04-18
Payer: MEDICARE

## 2024-04-18 DIAGNOSIS — J44.1 COPD WITH ACUTE EXACERBATION (HCC): Primary | ICD-10-CM

## 2024-04-18 PROCEDURE — 99213 OFFICE O/P EST LOW 20 MIN: CPT | Performed by: NURSE PRACTITIONER

## 2024-04-18 RX ORDER — PREDNISONE 20 MG/1
40 TABLET ORAL DAILY
Qty: 10 TABLET | Refills: 0 | Status: SHIPPED | OUTPATIENT
Start: 2024-04-18 | End: 2024-04-23

## 2024-04-18 RX ORDER — GUAIFENESIN 600 MG/1
1200 TABLET, EXTENDED RELEASE ORAL 2 TIMES DAILY
Qty: 40 TABLET | Refills: 0 | Status: SHIPPED | OUTPATIENT
Start: 2024-04-18 | End: 2024-04-28

## 2024-04-18 RX ORDER — AZITHROMYCIN 250 MG/1
TABLET, FILM COATED ORAL
Qty: 6 TABLET | Refills: 0 | Status: SHIPPED | OUTPATIENT
Start: 2024-04-18 | End: 2024-04-28

## 2024-04-18 ASSESSMENT — ENCOUNTER SYMPTOMS
NAUSEA: 0
SHORTNESS OF BREATH: 1
WHEEZING: 1
COUGH: 1
SORE THROAT: 0
DIARRHEA: 0
SINUS PRESSURE: 0
ABDOMINAL PAIN: 0
RHINORRHEA: 1
VOMITING: 0

## 2024-04-18 NOTE — PROGRESS NOTES
Michelle Obrien, was evaluated through a synchronous (real-time) audio-video encounter. The patient (or guardian if applicable) is aware that this is a billable service, which includes applicable co-pays. This Virtual Visit was conducted with patient's (and/or legal guardian's) consent. Patient identification was verified, and a caregiver was present when appropriate.   The patient was located at Home: [address]  [place] [address]  Provider was located at Facility (Appt Dept): [address]  [place] [address]  Confirm you are appropriately licensed, registered, or certified to deliver care in the state where the patient is located as indicated above. If you are not or unsure, please re-schedule the visit: Yes, I confirm.     Michelle Obrien (:  1970) is a Established patient, presenting virtually for evaluation of the following:    Assessment & Plan   Below is the assessment and plan developed based on review of pertinent history, physical exam, labs, studies, and medications.    1. COPD with acute exacerbation (HCC)  -     guaiFENesin (MUCINEX) 600 MG extended release tablet; Take 2 tablets by mouth 2 times daily for 10 days, Disp-40 tablet, R-0Normal  -     predniSONE (DELTASONE) 20 MG tablet; Take 2 tablets by mouth daily for 5 days, Disp-10 tablet, R-0Normal  -     azithromycin (ZITHROMAX) 250 MG tablet; 500mg on day 1 followed by 250mg on days 2 - 5, Disp-6 tablet, R-0Normal  Medications as prescribed. Continue inhalers and breathing treatments as prescribed. Not ideal for today's appointment to be done virtually, but patient unable to get here due to transportation issues. Encouraged to keep appointment scheduled for next week with PCP. If symptoms worsen over the weekend, advised that she call EMS promptly to go to the ER for evaluation. Encouraged to reschedule pulmonary follow-up.       Return in 5 days (on 2024) for next

## 2024-04-18 NOTE — CARE COORDINATION
Please make sure she has an appointment with pulmonary to follow-up on current symptoms and treatment given today.  Her pulmonary condition is quite advanced and often requires longer durations of treatment so need them to be involved!!

## 2024-04-22 RX ORDER — IPRATROPIUM BROMIDE AND ALBUTEROL SULFATE 2.5; .5 MG/3ML; MG/3ML
SOLUTION RESPIRATORY (INHALATION)
COMMUNITY
Start: 2024-04-16

## 2024-04-22 RX ORDER — ESOMEPRAZOLE MAGNESIUM 40 MG/1
CAPSULE, DELAYED RELEASE ORAL
COMMUNITY
Start: 2024-02-14

## 2024-04-22 NOTE — PROGRESS NOTES
maxillary sinus tenderness or frontal sinus tenderness.      Mouth/Throat:      Mouth: Mucous membranes are moist.      Pharynx: Oropharynx is clear.   Eyes:      Extraocular Movements: Extraocular movements intact.      Conjunctiva/sclera: Conjunctivae normal.      Pupils: Pupils are equal, round, and reactive to light.   Neck:      Vascular: No carotid bruit.   Cardiovascular:      Rate and Rhythm: Normal rate and regular rhythm.      Heart sounds: Normal heart sounds.   Pulmonary:      Effort: Pulmonary effort is normal.      Breath sounds: Decreased breath sounds present.      Comments: Wears O2  Musculoskeletal:         General: Normal range of motion.      Cervical back: Normal range of motion.      Right lower leg: No edema.      Left lower leg: No edema.   Lymphadenopathy:      Head:      Right side of head: No submandibular or tonsillar adenopathy.      Left side of head: No submandibular or tonsillar adenopathy.   Skin:     General: Skin is warm and dry.   Neurological:      Mental Status: She is alert and oriented to person, place, and time.   Psychiatric:         Mood and Affect: Mood normal.         Behavior: Behavior normal.         Thought Content: Thought content normal.         Judgment: Judgment normal.       Results for orders placed or performed in visit on 04/23/24   AMB POC COVID-19 COV   Result Value Ref Range    SARS-COV-2 RNA, POC Positive     Lot number swab      EXP date swab      Lot number solution      EXP date solution      LOT NUMBER POC      EXPIRATION DATE     AMB POC RAPID INFLUENZA TEST   Result Value Ref Range    Valid Internal Control, POC Yes     QuickVue Influenza test Negative    AMB POC RSV   Result Value Ref Range    Valid Internal Control, POC Yes     RSV RNA, POC Negative             On this date 4/23/2024 I have spent 50 minutes reviewing previous notes, test results and face to face with the patient discussing the diagnosis and importance of compliance with the

## 2024-04-23 ENCOUNTER — OFFICE VISIT (OUTPATIENT)
Dept: INTERNAL MEDICINE CLINIC | Facility: CLINIC | Age: 54
End: 2024-04-23
Payer: MEDICARE

## 2024-04-23 VITALS
HEART RATE: 107 BPM | OXYGEN SATURATION: 97 % | TEMPERATURE: 98.9 F | DIASTOLIC BLOOD PRESSURE: 80 MMHG | SYSTOLIC BLOOD PRESSURE: 128 MMHG | WEIGHT: 293 LBS | BODY MASS INDEX: 48.82 KG/M2 | HEIGHT: 65 IN

## 2024-04-23 DIAGNOSIS — R51.9 ACUTE NONINTRACTABLE HEADACHE, UNSPECIFIED HEADACHE TYPE: ICD-10-CM

## 2024-04-23 DIAGNOSIS — E11.29 TYPE 2 DIABETES MELLITUS WITH MICROALBUMINURIA, WITHOUT LONG-TERM CURRENT USE OF INSULIN (HCC): Primary | ICD-10-CM

## 2024-04-23 DIAGNOSIS — D50.0 IRON DEFICIENCY ANEMIA DUE TO CHRONIC BLOOD LOSS: ICD-10-CM

## 2024-04-23 DIAGNOSIS — I10 ESSENTIAL (PRIMARY) HYPERTENSION: ICD-10-CM

## 2024-04-23 DIAGNOSIS — U07.1 COVID-19: ICD-10-CM

## 2024-04-23 DIAGNOSIS — R50.9 FEVER, UNSPECIFIED FEVER CAUSE: ICD-10-CM

## 2024-04-23 DIAGNOSIS — J41.1 CHRONIC BRONCHITIS WITH PRODUCTIVE MUCOPURULENT COUGH (HCC): ICD-10-CM

## 2024-04-23 DIAGNOSIS — E78.5 HYPERLIPIDEMIA LDL GOAL <100: ICD-10-CM

## 2024-04-23 DIAGNOSIS — E66.01 MORBID OBESITY WITH BMI OF 50.0-59.9, ADULT (HCC): ICD-10-CM

## 2024-04-23 DIAGNOSIS — R80.9 TYPE 2 DIABETES MELLITUS WITH MICROALBUMINURIA, WITHOUT LONG-TERM CURRENT USE OF INSULIN (HCC): Primary | ICD-10-CM

## 2024-04-23 LAB
EXP DATE SOLUTION: ABNORMAL
EXP DATE SWAB: ABNORMAL
EXPIRATION DATE: ABNORMAL
LOT NUMBER POC: ABNORMAL
LOT NUMBER SOLUTION: ABNORMAL
LOT NUMBER SWAB: ABNORMAL
QUICKVUE INFLUENZA TEST: NEGATIVE
RSV RNA, POC: NEGATIVE
SARS-COV-2 RNA, POC: POSITIVE
VALID INTERNAL CONTROL, POC: YES
VALID INTERNAL CONTROL, POC: YES

## 2024-04-23 PROCEDURE — 99215 OFFICE O/P EST HI 40 MIN: CPT | Performed by: PHYSICIAN ASSISTANT

## 2024-04-23 PROCEDURE — 87635 SARS-COV-2 COVID-19 AMP PRB: CPT | Performed by: PHYSICIAN ASSISTANT

## 2024-04-23 PROCEDURE — 87634 RSV DNA/RNA AMP PROBE: CPT | Performed by: PHYSICIAN ASSISTANT

## 2024-04-23 PROCEDURE — 3044F HG A1C LEVEL LT 7.0%: CPT | Performed by: PHYSICIAN ASSISTANT

## 2024-04-23 PROCEDURE — 87804 INFLUENZA ASSAY W/OPTIC: CPT | Performed by: PHYSICIAN ASSISTANT

## 2024-04-23 PROCEDURE — 3074F SYST BP LT 130 MM HG: CPT | Performed by: PHYSICIAN ASSISTANT

## 2024-04-23 PROCEDURE — 3079F DIAST BP 80-89 MM HG: CPT | Performed by: PHYSICIAN ASSISTANT

## 2024-04-23 RX ORDER — PREDNISONE 20 MG/1
40 TABLET ORAL DAILY
Qty: 20 TABLET | Refills: 0 | Status: SHIPPED | OUTPATIENT
Start: 2024-04-23 | End: 2024-04-23 | Stop reason: ALTCHOICE

## 2024-04-23 RX ORDER — ATORVASTATIN CALCIUM 10 MG/1
10 TABLET, FILM COATED ORAL NIGHTLY
Qty: 30 TABLET | Refills: 5 | Status: SHIPPED | OUTPATIENT
Start: 2024-04-23

## 2024-04-23 RX ORDER — FERROUS SULFATE 325(65) MG
325 TABLET ORAL
Qty: 30 TABLET | Refills: 5 | Status: SHIPPED | OUTPATIENT
Start: 2024-04-23

## 2024-04-23 RX ORDER — PREDNISONE 10 MG/1
TABLET ORAL
Qty: 30 TABLET | Refills: 0 | Status: SHIPPED | OUTPATIENT
Start: 2024-04-23

## 2024-04-23 ASSESSMENT — ANXIETY QUESTIONNAIRES
IF YOU CHECKED OFF ANY PROBLEMS ON THIS QUESTIONNAIRE, HOW DIFFICULT HAVE THESE PROBLEMS MADE IT FOR YOU TO DO YOUR WORK, TAKE CARE OF THINGS AT HOME, OR GET ALONG WITH OTHER PEOPLE: SOMEWHAT DIFFICULT
4. TROUBLE RELAXING: SEVERAL DAYS
1. FEELING NERVOUS, ANXIOUS, OR ON EDGE: NOT AT ALL
5. BEING SO RESTLESS THAT IT IS HARD TO SIT STILL: NOT AT ALL
GAD7 TOTAL SCORE: 4
7. FEELING AFRAID AS IF SOMETHING AWFUL MIGHT HAPPEN: NOT AT ALL
6. BECOMING EASILY ANNOYED OR IRRITABLE: NOT AT ALL
2. NOT BEING ABLE TO STOP OR CONTROL WORRYING: SEVERAL DAYS
3. WORRYING TOO MUCH ABOUT DIFFERENT THINGS: MORE THAN HALF THE DAYS

## 2024-04-23 ASSESSMENT — PATIENT HEALTH QUESTIONNAIRE - PHQ9
7. TROUBLE CONCENTRATING ON THINGS, SUCH AS READING THE NEWSPAPER OR WATCHING TELEVISION: SEVERAL DAYS
SUM OF ALL RESPONSES TO PHQ QUESTIONS 1-9: 11
8. MOVING OR SPEAKING SO SLOWLY THAT OTHER PEOPLE COULD HAVE NOTICED. OR THE OPPOSITE, BEING SO FIGETY OR RESTLESS THAT YOU HAVE BEEN MOVING AROUND A LOT MORE THAN USUAL: NOT AT ALL
10. IF YOU CHECKED OFF ANY PROBLEMS, HOW DIFFICULT HAVE THESE PROBLEMS MADE IT FOR YOU TO DO YOUR WORK, TAKE CARE OF THINGS AT HOME, OR GET ALONG WITH OTHER PEOPLE: SOMEWHAT DIFFICULT
4. FEELING TIRED OR HAVING LITTLE ENERGY: MORE THAN HALF THE DAYS
SUM OF ALL RESPONSES TO PHQ QUESTIONS 1-9: 11
SUM OF ALL RESPONSES TO PHQ QUESTIONS 1-9: 11
3. TROUBLE FALLING OR STAYING ASLEEP: MORE THAN HALF THE DAYS
5. POOR APPETITE OR OVEREATING: MORE THAN HALF THE DAYS
SUM OF ALL RESPONSES TO PHQ9 QUESTIONS 1 & 2: 3
9. THOUGHTS THAT YOU WOULD BE BETTER OFF DEAD, OR OF HURTING YOURSELF: NOT AT ALL
2. FEELING DOWN, DEPRESSED OR HOPELESS: SEVERAL DAYS
6. FEELING BAD ABOUT YOURSELF - OR THAT YOU ARE A FAILURE OR HAVE LET YOURSELF OR YOUR FAMILY DOWN: SEVERAL DAYS
1. LITTLE INTEREST OR PLEASURE IN DOING THINGS: MORE THAN HALF THE DAYS
SUM OF ALL RESPONSES TO PHQ QUESTIONS 1-9: 11

## 2024-04-23 ASSESSMENT — ENCOUNTER SYMPTOMS
CHEST TIGHTNESS: 1
VOMITING: 0
RHINORRHEA: 1
COUGH: 1
NAUSEA: 0
ABDOMINAL DISTENTION: 0
CONSTIPATION: 0
DIARRHEA: 0
WHEEZING: 1
SORE THROAT: 0
ABDOMINAL PAIN: 0
BLOOD IN STOOL: 0
SHORTNESS OF BREATH: 1

## 2024-04-23 NOTE — PATIENT INSTRUCTIONS
Will extend Prednisone 40 mg to a full 14 days but inquired of pulmonary recommendations and will alter plan if different  Resume ferrous sulfate 325 mg daily 30 minutes before dinner or at bedtime  Start Ozempic 0.25 mg weekly x 4 weeks then increase to 0.5 mg weekly for maintenance  Use nebulizer treatments 4 times/day minimum on a scheduled basis x 2 weeks  Continue daily use of Mucinex 12 Hour twice daily x 2 weeks at least  Demonstrated how to inject into abdomen with importance of rotating sites emphasized  Reminded that diabetic eye exam is recommended annually and is quite overdue - please get scheduled ASAP  Counseled about the need to stop eating when sensation of feeling full is first felt to avoid overeating and getting nauseated & the need to eat important parts of the meal first in case she fills up prior to finishing meal  Monitor blood sugar daily and keep record to bring to next appointment  Continue chronic medications as prescribed  Monitor blood pressure + pulse 2-3 times/week and keep record to bring to next appointment

## 2024-04-24 ENCOUNTER — TELEPHONE (OUTPATIENT)
Dept: INTERNAL MEDICINE CLINIC | Facility: CLINIC | Age: 54
End: 2024-04-24

## 2024-04-24 DIAGNOSIS — D50.0 IRON DEFICIENCY ANEMIA DUE TO CHRONIC BLOOD LOSS: Primary | ICD-10-CM

## 2024-04-24 NOTE — TELEPHONE ENCOUNTER
----- Message from Xochitl Ivy PA-C sent at 4/23/2024  5:24 PM EDT -----  I no longer see transferrin saturation as an order we can place?  What is it called now if I want the iron panel that includes iron, TIBC & transferrin saturation?

## 2024-04-24 NOTE — TELEPHONE ENCOUNTER
The lab update states that transferrin saturation panel has been replaced with Iron and TIBC (SYJ412) and includes the following:  Iron  TIBC  Iron % saturation  Unsaturated iron binding capacity    Transferrin (CYO429), Ferritin (LAB68), and Iron (LAB94) are still orderable as separate tests.    Note: TIBC is now calculated instead of a direct measurement.

## 2024-04-24 NOTE — TELEPHONE ENCOUNTER
----- Message from Xochitl Ivy PA-C sent at 4/23/2024  5:26 PM EDT -----  Please let patient know that I sent in an extension of the same dose of Prednisone she was taking to total 14 days.  Also I want her to try taking the iron supplement 30 minutes before dinner or at bedtime so it can absorb best without a full stomach - still needs to be taken with OJ or vitamin c supplement though.

## 2024-05-01 ENCOUNTER — OFFICE VISIT (OUTPATIENT)
Dept: PULMONOLOGY | Age: 54
End: 2024-05-01
Payer: MEDICARE

## 2024-05-01 VITALS
HEIGHT: 65 IN | TEMPERATURE: 98.4 F | SYSTOLIC BLOOD PRESSURE: 162 MMHG | HEART RATE: 122 BPM | DIASTOLIC BLOOD PRESSURE: 98 MMHG | OXYGEN SATURATION: 96 % | RESPIRATION RATE: 18 BRPM | WEIGHT: 293 LBS | BODY MASS INDEX: 48.82 KG/M2

## 2024-05-01 DIAGNOSIS — U07.1 COVID-19: Primary | ICD-10-CM

## 2024-05-01 DIAGNOSIS — R05.1 ACUTE COUGH: ICD-10-CM

## 2024-05-01 DIAGNOSIS — J44.9 STAGE 4 VERY SEVERE COPD BY GOLD CLASSIFICATION (HCC): ICD-10-CM

## 2024-05-01 PROCEDURE — 3080F DIAST BP >= 90 MM HG: CPT | Performed by: INTERNAL MEDICINE

## 2024-05-01 PROCEDURE — 3077F SYST BP >= 140 MM HG: CPT | Performed by: INTERNAL MEDICINE

## 2024-05-01 PROCEDURE — 99214 OFFICE O/P EST MOD 30 MIN: CPT | Performed by: INTERNAL MEDICINE

## 2024-05-01 RX ORDER — IPRATROPIUM BROMIDE AND ALBUTEROL SULFATE 2.5; .5 MG/3ML; MG/3ML
1 SOLUTION RESPIRATORY (INHALATION) EVERY 4 HOURS
Qty: 360 ML | Refills: 11 | Status: SHIPPED | OUTPATIENT
Start: 2024-05-01

## 2024-05-01 RX ORDER — HYDROCODONE BITARTRATE AND HOMATROPINE METHYLBROMIDE ORAL SOLUTION 5; 1.5 MG/5ML; MG/5ML
5 LIQUID ORAL EVERY 4 HOURS PRN
Qty: 240 ML | Refills: 0 | Status: SHIPPED | OUTPATIENT
Start: 2024-05-01 | End: 2024-05-31

## 2024-05-01 NOTE — PROGRESS NOTES
05/01/24 1609   BP: (!) 162/98   Pulse: (!) 122   Resp: 18   Temp: 98.4 °F (36.9 °C)   TempSrc: Temporal   SpO2: 96%   Weight: (!) 138.3 kg (305 lb)   Height: 1.651 m (5' 5\")     Body mass index is 50.75 kg/m².      General:   Alert, cooperative, no distress, appears stated age.        Eyes/Ears/Nose:   Conjunctivae/corneas clear. PERRL. Nasal mucosa is normal.  Normal TMs and external auditory canals.        Mouth/Throat:  Lips, mucosa, and tongue normal. Teeth and gums normal.        Lungs:   Decreased bilaterally without wheezes crackles or rhonchi     Heart:   Regular rate and rhythm, S1, S2 normal, no murmur, click, rub or gallop.     Abdomen:    Soft, non-tender.     Extremities:  Extremities normal, atraumatic, no cyanosis or edema.     Skin:  Skin color normal. No rashes or lesions     Neurologic:  A&Ox3     DIAGNOSTIC TESTS:                                                                                                                        Pulmonary Function Testing:   Date           FVC         FEV1         FEV1/FVC         FEF 25-75%         TLC         FRC         RV         DLCO           No results found for this or any previous visit. No results found for this or any previous visit.    LABS:   Lab Results   Component Value Date/Time    WBC 8.2 04/16/2024 09:29 AM    HGB 10.5 04/16/2024 09:29 AM    HCT 38.7 04/16/2024 09:29 AM     04/16/2024 09:29 AM    TSH 1.200 11/09/2020 08:59 AM    NTPROBNP 30 11/17/2023 02:58 PM    CRP 1.8 11/17/2023 02:58 PM     Imaging: I performed an independent interpretation of the patient's images.  CXR:   XR CHEST PORTABLE 11/17/2023    Narrative  Exam: XR CHEST PORTABLE on 11/17/2023 3:06 PM    Clinical History: The Female patient is 53 years old  presenting for Shortness  of Breath. Chest tightness. COPD.    Comparison:  CTA of the chest with and without contrast dated July 26, 2023.  Portable radiograph of the chest dated July 26, 2023.    Findings:  Frontal

## 2024-05-03 ENCOUNTER — CARE COORDINATION (OUTPATIENT)
Dept: CARE COORDINATION | Facility: CLINIC | Age: 54
End: 2024-05-03

## 2024-05-03 NOTE — CARE COORDINATION
Remote Patient Monitoring Note      Date/Time:  5/3/2024 12:13 PM  Patient Current Location: [place]  LPN attempted to contact patient by telephone regarding red alert received for survey response (Pt answered yes to Have you had chills in the last 24 hours?). Left HIPAA compliant message requesting a return call.     Background: Pt enrolled in RPM r/t Pneumonia, and COPD      Plan/Follow Up: Will continue to review, monitor and address alerts with follow up based on severity of symptoms and risk factors.

## 2024-05-03 NOTE — CARE COORDINATION
-Remote Alert Monitoring Note      Date/Time:  5/3/2024 2:23 PM  Patient Current Location: [place]  Verified patients name and  as identifiers.    Rpm alert to be reviewed by the provider   red alert  survey response (Pt answered yes to Have you had chills in the last 24 hours?)   Additional needs to be addressed by provider: No                   LPN contacted patient by telephone regarding red alert received for survey response.     Background: Pt enrolled in RPM r/t Pneumonia, and COPD  Refer to 911 immediately if:  Patient unresponsive or unable to provide history  Change in cognition or sudden confusion  Patient unable to respond in complete sentences  Intense chest pain/tightness  Any concern for any clinical emergency  Red Alert: Provider response time of 1 hr required for any red alert requiring intervention  Yellow Alert: Provider response time of 3hr required for any escalated yellow alert        Clinical Interventions: Reviewed and followed up on alerts and treatments- Discussed survey response with pt. Pt denies having chills in the last 24 hours and states, \"I must have hit the wrong thing\". Pt was Dx with COVID - 19 on 24. Denies new or worsening symptoms, CP, SOB, fever, chills, and cough at this time. All metrics are currently within RPM parameters. Pt reports medication compliance, verbalizes understanding of when to notify provider(s) of changes / concerns, and when to seek emergent medical care. Will route FYI to [facility].     Plan/Follow Up: Will continue to review, monitor and address alerts with follow up based on severity of symptoms and risk factors.  **For any new or worsening symptoms, please contact your Provider or report to the nearest Emergency Room.**

## 2024-05-16 RX ORDER — ESOMEPRAZOLE MAGNESIUM 40 MG/1
40 CAPSULE, DELAYED RELEASE ORAL
Qty: 90 CAPSULE | Refills: 1 | OUTPATIENT
Start: 2024-05-16

## 2024-05-17 ENCOUNTER — CARE COORDINATION (OUTPATIENT)
Dept: CARE COORDINATION | Facility: CLINIC | Age: 54
End: 2024-05-17

## 2024-05-17 NOTE — CARE COORDINATION
Barriers: overwhelmed by complexity of regimen  Plan for overcoming my barriers: education, telephonic support  Confidence: 8/  Anticipated Goal Completion Date: 5/21/2023              Future Appointments   Date Time Provider Department Center   6/24/2024  1:00 PM Xochitl Ivy PA-C [facility]   8/9/2024  1:30 PM Gisela Webber MD [facility]   ,   Diabetes Assessment    Medic Alert ID: No  Meal Planning: None   How often do you test your blood sugar?: Daily   Do you have barriers with adherence to non-pharmacologic self-management interventions? (Nutrition/Exercise/Self-Monitoring): No   Have you ever had to go to the ED for symptoms of low blood sugar?: No       No patient-reported symptoms        ,   COPD Assessment    Does the patient understand envrionmental exposure?: Yes  Is the patient able to verbalize Rescue vs. Long Acting medications?: Yes  Does the patient have a nebulizer?: Yes  Does the patient use a space with inhaled medications?: Yes            Symptoms:          ,   General Assessment         , and No linked episodes

## 2024-05-24 ENCOUNTER — CARE COORDINATION (OUTPATIENT)
Dept: CARE COORDINATION | Facility: CLINIC | Age: 54
End: 2024-05-24

## 2024-05-24 NOTE — CARE COORDINATION
F/u call with pt to discuss DME, she is waiting to hear back from her ins. I emailed a [facility] in [place] who recently found out that they are giving away a lot of DME. I specifically requested info on pulse ox, scale and BP cuff. Will touch base with pt next week or before if either of us hear back.

## 2024-05-28 ENCOUNTER — CARE COORDINATION (OUTPATIENT)
Dept: CARE COORDINATION | Facility: CLINIC | Age: 54
End: 2024-05-28

## 2024-05-28 NOTE — CARE COORDINATION
F/u call made, no answer and a message was left. Pt informed that if they have any questions, concerns or any needs that need to be addressed to please call back. Otherwise I will reach out again in 2 weeks.

## 2024-05-31 ENCOUNTER — CARE COORDINATION (OUTPATIENT)
Dept: CARE COORDINATION | Facility: CLINIC | Age: 54
End: 2024-05-31

## 2024-05-31 ENCOUNTER — CARE COORDINATION (OUTPATIENT)
Facility: CLINIC | Age: 54
End: 2024-05-31

## 2024-05-31 DIAGNOSIS — E11.21 TYPE 2 DIABETES WITH NEPHROPATHY (HCC): ICD-10-CM

## 2024-05-31 DIAGNOSIS — J18.9 COMMUNITY ACQUIRED PNEUMONIA OF RIGHT MIDDLE LOBE OF LUNG: ICD-10-CM

## 2024-05-31 DIAGNOSIS — J44.9 STAGE 4 VERY SEVERE COPD BY GOLD CLASSIFICATION (HCC): Primary | ICD-10-CM

## 2024-05-31 NOTE — PROGRESS NOTES
Remote Patient Order Discontinued    Received request from Michelle Kirkpatrick RN   to discontinue order for remote patient monitoring of Pneumonia, COPD, and Diabetes and order completed.

## 2024-05-31 NOTE — CARE COORDINATION
Michelle Obrien  5/31/24    Care Coordination  placed call to patient to arrange RPM kit  through [employer].     CCSS spoke to Patient reviewed with Patient how to pack equipment in original packing. [employer] will  equipment in 2-4 days.   All questions and concerns answered.

## 2024-05-31 NOTE — CARE COORDINATION
Ambulatory Care Coordination Note  2024    Patient Current Location:  [place]     ACM contacted the patient by telephone. Verified name and  with patient as identifiers. Provided introduction to self, and explanation of the ACM role.     Challenges to be reviewed by the provider   Additional needs identified to be addressed with provider: No  none               Method of communication with provider: phone.    ACM: Kaya Martinez, RN    Reaching back out to pt about graduation from RPM and ACM. Pt agreed, that she has been doing well and it is a good time to graduate. We discussed medications, diet and activity level. Pt will continue to move around the apartment, watch [employer] and be compliant with f/u appointments. I will place a order to d/c  and my contact information has been shared with pt in the event there circumstances change. They are free to reach out at any time.     Remote Patient Monitoring Graduation      Date/Time:  2024 1:20 PM  Patient Current Location: [place]  Patient has graduated from the Remote Patient Monitoring program on 2024.   RPM goals have been met at this time.      Patient has been provided instruction on process to return RPM equipment and RPM has been deactivated.     Patient has ACM's contact information for any further questions, concerns, or needs.       Lab Results       None                 Goals Addressed                      This Visit's Progress      Conditions and Symptoms   Improving      Patient/Family verbalizes understanding of self-management of chronic disease.  Assess barriers to safe and effective d/c AEB  Patient/family is able to verbalize and obtain medicine after d/c  Patient/family is aware and attends follow up appointment's s/p d/c          Patient Stated (pt-stated)   Improving      Patient will:      Attend follow up appointment with PCP on 2023.  Call providers as needed with concerns/questions.  Follow discharge

## 2024-06-10 ENCOUNTER — CARE COORDINATION (OUTPATIENT)
Dept: CARE COORDINATION | Facility: CLINIC | Age: 54
End: 2024-06-10

## 2024-08-29 ENCOUNTER — LAB (OUTPATIENT)
Dept: INTERNAL MEDICINE CLINIC | Facility: CLINIC | Age: 54
End: 2024-08-29

## 2024-08-29 DIAGNOSIS — E11.29 TYPE 2 DIABETES MELLITUS WITH MICROALBUMINURIA, WITHOUT LONG-TERM CURRENT USE OF INSULIN (HCC): ICD-10-CM

## 2024-08-29 DIAGNOSIS — E78.5 HYPERLIPIDEMIA LDL GOAL <100: ICD-10-CM

## 2024-08-29 DIAGNOSIS — R80.9 TYPE 2 DIABETES MELLITUS WITH MICROALBUMINURIA, WITHOUT LONG-TERM CURRENT USE OF INSULIN (HCC): ICD-10-CM

## 2024-08-29 DIAGNOSIS — I10 ESSENTIAL (PRIMARY) HYPERTENSION: ICD-10-CM

## 2024-08-29 DIAGNOSIS — D50.0 IRON DEFICIENCY ANEMIA DUE TO CHRONIC BLOOD LOSS: Primary | ICD-10-CM

## 2024-08-29 DIAGNOSIS — D50.0 IRON DEFICIENCY ANEMIA DUE TO CHRONIC BLOOD LOSS: ICD-10-CM

## 2024-09-27 ENCOUNTER — APPOINTMENT (OUTPATIENT)
Dept: GENERAL RADIOLOGY | Age: 54
End: 2024-09-27
Payer: MEDICARE

## 2024-09-27 ENCOUNTER — HOSPITAL ENCOUNTER (EMERGENCY)
Age: 54
Discharge: HOME | End: 2024-09-28
Payer: MEDICARE

## 2024-09-27 VITALS
WEIGHT: 293 LBS | BODY MASS INDEX: 48.82 KG/M2 | HEIGHT: 65 IN | RESPIRATION RATE: 26 BRPM | DIASTOLIC BLOOD PRESSURE: 90 MMHG | OXYGEN SATURATION: 94 % | SYSTOLIC BLOOD PRESSURE: 164 MMHG | TEMPERATURE: 99 F | HEART RATE: 101 BPM

## 2024-09-27 DIAGNOSIS — R06.02 SHORTNESS OF BREATH: Primary | ICD-10-CM

## 2024-09-27 PROCEDURE — 87635 SARS-COV-2 COVID-19 AMP PRB: CPT

## 2024-09-27 PROCEDURE — 99284 EMERGENCY DEPT VISIT MOD MDM: CPT

## 2024-09-27 PROCEDURE — 87502 INFLUENZA DNA AMP PROBE: CPT

## 2024-09-27 PROCEDURE — 71045 X-RAY EXAM CHEST 1 VIEW: CPT

## 2024-09-27 PROCEDURE — 85025 COMPLETE CBC W/AUTO DIFF WBC: CPT

## 2024-09-28 ENCOUNTER — HOSPITAL ENCOUNTER (OUTPATIENT)
Dept: GENERAL RADIOLOGY | Age: 54
Discharge: HOME | End: 2024-10-01
Payer: MEDICARE

## 2024-09-28 DIAGNOSIS — R06.02 SOB (SHORTNESS OF BREATH): ICD-10-CM

## 2024-09-28 LAB
BASOPHILS # BLD: 0 K/UL (ref 0–0.2)
BASOPHILS NFR BLD: 0 % (ref 0–2)
DIFFERENTIAL METHOD BLD: ABNORMAL
EOSINOPHIL # BLD: 0.2 K/UL (ref 0–0.8)
EOSINOPHIL NFR BLD: 2 % (ref 0.5–7.8)
ERYTHROCYTE [DISTWIDTH] IN BLOOD BY AUTOMATED COUNT: 21.2 % (ref 11.9–14.6)
FLUAV RNA SPEC QL NAA+PROBE: NOT DETECTED
FLUBV RNA SPEC QL NAA+PROBE: NOT DETECTED
HCT VFR BLD AUTO: 45 % (ref 35.8–46.3)
HGB BLD-MCNC: 11.1 G/DL (ref 11.7–15.4)
IMM GRANULOCYTES # BLD AUTO: 0 K/UL (ref 0–0.5)
IMM GRANULOCYTES NFR BLD AUTO: 0 % (ref 0–5)
LYMPHOCYTES # BLD: 2 K/UL (ref 0.5–4.6)
LYMPHOCYTES NFR BLD: 20 % (ref 13–44)
MCH RBC QN AUTO: 20 PG (ref 26.1–32.9)
MCHC RBC AUTO-ENTMCNC: 24.7 G/DL (ref 31.4–35)
MCV RBC AUTO: 81.2 FL (ref 82–102)
MONOCYTES # BLD: 0.5 K/UL (ref 0.1–1.3)
MONOCYTES NFR BLD: 5 % (ref 4–12)
NEUTS SEG # BLD: 7 K/UL (ref 1.7–8.2)
NEUTS SEG NFR BLD: 72 % (ref 43–78)
NRBC # BLD: 0 K/UL (ref 0–0.2)
PLATELET # BLD AUTO: 241 K/UL (ref 150–450)
PMV BLD AUTO: 11.3 FL (ref 9.4–12.3)
RBC # BLD AUTO: 5.54 M/UL (ref 4.05–5.2)
SARS-COV-2 RDRP RESP QL NAA+PROBE: NOT DETECTED
SOURCE: NORMAL
WBC # BLD AUTO: 9.6 K/UL (ref 4.3–11.1)

## 2024-09-28 PROCEDURE — 71045 X-RAY EXAM CHEST 1 VIEW: CPT

## 2024-09-28 NOTE — ED PROVIDER NOTES
Emergency Department Provider Note       PCP: Xochitl Ivy PA-C   Age: 54 y.o.   Sex: female     DISPOSITION Decision To Discharge 09/28/2024 12:53:31 PM  Condition at Disposition: Data Unavailable     No diagnosis found.  Medical Decision Making     Will obtain broad-spectrum workup including cardiac enzyme, chest x-ray.     1 acute, uncomplicated illness or injury.  Over the counter drug management performed.  Patient was discharged risks and benefits of hospitalization were considered.  Shared medical decision making was utilized in creating the patients health plan today.    I independently ordered and reviewed each unique test.              Voice dictation software was used during the making of this note.  This software is not perfect and grammatical and other typographical errors may be present.  This note has not been completely proofread for errors.    History     Patient is a 54-year-old female who presents here with complaint of shortness of breath, congestion.  Has history of COPD and asthma, however not at home and she does not have any other means of administering oxygen to herself.  Also notes that she has had some increase of breath from her baseline.  Seems to have more productive cough that is productive of whitish/gray phlegm.  No fevers or chills or bodyaches.    The history is provided by the patient. No  was used.     Physical Exam     Vitals signs and nursing note reviewed:  Vitals:    09/27/24 2043   BP: (!) 164/90   Pulse: (!) 101   Resp: 26   Temp: 99 °F (37.2 °C)   TempSrc: Oral   SpO2: 94%   Weight: (!) 149.7 kg (330 lb)   Height: 1.651 m (5' 5\")      Physical Exam  Vitals and nursing note reviewed.   Constitutional:       General: She is not in acute distress.     Appearance: Normal appearance. She is obese. She is not ill-appearing, toxic-appearing or diaphoretic.      Comments: Chronically ill-appearing   HENT:      Head: Normocephalic and atraumatic.

## 2024-09-28 NOTE — ED TRIAGE NOTES
Pt reports dyspnea on exertion, productive cough, feeling like legs want to give out. Sx started last night. Denies fevers. Wears 4L O2 at baseline

## 2024-11-26 ENCOUNTER — TELEPHONE (OUTPATIENT)
Age: 54
End: 2024-11-26

## 2024-11-26 RX ORDER — DILTIAZEM HYDROCHLORIDE 240 MG/1
240 CAPSULE, COATED, EXTENDED RELEASE ORAL DAILY
Qty: 90 CAPSULE | Refills: 0 | Status: SHIPPED | OUTPATIENT
Start: 2024-11-26

## 2024-11-26 NOTE — TELEPHONE ENCOUNTER
MEDICATION REFILL REQUEST      Name of Medication:  Diltiazem  Dose:  240 mg  Frequency:  QD  Quantity:  90  Days' supply:  90 with refills      Pharmacy Name/Location:  [phone]

## 2024-11-26 NOTE — TELEPHONE ENCOUNTER
Yearly appt scheduled. Verified rx in last OV date 11/10/23. Pharmacy confirmed. Erx as requested.

## 2024-12-10 RX ORDER — FLUTICASONE FUROATE, UMECLIDINIUM BROMIDE AND VILANTEROL TRIFENATATE 100; 62.5; 25 UG/1; UG/1; UG/1
POWDER RESPIRATORY (INHALATION)
Refills: 11 | OUTPATIENT
Start: 2024-12-10

## 2025-01-02 NOTE — TELEPHONE ENCOUNTER
Patient needs these 2 inhaler sent to pharmacy           fluticasone-umeclidin-vilant (TRELEGY ELLIPTA) 100-62.5-25 MCG/ACT AEPB inhaler       albuterol sulfate HFA (PROVENTIL;VENTOLIN;PROAIR) 108 (90 Base) MCG/ACT inhaler       Pharmacy    [id number] - [place], [place] - [address] - P [phone] - F [phone] [address] [facility] [address]

## 2025-01-06 RX ORDER — FLUTICASONE FUROATE, UMECLIDINIUM BROMIDE AND VILANTEROL TRIFENATATE 100; 62.5; 25 UG/1; UG/1; UG/1
1 POWDER RESPIRATORY (INHALATION) DAILY
Qty: 1 EACH | Refills: 5 | Status: SHIPPED | OUTPATIENT
Start: 2025-01-06

## 2025-01-06 RX ORDER — ALBUTEROL SULFATE 90 UG/1
2 INHALANT RESPIRATORY (INHALATION) EVERY 4 HOURS PRN
Qty: 1 EACH | Refills: 5 | Status: SHIPPED | OUTPATIENT
Start: 2025-01-06

## 2025-01-15 DIAGNOSIS — E78.5 HYPERLIPIDEMIA LDL GOAL <100: ICD-10-CM

## 2025-01-15 RX ORDER — ATORVASTATIN CALCIUM 10 MG/1
10 TABLET, FILM COATED ORAL NIGHTLY
Qty: 30 TABLET | Refills: 5 | OUTPATIENT
Start: 2025-01-15

## 2025-01-21 ENCOUNTER — TELEMEDICINE (OUTPATIENT)
Dept: INTERNAL MEDICINE CLINIC | Facility: CLINIC | Age: 55
End: 2025-01-21
Payer: MEDICARE

## 2025-01-21 DIAGNOSIS — E66.01 MORBID OBESITY WITH BMI OF 50.0-59.9, ADULT: ICD-10-CM

## 2025-01-21 DIAGNOSIS — E78.5 HYPERLIPIDEMIA LDL GOAL <100: ICD-10-CM

## 2025-01-21 DIAGNOSIS — D50.0 IRON DEFICIENCY ANEMIA DUE TO CHRONIC BLOOD LOSS: ICD-10-CM

## 2025-01-21 DIAGNOSIS — E11.29 TYPE 2 DIABETES MELLITUS WITH MICROALBUMINURIA, WITHOUT LONG-TERM CURRENT USE OF INSULIN (HCC): Primary | ICD-10-CM

## 2025-01-21 DIAGNOSIS — Z28.21 COVID-19 VACCINE SERIES DECLINED: ICD-10-CM

## 2025-01-21 DIAGNOSIS — Z28.310 COVID-19 VACCINE SERIES DECLINED: ICD-10-CM

## 2025-01-21 DIAGNOSIS — I10 ESSENTIAL (PRIMARY) HYPERTENSION: ICD-10-CM

## 2025-01-21 DIAGNOSIS — R80.9 TYPE 2 DIABETES MELLITUS WITH MICROALBUMINURIA, WITHOUT LONG-TERM CURRENT USE OF INSULIN (HCC): Primary | ICD-10-CM

## 2025-01-21 PROCEDURE — 99215 OFFICE O/P EST HI 40 MIN: CPT | Performed by: PHYSICIAN ASSISTANT

## 2025-01-21 PROCEDURE — G2211 COMPLEX E/M VISIT ADD ON: HCPCS | Performed by: PHYSICIAN ASSISTANT

## 2025-01-21 RX ORDER — ATORVASTATIN CALCIUM 10 MG/1
10 TABLET, FILM COATED ORAL NIGHTLY
Qty: 30 TABLET | Refills: 0 | Status: SHIPPED | OUTPATIENT
Start: 2025-01-21

## 2025-01-21 RX ORDER — FERROUS SULFATE 325(65) MG
325 TABLET ORAL
Qty: 30 TABLET | Refills: 5 | Status: SHIPPED | OUTPATIENT
Start: 2025-01-21

## 2025-01-21 SDOH — ECONOMIC STABILITY: INCOME INSECURITY: IN THE LAST 12 MONTHS, WAS THERE A TIME WHEN YOU WERE NOT ABLE TO PAY THE MORTGAGE OR RENT ON TIME?: NO

## 2025-01-21 SDOH — ECONOMIC STABILITY: FOOD INSECURITY: WITHIN THE PAST 12 MONTHS, YOU WORRIED THAT YOUR FOOD WOULD RUN OUT BEFORE YOU GOT MONEY TO BUY MORE.: SOMETIMES TRUE

## 2025-01-21 SDOH — ECONOMIC STABILITY: FOOD INSECURITY: WITHIN THE PAST 12 MONTHS, THE FOOD YOU BOUGHT JUST DIDN'T LAST AND YOU DIDN'T HAVE MONEY TO GET MORE.: SOMETIMES TRUE

## 2025-01-21 SDOH — ECONOMIC STABILITY: TRANSPORTATION INSECURITY
IN THE PAST 12 MONTHS, HAS THE LACK OF TRANSPORTATION KEPT YOU FROM MEDICAL APPOINTMENTS OR FROM GETTING MEDICATIONS?: YES

## 2025-01-21 ASSESSMENT — ENCOUNTER SYMPTOMS
EYE PAIN: 1
WHEEZING: 0
SHORTNESS OF BREATH: 1
CONSTIPATION: 0
COUGH: 0
DIARRHEA: 0

## 2025-01-21 NOTE — PROGRESS NOTES
Michelle Obrien, was evaluated through a synchronous (real-time) audio-video encounter. The patient (or guardian if applicable) is aware that this is a billable service, which includes applicable co-pays. This Virtual Visit was conducted with patient's (and/or legal guardian's) consent. Patient identification was verified, and a caregiver was present when appropriate.   The patient was located at Home: [address]  [address]  [place] [facility] [place] [address]  Provider was located at Facility (Appt Dept): [address]  [place] [address]  Confirm you are appropriately licensed, registered, or certified to deliver care in the state where the patient is located as indicated above. If you are not or unsure, please re-schedule the visit: Yes, I confirm.     Michelle Obrien (:  1970) is a Established patient, presenting virtually for evaluation of the following:  Chief Complaint   Patient presents with    Follow-up     Diabetes, Hypertension, Lipids, Anemia           Below is the assessment and plan developed based on review of pertinent history, physical exam, labs, studies, and medications.     Assessment & Plan  Type 2 diabetes mellitus with microalbuminuria, without long-term current use of insulin (HCC)    Chronic, control uncertain, no change in treatment plan at this time pending review of upcoming lab results         Iron deficiency anemia due to chronic blood loss   Chronic, control uncertain, no change in treatment plan at this time pending review of upcoming lab results    Orders:    ferrous sulfate (IRON 325) 325 (65 Fe) MG tablet; Take 1 tablet by mouth Daily with supper    Hyperlipidemia LDL goal <100   Chronic, control uncertain, no change in treatment plan at this time pending review of upcoming lab results    Orders:    atorvastatin (LIPITOR) 10 MG tablet; Take 1 tablet by mouth at bedtime      Patient Instructions   Will arrange transportation via

## 2025-01-21 NOTE — PATIENT INSTRUCTIONS
Will arrange transportation via [employer] to get her here for fasting labs, high dose flu vaccine, and vital signs via nurse visit  Reduce caffeine intake to no more than 12 oz total in a day (this includes coffee, tea, soda)  Monitor blood pressure 2 times/week and keep record to bring to next appointment  Continue chronic medications as prescribed  Monitor blood sugar daily and keep record to bring to next appointment  Advised that Eliquis was prescribed by cardiology so she needs to contact them to request that refill  Clarified that she had enough of the remaining maintenance meds to last until her blood work results could be reviewed and any dosage change needs determined  Reminded that she is overdue for annual eye exam and needs to get scheduled ASAP  Encouraged an annual visit with podiatry which would be coming up in March/April

## 2025-01-21 NOTE — ASSESSMENT & PLAN NOTE
Chronic, control uncertain, no change in treatment plan at this time pending review of upcoming lab results    Orders:    ferrous sulfate (IRON 325) 325 (65 Fe) MG tablet; Take 1 tablet by mouth Daily with supper

## 2025-01-27 NOTE — TELEPHONE ENCOUNTER
Spoke to the pharmacist and she stated that when they went to fill the pt's Eliquis the insurance flagged for GI issues, blood loss, anemia.     I spoke to the pt and she stated she has not had any of these issues and has been taking her Eliquis everyday as prescribed

## 2025-01-27 NOTE — TELEPHONE ENCOUNTER
MEDICATION REFILL REQUEST      Name of Medication:  Eliquis  Dose:  5 mg  Frequency: BID  Quantity:  180  Days' supply:  90 with 3 refills      Pharmacy Name/Location:  [facility] [phone]

## 2025-01-27 NOTE — TELEPHONE ENCOUNTER
Arline from [facility][1] pharmacy is calling and wanting to know if they should keep refilling Eliquis.Pharmacy said she had some results of bleeding and they concern If they should keep refilling.

## 2025-01-27 NOTE — TELEPHONE ENCOUNTER
Requested Prescriptions     Pending Prescriptions Disp Refills    apixaban (ELIQUIS) 5 MG TABS tablet 120 tablet 0     Sig: Take 1 tablet by mouth 2 times daily     Verified rx. Last OV 11/10/23. Erx to pharm on file.    F/u 02/03/25 refills will be given at appt.

## 2025-01-28 NOTE — TELEPHONE ENCOUNTER
Requested Prescriptions     Pending Prescriptions Disp Refills    apixaban (ELIQUIS) 5 MG TABS tablet 60 tablet 0     Sig: Take 1 tablet by mouth 2 times daily         Verified rx. Last OV 11/10/2. Erx to pharm on file.

## 2025-01-29 ENCOUNTER — LAB (OUTPATIENT)
Dept: INTERNAL MEDICINE CLINIC | Facility: CLINIC | Age: 55
End: 2025-01-29

## 2025-01-29 ENCOUNTER — NURSE ONLY (OUTPATIENT)
Dept: INTERNAL MEDICINE CLINIC | Facility: CLINIC | Age: 55
End: 2025-01-29

## 2025-01-29 VITALS
HEART RATE: 94 BPM | DIASTOLIC BLOOD PRESSURE: 88 MMHG | OXYGEN SATURATION: 97 % | BODY MASS INDEX: 48.82 KG/M2 | TEMPERATURE: 97.3 F | WEIGHT: 293 LBS | SYSTOLIC BLOOD PRESSURE: 138 MMHG | HEIGHT: 65 IN

## 2025-01-29 DIAGNOSIS — E11.29 TYPE 2 DIABETES MELLITUS WITH MICROALBUMINURIA, WITHOUT LONG-TERM CURRENT USE OF INSULIN (HCC): Primary | ICD-10-CM

## 2025-01-29 DIAGNOSIS — E11.29 TYPE 2 DIABETES MELLITUS WITH MICROALBUMINURIA, WITHOUT LONG-TERM CURRENT USE OF INSULIN (HCC): ICD-10-CM

## 2025-01-29 DIAGNOSIS — R80.9 TYPE 2 DIABETES MELLITUS WITH MICROALBUMINURIA, WITHOUT LONG-TERM CURRENT USE OF INSULIN (HCC): ICD-10-CM

## 2025-01-29 DIAGNOSIS — Z23 NEED FOR INFLUENZA VACCINATION: ICD-10-CM

## 2025-01-29 DIAGNOSIS — D50.0 IRON DEFICIENCY ANEMIA DUE TO CHRONIC BLOOD LOSS: ICD-10-CM

## 2025-01-29 DIAGNOSIS — R80.9 TYPE 2 DIABETES MELLITUS WITH MICROALBUMINURIA, WITHOUT LONG-TERM CURRENT USE OF INSULIN (HCC): Primary | ICD-10-CM

## 2025-01-29 DIAGNOSIS — E78.5 HYPERLIPIDEMIA LDL GOAL <100: ICD-10-CM

## 2025-01-29 DIAGNOSIS — I10 ESSENTIAL (PRIMARY) HYPERTENSION: ICD-10-CM

## 2025-01-29 LAB
ALBUMIN SERPL-MCNC: 3.8 G/DL (ref 3.5–5)
ALBUMIN/GLOB SERPL: 1 (ref 1–1.9)
ALP SERPL-CCNC: 122 U/L (ref 35–104)
ALT SERPL-CCNC: 17 U/L (ref 8–45)
ANION GAP SERPL CALC-SCNC: 11 MMOL/L (ref 7–16)
AST SERPL-CCNC: 14 U/L (ref 15–37)
BASOPHILS # BLD: 0.04 K/UL (ref 0–0.2)
BASOPHILS NFR BLD: 0.5 % (ref 0–2)
BILIRUB SERPL-MCNC: <0.2 MG/DL (ref 0–1.2)
BUN SERPL-MCNC: 8 MG/DL (ref 6–23)
CALCIUM SERPL-MCNC: 10.1 MG/DL (ref 8.8–10.2)
CHLORIDE SERPL-SCNC: 99 MMOL/L (ref 98–107)
CHOLEST SERPL-MCNC: 151 MG/DL (ref 0–200)
CO2 SERPL-SCNC: 31 MMOL/L (ref 20–29)
CREAT SERPL-MCNC: 0.59 MG/DL (ref 0.6–1.1)
DIFFERENTIAL METHOD BLD: ABNORMAL
EOSINOPHIL # BLD: 0.11 K/UL (ref 0–0.8)
EOSINOPHIL NFR BLD: 1.3 % (ref 0.5–7.8)
ERYTHROCYTE [DISTWIDTH] IN BLOOD BY AUTOMATED COUNT: 17.8 % (ref 11.9–14.6)
EST. AVERAGE GLUCOSE BLD GHB EST-MCNC: 134 MG/DL
FERRITIN SERPL-MCNC: 44 NG/ML (ref 8–388)
GLOBULIN SER CALC-MCNC: 3.8 G/DL (ref 2.3–3.5)
GLUCOSE SERPL-MCNC: 95 MG/DL (ref 70–99)
HBA1C MFR BLD: 6.3 % (ref 0–5.6)
HCT VFR BLD AUTO: 40.6 % (ref 35.8–46.3)
HDLC SERPL-MCNC: 29 MG/DL (ref 40–60)
HDLC SERPL: 5.2 (ref 0–5)
HGB BLD-MCNC: 11.5 G/DL (ref 11.7–15.4)
IMM GRANULOCYTES # BLD AUTO: 0.02 K/UL (ref 0–0.5)
IMM GRANULOCYTES NFR BLD AUTO: 0.2 % (ref 0–5)
IRON SATN MFR SERPL: 10 % (ref 20–50)
IRON SERPL-MCNC: 34 UG/DL (ref 35–100)
LDLC SERPL CALC-MCNC: 99 MG/DL (ref 0–100)
LYMPHOCYTES # BLD: 1.89 K/UL (ref 0.5–4.6)
LYMPHOCYTES NFR BLD: 22.2 % (ref 13–44)
MCH RBC QN AUTO: 20.9 PG (ref 26.1–32.9)
MCHC RBC AUTO-ENTMCNC: 28.3 G/DL (ref 31.4–35)
MCV RBC AUTO: 73.8 FL (ref 82–102)
MONOCYTES # BLD: 0.45 K/UL (ref 0.1–1.3)
MONOCYTES NFR BLD: 5.3 % (ref 4–12)
NEUTS SEG # BLD: 6.01 K/UL (ref 1.7–8.2)
NEUTS SEG NFR BLD: 70.5 % (ref 43–78)
NRBC # BLD: 0 K/UL (ref 0–0.2)
PLATELET # BLD AUTO: 290 K/UL (ref 150–450)
PMV BLD AUTO: 11.4 FL (ref 9.4–12.3)
POTASSIUM SERPL-SCNC: 4.1 MMOL/L (ref 3.5–5.1)
PROT SERPL-MCNC: 7.6 G/DL (ref 6.3–8.2)
RBC # BLD AUTO: 5.5 M/UL (ref 4.05–5.2)
SODIUM SERPL-SCNC: 142 MMOL/L (ref 136–145)
TIBC SERPL-MCNC: 331 UG/DL (ref 240–450)
TRIGL SERPL-MCNC: 115 MG/DL (ref 0–150)
UIBC SERPL-MCNC: 297 UG/DL (ref 112–347)
VLDLC SERPL CALC-MCNC: 23 MG/DL (ref 6–23)
WBC # BLD AUTO: 8.5 K/UL (ref 4.3–11.1)

## 2025-02-03 DIAGNOSIS — D50.0 IRON DEFICIENCY ANEMIA DUE TO CHRONIC BLOOD LOSS: Primary | ICD-10-CM

## 2025-02-03 RX ORDER — IRON FUM,PS/FOLIC ACID/VITC/B3 125-1-40-3
1 CAPSULE ORAL DAILY
Qty: 30 CAPSULE | Refills: 5 | Status: SHIPPED | OUTPATIENT
Start: 2025-02-03

## 2025-02-20 DIAGNOSIS — E11.29 TYPE 2 DIABETES MELLITUS WITH MICROALBUMINURIA, WITHOUT LONG-TERM CURRENT USE OF INSULIN (HCC): ICD-10-CM

## 2025-02-20 DIAGNOSIS — E78.5 HYPERLIPIDEMIA LDL GOAL <100: ICD-10-CM

## 2025-02-20 DIAGNOSIS — R80.9 TYPE 2 DIABETES MELLITUS WITH MICROALBUMINURIA, WITHOUT LONG-TERM CURRENT USE OF INSULIN (HCC): ICD-10-CM

## 2025-02-20 RX ORDER — ATORVASTATIN CALCIUM 10 MG/1
10 TABLET, FILM COATED ORAL NIGHTLY
Qty: 30 TABLET | Refills: 3 | Status: SHIPPED | OUTPATIENT
Start: 2025-02-20

## 2025-02-20 NOTE — TELEPHONE ENCOUNTER
Refill of Metformin & Lipitor sent.  Is she going to be out of any of her other meds that we prescribe before we see her back here in June?  It looks like Cardizem might be out of refills but she needs to call cardiology for this refill.

## 2025-02-20 NOTE — TELEPHONE ENCOUNTER
Pt notified and verbalized understanding. Pt states that she should not run out of any other medications prior to her visit in June.

## 2025-03-07 NOTE — TELEPHONE ENCOUNTER
Last seen 11/10/2023. Appointment scheduled for 3/27/25    Verified rx in last OV date 11/10/23. Pharmacy confirmed. Erx as requested

## 2025-03-07 NOTE — TELEPHONE ENCOUNTER
MEDICATION REFILL REQUEST          Name of Medication:  diltiazem  Dose:  240mg  Frequency:  1x daily  Quantity:  90  Days' supply:  90          Pharmacy Name/Location:  [facility] [address] [place] [address]

## 2025-03-10 RX ORDER — DILTIAZEM HYDROCHLORIDE 240 MG/1
240 CAPSULE, COATED, EXTENDED RELEASE ORAL DAILY
Qty: 30 CAPSULE | Refills: 0 | Status: SHIPPED | OUTPATIENT
Start: 2025-03-10

## 2025-03-10 NOTE — TELEPHONE ENCOUNTER
Pt is calling asking  for her meds to be called in  as requested below ,pt alsois requesting someone call her to let her know this has been called into pharmacy   Pt is out of medication

## 2025-03-27 ENCOUNTER — OFFICE VISIT (OUTPATIENT)
Age: 55
End: 2025-03-27
Payer: MEDICARE

## 2025-03-27 VITALS
BODY MASS INDEX: 50.72 KG/M2 | DIASTOLIC BLOOD PRESSURE: 78 MMHG | HEART RATE: 101 BPM | SYSTOLIC BLOOD PRESSURE: 110 MMHG | HEIGHT: 65 IN

## 2025-03-27 DIAGNOSIS — I48.0 PAROXYSMAL ATRIAL FIBRILLATION (HCC): Primary | Chronic | ICD-10-CM

## 2025-03-27 DIAGNOSIS — I10 PRIMARY HYPERTENSION: ICD-10-CM

## 2025-03-27 PROCEDURE — 3074F SYST BP LT 130 MM HG: CPT | Performed by: INTERNAL MEDICINE

## 2025-03-27 PROCEDURE — 3078F DIAST BP <80 MM HG: CPT | Performed by: INTERNAL MEDICINE

## 2025-03-27 PROCEDURE — 93000 ELECTROCARDIOGRAM COMPLETE: CPT | Performed by: INTERNAL MEDICINE

## 2025-03-27 PROCEDURE — 99214 OFFICE O/P EST MOD 30 MIN: CPT | Performed by: INTERNAL MEDICINE

## 2025-03-27 RX ORDER — DILTIAZEM HYDROCHLORIDE 240 MG/1
240 CAPSULE, COATED, EXTENDED RELEASE ORAL DAILY
Qty: 90 CAPSULE | Refills: 3 | Status: SHIPPED | OUTPATIENT
Start: 2025-03-27

## 2025-03-27 ASSESSMENT — ENCOUNTER SYMPTOMS
ABDOMINAL PAIN: 0
VOMITING: 0
NAUSEA: 0
HEMOPTYSIS: 0
BLURRED VISION: 0
BLOATING: 0
SHORTNESS OF BREATH: 1
DOUBLE VISION: 0
COUGH: 0
ORTHOPNEA: 0
BACK PAIN: 0

## 2025-03-27 NOTE — PROGRESS NOTES
(HCC)    Chronic back pain    Morbid obesity with BMI of 50.0-59.9, adult    THANH treated with BiPAP    Class 3 obesity with alveolar hypoventilation, serious comorbidity, and body mass index (BMI) of 45.0 to 49.9 in adult    Cigarette nicotine dependence in remission    Centrilobular emphysema (HCC)    Secondary hypercoagulable state    Community acquired pneumonia of right middle lobe of lung    Vitamin D deficiency    Epigastric abdominal pain    Anemia    Upper GI bleed    Hypercalcemia    Respiratory failure with hypoxia (HCC)    Sepsis (HCC)    COPD, severe (HCC)     Past Medical History:   Diagnosis Date    Acute gallstone pancreatitis 10/26/2022    10/30/22 s/p lap river and liver biopsy; Dr Parsons Pathology: A:  \"GALLBLADDER\":            CHRONIC CHOLECYSTITIS WITH CHOLESTEROLOSIS.            CHOLELITHIASIS.        B:  \"LIVER BIOPSY\":            BENIGN HEPATIC TISSUE WITH MILD STEATOSIS AND MINIMAL PORTAL                 INFLAMMATION.            Childhood asthma     Chronic respiratory failure with hypoxia (HCC)     Continuous at 3L    COPD (chronic obstructive pulmonary disease) (HCC)     Stage 4 by GOLD Criteria    COVID-19 01/07/2021    Fatty liver determined by biopsy 10/30/2022    Gallstone pancreatitis 10/2022    Hyperlipidemia     Daily meds     Hypertension     Managed with meds     Intraductal papilloma of breast 01/16/2019    Left    Microalbuminuria     Microcytosis     Morbid obesity     Nonproliferative retinopathy due to secondary diabetes (HCC) 03/07/2020    Mild, Bilateral    THANH on CPAP 3/2/2017    Paroxysmal atrial fibrillation (HCC)     Pneumonia ages 5 and 9    Type 2 diabetes mellitus (HCC)      Past Surgical History:   Procedure Laterality Date    BREAST LUMPECTOMY W/ NEEDLE LOCALIZATION Left 03/27/2019    CHOLECYSTECTOMY, LAPAROSCOPIC N/A 10/30/2022    CHOLECYSTECTOMY LAPAROSCOPIC WITH POSSIBLE LIVER BIOPSY performed by Timo Parsons MD at [facility] MAIN OR    FRACTURE SURGERY

## 2025-04-14 ENCOUNTER — TELEPHONE (OUTPATIENT)
Dept: PULMONOLOGY | Age: 55
End: 2025-04-14

## 2025-04-14 NOTE — TELEPHONE ENCOUNTER
TRIAGE CALL      Complaint: sob/light headed    Cough: yes  Productive:  yes   Bloody Sputum:  no  Increased SOB/Wheezing:  yes both   Duration: since the weekend  Fever/Chills: no  OTC Meds tried: no      Patient is very tired  and her back is tight

## 2025-04-14 NOTE — TELEPHONE ENCOUNTER
Last seen: 5/1/24  Hx: h/o COVID, severe COPD, DM    HS NIV.    Patient call reporting sob & lightheadedness, productive cough w/ sob & wheezing, no fever/chills, symptoms started over the weekend. Having fatigue and back is tight.    Contacted patient regarding reported symptoms, sputum has a yellow tinge; some wheezing; using nebulizer at least 4-5 times per day, this helps for short term; sob is very pronounced with activity; wearing O2 continuously @ 4L; offered & scheduled for work in visit tomorrow afternoon w/ Dr. Webber.

## 2025-04-15 ENCOUNTER — HOSPITAL ENCOUNTER (INPATIENT)
Age: 55
LOS: 4 days | Discharge: HOME HEALTH | End: 2025-04-19
Attending: FAMILY MEDICINE | Admitting: FAMILY MEDICINE
Payer: MEDICARE

## 2025-04-15 ENCOUNTER — APPOINTMENT (OUTPATIENT)
Dept: GENERAL RADIOLOGY | Age: 55
End: 2025-04-15
Attending: FAMILY MEDICINE
Payer: MEDICARE

## 2025-04-15 PROBLEM — J96.21 ACUTE ON CHRONIC RESPIRATORY FAILURE WITH HYPOXIA (HCC): Status: ACTIVE | Noted: 2025-04-15

## 2025-04-15 PROBLEM — J96.00 ACUTE RESPIRATORY FAILURE: Status: ACTIVE | Noted: 2025-04-15

## 2025-04-15 LAB
ANION GAP SERPL CALC-SCNC: 8 MMOL/L (ref 7–16)
B PERT DNA SPEC QL NAA+PROBE: NOT DETECTED
BASOPHILS # BLD: 0.04 K/UL (ref 0–0.2)
BASOPHILS NFR BLD: 0.4 % (ref 0–2)
BORDETELLA PARAPERTUSSIS BY PCR: NOT DETECTED
BUN SERPL-MCNC: 10 MG/DL (ref 6–23)
C PNEUM DNA SPEC QL NAA+PROBE: NOT DETECTED
CALCIUM SERPL-MCNC: 9.8 MG/DL (ref 8.8–10.2)
CHLORIDE SERPL-SCNC: 94 MMOL/L (ref 98–107)
CO2 SERPL-SCNC: 36 MMOL/L (ref 20–29)
CREAT SERPL-MCNC: 0.6 MG/DL (ref 0.6–1.1)
D DIMER PPP FEU-MCNC: 0.29 UG/ML(FEU)
DIFFERENTIAL METHOD BLD: ABNORMAL
EOSINOPHIL # BLD: 0.16 K/UL (ref 0–0.8)
EOSINOPHIL NFR BLD: 1.5 % (ref 0.5–7.8)
ERYTHROCYTE [DISTWIDTH] IN BLOOD BY AUTOMATED COUNT: 17.3 % (ref 11.9–14.6)
FLUAV SUBTYP SPEC NAA+PROBE: NOT DETECTED
FLUBV RNA SPEC QL NAA+PROBE: NOT DETECTED
GLUCOSE BLD STRIP.AUTO-MCNC: 111 MG/DL (ref 65–100)
GLUCOSE SERPL-MCNC: 116 MG/DL (ref 70–99)
HADV DNA SPEC QL NAA+PROBE: NOT DETECTED
HCOV 229E RNA SPEC QL NAA+PROBE: NOT DETECTED
HCOV HKU1 RNA SPEC QL NAA+PROBE: NOT DETECTED
HCOV NL63 RNA SPEC QL NAA+PROBE: NOT DETECTED
HCOV OC43 RNA SPEC QL NAA+PROBE: NOT DETECTED
HCT VFR BLD AUTO: 40.5 % (ref 35.8–46.3)
HGB BLD-MCNC: 11.5 G/DL (ref 11.7–15.4)
HMPV RNA SPEC QL NAA+PROBE: NOT DETECTED
HPIV1 RNA SPEC QL NAA+PROBE: NOT DETECTED
HPIV2 RNA SPEC QL NAA+PROBE: NOT DETECTED
HPIV3 RNA SPEC QL NAA+PROBE: NOT DETECTED
HPIV4 RNA SPEC QL NAA+PROBE: NOT DETECTED
IMM GRANULOCYTES # BLD AUTO: 0.05 K/UL (ref 0–0.5)
IMM GRANULOCYTES NFR BLD AUTO: 0.5 % (ref 0–5)
LYMPHOCYTES # BLD: 2.04 K/UL (ref 0.5–4.6)
LYMPHOCYTES NFR BLD: 19 % (ref 13–44)
M PNEUMO DNA SPEC QL NAA+PROBE: NOT DETECTED
MAGNESIUM SERPL-MCNC: 1.7 MG/DL (ref 1.8–2.4)
MCH RBC QN AUTO: 21.3 PG (ref 26.1–32.9)
MCHC RBC AUTO-ENTMCNC: 28.4 G/DL (ref 31.4–35)
MCV RBC AUTO: 75 FL (ref 82–102)
MONOCYTES # BLD: 0.47 K/UL (ref 0.1–1.3)
MONOCYTES NFR BLD: 4.4 % (ref 4–12)
NEUTS SEG # BLD: 7.96 K/UL (ref 1.7–8.2)
NEUTS SEG NFR BLD: 74.2 % (ref 43–78)
NRBC # BLD: 0 K/UL (ref 0–0.2)
PLATELET # BLD AUTO: 276 K/UL (ref 150–450)
PMV BLD AUTO: 9.7 FL (ref 9.4–12.3)
POTASSIUM SERPL-SCNC: 3.8 MMOL/L (ref 3.5–5.1)
RBC # BLD AUTO: 5.4 M/UL (ref 4.05–5.2)
RSV RNA SPEC QL NAA+PROBE: NOT DETECTED
RV+EV RNA SPEC QL NAA+PROBE: NOT DETECTED
SARS-COV-2 RNA RESP QL NAA+PROBE: NOT DETECTED
SERVICE CMNT-IMP: ABNORMAL
SODIUM SERPL-SCNC: 137 MMOL/L (ref 136–145)
WBC # BLD AUTO: 10.7 K/UL (ref 4.3–11.1)

## 2025-04-15 PROCEDURE — 83735 ASSAY OF MAGNESIUM: CPT

## 2025-04-15 PROCEDURE — 36415 COLL VENOUS BLD VENIPUNCTURE: CPT

## 2025-04-15 PROCEDURE — 6360000002 HC RX W HCPCS: Performed by: FAMILY MEDICINE

## 2025-04-15 PROCEDURE — 87070 CULTURE OTHR SPECIMN AEROBIC: CPT

## 2025-04-15 PROCEDURE — 2500000003 HC RX 250 WO HCPCS: Performed by: FAMILY MEDICINE

## 2025-04-15 PROCEDURE — 94760 N-INVAS EAR/PLS OXIMETRY 1: CPT

## 2025-04-15 PROCEDURE — 85025 COMPLETE CBC W/AUTO DIFF WBC: CPT

## 2025-04-15 PROCEDURE — 71045 X-RAY EXAM CHEST 1 VIEW: CPT

## 2025-04-15 PROCEDURE — 80048 BASIC METABOLIC PNL TOTAL CA: CPT

## 2025-04-15 PROCEDURE — 85379 FIBRIN DEGRADATION QUANT: CPT

## 2025-04-15 PROCEDURE — 87205 SMEAR GRAM STAIN: CPT

## 2025-04-15 PROCEDURE — 94640 AIRWAY INHALATION TREATMENT: CPT

## 2025-04-15 PROCEDURE — 76937 US GUIDE VASCULAR ACCESS: CPT

## 2025-04-15 PROCEDURE — 1100000003 HC PRIVATE W/ TELEMETRY

## 2025-04-15 PROCEDURE — 94660 CPAP INITIATION&MGMT: CPT

## 2025-04-15 PROCEDURE — 82962 GLUCOSE BLOOD TEST: CPT

## 2025-04-15 PROCEDURE — 0202U NFCT DS 22 TRGT SARS-COV-2: CPT

## 2025-04-15 PROCEDURE — 6370000000 HC RX 637 (ALT 250 FOR IP): Performed by: FAMILY MEDICINE

## 2025-04-15 RX ORDER — BUDESONIDE 0.5 MG/2ML
0.5 INHALANT ORAL
Status: DISCONTINUED | OUTPATIENT
Start: 2025-04-15 | End: 2025-04-19 | Stop reason: HOSPADM

## 2025-04-15 RX ORDER — ACETAMINOPHEN 650 MG/1
650 SUPPOSITORY RECTAL EVERY 6 HOURS PRN
Status: DISCONTINUED | OUTPATIENT
Start: 2025-04-15 | End: 2025-04-19 | Stop reason: HOSPADM

## 2025-04-15 RX ORDER — GUAIFENESIN/DEXTROMETHORPHAN 100-10MG/5
5 SYRUP ORAL EVERY 4 HOURS PRN
Status: DISCONTINUED | OUTPATIENT
Start: 2025-04-15 | End: 2025-04-19 | Stop reason: HOSPADM

## 2025-04-15 RX ORDER — SODIUM CHLORIDE 9 MG/ML
INJECTION, SOLUTION INTRAVENOUS PRN
Status: DISCONTINUED | OUTPATIENT
Start: 2025-04-15 | End: 2025-04-19 | Stop reason: HOSPADM

## 2025-04-15 RX ORDER — DEXTROSE MONOHYDRATE 100 MG/ML
INJECTION, SOLUTION INTRAVENOUS CONTINUOUS PRN
Status: DISCONTINUED | OUTPATIENT
Start: 2025-04-15 | End: 2025-04-19 | Stop reason: HOSPADM

## 2025-04-15 RX ORDER — DOXYCYCLINE 100 MG/1
100 CAPSULE ORAL EVERY 12 HOURS SCHEDULED
Status: DISCONTINUED | OUTPATIENT
Start: 2025-04-15 | End: 2025-04-19 | Stop reason: HOSPADM

## 2025-04-15 RX ORDER — GUAIFENESIN 600 MG/1
1200 TABLET, EXTENDED RELEASE ORAL 2 TIMES DAILY
Status: DISCONTINUED | OUTPATIENT
Start: 2025-04-15 | End: 2025-04-19 | Stop reason: HOSPADM

## 2025-04-15 RX ORDER — ARFORMOTEROL TARTRATE 15 UG/2ML
15 SOLUTION RESPIRATORY (INHALATION)
Status: DISCONTINUED | OUTPATIENT
Start: 2025-04-15 | End: 2025-04-19 | Stop reason: HOSPADM

## 2025-04-15 RX ORDER — ATORVASTATIN CALCIUM 10 MG/1
10 TABLET, FILM COATED ORAL NIGHTLY
Status: DISCONTINUED | OUTPATIENT
Start: 2025-04-15 | End: 2025-04-19 | Stop reason: HOSPADM

## 2025-04-15 RX ORDER — IBUPROFEN 600 MG/1
1 TABLET ORAL PRN
Status: DISCONTINUED | OUTPATIENT
Start: 2025-04-15 | End: 2025-04-19 | Stop reason: HOSPADM

## 2025-04-15 RX ORDER — NICOTINE 21 MG/24HR
1 PATCH, TRANSDERMAL 24 HOURS TRANSDERMAL DAILY
Status: DISCONTINUED | OUTPATIENT
Start: 2025-04-15 | End: 2025-04-19 | Stop reason: HOSPADM

## 2025-04-15 RX ORDER — ONDANSETRON 2 MG/ML
4 INJECTION INTRAMUSCULAR; INTRAVENOUS EVERY 6 HOURS PRN
Status: DISCONTINUED | OUTPATIENT
Start: 2025-04-15 | End: 2025-04-19 | Stop reason: HOSPADM

## 2025-04-15 RX ORDER — ALBUTEROL SULFATE 0.83 MG/ML
2.5 SOLUTION RESPIRATORY (INHALATION) EVERY 6 HOURS PRN
Status: DISCONTINUED | OUTPATIENT
Start: 2025-04-15 | End: 2025-04-19 | Stop reason: HOSPADM

## 2025-04-15 RX ORDER — SODIUM CHLORIDE 0.9 % (FLUSH) 0.9 %
5-40 SYRINGE (ML) INJECTION PRN
Status: DISCONTINUED | OUTPATIENT
Start: 2025-04-15 | End: 2025-04-19 | Stop reason: HOSPADM

## 2025-04-15 RX ORDER — SODIUM CHLORIDE 0.9 % (FLUSH) 0.9 %
5-40 SYRINGE (ML) INJECTION EVERY 12 HOURS SCHEDULED
Status: DISCONTINUED | OUTPATIENT
Start: 2025-04-15 | End: 2025-04-19 | Stop reason: HOSPADM

## 2025-04-15 RX ORDER — DILTIAZEM HYDROCHLORIDE 240 MG/1
240 CAPSULE, COATED, EXTENDED RELEASE ORAL DAILY
Status: DISCONTINUED | OUTPATIENT
Start: 2025-04-15 | End: 2025-04-19 | Stop reason: HOSPADM

## 2025-04-15 RX ORDER — ONDANSETRON 4 MG/1
4 TABLET, ORALLY DISINTEGRATING ORAL EVERY 8 HOURS PRN
Status: DISCONTINUED | OUTPATIENT
Start: 2025-04-15 | End: 2025-04-19 | Stop reason: HOSPADM

## 2025-04-15 RX ORDER — POLYETHYLENE GLYCOL 3350 17 G/17G
17 POWDER, FOR SOLUTION ORAL DAILY PRN
Status: DISCONTINUED | OUTPATIENT
Start: 2025-04-15 | End: 2025-04-19 | Stop reason: HOSPADM

## 2025-04-15 RX ORDER — INSULIN LISPRO 100 [IU]/ML
0-4 INJECTION, SOLUTION INTRAVENOUS; SUBCUTANEOUS
Status: DISCONTINUED | OUTPATIENT
Start: 2025-04-15 | End: 2025-04-19 | Stop reason: HOSPADM

## 2025-04-15 RX ORDER — ACETAMINOPHEN 325 MG/1
650 TABLET ORAL EVERY 6 HOURS PRN
Status: DISCONTINUED | OUTPATIENT
Start: 2025-04-15 | End: 2025-04-19 | Stop reason: HOSPADM

## 2025-04-15 RX ORDER — IPRATROPIUM BROMIDE AND ALBUTEROL SULFATE 2.5; .5 MG/3ML; MG/3ML
1 SOLUTION RESPIRATORY (INHALATION)
Status: DISCONTINUED | OUTPATIENT
Start: 2025-04-15 | End: 2025-04-19 | Stop reason: HOSPADM

## 2025-04-15 RX ADMIN — WATER 60 MG: 1 INJECTION INTRAMUSCULAR; INTRAVENOUS; SUBCUTANEOUS at 18:13

## 2025-04-15 RX ADMIN — DOXYCYCLINE HYCLATE 100 MG: 100 CAPSULE ORAL at 22:25

## 2025-04-15 RX ADMIN — BUDESONIDE INHALATION 500 MCG: 0.5 SUSPENSION RESPIRATORY (INHALATION) at 20:20

## 2025-04-15 RX ADMIN — GUAIFENESIN 1200 MG: 600 TABLET ORAL at 22:25

## 2025-04-15 RX ADMIN — SODIUM CHLORIDE, PRESERVATIVE FREE 10 ML: 5 INJECTION INTRAVENOUS at 22:27

## 2025-04-15 RX ADMIN — APIXABAN 5 MG: 5 TABLET, FILM COATED ORAL at 22:25

## 2025-04-15 RX ADMIN — ARFORMOTEROL TARTRATE 15 MCG: 15 SOLUTION RESPIRATORY (INHALATION) at 20:20

## 2025-04-15 RX ADMIN — IPRATROPIUM BROMIDE AND ALBUTEROL SULFATE 1 DOSE: 2.5; .5 SOLUTION RESPIRATORY (INHALATION) at 20:20

## 2025-04-15 RX ADMIN — ATORVASTATIN CALCIUM 10 MG: 10 TABLET, FILM COATED ORAL at 22:25

## 2025-04-15 NOTE — H&P
Hospitalist History and Physical   Admit Date:  4/15/2025  4:25 PM   Name:  Michelle Obrien   Age:  55 y.o.  Sex:  female  :  1970   MRN:  [id number]   Room:  [facility]    Presenting/Chief Complaint: No chief complaint on file.     Reason(s) for Admission: Acute respiratory failure [J96.00]  Acute on chronic respiratory failure with hypoxia [J96.21]     History of Present Illness:   Michelle Obrien is a 55 y.o. female with medical history of COPD/chronic respiratory failure on 4L O2 baseline, PAF, THANH/OHS who presented as a direct admit from [place] pulmonary office due to worsening hypoxia.  Patient has been having 3-4-day of productive cough, congestion and dyspnea on exertion.  She denies peripheral edema, fever, chills, chest pain. She went to her pulmonologist on 4/15 and was noted to have significantly increased O2 requirement needing at least 8L O2 NC with exertion in the office.  Hospitalist was contacted to admit patient for acute COPD exacerbation.      Assessment & Plan:       Acute on chronic respiratory failure with hypoxia--4L O2 at baseline  Acute COPD exacerbation  O2 sat was 84% on room air at [place] pulmonary office. At baseline on 4O2LNC. B/l expiratory wheezing on admission.   Start Doxycycline for 5 days  Solumedrol 60mg IV TID  Duo-nebs scheduled, albuterol prn  Continue equivalence of home Trelegy  On 8LO2NC, wean as tolerated  Check VRP/COVID-19, sputum culture, D-dimer, CXR  Consult pulmonology, appreciate recs    PAF  Monitor on telemetry  Continue home diltiazem  Continue home Eliquis    DM2  Hold home metformin  Check A1c  Start SSI as patient on Solu-Medrol  Diabetic diet    HLD  Continue home statin    Morbid obesity  THANH/OHS  Adds to complexity of care  Continue nightly trilogy      PT/OT evals ordered?  Therapy evals ordered  Diet: ADULT DIET; Regular; 4 carb choices (60 gm/meal)  VTE prophylaxis: Already on anticoagulation  Code status: Full Code  Code status

## 2025-04-15 NOTE — PROGRESS NOTES
4 Eyes Skin Assessment     NAME:  Michelle Obrien  YOB: 1970  MEDICAL RECORD NUMBER:  [id number]    The patient is being assessed for  Admission    I agree that at least one RN has performed a thorough Head to Toe Skin Assessment on the patient. ALL assessment sites listed below have been assessed.      Areas assessed by both nurses:    Head, Face, Ears, Shoulders, Back, Chest, Arms, Elbows, Hands, Sacrum. Buttock, Coccyx, Ischium, and Legs. Feet and Heels        Does the Patient have a Wound? No noted wound(s)       Reynaldo Prevention initiated by RN: Yes  Wound Care Orders initiated by RN: No    Pressure Injury (Stage 3,4, Unstageable, DTI, NWPT, and Complex wounds) if present, place Wound referral order by RN under : No    New Ostomies, if present place, Ostomy referral order under : No     Nurse 1 eSignature: Electronically signed by Libby Myers RN on 4/15/25 at 6:45 PM EDT    **SHARE this note so that the co-signing nurse can place an eSignature**    Nurse 2 eSignature: {Esignature:[id number]}

## 2025-04-15 NOTE — CONSULTS
US Guided PIV access    Called for assistance with IV access. Ultrasound was used to find the vein which was compressible and does not have any features of an artery or nerve bundle. Skin was cleaned and disinfected prior to IV puncture. Under real-time ultrasound guidance, peripheral access was obtained in the left forearm using 22 G 1.75\" Peripheral IV catheter x 2 attempt. Blood return was present and IV flushed without difficulty. IV dressing applied, no immediate complications noted, and patient tolerated the procedure well.

## 2025-04-15 NOTE — ACP (ADVANCE CARE PLANNING)
Advance Care Planning Note   Admit Date:  4/15/2025  4:25 PM   Name:  Michelle Obrien   Age:  55 y.o.  Sex:  female  :  1970   MRN:  [id number]   Room:  [facility]    Michelle Obrien has capacity to make her own decisions:   Yes    If pt unable to make decisions, POA/surrogate decision maker:  Son Ney Ballesteros    Pt was alert and oriented x3 on admission. Discussed plan of care as well as GOC. Pt stated she doesn't have a living will. She desires for her son to be her HCPOA. She desires FULL code at this time. All questions answered.     Patient or surrogate consented to discussion of the current conditions, workup, management plans, prognosis, and the risk for further deterioration.  Time spent: 17 minutes in direct discussion.      Signed:  Sonya Barger DO

## 2025-04-16 PROBLEM — J96.21 ACUTE ON CHRONIC RESPIRATORY FAILURE WITH HYPOXIA (HCC): Status: RESOLVED | Noted: 2025-04-15 | Resolved: 2025-04-16

## 2025-04-16 PROBLEM — G47.33 OSA TREATED WITH BIPAP: Chronic | Status: ACTIVE | Noted: 2017-03-02

## 2025-04-16 PROBLEM — J96.21 ACUTE ON CHRONIC RESPIRATORY FAILURE WITH HYPOXIA AND HYPERCAPNIA (HCC): Status: ACTIVE | Noted: 2025-04-16

## 2025-04-16 PROBLEM — J96.22 ACUTE ON CHRONIC RESPIRATORY FAILURE WITH HYPOXIA AND HYPERCAPNIA (HCC): Status: ACTIVE | Noted: 2025-04-16

## 2025-04-16 PROBLEM — E66.01 MORBID OBESITY WITH BMI OF 50.0-59.9, ADULT: Chronic | Status: ACTIVE | Noted: 2017-03-02

## 2025-04-16 PROBLEM — E11.9 DIABETES MELLITUS TYPE 2, CONTROLLED (HCC): Chronic | Status: ACTIVE | Noted: 2017-03-02

## 2025-04-16 PROBLEM — J44.9 STAGE 4 VERY SEVERE COPD BY GOLD CLASSIFICATION (HCC): Chronic | Status: ACTIVE | Noted: 2017-03-02

## 2025-04-16 LAB
ANION GAP SERPL CALC-SCNC: 9 MMOL/L (ref 7–16)
ARTERIAL PATENCY WRIST A: POSITIVE
BASE EXCESS BLD CALC-SCNC: 6.7 MMOL/L
BASOPHILS # BLD: 0.04 K/UL (ref 0–0.2)
BASOPHILS NFR BLD: 0.4 % (ref 0–2)
BDY SITE: ABNORMAL
BUN SERPL-MCNC: 10 MG/DL (ref 6–23)
CALCIUM SERPL-MCNC: 9.7 MG/DL (ref 8.8–10.2)
CHLORIDE SERPL-SCNC: 94 MMOL/L (ref 98–107)
CO2 SERPL-SCNC: 35 MMOL/L (ref 20–29)
CREAT SERPL-MCNC: 0.59 MG/DL (ref 0.6–1.1)
DIFFERENTIAL METHOD BLD: ABNORMAL
EOSINOPHIL # BLD: 0.1 K/UL (ref 0–0.8)
EOSINOPHIL NFR BLD: 1 % (ref 0.5–7.8)
ERYTHROCYTE [DISTWIDTH] IN BLOOD BY AUTOMATED COUNT: 17.2 % (ref 11.9–14.6)
EST. AVERAGE GLUCOSE BLD GHB EST-MCNC: 127 MG/DL
GAS FLOW.O2 O2 DELIVERY SYS: ABNORMAL
GLUCOSE BLD STRIP.AUTO-MCNC: 139 MG/DL (ref 65–100)
GLUCOSE BLD STRIP.AUTO-MCNC: 145 MG/DL (ref 65–100)
GLUCOSE BLD STRIP.AUTO-MCNC: 166 MG/DL (ref 65–100)
GLUCOSE BLD STRIP.AUTO-MCNC: 174 MG/DL (ref 65–100)
GLUCOSE SERPL-MCNC: 117 MG/DL (ref 70–99)
HBA1C MFR BLD: 6.1 % (ref 0–5.6)
HCO3 BLD-SCNC: 34.7 MMOL/L (ref 21–28)
HCT VFR BLD AUTO: 39.6 % (ref 35.8–46.3)
HGB BLD-MCNC: 11.4 G/DL (ref 11.7–15.4)
IMM GRANULOCYTES # BLD AUTO: 0.05 K/UL (ref 0–0.5)
IMM GRANULOCYTES NFR BLD AUTO: 0.5 % (ref 0–5)
LYMPHOCYTES # BLD: 1.23 K/UL (ref 0.5–4.6)
LYMPHOCYTES NFR BLD: 11.9 % (ref 13–44)
MAGNESIUM SERPL-MCNC: 1.7 MG/DL (ref 1.8–2.4)
MCH RBC QN AUTO: 21.1 PG (ref 26.1–32.9)
MCHC RBC AUTO-ENTMCNC: 28.8 G/DL (ref 31.4–35)
MCV RBC AUTO: 73.3 FL (ref 82–102)
MONOCYTES # BLD: 0.28 K/UL (ref 0.1–1.3)
MONOCYTES NFR BLD: 2.7 % (ref 4–12)
NEUTS SEG # BLD: 8.61 K/UL (ref 1.7–8.2)
NEUTS SEG NFR BLD: 83.5 % (ref 43–78)
NRBC # BLD: 0 K/UL (ref 0–0.2)
PCO2 BLD: 62.5 MMHG (ref 35–45)
PH BLD: 7.35 (ref 7.35–7.45)
PLATELET # BLD AUTO: 290 K/UL (ref 150–450)
PMV BLD AUTO: 10.6 FL (ref 9.4–12.3)
PO2 BLD: 58 MMHG (ref 75–100)
POC FIO2: 6
POTASSIUM SERPL-SCNC: 4 MMOL/L (ref 3.5–5.1)
RBC # BLD AUTO: 5.4 M/UL (ref 4.05–5.2)
SAO2 % BLD: 87.3 % (ref 94–98)
SERVICE CMNT-IMP: ABNORMAL
SODIUM SERPL-SCNC: 137 MMOL/L (ref 136–145)
SPECIMEN TYPE: ABNORMAL
WBC # BLD AUTO: 10.3 K/UL (ref 4.3–11.1)

## 2025-04-16 PROCEDURE — 94660 CPAP INITIATION&MGMT: CPT

## 2025-04-16 PROCEDURE — 6360000002 HC RX W HCPCS: Performed by: FAMILY MEDICINE

## 2025-04-16 PROCEDURE — 97165 OT EVAL LOW COMPLEX 30 MIN: CPT

## 2025-04-16 PROCEDURE — 99223 1ST HOSP IP/OBS HIGH 75: CPT | Performed by: INTERNAL MEDICINE

## 2025-04-16 PROCEDURE — 97535 SELF CARE MNGMENT TRAINING: CPT

## 2025-04-16 PROCEDURE — 85025 COMPLETE CBC W/AUTO DIFF WBC: CPT

## 2025-04-16 PROCEDURE — 82803 BLOOD GASES ANY COMBINATION: CPT

## 2025-04-16 PROCEDURE — 83735 ASSAY OF MAGNESIUM: CPT

## 2025-04-16 PROCEDURE — 2500000003 HC RX 250 WO HCPCS: Performed by: NURSE PRACTITIONER

## 2025-04-16 PROCEDURE — 2500000003 HC RX 250 WO HCPCS: Performed by: FAMILY MEDICINE

## 2025-04-16 PROCEDURE — 83036 HEMOGLOBIN GLYCOSYLATED A1C: CPT

## 2025-04-16 PROCEDURE — 97161 PT EVAL LOW COMPLEX 20 MIN: CPT

## 2025-04-16 PROCEDURE — 36415 COLL VENOUS BLD VENIPUNCTURE: CPT

## 2025-04-16 PROCEDURE — 94761 N-INVAS EAR/PLS OXIMETRY MLT: CPT

## 2025-04-16 PROCEDURE — 82962 GLUCOSE BLOOD TEST: CPT

## 2025-04-16 PROCEDURE — 6370000000 HC RX 637 (ALT 250 FOR IP): Performed by: FAMILY MEDICINE

## 2025-04-16 PROCEDURE — 2700000000 HC OXYGEN THERAPY PER DAY

## 2025-04-16 PROCEDURE — 80048 BASIC METABOLIC PNL TOTAL CA: CPT

## 2025-04-16 PROCEDURE — 94760 N-INVAS EAR/PLS OXIMETRY 1: CPT

## 2025-04-16 PROCEDURE — 94640 AIRWAY INHALATION TREATMENT: CPT

## 2025-04-16 PROCEDURE — 97530 THERAPEUTIC ACTIVITIES: CPT

## 2025-04-16 PROCEDURE — 1100000003 HC PRIVATE W/ TELEMETRY

## 2025-04-16 PROCEDURE — 36600 WITHDRAWAL OF ARTERIAL BLOOD: CPT

## 2025-04-16 PROCEDURE — 6360000002 HC RX W HCPCS: Performed by: NURSE PRACTITIONER

## 2025-04-16 RX ADMIN — METHYLPREDNISOLONE SODIUM SUCCINATE 40 MG: 40 INJECTION, POWDER, LYOPHILIZED, FOR SOLUTION INTRAMUSCULAR; INTRAVENOUS at 22:24

## 2025-04-16 RX ADMIN — SODIUM CHLORIDE, PRESERVATIVE FREE 10 ML: 5 INJECTION INTRAVENOUS at 08:33

## 2025-04-16 RX ADMIN — APIXABAN 5 MG: 5 TABLET, FILM COATED ORAL at 20:40

## 2025-04-16 RX ADMIN — IPRATROPIUM BROMIDE AND ALBUTEROL SULFATE 1 DOSE: 2.5; .5 SOLUTION RESPIRATORY (INHALATION) at 08:02

## 2025-04-16 RX ADMIN — DILTIAZEM HYDROCHLORIDE 240 MG: 240 CAPSULE, EXTENDED RELEASE ORAL at 08:31

## 2025-04-16 RX ADMIN — GUAIFENESIN 1200 MG: 600 TABLET ORAL at 08:31

## 2025-04-16 RX ADMIN — ACETAMINOPHEN 650 MG: 325 TABLET ORAL at 17:20

## 2025-04-16 RX ADMIN — ATORVASTATIN CALCIUM 10 MG: 10 TABLET, FILM COATED ORAL at 20:40

## 2025-04-16 RX ADMIN — SODIUM CHLORIDE, PRESERVATIVE FREE 10 ML: 5 INJECTION INTRAVENOUS at 20:41

## 2025-04-16 RX ADMIN — IPRATROPIUM BROMIDE AND ALBUTEROL SULFATE 1 DOSE: 2.5; .5 SOLUTION RESPIRATORY (INHALATION) at 11:38

## 2025-04-16 RX ADMIN — WATER 60 MG: 1 INJECTION INTRAMUSCULAR; INTRAVENOUS; SUBCUTANEOUS at 08:32

## 2025-04-16 RX ADMIN — ARFORMOTEROL TARTRATE 15 MCG: 15 SOLUTION RESPIRATORY (INHALATION) at 08:04

## 2025-04-16 RX ADMIN — BUDESONIDE INHALATION 500 MCG: 0.5 SUSPENSION RESPIRATORY (INHALATION) at 08:03

## 2025-04-16 RX ADMIN — IPRATROPIUM BROMIDE AND ALBUTEROL SULFATE 1 DOSE: 2.5; .5 SOLUTION RESPIRATORY (INHALATION) at 15:10

## 2025-04-16 RX ADMIN — BUDESONIDE INHALATION 500 MCG: 0.5 SUSPENSION RESPIRATORY (INHALATION) at 19:31

## 2025-04-16 RX ADMIN — APIXABAN 5 MG: 5 TABLET, FILM COATED ORAL at 08:31

## 2025-04-16 RX ADMIN — IPRATROPIUM BROMIDE AND ALBUTEROL SULFATE 1 DOSE: 2.5; .5 SOLUTION RESPIRATORY (INHALATION) at 19:31

## 2025-04-16 RX ADMIN — ARFORMOTEROL TARTRATE 15 MCG: 15 SOLUTION RESPIRATORY (INHALATION) at 19:31

## 2025-04-16 RX ADMIN — WATER 60 MG: 1 INJECTION INTRAMUSCULAR; INTRAVENOUS; SUBCUTANEOUS at 02:07

## 2025-04-16 RX ADMIN — GUAIFENESIN 1200 MG: 600 TABLET ORAL at 20:39

## 2025-04-16 RX ADMIN — DOXYCYCLINE HYCLATE 100 MG: 100 CAPSULE ORAL at 20:40

## 2025-04-16 RX ADMIN — DOXYCYCLINE HYCLATE 100 MG: 100 CAPSULE ORAL at 08:32

## 2025-04-16 ASSESSMENT — PAIN SCALES - GENERAL
PAINLEVEL_OUTOF10: 0
PAINLEVEL_OUTOF10: 3
PAINLEVEL_OUTOF10: 0

## 2025-04-16 ASSESSMENT — PAIN SCALES - WONG BAKER: WONGBAKER_NUMERICALRESPONSE: NO HURT

## 2025-04-16 ASSESSMENT — PULMONARY FUNCTION TESTS: PIF_VALUE: 25.1

## 2025-04-16 ASSESSMENT — PAIN DESCRIPTION - LOCATION: LOCATION: HEAD

## 2025-04-16 NOTE — PROGRESS NOTES
ACUTE PHYSICAL THERAPY GOALS:   (Developed with and agreed upon by patient and/or caregiver.)  LTG:  (1.)Ms. Obrien will move from supine to sit and sit to supine , scoot up and down, and roll side to side in bed with INDEPENDENCE within 7 treatment day(s).    (2.)Ms. Obrien will transfer from bed to chair and chair to bed with MODIFIED INDEPENDENCE using the least restrictive device within 7 treatment day(s).    (3.)Ms. Obrien will ambulate with MODIFIED INDEPENDENCE for 250 feet with the least restrictive device within 7 treatment day(s).  (4.)Ms. Obrien will participate in therapeutic activity/exercises x 25 minutes for increased activity tolerance with SpO2 above 90% within 7 treatment days.  (5.)Ms. Obrien will ascend and descend 10 stairs using R hand rail(s) with SUPERVISION to improve functional mobility and safety within 7 day(s).    ________________________________________________________________________________________________        PHYSICAL THERAPY Initial Assessment and AM  (Link to Caseload Tracking: PT Visit Days : 1  Acknowledge Orders  Time In/Out  PT Charge Capture  Rehab Caseload Tracker    Michelle Obrien is a 55 y.o. female   PRIMARY DIAGNOSIS: COPD with acute exacerbation (HCC)  Acute respiratory failure [J96.00]  Acute on chronic respiratory failure with hypoxia [J96.21]       Reason for Referral: Generalized Muscle Weakness (M62.81)  Difficulty in walking, Not elsewhere classified (R26.2)  Inpatient: Payor: AET MEDICARE / Plan: [employer] MEDICARE ADVANTAGE HMO / Product Type: Medicare /     ASSESSMENT:     REHAB RECOMMENDATIONS:   Recommendation to date pending progress:  Setting:  Home Health Therapy    Equipment:     Bariatric rollator     ASSESSMENT:  Ms. Obrien was admitted to the hospital by pulmonary due to increased O2 needs.  Pt has a history of: PAF, COPD, and diabetes.  Ms. Obrien presents to PT with WFL AROM and decreased strength in B LEs.  Pt was on 6 L/min  Fair+ standing   Posture [] N/A   Sensation []     Coordination []      Tone []     Edema []    Activity Tolerance []  Desaturated with walking in room    []      COGNITION/  PERCEPTION: Intact Impaired (Comments):   Orientation [x]     Vision [x]     Hearing [x]     Cognition  [x]       MOBILITY: I Mod I S SBA CGA Min Mod Max Total  NT x2 Comments:   Bed Mobility    Rolling [] [] [] [] [] [] [] [] [] [] []    Supine to Sit [] [] [] [] [] [] [] [] [] [] []    Scooting [] [] [] [] [] [] [] [] [] [] []    Sit to Supine [] [] [] [] [] [] [] [] [] [] []    Transfers    Sit to Stand [] [] [] [x] [] [] [] [] [] [] []    Bed to Chair [] [] [] [] [x] [] [] [] [] [] []    Stand to Sit [] [] [] [] [x] [] [] [] [] [] []     [] [] [] [] [] [] [] [] [] [] []    I=Independent, Mod I=Modified Independent, S=Supervision, SBA=Standby Assistance, CGA=Contact Guard Assistance,   Min=Minimal Assistance, Mod=Moderate Assistance, Max=Maximal Assistance, Total=Total Assistance, NT=Not Tested    GAIT: I Mod I S SBA CGA Min Mod Max Total  NT x2 Comments:   Level of Assistance [] [] [] [] [x] [] [] [] [] [] [] X 2 with seated rest break   Distance   15 feet    DME Gait Belt and Rolling Walker    Gait Quality Decreased tyler  and Decreased step length    Weightbearing Status      Stairs      I=Independent, Mod I=Modified Independent, S=Supervision, SBA=Standby Assistance, CGA=Contact Guard Assistance,   Min=Minimal Assistance, Mod=Moderate Assistance, Max=Maximal Assistance, Total=Total Assistance, NT=Not Tested    PLAN:   FREQUENCY AND DURATION: 3 times/week for duration of hospital stay or until stated goals are met, whichever comes first.    THERAPY PROGNOSIS: Good    PROBLEM LIST:   (Skilled intervention is medically necessary to address:)  Decreased Activity Tolerance  Decreased Balance  Decreased Gait Ability  Decreased Strength  Decreased Transfer Abilities INTERVENTIONS PLANNED:   (Benefits and precautions of physical therapy have

## 2025-04-16 NOTE — PROGRESS NOTES
Pt placed on Bipap via the V60 ventilator. The \"under the nose\" mask is sealed properly and no leak is noted. Pt voices no discomfort with the mask at this time. Alarms are set and audible. No signs of distress noted. Call light within reach.     NIV Settings:   Mode Ipap Epap Rate FiO2   Bilevel 16 8 15 40%     Minute Ventilation: 10L/min    4L NC on standby  Pt encouraged to have someone bring in her Trilogy unit.             Reina Kowalski, RCP-RTT

## 2025-04-16 NOTE — PROGRESS NOTES
ACUTE OCCUPATIONAL THERAPY GOALS:   (Developed with and agreed upon by patient and/or caregiver.)  1. Patient will complete lower body bathing and dressing with MODIFIED INDEPENDENCE and adaptive equipment as needed.     2. Patient will complete toileting with INDEPENDENCE.  3. Patient will complete grooming ADL standing at sink with INDEPENDENCE.  4. Patient will tolerate 25 minutes of OT treatment with SELF DIRECTED rest breaks to increase activity tolerance for ADLs.   5. Patient will complete functional transfers with MODIFIED INDEPENDENCE and adaptive equipment as needed.   6. Patient will tolerate 10 minutes BUE exercises to increase strength for safe, functional transfers.   7. Patient will verbalize 3 energy conservation techniques to utilize during ADLs/IADLs.     Timeframe: 7 visits     OCCUPATIONAL THERAPY Initial Assessment and Daily Note       OT Visit Days: 1  Acknowledge Orders  Time  OT Charge Capture  Rehab Caseload Tracker      Michelle Obrien is a 55 y.o. female   PRIMARY DIAGNOSIS: COPD with acute exacerbation (HCC)  Acute respiratory failure [J96.00]  Acute on chronic respiratory failure with hypoxia [J96.21]       Reason for Referral: Generalized Muscle Weakness (M62.81)  Difficulty in walking, Not elsewhere classified (R26.2)  Other abnormalities of gait and mobility (R26.89)  Inpatient: Payor: [facility] MEDICARE / Plan: AET MEDICARE ADVANTAGE HMO / Product Type: Medicare /     ASSESSMENT:     REHAB RECOMMENDATIONS:   Recommendation to date pending progress:  Setting:  Home Health Therapy    Equipment:     Bariatric rollator--pt has shower chair at home     ASSESSMENT:  Ms. Obrien is a 56 y/o female presents with worsening SOB at Dr. office, found to be in COPD exacerbation. At baseline pt lives with her son, is independent all ADLs and does not use DME for ambulation. Pt wears 4L O2 nasal cannula at baseline and is currently on 6-8L O2 NC. Today pt presents with decreased activity

## 2025-04-16 NOTE — PROGRESS NOTES
Hospitalist Progress Note   Admit Date:  4/15/2025  4:25 PM   Name:  Michelle Obrien   Age:  55 y.o.  Sex:  female  :  1970   MRN:  [id number]   Room:  [facility]    Presenting/Chief Complaint: No chief complaint on file.     Reason(s) for Admission: Acute respiratory failure [J96.00]  Acute on chronic respiratory failure with hypoxia [J96.21]     Hospital Course:   55-year-old female history of COPD, chronic respiratory failure on 4 L O2 baseline, paroxysmal atrial fibrillation, THANH/OHS presented from [facility] as a direct admit due to worsening hypoxia.      Subjective & 24hr Events:   Patient was seen and examined at bedside.  No overnight events.  Patient states breathing improved currently on 6 L nasal cannula satting 92%      Assessment & Plan:   Acute on chronic respiratory failure with hypoxia  Acute COPD exacerbation  - Patient wears 4 L O2 at baseline  - Direct admit from [place] [facility]monology  - Pulmonary consulted  - Continue doxycycline x 5 days  - Continue Solu-Medrol 40 mg every 12 hour  -DuoNebs  - Continue to wean oxygen as tolerated-continue confluence of home Trelegy    Paroxysmal atrial fibrillation  - Continue home Eliquis, diltiazem  - Telemetry    Type 2 diabetes  - Metformin on hold  - Sliding scale insulin  - Diabetic diet    Hyperlipidemia  - Statin    Morbid obesity  THANH/OHS  - ASA complexity of care  - Nightly trilogy-patient has not been compliant with at home    PT/OT evals ordered?  Not ordered; patient not expected to need rehab  Diet:  ADULT DIET; Regular; 4 carb choices (60 gm/meal)  VTE prophylaxis: Already on anticoagulation  Code status: Full Code      Non-peripheral Lines and Tubes (if present):          Telemetry (if present):  Cardiac/Telemetry Monitor On: Portable telemetry pack applied        Hospital Problems:  Principal Problem:    COPD with acute exacerbation (HCC)  Active Problems:    Stage 4 very severe COPD by GOLD classification (HCC)

## 2025-04-16 NOTE — CARE COORDINATION
MSN, Cm:  spoke with patient this afternoon about discharge planning.  Patient lives with her son in own apartment 14 steps for entrance.  Patient is independent with all ADL's and requires no equipment for ambulation.  Patient denies any HH, rehab, or palliative Care.  Patient is current with home oxygen which is provided by [employer].  Patient also requires trilogy at .  Patient is disabled and her son drives her to all appointments.  Patient was admitted from pulmonary office for SOB with sats at 84% on room air, cough, congestion.  Patient started on IV abx and steroids.         04/16/25 1409   Service Assessment   Patient Orientation Alert and Oriented   Cognition Alert   History Provided By Patient   Primary Caregiver Self   Support Systems Children;Family Members   Patient's Healthcare Decision Maker is: Named in Scanned ACP Document   PCP Verified by CM Yes   Last Visit to PCP Within last 3 months   Prior Functional Level Independent in ADLs/IADLs   Current Functional Level Other (see comment)  (PT/OT consulted)   Can patient return to prior living arrangement Yes   Ability to make needs known: Good   Family able to assist with home care needs: Yes   Would you like for me to discuss the discharge plan with any other family members/significant others, and if so, who? Yes  (Son)   Financial Resources Medicare;Medicaid   Community Resources None   Social/Functional History   Lives With Son   Type of Home Apartment   Home Layout Two level   Home Access Stairs to enter with rails   Entrance Stairs - Number of Steps 14   Entrance Stairs - Rails Right   Bathroom Shower/Tub Tub/Shower unit   Bathroom Toilet Standard   Bathroom Equipment Shower chair   Bathroom Accessibility Accessible   Home Equipment Oxygen  ([employer])   Receives Help From Family   Prior Level of Assist for ADLs Independent   Prior Level of Assist for Homemaking Independent   Homemaking Responsibilities Yes   Ambulation Assistance Independent

## 2025-04-16 NOTE — CONSULTS
History and Physical Initial Visit NOTE           4/16/2025    Michelle Obrien                        Date of Admission:  4/15/2025    The patient's chart is reviewed and the patient is discussed with the staff.    Subjective:     Patient is a 55 y.o.  female seen and evaluated at the request of Dr. Shabazz for AECOPD.    She was seen in our office yesterday for cough and wheezing and was sent over for direct admission for AECOPD.  She has a history of severe COPD (FEV1 37% in 2018) on nocturnal Trilogy for OHS, continued smoking, super morbidly obese with BMI 50 and diabetes. She wears 4 L/min of supplemental oxygen continuously.  She is using DuoNeb's 4-5 times daily in addition to the Trelegy Ellipta inhaler 200 mcg dose.      She has had 5 days of thick, increased sputum production and difficulty with shortness of breath on exertion.  She has had difficulty doing ADLs due to severe dyspnea.  Her cough has been worse, and she has had pain in her back for several days. She is currently smoking about 3 cigarettes per day.  CXR without acute findings. Labs remarkable for hgb 11.4. She is on 4 lpm with sat of 90%. We were asked to see her for AECOPD.     Review of Systems: Comprehensive ROS negative except in HPI    Current Outpatient Medications   Medication Instructions    albuterol (PROVENTIL) 2.5 mg, Nebulization, EVERY 6 HOURS PRN    albuterol sulfate HFA (PROVENTIL;VENTOLIN;PROAIR) 108 (90 Base) MCG/ACT inhaler 2 puffs, Inhalation, EVERY 4 HOURS PRN    apixaban (ELIQUIS) 5 mg, Oral, 2 TIMES DAILY    atorvastatin (LIPITOR) 10 mg, Oral, Nightly    blood glucose test strips (ASCENSIA AUTODISC VI;ONE TOUCH ULTRA TEST VI) strip Use to check blood sugar once daily as directed.  Dx: E11.29    dilTIAZem (CARDIZEM CD) 240 mg, Oral, DAILY    doxycycline hyclate (VIBRAMYCIN) 100 mg, Oral, 2 TIMES DAILY    Fe Fum-FePoly-FA-Vit C-Vit B3 (FOLIVANE-F) 125-1 MG CAPS 1 capsule, Oral, DAILY

## 2025-04-17 LAB
ANION GAP SERPL CALC-SCNC: 8 MMOL/L (ref 7–16)
BASOPHILS # BLD: 0.02 K/UL (ref 0–0.2)
BASOPHILS NFR BLD: 0.2 % (ref 0–2)
BUN SERPL-MCNC: 11 MG/DL (ref 6–23)
CALCIUM SERPL-MCNC: 9.7 MG/DL (ref 8.8–10.2)
CHLORIDE SERPL-SCNC: 97 MMOL/L (ref 98–107)
CO2 SERPL-SCNC: 32 MMOL/L (ref 20–29)
CREAT SERPL-MCNC: 0.5 MG/DL (ref 0.6–1.1)
DIFFERENTIAL METHOD BLD: ABNORMAL
EOSINOPHIL # BLD: 0 K/UL (ref 0–0.8)
EOSINOPHIL NFR BLD: 0 % (ref 0.5–7.8)
ERYTHROCYTE [DISTWIDTH] IN BLOOD BY AUTOMATED COUNT: 17 % (ref 11.9–14.6)
GLUCOSE BLD STRIP.AUTO-MCNC: 116 MG/DL (ref 65–100)
GLUCOSE BLD STRIP.AUTO-MCNC: 127 MG/DL (ref 65–100)
GLUCOSE BLD STRIP.AUTO-MCNC: 134 MG/DL (ref 65–100)
GLUCOSE BLD STRIP.AUTO-MCNC: 142 MG/DL (ref 65–100)
GLUCOSE SERPL-MCNC: 138 MG/DL (ref 70–99)
HCT VFR BLD AUTO: 37 % (ref 35.8–46.3)
HGB BLD-MCNC: 10.9 G/DL (ref 11.7–15.4)
IMM GRANULOCYTES # BLD AUTO: 0.06 K/UL (ref 0–0.5)
IMM GRANULOCYTES NFR BLD AUTO: 0.5 % (ref 0–5)
LYMPHOCYTES # BLD: 1.24 K/UL (ref 0.5–4.6)
LYMPHOCYTES NFR BLD: 9.4 % (ref 13–44)
MAGNESIUM SERPL-MCNC: 2.1 MG/DL (ref 1.8–2.4)
MCH RBC QN AUTO: 20.8 PG (ref 26.1–32.9)
MCHC RBC AUTO-ENTMCNC: 29.5 G/DL (ref 31.4–35)
MCV RBC AUTO: 70.7 FL (ref 82–102)
MONOCYTES # BLD: 0.33 K/UL (ref 0.1–1.3)
MONOCYTES NFR BLD: 2.5 % (ref 4–12)
NEUTS SEG # BLD: 11.57 K/UL (ref 1.7–8.2)
NEUTS SEG NFR BLD: 87.4 % (ref 43–78)
NRBC # BLD: 0 K/UL (ref 0–0.2)
PLATELET # BLD AUTO: 308 K/UL (ref 150–450)
PMV BLD AUTO: 10.6 FL (ref 9.4–12.3)
POTASSIUM SERPL-SCNC: 4.3 MMOL/L (ref 3.5–5.1)
RBC # BLD AUTO: 5.23 M/UL (ref 4.05–5.2)
SERVICE CMNT-IMP: ABNORMAL
SODIUM SERPL-SCNC: 137 MMOL/L (ref 136–145)
WBC # BLD AUTO: 13.2 K/UL (ref 4.3–11.1)

## 2025-04-17 PROCEDURE — 6370000000 HC RX 637 (ALT 250 FOR IP): Performed by: NURSE PRACTITIONER

## 2025-04-17 PROCEDURE — 2700000000 HC OXYGEN THERAPY PER DAY

## 2025-04-17 PROCEDURE — 85025 COMPLETE CBC W/AUTO DIFF WBC: CPT

## 2025-04-17 PROCEDURE — 6370000000 HC RX 637 (ALT 250 FOR IP): Performed by: FAMILY MEDICINE

## 2025-04-17 PROCEDURE — 99232 SBSQ HOSP IP/OBS MODERATE 35: CPT | Performed by: INTERNAL MEDICINE

## 2025-04-17 PROCEDURE — 6360000002 HC RX W HCPCS: Performed by: NURSE PRACTITIONER

## 2025-04-17 PROCEDURE — 1100000000 HC RM PRIVATE

## 2025-04-17 PROCEDURE — 5A0935A ASSISTANCE WITH RESPIRATORY VENTILATION, LESS THAN 24 CONSECUTIVE HOURS, HIGH NASAL FLOW/VELOCITY: ICD-10-PCS | Performed by: FAMILY MEDICINE

## 2025-04-17 PROCEDURE — 94640 AIRWAY INHALATION TREATMENT: CPT

## 2025-04-17 PROCEDURE — 94760 N-INVAS EAR/PLS OXIMETRY 1: CPT

## 2025-04-17 PROCEDURE — 94761 N-INVAS EAR/PLS OXIMETRY MLT: CPT

## 2025-04-17 PROCEDURE — 2500000003 HC RX 250 WO HCPCS: Performed by: FAMILY MEDICINE

## 2025-04-17 PROCEDURE — 80048 BASIC METABOLIC PNL TOTAL CA: CPT

## 2025-04-17 PROCEDURE — 2500000003 HC RX 250 WO HCPCS: Performed by: NURSE PRACTITIONER

## 2025-04-17 PROCEDURE — 83735 ASSAY OF MAGNESIUM: CPT

## 2025-04-17 PROCEDURE — 5A09357 ASSISTANCE WITH RESPIRATORY VENTILATION, LESS THAN 24 CONSECUTIVE HOURS, CONTINUOUS POSITIVE AIRWAY PRESSURE: ICD-10-PCS | Performed by: FAMILY MEDICINE

## 2025-04-17 PROCEDURE — 36415 COLL VENOUS BLD VENIPUNCTURE: CPT

## 2025-04-17 PROCEDURE — 82962 GLUCOSE BLOOD TEST: CPT

## 2025-04-17 PROCEDURE — 6360000002 HC RX W HCPCS: Performed by: FAMILY MEDICINE

## 2025-04-17 RX ORDER — IBUPROFEN 400 MG/1
600 TABLET, FILM COATED ORAL ONCE
Status: COMPLETED | OUTPATIENT
Start: 2025-04-17 | End: 2025-04-17

## 2025-04-17 RX ADMIN — GUAIFENESIN 1200 MG: 600 TABLET ORAL at 08:40

## 2025-04-17 RX ADMIN — IPRATROPIUM BROMIDE AND ALBUTEROL SULFATE 1 DOSE: 2.5; .5 SOLUTION RESPIRATORY (INHALATION) at 08:10

## 2025-04-17 RX ADMIN — GUAIFENESIN 1200 MG: 600 TABLET ORAL at 21:31

## 2025-04-17 RX ADMIN — POLYETHYLENE GLYCOL 3350 17 G: 17 POWDER, FOR SOLUTION ORAL at 12:57

## 2025-04-17 RX ADMIN — SODIUM CHLORIDE, PRESERVATIVE FREE 10 ML: 5 INJECTION INTRAVENOUS at 09:56

## 2025-04-17 RX ADMIN — DOXYCYCLINE HYCLATE 100 MG: 100 CAPSULE ORAL at 21:31

## 2025-04-17 RX ADMIN — APIXABAN 5 MG: 5 TABLET, FILM COATED ORAL at 21:31

## 2025-04-17 RX ADMIN — DILTIAZEM HYDROCHLORIDE 240 MG: 240 CAPSULE, EXTENDED RELEASE ORAL at 08:40

## 2025-04-17 RX ADMIN — ARFORMOTEROL TARTRATE 15 MCG: 15 SOLUTION RESPIRATORY (INHALATION) at 19:40

## 2025-04-17 RX ADMIN — BUDESONIDE INHALATION 500 MCG: 0.5 SUSPENSION RESPIRATORY (INHALATION) at 19:40

## 2025-04-17 RX ADMIN — BUDESONIDE INHALATION 500 MCG: 0.5 SUSPENSION RESPIRATORY (INHALATION) at 08:10

## 2025-04-17 RX ADMIN — METHYLPREDNISOLONE SODIUM SUCCINATE 40 MG: 40 INJECTION, POWDER, LYOPHILIZED, FOR SOLUTION INTRAMUSCULAR; INTRAVENOUS at 08:41

## 2025-04-17 RX ADMIN — ATORVASTATIN CALCIUM 10 MG: 10 TABLET, FILM COATED ORAL at 21:31

## 2025-04-17 RX ADMIN — SODIUM CHLORIDE, PRESERVATIVE FREE 10 ML: 5 INJECTION INTRAVENOUS at 21:32

## 2025-04-17 RX ADMIN — IPRATROPIUM BROMIDE AND ALBUTEROL SULFATE 1 DOSE: 2.5; .5 SOLUTION RESPIRATORY (INHALATION) at 11:51

## 2025-04-17 RX ADMIN — ARFORMOTEROL TARTRATE 15 MCG: 15 SOLUTION RESPIRATORY (INHALATION) at 08:10

## 2025-04-17 RX ADMIN — METHYLPREDNISOLONE SODIUM SUCCINATE 40 MG: 40 INJECTION, POWDER, LYOPHILIZED, FOR SOLUTION INTRAMUSCULAR; INTRAVENOUS at 21:31

## 2025-04-17 RX ADMIN — ACETAMINOPHEN 650 MG: 325 TABLET ORAL at 17:44

## 2025-04-17 RX ADMIN — APIXABAN 5 MG: 5 TABLET, FILM COATED ORAL at 08:40

## 2025-04-17 RX ADMIN — ACETAMINOPHEN 650 MG: 325 TABLET ORAL at 06:16

## 2025-04-17 RX ADMIN — DOXYCYCLINE HYCLATE 100 MG: 100 CAPSULE ORAL at 08:40

## 2025-04-17 RX ADMIN — IPRATROPIUM BROMIDE AND ALBUTEROL SULFATE 1 DOSE: 2.5; .5 SOLUTION RESPIRATORY (INHALATION) at 19:40

## 2025-04-17 RX ADMIN — IBUPROFEN 600 MG: 400 TABLET, FILM COATED ORAL at 20:08

## 2025-04-17 RX ADMIN — IPRATROPIUM BROMIDE AND ALBUTEROL SULFATE 1 DOSE: 2.5; .5 SOLUTION RESPIRATORY (INHALATION) at 15:21

## 2025-04-17 ASSESSMENT — PAIN DESCRIPTION - ORIENTATION: ORIENTATION: LOWER

## 2025-04-17 ASSESSMENT — PAIN SCALES - GENERAL
PAINLEVEL_OUTOF10: 6
PAINLEVEL_OUTOF10: 5
PAINLEVEL_OUTOF10: 6

## 2025-04-17 ASSESSMENT — PAIN DESCRIPTION - LOCATION
LOCATION: BACK
LOCATION: HEAD
LOCATION: HEAD

## 2025-04-17 ASSESSMENT — PAIN DESCRIPTION - DESCRIPTORS
DESCRIPTORS: ACHING
DESCRIPTORS: ACHING

## 2025-04-17 NOTE — PROGRESS NOTES
Pt resting in bed, alert oriented times 3, denies pain or distress. Pt on 6 L HF NC , no s/sx of distress. Pt encouraged to call for assistance if needed, call light in reach, will monitor.

## 2025-04-17 NOTE — PROGRESS NOTES
Michelle Obrien  Admission Date: 4/15/2025         Daily Progress Note: 4/17/2025    The patient's chart is reviewed and the patient is discussed with the staff.    Background: 54 y/o female seen in our office 4/15 for cough and wheezing and was sent over for direct admission for AECOPD.  She has a history of severe COPD (FEV1 37% in 2018) on nocturnal Trilogy for OHS, continued smoking, super morbidly obese with BMI 50 and diabetes. She wears 4 L/min of supplemental oxygen continuously.  She is using DuoNeb's 4-5 times daily in addition to the Trelegy Ellipta inhaler 200 mcg dose.      She has had 5 days of thick, increased sputum production and difficulty with shortness of breath on exertion.  She has had difficulty doing ADLs due to severe dyspnea.  Her cough has been worse, and she has had pain in her back for several days. She is currently smoking about 3 cigarettes per day.  CXR without acute findings. Labs remarkable for hgb 11.4. She is on 4 lpm with sat of 90%. We were asked to see her for AECOPD.    Subjective:     Feels better, back to 4L. On NIV. Doing better. Still coughing some, but minimal.     Current Facility-Administered Medications   Medication Dose Route Frequency    methylPREDNISolone sodium succ (SOLU-MEDROL) 40 mg in sterile water 1 mL injection  40 mg IntraVENous Q12H    albuterol (PROVENTIL) (2.5 MG/3ML) 0.083% nebulizer solution 2.5 mg  2.5 mg Nebulization Q6H PRN    apixaban (ELIQUIS) tablet 5 mg  5 mg Oral BID    atorvastatin (LIPITOR) tablet 10 mg  10 mg Oral Nightly    dilTIAZem (CARDIZEM CD) extended release capsule 240 mg  240 mg Oral Daily    budesonide (PULMICORT) nebulizer suspension 500 mcg  0.5 mg Nebulization BID RT    arformoterol tartrate (BROVANA) nebulizer solution 15 mcg  15 mcg Nebulization BID RT    ipratropium 0.5 mg-albuterol 2.5 mg (DUONEB) nebulizer solution 1 Dose  1 Dose Inhalation Q4H WA RT    glucose chewable tablet 16 g  4 tablet Oral PRN

## 2025-04-17 NOTE — PROGRESS NOTES
Hospitalist Progress Note   Admit Date:  4/15/2025  4:25 PM   Name:  Michelle Obrien   Age:  55 y.o.  Sex:  female  :  1970   MRN:  [id number]   Room:  [facility]    Presenting/Chief Complaint: No chief complaint on file.     Reason(s) for Admission: Acute respiratory failure [J96.00]  Acute on chronic respiratory failure with hypoxia [J96.21]     Hospital Course:   55-year-old female history of COPD, chronic respiratory failure on 4 L O2 baseline, paroxysmal atrial fibrillation, THANH/OHS presented from [facility] as a direct admit due to worsening hypoxia.      Subjective & 24hr Events:   Patient was seen and examined at bedside.  No overnight events.  Patient currently on 4 L nasal cannula which is her baseline.  Has some mild cough but overall feeling better this morning.      Assessment & Plan:   Acute on chronic respiratory failure with hypoxia  Acute COPD exacerbation  - Patient wears 4 L O2 at baseline  - Direct admit from [place] pulmonology  - Pulmonary consulted  - Continue doxycycline x 5 days  - Continue Solu-Medrol 40 mg every 12 hour  -DuoNebs  - Continue to wean oxygen as tolerated-continue confluence of home Trelegy-OHS on NIV  - Patient currently on 4 L nasal cannula satting at 98%    Paroxysmal atrial fibrillation  - Continue home Eliquis, diltiazem  - Telemetry    Type 2 diabetes  - Metformin on hold  - Sliding scale insulin  - Diabetic diet    Hyperlipidemia  - Statin    Morbid obesity  THANH/OHS  - ASA complexity of care  - Nightly trilogy-patient has not been compliant with at home    PT/OT evals ordered?  Not ordered; patient not expected to need rehab  Diet:  ADULT DIET; Regular; 4 carb choices (60 gm/meal)  VTE prophylaxis: Already on anticoagulation  Code status: Full Code      Non-peripheral Lines and Tubes (if present):          Telemetry (if present):  Cardiac/Telemetry Monitor On: Portable telemetry pack applied        Hospital Problems:  Principal Problem:    COPD

## 2025-04-18 DIAGNOSIS — J44.9 COPD, SEVERE (HCC): Primary | ICD-10-CM

## 2025-04-18 LAB
ANION GAP SERPL CALC-SCNC: 9 MMOL/L (ref 7–16)
BACTERIA SPEC CULT: NORMAL
BASOPHILS # BLD: 0.02 K/UL (ref 0–0.2)
BASOPHILS NFR BLD: 0.1 % (ref 0–2)
BUN SERPL-MCNC: 17 MG/DL (ref 6–23)
CALCIUM SERPL-MCNC: 9.9 MG/DL (ref 8.8–10.2)
CHLORIDE SERPL-SCNC: 96 MMOL/L (ref 98–107)
CO2 SERPL-SCNC: 30 MMOL/L (ref 20–29)
CREAT SERPL-MCNC: 0.57 MG/DL (ref 0.6–1.1)
DIFFERENTIAL METHOD BLD: ABNORMAL
EOSINOPHIL # BLD: 0 K/UL (ref 0–0.8)
EOSINOPHIL NFR BLD: 0 % (ref 0.5–7.8)
ERYTHROCYTE [DISTWIDTH] IN BLOOD BY AUTOMATED COUNT: 17.5 % (ref 11.9–14.6)
GLUCOSE BLD STRIP.AUTO-MCNC: 114 MG/DL (ref 65–100)
GLUCOSE BLD STRIP.AUTO-MCNC: 122 MG/DL (ref 65–100)
GLUCOSE BLD STRIP.AUTO-MCNC: 155 MG/DL (ref 65–100)
GLUCOSE BLD STRIP.AUTO-MCNC: 91 MG/DL (ref 65–100)
GLUCOSE SERPL-MCNC: 131 MG/DL (ref 70–99)
GRAM STN SPEC: NORMAL
HCT VFR BLD AUTO: 36.3 % (ref 35.8–46.3)
HGB BLD-MCNC: 11 G/DL (ref 11.7–15.4)
IMM GRANULOCYTES # BLD AUTO: 0.1 K/UL (ref 0–0.5)
IMM GRANULOCYTES NFR BLD AUTO: 0.6 % (ref 0–5)
LYMPHOCYTES # BLD: 1.41 K/UL (ref 0.5–4.6)
LYMPHOCYTES NFR BLD: 9.1 % (ref 13–44)
MAGNESIUM SERPL-MCNC: 2.3 MG/DL (ref 1.8–2.4)
MCH RBC QN AUTO: 21.2 PG (ref 26.1–32.9)
MCHC RBC AUTO-ENTMCNC: 30.3 G/DL (ref 31.4–35)
MCV RBC AUTO: 70.1 FL (ref 82–102)
MONOCYTES # BLD: 0.43 K/UL (ref 0.1–1.3)
MONOCYTES NFR BLD: 2.8 % (ref 4–12)
NEUTS SEG # BLD: 13.55 K/UL (ref 1.7–8.2)
NEUTS SEG NFR BLD: 87.4 % (ref 43–78)
NRBC # BLD: 0 K/UL (ref 0–0.2)
PLATELET # BLD AUTO: 376 K/UL (ref 150–450)
PMV BLD AUTO: 10.4 FL (ref 9.4–12.3)
POTASSIUM SERPL-SCNC: 4.4 MMOL/L (ref 3.5–5.1)
RBC # BLD AUTO: 5.18 M/UL (ref 4.05–5.2)
SERVICE CMNT-IMP: ABNORMAL
SERVICE CMNT-IMP: NORMAL
SERVICE CMNT-IMP: NORMAL
SODIUM SERPL-SCNC: 135 MMOL/L (ref 136–145)
WBC # BLD AUTO: 15.5 K/UL (ref 4.3–11.1)

## 2025-04-18 PROCEDURE — 1100000000 HC RM PRIVATE

## 2025-04-18 PROCEDURE — 2500000003 HC RX 250 WO HCPCS: Performed by: NURSE PRACTITIONER

## 2025-04-18 PROCEDURE — 99232 SBSQ HOSP IP/OBS MODERATE 35: CPT | Performed by: INTERNAL MEDICINE

## 2025-04-18 PROCEDURE — 83735 ASSAY OF MAGNESIUM: CPT

## 2025-04-18 PROCEDURE — 6360000002 HC RX W HCPCS: Performed by: FAMILY MEDICINE

## 2025-04-18 PROCEDURE — 94760 N-INVAS EAR/PLS OXIMETRY 1: CPT

## 2025-04-18 PROCEDURE — 6370000000 HC RX 637 (ALT 250 FOR IP): Performed by: FAMILY MEDICINE

## 2025-04-18 PROCEDURE — 6360000002 HC RX W HCPCS: Performed by: NURSE PRACTITIONER

## 2025-04-18 PROCEDURE — 85025 COMPLETE CBC W/AUTO DIFF WBC: CPT

## 2025-04-18 PROCEDURE — 2700000000 HC OXYGEN THERAPY PER DAY

## 2025-04-18 PROCEDURE — 2500000003 HC RX 250 WO HCPCS: Performed by: FAMILY MEDICINE

## 2025-04-18 PROCEDURE — 36415 COLL VENOUS BLD VENIPUNCTURE: CPT

## 2025-04-18 PROCEDURE — 94640 AIRWAY INHALATION TREATMENT: CPT

## 2025-04-18 PROCEDURE — 80048 BASIC METABOLIC PNL TOTAL CA: CPT

## 2025-04-18 PROCEDURE — 94761 N-INVAS EAR/PLS OXIMETRY MLT: CPT

## 2025-04-18 PROCEDURE — 82962 GLUCOSE BLOOD TEST: CPT

## 2025-04-18 PROCEDURE — 97530 THERAPEUTIC ACTIVITIES: CPT

## 2025-04-18 RX ADMIN — ARFORMOTEROL TARTRATE 15 MCG: 15 SOLUTION RESPIRATORY (INHALATION) at 20:21

## 2025-04-18 RX ADMIN — BUDESONIDE INHALATION 500 MCG: 0.5 SUSPENSION RESPIRATORY (INHALATION) at 20:21

## 2025-04-18 RX ADMIN — SODIUM CHLORIDE, PRESERVATIVE FREE 10 ML: 5 INJECTION INTRAVENOUS at 20:29

## 2025-04-18 RX ADMIN — GUAIFENESIN 1200 MG: 600 TABLET ORAL at 20:29

## 2025-04-18 RX ADMIN — DOXYCYCLINE HYCLATE 100 MG: 100 CAPSULE ORAL at 20:29

## 2025-04-18 RX ADMIN — DOXYCYCLINE HYCLATE 100 MG: 100 CAPSULE ORAL at 09:25

## 2025-04-18 RX ADMIN — ATORVASTATIN CALCIUM 10 MG: 10 TABLET, FILM COATED ORAL at 20:29

## 2025-04-18 RX ADMIN — DILTIAZEM HYDROCHLORIDE 240 MG: 240 CAPSULE, EXTENDED RELEASE ORAL at 09:24

## 2025-04-18 RX ADMIN — IPRATROPIUM BROMIDE AND ALBUTEROL SULFATE 1 DOSE: 2.5; .5 SOLUTION RESPIRATORY (INHALATION) at 07:17

## 2025-04-18 RX ADMIN — APIXABAN 5 MG: 5 TABLET, FILM COATED ORAL at 20:29

## 2025-04-18 RX ADMIN — SODIUM CHLORIDE, PRESERVATIVE FREE 5 ML: 5 INJECTION INTRAVENOUS at 09:28

## 2025-04-18 RX ADMIN — METHYLPREDNISOLONE SODIUM SUCCINATE 40 MG: 40 INJECTION, POWDER, LYOPHILIZED, FOR SOLUTION INTRAMUSCULAR; INTRAVENOUS at 20:29

## 2025-04-18 RX ADMIN — IPRATROPIUM BROMIDE AND ALBUTEROL SULFATE 1 DOSE: 2.5; .5 SOLUTION RESPIRATORY (INHALATION) at 20:21

## 2025-04-18 RX ADMIN — APIXABAN 5 MG: 5 TABLET, FILM COATED ORAL at 09:25

## 2025-04-18 RX ADMIN — GUAIFENESIN 1200 MG: 600 TABLET ORAL at 09:24

## 2025-04-18 RX ADMIN — ARFORMOTEROL TARTRATE 15 MCG: 15 SOLUTION RESPIRATORY (INHALATION) at 07:17

## 2025-04-18 RX ADMIN — BUDESONIDE INHALATION 500 MCG: 0.5 SUSPENSION RESPIRATORY (INHALATION) at 07:17

## 2025-04-18 RX ADMIN — IPRATROPIUM BROMIDE AND ALBUTEROL SULFATE 1 DOSE: 2.5; .5 SOLUTION RESPIRATORY (INHALATION) at 12:05

## 2025-04-18 RX ADMIN — METHYLPREDNISOLONE SODIUM SUCCINATE 40 MG: 40 INJECTION, POWDER, LYOPHILIZED, FOR SOLUTION INTRAMUSCULAR; INTRAVENOUS at 09:25

## 2025-04-18 RX ADMIN — IPRATROPIUM BROMIDE AND ALBUTEROL SULFATE 1 DOSE: 2.5; .5 SOLUTION RESPIRATORY (INHALATION) at 15:27

## 2025-04-18 ASSESSMENT — PAIN SCALES - GENERAL
PAINLEVEL_OUTOF10: 0
PAINLEVEL_OUTOF10: 0

## 2025-04-18 NOTE — CARE COORDINATION
MSN, Cm:  patient for possible discharge tomorrow.  Pulmonary wanted patient to use new machine tonight and be discharged tomorrow.  Patient currently on 4L NC which is baseline.  Patient's home oxygen company is [employer].  Patient agreeable to HH and Palliative Care which has been referred.  Case Management will continue to follow.

## 2025-04-18 NOTE — PROGRESS NOTES
Michelle Obrien  Admission Date: 4/15/2025         Daily Progress Note: 4/18/2025    The patient's chart is reviewed and the patient is discussed with the staff.    Background: 56 y/o female seen in our office 4/15 for cough and wheezing and was sent over for direct admission for AECOPD.  She has a history of severe COPD (FEV1 37% in 2018) on nocturnal bipap for OHS, continued smoking, super morbidly obese with BMI 50 and diabetes. She wears 4 L/min of supplemental oxygen continuously.  She is using DuoNeb's 4-5 times daily in addition to the Trelegy Ellipta inhaler 200 mcg dose.      She has had 5 days of thick, increased sputum production and difficulty with shortness of breath on exertion.  She has had difficulty doing ADLs due to severe dyspnea.  Her cough has been worse, and she has had pain in her back for several days. She is currently smoking about 3 cigarettes per day.  CXR without acute findings. Labs remarkable for hgb 11.4. She is on 4 lpm with sat of 90%. We were asked to see her for AECOPD.    Subjective:     Feels better, back to 4L. Wearing her home trilogy still so will encourage set up for new machine tonight.  Wheezing and cough improving.  Has not been walking around too much yet so encouraged today.     Current Facility-Administered Medications   Medication Dose Route Frequency    methylPREDNISolone sodium succ (SOLU-MEDROL) 40 mg in sterile water 1 mL injection  40 mg IntraVENous Q12H    albuterol (PROVENTIL) (2.5 MG/3ML) 0.083% nebulizer solution 2.5 mg  2.5 mg Nebulization Q6H PRN    apixaban (ELIQUIS) tablet 5 mg  5 mg Oral BID    atorvastatin (LIPITOR) tablet 10 mg  10 mg Oral Nightly    dilTIAZem (CARDIZEM CD) extended release capsule 240 mg  240 mg Oral Daily    budesonide (PULMICORT) nebulizer suspension 500 mcg  0.5 mg Nebulization BID RT    arformoterol tartrate (BROVANA) nebulizer solution 15 mcg  15 mcg Nebulization BID RT    ipratropium 0.5 mg-albuterol 2.5 mg

## 2025-04-18 NOTE — PROGRESS NOTES
Oxygen Qualifier       Room air: SpO2 with O2 and liter flow   Resting SpO2    95% on 4L   Ambulating SpO2   92% on 4L       Completed by:  Geraldine Glynn RRT   Patient wears 4L of o2 at baseline

## 2025-04-18 NOTE — PLAN OF CARE
Problem: Respiratory - Adult  Goal: Achieves optimal ventilation and oxygenation  Outcome: Progressing  Flowsheets (Taken 4/18/2025 4632)  Achieves optimal ventilation and oxygenation:   Assess for changes in respiratory status   Position to facilitate oxygenation and minimize respiratory effort   Respiratory therapy support as indicated   Assess and instruct to report shortness of breath or any respiratory difficulty   Encourage broncho-pulmonary hygiene including cough, deep breathe, incentive spirometry   Assess for changes in mentation and behavior

## 2025-04-18 NOTE — NURSE NAVIGATOR
This RN Navigator at bedside, introduced to patient.  Patient informed that a member of the [facility] team will be following them at least 30 days post discharge to act as a resource for any needs that may arise in relation to their COPD diagnosis and treatment.       Patient states understanding of COPD disease process and COPD action plan discussed.        Patient verbalized understanding of importance of COPD zone tool, handwashing, PCP follow up within 7 days of hospital discharge.     COPD educational booklet given to patient. Patient informed that a member of the [facility] team will be contacting them following hospital discharge. Contact information verified by patient.       Patient eligible for RPM per Mica Ventura. Patient offered and accepted RPM. Patient instructed on contents of box and not to open until discharged home from the hospital. Patient states understanding. Will notify Mica Ventura that this patient was given RPM.        *This patient is a Population Health participant. This RN Navigator will not be following progress or contacting patient after hospital discharge. Transitions of Care team will follow for any needs.*

## 2025-04-18 NOTE — PROGRESS NOTES
04/17/25 0328   NIV Type   NIV Started/Stopped On   Equipment Type Trilogy   Mode AVAPS   Mask Type Under the nose   Assessment   Pulse 82   Respirations 20   SpO2 96 %   Comfort Level Good   Using Accessory Muscles No   Mask Compliance Good   Skin Assessment Clean, dry, & intact   Settings/Measurements   PIP Observed 21.9 cm H20   Vt (Set, mL) 550 mL   Vt (Measured) 348 mL   Rate Ordered 12   Insp Rise Time (%) 3 %   Minute Volume (L/min) 8.8 Liters   Mask Leak (lpm) 25 lpm   Patient's Home Machine Yes (type/vendor)

## 2025-04-18 NOTE — PROGRESS NOTES
ACUTE PHYSICAL THERAPY GOALS:   (Developed with and agreed upon by patient and/or caregiver.)  LTG:  (1.)Ms. Obrien will move from supine to sit and sit to supine , scoot up and down, and roll side to side in bed with INDEPENDENCE within 7 treatment day(s).    (2.)Ms. Obrien will transfer from bed to chair and chair to bed with MODIFIED INDEPENDENCE using the least restrictive device within 7 treatment day(s).    (3.)Ms. Obrien will ambulate with MODIFIED INDEPENDENCE for 250 feet with the least restrictive device within 7 treatment day(s).  (4.)Ms. Obrien will participate in therapeutic activity/exercises x 25 minutes for increased activity tolerance with SpO2 above 90% within 7 treatment days.  (5.)Ms. Obrien will ascend and descend 10 stairs using R hand rail(s) with SUPERVISION to improve functional mobility and safety within 7 day(s).    PHYSICAL THERAPY: Daily Note AM   (Link to Caseload Tracking: PT Visit Days : 2  Time In/Out PT Charge Capture  Rehab Caseload Tracker  Orders    Michelle Obrien is a 55 y.o. female   PRIMARY DIAGNOSIS: COPD with acute exacerbation (HCC)  Acute respiratory failure [J96.00]  Acute on chronic respiratory failure with hypoxia [J96.21]       Inpatient: Payor: [facility] MEDICARE / Plan: [employer] MEDICARE ADVANTAGE HMO / Product Type: Medicare /     ASSESSMENT:     REHAB RECOMMENDATIONS:   Recommendation to date pending progress:  Setting:  Home Health Therapy    Equipment:     Bariatric RW     ASSESSMENT:  Ms. Obrien was sitting in recliner and agreeable to therapy upon arrival.  Pt was able to stand today with SBA/RW.  She ambulated multiple short bouts with CGA/RW and chair follow.  Pt required several seated rest breaks while walking today due to fatigue.  RT present and reported pt requiring 4 L/min for walking. Pt returned to recliner and performed several seated exercises.  Pt progressed in ambulation today.     SUBJECTIVE:   Ms. Obrien states, \"Oh okay\"

## 2025-04-18 NOTE — PROGRESS NOTES
Exam:   General:    Well nourished.    Head:  Normocephalic, atraumatic  Eyes:  Sclerae appear normal.  Pupils equally round.  ENT:  Nares appear normal.  Moist oral mucosa  Neck:  No restricted ROM.  Trachea midline   CV:   RRR.  No m/r/g.  No jugular venous distension.  Lungs:   Decreased breath sounds bilaterally.  No wheezing, rhonchi, or rales.  Symmetric expansion.  Abdomen:   Soft, nontender, nondistended.  Extremities: No cyanosis or clubbing.  No edema  Skin:     No rashes.  Normal coloration.   Warm and dry.    Neuro:  CN II-XII grossly intact.    Psych:  Normal mood and affect.      I have personally reviewed labs and tests:  Recent Labs:  Recent Results (from the past 48 hours)   POCT Glucose    Collection Time: 04/16/25  3:47 PM   Result Value Ref Range    POC Glucose 166 (H) 65 - 100 mg/dL    Performed by: Constantino    POCT Glucose    Collection Time: 04/16/25  8:14 PM   Result Value Ref Range    POC Glucose 139 (H) 65 - 100 mg/dL    Performed by: Tio    Basic Metabolic Panel w/ Reflex to MG    Collection Time: 04/17/25  4:30 AM   Result Value Ref Range    Sodium 137 136 - 145 mmol/L    Potassium 4.3 3.5 - 5.1 mmol/L    Chloride 97 (L) 98 - 107 mmol/L    CO2 32 (H) 20 - 29 mmol/L    Anion Gap 8 7 - 16 mmol/L    Glucose 138 (H) 70 - 99 mg/dL    BUN 11 6 - 23 MG/DL    Creatinine 0.50 (L) 0.60 - 1.10 MG/DL    Est, Glom Filt Rate >90 >60 ml/min/1.73m2    Calcium 9.7 8.8 - 10.2 MG/DL   CBC with Auto Differential    Collection Time: 04/17/25  4:30 AM   Result Value Ref Range    WBC 13.2 (H) 4.3 - 11.1 K/uL    RBC 5.23 (H) 4.05 - 5.2 M/uL    Hemoglobin 10.9 (L) 11.7 - 15.4 g/dL    Hematocrit 37.0 35.8 - 46.3 %    MCV 70.7 (L) 82 - 102 FL    MCH 20.8 (L) 26.1 - 32.9 PG    MCHC 29.5 (L) 31.4 - 35.0 g/dL    RDW 17.0 (H) 11.9 - 14.6 %    Platelets 308 150 - 450 K/uL    MPV 10.6 9.4 - 12.3 FL    nRBC 0.00 0.0 - 0.2 K/uL    Differential Type AUTOMATED      Neutrophils % 87.4 (H) 43.0 - 78.0 %     (L) 26.1 - 32.9 PG    MCHC 30.3 (L) 31.4 - 35.0 g/dL    RDW 17.5 (H) 11.9 - 14.6 %    Platelets 376 150 - 450 K/uL    MPV 10.4 9.4 - 12.3 FL    nRBC 0.00 0.0 - 0.2 K/uL    Differential Type AUTOMATED      Neutrophils % 87.4 (H) 43.0 - 78.0 %    Lymphocytes % 9.1 (L) 13.0 - 44.0 %    Monocytes % 2.8 (L) 4.0 - 12.0 %    Eosinophils % 0.0 (L) 0.5 - 7.8 %    Basophils % 0.1 0.0 - 2.0 %    Immature Granulocytes % 0.6 0.0 - 5.0 %    Neutrophils Absolute 13.55 (H) 1.70 - 8.20 K/UL    Lymphocytes Absolute 1.41 0.50 - 4.60 K/UL    Monocytes Absolute 0.43 0.10 - 1.30 K/UL    Eosinophils Absolute 0.00 0.00 - 0.80 K/UL    Basophils Absolute 0.02 0.00 - 0.20 K/UL    Immature Granulocytes Absolute 0.10 0.0 - 0.5 K/UL   Magnesium    Collection Time: 04/18/25  5:36 AM   Result Value Ref Range    Magnesium 2.3 1.8 - 2.4 mg/dL   POCT Glucose    Collection Time: 04/18/25  7:42 AM   Result Value Ref Range    POC Glucose 122 (H) 65 - 100 mg/dL    Performed by: Dano    POCT Glucose    Collection Time: 04/18/25 11:52 AM   Result Value Ref Range    POC Glucose 91 65 - 100 mg/dL    Performed by: Aniyah        Recent Labs     04/15/25  1750   COVID19 NOT DETECTED       Current Meds:  Current Facility-Administered Medications   Medication Dose Route Frequency    methylPREDNISolone sodium succ (SOLU-MEDROL) 40 mg in sterile water 1 mL injection  40 mg IntraVENous Q12H    albuterol (PROVENTIL) (2.5 MG/3ML) 0.083% nebulizer solution 2.5 mg  2.5 mg Nebulization Q6H PRN    apixaban (ELIQUIS) tablet 5 mg  5 mg Oral BID    atorvastatin (LIPITOR) tablet 10 mg  10 mg Oral Nightly    dilTIAZem (CARDIZEM CD) extended release capsule 240 mg  240 mg Oral Daily    budesonide (PULMICORT) nebulizer suspension 500 mcg  0.5 mg Nebulization BID RT    arformoterol tartrate (BROVANA) nebulizer solution 15 mcg  15 mcg Nebulization BID RT    ipratropium 0.5 mg-albuterol 2.5 mg (DUONEB) nebulizer solution 1 Dose  1 Dose Inhalation Q4H WA RT

## 2025-04-19 VITALS
BODY MASS INDEX: 48.82 KG/M2 | WEIGHT: 293 LBS | HEIGHT: 65 IN | OXYGEN SATURATION: 98 % | HEART RATE: 88 BPM | DIASTOLIC BLOOD PRESSURE: 77 MMHG | RESPIRATION RATE: 17 BRPM | SYSTOLIC BLOOD PRESSURE: 124 MMHG | TEMPERATURE: 98.2 F

## 2025-04-19 LAB
ANION GAP SERPL CALC-SCNC: 10 MMOL/L (ref 7–16)
BASOPHILS # BLD: 0.02 K/UL (ref 0–0.2)
BASOPHILS NFR BLD: 0.1 % (ref 0–2)
BUN SERPL-MCNC: 17 MG/DL (ref 6–23)
CALCIUM SERPL-MCNC: 9.6 MG/DL (ref 8.8–10.2)
CHLORIDE SERPL-SCNC: 97 MMOL/L (ref 98–107)
CO2 SERPL-SCNC: 29 MMOL/L (ref 20–29)
CREAT SERPL-MCNC: 0.66 MG/DL (ref 0.6–1.1)
DIFFERENTIAL METHOD BLD: ABNORMAL
EOSINOPHIL # BLD: 0 K/UL (ref 0–0.8)
EOSINOPHIL NFR BLD: 0 % (ref 0.5–7.8)
ERYTHROCYTE [DISTWIDTH] IN BLOOD BY AUTOMATED COUNT: 17.7 % (ref 11.9–14.6)
GLUCOSE BLD STRIP.AUTO-MCNC: 110 MG/DL (ref 65–100)
GLUCOSE BLD STRIP.AUTO-MCNC: 130 MG/DL (ref 65–100)
GLUCOSE SERPL-MCNC: 138 MG/DL (ref 70–99)
HCT VFR BLD AUTO: 39.6 % (ref 35.8–46.3)
HGB BLD-MCNC: 11.5 G/DL (ref 11.7–15.4)
IMM GRANULOCYTES # BLD AUTO: 0.11 K/UL (ref 0–0.5)
IMM GRANULOCYTES NFR BLD AUTO: 0.7 % (ref 0–5)
LYMPHOCYTES # BLD: 1.77 K/UL (ref 0.5–4.6)
LYMPHOCYTES NFR BLD: 10.9 % (ref 13–44)
MAGNESIUM SERPL-MCNC: 2.5 MG/DL (ref 1.8–2.4)
MCH RBC QN AUTO: 20.9 PG (ref 26.1–32.9)
MCHC RBC AUTO-ENTMCNC: 29 G/DL (ref 31.4–35)
MCV RBC AUTO: 72.1 FL (ref 82–102)
MONOCYTES # BLD: 0.73 K/UL (ref 0.1–1.3)
MONOCYTES NFR BLD: 4.5 % (ref 4–12)
NEUTS SEG # BLD: 13.68 K/UL (ref 1.7–8.2)
NEUTS SEG NFR BLD: 83.8 % (ref 43–78)
NRBC # BLD: 0 K/UL (ref 0–0.2)
PLATELET # BLD AUTO: 370 K/UL (ref 150–450)
PMV BLD AUTO: 10.1 FL (ref 9.4–12.3)
POTASSIUM SERPL-SCNC: 4.3 MMOL/L (ref 3.5–5.1)
RBC # BLD AUTO: 5.49 M/UL (ref 4.05–5.2)
SERVICE CMNT-IMP: ABNORMAL
SERVICE CMNT-IMP: ABNORMAL
SODIUM SERPL-SCNC: 136 MMOL/L (ref 136–145)
WBC # BLD AUTO: 16.3 K/UL (ref 4.3–11.1)

## 2025-04-19 PROCEDURE — 85025 COMPLETE CBC W/AUTO DIFF WBC: CPT

## 2025-04-19 PROCEDURE — 6360000002 HC RX W HCPCS: Performed by: NURSE PRACTITIONER

## 2025-04-19 PROCEDURE — 80048 BASIC METABOLIC PNL TOTAL CA: CPT

## 2025-04-19 PROCEDURE — 2500000003 HC RX 250 WO HCPCS: Performed by: NURSE PRACTITIONER

## 2025-04-19 PROCEDURE — 99232 SBSQ HOSP IP/OBS MODERATE 35: CPT | Performed by: INTERNAL MEDICINE

## 2025-04-19 PROCEDURE — 2700000000 HC OXYGEN THERAPY PER DAY

## 2025-04-19 PROCEDURE — 94760 N-INVAS EAR/PLS OXIMETRY 1: CPT

## 2025-04-19 PROCEDURE — 6370000000 HC RX 637 (ALT 250 FOR IP): Performed by: FAMILY MEDICINE

## 2025-04-19 PROCEDURE — 2500000003 HC RX 250 WO HCPCS: Performed by: FAMILY MEDICINE

## 2025-04-19 PROCEDURE — 36415 COLL VENOUS BLD VENIPUNCTURE: CPT

## 2025-04-19 PROCEDURE — 6360000002 HC RX W HCPCS: Performed by: FAMILY MEDICINE

## 2025-04-19 PROCEDURE — 94640 AIRWAY INHALATION TREATMENT: CPT

## 2025-04-19 PROCEDURE — 83735 ASSAY OF MAGNESIUM: CPT

## 2025-04-19 PROCEDURE — 82962 GLUCOSE BLOOD TEST: CPT

## 2025-04-19 RX ORDER — NICOTINE 21 MG/24HR
1 PATCH, TRANSDERMAL 24 HOURS TRANSDERMAL DAILY
Qty: 30 PATCH | Refills: 3 | Status: SHIPPED | OUTPATIENT
Start: 2025-04-20

## 2025-04-19 RX ORDER — GUAIFENESIN 600 MG/1
1200 TABLET, EXTENDED RELEASE ORAL 2 TIMES DAILY
Qty: 28 TABLET | Refills: 0 | Status: SHIPPED | OUTPATIENT
Start: 2025-04-19 | End: 2025-04-26

## 2025-04-19 RX ORDER — PREDNISONE 10 MG/1
TABLET ORAL
Qty: 18 TABLET | Refills: 0 | Status: SHIPPED | OUTPATIENT
Start: 2025-04-19

## 2025-04-19 RX ORDER — DOXYCYCLINE 100 MG/1
100 CAPSULE ORAL EVERY 12 HOURS SCHEDULED
Qty: 2 CAPSULE | Refills: 0 | Status: SHIPPED | OUTPATIENT
Start: 2025-04-19 | End: 2025-04-20

## 2025-04-19 RX ADMIN — SODIUM CHLORIDE, PRESERVATIVE FREE 10 ML: 5 INJECTION INTRAVENOUS at 11:14

## 2025-04-19 RX ADMIN — BUDESONIDE INHALATION 500 MCG: 0.5 SUSPENSION RESPIRATORY (INHALATION) at 07:55

## 2025-04-19 RX ADMIN — GUAIFENESIN 1200 MG: 600 TABLET ORAL at 08:24

## 2025-04-19 RX ADMIN — IPRATROPIUM BROMIDE AND ALBUTEROL SULFATE 1 DOSE: 2.5; .5 SOLUTION RESPIRATORY (INHALATION) at 11:49

## 2025-04-19 RX ADMIN — APIXABAN 5 MG: 5 TABLET, FILM COATED ORAL at 08:24

## 2025-04-19 RX ADMIN — ARFORMOTEROL TARTRATE 15 MCG: 15 SOLUTION RESPIRATORY (INHALATION) at 07:55

## 2025-04-19 RX ADMIN — DILTIAZEM HYDROCHLORIDE 240 MG: 240 CAPSULE, EXTENDED RELEASE ORAL at 08:24

## 2025-04-19 RX ADMIN — IPRATROPIUM BROMIDE AND ALBUTEROL SULFATE 1 DOSE: 2.5; .5 SOLUTION RESPIRATORY (INHALATION) at 07:55

## 2025-04-19 RX ADMIN — DOXYCYCLINE HYCLATE 100 MG: 100 CAPSULE ORAL at 08:24

## 2025-04-19 RX ADMIN — METHYLPREDNISOLONE SODIUM SUCCINATE 40 MG: 40 INJECTION, POWDER, LYOPHILIZED, FOR SOLUTION INTRAMUSCULAR; INTRAVENOUS at 11:13

## 2025-04-19 NOTE — PROGRESS NOTES
Michelle Obrien  Admission Date: 4/15/2025         Daily Progress Note: 4/19/2025    The patient's chart is reviewed and the patient is discussed with the staff.    Background: 54 y/o female seen in our office 4/15 for cough and wheezing and was sent over for direct admission for AECOPD.  She has a history of severe COPD (FEV1 37% in 2018) on nocturnal bipap for OHS, continued smoking, super morbidly obese with BMI 50 and diabetes. She wears 4 L/min of supplemental oxygen continuously.  She is using DuoNeb's 4-5 times daily in addition to the Trelegy Ellipta inhaler 200 mcg dose.      She has had 5 days of thick, increased sputum production and difficulty with shortness of breath on exertion.  She has had difficulty doing ADLs due to severe dyspnea.  Her cough has been worse, and she has had pain in her back for several days. She is currently smoking about 3 cigarettes per day.  CXR without acute findings. Labs remarkable for hgb 11.4. She is on 4 lpm with sat of 90%. We were asked to see her for AECOPD.    Subjective:     She reports she feels much better today in comparison to when she was first admitted. States that she has been up to use the bathroom and clean up this morning. Tolerated ambulation well. Remains of 4l O2 nasal cannula which she wears at home. Denies any shortness of breath.     Current Facility-Administered Medications   Medication Dose Route Frequency    methylPREDNISolone sodium succ (SOLU-MEDROL) 40 mg in sterile water 1 mL injection  40 mg IntraVENous Q12H    albuterol (PROVENTIL) (2.5 MG/3ML) 0.083% nebulizer solution 2.5 mg  2.5 mg Nebulization Q6H PRN    apixaban (ELIQUIS) tablet 5 mg  5 mg Oral BID    atorvastatin (LIPITOR) tablet 10 mg  10 mg Oral Nightly    dilTIAZem (CARDIZEM CD) extended release capsule 240 mg  240 mg Oral Daily    budesonide (PULMICORT) nebulizer suspension 500 mcg  0.5 mg Nebulization BID RT    arformoterol tartrate (BROVANA) nebulizer solution  15 mcg  15 mcg Nebulization BID RT    ipratropium 0.5 mg-albuterol 2.5 mg (DUONEB) nebulizer solution 1 Dose  1 Dose Inhalation Q4H WA RT    glucose chewable tablet 16 g  4 tablet Oral PRN    dextrose bolus 10% 125 mL  125 mL IntraVENous PRN    Or    dextrose bolus 10% 250 mL  250 mL IntraVENous PRN    Glucagon Emergency KIT 1 mg  1 mg SubCUTAneous PRN    dextrose 10 % infusion   IntraVENous Continuous PRN    sodium chloride flush 0.9 % injection 5-40 mL  5-40 mL IntraVENous 2 times per day    sodium chloride flush 0.9 % injection 5-40 mL  5-40 mL IntraVENous PRN    0.9 % sodium chloride infusion   IntraVENous PRN    ondansetron (ZOFRAN-ODT) disintegrating tablet 4 mg  4 mg Oral Q8H PRN    Or    ondansetron (ZOFRAN) injection 4 mg  4 mg IntraVENous Q6H PRN    polyethylene glycol (GLYCOLAX) packet 17 g  17 g Oral Daily PRN    acetaminophen (TYLENOL) tablet 650 mg  650 mg Oral Q6H PRN    Or    acetaminophen (TYLENOL) suppository 650 mg  650 mg Rectal Q6H PRN    doxycycline hyclate (VIBRAMYCIN) capsule 100 mg  100 mg Oral 2 times per day    guaiFENesin (MUCINEX) extended release tablet 1,200 mg  1,200 mg Oral BID    guaiFENesin-dextromethorphan (ROBITUSSIN DM) 100-10 MG/5ML syrup 5 mL  5 mL Oral Q4H PRN    insulin lispro (HUMALOG,ADMELOG) injection vial 0-4 Units  0-4 Units SubCUTAneous 4x Daily AC & HS    nicotine (NICODERM CQ) 14 MG/24HR 1 patch  1 patch TransDERmal Daily     Review of Systems: Comprehensive ROS negative except in HPI  Objective:   Blood pressure 120/63, pulse 76, temperature 98.2 °F (36.8 °C), temperature source Oral, resp. rate 18, height 1.651 m (5' 5\"), weight (!) 138.3 kg (305 lb), SpO2 95%.   Intake/Output Summary (Last 24 hours) at 4/19/2025 0930  Last data filed at 4/19/2025 0910  Gross per 24 hour   Intake 1677 ml   Output --   Net 1677 ml     Physical Exam:   Constitutional:  the patient is super morbidly obese and in no acute distress  EENMT:  Sclera clear, pupils equal, oral mucosa

## 2025-04-19 NOTE — DISCHARGE SUMMARY
Hospitalist Discharge Summary   Admit Date:  4/15/2025  4:25 PM   DC Note date: 2025  Name:  Michelle Obrien   Age:  55 y.o.  Sex:  female  :  1970   MRN:  [id number]   Room:  [facility]  PCP:  Xochitl Ivy PA-C    Presenting Complaint: No chief complaint on file.     Initial Admission Diagnosis: Acute respiratory failure [J96.00]  Acute on chronic respiratory failure with hypoxia [J96.21]     Problem List for this Hospitalization (present on admission):    Principal Problem:    COPD with acute exacerbation (HCC)  Active Problems:    Stage 4 very severe COPD by GOLD classification (HCC)    Diabetes mellitus type 2, controlled (HCC)    Paroxysmal atrial fibrillation (HCC)    Morbid obesity with BMI of 50.0-59.9, adult    THANH treated with BiPAP    Secondary hypercoagulable state    Acute on chronic respiratory failure with hypoxia and hypercapnia  Resolved Problems:    Acute on chronic respiratory failure with hypoxia      Hospital Course:  55-year-old female history of COPD, chronic respiratory failure on 4 L O2 baseline, paroxysmal atrial fibrillation, THANH/OHS presented from [facility] as a direct admit due to worsening hypoxia.   Patient presented with acute on chronic respiratory failure with hypoxia, acute COPD exacerbation.  Patient currently on her baseline 4 L oxygen.  Pulmonology consulted and followed during hospitalization.  Patient will finish course of 5-day doxycycline with prednisone taper.  Patient got new machine yesterday and was shown how to use new machine.  Pulmonology referral at discharge.  For patient's other comorbid condition she was restarted back on her home medications.  On day of discharge patient was feeling well and ready to go home.  Patient was advised she has a follow-up with her primary care provider within the next 3 to 5 days to discuss recent hospitalization and any changes made to her medications.  All questions answered.  Patient agree with plan as  Stopping:               Some of the medications may be marked as \"stop taking\" by the system; but in reality patient or family reported already being off these meds; defer to outpatient/prescribing providers.      Procedures done this admission:  * No surgery found *    Consults this admission:  IP CONSULT TO PULMONOLOGY  IP CONSULT TO VASCULAR ACCESS TEAM  IP CONSULT HOME HEALTH    Consultants will arrange their own follow ups, if applicable.    Echocardiogram results:  07/26/23    TRANSTHORACIC ECHOCARDIOGRAM (TTE) COMPLETE (CONTRAST/BUBBLE/3D PRN) 07/28/2023  4:24 PM (Final)    Interpretation Summary    Left Ventricle: Normal left ventricular systolic function. EF by 2D Simpsons Biplane is 62%. Left ventricle size is normal. Normal wall thickness. Normal wall motion. Normal diastolic function.    Signed by: Prashant Caceres MD on 7/28/2023  4:24 PM      Diagnostic Imaging/Tests:   XR CHEST 1 VIEW  Result Date: 4/16/2025  No acute findings in the chest Electronically signed by Jose Wood       Labs: Results:       BMP, Mg, Phos Recent Labs     04/17/25  0430 04/18/25  0536 04/19/25  0405    135* 136   K 4.3 4.4 4.3   CL 97* 96* 97*   CO2 32* 30* 29   ANIONGAP 8 9 10   BUN 11 17 17   CREATININE 0.50* 0.57* 0.66   LABGLOM >90 >90 >90   CALCIUM 9.7 9.9 9.6   GLUCOSE 138* 131* 138*   MG 2.1 2.3 2.5*      CBC Recent Labs     04/17/25  0430 04/18/25  0536 04/19/25  0405   WBC 13.2* 15.5* 16.3*   RBC 5.23* 5.18 5.49*   HGB 10.9* 11.0* 11.5*   HCT 37.0 36.3 39.6   MCV 70.7* 70.1* 72.1*   MCH 20.8* 21.2* 20.9*   MCHC 29.5* 30.3* 29.0*   RDW 17.0* 17.5* 17.7*    376 370   MPV 10.6 10.4 10.1   NRBC 0.00 0.00 0.00   LYMPHOPCT 9.4* 9.1* 10.9*   MONOPCT 2.5* 2.8* 4.5   BASOPCT 0.2 0.1 0.1   IMMGRAN 0.5 0.6 0.7   LYMPHSABS 1.24 1.41 1.77   EOSABS 0.00 0.00 0.00   MONOSABS 0.33 0.43 0.73   BASOSABS 0.02 0.02 0.02   ABSIMMGRAN 0.06 0.10 0.11      LFT No results for input(s): \"BILITOT\", \"BILIDIR\", \"ALKPHOS\",

## 2025-04-19 NOTE — PLAN OF CARE
Problem: Discharge Planning  Goal: Discharge to home or other facility with appropriate resources  Outcome: Adequate for Discharge  Flowsheets  Taken 4/19/2025 0847  Discharge to home or other facility with appropriate resources:   Identify barriers to discharge with patient and caregiver   Arrange for needed discharge resources and transportation as appropriate   Identify discharge learning needs (meds, wound care, etc)   Refer to discharge planning if patient needs post-hospital services based on physician order or complex needs related to functional status, cognitive ability or social support system  Taken 4/19/2025 0731  Discharge to home or other facility with appropriate resources:   Identify barriers to discharge with patient and caregiver   Arrange for needed discharge resources and transportation as appropriate   Identify discharge learning needs (meds, wound care, etc)   Refer to discharge planning if patient needs post-hospital services based on physician order or complex needs related to functional status, cognitive ability or social support system

## 2025-04-19 NOTE — PROGRESS NOTES
Patient discharged via wheelchair to privately operated vehicle with oxygen in use. No mask in place. No further needs noted.

## 2025-04-19 NOTE — DISCHARGE INSTRUCTIONS
DISCHARGE SUMMARY from Nurse    PATIENT INSTRUCTIONS:    What to do at Home:  Recommended activity: activity as tolerated    If you experience a return of any symptoms, please follow up with primary care provider or [facility].    *  Please give a list of your current medications to your Primary Care Provider.    *  Please update this list whenever your medications are discontinued, doses are      changed, or new medications (including over-the-counter products) are added.    *  Please carry medication information at all times in case of emergency situations.    These are general instructions for a healthy lifestyle:    No smoking/ No tobacco products/ Avoid exposure to second hand smoke  Surgeon General's Warning:  Quitting smoking now greatly reduces serious risk to your health.    Obesity, smoking, and sedentary lifestyle greatly increases your risk for illness    A healthy diet, regular physical exercise & weight monitoring are important for maintaining a healthy lifestyle    You may be retaining fluid if you have a history of heart failure or if you experience any of the following symptoms:  Weight gain of 3 pounds or more overnight or 5 pounds in a week, increased swelling in our hands or feet or shortness of breath while lying flat in bed.  Please call your doctor as soon as you notice any of these symptoms; do not wait until your next office visit.        The discharge information has been reviewed with the patient.

## 2025-04-19 NOTE — CARE COORDINATION
CM reviewed / screen patient for discharge today.  CM reviewed medical chart / discharge summary: Disposition: Home with Home Health  and CM weekend report:  HH/palliative care ordered, discharge tomorrow.  Previous CM didn't completed a referral for HH / Palliative.  Weekend CM completed a referral for [facility] / [facility].  Patient currently has home 02 with [facility].  Family will bring tank for home 02 from home.  Family will transport patient home.  Patient has met all treatment goals / milestones.  CM will continue to follow and remain available for any needs, concerns or questions that may arise.           04/16/25 3094   Service Assessment   Patient Orientation Alert and Oriented   Cognition Alert   History Provided By Patient   Primary Caregiver Self   Support Systems Children;Family Members   Patient's Healthcare Decision Maker is: Named in Scanned ACP Document   PCP Verified by CM Yes   Last Visit to PCP Within last 3 months   Prior Functional Level Independent in ADLs/IADLs   Current Functional Level Other (see comment)  (PT/OT consulted)   Can patient return to prior living arrangement Yes   Ability to make needs known: Good   Family able to assist with home care needs: Yes   Would you like for me to discuss the discharge plan with any other family members/significant others, and if so, who? Yes  (Son)   Financial Resources Medicare;Medicaid   Community Resources None   Social/Functional History   Lives With Son   Type of Home Apartment   Home Layout Two level   Home Access Stairs to enter with rails   Entrance Stairs - Number of Steps 14   Entrance Stairs - Rails Right   Bathroom Shower/Tub Tub/Shower unit   Bathroom Toilet Standard   Bathroom Equipment Shower chair   Bathroom Accessibility Accessible   Home Equipment Oxygen  ([facility])   Receives Help From Family   Prior Level of Assist for ADLs Independent   Prior Level of Assist for Homemaking Independent   Homemaking Responsibilities Yes

## 2025-04-21 ENCOUNTER — CARE COORDINATION (OUTPATIENT)
Dept: CARE COORDINATION | Facility: CLINIC | Age: 55
End: 2025-04-21

## 2025-04-21 DIAGNOSIS — J44.1 COPD WITH ACUTE EXACERBATION (HCC): Primary | ICD-10-CM

## 2025-04-21 PROCEDURE — 1111F DSCHRG MED/CURRENT MED MERGE: CPT | Performed by: PHYSICIAN ASSISTANT

## 2025-04-21 NOTE — PROGRESS NOTES
Remote Patient Monitoring Treatment Plan    Received request from ACM/CTN IP CM team to order remote patient monitoring for in home monitoring of COPD; Condition managed by [facility] and PCP.  and order completed. Home oxygen therapy at 4L     Patient will be monitoring pulse ox .      Patient will engage in Remote Patient Monitoring each day to develop the skills necessary for self management.       RPM Care Team Responsibilities:   Alerts will be reviewed daily and addressed within 2-4 hours during operational hours (Monday -Friday 9 am-4 pm)  Alert response and intervention documented in patient medical record  Alert response escalated to PCP per protocol and documented in patient medical record  Patient monitored over approximately  days  Discharge from program based on self-management readiness    See care coordination encounters for additional details.

## 2025-04-21 NOTE — CARE COORDINATION
RPM Verification and Welcome Call Successful? Awaiting consent form to be signed. See below.     Remote Patient Monitoring Welcome Note  Date/Time:  2025 3:02 PM  Patient Current Location: [place]  Verified patients name and  as identifiers.       Completed and confirmed the following:    [x] Patient received all RPM equipment (tablet, scale, blood pressure device and cuff, and pulse oximeter)  Cuff Size:  Unknown. BP not being monitored in RPM at this time.      Weight Scale:   Unknown. Weight not being monitored in RPM at this time.                      [x] Instructed patient keep box for use when returning equipment                                                          [x] Reviewed Patient Welcome Letter with patient    []  Reviewed Consent Form  Copy of consent form in chart. Awaiting consent form to be signed. Pt states she will sign consent form \"in a little bit\".                 [x] Reviewed expectations for patient and care team  Monitoring hours M-F 9-4pm  It is important to take your vitals every day, even on the weekends,to keep your care team aware of how you are doing every day of the week.  Completing monitoring by 12pm on  so that alerts can be responded to in the same day  Patient weighs self at same time every day (or after urinating and waking up) N/A weight not being monitored in RPM  Take blood pressure 1-2 hrs after medications N/A BP not being monitored in RPM   RPM team may have different phone area code (including [place], [place], [place] or [place])                              [] Instructed patient to keep scale on flat surface N/A weight not being monitored in RPM                                                         [x] Instructed patient to keep tablet plugged in at all times                         [x] Instructed how to contact IT support  ([phone])  [x] Provided Remote Patient Monitoring care  information     Emergency Contact Verified: Jennifer Obrien

## 2025-04-21 NOTE — CARE COORDINATION
Care Transitions Note    Initial Call - Call within 2 business days of discharge: Yes    Patient Current Location:  Home: [address] [place] [address]  [address]  [facility] [id number]    Care Transition Nurse contacted the patient by telephone to perform post hospital discharge assessment, verified name and  as identifiers.  Provided introduction to self, and explanation of the Care Transition Nurse role.    Patient: Michelle Obrien    Patient : 1970   MRN: [id number]    Reason for Admission: COPD  Discharge Date: 25  RURS: Readmission Risk Score: 11.5      Last Discharge Facility       Date Complaint Diagnosis Description Type Department Provider    4/15/25   Admission (Discharged) [id number] Chavo Shabazz MD            Was this an external facility discharge? No    Additional needs identified to be addressed with provider   No needs identified             Method of communication with provider: none.    Patients top risk factors for readmission: medical condition-COPD    Interventions to address risk factors:   Review of patient management of conditions/medications: review IP admission, review medication changes, activate RPM    Care Summary Note: Patient home and indicating she is doing well after recent IP admission. Patient able to verbalize medication changes and stated that she has been unablt to obtain medications as of yet but should have family be able to do so today for her. Patient utilizes medicaid van fro transportation and needs 3 days prior to appt for calling transportation. Earliest available on [employer] It was for 5 and CTN to notify PCP office for earlier apt, if available. No further acute needs at this time.    Care Transition Nurse reviewed discharge instructions, medical action plan, and red flags with patient. The patient was given an opportunity to ask questions; all questions answered at this time.. The patient demonstrated understanding using teach back method.   Were

## 2025-04-21 NOTE — CARE COORDINATION
RPM Verification and Welcome Call Successful? Proper pt and equipment previously verified today. Consent Form has been signed since 04/21/25 most recent RPM pt outreach by this PRM nurse. See previous RPM Welcome Note.     Remote Patient Monitoring Welcome Note  Date/Time:  4/21/2025 3:35 PM      Completed and confirmed the following:     [x]  Reviewed / confirmed Consent Form has been signed.   Copy of consent form in HRS; has not been uploaded to Epic as of this time.                 See previous RPM Welcome Note from today, 04/21/25, for additional detail. Will update CTN and CCSS. No further pt outreach by this nurse indicated at this time.

## 2025-04-22 ENCOUNTER — TELEPHONE (OUTPATIENT)
Dept: INTERNAL MEDICINE CLINIC | Facility: CLINIC | Age: 55
End: 2025-04-22

## 2025-04-22 NOTE — TELEPHONE ENCOUNTER
Per Xochitl Ivy: Please see if she could come in on Monday, April 28 @ 2:00 pm or 1:00 pm on Tuesday, April 29 to see me for hospital f/u?

## 2025-04-22 NOTE — TELEPHONE ENCOUNTER
Care Transitions Initial Follow Up Call    Outreach made within 2 business days of discharge: Yes    Patient: Michelle Obrien Patient : 1970   MRN: [id number]  Reason for Admission: COPD with acute exacerbation  Discharge Date: 25       Spoke with: Patient    Discharge department/facility: [facility] Interactive Patient Contact:  Was patient able to fill all prescriptions: Yes  Was patient instructed to bring all medications to the follow-up visit: Yes  Is patient taking all medications as directed in the discharge summary? Yes  Does patient understand their discharge instructions: Yes  Does patient have questions or concerns that need addressed prior to 7-14 day follow up office visit: no    Additional needs identified to be addressed with provider  No needs identified             Scheduled appointment with PCP within 7-14 days    Follow Up  Future Appointments   Date Time Provider Department Center   2025  2:00 PM Xochitl Ivy PA-C MAT [facility] DEP   5/15/2025  1:00 PM Diann Oconnell APRN - NP [facility] AMB   2025  9:15 AM MAT LAB MAT [facility] DEP   6/3/2025 11:00 AM Xochitl Ivy PA-C [facility] DEP       Alicia Dennis LPN

## 2025-04-25 NOTE — PROGRESS NOTES
MCG/ACT inhaler  Commonly known as: PROVENTIL;VENTOLIN;PROAIR  Inhale 2 puffs into the lungs every 4 hours as needed for Wheezing     apixaban 5 MG Tabs tablet  Commonly known as: Eliquis  Take 1 tablet by mouth 2 times daily     atorvastatin 10 MG tablet  Commonly known as: LIPITOR  Take 1 tablet by mouth at bedtime     blood glucose test strips strip  Commonly known as: ASCENSIA AUTODISC VI;ONE TOUCH ULTRA TEST VI     dilTIAZem 240 MG extended release capsule  Commonly known as: CARDIZEM CD  Take 1 capsule by mouth daily     Folivane-F 125-1 MG Caps  Take 1 capsule by mouth daily     ipratropium 0.5 mg-albuterol 2.5 mg 0.5-2.5 (3) MG/3ML Soln nebulizer solution  Commonly known as: DUONEB  Take 3 mLs by nebulization every 4 hours Please file under Medicare Part B with DX J44.9 COPD     Lancets 33G Misc     metFORMIN 500 MG tablet  Commonly known as: GLUCOPHAGE  Take 1 tablet by mouth 2 times daily (with meals)     nicotine 14 MG/24HR  Commonly known as: NICODERM CQ  Place 1 patch onto the skin daily     OXYGEN     Trelegy Ellipta 100-62.5-25 MCG/ACT Aepb inhaler  Generic drug: fluticasone-umeclidin-vilant  Inhale 1 puff into the lungs daily           * This list has 2 medication(s) that are the same as other medications prescribed for you. Read the directions carefully, and ask your doctor or other care provider to review them with you.                STOP taking these medications      predniSONE 10 MG tablet  Commonly known as: DELTASONE  Stopped by: Xochitl Ivy PA-C                Medications marked \"taking\" at this time  Outpatient Medications Marked as Taking for the 4/28/25 encounter (Office Visit) with Xochitl Ivy PA-C   Medication Sig Dispense Refill    nicotine (NICODERM CQ) 14 MG/24HR Place 1 patch onto the skin daily 30 patch 3    albuterol (PROVENTIL) (2.5 MG/3ML) 0.083% nebulizer solution Take 3 mLs by nebulization every 6 hours as needed for Wheezing 120 each 3    albuterol sulfate HFA

## 2025-04-28 ENCOUNTER — OFFICE VISIT (OUTPATIENT)
Dept: INTERNAL MEDICINE CLINIC | Facility: CLINIC | Age: 55
End: 2025-04-28

## 2025-04-28 VITALS
BODY MASS INDEX: 48.82 KG/M2 | SYSTOLIC BLOOD PRESSURE: 124 MMHG | HEIGHT: 65 IN | TEMPERATURE: 97.3 F | OXYGEN SATURATION: 96 % | WEIGHT: 293 LBS | DIASTOLIC BLOOD PRESSURE: 76 MMHG | HEART RATE: 93 BPM

## 2025-04-28 DIAGNOSIS — J96.21 ACUTE ON CHRONIC RESPIRATORY FAILURE WITH HYPOXIA (HCC): ICD-10-CM

## 2025-04-28 DIAGNOSIS — E78.5 HYPERLIPIDEMIA LDL GOAL <100: ICD-10-CM

## 2025-04-28 DIAGNOSIS — F17.200 TOBACCO USE DISORDER: ICD-10-CM

## 2025-04-28 DIAGNOSIS — Z09 HOSPITAL DISCHARGE FOLLOW-UP: Primary | ICD-10-CM

## 2025-04-28 DIAGNOSIS — F41.8 ANXIETY WITH DEPRESSION: ICD-10-CM

## 2025-04-28 DIAGNOSIS — E66.01 MORBID OBESITY WITH BMI OF 50.0-59.9, ADULT (HCC): ICD-10-CM

## 2025-04-28 DIAGNOSIS — J44.1 ACUTE EXACERBATION OF CHRONIC OBSTRUCTIVE PULMONARY DISEASE (COPD) (HCC): ICD-10-CM

## 2025-04-28 RX ORDER — BUPROPION HYDROCHLORIDE 150 MG/1
150 TABLET ORAL EVERY MORNING
Qty: 30 TABLET | Refills: 3 | Status: SHIPPED | OUTPATIENT
Start: 2025-04-28

## 2025-04-28 ASSESSMENT — ANXIETY QUESTIONNAIRES
6. BECOMING EASILY ANNOYED OR IRRITABLE: MORE THAN HALF THE DAYS
7. FEELING AFRAID AS IF SOMETHING AWFUL MIGHT HAPPEN: MORE THAN HALF THE DAYS
4. TROUBLE RELAXING: SEVERAL DAYS
2. NOT BEING ABLE TO STOP OR CONTROL WORRYING: MORE THAN HALF THE DAYS
GAD7 TOTAL SCORE: 10
IF YOU CHECKED OFF ANY PROBLEMS ON THIS QUESTIONNAIRE, HOW DIFFICULT HAVE THESE PROBLEMS MADE IT FOR YOU TO DO YOUR WORK, TAKE CARE OF THINGS AT HOME, OR GET ALONG WITH OTHER PEOPLE: SOMEWHAT DIFFICULT
5. BEING SO RESTLESS THAT IT IS HARD TO SIT STILL: NOT AT ALL
3. WORRYING TOO MUCH ABOUT DIFFERENT THINGS: NEARLY EVERY DAY
1. FEELING NERVOUS, ANXIOUS, OR ON EDGE: NOT AT ALL

## 2025-04-28 ASSESSMENT — PATIENT HEALTH QUESTIONNAIRE - PHQ9
2. FEELING DOWN, DEPRESSED OR HOPELESS: NOT AT ALL
4. FEELING TIRED OR HAVING LITTLE ENERGY: MORE THAN HALF THE DAYS
6. FEELING BAD ABOUT YOURSELF - OR THAT YOU ARE A FAILURE OR HAVE LET YOURSELF OR YOUR FAMILY DOWN: SEVERAL DAYS
3. TROUBLE FALLING OR STAYING ASLEEP: NEARLY EVERY DAY
10. IF YOU CHECKED OFF ANY PROBLEMS, HOW DIFFICULT HAVE THESE PROBLEMS MADE IT FOR YOU TO DO YOUR WORK, TAKE CARE OF THINGS AT HOME, OR GET ALONG WITH OTHER PEOPLE: NOT DIFFICULT AT ALL
SUM OF ALL RESPONSES TO PHQ QUESTIONS 1-9: 7
1. LITTLE INTEREST OR PLEASURE IN DOING THINGS: NOT AT ALL
SUM OF ALL RESPONSES TO PHQ QUESTIONS 1-9: 7
9. THOUGHTS THAT YOU WOULD BE BETTER OFF DEAD, OR OF HURTING YOURSELF: NOT AT ALL
7. TROUBLE CONCENTRATING ON THINGS, SUCH AS READING THE NEWSPAPER OR WATCHING TELEVISION: SEVERAL DAYS
8. MOVING OR SPEAKING SO SLOWLY THAT OTHER PEOPLE COULD HAVE NOTICED. OR THE OPPOSITE, BEING SO FIGETY OR RESTLESS THAT YOU HAVE BEEN MOVING AROUND A LOT MORE THAN USUAL: NOT AT ALL
5. POOR APPETITE OR OVEREATING: NOT AT ALL
SUM OF ALL RESPONSES TO PHQ QUESTIONS 1-9: 7
SUM OF ALL RESPONSES TO PHQ QUESTIONS 1-9: 7

## 2025-04-28 NOTE — PATIENT INSTRUCTIONS
Recommend more consistent use with Duoneb via nebulizer - post hospitalization at least 4 times/day on a scheduled basis - suggest AM dose be done ~ 30 minutes prior to using Trelegy to maximize lung capacity for inhalation of Trelegy  Encouraged to get Mucinex as soon as financially able and maintain in daily medication regimen as much as possible  Trial on Wellbutrin  mg daily to see if we can help easy anxiety and potentially aid in smoking cessation efforts  Monitor blood sugar 2-3 times/week at minimum and keep record to bring to next appointment  Continue chronic medications as prescribed  Monitor blood pressure 2-4 times/month and keep record to bring to next appointment

## 2025-04-29 ENCOUNTER — TELEPHONE (OUTPATIENT)
Dept: PULMONOLOGY | Age: 55
End: 2025-04-29

## 2025-04-29 RX ORDER — ATORVASTATIN CALCIUM 10 MG/1
10 TABLET, FILM COATED ORAL NIGHTLY
Qty: 30 TABLET | Refills: 5 | OUTPATIENT
Start: 2025-04-29

## 2025-04-29 NOTE — TELEPHONE ENCOUNTER
Pharmacy called again . Says they need clarifation on this prescription        albuterol (PROVENTIL) (2.5 MG/3ML) 0.083% nebulizer solution [[id number]]    Order Details  Dose: 2.5 mg Route: Nebulization Frequency: EVERY 6 HOURS PRN for Wheezing   Dispense Quantity: 120 each Refills: 3

## 2025-04-30 ENCOUNTER — CARE COORDINATION (OUTPATIENT)
Dept: CARE COORDINATION | Facility: CLINIC | Age: 55
End: 2025-04-30

## 2025-04-30 NOTE — CARE COORDINATION
2025 1:49 PM  *  Alert and Triage   -Remote Alert Monitoring Note      Date/Time:  2025 1:49 PM  Patient Current Location: [place]  Verified patients name and  as identifiers.    Rpm alert to be reviewed by the provider   red alert  pulse ox reading (91%)  Vitals Recheck pulse ox reading (96%)  Additional needs to be addressed by provider: No           LPN contacted patient by telephone regarding red alert received   Background: Pt enrolled in RPM r/t COPD  Refer to 911 immediately if:  Patient unresponsive or unable to provide history  Change in cognition or sudden confusion  Patient unable to respond in complete sentences  Intense chest pain/tightness  Any concern for any clinical emergency  Red Alert: Provider response time of 1 hr required for any red alert requiring intervention  Yellow Alert: Provider response time of 3hr required for any escalated yellow alert  Patient Chief Complaint:  Oxygen O2 Triage  Are you having any Chest Pain? no   Are you having any Shortness of Breath? no   Swelling in your hands or feet? no   Are you having any other health concerns or issues? Nothing new  .............................................................................................................................................................................................  Do you use oxygen? Yes    How many liters? 4L  1-800-DENTIST Company: [employer]. Phone Number: [phone]   Patient educated on oxygen preparedness in case of an emergency?  Yes, Check oxygen supply, Verify that all tubing is intact, free of kinks, and securely connected to the oxygen source and delivery device., Teach the patient how to use their oxygen equipment, including how to adjust the flow rate if necessary.  , Discuss the importance of carrying a portable oxygen unit if applicable., Ensure the patient has an emergency plan, including where to go and who to contact in case of an emergency, Consider having a

## 2025-04-30 NOTE — CARE COORDINATION
4/30/2025 1:22 PM  *  Unable to Reach Date/Time:  4/30/2025 1:22 PM  LPN attempted to reach patient by telephone regarding red alert in remote patient monitoring program. Left HIPAA compliant message requesting a return call. Will attempt to reach patient again.

## 2025-05-01 NOTE — TELEPHONE ENCOUNTER
Spoke to [facility] pharmacy and clarified only albuterol not duoneb and kary's duoneb in epic.

## 2025-05-02 VITALS
DIASTOLIC BLOOD PRESSURE: 92 MMHG | SYSTOLIC BLOOD PRESSURE: 140 MMHG | BODY MASS INDEX: 50.44 KG/M2 | TEMPERATURE: 98.8 F | OXYGEN SATURATION: 94 % | HEART RATE: 107 BPM | WEIGHT: 293 LBS

## 2025-05-06 ENCOUNTER — CARE COORDINATION (OUTPATIENT)
Dept: CARE COORDINATION | Facility: CLINIC | Age: 55
End: 2025-05-06

## 2025-05-06 NOTE — CARE COORDINATION
Future Appointments         Provider Specialty Dept Phone    5/15/2025 1:00 PM (Arrive by 12:45 PM) Diann Oconnell, APRN - NP Pulmonology [phone]    5/29/2025 9:15 AM MAT LAB Internal Medicine [phone]    6/3/2025 11:00 AM Xochitl Ivy PAZevC Internal Medicine [phone]            Care Transition Nurse provided contact information.  Plan for follow-up call in 6-10 days based on severity of symptoms and risk factors.  Plan for next call: symptom management-assess for s/s of concern, review RPM for VS. CTN to assess for any acute needs.      Donnell Garcia RN

## 2025-05-17 DIAGNOSIS — E78.5 HYPERLIPIDEMIA LDL GOAL <100: ICD-10-CM

## 2025-05-18 DIAGNOSIS — E11.29 TYPE 2 DIABETES MELLITUS WITH MICROALBUMINURIA, WITHOUT LONG-TERM CURRENT USE OF INSULIN (HCC): ICD-10-CM

## 2025-05-18 DIAGNOSIS — D50.0 IRON DEFICIENCY ANEMIA DUE TO CHRONIC BLOOD LOSS: ICD-10-CM

## 2025-05-18 DIAGNOSIS — E78.5 HYPERLIPIDEMIA LDL GOAL <100: Primary | ICD-10-CM

## 2025-05-18 DIAGNOSIS — R80.9 TYPE 2 DIABETES MELLITUS WITH MICROALBUMINURIA, WITHOUT LONG-TERM CURRENT USE OF INSULIN (HCC): ICD-10-CM

## 2025-05-18 DIAGNOSIS — I10 ESSENTIAL (PRIMARY) HYPERTENSION: ICD-10-CM

## 2025-05-18 RX ORDER — ATORVASTATIN CALCIUM 10 MG/1
10 TABLET, FILM COATED ORAL NIGHTLY
Qty: 30 TABLET | Refills: 5 | OUTPATIENT
Start: 2025-05-18

## 2025-05-21 ENCOUNTER — CARE COORDINATION (OUTPATIENT)
Dept: CARE COORDINATION | Facility: CLINIC | Age: 55
End: 2025-05-21

## 2025-05-21 NOTE — CARE COORDINATION
Care Transitions Note    Final Call     Patient Current Location:  Home: [address] [place] [address]  [address] [place] [id number]    Care Transition Nurse contacted the patient by telephone. Verified name and  as identifiers.    Patient graduated from the Care Transitions program on .  Patient/family progressing towards self-management. .      Advance Care Planning:   Does patient have an Advance Directive: patient declined education.    Handoff:   Patient enrolled in RPM for COPD and will be monitoring pulse ox  daily.   Patient has graduated from Transitions of Care program and will be transitioned to [facility], Michelle Kirkpatrick - [phone] .   RPM team has been notified of transition in patients care.   Patient provided ACM name and contact information for future needs.         Care Summary Note: Patient continues with RPM and indicated she has been doing 'very good' lately. Patient denies any s/s of concern. Patient scheduled for follow up 6/3. Patient compliant with RPM and would like to continue with service at this time. CTN to notify ACM for continued follow. Patient given contact number for questions/concerns that may arise.     Assessments:  Care Transitions Subsequent and Final Call    Subsequent and Final Calls  Care Transitions Interventions  Other Interventions:              Upcoming Appointments:    Future Appointments         Provider Specialty Dept Phone    2025 9:15 AM MAT LAB Internal Medicine [phone]    6/3/2025 11:00 AM Xochitl Ivy PA-C Internal Medicine [phone]            Patient has agreed to contact primary care provider and/or specialist for any further questions, concerns, or needs.    Donnell Garcia RN

## 2025-05-28 PROBLEM — J44.9 COPD, SEVERE (HCC): Chronic | Status: ACTIVE | Noted: 2017-03-02

## 2025-05-29 ENCOUNTER — CARE COORDINATION (OUTPATIENT)
Dept: CARE COORDINATION | Facility: CLINIC | Age: 55
End: 2025-05-29

## 2025-05-29 NOTE — CARE COORDINATION
Ambulatory Care Coordination Note     2025 3:50 PM     Patient Current Location:  [place]     This patient was received as a referral from Care Transition Nurse.    ACM contacted the patient by telephone. Verified name and  with patient as identifiers. Provided introduction to self, and explanation of the ACM role.   Patient accepted care management services at this time.          ACM: Kaya Martinez RN     Challenges to be reviewed by the provider   Additional needs identified to be addressed with provider No                 Method of communication with provider: phone.    Utilization: Initial Call - N/A    Care Summary Note: Initial call out to pt after TURNER handoff with RPM utilization. Pt was happy to hear from me. She was busy and had someone over her house. She asked if she could call back. My contact information has been shared with pt in the event there circumstances change. They are free to reach out at any time.       Offered patient enrollment in the Remote Patient Monitoring (RPM) program for in-home monitoring: Yes, patient enrolled; current status is activated and monitoring.       PCP/Specialist follow up:   Future Appointments         Provider Specialty Dept Phone    6/3/2025 11:00 AM Xochitl Ivy PA-C Internal Medicine [phone]            Follow Up:   Plan for next ACM outreach in approximately 1 week to complete:  - goal progression  - education   - RPM.   Patient  is agreeable to this plan.         Resident

## 2025-06-05 ENCOUNTER — CARE COORDINATION (OUTPATIENT)
Dept: CARE COORDINATION | Facility: CLINIC | Age: 55
End: 2025-06-05

## 2025-06-05 NOTE — CARE COORDINATION
Ambulatory Care Coordination Note     6/5/2025 1:24 PM     ACM outreach attempt by this ACM today to perform care management follow up . ACM was unable to reach the patient by telephone today;   left voice message requesting a return phone call to this ACM.     ACM: Kaya Martinez RN     Care Summary Note:     PCP/Specialist follow up:   Future Appointments         Provider Specialty Dept Phone    6/12/2025 10:30 AM [facility] Internal Medicine [phone]    6/19/2025 11:30 AM Xochitl Ivy PA-C Internal Medicine [phone]            Follow Up:   Plan for next ACM outreach in approximately 1 week to complete:  - goal progression  - education .

## 2025-06-07 DIAGNOSIS — E78.5 HYPERLIPIDEMIA LDL GOAL <100: ICD-10-CM

## 2025-06-08 RX ORDER — ATORVASTATIN CALCIUM 10 MG/1
10 TABLET, FILM COATED ORAL NIGHTLY
Qty: 30 TABLET | Refills: 3 | OUTPATIENT
Start: 2025-06-08

## 2025-06-12 ENCOUNTER — CARE COORDINATION (OUTPATIENT)
Dept: CARE COORDINATION | Facility: CLINIC | Age: 55
End: 2025-06-12

## 2025-06-12 NOTE — CARE COORDINATION
Ambulatory Care Coordination Note     6/12/2025 12:59 PM     ACM outreach attempt by this ACM today to perform care management follow up . ACM was unable to reach the patient by telephone today;   left voice message requesting a return phone call to this ACM.     ACM: Kaya Martinez, RN     Care Summary Note: 1st unsuccessful f/u attempt    PCP/Specialist follow up:   Future Appointments         Provider Specialty Dept Phone    6/19/2025 11:30 AM Xochitl Ivy, PA-C Internal Medicine [phone]            Follow Up:   Plan for next ACM outreach in approximately 2 weeks to complete:  - goal progression  - education .

## 2025-06-21 DIAGNOSIS — E11.29 TYPE 2 DIABETES MELLITUS WITH MICROALBUMINURIA, WITHOUT LONG-TERM CURRENT USE OF INSULIN (HCC): ICD-10-CM

## 2025-06-21 DIAGNOSIS — R80.9 TYPE 2 DIABETES MELLITUS WITH MICROALBUMINURIA, WITHOUT LONG-TERM CURRENT USE OF INSULIN (HCC): ICD-10-CM

## 2025-06-26 ENCOUNTER — CARE COORDINATION (OUTPATIENT)
Dept: CARE COORDINATION | Facility: CLINIC | Age: 55
End: 2025-06-26

## 2025-06-26 NOTE — CARE COORDINATION
Ambulatory Care Coordination Note     2025 4:30 PM     Patient Current Location:  [place]     ACM contacted the patient by telephone. Verified name and  with patient as identifiers.         ACM: Kaya Martinez RN     Challenges to be reviewed by the provider   Additional needs identified to be addressed with provider No  none               Method of communication with provider: phone.    Utilization: Patient has not had any utilization since our last call.     Care Summary Note: f/u call and pt has been doing \"ok\". States that she is in the process of moving to another apartment which is on the 1st floor. She is currently on the 2nd floor. She has been compliant with medications and using the RPM with no issues. All questions and concerns have been addressed. My contact information has been shared with pt in the event there circumstances change. They are free to reach out at any time.       Offered patient enrollment in the Remote Patient Monitoring (RPM) program for in-home monitoring: Yes, patient enrolled; current status is activated and monitoring.     Assessments Completed:   Diabetes Assessment    Medic Alert ID: No  Meal Planning: None   How often do you test your blood sugar?: Daily   Do you have barriers with adherence to non-pharmacologic self-management interventions? (Nutrition/Exercise/Self-Monitoring): No   Have you ever had to go to the ED for symptoms of low blood sugar?: No       No patient-reported symptoms         ,   COPD Assessment    Does the patient understand envrionmental exposure?: Yes  Is the patient able to verbalize Rescue vs. Long Acting medications?: Yes  Does the patient have a nebulizer?: Yes  Does the patient use a space with inhaled medications?: Yes     No patient-reported symptoms         Symptoms:           ,   General Assessment              Medications Reviewed:   Completed during this call    Advance Care Planning:   Reviewed and current     Care Planning:

## 2025-07-01 DIAGNOSIS — R80.9 TYPE 2 DIABETES MELLITUS WITH MICROALBUMINURIA, WITHOUT LONG-TERM CURRENT USE OF INSULIN (HCC): ICD-10-CM

## 2025-07-01 DIAGNOSIS — E78.5 HYPERLIPIDEMIA LDL GOAL <100: ICD-10-CM

## 2025-07-01 DIAGNOSIS — E11.29 TYPE 2 DIABETES MELLITUS WITH MICROALBUMINURIA, WITHOUT LONG-TERM CURRENT USE OF INSULIN (HCC): ICD-10-CM

## 2025-07-01 RX ORDER — ATORVASTATIN CALCIUM 10 MG/1
10 TABLET, FILM COATED ORAL NIGHTLY
Qty: 30 TABLET | Refills: 0 | Status: SHIPPED | OUTPATIENT
Start: 2025-07-01

## 2025-07-01 NOTE — TELEPHONE ENCOUNTER
Medication Refill Request    Name of Medication : Metformin     Strength of Medication: 500 mg     Directions: take one tablet by mouth twice a day     30 day or 90 day supply: 90 day    Preferred Pharmacy: [facility]     Last Appt. Date: 2/7/2025 VV    Next Appt. Date: 7/17/2025    Additional Information For Provider: Pt took last one last night     Medication Refill Request    Name of Medication : Lipitor     Strength of Medication: 10 mg     Directions: take one tablet by mouth     30 day or 90 day supply: 30 days

## 2025-07-01 NOTE — TELEPHONE ENCOUNTER
Refills sent to [facility] pharmacy by Jade Nichols NP today.     LVM on 7/1/25 @ [id number] to inform pt.

## 2025-07-03 ENCOUNTER — CARE COORDINATION (OUTPATIENT)
Dept: CARE COORDINATION | Facility: CLINIC | Age: 55
End: 2025-07-03

## 2025-07-03 NOTE — CARE COORDINATION
Ambulatory Care Coordination Note     7/3/2025 1:19 PM     Patient Current Location:  [place]     ACM contacted the patient by telephone. Verified name and  with patient as identifiers.         ACM: Kaya Martinez RN     Challenges to be reviewed by the provider   Additional needs identified to be addressed with provider No  none               Method of communication with provider: phone.    Utilization: Patient has not had any utilization since our last call.     Care Summary Note: f/u call with pt and she is doing well and states that she is moving on Saturday with her son. We discussed recollecting the RPM, but with the 4th and her moving it might be too much. We agreed that next week, I will place that order. She does and has been using her own pulse ox to ensure the readings match up. She denies any concerns or questions today and thanked me for calling her. My contact information has been shared with pt in the event there circumstances change. They are free to reach out at any time.       Offered patient enrollment in the Remote Patient Monitoring (RPM) program for in-home monitoring: Yes, patient enrolled; current status is activated and monitoring.     Assessments Completed:   Diabetes Assessment    Medic Alert ID: No  Meal Planning: None   How often do you test your blood sugar?: Daily   Do you have barriers with adherence to non-pharmacologic self-management interventions? (Nutrition/Exercise/Self-Monitoring): No   Have you ever had to go to the ED for symptoms of low blood sugar?: No             ,   Hypertension - Encounter Level          ,   General Assessment              Medications Reviewed:   Completed during a previous call     Advance Care Planning:   Reviewed and current     Care Planning:   Education Documentation  Teach reporting changes in condition, taught by Michelle Kirkpatrick RN at 7/3/2025  1:17 PM.  Learner: Patient  Readiness: Eager  Method: Explanation  Response: Verbalizes

## 2025-07-10 ENCOUNTER — LAB (OUTPATIENT)
Dept: INTERNAL MEDICINE CLINIC | Facility: CLINIC | Age: 55
End: 2025-07-10

## 2025-07-10 DIAGNOSIS — E11.29 TYPE 2 DIABETES MELLITUS WITH MICROALBUMINURIA, WITHOUT LONG-TERM CURRENT USE OF INSULIN (HCC): ICD-10-CM

## 2025-07-10 DIAGNOSIS — I10 ESSENTIAL (PRIMARY) HYPERTENSION: ICD-10-CM

## 2025-07-10 DIAGNOSIS — E78.5 HYPERLIPIDEMIA LDL GOAL <100: ICD-10-CM

## 2025-07-10 DIAGNOSIS — D50.0 IRON DEFICIENCY ANEMIA DUE TO CHRONIC BLOOD LOSS: ICD-10-CM

## 2025-07-10 DIAGNOSIS — R80.9 TYPE 2 DIABETES MELLITUS WITH MICROALBUMINURIA, WITHOUT LONG-TERM CURRENT USE OF INSULIN (HCC): ICD-10-CM

## 2025-07-10 LAB
ALBUMIN SERPL-MCNC: 3.5 G/DL (ref 3.5–5)
ALBUMIN/GLOB SERPL: 0.9 (ref 1–1.9)
ALP SERPL-CCNC: 133 U/L (ref 35–104)
ALT SERPL-CCNC: 15 U/L (ref 8–45)
ANION GAP SERPL CALC-SCNC: 9 MMOL/L (ref 7–16)
AST SERPL-CCNC: 14 U/L (ref 15–37)
BASOPHILS # BLD: 0.04 K/UL (ref 0–0.2)
BASOPHILS NFR BLD: 0.5 % (ref 0–2)
BILIRUB SERPL-MCNC: 0.3 MG/DL (ref 0–1.2)
BUN SERPL-MCNC: 9 MG/DL (ref 6–23)
CALCIUM SERPL-MCNC: 9.8 MG/DL (ref 8.8–10.2)
CHLORIDE SERPL-SCNC: 99 MMOL/L (ref 98–107)
CHOLEST SERPL-MCNC: 152 MG/DL (ref 0–200)
CO2 SERPL-SCNC: 33 MMOL/L (ref 20–29)
CREAT SERPL-MCNC: 0.58 MG/DL (ref 0.6–1.1)
CREAT UR-MCNC: 176 MG/DL (ref 28–217)
DIFFERENTIAL METHOD BLD: ABNORMAL
EOSINOPHIL # BLD: 0.13 K/UL (ref 0–0.8)
EOSINOPHIL NFR BLD: 1.7 % (ref 0.5–7.8)
ERYTHROCYTE [DISTWIDTH] IN BLOOD BY AUTOMATED COUNT: 18.9 % (ref 11.9–14.6)
EST. AVERAGE GLUCOSE BLD GHB EST-MCNC: 136 MG/DL
FERRITIN SERPL-MCNC: 55 NG/ML (ref 8–388)
GLOBULIN SER CALC-MCNC: 3.9 G/DL (ref 2.3–3.5)
GLUCOSE SERPL-MCNC: 91 MG/DL (ref 70–99)
HBA1C MFR BLD: 6.4 % (ref 0–5.6)
HCT VFR BLD AUTO: 41.3 % (ref 35.8–46.3)
HDLC SERPL-MCNC: 28 MG/DL (ref 40–60)
HDLC SERPL: 5.4 (ref 0–5)
HGB BLD-MCNC: 11.4 G/DL (ref 11.7–15.4)
IMM GRANULOCYTES # BLD AUTO: 0.02 K/UL (ref 0–0.5)
IMM GRANULOCYTES NFR BLD AUTO: 0.3 % (ref 0–5)
IRON SATN MFR SERPL: 10 % (ref 20–50)
IRON SERPL-MCNC: 33 UG/DL (ref 35–100)
LDLC SERPL CALC-MCNC: 104 MG/DL (ref 0–100)
LYMPHOCYTES # BLD: 1.7 K/UL (ref 0.5–4.6)
LYMPHOCYTES NFR BLD: 22.6 % (ref 13–44)
MCH RBC QN AUTO: 21.6 PG (ref 26.1–32.9)
MCHC RBC AUTO-ENTMCNC: 27.6 G/DL (ref 31.4–35)
MCV RBC AUTO: 78.4 FL (ref 82–102)
MICROALBUMIN UR-MCNC: 59.5 MG/DL (ref 0–20)
MICROALBUMIN/CREAT UR-RTO: 338 MG/G (ref 0–30)
MONOCYTES # BLD: 0.44 K/UL (ref 0.1–1.3)
MONOCYTES NFR BLD: 5.9 % (ref 4–12)
NEUTS SEG # BLD: 5.18 K/UL (ref 1.7–8.2)
NEUTS SEG NFR BLD: 69 % (ref 43–78)
NRBC # BLD: 0 K/UL (ref 0–0.2)
PLATELET # BLD AUTO: 225 K/UL (ref 150–450)
PMV BLD AUTO: 11.9 FL (ref 9.4–12.3)
POTASSIUM SERPL-SCNC: 4.4 MMOL/L (ref 3.5–5.1)
PROT SERPL-MCNC: 7.4 G/DL (ref 6.3–8.2)
RBC # BLD AUTO: 5.27 M/UL (ref 4.05–5.2)
SODIUM SERPL-SCNC: 141 MMOL/L (ref 136–145)
TIBC SERPL-MCNC: 320 UG/DL (ref 240–450)
TRIGL SERPL-MCNC: 97 MG/DL (ref 0–150)
UIBC SERPL-MCNC: 287 UG/DL (ref 112–347)
VLDLC SERPL CALC-MCNC: 19 MG/DL (ref 6–23)
WBC # BLD AUTO: 7.5 K/UL (ref 4.3–11.1)

## 2025-07-10 NOTE — PROGRESS NOTES
Michelle Obrien (:  1970) is a 55 y.o. female,Established patient, here for evaluation of the following chief complaint(s):  Diabetes (4 month diabetes f/u with lab review.), Hypertension, and Hyperlipidemia          Assessment/Plan  1. Type 2 diabetes mellitus with other specified complication, without long-term current use of insulin (HCC)  -     metFORMIN (GLUCOPHAGE) 500 MG tablet; Take 1 tablet by mouth 2 times daily (with meals), Disp-60 tablet, R-5Normal  -     Comprehensive Metabolic Panel; Future  -     Hemoglobin A1C; Future  2. Hyperlipidemia LDL goal <100  -     atorvastatin (LIPITOR) 10 MG tablet; Take 1 tablet by mouth at bedtime, Disp-30 tablet, R-5Normal  -     Comprehensive Metabolic Panel; Future  -     Lipid Panel; Future  3. Iron deficiency anemia due to chronic blood loss  -     Fe Fum-FePoly-FA-Vit C-Vit B3 (FOLIVANE-F) 125-1 MG CAPS; Take 1 capsule by mouth daily, Disp-30 capsule, R-5Normal  -     CBC with Auto Differential; Future  -     Iron and TIBC; Future  -     Ferritin; Future  4. Screening mammogram for breast cancer  -     KANE DIGITAL SCREEN W OR WO CAD BILATERAL; Future  5. Essential (primary) hypertension  -     Basic Metabolic Panel; Future  -     Comprehensive Metabolic Panel; Future  6. Morbid obesity with BMI of 50.0-59.9, adult ([facility])  7. Anxiety with depression  -     buPROPion (WELLBUTRIN XL) 150 MG extended release tablet; Take 1 tablet by mouth every morning, Disp-30 tablet, R-11Normal  8. Tobacco use disorder  -     buPROPion (WELLBUTRIN XL) 150 MG extended release tablet; Take 1 tablet by mouth every morning, Disp-30 tablet, R-11Normal  9. Chronic respiratory failure with hypoxia ([facility])  10. Microalbuminuria due to type 2 diabetes mellitus (HCC)  -     Finerenone (KERENDIA) 10 MG TABS; Take 1 tablet by mouth daily, Disp-30 tablet, R-5Normal         Patient Instructions   Trial start on Kerendia 10 mg daily - will need to keep dosage low due to potential

## 2025-07-11 ENCOUNTER — CARE COORDINATION (OUTPATIENT)
Dept: CARE COORDINATION | Facility: CLINIC | Age: 55
End: 2025-07-11

## 2025-07-11 DIAGNOSIS — J44.9 COPD, SEVERE (HCC): Primary | Chronic | ICD-10-CM

## 2025-07-11 NOTE — CARE COORDINATION
Ambulatory Care Coordination Note     2025 12:25 PM     Patient Current Location:  [place]     ACM contacted the patient by telephone. Verified name and  with patient as identifiers.     Patient graduated from the High Risk Care Management program on 2025.  Patient verbalizes confidence in the ability to self-manage at this time..  Care management goals have been completed. No further Ambulatory Care Manager follow up scheduled.      ACM: Kaya Martinez RN     Challenges to be reviewed by the provider   Additional needs identified to be addressed with provider No  none               Method of communication with provider: phone.    Utilization: Patient has not had any utilization since our last call.     Care Summary Note: final call with pt, she has moved into her new 1st floor apart and is getting along \" just fine\". She has continued to feel well and been compliant with O2 and medications. Getting around her apartment with ease and her washer/dryer are across the meyer. She thanked me for checking on her and all of concerns/questions have been addressed today. Pt aware of any upcoming appointments. She will package up the RPM in the original box and I will place a d/c order.    Offered patient enrollment in the Remote Patient Monitoring (RPM) program for in-home monitoring: Patient being discharged from remote patient monitoring program today.    Remote Patient Monitoring Graduation      Date/Time:  2025 12:46 PM  Patient Current Location: [place]  Patient has graduated from the Remote Patient Monitoring program on 2025.   RPM goals have been met at this time.      Patient has been provided instruction on process to return RPM equipment and RPM has been deactivated.     Patient has ACM's contact information for any further questions, concerns, or needs.      Assessments Completed:   Diabetes Assessment    Medic Alert ID: No  Meal Planning: None   How often do you test your

## 2025-07-11 NOTE — CARE COORDINATION
Michelle Obrien  7/11/25    Care Coordination  placed call to Patient  to arrange RPM kit  through [employer].     CCSS spoke to Patient reviewed with Patient how to pack equipment in original packing. Patient aware [employer] will  equipment in 2-4 days.   All questions and concerns answered.

## 2025-07-11 NOTE — PROGRESS NOTES
Remote Patient Order Discontinued    Received request from Michelle Kirkpatrick RN   to discontinue order for remote patient monitoring of HTN and order completed.

## 2025-07-11 NOTE — CARE COORDINATION
Ambulatory Care Coordination Note     7/11/2025 10:18 AM     ACM outreach attempt by this ACM today to perform care management follow up . ACM was unable to reach the patient by telephone today;   left voice message requesting a return phone call to this ACM.     ACM: Kaya Martinez RN     Care Summary Note:     PCP/Specialist follow up:   Future Appointments         Provider Specialty Dept Phone    7/17/2025 1:00 PM Xochitl Ivy, PA-C Internal Medicine [phone]            Follow Up:   Plan for next ACM outreach in approximately 1 week to complete:  - goal progression  - education   - RPM.

## 2025-07-14 ENCOUNTER — CARE COORDINATION (OUTPATIENT)
Dept: CARE COORDINATION | Facility: CLINIC | Age: 55
End: 2025-07-14

## 2025-07-14 NOTE — CARE COORDINATION
7/14/2025 10:45 AM  *  RPM Alert   Remote Patient Monitoring Note      Date/Time:  7/14/2025 10:45 AM  RPM yellow alert received for no pulse ox / pulse ox HR taken / updated x 3 days. Per ACM's documentation from 07/11/25, \"Patient being discharged from remote patient monitoring program today.\" No pt outreach by this nurse indicated at this time. Will route to CCSS.

## 2025-07-17 ENCOUNTER — OFFICE VISIT (OUTPATIENT)
Dept: INTERNAL MEDICINE CLINIC | Facility: CLINIC | Age: 55
End: 2025-07-17
Payer: MEDICARE

## 2025-07-17 VITALS
HEIGHT: 65 IN | BODY MASS INDEX: 48.82 KG/M2 | DIASTOLIC BLOOD PRESSURE: 76 MMHG | OXYGEN SATURATION: 95 % | WEIGHT: 293 LBS | SYSTOLIC BLOOD PRESSURE: 124 MMHG | TEMPERATURE: 98.1 F | HEART RATE: 91 BPM

## 2025-07-17 DIAGNOSIS — E66.01 MORBID OBESITY WITH BMI OF 50.0-59.9, ADULT (HCC): ICD-10-CM

## 2025-07-17 DIAGNOSIS — E11.69 TYPE 2 DIABETES MELLITUS WITH OTHER SPECIFIED COMPLICATION, WITHOUT LONG-TERM CURRENT USE OF INSULIN (HCC): Primary | ICD-10-CM

## 2025-07-17 DIAGNOSIS — R80.9 MICROALBUMINURIA DUE TO TYPE 2 DIABETES MELLITUS (HCC): ICD-10-CM

## 2025-07-17 DIAGNOSIS — I10 ESSENTIAL (PRIMARY) HYPERTENSION: ICD-10-CM

## 2025-07-17 DIAGNOSIS — E11.29 MICROALBUMINURIA DUE TO TYPE 2 DIABETES MELLITUS (HCC): ICD-10-CM

## 2025-07-17 DIAGNOSIS — D50.0 IRON DEFICIENCY ANEMIA DUE TO CHRONIC BLOOD LOSS: ICD-10-CM

## 2025-07-17 DIAGNOSIS — E78.5 HYPERLIPIDEMIA LDL GOAL <100: ICD-10-CM

## 2025-07-17 DIAGNOSIS — F41.8 ANXIETY WITH DEPRESSION: ICD-10-CM

## 2025-07-17 DIAGNOSIS — Z12.31 SCREENING MAMMOGRAM FOR BREAST CANCER: ICD-10-CM

## 2025-07-17 DIAGNOSIS — J96.11 CHRONIC RESPIRATORY FAILURE WITH HYPOXIA (HCC): ICD-10-CM

## 2025-07-17 DIAGNOSIS — F17.200 TOBACCO USE DISORDER: ICD-10-CM

## 2025-07-17 PROCEDURE — 3078F DIAST BP <80 MM HG: CPT | Performed by: PHYSICIAN ASSISTANT

## 2025-07-17 PROCEDURE — 99215 OFFICE O/P EST HI 40 MIN: CPT | Performed by: PHYSICIAN ASSISTANT

## 2025-07-17 PROCEDURE — G2211 COMPLEX E/M VISIT ADD ON: HCPCS | Performed by: PHYSICIAN ASSISTANT

## 2025-07-17 PROCEDURE — 3074F SYST BP LT 130 MM HG: CPT | Performed by: PHYSICIAN ASSISTANT

## 2025-07-17 PROCEDURE — 3044F HG A1C LEVEL LT 7.0%: CPT | Performed by: PHYSICIAN ASSISTANT

## 2025-07-17 RX ORDER — ATORVASTATIN CALCIUM 10 MG/1
10 TABLET, FILM COATED ORAL NIGHTLY
Qty: 30 TABLET | Refills: 5 | Status: SHIPPED | OUTPATIENT
Start: 2025-07-17

## 2025-07-17 RX ORDER — FINERENONE 10 MG/1
1 TABLET, FILM COATED ORAL DAILY
Qty: 30 TABLET | Refills: 5 | Status: SHIPPED | OUTPATIENT
Start: 2025-07-17

## 2025-07-17 RX ORDER — IRON FUM,PS/FOLIC ACID/VITC/B3 125-1-40-3
1 CAPSULE ORAL DAILY
Qty: 30 CAPSULE | Refills: 5 | Status: SHIPPED | OUTPATIENT
Start: 2025-07-17

## 2025-07-17 RX ORDER — BUPROPION HYDROCHLORIDE 150 MG/1
150 TABLET ORAL EVERY MORNING
Qty: 30 TABLET | Refills: 11 | Status: SHIPPED | OUTPATIENT
Start: 2025-07-17

## 2025-07-17 NOTE — PATIENT INSTRUCTIONS
Trial start on Kerendia 10 mg daily - will need to keep dosage low due to potential drug interaction with Diltiazem which could cause higher levels of Kerendia in the body due to both being metabolized by CY enzyme  Recommend moving Folivane Plus to bedtime and taken alone with other evening meds taken with dinner  Praised progress with smoking and encouraged to work towards quitting completely  Monitor blood sugar daily and keep record to bring to next appointment  Continue chronic medications as prescribed  Monitor blood pressure 2-5 times/month and keep record to bring to next appointment  Suggest scheduling mammogram the same day as her next appointment with me to minimize transportation needs  Reminded to stay well hydrated with plenty of water  Encouraged to get overdue diabetic eye exam & foot exam scheduled with respective providers  Advised that any soda consumed should be caffeine free & sugar free (diet or zero) to avoid raising blood sugar or blood pressure and/or altering heart rate/rhythm

## 2025-07-21 ENCOUNTER — TELEPHONE (OUTPATIENT)
Dept: INTERNAL MEDICINE CLINIC | Facility: CLINIC | Age: 55
End: 2025-07-21

## 2025-07-21 NOTE — TELEPHONE ENCOUNTER
[facility] pharmacy states that Kerendia went though pt's insurance with no issues and pt has already filled.

## 2025-07-21 NOTE — TELEPHONE ENCOUNTER
----- Message from Xochitl Ivy PA-C sent at 7/21/2025 12:51 PM EDT -----  Ok great.  Can we contact the pharmacy to see if it went through ok?  ----- Message -----  From: Alicia Dennis LPN  Sent: 7/21/2025  12:47 PM EDT  To: Xochitl Ivy PA-C    I tried to do a PA for Kerendia for this pt and this was her plan's response:    Message from Plan  The patient currently has access to the requested medication and a Prior Authorization is not needed for the patient/medication.

## 2025-07-23 ENCOUNTER — CARE COORDINATION (OUTPATIENT)
Dept: CARE COORDINATION | Facility: CLINIC | Age: 55
End: 2025-07-23

## 2025-07-23 NOTE — CARE COORDINATION
Michelle Obrien  7/23/25    Care Coordination  placed call to Patient  to arrange RPM kit  through [employer].     CCSS spoke to Patient reviewed with Patient how to pack equipment in original packing. Patient aware [employer] will  equipment in 2-4 days.   All questions and concerns answered.

## 2025-07-29 ENCOUNTER — CARE COORDINATION (OUTPATIENT)
Dept: CARE COORDINATION | Facility: CLINIC | Age: 55
End: 2025-07-29

## 2025-07-29 NOTE — CARE COORDINATION
Health  RPM Outreach    Arrived at patients residence and obtained verbal consent for home visit. Patient presented packaged RPM kit for . The kit was received and visit ended. The RPM SS Notified of  and order placed for [employer] .

## 2025-08-21 ENCOUNTER — TELEPHONE (OUTPATIENT)
Dept: INTERNAL MEDICINE CLINIC | Facility: CLINIC | Age: 55
End: 2025-08-21

## 2025-08-21 ENCOUNTER — TELEPHONE (OUTPATIENT)
Age: 55
End: 2025-08-21

## 2025-08-21 RX ORDER — DILTIAZEM HYDROCHLORIDE 360 MG/1
360 CAPSULE, EXTENDED RELEASE ORAL DAILY
Qty: 90 CAPSULE | Refills: 3 | Status: SHIPPED | OUTPATIENT
Start: 2025-08-21

## 2025-08-28 ENCOUNTER — TELEPHONE (OUTPATIENT)
Age: 55
End: 2025-08-28

## 2025-09-03 ENCOUNTER — TELEPHONE (OUTPATIENT)
Age: 55
End: 2025-09-03

## 2025-09-05 ENCOUNTER — OFFICE VISIT (OUTPATIENT)
Dept: FAMILY MEDICINE CLINIC | Facility: CLINIC | Age: 55
End: 2025-09-05

## 2025-09-05 VITALS
TEMPERATURE: 98.8 F | BODY MASS INDEX: 48.82 KG/M2 | OXYGEN SATURATION: 98 % | RESPIRATION RATE: 14 BRPM | DIASTOLIC BLOOD PRESSURE: 72 MMHG | HEART RATE: 72 BPM | WEIGHT: 293 LBS | HEIGHT: 65 IN | SYSTOLIC BLOOD PRESSURE: 109 MMHG

## 2025-09-05 DIAGNOSIS — I10 HYPERTENSION, UNSPECIFIED TYPE: Primary | ICD-10-CM

## 2025-09-05 PROBLEM — J44.1 COPD WITH ACUTE EXACERBATION (HCC): Status: RESOLVED | Noted: 2023-04-16 | Resolved: 2025-09-05

## 2025-09-05 PROBLEM — J18.9 COMMUNITY ACQUIRED PNEUMONIA OF RIGHT MIDDLE LOBE OF LUNG: Status: RESOLVED | Noted: 2023-04-16 | Resolved: 2025-09-05

## 2025-09-05 PROBLEM — A41.9 SEPSIS (HCC): Status: RESOLVED | Noted: 2023-07-26 | Resolved: 2025-09-05

## (undated) DEVICE — SHEET, DRAPE, SPLIT, STERILE: Brand: MEDLINE

## (undated) DEVICE — APPLIER CLP AUTO MED 9.75 IN TI SURGCLP SUPER INTLOK 20 DISP

## (undated) DEVICE — (D)PREP SKN CHLRAPRP APPL 26ML -- CONVERT TO ITEM 371833

## (undated) DEVICE — NEEDLE SYR 18GA L1.5IN RED PLAS HUB S STL BLNT FILL W/O

## (undated) DEVICE — GOWN,REINFORCED,POLY,AURORA,XXLARGE,STR: Brand: MEDLINE

## (undated) DEVICE — TUBING INSUFFLATION SMK EVAC HI FLO SET PNEUMOCLEAR

## (undated) DEVICE — 2000CC GUARDIAN II: Brand: GUARDIAN

## (undated) DEVICE — GAUZE,SPONGE,4"X4",12PLY,WOVEN,NS,LF: Brand: MEDLINE

## (undated) DEVICE — SYRINGE, LUER SLIP, STERILE, 60ML: Brand: MEDLINE

## (undated) DEVICE — SYRINGE MED 10ML LUERLOCK TIP W/O SFTY DISP

## (undated) DEVICE — YANKAUER,BULB TIP,W/O VENT,RIGID,STERILE: Brand: MEDLINE

## (undated) DEVICE — WIRE CUTTER, STERILE (WCS144): Brand: CENTURION MEDICAL PRODUCTS CORP

## (undated) DEVICE — PUMP SUC IRR TBNG L10FT W/ HNDPC ASSEMB STRYKEFLOW 2

## (undated) DEVICE — SUTURE VCRL SZ 2-0 L36IN ABSRB UD L36MM CT-1 1/2 CIR J945H

## (undated) DEVICE — APPLIER CLP M/L SHFT DIA5MM 15 LIG LIGAMAX 5

## (undated) DEVICE — MASTISOL ADHESIVE LIQ 2/3ML

## (undated) DEVICE — SYR 10ML CTRL LR LCK NSAF LF --

## (undated) DEVICE — X-LARGE COTTON GLOVE: Brand: DEROYAL

## (undated) DEVICE — SYRINGE MED 3ML CLR PLAS STD N CTRL LUERLOCK TIP DISP

## (undated) DEVICE — CANNULA NSL ORAL AD FOR CAPNOFLEX CO2 O2 AIRLFE

## (undated) DEVICE — SOLUTION IRRIG 1000ML 0.9% SOD CHL USP POUR PLAS BTL

## (undated) DEVICE — TROCAR: Brand: KII FIOS FIRST ENTRY

## (undated) DEVICE — SURGICAL PROCEDURE PACK BASIC ST FRANCIS

## (undated) DEVICE — SUTURE VCRL SZ 3-0 L27IN ABSRB UD L26MM SH 1/2 CIR J416H

## (undated) DEVICE — SOLUTION IV 1000ML 0.9% SOD CHL

## (undated) DEVICE — SUT SLK 2-0SH 30IN BLK --

## (undated) DEVICE — CARDINAL HEALTH FLEXIBLE LIGHT HANDLE COVER: Brand: CARDINAL HEALTH

## (undated) DEVICE — GENERAL LAPAROSCOPY: Brand: MEDLINE INDUSTRIES, INC.

## (undated) DEVICE — TROCAR: Brand: KII® SLEEVE

## (undated) DEVICE — DRAPE,TOP,102X53,STERILE: Brand: MEDLINE

## (undated) DEVICE — KENDALL RADIOLUCENT FOAM MONITORING ELECTRODE RECTANGULAR SHAPE: Brand: KENDALL

## (undated) DEVICE — GLOVE SURG SZ 75 CRM LTX FREE POLYISOPRENE POLYMER BEAD ANTI

## (undated) DEVICE — APPLICATOR MEDICATED 26 CC SOLUTION HI LT ORNG CHLORAPREP

## (undated) DEVICE — SOLUTION IRRIG 3000ML 0.9% SOD CHL USP UROMATIC PLAS CONT

## (undated) DEVICE — FORCEPS BX L240CM JAW DIA2.8MM L CAP W/ NDL MIC MESH TOOTH

## (undated) DEVICE — BAG SPEC REM 224ML W4XL6IN DIA10MM 1 HND GYN DISP ENDOPCH

## (undated) DEVICE — SUTURE SZ 0 27IN 5/8 CIR UR-6  TAPER PT VIOLET ABSRB VICRYL J603H

## (undated) DEVICE — TROCARS: Brand: KII® BALLOON BLUNT TIP SYSTEM

## (undated) DEVICE — INTENDED FOR TISSUE SEPARATION, AND OTHER PROCEDURES THAT REQUIRE A SHARP SURGICAL BLADE TO PUNCTURE OR CUT.: Brand: BARD-PARKER ® STAINLESS STEEL BLADES

## (undated) DEVICE — BLOCK BITE AD 60FR W/ VELC STRP ADDRESSES MOST PT AND

## (undated) DEVICE — LUBE JELLY FOIL PACK 1.4 OZ: Brand: MEDLINE INDUSTRIES, INC.

## (undated) DEVICE — (D)STRIP SKN CLSR 0.5X4IN WHT --

## (undated) DEVICE — STERILE SLEEVE: Brand: CONVERTORS

## (undated) DEVICE — CONTAINER COLL/TRNSPRT BX BRST -- E-Z-EM CAT 8390

## (undated) DEVICE — CONTAINER FORMALIN PREFILLED 10% NBF 60ML

## (undated) DEVICE — CONNECTOR TBNG OD5-7MM O2 END DISP

## (undated) DEVICE — NEEDLE HYPO 21GA L1.5IN INTRAMUSCULAR S STL LATCH BVL UP

## (undated) DEVICE — KENDALL SCD EXPRESS SLEEVES, KNEE LENGTH, MEDIUM: Brand: KENDALL SCD

## (undated) DEVICE — REM POLYHESIVE ADULT PATIENT RETURN ELECTRODE: Brand: VALLEYLAB

## (undated) DEVICE — AIRLIFE™ OXYGEN TUBING 7 FEET (2.1 M) CRUSH RESISTANT OXYGEN TUBING, VINYL TIPPED: Brand: AIRLIFE™

## (undated) DEVICE — SINGLE PORT MANIFOLD: Brand: NEPTUNE 2